# Patient Record
Sex: MALE | Race: WHITE | NOT HISPANIC OR LATINO | Employment: OTHER | URBAN - METROPOLITAN AREA
[De-identification: names, ages, dates, MRNs, and addresses within clinical notes are randomized per-mention and may not be internally consistent; named-entity substitution may affect disease eponyms.]

---

## 2018-02-20 NOTE — H&P
[facility]  TOTAL EYE CARE  [address]  [facility] [address]  [phone]  Dept: [phone]    OPHTHALMOLOGY PRE-OPERATIVE  HISTORY AND PHYSICAL    DATE OF H/P:  2018    DATE OF SURGERY:  2018  PROCEDURE:  Procedure(s):  PHACOEMULSIFICATION WITH STANDARD INTRAOCULAR LENS IMPLANT, Left Eye  LENS IMPLANT:  ZCB00 +18.0  REFRACTIVE GOAL:  PL Sph  SURGEON:  Mohit Victor MD    ANESTHESIA:  TOPICAL / MAC    OR CASE REQUIREMENTS:    DEMOGRAPHICS:  Demographic Information on Dk Randhawa:    Dk Randhawa  Gender: male  : 1955  [address] [facility] [address]  [phone] (home)     Medical Record: [id number]  Social Security Number: [id number]  Pharmacy: Data Unavailable  Primary Care Provider: Valent[facility]    Parent's names are: Data Unavailable (mother) and Data Unavailable (father).    Insurance: Payor: BCBS / [facility] Product Type: Indemnity /     OCULAR HISTORY:  Cataracts.    HISTORIES:  HTN    No past medical history on file.    No past surgical history on file.    No family history on file.    Social History   Substance Use Topics     Smoking status: Never Smoker     Smokeless tobacco: Not on file     Alcohol use Yes       MEDICATIONS:  No current facility-administered medications for this encounter.      Current Outpatient Prescriptions   Medication Sig     LISINOPRIL PO Take 20 mg by mouth 2 times daily.       metaproterenol sulfate 10 MG/5ML SYRP Take  by mouth.       METOPROLOL SUCCINATE PO Take  by mouth daily.         ALLERGIES:   No Known Allergies    PERTINENT SYSTEMS REVIEW:    1. No - Do you have a history of heart attack, stroke, stent, bypass or surgery on an artery in the head, neck, heart or legs?  2. No - Do you ever have any pain or discomfort in your chest?  3. No - Do you have a history of  Heart Failure?  4. No - Are you troubled by shortness of breath when walking: On the level, up a slight hill or  at night?  5. No - Do you currently have a cold, bronchitis or other respiratory infection?  6. No - Do you have a cough, shortness of breath or wheezing?  7. No - Do you sometimes get pains in the calves of your legs when you walk?  8. No - Do you or anyone in your family have previous history of blood clots?  9. No - Do you or does anyone in your family have a serious bleeding problem such as prolonged bleeding following surgeries or cuts?  10. No - Have you ever had problems with anemia or been told to take iron pills?  11. No - Have you had any abnormal blood loss such as black, tarry or bloody stools, or abnormal vaginal bleeding?  12. No - Have you ever had a blood transfusion?  13. No - Have you or any of your relatives ever had problems with anesthesia?  14. No - Do you have sleep apnea, excessive snoring or daytime drowsiness?  15. No - Do you have any prosthetic heart valves?  16. No - Do you have prosthetic joints?    EXAMINATION:  Vitals were reviewed                     Vison:  Va, right - 20/25, left - 20/30;   BAT, right - 20/70, left - 20/80;  HEENT:  Cataract, otherwise unremarkable.  LUNGS:  Clear  CV:  Regular rate and rhythm without murmur  ABD:  Soft and nontender  NEURO:  Alert and nonfocal    IMPRESSION:  Patient cleared for ophthalmic surgery.  Low risk with monitored, light sedation.  I have assessed the patient's DVT risk, and no additional orders necessary.    PLAN:  Procedure(s):  PHACOEMULSIFICATION WITH STANDARD INTRAOCULAR LENS IMPLANT, Left Eye      Mohit Victor MD

## 2018-02-22 ENCOUNTER — ANESTHESIA EVENT (OUTPATIENT)
Dept: SURGERY | Facility: CLINIC | Age: 63
End: 2018-02-22
Payer: COMMERCIAL

## 2018-02-22 NOTE — ANESTHESIA PREPROCEDURE EVALUATION
Anesthesia Evaluation     . Pt has had prior anesthetic. Type: General and MAC    No history of anesthetic complications          ROS/MED HX    ENT/Pulmonary:     (+)BERNARDINO risk factors hypertension, obese, tobacco use, Past use , . .    Neurologic:  - neg neurologic ROS     Cardiovascular:     (+) hypertension----. : . . . :. .       METS/Exercise Tolerance:  >4 METS   Hematologic:  - neg hematologic  ROS       Musculoskeletal:  - neg musculoskeletal ROS       GI/Hepatic:  - neg GI/hepatic ROS       Renal/Genitourinary:  - ROS Renal section negative       Endo:  - neg endo ROS       Psychiatric:  - neg psychiatric ROS       Infectious Disease:  - neg infectious disease ROS       Malignancy:      - no malignancy   Other:                     Physical Exam  Normal systems: cardiovascular, pulmonary and dental    Airway   Mallampati: I  TM distance: >3 FB  Neck ROM: full    Dental     Cardiovascular       Pulmonary                     Anesthesia Plan      History & Physical Review  History and physical reviewed and following examination; no interval change.    ASA Status:  2 .    NPO Status:  > 8 hours    Plan for MAC Reason for MAC:  Deep or markedly invasive procedure (G8)         Postoperative Care      Consents  Anesthetic plan, risks, benefits and alternatives discussed with:  Patient..                          .

## 2018-02-26 ENCOUNTER — SURGERY (OUTPATIENT)
Age: 63
End: 2018-02-26

## 2018-02-26 ENCOUNTER — ANESTHESIA (OUTPATIENT)
Dept: SURGERY | Facility: CLINIC | Age: 63
End: 2018-02-26
Payer: COMMERCIAL

## 2018-02-26 ENCOUNTER — HOSPITAL ENCOUNTER (OUTPATIENT)
Facility: CLINIC | Age: 63
Discharge: HOME | End: 2018-02-26
Attending: OPHTHALMOLOGY | Admitting: OPHTHALMOLOGY
Payer: COMMERCIAL

## 2018-02-26 VITALS
HEIGHT: 74 IN | SYSTOLIC BLOOD PRESSURE: 115 MMHG | RESPIRATION RATE: 16 BRPM | TEMPERATURE: 98 F | DIASTOLIC BLOOD PRESSURE: 90 MMHG | WEIGHT: 290 LBS | BODY MASS INDEX: 37.22 KG/M2 | OXYGEN SATURATION: 92 %

## 2018-02-26 PROCEDURE — 37000012 ZZH ANESTHESIA CATARACT PACKAGE: Performed by: OPHTHALMOLOGY

## 2018-02-26 PROCEDURE — 71000022 ZZH RECOVERY CATRACT PACKAGE: Performed by: OPHTHALMOLOGY

## 2018-02-26 PROCEDURE — V2632 POST CHMBR INTRAOCULAR LENS: HCPCS | Performed by: OPHTHALMOLOGY

## 2018-02-26 PROCEDURE — 25000125 ZZHC RX 250: Performed by: OPHTHALMOLOGY

## 2018-02-26 PROCEDURE — 25000125 ZZHC RX 250: Performed by: NURSE ANESTHETIST, CERTIFIED REGISTERED

## 2018-02-26 PROCEDURE — 25000128 H RX IP 250 OP 636: Performed by: OPHTHALMOLOGY

## 2018-02-26 PROCEDURE — 25000128 H RX IP 250 OP 636: Performed by: NURSE ANESTHETIST, CERTIFIED REGISTERED

## 2018-02-26 PROCEDURE — 36000025 ZZH CATARACT SURGICAL PACKAGE: Performed by: OPHTHALMOLOGY

## 2018-02-26 DEVICE — EYE IMP IOL AMO PCL TECNIS ZCB00 18.0: Type: IMPLANTABLE DEVICE | Site: EYE | Status: FUNCTIONAL

## 2018-02-26 RX ORDER — CYCLOPENTOLATE HYDROCHLORIDE 10 MG/ML
1 SOLUTION/ DROPS OPHTHALMIC
Status: COMPLETED | OUTPATIENT
Start: 2018-02-26 | End: 2018-02-26

## 2018-02-26 RX ORDER — LIDOCAINE 40 MG/G
CREAM TOPICAL
Status: DISCONTINUED | OUTPATIENT
Start: 2018-02-26 | End: 2018-02-26 | Stop reason: HOSPADM

## 2018-02-26 RX ORDER — BALANCED SALT SOLUTION 6.4; .75; .48; .3; 3.9; 1.7 MG/ML; MG/ML; MG/ML; MG/ML; MG/ML; MG/ML
SOLUTION OPHTHALMIC PRN
Status: DISCONTINUED | OUTPATIENT
Start: 2018-02-26 | End: 2018-02-26 | Stop reason: HOSPADM

## 2018-02-26 RX ORDER — PHENYLEPHRINE HYDROCHLORIDE 25 MG/ML
1 SOLUTION/ DROPS OPHTHALMIC
Status: COMPLETED | OUTPATIENT
Start: 2018-02-26 | End: 2018-02-26

## 2018-02-26 RX ORDER — SODIUM CHLORIDE, SODIUM LACTATE, POTASSIUM CHLORIDE, CALCIUM CHLORIDE 600; 310; 30; 20 MG/100ML; MG/100ML; MG/100ML; MG/100ML
INJECTION, SOLUTION INTRAVENOUS CONTINUOUS
Status: DISCONTINUED | OUTPATIENT
Start: 2018-02-26 | End: 2018-02-26 | Stop reason: HOSPADM

## 2018-02-26 RX ORDER — PROPARACAINE HYDROCHLORIDE 5 MG/ML
SOLUTION/ DROPS OPHTHALMIC PRN
Status: DISCONTINUED | OUTPATIENT
Start: 2018-02-26 | End: 2018-02-26 | Stop reason: HOSPADM

## 2018-02-26 RX ORDER — TROPICAMIDE 10 MG/ML
1 SOLUTION/ DROPS OPHTHALMIC
Status: COMPLETED | OUTPATIENT
Start: 2018-02-26 | End: 2018-02-26

## 2018-02-26 RX ADMIN — PHENYLEPHRINE HYDROCHLORIDE 1 DROP: 25 SOLUTION/ DROPS OPHTHALMIC at 08:46

## 2018-02-26 RX ADMIN — LIDOCAINE HYDROCHLORIDE 1 ML: 10 INJECTION, SOLUTION EPIDURAL; INFILTRATION; INTRACAUDAL; PERINEURAL at 08:57

## 2018-02-26 RX ADMIN — TROPICAMIDE 1 DROP: 10 SOLUTION/ DROPS OPHTHALMIC at 08:46

## 2018-02-26 RX ADMIN — PROPARACAINE HYDROCHLORIDE 2 DROP: 5 SOLUTION/ DROPS OPHTHALMIC at 10:18

## 2018-02-26 RX ADMIN — PHENYLEPHRINE HYDROCHLORIDE 1 DROP: 25 SOLUTION/ DROPS OPHTHALMIC at 09:11

## 2018-02-26 RX ADMIN — EPINEPHRINE 0.7 ML: 1 INJECTION, SOLUTION INTRAMUSCULAR; SUBCUTANEOUS at 10:20

## 2018-02-26 RX ADMIN — BALANCED SALT SOLUTION 175 ML: 6.4; .75; .48; .3; 3.9; 1.7 SOLUTION OPHTHALMIC at 10:19

## 2018-02-26 RX ADMIN — CYCLOPENTOLATE HYDROCHLORIDE 1 DROP: 10 SOLUTION/ DROPS OPHTHALMIC at 08:56

## 2018-02-26 RX ADMIN — TROPICAMIDE 1 DROP: 10 SOLUTION/ DROPS OPHTHALMIC at 08:56

## 2018-02-26 RX ADMIN — CYCLOPENTOLATE HYDROCHLORIDE 1 DROP: 10 SOLUTION/ DROPS OPHTHALMIC at 09:11

## 2018-02-26 RX ADMIN — MOXIFLOXACIN HYDROCHLORIDE 0.7 ML: 5 SOLUTION/ DROPS OPHTHALMIC at 10:31

## 2018-02-26 RX ADMIN — TROPICAMIDE 1 DROP: 10 SOLUTION/ DROPS OPHTHALMIC at 09:11

## 2018-02-26 RX ADMIN — MIDAZOLAM 2 MG: 1 INJECTION INTRAMUSCULAR; INTRAVENOUS at 10:12

## 2018-02-26 RX ADMIN — PHENYLEPHRINE HYDROCHLORIDE 1 DROP: 25 SOLUTION/ DROPS OPHTHALMIC at 08:56

## 2018-02-26 RX ADMIN — CYCLOPENTOLATE HYDROCHLORIDE 1 DROP: 10 SOLUTION/ DROPS OPHTHALMIC at 08:46

## 2018-02-26 ASSESSMENT — LIFESTYLE VARIABLES: TOBACCO_USE: 1

## 2018-02-26 NOTE — OP NOTE
CATARACT OPERATIVE NOTE    PATIENT: Dk Randhawa  DATE OF SURGERY: 2/26/2018  PREOPERATIVE DIAGNOSIS:  Senile Nuclear Cataract, Left eye  POSTOPERATIVE DIAGNOSIS:  Senile Nuclear Cataract, Left eye  OPERATIVE PROCEDURE:  Phacoemulsification and placement of intraocular lens  SURGEON:  Mohit Victor MD  ANESTHESIA:  Topical / MAC  EBL:  None  SPECIMENS:  None  COMPLICATIONS:  None    PROCEDURE:  The patient was brought to the operating room at [facility].  The left eye was prepped and draped in the usual fashion for cataract surgery.  A wire lid speculum was inserted.  A super sharp blade was used to make a paracentesis at the 5 O'clock position.  The super sharp blade was used to make a partial thickness temporal groove, which was 3 mm in length.  0.8 mL of non-preserved epi-Shugarcaine was injected into the anterior chamber.  Viscoelastic was used to inflate the anterior chamber through a cannula.  A 2.5 mm microkeratome was used to make a temporal clear corneal incision in a two-plane fashion.  A cystotome needle and forceps were used to make a capsulorrhexis.  Hydrodissection and hydrodelineation were performed with Balance Salt Solution.  The lens was then phacoemulsified and removed without complications.  The cortical material was removed with bimanual irrigation and aspiration.  The capsular bag was filled with viscoelastic.  A posterior chamber intraocular lens, preselected and recorded, was folded and inserted into the capsular bag.  The viscoelastic was removed with the irrigation and aspiration tip.  Balanced Salt Solution with Vigamox, 150mg/0.1mL, was used to refill the anterior chamber.  The wounds were checked for water tightness and required no suture.  The wire lid speculum was removed.  The patient's left eye was cleaned and a drop of each post-operative drop was placed, followed by a serna shield.  The patient tolerated the procedure well, and there were no  complications.      Mohit Victor MD

## 2018-02-26 NOTE — ANESTHESIA CARE TRANSFER NOTE
Patient: Dk Randhawa    Procedure(s):  Left Cataract Removal with Implant - Wound Class: I-Clean    Diagnosis: cataract  Diagnosis Additional Information: No value filed.    Anesthesia Type:   MAC     Note:  Airway :Room Air  Patient transferred to:Phase II  Comments: Patient's VSS. Spontaneous respirations. Patient awake and oriented. IV patent. Report to RN.Handoff Report: Identifed the Patient, Identified the Reponsible Provider, Reviewed the pertinent medical history, Discussed the surgical course, Reviewed Intra-OP anesthesia mangement and issues during anesthesia, Set expectations for post-procedure period and Allowed opportunity for questions and acknowledgement of understanding      Vitals: (Last set prior to Anesthesia Care Transfer)    CRNA VITALS  2/26/2018 1002 - 2/26/2018 1033      2/26/2018             NIBP: 114/73    Pulse: 86    NIBP Mean: 87    SpO2: 92 %                Electronically Signed By: DIEGO Whitehead CRNA  February 26, 2018  10:33 AM

## 2019-01-25 NOTE — H&P
[facility]  TOTAL EYE CARE  [address]  [facility] [address]  [phone]  Dept: [phone]    OPHTHALMOLOGY PRE-OPERATIVE  HISTORY AND PHYSICAL    DATE OF H/P:  2019    DATE OF SURGERY:  2019  PROCEDURE:  Procedure(s):  Cataract Removal with Implant, Right Eye  LENS IMPLANT:  ZCB00 +17.5  REFRACTIVE GOAL:  PL Sph  SURGEON:  Mohit Victor MD    ANESTHESIA:  TOPICAL / MAC    OR CASE REQUIREMENTS:    DEMOGRAPHICS:  Demographic Information on Dk Randhawa:    Dk Randhawa  Gender: male  : 1955  [address]  [facility] [address]  [phone] (home)     Medical Record: [id number]  Social Security Number: [id number]  Pharmacy: Data Unavailable  Primary Care Provider: Valentín [facility]    Parent's names are: Data Unavailable (mother) and Data Unavailable (father).    Insurance: Payor: BCBS / Plan: [facility] OUT OF STATE / Product Type: Indemnity /     OCULAR HISTORY:  Cataracts, s/p IOL left eye.    HISTORIES:  Past Medical History:   Diagnosis Date     Hypertension        Past Surgical History:   Procedure Laterality Date     ORTHOPEDIC SURGERY       PHACOEMULSIFICATION WITH STANDARD INTRAOCULAR LENS IMPLANT Left 2018    Procedure: PHACOEMULSIFICATION WITH STANDARD INTRAOCULAR LENS IMPLANT;  Left Cataract Removal with Implant;  Surgeon: Mohit Victor MD;  Location: [facility] OR       No family history on file.    Social History     Tobacco Use     Smoking status: Never Smoker     Smokeless tobacco: Never Used   Substance Use Topics     Alcohol use: Yes       MEDICATIONS:  No current facility-administered medications for this encounter.      Current Outpatient Medications   Medication Sig     AMLODIPINE BESYLATE PO Take 10 mg by mouth     LOSARTAN POTASSIUM PO Take 100 mg by mouth       ALLERGIES:   No Known Allergies    PERTINENT SYSTEMS REVIEW:    1. No - Do you have a history of heart attack, stroke, stent, bypass or surgery on an  artery in the head, neck, heart or legs?  2. No - Do you ever have any pain or discomfort in your chest?  3. No - Do you have a history of  Heart Failure?  4. No - Are you troubled by shortness of breath when walking: On the level, up a slight hill or at night?  5. No - Do you currently have a cold, bronchitis or other respiratory infection?  6. No - Do you have a cough, shortness of breath or wheezing?  7. No - Do you sometimes get pains in the calves of your legs when you walk?  8. No - Do you or anyone in your family have previous history of blood clots?  9. No - Do you or does anyone in your family have a serious bleeding problem such as prolonged bleeding following surgeries or cuts?  10. No - Have you ever had problems with anemia or been told to take iron pills?  11. No - Have you had any abnormal blood loss such as black, tarry or bloody stools, or abnormal vaginal bleeding?  12. No - Have you ever had a blood transfusion?  13. No - Have you or any of your relatives ever had problems with anesthesia?  14. No - Do you have sleep apnea, excessive snoring or daytime drowsiness?  15. No - Do you have any prosthetic heart valves?  16. No - Do you have prosthetic joints?    EXAMINATION:  Vitals were reviewed                     Vison:  Va, right - 20/30;   BAT, right - 20/80;  HEENT:  Cataract, otherwise unremarkable.  LUNGS:  Clear  CV:  Regular rate and rhythm without murmur  ABD:  Soft and nontender  NEURO:  Alert and nonfocal    IMPRESSION:  Patient cleared for ophthalmic surgery.  Low risk with monitored, light sedation.  I have assessed the patient's DVT risk, and no additional orders necessary.    PLAN:  Procedure(s):  Cataract Removal with Implant, Right Eye      Mohit Victor MD

## 2019-01-29 ENCOUNTER — ANESTHESIA EVENT (OUTPATIENT)
Dept: SURGERY | Facility: CLINIC | Age: 64
End: 2019-01-29
Payer: COMMERCIAL

## 2019-01-29 ASSESSMENT — LIFESTYLE VARIABLES: TOBACCO_USE: 1

## 2019-01-29 NOTE — ANESTHESIA PREPROCEDURE EVALUATION
Anesthesia Pre-Procedure Evaluation    Patient: Dk Randhawa   MRN: [id number] : 1955          Preoperative Diagnosis: cataract    Procedure(s):  Cataract Removal with Implant    Past Medical History:   Diagnosis Date     Hypertension      Past Surgical History:   Procedure Laterality Date     ORTHOPEDIC SURGERY       PHACOEMULSIFICATION WITH STANDARD INTRAOCULAR LENS IMPLANT Left 2018    Procedure: PHACOEMULSIFICATION WITH STANDARD INTRAOCULAR LENS IMPLANT;  Left Cataract Removal with Implant;  Surgeon: Mohit Victor MD;  Location: [facility] OR       Anesthesia Evaluation     . Pt has had prior anesthetic. Type: MAC and Regional           ROS/MED HX    ENT/Pulmonary:     (+)BERNARDINO risk factors hypertension, tobacco use, Past use , . .    Neurologic:       Cardiovascular:     (+) Dyslipidemia, hypertension----. : . . . :. .       METS/Exercise Tolerance:     Hematologic:         Musculoskeletal:         GI/Hepatic:         Renal/Genitourinary:         Endo:         Psychiatric:         Infectious Disease:         Malignancy:         Other:                          Physical Exam  Normal systems: cardiovascular and dental    Airway   Mallampati: I  TM distance: >3 FB  Neck ROM: full    Dental     Cardiovascular   Rhythm and rate: regular and normal      Pulmonary    breath sounds clear to auscultation            No results found for: WBC, HGB, HCT, PLT, CRP, SED, NA, POTASSIUM, CHLORIDE, CO2, BUN, CR, GLC, COLLINS, PHOS, MAG, ALBUMIN, PROTTOTAL, ALT, AST, GGT, ALKPHOS, BILITOTAL, BILIDIRECT, LIPASE, AMYLASE, JOSE F, PTT, INR, FIBR, TSH, T4, T3, HCG, HCGS, CKTOTAL, CKMB, TROPN    Preop Vitals  BP Readings from Last 3 Encounters:   18 115/90   12 167/110    Pulse Readings from Last 3 Encounters:   12 83      Resp Readings from Last 3 Encounters:   18 16    SpO2 Readings from Last 3 Encounters:   18 92%   12 99%      Temp Readings from Last 1 Encounters:   18 36.7  C  "(98  F) (Oral)    Ht Readings from Last 1 Encounters:   02/26/18 1.88 m (6' 2\")      Wt Readings from Last 1 Encounters:   02/26/18 131.5 kg (290 lb)    Estimated body mass index is 37.23 kg/m  as calculated from the following:    Height as of 2/26/18: 1.88 m (6' 2\").    Weight as of 2/26/18: 131.5 kg (290 lb).       Anesthesia Plan      History & Physical Review  History and physical reviewed and following examination; no interval change.    ASA Status:  3 .    NPO Status:  > 6 hours    Plan for MAC Reason for MAC:  Deep or markedly invasive procedure (G8)         Postoperative Care      Consents  Anesthetic plan, risks, benefits and alternatives discussed with:  Patient..                 DIEGO Carty CRNA  "

## 2019-01-30 ENCOUNTER — HOSPITAL ENCOUNTER (OUTPATIENT)
Facility: CLINIC | Age: 64
Discharge: HOME | End: 2019-01-30
Attending: OPHTHALMOLOGY | Admitting: OPHTHALMOLOGY
Payer: COMMERCIAL

## 2019-01-30 ENCOUNTER — ANESTHESIA (OUTPATIENT)
Dept: SURGERY | Facility: CLINIC | Age: 64
End: 2019-01-30
Payer: COMMERCIAL

## 2019-01-30 VITALS
OXYGEN SATURATION: 95 % | WEIGHT: 280 LBS | HEART RATE: 76 BPM | HEIGHT: 73 IN | TEMPERATURE: 98.3 F | BODY MASS INDEX: 37.11 KG/M2 | DIASTOLIC BLOOD PRESSURE: 85 MMHG | SYSTOLIC BLOOD PRESSURE: 129 MMHG | RESPIRATION RATE: 16 BRPM

## 2019-01-30 PROCEDURE — 71000022 ZZH RECOVERY CATRACT PACKAGE: Performed by: OPHTHALMOLOGY

## 2019-01-30 PROCEDURE — 25000128 H RX IP 250 OP 636: Performed by: OPHTHALMOLOGY

## 2019-01-30 PROCEDURE — 25000125 ZZHC RX 250: Performed by: NURSE ANESTHETIST, CERTIFIED REGISTERED

## 2019-01-30 PROCEDURE — 25000128 H RX IP 250 OP 636: Performed by: NURSE ANESTHETIST, CERTIFIED REGISTERED

## 2019-01-30 PROCEDURE — 37000012 ZZH ANESTHESIA CATARACT PACKAGE: Performed by: OPHTHALMOLOGY

## 2019-01-30 PROCEDURE — 25000125 ZZHC RX 250: Performed by: OPHTHALMOLOGY

## 2019-01-30 PROCEDURE — V2632 POST CHMBR INTRAOCULAR LENS: HCPCS | Performed by: OPHTHALMOLOGY

## 2019-01-30 PROCEDURE — 36000025 ZZH CATARACT SURGICAL PACKAGE: Performed by: OPHTHALMOLOGY

## 2019-01-30 DEVICE — EYE IMP IOL AMO PCL TECNIS ZCB00 17.5: Type: IMPLANTABLE DEVICE | Site: EYE | Status: FUNCTIONAL

## 2019-01-30 RX ORDER — PHENYLEPHRINE HYDROCHLORIDE 25 MG/ML
1 SOLUTION/ DROPS OPHTHALMIC
Status: COMPLETED | OUTPATIENT
Start: 2019-01-30 | End: 2019-01-30

## 2019-01-30 RX ORDER — SODIUM CHLORIDE, SODIUM LACTATE, POTASSIUM CHLORIDE, CALCIUM CHLORIDE 600; 310; 30; 20 MG/100ML; MG/100ML; MG/100ML; MG/100ML
INJECTION, SOLUTION INTRAVENOUS CONTINUOUS
Status: CANCELLED | OUTPATIENT
Start: 2019-01-30

## 2019-01-30 RX ORDER — BALANCED SALT SOLUTION 6.4; .75; .48; .3; 3.9; 1.7 MG/ML; MG/ML; MG/ML; MG/ML; MG/ML; MG/ML
SOLUTION OPHTHALMIC PRN
Status: DISCONTINUED | OUTPATIENT
Start: 2019-01-30 | End: 2019-01-30 | Stop reason: HOSPADM

## 2019-01-30 RX ORDER — SODIUM CHLORIDE, SODIUM LACTATE, POTASSIUM CHLORIDE, CALCIUM CHLORIDE 600; 310; 30; 20 MG/100ML; MG/100ML; MG/100ML; MG/100ML
INJECTION, SOLUTION INTRAVENOUS CONTINUOUS
Status: DISCONTINUED | OUTPATIENT
Start: 2019-01-30 | End: 2019-01-30 | Stop reason: HOSPADM

## 2019-01-30 RX ORDER — CYCLOPENTOLATE HYDROCHLORIDE 10 MG/ML
1 SOLUTION/ DROPS OPHTHALMIC
Status: COMPLETED | OUTPATIENT
Start: 2019-01-30 | End: 2019-01-30

## 2019-01-30 RX ORDER — TETRACAINE HYDROCHLORIDE 5 MG/ML
SOLUTION OPHTHALMIC PRN
Status: DISCONTINUED | OUTPATIENT
Start: 2019-01-30 | End: 2019-01-30 | Stop reason: HOSPADM

## 2019-01-30 RX ORDER — LIDOCAINE 40 MG/G
CREAM TOPICAL
Status: DISCONTINUED | OUTPATIENT
Start: 2019-01-30 | End: 2019-01-30 | Stop reason: HOSPADM

## 2019-01-30 RX ORDER — PROPOFOL 10 MG/ML
INJECTION, EMULSION INTRAVENOUS PRN
Status: DISCONTINUED | OUTPATIENT
Start: 2019-01-30 | End: 2019-01-30

## 2019-01-30 RX ORDER — TROPICAMIDE 10 MG/ML
1 SOLUTION/ DROPS OPHTHALMIC
Status: COMPLETED | OUTPATIENT
Start: 2019-01-30 | End: 2019-01-30

## 2019-01-30 RX ADMIN — CYCLOPENTOLATE HYDROCHLORIDE 1 DROP: 10 SOLUTION/ DROPS OPHTHALMIC at 06:52

## 2019-01-30 RX ADMIN — PHENYLEPHRINE HYDROCHLORIDE 1 DROP: 25 SOLUTION/ DROPS OPHTHALMIC at 07:02

## 2019-01-30 RX ADMIN — SODIUM CHLORIDE, POTASSIUM CHLORIDE, SODIUM LACTATE AND CALCIUM CHLORIDE 1000 ML: 600; 310; 30; 20 INJECTION, SOLUTION INTRAVENOUS at 06:57

## 2019-01-30 RX ADMIN — PHENYLEPHRINE HYDROCHLORIDE 1 DROP: 25 SOLUTION/ DROPS OPHTHALMIC at 06:58

## 2019-01-30 RX ADMIN — CYCLOPENTOLATE HYDROCHLORIDE 1 DROP: 10 SOLUTION/ DROPS OPHTHALMIC at 06:57

## 2019-01-30 RX ADMIN — CYCLOPENTOLATE HYDROCHLORIDE 1 DROP: 10 SOLUTION/ DROPS OPHTHALMIC at 07:02

## 2019-01-30 RX ADMIN — PROPOFOL 25 MG: 10 INJECTION, EMULSION INTRAVENOUS at 08:23

## 2019-01-30 RX ADMIN — PHENYLEPHRINE HYDROCHLORIDE 1 DROP: 25 SOLUTION/ DROPS OPHTHALMIC at 06:53

## 2019-01-30 RX ADMIN — TROPICAMIDE 1 DROP: 10 SOLUTION/ DROPS OPHTHALMIC at 07:03

## 2019-01-30 RX ADMIN — TROPICAMIDE 1 DROP: 10 SOLUTION/ DROPS OPHTHALMIC at 06:58

## 2019-01-30 RX ADMIN — TROPICAMIDE 1 DROP: 10 SOLUTION/ DROPS OPHTHALMIC at 06:53

## 2019-01-30 RX ADMIN — LIDOCAINE HYDROCHLORIDE 1 ML: 10 INJECTION, SOLUTION EPIDURAL; INFILTRATION; INTRACAUDAL; PERINEURAL at 06:57

## 2019-01-30 RX ADMIN — PROPOFOL 25 MG: 10 INJECTION, EMULSION INTRAVENOUS at 08:22

## 2019-01-30 ASSESSMENT — MIFFLIN-ST. JEOR: SCORE: 2113.95

## 2019-01-30 NOTE — OP NOTE
OPHTHALMOLOGY OPERATIVE NOTE    PATIENT: Dk Randhawa  DATE OF SURGERY: 1/30/2019  PREOPERATIVE DIAGNOSIS:  Senile Nuclear Cataract, Right eye  POSTOPERATIVE DIAGNOSIS:  Senile Nuclear Cataract, Right eye  OPERATIVE PROCEDURE:  Phacoemulsification with placement of intraocular lens  SURGEON:  Mohit Victor MD  ANESTHESIA:  Topical / MAC  EBL:  None  SPECIMENS:  None  COMPLICATIONS:  None    PROCEDURE:  The patient was brought to the operating room at [facility].  The right eye was prepped and draped in the usual fashion for cataract surgery.  A wire lid speculum was inserted.  A super sharp blade was used to make a paracentesis at the 11 O'clock position.  The super sharp blade was used to make a partial thickness temporal groove, which was 3 mm in length.  0.8 mL of non-preserved epi-Shugarcaine was injected into the anterior chamber.  Viscoelastic was used to inflate the anterior chamber through a cannula.  A 2.5 mm microkeratome was used to make a temporal clear corneal incision in a two-plane fashion.  A cystotome needle and forceps were used to make a capsulorrhexis.  Hydrodissection and hydrodelineation were performed with Balance Salt Solution.  The lens was then phacoemulsified and removed without complications.  The cortical material was removed with bimanual irrigation and aspiration.  The capsular bag was filled with viscoelastic.  A posterior chamber intraocular lens, preselected and recorded, was folded and inserted into the capsular bag.  The viscoelastic was removed with the irrigation and aspiration tip.  Balanced Salt Solution with Vigamox, 150mg/0.1mL, was used to refill the anterior chamber.  The wounds were checked for water tightness and required no suture.  The wire lid speculum was removed.  The patient's right eye was cleaned and a drop of each post-operative drop was placed, followed by a serna shield.  The patient tolerated the procedure well, and there  were no complications.      Mohit Victor MD

## 2019-01-30 NOTE — ANESTHESIA POSTPROCEDURE EVALUATION
Patient: Dk Randhawa    Procedure(s):  Cataract Removal with Implant    Diagnosis:cataract  Diagnosis Additional Information: No value filed.    Anesthesia Type:  No value filed.    Note:  Anesthesia Post Evaluation    Patient location during evaluation: Bedside  Patient participation: Able to fully participate in evaluation  Level of consciousness: awake and alert  Pain management: adequate  Airway patency: patent  Cardiovascular status: acceptable  Respiratory status: acceptable  Hydration status: acceptable  PONV: none     Anesthetic complications: None          Last vitals:  Vitals:    01/30/19 0625   BP: 119/89   Pulse: 89   Resp: 18   Temp: 36.8  C (98.3  F)   SpO2: 95%         Electronically Signed By: DIEGO Rome CRNA  January 30, 2019  8:53 AM

## 2022-04-06 ENCOUNTER — HOSPITAL ENCOUNTER (EMERGENCY)
Facility: CLINIC | Age: 67
Discharge: HOME | End: 2022-04-06
Attending: EMERGENCY MEDICINE | Admitting: EMERGENCY MEDICINE
Payer: COMMERCIAL

## 2022-04-06 ENCOUNTER — OFFICE VISIT (OUTPATIENT)
Dept: URGENT CARE | Facility: URGENT CARE | Age: 67
End: 2022-04-06
Payer: COMMERCIAL

## 2022-04-06 ENCOUNTER — NURSE TRIAGE (OUTPATIENT)
Dept: NURSING | Facility: CLINIC | Age: 67
End: 2022-04-06
Payer: MEDICARE

## 2022-04-06 ENCOUNTER — APPOINTMENT (OUTPATIENT)
Dept: CT IMAGING | Facility: CLINIC | Age: 67
End: 2022-04-06
Attending: EMERGENCY MEDICINE
Payer: COMMERCIAL

## 2022-04-06 VITALS
OXYGEN SATURATION: 98 % | BODY MASS INDEX: 35.21 KG/M2 | WEIGHT: 260 LBS | SYSTOLIC BLOOD PRESSURE: 159 MMHG | HEIGHT: 72 IN | TEMPERATURE: 97.7 F | RESPIRATION RATE: 18 BRPM | HEART RATE: 92 BPM | DIASTOLIC BLOOD PRESSURE: 108 MMHG

## 2022-04-06 VITALS
HEART RATE: 93 BPM | OXYGEN SATURATION: 97 % | BODY MASS INDEX: 34.3 KG/M2 | WEIGHT: 260 LBS | DIASTOLIC BLOOD PRESSURE: 99 MMHG | SYSTOLIC BLOOD PRESSURE: 168 MMHG | TEMPERATURE: 97.5 F | RESPIRATION RATE: 18 BRPM

## 2022-04-06 DIAGNOSIS — R10.31 RLQ ABDOMINAL PAIN: Primary | ICD-10-CM

## 2022-04-06 DIAGNOSIS — R10.31 ABDOMINAL PAIN, RIGHT LOWER QUADRANT: ICD-10-CM

## 2022-04-06 LAB
ALBUMIN UR-MCNC: NEGATIVE MG/DL
APPEARANCE UR: CLEAR
BILIRUB UR QL STRIP: NEGATIVE
COLOR UR AUTO: YELLOW
ERYTHROCYTE [DISTWIDTH] IN BLOOD BY AUTOMATED COUNT: 12.7 % (ref 10–15)
GLUCOSE UR STRIP-MCNC: NEGATIVE MG/DL
HCT VFR BLD AUTO: 43.9 % (ref 40–53)
HGB BLD-MCNC: 14.8 G/DL (ref 13.3–17.7)
HGB UR QL STRIP: ABNORMAL
KETONES UR STRIP-MCNC: 80 MG/DL
LEUKOCYTE ESTERASE UR QL STRIP: NEGATIVE
MCH RBC QN AUTO: 30 PG (ref 26.5–33)
MCHC RBC AUTO-ENTMCNC: 33.7 G/DL (ref 31.5–36.5)
MCV RBC AUTO: 89 FL (ref 78–100)
NITRATE UR QL: NEGATIVE
PH UR STRIP: 6 [PH] (ref 5–7)
PLATELET # BLD AUTO: 259 10E3/UL (ref 150–450)
RBC # BLD AUTO: 4.93 10E6/UL (ref 4.4–5.9)
RBC #/AREA URNS AUTO: NORMAL /HPF
SP GR UR STRIP: 1.01 (ref 1–1.03)
UROBILINOGEN UR STRIP-ACNC: 0.2 E.U./DL
WBC # BLD AUTO: 9.8 10E3/UL (ref 4–11)
WBC #/AREA URNS AUTO: NORMAL /HPF

## 2022-04-06 PROCEDURE — 81001 URINALYSIS AUTO W/SCOPE: CPT | Performed by: NURSE PRACTITIONER

## 2022-04-06 PROCEDURE — 250N000011 HC RX IP 250 OP 636: Performed by: EMERGENCY MEDICINE

## 2022-04-06 PROCEDURE — 36415 COLL VENOUS BLD VENIPUNCTURE: CPT | Performed by: NURSE PRACTITIONER

## 2022-04-06 PROCEDURE — 99285 EMERGENCY DEPT VISIT HI MDM: CPT | Mod: 25 | Performed by: EMERGENCY MEDICINE

## 2022-04-06 PROCEDURE — 250N000009 HC RX 250: Performed by: EMERGENCY MEDICINE

## 2022-04-06 PROCEDURE — 99284 EMERGENCY DEPT VISIT MOD MDM: CPT | Performed by: EMERGENCY MEDICINE

## 2022-04-06 PROCEDURE — 74177 CT ABD & PELVIS W/CONTRAST: CPT

## 2022-04-06 PROCEDURE — 85027 COMPLETE CBC AUTOMATED: CPT | Performed by: NURSE PRACTITIONER

## 2022-04-06 PROCEDURE — 99203 OFFICE O/P NEW LOW 30 MIN: CPT | Performed by: NURSE PRACTITIONER

## 2022-04-06 RX ORDER — IOPAMIDOL 755 MG/ML
100 INJECTION, SOLUTION INTRAVASCULAR ONCE
Status: COMPLETED | OUTPATIENT
Start: 2022-04-06 | End: 2022-04-06

## 2022-04-06 RX ADMIN — IOPAMIDOL 100 ML: 755 INJECTION, SOLUTION INTRAVENOUS at 21:35

## 2022-04-06 RX ADMIN — SODIUM CHLORIDE 72 ML: 9 INJECTION, SOLUTION INTRAVENOUS at 21:35

## 2022-04-06 NOTE — ED TRIAGE NOTES
Pt sent from [facility] with RLQ abdominal pain that started a little bit yesterday. Worse today. No nausea, vomiting or diarrhea.

## 2022-04-06 NOTE — TELEPHONE ENCOUNTER
Pt calling    Has been having RLQ pain that radiates to his lower back that started this morning    Denies N/V, diarrhea, CP, or difficulty urinating. Has had 2 BMs today     Rates pain 5/10 that becomes sharp when he pushes on it       Reason for Disposition    Constant abdominal pain lasting > 2 hours    Additional Information    Negative: Passed out (i.e., fainted, collapsed and was not responding)    Negative: Chest pain    Negative: Pain is mainly in upper abdomen (if needed ask: 'is it mainly above the belly button?')    Negative: Shock suspected (e.g., cold/pale/clammy skin, too weak to stand, low BP, rapid pulse)    Negative: Sounds like a life-threatening emergency to the triager    Negative: SEVERE abdominal pain (e.g., excruciating)    Negative: Vomiting red blood or black (coffee ground) material    Negative: Bloody, black, or tarry bowel movements (Exception: chronic-unchanged black-grey bowel movements and is taking iron pills or Pepto-bismol)    Negative: Unable to urinate (or only a few drops) and bladder feels very full    Negative: Pain in scrotum persists > 1 hour    Protocols used: ABDOMINAL PAIN - MALE-A-OH    COVID 19 Nurse Triage Plan/Patient Instructions    Please be aware that novel coronavirus (COVID-19) may be circulating in the community. If you develop symptoms such as fever, cough, or SOB or if you have concerns about the presence of another infection including coronavirus (COVID-19), please contact your health care provider or visit [email or web address].     Disposition/Instructions    In-Person Visit with provider recommended. Reference Visit Selection Guide.    Thank you for taking steps to prevent the spread of this virus.  o Limit your contact with others.  o Wear a simple mask to cover your cough.  o Wash your hands well and often.    Resources    [facility]: About COVID-19: [email or web address]    CDC: What to Do If You're Sick:  www.cdc.gov/coronavirus/2019-ncov/about/steps-when-sick.html    CDC: Ending Home Isolation: www.cdc.gov/coronavirus/2019-ncov/hcp/disposition-in-home-patients.html     CDC: Caring for Someone: www.cdc.gov/coronavirus/2019-ncov/if-you-are-sick/care-for-someone.html     [facility]: Interim Guidance for Hospital Discharge to Home: [email or web address]./diseases/coronavirus/hcp/hospdischarge.pdf    [facility] clinical trials (COVID-19 research studies): clinicalaffairs.[facility]-clinical-trials     Below are the COVID-19 hotlines at the [place] [facility] ([facility]). Interpreters are available.   o For health questions: Call 312-618-7206 or 1-506.239.8673 (7 a.m. to 7 p.m.)  o For questions about schools and childcare: Call 554-520-3241 or 1-714.908.5239 (7 a.m. to 7 p.m.)         Sanna Little RN [facility] April 6, 2022 3:18 PM

## 2022-04-06 NOTE — PROGRESS NOTES
SUBJECTIVE:  Dk Randhawa is a 66 year old male seen in clinic today for evaluation of abdomen pain.   Symptoms have beening going on for 1 day.    Pain is located in the LLQ region, with radiation to radiates to the back, and are at worst a  on a scale of 1-10.    Past Medical History:   Diagnosis Date     Hypertension        Social History     Tobacco Use     Smoking status: Never Smoker     Smokeless tobacco: Never Used   Substance Use Topics     Alcohol use: Yes     Drug use: No       ROS:  Review of systems negative except as stated above.      OBJECTIVE:  Blood pressure (!) 168/99, pulse 93, temperature 97.5  F (36.4  C), temperature source Tympanic, resp. rate 18, weight 117.9 kg (260 lb), SpO2 97 %.  Exam:  Constitutional: healthy, alert and no distress  Head: Normocephalic. No masses, lesions, tenderness or abnormalities  Cardiovascular: negative, PMI normal. No lifts, heaves, or thrills. RRR. No murmurs, clicks gallops or rub  Respiratory: negative, Percussion normal. Good diaphragmatic excursion. Lungs clear  Gastrointestinal: Abdomen soft, non-tender. BS normal. No masses, organomegaly, positive findings: tenderness lower RLQ.  Musculoskeletal: extremities normal- no gross deformities noted, gait normal and normal muscle tone  Skin: no suspicious lesions or rashes  Neurologic: Gait normal. Reflexes normal and symmetric. Sensation grossly WNL.  Psychiatric: mentation appears normal and affect normal/bright  Hematologic/Lymphatic/Immunologic:     Results for orders placed or performed in visit on 04/06/22   UA Macro with Reflex to Micro and Culture - lab collect     Status: Abnormal    Specimen: Urine, Midstream   Result Value Ref Range    Color Urine Yellow Colorless, Straw, Light Yellow, Yellow    Appearance Urine Clear Clear    Glucose Urine Negative Negative mg/dL    Bilirubin Urine Negative Negative    Ketones Urine 80  (A) Negative mg/dL    Specific Gravity Urine 1.010 1.003 - 1.035    Blood  Urine Trace (A) Negative    pH Urine 6.0 5.0 - 7.0    Protein Albumin Urine Negative Negative mg/dL    Urobilinogen Urine 0.2 0.2, 1.0 E.U./dL    Nitrite Urine Negative Negative    Leukocyte Esterase Urine Negative Negative   CBC with platelets     Status: Normal   Result Value Ref Range    WBC Count 9.8 4.0 - 11.0 10e3/uL    RBC Count 4.93 4.40 - 5.90 10e6/uL    Hemoglobin 14.8 13.3 - 17.7 g/dL    Hematocrit 43.9 40.0 - 53.0 %    MCV 89 78 - 100 fL    MCH 30.0 26.5 - 33.0 pg    MCHC 33.7 31.5 - 36.5 g/dL    RDW 12.7 10.0 - 15.0 %    Platelet Count 259 150 - 450 10e3/uL   Urine Microscopic     Status: Normal   Result Value Ref Range    RBC Urine 0-2 0-2 /HPF /HPF    WBC Urine 0-5 0-5 /HPF /HPF    Narrative    Urine Culture not indicated             ICD-10-CM    1. RLQ abdominal pain  R10.31 UA Macro with Reflex to Micro and Culture - lab collect     CBC with platelets     UA Macro with Reflex to Micro and Culture - lab collect     CBC with platelets     Urine Microscopic     Patient is urine negative CBC is negative ketones noted in the urine.  Reviewed with patient muscular pain versus lower right quadrant pain.  Patient does feel that the pain is steady in the right lower quadrant did start last night and is progressively getting worse based on these symptoms and her limitations of urgent care recommend patient be seen in the emergency room.  Patient will go to [place].  I will call report any    DIEGO Mendieta CNP

## 2022-04-07 NOTE — ED NOTES
Patient states he was at the [facility] earlier today and they sent him here for further evaluation of right lower abdominal, pain that started early this morning.

## 2022-04-07 NOTE — DISCHARGE INSTRUCTIONS
We have not found a good cause for your abdominal pain, as all tests so far have not shown us the reason you're in pain.      Therefore, it is very important for you to follow up here or at your regular doctor's office tomorrow, in 1-2 days if still having symptoms, so that we can re-check your pain and see how you are doing.      Please come back sooner if you have worsening abdominal pain, intractable vomiting, fevers/chills, increasing malaise, or for any other worsening of your condition as you see fit.

## 2022-04-07 NOTE — ED PROVIDER NOTES
History     Chief Complaint   Patient presents with     Abdominal Pain     HPI  Dk Randhawa is a 66 year old male who presents for right lower quadrant abdominal pain.  Symptoms have been getting worse for the past 2 days.  Pain is sharp, nonradiating, hurts with certain movements, mild.  He has never had pain like this before.  He denies fever, chills, nausea, vomiting, diarrhea, dysuria, hematuria, or rash.  No prior abdominal surgeries.  No chest pain or difficulty breathing.  He was in urgent care prior to his arrival here and had a urinalysis that was unremarkable and a CBC that showed a normal white blood cell count.  He was sent here for further evaluation.    Allergies:  No Known Allergies    Problem List:    Patient Active Problem List    Diagnosis Date Noted     CARDIOVASCULAR SCREENING; LDL GOAL LESS THAN 160 10/31/2010     Priority: Medium        Past Medical History:    Past Medical History:   Diagnosis Date     Hypertension        Past Surgical History:    Past Surgical History:   Procedure Laterality Date     ORTHOPEDIC SURGERY       PHACOEMULSIFICATION WITH STANDARD INTRAOCULAR LENS IMPLANT Left 2/26/2018    Procedure: PHACOEMULSIFICATION WITH STANDARD INTRAOCULAR LENS IMPLANT;  Left Cataract Removal with Implant;  Surgeon: Mohit Victor MD;  Location: [facility] OR     PHACOEMULSIFICATION WITH STANDARD INTRAOCULAR LENS IMPLANT Right 1/30/2019    Procedure: Cataract Removal with Implant;  Surgeon: Mohit Victor MD;  Location: [facility] OR       Family History:    No family history on file.    Social History:  Marital Status:   [4]  Social History     Tobacco Use     Smoking status: Never Smoker     Smokeless tobacco: Never Used   Substance Use Topics     Alcohol use: Yes     Drug use: No        Medications:    AMLODIPINE BESYLATE PO  Aspirin-Acetaminophen-Caffeine (EXCEDRIN PO)  LOSARTAN POTASSIUM PO          Review of Systems  Pertinent positives and negatives listed in the  HPI, all other systems reviewed and are negative.    Physical Exam   BP: (!) 171/109  Pulse: 95  Temp: 97.7  F (36.5  C)  Resp: 18  Height: 182.9 cm (6')  Weight: 117.9 kg (260 lb)  SpO2: 97 %      Physical Exam  Vitals and nursing note reviewed.   Constitutional:       General: He is in acute distress.      Appearance: He is well-developed. He is not diaphoretic.   HENT:      Head: Normocephalic and atraumatic.      Right Ear: External ear normal.      Left Ear: External ear normal.      Nose: Nose normal.   Eyes:      General: No scleral icterus.     Conjunctiva/sclera: Conjunctivae normal.   Cardiovascular:      Rate and Rhythm: Normal rate and regular rhythm.   Pulmonary:      Effort: Pulmonary effort is normal. No respiratory distress.      Breath sounds: No stridor.   Abdominal:      General: There is no distension.      Palpations: Abdomen is soft.      Tenderness: There is abdominal tenderness in the right lower quadrant. There is no guarding.   Genitourinary:     Penis: Normal.       Testes: Normal.   Musculoskeletal:      Cervical back: Normal range of motion.   Skin:     General: Skin is warm and dry.   Neurological:      Mental Status: He is alert and oriented to person, place, and time.   Psychiatric:         Behavior: Behavior normal.         ED Course                 Procedures              Critical Care time:  none               Results for orders placed or performed during the hospital encounter of 04/06/22 (from the past 24 hour(s))   CT Abdomen Pelvis w Contrast    Narrative    EXAM: CT ABDOMEN PELVIS W CONTRAST  LOCATION: [facility]  DATE/TIME: 4/6/2022 9:35 PM    INDICATION: RLQ abdominal pain  COMPARISON: None.  TECHNIQUE: CT scan of the abdomen and pelvis was performed following injection of IV contrast. Multiplanar reformats were obtained. Dose reduction techniques were used.  CONTRAST: 100mL Isovue 370    FINDINGS:   LOWER CHEST: Lung bases  clear.    HEPATOBILIARY: Normal.    PANCREAS: Normal.    SPLEEN: Normal.    ADRENAL GLANDS: Thickening of the left adrenal gland.    KIDNEYS/BLADDER: Normal.    BOWEL: Normal caliber. Normal appendix.    LYMPH NODES: Normal.    VASCULATURE: Atherosclerotic vascular calcification.    PELVIC ORGANS: Normal.    MUSCULOSKELETAL: Degenerative change osseous structures.      Impression    IMPRESSION:   1.  No inflammatory change, bowel obstruction or abscess. The appendix is normal.       Medications   iopamidol (ISOVUE-370) solution 100 mL (100 mLs Intravenous Given 4/6/22 2135)   sodium chloride 0.9 % bag 500mL for CT scan flush use (72 mLs Intravenous Given 4/6/22 2135)       Assessments & Plan (with Medical Decision Making)   66-year-old male presents with right lower quadrant abdominal pain.  Temperature is 36.5  C, heart rate 95, SPO2 is 97% on room air.  Genitourinary exam is reassuring without signs of hernia or testicular torsion or epididymitis. CT of the abdomen and pelvis obtained, images reviewed independently as well as radiology read reviewed, no signs of appendicitis or obstruction or other cause of acute pain or inflammation within the abdomen or pelvis.  On recheck the patient is comfortable and is safe to discharge with reassurance and instructions to return if he has worsening of his symptoms or other concerns, otherwise get rechecked if not better in the next several days.  The patient is in agreement with this plan.    I have reviewed the nursing notes.    I have reviewed the findings, diagnosis, plan and need for follow up with the patient.       New Prescriptions    No medications on file       Final diagnoses:   Abdominal pain, right lower quadrant       4/6/2022   [facility] EMERGENCY DEPT     Kwaku Lin MD  04/06/22 [id number]

## 2024-01-01 ENCOUNTER — ANCILLARY PROCEDURE (OUTPATIENT)
Dept: GENERAL RADIOLOGY | Facility: CLINIC | Age: 69
End: 2024-01-01
Attending: THORACIC SURGERY (CARDIOTHORACIC VASCULAR SURGERY)
Payer: COMMERCIAL

## 2024-01-01 ENCOUNTER — DOCUMENTATION ONLY (OUTPATIENT)
Dept: OTHER | Facility: CLINIC | Age: 69
End: 2024-01-01
Payer: COMMERCIAL

## 2024-01-01 ENCOUNTER — PATIENT OUTREACH (OUTPATIENT)
Dept: ONCOLOGY | Facility: CLINIC | Age: 69
End: 2024-01-01
Payer: COMMERCIAL

## 2024-01-01 ENCOUNTER — APPOINTMENT (OUTPATIENT)
Dept: CT IMAGING | Facility: CLINIC | Age: 69
End: 2024-01-01
Attending: FAMILY MEDICINE
Payer: COMMERCIAL

## 2024-01-01 ENCOUNTER — INFUSION THERAPY VISIT (OUTPATIENT)
Dept: INFUSION THERAPY | Facility: CLINIC | Age: 69
End: 2024-01-01
Attending: INTERNAL MEDICINE
Payer: COMMERCIAL

## 2024-01-01 ENCOUNTER — HOSPITAL ENCOUNTER (OUTPATIENT)
Dept: MRI IMAGING | Facility: CLINIC | Age: 69
Discharge: HOME | End: 2024-08-08
Attending: RADIOLOGY | Admitting: RADIOLOGY
Payer: COMMERCIAL

## 2024-01-01 ENCOUNTER — HOSPITAL ENCOUNTER (OUTPATIENT)
Dept: MRI IMAGING | Facility: CLINIC | Age: 69
Discharge: HOME | End: 2024-04-30
Attending: THORACIC SURGERY (CARDIOTHORACIC VASCULAR SURGERY) | Admitting: THORACIC SURGERY (CARDIOTHORACIC VASCULAR SURGERY)
Payer: COMMERCIAL

## 2024-01-01 ENCOUNTER — APPOINTMENT (OUTPATIENT)
Dept: PHYSICAL THERAPY | Facility: CLINIC | Age: 69
DRG: 871 | End: 2024-01-01
Payer: COMMERCIAL

## 2024-01-01 ENCOUNTER — LAB REQUISITION (OUTPATIENT)
Dept: LAB | Facility: CLINIC | Age: 69
End: 2024-01-01

## 2024-01-01 ENCOUNTER — ONCOLOGY VISIT (OUTPATIENT)
Dept: ONCOLOGY | Facility: CLINIC | Age: 69
End: 2024-01-01
Attending: INTERNAL MEDICINE
Payer: COMMERCIAL

## 2024-01-01 ENCOUNTER — TRANSITIONAL CARE UNIT VISIT (OUTPATIENT)
Dept: GERIATRICS | Facility: CLINIC | Age: 69
End: 2024-01-01
Payer: COMMERCIAL

## 2024-01-01 ENCOUNTER — APPOINTMENT (OUTPATIENT)
Dept: GENERAL RADIOLOGY | Facility: CLINIC | Age: 69
DRG: 871 | End: 2024-01-01
Attending: STUDENT IN AN ORGANIZED HEALTH CARE EDUCATION/TRAINING PROGRAM
Payer: COMMERCIAL

## 2024-01-01 ENCOUNTER — PATIENT OUTREACH (OUTPATIENT)
Dept: ONCOLOGY | Facility: CLINIC | Age: 69
End: 2024-01-01

## 2024-01-01 ENCOUNTER — PRE VISIT (OUTPATIENT)
Dept: SURGERY | Facility: CLINIC | Age: 69
End: 2024-01-01

## 2024-01-01 ENCOUNTER — ONCOLOGY VISIT (OUTPATIENT)
Dept: PULMONOLOGY | Facility: CLINIC | Age: 69
End: 2024-01-01
Attending: STUDENT IN AN ORGANIZED HEALTH CARE EDUCATION/TRAINING PROGRAM
Payer: COMMERCIAL

## 2024-01-01 ENCOUNTER — APPOINTMENT (OUTPATIENT)
Dept: GENERAL RADIOLOGY | Facility: CLINIC | Age: 69
End: 2024-01-01
Attending: STUDENT IN AN ORGANIZED HEALTH CARE EDUCATION/TRAINING PROGRAM
Payer: COMMERCIAL

## 2024-01-01 ENCOUNTER — PATIENT OUTREACH (OUTPATIENT)
Dept: CARE COORDINATION | Facility: CLINIC | Age: 69
End: 2024-01-01
Payer: COMMERCIAL

## 2024-01-01 ENCOUNTER — ONCOLOGY VISIT (OUTPATIENT)
Dept: ONCOLOGY | Facility: CLINIC | Age: 69
End: 2024-01-01
Attending: THORACIC SURGERY (CARDIOTHORACIC VASCULAR SURGERY)
Payer: COMMERCIAL

## 2024-01-01 ENCOUNTER — NURSE TRIAGE (OUTPATIENT)
Dept: NURSING | Facility: CLINIC | Age: 69
End: 2024-01-01
Payer: COMMERCIAL

## 2024-01-01 ENCOUNTER — APPOINTMENT (OUTPATIENT)
Dept: GENERAL RADIOLOGY | Facility: CLINIC | Age: 69
DRG: 180 | End: 2024-01-01
Attending: NURSE PRACTITIONER
Payer: COMMERCIAL

## 2024-01-01 ENCOUNTER — PRE VISIT (OUTPATIENT)
Dept: RADIATION THERAPY | Facility: OUTPATIENT CENTER | Age: 69
End: 2024-01-01

## 2024-01-01 ENCOUNTER — APPOINTMENT (OUTPATIENT)
Dept: GENERAL RADIOLOGY | Facility: CLINIC | Age: 69
DRG: 164 | End: 2024-01-01
Attending: PHYSICIAN ASSISTANT
Payer: COMMERCIAL

## 2024-01-01 ENCOUNTER — TUMOR CONFERENCE (OUTPATIENT)
Dept: ONCOLOGY | Facility: CLINIC | Age: 69
End: 2024-01-01
Payer: COMMERCIAL

## 2024-01-01 ENCOUNTER — APPOINTMENT (OUTPATIENT)
Dept: PHYSICAL THERAPY | Facility: CLINIC | Age: 69
DRG: 164 | End: 2024-01-01
Attending: PHYSICIAN ASSISTANT
Payer: COMMERCIAL

## 2024-01-01 ENCOUNTER — HOSPITAL ENCOUNTER (OUTPATIENT)
Dept: PET IMAGING | Facility: CLINIC | Age: 69
Discharge: HOME | End: 2024-05-02
Attending: INTERNAL MEDICINE | Admitting: INTERNAL MEDICINE
Payer: COMMERCIAL

## 2024-01-01 ENCOUNTER — OFFICE VISIT (OUTPATIENT)
Dept: RADIATION THERAPY | Facility: OUTPATIENT CENTER | Age: 69
End: 2024-01-01
Payer: COMMERCIAL

## 2024-01-01 ENCOUNTER — HOSPITAL ENCOUNTER (OUTPATIENT)
Dept: CT IMAGING | Facility: CLINIC | Age: 69
Discharge: HOME | End: 2024-04-16
Attending: INTERNAL MEDICINE | Admitting: INTERNAL MEDICINE
Payer: COMMERCIAL

## 2024-01-01 ENCOUNTER — OFFICE VISIT (OUTPATIENT)
Dept: RADIATION ONCOLOGY | Facility: CLINIC | Age: 69
End: 2024-01-01
Attending: RADIOLOGY
Payer: COMMERCIAL

## 2024-01-01 ENCOUNTER — PRE VISIT (OUTPATIENT)
Dept: PULMONOLOGY | Facility: CLINIC | Age: 69
End: 2024-01-01
Payer: COMMERCIAL

## 2024-01-01 ENCOUNTER — HOSPITAL ENCOUNTER (OUTPATIENT)
Dept: GENERAL RADIOLOGY | Facility: CLINIC | Age: 69
Discharge: HOME | End: 2024-08-01
Attending: INTERNAL MEDICINE | Admitting: INTERNAL MEDICINE
Payer: COMMERCIAL

## 2024-01-01 ENCOUNTER — APPOINTMENT (OUTPATIENT)
Dept: RADIATION ONCOLOGY | Facility: CLINIC | Age: 69
End: 2024-01-01
Attending: NEUROLOGICAL SURGERY
Payer: COMMERCIAL

## 2024-01-01 ENCOUNTER — APPOINTMENT (OUTPATIENT)
Dept: RADIATION THERAPY | Facility: OUTPATIENT CENTER | Age: 69
End: 2024-01-01
Payer: COMMERCIAL

## 2024-01-01 ENCOUNTER — PATIENT OUTREACH (OUTPATIENT)
Dept: SURGERY | Facility: CLINIC | Age: 69
End: 2024-01-01
Payer: COMMERCIAL

## 2024-01-01 ENCOUNTER — PREP FOR PROCEDURE (OUTPATIENT)
Dept: SURGERY | Facility: CLINIC | Age: 69
End: 2024-01-01

## 2024-01-01 ENCOUNTER — APPOINTMENT (OUTPATIENT)
Dept: CT IMAGING | Facility: CLINIC | Age: 69
End: 2024-01-01
Attending: STUDENT IN AN ORGANIZED HEALTH CARE EDUCATION/TRAINING PROGRAM
Payer: COMMERCIAL

## 2024-01-01 ENCOUNTER — DOCUMENTATION ONLY (OUTPATIENT)
Dept: MEDSURG UNIT | Facility: CLINIC | Age: 69
End: 2024-01-01
Payer: COMMERCIAL

## 2024-01-01 ENCOUNTER — ANESTHESIA EVENT (OUTPATIENT)
Dept: SURGERY | Facility: CLINIC | Age: 69
End: 2024-01-01
Payer: COMMERCIAL

## 2024-01-01 ENCOUNTER — LAB (OUTPATIENT)
Dept: LAB | Facility: CLINIC | Age: 69
End: 2024-01-01
Payer: COMMERCIAL

## 2024-01-01 ENCOUNTER — OFFICE VISIT (OUTPATIENT)
Dept: RADIATION THERAPY | Facility: OUTPATIENT CENTER | Age: 69
End: 2024-01-01
Attending: INTERNAL MEDICINE
Payer: COMMERCIAL

## 2024-01-01 ENCOUNTER — APPOINTMENT (OUTPATIENT)
Dept: OCCUPATIONAL THERAPY | Facility: CLINIC | Age: 69
DRG: 871 | End: 2024-01-01
Payer: COMMERCIAL

## 2024-01-01 ENCOUNTER — HOSPITAL ENCOUNTER (OUTPATIENT)
Dept: CT IMAGING | Facility: HOSPITAL | Age: 69
Discharge: HOME | End: 2024-07-25
Attending: NURSE PRACTITIONER | Admitting: NURSE PRACTITIONER
Payer: COMMERCIAL

## 2024-01-01 ENCOUNTER — APPOINTMENT (OUTPATIENT)
Dept: GENERAL RADIOLOGY | Facility: CLINIC | Age: 69
End: 2024-01-01
Attending: PHYSICIAN ASSISTANT
Payer: COMMERCIAL

## 2024-01-01 ENCOUNTER — ANESTHESIA EVENT (OUTPATIENT)
Dept: SURGERY | Facility: CLINIC | Age: 69
DRG: 164 | End: 2024-01-01
Payer: COMMERCIAL

## 2024-01-01 ENCOUNTER — TELEPHONE (OUTPATIENT)
Dept: ONCOLOGY | Facility: CLINIC | Age: 69
End: 2024-01-01

## 2024-01-01 ENCOUNTER — OFFICE VISIT (OUTPATIENT)
Dept: SURGERY | Facility: CLINIC | Age: 69
End: 2024-01-01
Attending: INTERNAL MEDICINE
Payer: COMMERCIAL

## 2024-01-01 ENCOUNTER — ANESTHESIA (OUTPATIENT)
Dept: SURGERY | Facility: CLINIC | Age: 69
DRG: 164 | End: 2024-01-01
Payer: COMMERCIAL

## 2024-01-01 ENCOUNTER — HOSPITAL ENCOUNTER (EMERGENCY)
Facility: CLINIC | Age: 69
Discharge: HOME | End: 2024-04-07
Attending: PHYSICIAN ASSISTANT | Admitting: STUDENT IN AN ORGANIZED HEALTH CARE EDUCATION/TRAINING PROGRAM
Payer: COMMERCIAL

## 2024-01-01 ENCOUNTER — APPOINTMENT (OUTPATIENT)
Dept: PHYSICAL THERAPY | Facility: CLINIC | Age: 69
End: 2024-01-01
Payer: COMMERCIAL

## 2024-01-01 ENCOUNTER — ONCOLOGY VISIT (OUTPATIENT)
Dept: ONCOLOGY | Facility: CLINIC | Age: 69
End: 2024-01-01
Attending: NURSE PRACTITIONER
Payer: COMMERCIAL

## 2024-01-01 ENCOUNTER — HOSPITAL ENCOUNTER (INPATIENT)
Facility: CLINIC | Age: 69
LOS: 10 days | Discharge: HOSPICE | DRG: 180 | End: 2024-10-28
Attending: INTERNAL MEDICINE | Admitting: SURGERY
Payer: COMMERCIAL

## 2024-01-01 ENCOUNTER — LAB (OUTPATIENT)
Dept: INFUSION THERAPY | Facility: CLINIC | Age: 69
End: 2024-01-01
Attending: NURSE PRACTITIONER
Payer: COMMERCIAL

## 2024-01-01 ENCOUNTER — DOCUMENTATION ONLY (OUTPATIENT)
Dept: GERIATRICS | Facility: CLINIC | Age: 69
End: 2024-01-01
Payer: COMMERCIAL

## 2024-01-01 ENCOUNTER — TELEPHONE (OUTPATIENT)
Dept: GERIATRICS | Facility: CLINIC | Age: 69
End: 2024-01-01
Payer: COMMERCIAL

## 2024-01-01 ENCOUNTER — OFFICE VISIT (OUTPATIENT)
Dept: SURGERY | Facility: CLINIC | Age: 69
End: 2024-01-01
Payer: COMMERCIAL

## 2024-01-01 ENCOUNTER — HOSPITAL ENCOUNTER (OUTPATIENT)
Dept: GENERAL RADIOLOGY | Facility: HOSPITAL | Age: 69
Discharge: HOME | End: 2024-06-13
Attending: THORACIC SURGERY (CARDIOTHORACIC VASCULAR SURGERY) | Admitting: THORACIC SURGERY (CARDIOTHORACIC VASCULAR SURGERY)
Payer: COMMERCIAL

## 2024-01-01 ENCOUNTER — OFFICE VISIT (OUTPATIENT)
Dept: PULMONOLOGY | Facility: CLINIC | Age: 69
End: 2024-01-01
Payer: COMMERCIAL

## 2024-01-01 ENCOUNTER — ONCOLOGY VISIT (OUTPATIENT)
Dept: SURGERY | Facility: CLINIC | Age: 69
End: 2024-01-01
Attending: THORACIC SURGERY (CARDIOTHORACIC VASCULAR SURGERY)
Payer: COMMERCIAL

## 2024-01-01 ENCOUNTER — HOSPITAL ENCOUNTER (EMERGENCY)
Facility: CLINIC | Age: 69
Discharge: STILL ADMITTED OR ADMINISTRATIVE | End: 2024-10-18
Attending: FAMILY MEDICINE | Admitting: FAMILY MEDICINE
Payer: COMMERCIAL

## 2024-01-01 ENCOUNTER — HOSPITAL ENCOUNTER (OUTPATIENT)
Facility: CLINIC | Age: 69
Discharge: HOME | End: 2024-07-05
Attending: STUDENT IN AN ORGANIZED HEALTH CARE EDUCATION/TRAINING PROGRAM | Admitting: STUDENT IN AN ORGANIZED HEALTH CARE EDUCATION/TRAINING PROGRAM
Payer: COMMERCIAL

## 2024-01-01 ENCOUNTER — DOCUMENTATION ONLY (OUTPATIENT)
Dept: RADIATION ONCOLOGY | Facility: CLINIC | Age: 69
End: 2024-01-01

## 2024-01-01 ENCOUNTER — NURSE TRIAGE (OUTPATIENT)
Dept: ONCOLOGY | Facility: CLINIC | Age: 69
End: 2024-01-01
Payer: COMMERCIAL

## 2024-01-01 ENCOUNTER — ONCOLOGY VISIT (OUTPATIENT)
Dept: RADIATION THERAPY | Facility: OUTPATIENT CENTER | Age: 69
End: 2024-01-01

## 2024-01-01 ENCOUNTER — TELEPHONE (OUTPATIENT)
Dept: SURGERY | Facility: CLINIC | Age: 69
End: 2024-01-01
Payer: COMMERCIAL

## 2024-01-01 ENCOUNTER — PATIENT OUTREACH (OUTPATIENT)
Dept: RADIATION THERAPY | Facility: OUTPATIENT CENTER | Age: 69
End: 2024-01-01

## 2024-01-01 ENCOUNTER — HOSPITAL ENCOUNTER (INPATIENT)
Facility: CLINIC | Age: 69
LOS: 7 days | Discharge: NURSING, REHAB OR LONG-TERM CARE | DRG: 871 | End: 2024-09-23
Attending: EMERGENCY MEDICINE | Admitting: STUDENT IN AN ORGANIZED HEALTH CARE EDUCATION/TRAINING PROGRAM
Payer: COMMERCIAL

## 2024-01-01 ENCOUNTER — HOSPITAL ENCOUNTER (OUTPATIENT)
Dept: MRI IMAGING | Facility: CLINIC | Age: 69
Discharge: HOME | End: 2024-07-30
Attending: NURSE PRACTITIONER | Admitting: NURSE PRACTITIONER
Payer: COMMERCIAL

## 2024-01-01 ENCOUNTER — PRE VISIT (OUTPATIENT)
Dept: SURGERY | Facility: CLINIC | Age: 69
End: 2024-01-01
Payer: COMMERCIAL

## 2024-01-01 ENCOUNTER — HOSPITAL ENCOUNTER (OUTPATIENT)
Facility: CLINIC | Age: 69
Setting detail: OBSERVATION
Discharge: HOME | End: 2024-08-24
Attending: FAMILY MEDICINE | Admitting: FAMILY MEDICINE
Payer: COMMERCIAL

## 2024-01-01 ENCOUNTER — APPOINTMENT (OUTPATIENT)
Dept: GENERAL RADIOLOGY | Facility: CLINIC | Age: 69
DRG: 871 | End: 2024-01-01
Attending: EMERGENCY MEDICINE
Payer: COMMERCIAL

## 2024-01-01 ENCOUNTER — HOSPITAL ENCOUNTER (EMERGENCY)
Facility: CLINIC | Age: 69
Discharge: HOME | End: 2024-07-20
Attending: PHYSICIAN ASSISTANT | Admitting: PHYSICIAN ASSISTANT
Payer: COMMERCIAL

## 2024-01-01 ENCOUNTER — ANCILLARY ORDERS (OUTPATIENT)
Dept: SURGERY | Facility: CLINIC | Age: 69
End: 2024-01-01

## 2024-01-01 ENCOUNTER — TELEPHONE (OUTPATIENT)
Dept: RADIATION ONCOLOGY | Facility: CLINIC | Age: 69
End: 2024-01-01
Payer: COMMERCIAL

## 2024-01-01 ENCOUNTER — LAB (OUTPATIENT)
Dept: INFUSION THERAPY | Facility: CLINIC | Age: 69
End: 2024-01-01
Attending: RADIOLOGY
Payer: COMMERCIAL

## 2024-01-01 ENCOUNTER — HOSPITAL ENCOUNTER (INPATIENT)
Facility: CLINIC | Age: 69
LOS: 1 days | Discharge: HOME | DRG: 164 | End: 2024-05-16
Attending: THORACIC SURGERY (CARDIOTHORACIC VASCULAR SURGERY) | Admitting: THORACIC SURGERY (CARDIOTHORACIC VASCULAR SURGERY)
Payer: COMMERCIAL

## 2024-01-01 ENCOUNTER — HOSPITAL ENCOUNTER (OUTPATIENT)
Facility: CLINIC | Age: 69
Discharge: HOME | End: 2024-04-16
Attending: INTERNAL MEDICINE | Admitting: INTERNAL MEDICINE
Payer: COMMERCIAL

## 2024-01-01 ENCOUNTER — TRANSFERRED RECORDS (OUTPATIENT)
Dept: SURGERY | Facility: CLINIC | Age: 69
End: 2024-01-01

## 2024-01-01 ENCOUNTER — APPOINTMENT (OUTPATIENT)
Dept: GENERAL RADIOLOGY | Facility: CLINIC | Age: 69
DRG: 871 | End: 2024-01-01
Attending: INTERNAL MEDICINE
Payer: COMMERCIAL

## 2024-01-01 ENCOUNTER — HOSPITAL ENCOUNTER (OUTPATIENT)
Dept: RESPIRATORY THERAPY | Facility: CLINIC | Age: 69
Discharge: HOME | End: 2024-04-12
Attending: STUDENT IN AN ORGANIZED HEALTH CARE EDUCATION/TRAINING PROGRAM | Admitting: STUDENT IN AN ORGANIZED HEALTH CARE EDUCATION/TRAINING PROGRAM
Payer: COMMERCIAL

## 2024-01-01 ENCOUNTER — HOSPITAL ENCOUNTER (EMERGENCY)
Facility: CLINIC | Age: 69
Discharge: HOME | End: 2024-05-04
Attending: EMERGENCY MEDICINE | Admitting: EMERGENCY MEDICINE
Payer: COMMERCIAL

## 2024-01-01 ENCOUNTER — ANESTHESIA (OUTPATIENT)
Dept: SURGERY | Facility: CLINIC | Age: 69
End: 2024-01-01
Payer: COMMERCIAL

## 2024-01-01 ENCOUNTER — PRE VISIT (OUTPATIENT)
Dept: ONCOLOGY | Facility: CLINIC | Age: 69
End: 2024-01-01
Payer: COMMERCIAL

## 2024-01-01 VITALS
SYSTOLIC BLOOD PRESSURE: 108 MMHG | RESPIRATION RATE: 30 BRPM | HEART RATE: 101 BPM | TEMPERATURE: 97.4 F | OXYGEN SATURATION: 100 % | DIASTOLIC BLOOD PRESSURE: 73 MMHG

## 2024-01-01 VITALS
SYSTOLIC BLOOD PRESSURE: 161 MMHG | RESPIRATION RATE: 16 BRPM | TEMPERATURE: 97.3 F | DIASTOLIC BLOOD PRESSURE: 114 MMHG | HEART RATE: 98 BPM | OXYGEN SATURATION: 93 %

## 2024-01-01 VITALS
SYSTOLIC BLOOD PRESSURE: 132 MMHG | BODY MASS INDEX: 32.89 KG/M2 | WEIGHT: 242.8 LBS | RESPIRATION RATE: 20 BRPM | HEART RATE: 103 BPM | TEMPERATURE: 98 F | DIASTOLIC BLOOD PRESSURE: 91 MMHG | HEIGHT: 72 IN | OXYGEN SATURATION: 96 %

## 2024-01-01 VITALS
DIASTOLIC BLOOD PRESSURE: 88 MMHG | HEART RATE: 94 BPM | OXYGEN SATURATION: 96 % | WEIGHT: 248.8 LBS | BODY MASS INDEX: 33.74 KG/M2 | SYSTOLIC BLOOD PRESSURE: 137 MMHG

## 2024-01-01 VITALS
WEIGHT: 231.8 LBS | OXYGEN SATURATION: 97 % | TEMPERATURE: 97.8 F | DIASTOLIC BLOOD PRESSURE: 69 MMHG | SYSTOLIC BLOOD PRESSURE: 101 MMHG | HEART RATE: 117 BPM | BODY MASS INDEX: 31.44 KG/M2

## 2024-01-01 VITALS
BODY MASS INDEX: 32.7 KG/M2 | RESPIRATION RATE: 20 BRPM | WEIGHT: 241.4 LBS | SYSTOLIC BLOOD PRESSURE: 126 MMHG | DIASTOLIC BLOOD PRESSURE: 85 MMHG | TEMPERATURE: 98.6 F | HEART RATE: 96 BPM | HEIGHT: 72 IN | OXYGEN SATURATION: 95 %

## 2024-01-01 VITALS
BODY MASS INDEX: 28.04 KG/M2 | HEART RATE: 92 BPM | RESPIRATION RATE: 20 BRPM | HEIGHT: 72 IN | SYSTOLIC BLOOD PRESSURE: 123 MMHG | DIASTOLIC BLOOD PRESSURE: 78 MMHG | TEMPERATURE: 97.9 F | WEIGHT: 207 LBS | OXYGEN SATURATION: 97 %

## 2024-01-01 VITALS
OXYGEN SATURATION: 96 % | SYSTOLIC BLOOD PRESSURE: 118 MMHG | BODY MASS INDEX: 32.25 KG/M2 | TEMPERATURE: 97.5 F | HEART RATE: 102 BPM | HEIGHT: 72 IN | WEIGHT: 238.1 LBS | DIASTOLIC BLOOD PRESSURE: 76 MMHG | RESPIRATION RATE: 20 BRPM

## 2024-01-01 VITALS
HEIGHT: 72 IN | HEART RATE: 98 BPM | WEIGHT: 237.88 LBS | TEMPERATURE: 98.1 F | RESPIRATION RATE: 18 BRPM | OXYGEN SATURATION: 95 % | DIASTOLIC BLOOD PRESSURE: 82 MMHG | SYSTOLIC BLOOD PRESSURE: 122 MMHG | BODY MASS INDEX: 32.22 KG/M2

## 2024-01-01 VITALS
OXYGEN SATURATION: 98 % | SYSTOLIC BLOOD PRESSURE: 146 MMHG | HEART RATE: 87 BPM | RESPIRATION RATE: 18 BRPM | TEMPERATURE: 97.3 F | DIASTOLIC BLOOD PRESSURE: 91 MMHG

## 2024-01-01 VITALS
HEIGHT: 72 IN | DIASTOLIC BLOOD PRESSURE: 84 MMHG | HEART RATE: 97 BPM | WEIGHT: 208.4 LBS | BODY MASS INDEX: 28.23 KG/M2 | RESPIRATION RATE: 18 BRPM | OXYGEN SATURATION: 95 % | TEMPERATURE: 98 F | SYSTOLIC BLOOD PRESSURE: 138 MMHG

## 2024-01-01 VITALS
HEIGHT: 72 IN | RESPIRATION RATE: 12 BRPM | OXYGEN SATURATION: 96 % | DIASTOLIC BLOOD PRESSURE: 108 MMHG | BODY MASS INDEX: 33.46 KG/M2 | SYSTOLIC BLOOD PRESSURE: 158 MMHG | WEIGHT: 247 LBS | TEMPERATURE: 97.7 F | HEART RATE: 98 BPM

## 2024-01-01 VITALS
BODY MASS INDEX: 33.59 KG/M2 | OXYGEN SATURATION: 98 % | DIASTOLIC BLOOD PRESSURE: 109 MMHG | RESPIRATION RATE: 16 BRPM | SYSTOLIC BLOOD PRESSURE: 159 MMHG | HEART RATE: 75 BPM | HEIGHT: 72 IN | TEMPERATURE: 98 F | WEIGHT: 248 LBS

## 2024-01-01 VITALS
TEMPERATURE: 98.8 F | DIASTOLIC BLOOD PRESSURE: 84 MMHG | RESPIRATION RATE: 20 BRPM | HEART RATE: 120 BPM | OXYGEN SATURATION: 96 % | BODY MASS INDEX: 33.05 KG/M2 | SYSTOLIC BLOOD PRESSURE: 122 MMHG | WEIGHT: 244 LBS | HEIGHT: 72 IN

## 2024-01-01 VITALS
HEIGHT: 72 IN | OXYGEN SATURATION: 96 % | SYSTOLIC BLOOD PRESSURE: 141 MMHG | BODY MASS INDEX: 33.58 KG/M2 | TEMPERATURE: 99.5 F | DIASTOLIC BLOOD PRESSURE: 95 MMHG | HEART RATE: 90 BPM | RESPIRATION RATE: 11 BRPM | WEIGHT: 247.9 LBS

## 2024-01-01 VITALS
SYSTOLIC BLOOD PRESSURE: 110 MMHG | OXYGEN SATURATION: 99 % | WEIGHT: 231 LBS | DIASTOLIC BLOOD PRESSURE: 79 MMHG | TEMPERATURE: 98.2 F | BODY MASS INDEX: 31.29 KG/M2 | HEART RATE: 111 BPM | HEIGHT: 72 IN | RESPIRATION RATE: 12 BRPM

## 2024-01-01 VITALS
TEMPERATURE: 97.2 F | DIASTOLIC BLOOD PRESSURE: 76 MMHG | RESPIRATION RATE: 18 BRPM | SYSTOLIC BLOOD PRESSURE: 139 MMHG | OXYGEN SATURATION: 99 % | HEART RATE: 102 BPM

## 2024-01-01 VITALS
SYSTOLIC BLOOD PRESSURE: 150 MMHG | TEMPERATURE: 97.8 F | HEART RATE: 96 BPM | OXYGEN SATURATION: 98 % | DIASTOLIC BLOOD PRESSURE: 84 MMHG | RESPIRATION RATE: 18 BRPM

## 2024-01-01 VITALS
HEIGHT: 72 IN | OXYGEN SATURATION: 96 % | WEIGHT: 246.8 LBS | BODY MASS INDEX: 33.43 KG/M2 | SYSTOLIC BLOOD PRESSURE: 151 MMHG | BODY MASS INDEX: 33.72 KG/M2 | TEMPERATURE: 98.6 F | OXYGEN SATURATION: 98 % | RESPIRATION RATE: 16 BRPM | DIASTOLIC BLOOD PRESSURE: 95 MMHG | WEIGHT: 249 LBS | HEART RATE: 103 BPM | DIASTOLIC BLOOD PRESSURE: 92 MMHG | RESPIRATION RATE: 16 BRPM | TEMPERATURE: 98.1 F | HEART RATE: 104 BPM | HEIGHT: 72 IN | SYSTOLIC BLOOD PRESSURE: 142 MMHG

## 2024-01-01 VITALS
HEART RATE: 87 BPM | BODY MASS INDEX: 32.82 KG/M2 | SYSTOLIC BLOOD PRESSURE: 89 MMHG | WEIGHT: 242 LBS | TEMPERATURE: 97.8 F | RESPIRATION RATE: 18 BRPM | DIASTOLIC BLOOD PRESSURE: 60 MMHG | OXYGEN SATURATION: 98 %

## 2024-01-01 VITALS
TEMPERATURE: 97 F | RESPIRATION RATE: 16 BRPM | OXYGEN SATURATION: 94 % | DIASTOLIC BLOOD PRESSURE: 105 MMHG | HEIGHT: 72 IN | BODY MASS INDEX: 33.86 KG/M2 | SYSTOLIC BLOOD PRESSURE: 145 MMHG | HEART RATE: 105 BPM | WEIGHT: 250 LBS

## 2024-01-01 VITALS — SYSTOLIC BLOOD PRESSURE: 131 MMHG | DIASTOLIC BLOOD PRESSURE: 85 MMHG | HEART RATE: 102 BPM

## 2024-01-01 VITALS
HEART RATE: 102 BPM | BODY MASS INDEX: 33.5 KG/M2 | OXYGEN SATURATION: 98 % | RESPIRATION RATE: 18 BRPM | SYSTOLIC BLOOD PRESSURE: 152 MMHG | WEIGHT: 247 LBS | DIASTOLIC BLOOD PRESSURE: 92 MMHG | TEMPERATURE: 97.5 F

## 2024-01-01 VITALS
OXYGEN SATURATION: 98 % | HEIGHT: 72 IN | TEMPERATURE: 98.5 F | HEART RATE: 104 BPM | DIASTOLIC BLOOD PRESSURE: 104 MMHG | BODY MASS INDEX: 33.54 KG/M2 | SYSTOLIC BLOOD PRESSURE: 146 MMHG | WEIGHT: 247.6 LBS

## 2024-01-01 VITALS
OXYGEN SATURATION: 96 % | SYSTOLIC BLOOD PRESSURE: 127 MMHG | TEMPERATURE: 97.8 F | BODY MASS INDEX: 25.91 KG/M2 | WEIGHT: 191.3 LBS | DIASTOLIC BLOOD PRESSURE: 82 MMHG | RESPIRATION RATE: 20 BRPM | HEIGHT: 72 IN | HEART RATE: 94 BPM

## 2024-01-01 VITALS
DIASTOLIC BLOOD PRESSURE: 93 MMHG | TEMPERATURE: 98.3 F | SYSTOLIC BLOOD PRESSURE: 139 MMHG | HEART RATE: 95 BPM | WEIGHT: 247 LBS | BODY MASS INDEX: 33.5 KG/M2

## 2024-01-01 VITALS — SYSTOLIC BLOOD PRESSURE: 129 MMHG | DIASTOLIC BLOOD PRESSURE: 86 MMHG | HEART RATE: 118 BPM

## 2024-01-01 VITALS — SYSTOLIC BLOOD PRESSURE: 163 MMHG | HEART RATE: 114 BPM | TEMPERATURE: 97.7 F | DIASTOLIC BLOOD PRESSURE: 98 MMHG

## 2024-01-01 VITALS
TEMPERATURE: 97.7 F | DIASTOLIC BLOOD PRESSURE: 95 MMHG | SYSTOLIC BLOOD PRESSURE: 154 MMHG | HEART RATE: 118 BPM | RESPIRATION RATE: 18 BRPM

## 2024-01-01 VITALS
SYSTOLIC BLOOD PRESSURE: 133 MMHG | RESPIRATION RATE: 18 BRPM | TEMPERATURE: 98.6 F | DIASTOLIC BLOOD PRESSURE: 90 MMHG | BODY MASS INDEX: 31.86 KG/M2 | OXYGEN SATURATION: 98 % | HEART RATE: 114 BPM | HEIGHT: 72 IN | WEIGHT: 235.23 LBS

## 2024-01-01 VITALS
DIASTOLIC BLOOD PRESSURE: 75 MMHG | WEIGHT: 211.2 LBS | BODY MASS INDEX: 28.61 KG/M2 | HEART RATE: 64 BPM | SYSTOLIC BLOOD PRESSURE: 93 MMHG | TEMPERATURE: 97.8 F | HEIGHT: 72 IN | RESPIRATION RATE: 17 BRPM | OXYGEN SATURATION: 99 %

## 2024-01-01 VITALS
BODY MASS INDEX: 32.23 KG/M2 | HEART RATE: 101 BPM | WEIGHT: 238 LBS | DIASTOLIC BLOOD PRESSURE: 79 MMHG | HEIGHT: 72 IN | TEMPERATURE: 98 F | SYSTOLIC BLOOD PRESSURE: 106 MMHG | RESPIRATION RATE: 12 BRPM | OXYGEN SATURATION: 98 %

## 2024-01-01 VITALS
HEART RATE: 85 BPM | DIASTOLIC BLOOD PRESSURE: 94 MMHG | TEMPERATURE: 97.6 F | SYSTOLIC BLOOD PRESSURE: 155 MMHG | RESPIRATION RATE: 18 BRPM

## 2024-01-01 VITALS
DIASTOLIC BLOOD PRESSURE: 84 MMHG | TEMPERATURE: 98.3 F | BODY MASS INDEX: 28.36 KG/M2 | HEART RATE: 94 BPM | OXYGEN SATURATION: 97 % | WEIGHT: 209.4 LBS | SYSTOLIC BLOOD PRESSURE: 143 MMHG | RESPIRATION RATE: 20 BRPM | HEIGHT: 72 IN

## 2024-01-01 VITALS
RESPIRATION RATE: 20 BRPM | HEART RATE: 65 BPM | TEMPERATURE: 98.3 F | SYSTOLIC BLOOD PRESSURE: 117 MMHG | DIASTOLIC BLOOD PRESSURE: 69 MMHG

## 2024-01-01 VITALS
DIASTOLIC BLOOD PRESSURE: 77 MMHG | WEIGHT: 206.2 LBS | SYSTOLIC BLOOD PRESSURE: 131 MMHG | TEMPERATURE: 97.9 F | OXYGEN SATURATION: 96 % | HEART RATE: 102 BPM | HEIGHT: 72 IN | BODY MASS INDEX: 27.93 KG/M2 | RESPIRATION RATE: 18 BRPM

## 2024-01-01 VITALS
SYSTOLIC BLOOD PRESSURE: 113 MMHG | BODY MASS INDEX: 33.48 KG/M2 | WEIGHT: 247.2 LBS | RESPIRATION RATE: 16 BRPM | HEART RATE: 65 BPM | HEIGHT: 72 IN | DIASTOLIC BLOOD PRESSURE: 77 MMHG | TEMPERATURE: 97.8 F | OXYGEN SATURATION: 96 %

## 2024-01-01 VITALS — HEART RATE: 101 BPM | DIASTOLIC BLOOD PRESSURE: 71 MMHG | SYSTOLIC BLOOD PRESSURE: 135 MMHG

## 2024-01-01 VITALS
OXYGEN SATURATION: 100 % | DIASTOLIC BLOOD PRESSURE: 93 MMHG | RESPIRATION RATE: 16 BRPM | HEART RATE: 90 BPM | TEMPERATURE: 97.6 F | SYSTOLIC BLOOD PRESSURE: 163 MMHG

## 2024-01-01 DIAGNOSIS — L03.011 CELLULITIS OF FINGER OF RIGHT HAND: ICD-10-CM

## 2024-01-01 DIAGNOSIS — C34.11 MALIGNANT NEOPLASM OF UPPER LOBE OF RIGHT LUNG (H): ICD-10-CM

## 2024-01-01 DIAGNOSIS — K59.01 SLOW TRANSIT CONSTIPATION: ICD-10-CM

## 2024-01-01 DIAGNOSIS — C78.2 METASTASIS TO PLEURA (H): ICD-10-CM

## 2024-01-01 DIAGNOSIS — L03.115 CELLULITIS OF RIGHT LOWER EXTREMITY: ICD-10-CM

## 2024-01-01 DIAGNOSIS — Z51.5 HOSPICE CARE PATIENT: ICD-10-CM

## 2024-01-01 DIAGNOSIS — D72.819 LEUKOPENIA, UNSPECIFIED TYPE: ICD-10-CM

## 2024-01-01 DIAGNOSIS — C78.89 MALIGNANT NEOPLASM METASTATIC TO PANCREAS (H): ICD-10-CM

## 2024-01-01 DIAGNOSIS — C34.91 LARGE CELL CARCINOMA OF LUNG, RIGHT (H): Primary | ICD-10-CM

## 2024-01-01 DIAGNOSIS — C79.51 MALIGNANT NEOPLASM METASTATIC TO BONE (H): ICD-10-CM

## 2024-01-01 DIAGNOSIS — R91.8 MASS OF UPPER LOBE OF RIGHT LUNG: ICD-10-CM

## 2024-01-01 DIAGNOSIS — L89.300 PRESSURE INJURY OF BUTTOCK, UNSTAGEABLE, UNSPECIFIED LATERALITY (H): ICD-10-CM

## 2024-01-01 DIAGNOSIS — L03.115 CELLULITIS OF RIGHT LOWER EXTREMITY: Primary | ICD-10-CM

## 2024-01-01 DIAGNOSIS — Z01.818 PREOP EXAMINATION: Primary | ICD-10-CM

## 2024-01-01 DIAGNOSIS — I10 ESSENTIAL HYPERTENSION, BENIGN: ICD-10-CM

## 2024-01-01 DIAGNOSIS — I48.91 ATRIAL FIBRILLATION WITH RVR (H): ICD-10-CM

## 2024-01-01 DIAGNOSIS — C78.89 MALIGNANT NEOPLASM METASTATIC TO PANCREAS (H): Primary | ICD-10-CM

## 2024-01-01 DIAGNOSIS — C34.11 MALIGNANT NEOPLASM OF UPPER LOBE OF RIGHT LUNG (H): Primary | ICD-10-CM

## 2024-01-01 DIAGNOSIS — D72.828 OTHER ELEVATED WHITE BLOOD CELL (WBC) COUNT: ICD-10-CM

## 2024-01-01 DIAGNOSIS — C34.91 LARGE CELL CARCINOMA OF LUNG, RIGHT (H): ICD-10-CM

## 2024-01-01 DIAGNOSIS — M25.551 HIP PAIN, RIGHT: ICD-10-CM

## 2024-01-01 DIAGNOSIS — D72.829 LEUKOCYTOSIS, UNSPECIFIED TYPE: ICD-10-CM

## 2024-01-01 DIAGNOSIS — L08.9 NECK INFECTION: Primary | ICD-10-CM

## 2024-01-01 DIAGNOSIS — Z01.818 PREOP EXAMINATION: ICD-10-CM

## 2024-01-01 DIAGNOSIS — C79.31 MALIGNANT NEOPLASM METASTATIC TO BRAIN (H): ICD-10-CM

## 2024-01-01 DIAGNOSIS — E87.1 HYPONATREMIA: ICD-10-CM

## 2024-01-01 DIAGNOSIS — R91.8 LUNG MASS: ICD-10-CM

## 2024-01-01 DIAGNOSIS — Z01.818 PREOPERATIVE EXAMINATION: Primary | ICD-10-CM

## 2024-01-01 DIAGNOSIS — C79.31 MALIGNANT NEOPLASM METASTATIC TO BRAIN (H): Primary | ICD-10-CM

## 2024-01-01 DIAGNOSIS — K59.03 DRUG-INDUCED CONSTIPATION: ICD-10-CM

## 2024-01-01 DIAGNOSIS — C79.31 METASTASIS TO BRAIN (H): ICD-10-CM

## 2024-01-01 DIAGNOSIS — M84.550D: ICD-10-CM

## 2024-01-01 DIAGNOSIS — C79.31 METASTASIS TO BRAIN (H): Primary | ICD-10-CM

## 2024-01-01 DIAGNOSIS — R60.0 PEDAL EDEMA: ICD-10-CM

## 2024-01-01 DIAGNOSIS — E87.1 HYPO-OSMOLALITY AND HYPONATREMIA: ICD-10-CM

## 2024-01-01 DIAGNOSIS — S91.301A WOUND OF RIGHT FOOT: ICD-10-CM

## 2024-01-01 DIAGNOSIS — C79.31 SECONDARY MALIGNANT NEOPLASM OF BRAIN (H): Primary | ICD-10-CM

## 2024-01-01 DIAGNOSIS — L89.150 PRESSURE INJURY OF COCCYGEAL REGION, UNSTAGEABLE (H): ICD-10-CM

## 2024-01-01 DIAGNOSIS — D64.9 ANEMIA, UNSPECIFIED: ICD-10-CM

## 2024-01-01 DIAGNOSIS — Z86.711 PERSONAL HISTORY OF PE (PULMONARY EMBOLISM): Primary | ICD-10-CM

## 2024-01-01 DIAGNOSIS — R04.2 HEMOPTYSIS: ICD-10-CM

## 2024-01-01 DIAGNOSIS — I48.91 ATRIAL FIBRILLATION WITH RAPID VENTRICULAR RESPONSE (H): ICD-10-CM

## 2024-01-01 DIAGNOSIS — L89.626 PRESSURE INJURY OF DEEP TISSUE OF LEFT HEEL: Primary | ICD-10-CM

## 2024-01-01 DIAGNOSIS — M84.550A PATHOLOGICAL FRACTURE IN NEOPLASTIC DISEASE, PELVIS, INIT: ICD-10-CM

## 2024-01-01 DIAGNOSIS — M86.9 OSTEOMYELITIS, UNSPECIFIED SITE, UNSPECIFIED TYPE (H): ICD-10-CM

## 2024-01-01 DIAGNOSIS — M25.551 RIGHT HIP PAIN: ICD-10-CM

## 2024-01-01 DIAGNOSIS — K59.03 DRUG INDUCED CONSTIPATION: ICD-10-CM

## 2024-01-01 DIAGNOSIS — I48.20 CHRONIC ATRIAL FIBRILLATION (H): ICD-10-CM

## 2024-01-01 DIAGNOSIS — S91.302A WOUND OF LEFT FOOT: ICD-10-CM

## 2024-01-01 DIAGNOSIS — R91.8 MASS OF UPPER LOBE OF RIGHT LUNG: Primary | ICD-10-CM

## 2024-01-01 DIAGNOSIS — R91.8 LUNG MASS: Primary | ICD-10-CM

## 2024-01-01 DIAGNOSIS — D64.9 CHRONIC ANEMIA: ICD-10-CM

## 2024-01-01 DIAGNOSIS — R76.12 NONSPECIFIC REACTION TO CELL MEDIATED IMMUNITY MEASUREMENT OF GAMMA INTERFERON ANTIGEN RESPONSE WITHOUT ACTIVE TUBERCULOSIS: ICD-10-CM

## 2024-01-01 DIAGNOSIS — J96.01 ACUTE RESPIRATORY FAILURE WITH HYPOXIA (H): ICD-10-CM

## 2024-01-01 DIAGNOSIS — L89.300 PRESSURE INJURY OF BUTTOCK, UNSTAGEABLE, UNSPECIFIED LATERALITY (H): Primary | ICD-10-CM

## 2024-01-01 DIAGNOSIS — C79.51 SECONDARY MALIGNANT NEOPLASM OF BONE (H): Primary | ICD-10-CM

## 2024-01-01 DIAGNOSIS — R05.1 ACUTE COUGH: Primary | ICD-10-CM

## 2024-01-01 DIAGNOSIS — Z79.01 CHRONIC ANTICOAGULATION: ICD-10-CM

## 2024-01-01 DIAGNOSIS — N31.9 NEUROGENIC BLADDER: ICD-10-CM

## 2024-01-01 DIAGNOSIS — G89.3 CANCER ASSOCIATED PAIN: Primary | ICD-10-CM

## 2024-01-01 DIAGNOSIS — A41.9 SEPSIS, DUE TO UNSPECIFIED ORGANISM, UNSPECIFIED WHETHER ACUTE ORGAN DYSFUNCTION PRESENT (H): ICD-10-CM

## 2024-01-01 DIAGNOSIS — T45.1X5A ANTINEOPLASTIC CHEMOTHERAPY INDUCED PANCYTOPENIA (H): ICD-10-CM

## 2024-01-01 DIAGNOSIS — D61.810 ANTINEOPLASTIC CHEMOTHERAPY INDUCED PANCYTOPENIA (H): ICD-10-CM

## 2024-01-01 DIAGNOSIS — L89.616 PRESSURE INJURY OF DEEP TISSUE OF RIGHT HEEL: ICD-10-CM

## 2024-01-01 DIAGNOSIS — G89.29 OTHER CHRONIC PAIN: ICD-10-CM

## 2024-01-01 DIAGNOSIS — D72.829 ELEVATED WHITE BLOOD CELL COUNT, UNSPECIFIED: ICD-10-CM

## 2024-01-01 DIAGNOSIS — Z97.8 FOLEY CATHETER IN PLACE: ICD-10-CM

## 2024-01-01 DIAGNOSIS — J90 PLEURAL EFFUSION ON RIGHT: ICD-10-CM

## 2024-01-01 DIAGNOSIS — L03.116 CELLULITIS OF LEFT LOWER LIMB: ICD-10-CM

## 2024-01-01 DIAGNOSIS — C79.51 MALIGNANT NEOPLASM METASTATIC TO BONE (H): Primary | ICD-10-CM

## 2024-01-01 LAB
ABO/RH(D): NORMAL
ALBUMIN SERPL BCG-MCNC: 2.1 G/DL (ref 3.5–5.2)
ALBUMIN SERPL BCG-MCNC: 2.2 G/DL (ref 3.5–5.2)
ALBUMIN SERPL BCG-MCNC: 2.2 G/DL (ref 3.5–5.2)
ALBUMIN SERPL BCG-MCNC: 2.3 G/DL (ref 3.5–5.2)
ALBUMIN SERPL BCG-MCNC: 2.4 G/DL (ref 3.5–5.2)
ALBUMIN SERPL BCG-MCNC: 2.5 G/DL (ref 3.5–5.2)
ALBUMIN SERPL BCG-MCNC: 2.6 G/DL (ref 3.5–5.2)
ALBUMIN SERPL BCG-MCNC: 2.6 G/DL (ref 3.5–5.2)
ALBUMIN SERPL BCG-MCNC: 2.7 G/DL (ref 3.5–5.2)
ALBUMIN SERPL BCG-MCNC: 2.7 G/DL (ref 3.5–5.2)
ALBUMIN SERPL BCG-MCNC: 3 G/DL (ref 3.5–5.2)
ALBUMIN SERPL BCG-MCNC: 3.7 G/DL (ref 3.5–5.2)
ALBUMIN SERPL BCG-MCNC: 3.7 G/DL (ref 3.5–5.2)
ALBUMIN SERPL BCG-MCNC: 3.8 G/DL (ref 3.5–5.2)
ALBUMIN SERPL BCG-MCNC: 3.8 G/DL (ref 3.5–5.2)
ALBUMIN SERPL BCG-MCNC: 3.9 G/DL (ref 3.5–5.2)
ALBUMIN SERPL BCG-MCNC: 4.2 G/DL (ref 3.5–5.2)
ALBUMIN SERPL BCG-MCNC: 4.3 G/DL (ref 3.5–5.2)
ALBUMIN UR-MCNC: 30 MG/DL
ALBUMIN UR-MCNC: NEGATIVE MG/DL
ALP SERPL-CCNC: 110 U/L (ref 40–150)
ALP SERPL-CCNC: 121 U/L (ref 40–150)
ALP SERPL-CCNC: 121 U/L (ref 40–150)
ALP SERPL-CCNC: 124 U/L (ref 40–150)
ALP SERPL-CCNC: 136 U/L (ref 40–150)
ALP SERPL-CCNC: 136 U/L (ref 40–150)
ALP SERPL-CCNC: 145 U/L (ref 40–150)
ALP SERPL-CCNC: 152 U/L (ref 40–150)
ALP SERPL-CCNC: 163 U/L (ref 40–150)
ALP SERPL-CCNC: 176 U/L (ref 40–150)
ALP SERPL-CCNC: 198 U/L (ref 40–150)
ALP SERPL-CCNC: 238 U/L (ref 40–150)
ALP SERPL-CCNC: 265 U/L (ref 40–150)
ALP SERPL-CCNC: 301 U/L (ref 40–150)
ALP SERPL-CCNC: 346 U/L (ref 40–150)
ALP SERPL-CCNC: 355 U/L (ref 40–150)
ALP SERPL-CCNC: 94 U/L (ref 40–150)
ALP SERPL-CCNC: 96 U/L (ref 40–150)
ALT SERPL W P-5'-P-CCNC: 13 U/L (ref 0–70)
ALT SERPL W P-5'-P-CCNC: 16 U/L (ref 0–70)
ALT SERPL W P-5'-P-CCNC: 17 U/L (ref 0–70)
ALT SERPL W P-5'-P-CCNC: 17 U/L (ref 0–70)
ALT SERPL W P-5'-P-CCNC: 18 U/L (ref 0–70)
ALT SERPL W P-5'-P-CCNC: 18 U/L (ref 0–70)
ALT SERPL W P-5'-P-CCNC: 20 U/L (ref 0–70)
ALT SERPL W P-5'-P-CCNC: 22 U/L (ref 0–70)
ALT SERPL W P-5'-P-CCNC: 22 U/L (ref 0–70)
ALT SERPL W P-5'-P-CCNC: 23 U/L (ref 0–70)
ALT SERPL W P-5'-P-CCNC: 24 U/L (ref 0–70)
ALT SERPL W P-5'-P-CCNC: 26 U/L (ref 0–70)
ALT SERPL W P-5'-P-CCNC: 28 U/L (ref 0–70)
ALT SERPL W P-5'-P-CCNC: 31 U/L (ref 0–70)
ALT SERPL W P-5'-P-CCNC: 31 U/L (ref 0–70)
ALT SERPL W P-5'-P-CCNC: 9 U/L (ref 0–70)
ANION GAP SERPL CALCULATED.3IONS-SCNC: 10 MMOL/L (ref 7–15)
ANION GAP SERPL CALCULATED.3IONS-SCNC: 11 MMOL/L (ref 7–15)
ANION GAP SERPL CALCULATED.3IONS-SCNC: 12 MMOL/L (ref 7–15)
ANION GAP SERPL CALCULATED.3IONS-SCNC: 13 MMOL/L (ref 7–15)
ANION GAP SERPL CALCULATED.3IONS-SCNC: 14 MMOL/L (ref 7–15)
ANION GAP SERPL CALCULATED.3IONS-SCNC: 14 MMOL/L (ref 7–15)
ANION GAP SERPL CALCULATED.3IONS-SCNC: 15 MMOL/L (ref 7–15)
ANION GAP SERPL CALCULATED.3IONS-SCNC: 17 MMOL/L (ref 7–15)
ANION GAP SERPL CALCULATED.3IONS-SCNC: 6 MMOL/L (ref 7–15)
ANION GAP SERPL CALCULATED.3IONS-SCNC: 8 MMOL/L (ref 7–15)
ANION GAP SERPL CALCULATED.3IONS-SCNC: 9 MMOL/L (ref 7–15)
ANTIBODY SCREEN: NEGATIVE
APPEARANCE UR: ABNORMAL
APPEARANCE UR: CLEAR
APTT PPP: 27 SECONDS (ref 22–38)
AST SERPL W P-5'-P-CCNC: 11 U/L (ref 0–45)
AST SERPL W P-5'-P-CCNC: 13 U/L (ref 0–45)
AST SERPL W P-5'-P-CCNC: 16 U/L (ref 0–45)
AST SERPL W P-5'-P-CCNC: 17 U/L (ref 0–45)
AST SERPL W P-5'-P-CCNC: 18 U/L (ref 0–45)
AST SERPL W P-5'-P-CCNC: 18 U/L (ref 0–45)
AST SERPL W P-5'-P-CCNC: 19 U/L (ref 0–45)
AST SERPL W P-5'-P-CCNC: 20 U/L (ref 0–45)
AST SERPL W P-5'-P-CCNC: 20 U/L (ref 0–45)
AST SERPL W P-5'-P-CCNC: 21 U/L (ref 0–45)
AST SERPL W P-5'-P-CCNC: 22 U/L (ref 0–45)
AST SERPL W P-5'-P-CCNC: 22 U/L (ref 0–45)
AST SERPL W P-5'-P-CCNC: 24 U/L (ref 0–45)
AST SERPL W P-5'-P-CCNC: 25 U/L (ref 0–45)
AST SERPL W P-5'-P-CCNC: 27 U/L (ref 0–45)
ATRIAL RATE - MUSE: 102 BPM
ATRIAL RATE - MUSE: 84 BPM
BACTERIA BLD CULT: ABNORMAL
BACTERIA BLD CULT: NO GROWTH
BACTERIA SPT CULT: NORMAL
BACTERIA UR CULT: ABNORMAL
BACTERIA UR CULT: ABNORMAL
BACTERIA WND CULT: ABNORMAL
BACTERIA WND CULT: ABNORMAL
BASE EXCESS BLDV CALC-SCNC: -0.9 MMOL/L (ref -3–3)
BASE EXCESS BLDV CALC-SCNC: -1.9 MMOL/L (ref -3–3)
BASE EXCESS BLDV CALC-SCNC: 0 MMOL/L (ref -3–3)
BASE EXCESS BLDV CALC-SCNC: 1.9 MMOL/L (ref -3–3)
BASE EXCESS BLDV CALC-SCNC: 3.4 MMOL/L (ref -3–3)
BASOPHILS # BLD AUTO: 0 10E3/UL (ref 0–0.2)
BASOPHILS # BLD AUTO: 0.1 10E3/UL (ref 0–0.2)
BASOPHILS # BLD AUTO: 0.1 10E3/UL (ref 0–0.2)
BASOPHILS # BLD AUTO: 0.2 10E3/UL (ref 0–0.2)
BASOPHILS # BLD MANUAL: 0 10E3/UL (ref 0–0.2)
BASOPHILS # BLD MANUAL: 0.3 10E3/UL (ref 0–0.2)
BASOPHILS NFR BLD AUTO: 0 %
BASOPHILS NFR BLD AUTO: 1 %
BASOPHILS NFR BLD MANUAL: 0 %
BASOPHILS NFR BLD MANUAL: 1 %
BASOPHILS NFR BLD MANUAL: 2 %
BILIRUB DIRECT SERPL-MCNC: 0.23 MG/DL (ref 0–0.3)
BILIRUB SERPL-MCNC: 0.2 MG/DL
BILIRUB SERPL-MCNC: 0.3 MG/DL
BILIRUB SERPL-MCNC: 0.4 MG/DL
BILIRUB SERPL-MCNC: 0.5 MG/DL
BILIRUB SERPL-MCNC: 0.6 MG/DL
BILIRUB SERPL-MCNC: <0.2 MG/DL
BILIRUB UR QL STRIP: NEGATIVE
BILIRUB UR QL STRIP: NEGATIVE
BLD PROD TYP BPU: NORMAL
BLOOD COMPONENT TYPE: NORMAL
BUN SERPL-MCNC: 12.2 MG/DL (ref 8–23)
BUN SERPL-MCNC: 12.8 MG/DL (ref 8–23)
BUN SERPL-MCNC: 13 MG/DL (ref 8–23)
BUN SERPL-MCNC: 14 MG/DL (ref 8–23)
BUN SERPL-MCNC: 14.9 MG/DL (ref 8–23)
BUN SERPL-MCNC: 16.2 MG/DL (ref 8–23)
BUN SERPL-MCNC: 17.6 MG/DL (ref 8–23)
BUN SERPL-MCNC: 17.8 MG/DL (ref 8–23)
BUN SERPL-MCNC: 18.1 MG/DL (ref 8–23)
BUN SERPL-MCNC: 19.4 MG/DL (ref 8–23)
BUN SERPL-MCNC: 19.6 MG/DL (ref 8–23)
BUN SERPL-MCNC: 19.8 MG/DL (ref 8–23)
BUN SERPL-MCNC: 20.4 MG/DL (ref 8–23)
BUN SERPL-MCNC: 21.1 MG/DL (ref 8–23)
BUN SERPL-MCNC: 21.4 MG/DL (ref 8–23)
BUN SERPL-MCNC: 23.5 MG/DL (ref 8–23)
BUN SERPL-MCNC: 23.5 MG/DL (ref 8–23)
BUN SERPL-MCNC: 23.6 MG/DL (ref 8–23)
BUN SERPL-MCNC: 24 MG/DL (ref 8–23)
BUN SERPL-MCNC: 25.7 MG/DL (ref 8–23)
BUN SERPL-MCNC: 26.4 MG/DL (ref 8–23)
BUN SERPL-MCNC: 27.7 MG/DL (ref 8–23)
BUN SERPL-MCNC: 28 MG/DL (ref 8–23)
BUN SERPL-MCNC: 37.2 MG/DL (ref 8–23)
BUN SERPL-MCNC: 43.9 MG/DL (ref 8–23)
BUN SERPL-MCNC: 45.5 MG/DL (ref 8–23)
BURR CELLS BLD QL SMEAR: SLIGHT
CA-I BLD-MCNC: 4.6 MG/DL (ref 4.4–5.2)
CALCIUM SERPL-MCNC: 7.8 MG/DL (ref 8.8–10.4)
CALCIUM SERPL-MCNC: 7.9 MG/DL (ref 8.8–10.4)
CALCIUM SERPL-MCNC: 8.1 MG/DL (ref 8.8–10.4)
CALCIUM SERPL-MCNC: 8.1 MG/DL (ref 8.8–10.4)
CALCIUM SERPL-MCNC: 8.2 MG/DL (ref 8.8–10.4)
CALCIUM SERPL-MCNC: 8.3 MG/DL (ref 8.8–10.4)
CALCIUM SERPL-MCNC: 8.4 MG/DL (ref 8.8–10.2)
CALCIUM SERPL-MCNC: 8.5 MG/DL (ref 8.8–10.4)
CALCIUM SERPL-MCNC: 8.5 MG/DL (ref 8.8–10.4)
CALCIUM SERPL-MCNC: 8.6 MG/DL (ref 8.8–10.4)
CALCIUM SERPL-MCNC: 8.7 MG/DL (ref 8.8–10.4)
CALCIUM SERPL-MCNC: 8.7 MG/DL (ref 8.8–10.4)
CALCIUM SERPL-MCNC: 8.8 MG/DL (ref 8.8–10.2)
CALCIUM SERPL-MCNC: 8.8 MG/DL (ref 8.8–10.4)
CALCIUM SERPL-MCNC: 8.8 MG/DL (ref 8.8–10.4)
CALCIUM SERPL-MCNC: 8.9 MG/DL (ref 8.8–10.4)
CALCIUM SERPL-MCNC: 9 MG/DL (ref 8.8–10.2)
CALCIUM SERPL-MCNC: 9 MG/DL (ref 8.8–10.4)
CALCIUM SERPL-MCNC: 9 MG/DL (ref 8.8–10.4)
CALCIUM SERPL-MCNC: 9.2 MG/DL (ref 8.8–10.2)
CALCIUM SERPL-MCNC: 9.3 MG/DL (ref 8.8–10.4)
CALCIUM SERPL-MCNC: 9.4 MG/DL (ref 8.8–10.4)
CHLORIDE SERPL-SCNC: 100 MMOL/L (ref 98–107)
CHLORIDE SERPL-SCNC: 101 MMOL/L (ref 98–107)
CHLORIDE SERPL-SCNC: 102 MMOL/L (ref 98–107)
CHLORIDE SERPL-SCNC: 104 MMOL/L (ref 98–107)
CHLORIDE SERPL-SCNC: 112 MMOL/L (ref 98–107)
CHLORIDE SERPL-SCNC: 91 MMOL/L (ref 98–107)
CHLORIDE SERPL-SCNC: 92 MMOL/L (ref 98–107)
CHLORIDE SERPL-SCNC: 93 MMOL/L (ref 98–107)
CHLORIDE SERPL-SCNC: 94 MMOL/L (ref 98–107)
CHLORIDE SERPL-SCNC: 95 MMOL/L (ref 98–107)
CHLORIDE SERPL-SCNC: 96 MMOL/L (ref 98–107)
CHLORIDE SERPL-SCNC: 97 MMOL/L (ref 98–107)
CHLORIDE SERPL-SCNC: 98 MMOL/L (ref 98–107)
CHLORIDE SERPL-SCNC: 99 MMOL/L (ref 98–107)
CODING SYSTEM: NORMAL
COLOR UR AUTO: YELLOW
COLOR UR AUTO: YELLOW
CREAT SERPL-MCNC: 0.45 MG/DL (ref 0.67–1.17)
CREAT SERPL-MCNC: 0.48 MG/DL (ref 0.67–1.17)
CREAT SERPL-MCNC: 0.48 MG/DL (ref 0.67–1.17)
CREAT SERPL-MCNC: 0.52 MG/DL (ref 0.67–1.17)
CREAT SERPL-MCNC: 0.53 MG/DL (ref 0.67–1.17)
CREAT SERPL-MCNC: 0.58 MG/DL (ref 0.67–1.17)
CREAT SERPL-MCNC: 0.58 MG/DL (ref 0.67–1.17)
CREAT SERPL-MCNC: 0.6 MG/DL (ref 0.67–1.17)
CREAT SERPL-MCNC: 0.64 MG/DL (ref 0.67–1.17)
CREAT SERPL-MCNC: 0.7 MG/DL (ref 0.67–1.17)
CREAT SERPL-MCNC: 0.72 MG/DL (ref 0.67–1.17)
CREAT SERPL-MCNC: 0.74 MG/DL (ref 0.67–1.17)
CREAT SERPL-MCNC: 0.75 MG/DL (ref 0.67–1.17)
CREAT SERPL-MCNC: 0.77 MG/DL (ref 0.67–1.17)
CREAT SERPL-MCNC: 0.8 MG/DL (ref 0.67–1.17)
CREAT SERPL-MCNC: 0.83 MG/DL (ref 0.67–1.17)
CREAT SERPL-MCNC: 0.83 MG/DL (ref 0.67–1.17)
CREAT SERPL-MCNC: 0.85 MG/DL (ref 0.67–1.17)
CREAT SERPL-MCNC: 0.86 MG/DL (ref 0.67–1.17)
CREAT SERPL-MCNC: 0.86 MG/DL (ref 0.67–1.17)
CREAT SERPL-MCNC: 0.92 MG/DL (ref 0.67–1.17)
CREAT SERPL-MCNC: 0.94 MG/DL (ref 0.67–1.17)
CREAT SERPL-MCNC: 0.94 MG/DL (ref 0.67–1.17)
CROSSMATCH: NORMAL
CRP SERPL-MCNC: 256.81 MG/L
CRP SERPL-MCNC: 299.84 MG/L
DEPRECATED HCO3 PLAS-SCNC: 23 MMOL/L (ref 22–29)
DEPRECATED HCO3 PLAS-SCNC: 23 MMOL/L (ref 22–29)
DEPRECATED HCO3 PLAS-SCNC: 25 MMOL/L (ref 22–29)
DEPRECATED HCO3 PLAS-SCNC: 25 MMOL/L (ref 22–29)
DIASTOLIC BLOOD PRESSURE - MUSE: NORMAL MMHG
DIASTOLIC BLOOD PRESSURE - MUSE: NORMAL MMHG
DIGOXIN SERPL-MCNC: 0.6 NG/ML (ref 0.6–2)
DLCOCOR-%PRED-PRE: 84 %
DLCOCOR-PRE: 22.96 ML/MIN/MMHG
DLCOUNC-%PRED-PRE: 84 %
DLCOUNC-PRE: 22.96 ML/MIN/MMHG
DLCOUNC-PRED: 27.06 ML/MIN/MMHG
EGFRCR SERPLBLD CKD-EPI 2021: 87 ML/MIN/1.73M2
EGFRCR SERPLBLD CKD-EPI 2021: 88 ML/MIN/1.73M2
EGFRCR SERPLBLD CKD-EPI 2021: 89 ML/MIN/1.73M2
EGFRCR SERPLBLD CKD-EPI 2021: >90 ML/MIN/1.73M2
ELLIPTOCYTES BLD QL SMEAR: SLIGHT
ENTEROCOCCUS FAECALIS: NOT DETECTED
ENTEROCOCCUS FAECIUM: NOT DETECTED
EOSINOPHIL # BLD AUTO: 0 10E3/UL (ref 0–0.7)
EOSINOPHIL # BLD AUTO: 0.2 10E3/UL (ref 0–0.7)
EOSINOPHIL # BLD AUTO: 0.2 10E3/UL (ref 0–0.7)
EOSINOPHIL # BLD AUTO: 0.4 10E3/UL (ref 0–0.7)
EOSINOPHIL # BLD MANUAL: 0 10E3/UL (ref 0–0.7)
EOSINOPHIL NFR BLD AUTO: 0 %
EOSINOPHIL NFR BLD AUTO: 1 %
EOSINOPHIL NFR BLD AUTO: 1 %
EOSINOPHIL NFR BLD AUTO: 2 %
EOSINOPHIL NFR BLD MANUAL: 0 %
ERV-%PRED-PRE: 93 %
ERV-PRE: 1.45 L
ERV-PRED: 1.55 L
ERYTHROCYTE [DISTWIDTH] IN BLOOD BY AUTOMATED COUNT: 13.2 % (ref 10–15)
ERYTHROCYTE [DISTWIDTH] IN BLOOD BY AUTOMATED COUNT: 13.5 % (ref 10–15)
ERYTHROCYTE [DISTWIDTH] IN BLOOD BY AUTOMATED COUNT: 13.5 % (ref 10–15)
ERYTHROCYTE [DISTWIDTH] IN BLOOD BY AUTOMATED COUNT: 13.6 % (ref 10–15)
ERYTHROCYTE [DISTWIDTH] IN BLOOD BY AUTOMATED COUNT: 13.8 % (ref 10–15)
ERYTHROCYTE [DISTWIDTH] IN BLOOD BY AUTOMATED COUNT: 13.8 % (ref 10–15)
ERYTHROCYTE [DISTWIDTH] IN BLOOD BY AUTOMATED COUNT: 13.9 % (ref 10–15)
ERYTHROCYTE [DISTWIDTH] IN BLOOD BY AUTOMATED COUNT: 15.9 % (ref 10–15)
ERYTHROCYTE [DISTWIDTH] IN BLOOD BY AUTOMATED COUNT: 17.2 % (ref 10–15)
ERYTHROCYTE [DISTWIDTH] IN BLOOD BY AUTOMATED COUNT: 17.4 % (ref 10–15)
ERYTHROCYTE [DISTWIDTH] IN BLOOD BY AUTOMATED COUNT: 18.2 % (ref 10–15)
ERYTHROCYTE [DISTWIDTH] IN BLOOD BY AUTOMATED COUNT: 18.7 % (ref 10–15)
ERYTHROCYTE [DISTWIDTH] IN BLOOD BY AUTOMATED COUNT: 20 % (ref 10–15)
ERYTHROCYTE [DISTWIDTH] IN BLOOD BY AUTOMATED COUNT: 20.3 % (ref 10–15)
ERYTHROCYTE [DISTWIDTH] IN BLOOD BY AUTOMATED COUNT: 21.2 % (ref 10–15)
ERYTHROCYTE [DISTWIDTH] IN BLOOD BY AUTOMATED COUNT: 21.2 % (ref 10–15)
ERYTHROCYTE [DISTWIDTH] IN BLOOD BY AUTOMATED COUNT: 21.3 % (ref 10–15)
ERYTHROCYTE [DISTWIDTH] IN BLOOD BY AUTOMATED COUNT: 21.4 % (ref 10–15)
ERYTHROCYTE [DISTWIDTH] IN BLOOD BY AUTOMATED COUNT: 22.5 % (ref 10–15)
ERYTHROCYTE [DISTWIDTH] IN BLOOD BY AUTOMATED COUNT: 22.7 % (ref 10–15)
ERYTHROCYTE [DISTWIDTH] IN BLOOD BY AUTOMATED COUNT: 23.8 % (ref 10–15)
ERYTHROCYTE [DISTWIDTH] IN BLOOD BY AUTOMATED COUNT: 25.1 % (ref 10–15)
ERYTHROCYTE [DISTWIDTH] IN BLOOD BY AUTOMATED COUNT: 25.1 % (ref 10–15)
ERYTHROCYTE [DISTWIDTH] IN BLOOD BY AUTOMATED COUNT: 25.2 % (ref 10–15)
ERYTHROCYTE [DISTWIDTH] IN BLOOD BY AUTOMATED COUNT: 25.3 % (ref 10–15)
ERYTHROCYTE [DISTWIDTH] IN BLOOD BY AUTOMATED COUNT: 25.5 % (ref 10–15)
EST. AVERAGE GLUCOSE BLD GHB EST-MCNC: 134 MG/DL
EXPTIME-PRE: 7.35 SEC
FEF2575-%PRED-PRE: 143 %
FEF2575-PRE: 3.48 L/SEC
FEF2575-PRED: 2.42 L/SEC
FEFMAX-%PRED-PRE: 117 %
FEFMAX-PRE: 10.22 L/SEC
FEFMAX-PRED: 8.72 L/SEC
FEV1-%PRED-PRE: 122 %
FEV1-PRE: 3.86 L
FEV1FEV6-PRE: 80 %
FEV1FEV6-PRED: 78 %
FEV1FVC-PRE: 78 %
FEV1FVC-PRED: 77 %
FEV1SVC-PRE: 75 %
FEV1SVC-PRED: 72 %
FIBRINOGEN PPP-MCNC: 865 MG/DL (ref 170–510)
FIFMAX-PRE: 2.39 L/SEC
FRAGMENTS BLD QL SMEAR: ABNORMAL
FRAGMENTS BLD QL SMEAR: SLIGHT
FRAGMENTS BLD QL SMEAR: SLIGHT
FRCPLETH-%PRED-PRE: 116 %
FRCPLETH-PRE: 4.41 L
FRCPLETH-PRED: 3.79 L
FVC-%PRED-PRE: 119 %
FVC-PRE: 4.95 L
FVC-PRED: 4.13 L
GAMMA INTERFERON BACKGROUND BLD IA-ACNC: 0.02 IU/ML
GGT SERPL-CCNC: 74 U/L (ref 8–61)
GLUCOSE BLDC GLUCOMTR-MCNC: 103 MG/DL (ref 70–99)
GLUCOSE BLDC GLUCOMTR-MCNC: 104 MG/DL (ref 70–99)
GLUCOSE BLDC GLUCOMTR-MCNC: 107 MG/DL (ref 70–99)
GLUCOSE BLDC GLUCOMTR-MCNC: 124 MG/DL (ref 70–99)
GLUCOSE BLDC GLUCOMTR-MCNC: 136 MG/DL (ref 70–99)
GLUCOSE BLDC GLUCOMTR-MCNC: 139 MG/DL (ref 70–99)
GLUCOSE BLDC GLUCOMTR-MCNC: 144 MG/DL (ref 70–99)
GLUCOSE BLDC GLUCOMTR-MCNC: 148 MG/DL (ref 70–99)
GLUCOSE BLDC GLUCOMTR-MCNC: 197 MG/DL (ref 70–99)
GLUCOSE BLDC GLUCOMTR-MCNC: 217 MG/DL (ref 70–99)
GLUCOSE BLDC GLUCOMTR-MCNC: 232 MG/DL (ref 70–99)
GLUCOSE BLDC GLUCOMTR-MCNC: 249 MG/DL (ref 70–99)
GLUCOSE BLDC GLUCOMTR-MCNC: 51 MG/DL (ref 70–99)
GLUCOSE SERPL-MCNC: 100 MG/DL (ref 70–99)
GLUCOSE SERPL-MCNC: 102 MG/DL (ref 70–99)
GLUCOSE SERPL-MCNC: 102 MG/DL (ref 70–99)
GLUCOSE SERPL-MCNC: 104 MG/DL (ref 70–99)
GLUCOSE SERPL-MCNC: 105 MG/DL (ref 70–99)
GLUCOSE SERPL-MCNC: 105 MG/DL (ref 70–99)
GLUCOSE SERPL-MCNC: 107 MG/DL (ref 70–99)
GLUCOSE SERPL-MCNC: 111 MG/DL (ref 70–99)
GLUCOSE SERPL-MCNC: 115 MG/DL (ref 70–99)
GLUCOSE SERPL-MCNC: 117 MG/DL (ref 70–99)
GLUCOSE SERPL-MCNC: 117 MG/DL (ref 70–99)
GLUCOSE SERPL-MCNC: 120 MG/DL (ref 70–99)
GLUCOSE SERPL-MCNC: 121 MG/DL (ref 70–99)
GLUCOSE SERPL-MCNC: 121 MG/DL (ref 70–99)
GLUCOSE SERPL-MCNC: 131 MG/DL (ref 70–99)
GLUCOSE SERPL-MCNC: 142 MG/DL (ref 70–99)
GLUCOSE SERPL-MCNC: 154 MG/DL (ref 70–99)
GLUCOSE SERPL-MCNC: 156 MG/DL (ref 70–99)
GLUCOSE SERPL-MCNC: 158 MG/DL (ref 70–99)
GLUCOSE SERPL-MCNC: 158 MG/DL (ref 70–99)
GLUCOSE SERPL-MCNC: 199 MG/DL (ref 70–99)
GLUCOSE SERPL-MCNC: 250 MG/DL (ref 70–99)
GLUCOSE SERPL-MCNC: 257 MG/DL (ref 70–99)
GLUCOSE SERPL-MCNC: 77 MG/DL (ref 70–99)
GLUCOSE SERPL-MCNC: 86 MG/DL (ref 70–99)
GLUCOSE SERPL-MCNC: 97 MG/DL (ref 70–99)
GLUCOSE SERPL-MCNC: 98 MG/DL (ref 70–99)
GLUCOSE SERPL-MCNC: 99 MG/DL (ref 70–99)
GLUCOSE UR STRIP-MCNC: 300 MG/DL
GLUCOSE UR STRIP-MCNC: NEGATIVE MG/DL
GRAM STAIN RESULT: ABNORMAL
GRAM STAIN RESULT: ABNORMAL
GRAM STAIN RESULT: NORMAL
HBA1C MFR BLD: 6.3 %
HCO3 BLDV-SCNC: 22 MMOL/L (ref 21–28)
HCO3 BLDV-SCNC: 24 MMOL/L (ref 21–28)
HCO3 BLDV-SCNC: 24 MMOL/L (ref 21–28)
HCO3 BLDV-SCNC: 26 MMOL/L (ref 21–28)
HCO3 BLDV-SCNC: 27 MMOL/L (ref 21–28)
HCO3 SERPL-SCNC: 19 MMOL/L (ref 22–29)
HCO3 SERPL-SCNC: 21 MMOL/L (ref 22–29)
HCO3 SERPL-SCNC: 22 MMOL/L (ref 22–29)
HCO3 SERPL-SCNC: 23 MMOL/L (ref 22–29)
HCO3 SERPL-SCNC: 24 MMOL/L (ref 22–29)
HCO3 SERPL-SCNC: 25 MMOL/L (ref 22–29)
HCO3 SERPL-SCNC: 26 MMOL/L (ref 22–29)
HCO3 SERPL-SCNC: 26 MMOL/L (ref 22–29)
HCO3 SERPL-SCNC: 29 MMOL/L (ref 22–29)
HCT VFR BLD AUTO: 16.7 % (ref 40–53)
HCT VFR BLD AUTO: 18.7 % (ref 40–53)
HCT VFR BLD AUTO: 21.8 % (ref 40–53)
HCT VFR BLD AUTO: 22.2 % (ref 40–53)
HCT VFR BLD AUTO: 22.4 % (ref 40–53)
HCT VFR BLD AUTO: 23.9 % (ref 40–53)
HCT VFR BLD AUTO: 24.2 % (ref 40–53)
HCT VFR BLD AUTO: 24.6 % (ref 40–53)
HCT VFR BLD AUTO: 25.1 % (ref 40–53)
HCT VFR BLD AUTO: 25.8 % (ref 40–53)
HCT VFR BLD AUTO: 26 % (ref 40–53)
HCT VFR BLD AUTO: 26.5 % (ref 40–53)
HCT VFR BLD AUTO: 27.1 % (ref 40–53)
HCT VFR BLD AUTO: 27.3 % (ref 40–53)
HCT VFR BLD AUTO: 28.1 % (ref 40–53)
HCT VFR BLD AUTO: 28.6 % (ref 40–53)
HCT VFR BLD AUTO: 29.9 % (ref 40–53)
HCT VFR BLD AUTO: 29.9 % (ref 40–53)
HCT VFR BLD AUTO: 30.4 % (ref 40–53)
HCT VFR BLD AUTO: 31.2 % (ref 40–53)
HCT VFR BLD AUTO: 32.9 % (ref 40–53)
HCT VFR BLD AUTO: 33.7 % (ref 40–53)
HCT VFR BLD AUTO: 33.9 % (ref 40–53)
HCT VFR BLD AUTO: 34.4 % (ref 40–53)
HCT VFR BLD AUTO: 34.6 % (ref 40–53)
HCT VFR BLD AUTO: 35.8 % (ref 40–53)
HCT VFR BLD AUTO: 37.2 % (ref 40–53)
HCT VFR BLD AUTO: 38.6 % (ref 40–53)
HCT VFR BLD AUTO: 42.7 % (ref 40–53)
HCT VFR BLD AUTO: 42.8 % (ref 40–53)
HCT VFR BLD AUTO: 43.2 % (ref 40–53)
HGB BLD-MCNC: 10 G/DL (ref 13.3–17.7)
HGB BLD-MCNC: 10.1 G/DL (ref 13.3–17.7)
HGB BLD-MCNC: 10.2 G/DL (ref 13.3–17.7)
HGB BLD-MCNC: 10.2 G/DL (ref 13.3–17.7)
HGB BLD-MCNC: 11.2 G/DL (ref 13.3–17.7)
HGB BLD-MCNC: 11.5 G/DL (ref 13.3–17.7)
HGB BLD-MCNC: 11.6 G/DL (ref 13.3–17.7)
HGB BLD-MCNC: 11.6 G/DL (ref 13.3–17.7)
HGB BLD-MCNC: 11.7 G/DL (ref 13.3–17.7)
HGB BLD-MCNC: 11.8 G/DL (ref 13.3–17.7)
HGB BLD-MCNC: 12.9 G/DL (ref 13.3–17.7)
HGB BLD-MCNC: 13.1 G/DL (ref 13.3–17.7)
HGB BLD-MCNC: 14.2 G/DL (ref 13.3–17.7)
HGB BLD-MCNC: 14.4 G/DL (ref 13.3–17.7)
HGB BLD-MCNC: 14.6 G/DL (ref 13.3–17.7)
HGB BLD-MCNC: 5.3 G/DL (ref 13.3–17.7)
HGB BLD-MCNC: 6.3 G/DL (ref 13.3–17.7)
HGB BLD-MCNC: 7.3 G/DL (ref 13.3–17.7)
HGB BLD-MCNC: 7.4 G/DL (ref 13.3–17.7)
HGB BLD-MCNC: 7.5 G/DL (ref 13.3–17.7)
HGB BLD-MCNC: 7.6 G/DL (ref 13.3–17.7)
HGB BLD-MCNC: 8 G/DL (ref 13.3–17.7)
HGB BLD-MCNC: 8.2 G/DL (ref 13.3–17.7)
HGB BLD-MCNC: 8.5 G/DL (ref 13.3–17.7)
HGB BLD-MCNC: 8.5 G/DL (ref 13.3–17.7)
HGB BLD-MCNC: 8.7 G/DL (ref 13.3–17.7)
HGB BLD-MCNC: 8.7 G/DL (ref 13.3–17.7)
HGB BLD-MCNC: 8.8 G/DL (ref 13.3–17.7)
HGB BLD-MCNC: 8.8 G/DL (ref 13.3–17.7)
HGB BLD-MCNC: 9.2 G/DL (ref 13.3–17.7)
HGB BLD-MCNC: 9.3 G/DL (ref 13.3–17.7)
HGB UR QL STRIP: ABNORMAL
HGB UR QL STRIP: NEGATIVE
HOLD SPECIMEN: NORMAL
IC-%PRED-PRE: 118 %
IC-PRE: 3.66 L
IC-PRED: 3.09 L
IMM GRANULOCYTES # BLD: 0 10E3/UL
IMM GRANULOCYTES # BLD: 0.1 10E3/UL
IMM GRANULOCYTES # BLD: 0.1 10E3/UL
IMM GRANULOCYTES # BLD: 1.2 10E3/UL
IMM GRANULOCYTES # BLD: 1.7 10E3/UL
IMM GRANULOCYTES # BLD: 2.2 10E3/UL
IMM GRANULOCYTES # BLD: 2.6 10E3/UL
IMM GRANULOCYTES NFR BLD: 1 %
IMM GRANULOCYTES NFR BLD: 3 %
IMM GRANULOCYTES NFR BLD: 4 %
IMM GRANULOCYTES NFR BLD: 5 %
IMM GRANULOCYTES NFR BLD: 5 %
INR PPP: 1.16 (ref 0.85–1.15)
INR PPP: 1.18 (ref 0.85–1.15)
INR PPP: 1.2 (ref 0.85–1.15)
INTERPRETATION ECG - MUSE: NORMAL
INTERPRETATION ECG - MUSE: NORMAL
ISSUE DATE AND TIME: NORMAL
ISSUE DATE AND TIME: NORMAL
KETONES UR STRIP-MCNC: 5 MG/DL
KETONES UR STRIP-MCNC: NEGATIVE MG/DL
L PNEUMO1 AG UR QL IA: NEGATIVE
LACTATE SERPL-SCNC: 1.3 MMOL/L (ref 0.7–2)
LACTATE SERPL-SCNC: 1.4 MMOL/L (ref 0.7–2)
LACTATE SERPL-SCNC: 1.5 MMOL/L (ref 0.7–2)
LACTATE SERPL-SCNC: 3.1 MMOL/L (ref 0.7–2)
LACTATE SERPL-SCNC: 3.3 MMOL/L (ref 0.7–2)
LACTATE SERPL-SCNC: 3.5 MMOL/L (ref 0.7–2)
LACTATE SERPL-SCNC: 4.4 MMOL/L (ref 0.7–2)
LEUKOCYTE ESTERASE UR QL STRIP: ABNORMAL
LEUKOCYTE ESTERASE UR QL STRIP: NEGATIVE
LIPASE SERPL-CCNC: 42 U/L (ref 13–60)
LIPASE SERPL-CCNC: 7 U/L (ref 13–60)
LISTERIA SPECIES (DETECTED/NOT DETECTED): NOT DETECTED
LYMPHOCYTES # BLD AUTO: 0.2 10E3/UL (ref 0.8–5.3)
LYMPHOCYTES # BLD AUTO: 0.5 10E3/UL (ref 0.8–5.3)
LYMPHOCYTES # BLD AUTO: 1.2 10E3/UL (ref 0.8–5.3)
LYMPHOCYTES # BLD AUTO: 1.3 10E3/UL (ref 0.8–5.3)
LYMPHOCYTES # BLD AUTO: 1.4 10E3/UL (ref 0.8–5.3)
LYMPHOCYTES # BLD MANUAL: 0.1 10E3/UL (ref 0.8–5.3)
LYMPHOCYTES # BLD MANUAL: 0.2 10E3/UL (ref 0.8–5.3)
LYMPHOCYTES # BLD MANUAL: 0.6 10E3/UL (ref 0.8–5.3)
LYMPHOCYTES # BLD MANUAL: 0.9 10E3/UL (ref 0.8–5.3)
LYMPHOCYTES # BLD MANUAL: 1.1 10E3/UL (ref 0.8–5.3)
LYMPHOCYTES # BLD MANUAL: 1.3 10E3/UL (ref 0.8–5.3)
LYMPHOCYTES # BLD MANUAL: 1.3 10E3/UL (ref 0.8–5.3)
LYMPHOCYTES # BLD MANUAL: 1.4 10E3/UL (ref 0.8–5.3)
LYMPHOCYTES # BLD MANUAL: 2.2 10E3/UL (ref 0.8–5.3)
LYMPHOCYTES # BLD MANUAL: 4.3 10E3/UL (ref 0.8–5.3)
LYMPHOCYTES NFR BLD AUTO: 10 %
LYMPHOCYTES NFR BLD AUTO: 2 %
LYMPHOCYTES NFR BLD AUTO: 2 %
LYMPHOCYTES NFR BLD AUTO: 28 %
LYMPHOCYTES NFR BLD AUTO: 3 %
LYMPHOCYTES NFR BLD AUTO: 3 %
LYMPHOCYTES NFR BLD AUTO: 9 %
LYMPHOCYTES NFR BLD MANUAL: 1 %
LYMPHOCYTES NFR BLD MANUAL: 10 %
LYMPHOCYTES NFR BLD MANUAL: 10 %
LYMPHOCYTES NFR BLD MANUAL: 3 %
LYMPHOCYTES NFR BLD MANUAL: 4 %
LYMPHOCYTES NFR BLD MANUAL: 40 %
LYMPHOCYTES NFR BLD MANUAL: 5 %
LYMPHOCYTES NFR BLD MANUAL: 5 %
M TB IFN-G BLD-IMP: ABNORMAL
M TB IFN-G CD4+ BCKGRND COR BLD-ACNC: -0.01 IU/ML
MAGNESIUM SERPL-MCNC: 1.5 MG/DL (ref 1.7–2.3)
MAGNESIUM SERPL-MCNC: 1.5 MG/DL (ref 1.7–2.3)
MAGNESIUM SERPL-MCNC: 1.6 MG/DL (ref 1.7–2.3)
MAGNESIUM SERPL-MCNC: 1.6 MG/DL (ref 1.7–2.3)
MAGNESIUM SERPL-MCNC: 1.8 MG/DL (ref 1.7–2.3)
MAGNESIUM SERPL-MCNC: 1.8 MG/DL (ref 1.7–2.3)
MAGNESIUM SERPL-MCNC: 1.9 MG/DL (ref 1.7–2.3)
MAGNESIUM SERPL-MCNC: 1.9 MG/DL (ref 1.7–2.3)
MAGNESIUM SERPL-MCNC: 2 MG/DL (ref 1.7–2.3)
MAGNESIUM SERPL-MCNC: 2.1 MG/DL (ref 1.7–2.3)
MAGNESIUM SERPL-MCNC: 2.1 MG/DL (ref 1.7–2.3)
MAGNESIUM SERPL-MCNC: 2.2 MG/DL (ref 1.7–2.3)
MAGNESIUM SERPL-MCNC: 3.6 MG/DL (ref 1.7–2.3)
MCH RBC QN AUTO: 28.7 PG (ref 26.5–33)
MCH RBC QN AUTO: 28.8 PG (ref 26.5–33)
MCH RBC QN AUTO: 28.9 PG (ref 26.5–33)
MCH RBC QN AUTO: 28.9 PG (ref 26.5–33)
MCH RBC QN AUTO: 29.1 PG (ref 26.5–33)
MCH RBC QN AUTO: 29.2 PG (ref 26.5–33)
MCH RBC QN AUTO: 29.2 PG (ref 26.5–33)
MCH RBC QN AUTO: 29.3 PG (ref 26.5–33)
MCH RBC QN AUTO: 29.4 PG (ref 26.5–33)
MCH RBC QN AUTO: 29.5 PG (ref 26.5–33)
MCH RBC QN AUTO: 29.6 PG (ref 26.5–33)
MCH RBC QN AUTO: 29.7 PG (ref 26.5–33)
MCH RBC QN AUTO: 29.7 PG (ref 26.5–33)
MCH RBC QN AUTO: 30 PG (ref 26.5–33)
MCH RBC QN AUTO: 30.2 PG (ref 26.5–33)
MCH RBC QN AUTO: 30.3 PG (ref 26.5–33)
MCH RBC QN AUTO: 30.4 PG (ref 26.5–33)
MCH RBC QN AUTO: 30.5 PG (ref 26.5–33)
MCH RBC QN AUTO: 30.6 PG (ref 26.5–33)
MCH RBC QN AUTO: 30.8 PG (ref 26.5–33)
MCH RBC QN AUTO: 30.9 PG (ref 26.5–33)
MCH RBC QN AUTO: 31 PG (ref 26.5–33)
MCH RBC QN AUTO: 31.1 PG (ref 26.5–33)
MCHC RBC AUTO-ENTMCNC: 31.1 G/DL (ref 31.5–36.5)
MCHC RBC AUTO-ENTMCNC: 31.4 G/DL (ref 31.5–36.5)
MCHC RBC AUTO-ENTMCNC: 31.7 G/DL (ref 31.5–36.5)
MCHC RBC AUTO-ENTMCNC: 32.1 G/DL (ref 31.5–36.5)
MCHC RBC AUTO-ENTMCNC: 32.2 G/DL (ref 31.5–36.5)
MCHC RBC AUTO-ENTMCNC: 32.7 G/DL (ref 31.5–36.5)
MCHC RBC AUTO-ENTMCNC: 32.7 G/DL (ref 31.5–36.5)
MCHC RBC AUTO-ENTMCNC: 32.9 G/DL (ref 31.5–36.5)
MCHC RBC AUTO-ENTMCNC: 33.1 G/DL (ref 31.5–36.5)
MCHC RBC AUTO-ENTMCNC: 33.2 G/DL (ref 31.5–36.5)
MCHC RBC AUTO-ENTMCNC: 33.2 G/DL (ref 31.5–36.5)
MCHC RBC AUTO-ENTMCNC: 33.3 G/DL (ref 31.5–36.5)
MCHC RBC AUTO-ENTMCNC: 33.5 G/DL (ref 31.5–36.5)
MCHC RBC AUTO-ENTMCNC: 33.6 G/DL (ref 31.5–36.5)
MCHC RBC AUTO-ENTMCNC: 33.7 G/DL (ref 31.5–36.5)
MCHC RBC AUTO-ENTMCNC: 33.8 G/DL (ref 31.5–36.5)
MCHC RBC AUTO-ENTMCNC: 33.9 G/DL (ref 31.5–36.5)
MCHC RBC AUTO-ENTMCNC: 33.9 G/DL (ref 31.5–36.5)
MCHC RBC AUTO-ENTMCNC: 34 G/DL (ref 31.5–36.5)
MCHC RBC AUTO-ENTMCNC: 34.1 G/DL (ref 31.5–36.5)
MCHC RBC AUTO-ENTMCNC: 34.4 G/DL (ref 31.5–36.5)
MCHC RBC AUTO-ENTMCNC: 34.7 G/DL (ref 31.5–36.5)
MCHC RBC AUTO-ENTMCNC: 34.8 G/DL (ref 31.5–36.5)
MCV RBC AUTO: 85 FL (ref 78–100)
MCV RBC AUTO: 86 FL (ref 78–100)
MCV RBC AUTO: 87 FL (ref 78–100)
MCV RBC AUTO: 88 FL (ref 78–100)
MCV RBC AUTO: 89 FL (ref 78–100)
MCV RBC AUTO: 90 FL (ref 78–100)
MCV RBC AUTO: 92 FL (ref 78–100)
MCV RBC AUTO: 92 FL (ref 78–100)
MCV RBC AUTO: 93 FL (ref 78–100)
MCV RBC AUTO: 96 FL (ref 78–100)
MCV RBC AUTO: 97 FL (ref 78–100)
MCV RBC AUTO: 97 FL (ref 78–100)
MCV RBC AUTO: 98 FL (ref 78–100)
MCV RBC AUTO: 98 FL (ref 78–100)
METAMYELOCYTES # BLD MANUAL: 0 10E3/UL
METAMYELOCYTES # BLD MANUAL: 0.9 10E3/UL
METAMYELOCYTES # BLD MANUAL: 1.1 10E3/UL
METAMYELOCYTES # BLD MANUAL: 1.4 10E3/UL
METAMYELOCYTES # BLD MANUAL: 1.6 10E3/UL
METAMYELOCYTES # BLD MANUAL: 1.9 10E3/UL
METAMYELOCYTES NFR BLD MANUAL: 1 %
METAMYELOCYTES NFR BLD MANUAL: 17 %
METAMYELOCYTES NFR BLD MANUAL: 5 %
METAMYELOCYTES NFR BLD MANUAL: 7 %
MITOGEN IGNF BCKGRD COR BLD-ACNC: 0 IU/ML
MITOGEN IGNF BCKGRD COR BLD-ACNC: 0.01 IU/ML
MONOCYTES # BLD AUTO: 0 10E3/UL (ref 0–1.3)
MONOCYTES # BLD AUTO: 0.2 10E3/UL (ref 0–1.3)
MONOCYTES # BLD AUTO: 0.5 10E3/UL (ref 0–1.3)
MONOCYTES # BLD AUTO: 0.6 10E3/UL (ref 0–1.3)
MONOCYTES # BLD AUTO: 1 10E3/UL (ref 0–1.3)
MONOCYTES # BLD AUTO: 1 10E3/UL (ref 0–1.3)
MONOCYTES # BLD AUTO: 2.2 10E3/UL (ref 0–1.3)
MONOCYTES # BLD MANUAL: 0 10E3/UL (ref 0–1.3)
MONOCYTES # BLD MANUAL: 0 10E3/UL (ref 0–1.3)
MONOCYTES # BLD MANUAL: 0.1 10E3/UL (ref 0–1.3)
MONOCYTES # BLD MANUAL: 0.1 10E3/UL (ref 0–1.3)
MONOCYTES # BLD MANUAL: 1.3 10E3/UL (ref 0–1.3)
MONOCYTES # BLD MANUAL: 1.6 10E3/UL (ref 0–1.3)
MONOCYTES # BLD MANUAL: 1.7 10E3/UL (ref 0–1.3)
MONOCYTES # BLD MANUAL: 2.3 10E3/UL (ref 0–1.3)
MONOCYTES # BLD MANUAL: 2.8 10E3/UL (ref 0–1.3)
MONOCYTES # BLD MANUAL: 2.9 10E3/UL (ref 0–1.3)
MONOCYTES NFR BLD AUTO: 1 %
MONOCYTES NFR BLD AUTO: 5 %
MONOCYTES NFR BLD AUTO: 5 %
MONOCYTES NFR BLD AUTO: 6 %
MONOCYTES NFR BLD AUTO: 7 %
MONOCYTES NFR BLD MANUAL: 0 %
MONOCYTES NFR BLD MANUAL: 1 %
MONOCYTES NFR BLD MANUAL: 17 %
MONOCYTES NFR BLD MANUAL: 2 %
MONOCYTES NFR BLD MANUAL: 4 %
MONOCYTES NFR BLD MANUAL: 4 %
MONOCYTES NFR BLD MANUAL: 6 %
MRSA DNA SPEC QL NAA+PROBE: NEGATIVE
MUCOUS THREADS #/AREA URNS LPF: PRESENT /LPF
MYELOCYTES # BLD MANUAL: 0.1 10E3/UL
MYELOCYTES # BLD MANUAL: 0.8 10E3/UL
MYELOCYTES # BLD MANUAL: 1.7 10E3/UL
MYELOCYTES # BLD MANUAL: 2.2 10E3/UL
MYELOCYTES # BLD MANUAL: 4.2 10E3/UL
MYELOCYTES # BLD MANUAL: 4.3 10E3/UL
MYELOCYTES NFR BLD MANUAL: 15 %
MYELOCYTES NFR BLD MANUAL: 16 %
MYELOCYTES NFR BLD MANUAL: 2 %
MYELOCYTES NFR BLD MANUAL: 3 %
MYELOCYTES NFR BLD MANUAL: 3 %
MYELOCYTES NFR BLD MANUAL: 9 %
NEUTROPHILS # BLD AUTO: 0.5 10E3/UL (ref 1.6–8.3)
NEUTROPHILS # BLD AUTO: 10.9 10E3/UL (ref 1.6–8.3)
NEUTROPHILS # BLD AUTO: 12.4 10E3/UL (ref 1.6–8.3)
NEUTROPHILS # BLD AUTO: 28.9 10E3/UL (ref 1.6–8.3)
NEUTROPHILS # BLD AUTO: 37.7 10E3/UL (ref 1.6–8.3)
NEUTROPHILS # BLD AUTO: 45.5 10E3/UL (ref 1.6–8.3)
NEUTROPHILS # BLD AUTO: 54.6 10E3/UL (ref 1.6–8.3)
NEUTROPHILS # BLD MANUAL: 0.1 10E3/UL (ref 1.6–8.3)
NEUTROPHILS # BLD MANUAL: 0.6 10E3/UL (ref 1.6–8.3)
NEUTROPHILS # BLD MANUAL: 109.7 10E3/UL (ref 1.6–8.3)
NEUTROPHILS # BLD MANUAL: 132 10E3/UL (ref 1.6–8.3)
NEUTROPHILS # BLD MANUAL: 17.5 10E3/UL (ref 1.6–8.3)
NEUTROPHILS # BLD MANUAL: 30 10E3/UL (ref 1.6–8.3)
NEUTROPHILS # BLD MANUAL: 5.7 10E3/UL (ref 1.6–8.3)
NEUTROPHILS # BLD MANUAL: 56.4 10E3/UL (ref 1.6–8.3)
NEUTROPHILS # BLD MANUAL: 64.8 10E3/UL (ref 1.6–8.3)
NEUTROPHILS # BLD MANUAL: 78 10E3/UL (ref 1.6–8.3)
NEUTROPHILS NFR BLD AUTO: 65 %
NEUTROPHILS NFR BLD AUTO: 81 %
NEUTROPHILS NFR BLD AUTO: 82 %
NEUTROPHILS NFR BLD AUTO: 88 %
NEUTROPHILS NFR BLD AUTO: 92 %
NEUTROPHILS NFR BLD AUTO: 92 %
NEUTROPHILS NFR BLD AUTO: 93 %
NEUTROPHILS NFR BLD MANUAL: 22 %
NEUTROPHILS NFR BLD MANUAL: 61 %
NEUTROPHILS NFR BLD MANUAL: 66 %
NEUTROPHILS NFR BLD MANUAL: 86 %
NEUTROPHILS NFR BLD MANUAL: 90 %
NEUTROPHILS NFR BLD MANUAL: 90 %
NEUTROPHILS NFR BLD MANUAL: 91 %
NEUTROPHILS NFR BLD MANUAL: 93 %
NEUTROPHILS NFR BLD MANUAL: 96 %
NEUTROPHILS NFR BLD MANUAL: 99 %
NITRATE UR QL: NEGATIVE
NITRATE UR QL: NEGATIVE
NRBC # BLD AUTO: 0 10E3/UL
NRBC # BLD AUTO: 0.1 10E3/UL
NRBC # BLD AUTO: 0.1 10E3/UL
NRBC # BLD AUTO: 0.2 10E3/UL
NRBC # BLD AUTO: 0.3 10E3/UL
NRBC # BLD AUTO: 0.3 10E3/UL
NRBC # BLD AUTO: 0.6 10E3/UL
NRBC # BLD AUTO: 0.6 10E3/UL
NRBC # BLD AUTO: 0.9 10E3/UL
NRBC BLD AUTO-RTO: 0 /100
NRBC BLD AUTO-RTO: 1 /100
NRBC BLD AUTO-RTO: 2 /100
NRBC BLD AUTO-RTO: 2 /100
NRBC BLD AUTO-RTO: 5 /100
NRBC BLD MANUAL-RTO: 1 %
NRBC BLD MANUAL-RTO: 5 %
NRBC BLD MANUAL-RTO: 6 %
NT-PROBNP SERPL-MCNC: 1214 PG/ML (ref 0–900)
O2/TOTAL GAS SETTING VFR VENT: 0 %
O2/TOTAL GAS SETTING VFR VENT: 3 %
O2/TOTAL GAS SETTING VFR VENT: 40 %
O2/TOTAL GAS SETTING VFR VENT: 48 %
O2/TOTAL GAS SETTING VFR VENT: 50 %
OSMOLALITY UR: 572 MMOL/KG (ref 100–1200)
OXYHGB MFR BLDV: 72 % (ref 70–75)
OXYHGB MFR BLDV: 72 % (ref 70–75)
OXYHGB MFR BLDV: 90 % (ref 70–75)
OXYHGB MFR BLDV: 90 % (ref 70–75)
OXYHGB MFR BLDV: 94 % (ref 70–75)
P AXIS - MUSE: NORMAL DEGREES
P AXIS - MUSE: NORMAL DEGREES
PATH REPORT.ADDENDUM SPEC: ABNORMAL
PATH REPORT.COMMENTS IMP SPEC: ABNORMAL
PATH REPORT.COMMENTS IMP SPEC: NORMAL
PATH REPORT.COMMENTS IMP SPEC: NORMAL
PATH REPORT.COMMENTS IMP SPEC: YES
PATH REPORT.COMMENTS IMP SPEC: YES
PATH REPORT.FINAL DX SPEC: ABNORMAL
PATH REPORT.FINAL DX SPEC: ABNORMAL
PATH REPORT.FINAL DX SPEC: NORMAL
PATH REPORT.GROSS SPEC: ABNORMAL
PATH REPORT.GROSS SPEC: ABNORMAL
PATH REPORT.GROSS SPEC: NORMAL
PATH REPORT.INTRAOP OBS SPEC DOC: ABNORMAL
PATH REPORT.MICROSCOPIC SPEC OTHER STN: ABNORMAL
PATH REPORT.MICROSCOPIC SPEC OTHER STN: ABNORMAL
PATH REPORT.MICROSCOPIC SPEC OTHER STN: NORMAL
PATH REPORT.RELEVANT HX SPEC: ABNORMAL
PATH REPORT.RELEVANT HX SPEC: ABNORMAL
PATH REPORT.RELEVANT HX SPEC: NORMAL
PATHOLOGY SYNOPTIC REPORT: ABNORMAL
PCO2 BLDV: 34 MM HG (ref 40–50)
PCO2 BLDV: 35 MM HG (ref 40–50)
PCO2 BLDV: 37 MM HG (ref 40–50)
PCO2 BLDV: 38 MM HG (ref 40–50)
PCO2 BLDV: 38 MM HG (ref 40–50)
PH BLDV: 7.41 [PH] (ref 7.32–7.43)
PH BLDV: 7.41 [PH] (ref 7.32–7.43)
PH BLDV: 7.45 [PH] (ref 7.32–7.43)
PH BLDV: 7.45 [PH] (ref 7.32–7.43)
PH BLDV: 7.47 [PH] (ref 7.32–7.43)
PH UR STRIP: 5.5 [PH] (ref 5–7)
PH UR STRIP: 7 [PH] (ref 5–7)
PHOSPHATE SERPL-MCNC: 2 MG/DL (ref 2.5–4.5)
PHOSPHATE SERPL-MCNC: 4.4 MG/DL (ref 2.5–4.5)
PHOSPHATE SERPL-MCNC: 6.4 MG/DL (ref 2.5–4.5)
PHOTO IMAGE: ABNORMAL
PHOTO IMAGE: ABNORMAL
PLAT MORPH BLD: ABNORMAL
PLAT MORPH BLD: NORMAL
PLATELET # BLD AUTO: 10 10E3/UL (ref 150–450)
PLATELET # BLD AUTO: 118 10E3/UL (ref 150–450)
PLATELET # BLD AUTO: 147 10E3/UL (ref 150–450)
PLATELET # BLD AUTO: 157 10E3/UL (ref 150–450)
PLATELET # BLD AUTO: 167 10E3/UL (ref 150–450)
PLATELET # BLD AUTO: 18 10E3/UL (ref 150–450)
PLATELET # BLD AUTO: 202 10E3/UL (ref 150–450)
PLATELET # BLD AUTO: 213 10E3/UL (ref 150–450)
PLATELET # BLD AUTO: 232 10E3/UL (ref 150–450)
PLATELET # BLD AUTO: 243 10E3/UL (ref 150–450)
PLATELET # BLD AUTO: 248 10E3/UL (ref 150–450)
PLATELET # BLD AUTO: 251 10E3/UL (ref 150–450)
PLATELET # BLD AUTO: 262 10E3/UL (ref 150–450)
PLATELET # BLD AUTO: 263 10E3/UL (ref 150–450)
PLATELET # BLD AUTO: 266 10E3/UL (ref 150–450)
PLATELET # BLD AUTO: 278 10E3/UL (ref 150–450)
PLATELET # BLD AUTO: 281 10E3/UL (ref 150–450)
PLATELET # BLD AUTO: 289 10E3/UL (ref 150–450)
PLATELET # BLD AUTO: 292 10E3/UL (ref 150–450)
PLATELET # BLD AUTO: 296 10E3/UL (ref 150–450)
PLATELET # BLD AUTO: 314 10E3/UL (ref 150–450)
PLATELET # BLD AUTO: 317 10E3/UL (ref 150–450)
PLATELET # BLD AUTO: 350 10E3/UL (ref 150–450)
PLATELET # BLD AUTO: 357 10E3/UL (ref 150–450)
PLATELET # BLD AUTO: 358 10E3/UL (ref 150–450)
PLATELET # BLD AUTO: 46 10E3/UL (ref 150–450)
PLATELET # BLD AUTO: 46 10E3/UL (ref 150–450)
PLATELET # BLD AUTO: 48 10E3/UL (ref 150–450)
PLATELET # BLD AUTO: 55 10E3/UL (ref 150–450)
PLATELET # BLD AUTO: 59 10E3/UL (ref 150–450)
PLATELET # BLD AUTO: 77 10E3/UL (ref 150–450)
PO2 BLDV: 40 MM HG (ref 25–47)
PO2 BLDV: 40 MM HG (ref 25–47)
PO2 BLDV: 62 MM HG (ref 25–47)
PO2 BLDV: 65 MM HG (ref 25–47)
PO2 BLDV: 73 MM HG (ref 25–47)
POLYCHROMASIA BLD QL SMEAR: SLIGHT
POTASSIUM SERPL-SCNC: 4 MMOL/L (ref 3.4–5.3)
POTASSIUM SERPL-SCNC: 4.1 MMOL/L (ref 3.4–5.3)
POTASSIUM SERPL-SCNC: 4.1 MMOL/L (ref 3.4–5.3)
POTASSIUM SERPL-SCNC: 4.2 MMOL/L (ref 3.4–5.3)
POTASSIUM SERPL-SCNC: 4.3 MMOL/L (ref 3.4–5.3)
POTASSIUM SERPL-SCNC: 4.5 MMOL/L (ref 3.4–5.3)
POTASSIUM SERPL-SCNC: 4.6 MMOL/L (ref 3.4–5.3)
POTASSIUM SERPL-SCNC: 4.7 MMOL/L (ref 3.4–5.3)
POTASSIUM SERPL-SCNC: 4.8 MMOL/L (ref 3.4–5.3)
POTASSIUM SERPL-SCNC: 4.9 MMOL/L (ref 3.4–5.3)
POTASSIUM SERPL-SCNC: 5.1 MMOL/L (ref 3.4–5.3)
POTASSIUM SERPL-SCNC: 5.1 MMOL/L (ref 3.4–5.3)
POTASSIUM SERPL-SCNC: 5.3 MMOL/L (ref 3.4–5.3)
PR INTERVAL - MUSE: NORMAL MS
PR INTERVAL - MUSE: NORMAL MS
PROCALCITONIN SERPL IA-MCNC: 2.25 NG/ML
PROCALCITONIN SERPL IA-MCNC: 2.42 NG/ML
PROMYELOCYTES # BLD MANUAL: 0 10E3/UL
PROMYELOCYTES # BLD MANUAL: 0.2 10E3/UL
PROMYELOCYTES NFR BLD MANUAL: 1 %
PROMYELOCYTES NFR BLD MANUAL: 2 %
PROT SERPL-MCNC: 4.7 G/DL (ref 6.4–8.3)
PROT SERPL-MCNC: 5 G/DL (ref 6.4–8.3)
PROT SERPL-MCNC: 5.1 G/DL (ref 6.4–8.3)
PROT SERPL-MCNC: 5.2 G/DL (ref 6.4–8.3)
PROT SERPL-MCNC: 5.2 G/DL (ref 6.4–8.3)
PROT SERPL-MCNC: 5.3 G/DL (ref 6.4–8.3)
PROT SERPL-MCNC: 5.4 G/DL (ref 6.4–8.3)
PROT SERPL-MCNC: 5.5 G/DL (ref 6.4–8.3)
PROT SERPL-MCNC: 6 G/DL (ref 6.4–8.3)
PROT SERPL-MCNC: 6.2 G/DL (ref 6.4–8.3)
PROT SERPL-MCNC: 6.3 G/DL (ref 6.4–8.3)
PROT SERPL-MCNC: 6.5 G/DL (ref 6.4–8.3)
PROT SERPL-MCNC: 6.6 G/DL (ref 6.4–8.3)
PROT SERPL-MCNC: 6.7 G/DL (ref 6.4–8.3)
PROT SERPL-MCNC: 7 G/DL (ref 6.4–8.3)
PROT SERPL-MCNC: 7.3 G/DL (ref 6.4–8.3)
QRS DURATION - MUSE: 84 MS
QRS DURATION - MUSE: 88 MS
QT - MUSE: 336 MS
QT - MUSE: 444 MS
QTC - MUSE: 402 MS
QTC - MUSE: 546 MS
QUANTIFERON MITOGEN: 0.01 IU/ML
QUANTIFERON NIL TUBE: 0.02 IU/ML
QUANTIFERON TB1 TUBE: 0.02 IU/ML
QUANTIFERON TB2 TUBE: 0.03
R AXIS - MUSE: 0 DEGREES
R AXIS - MUSE: 73 DEGREES
RADIOLOGIST FLAGS: NORMAL
RBC # BLD AUTO: 1.72 10E6/UL (ref 4.4–5.9)
RBC # BLD AUTO: 2.16 10E6/UL (ref 4.4–5.9)
RBC # BLD AUTO: 2.46 10E6/UL (ref 4.4–5.9)
RBC # BLD AUTO: 2.48 10E6/UL (ref 4.4–5.9)
RBC # BLD AUTO: 2.52 10E6/UL (ref 4.4–5.9)
RBC # BLD AUTO: 2.57 10E6/UL (ref 4.4–5.9)
RBC # BLD AUTO: 2.77 10E6/UL (ref 4.4–5.9)
RBC # BLD AUTO: 2.8 10E6/UL (ref 4.4–5.9)
RBC # BLD AUTO: 2.81 10E6/UL (ref 4.4–5.9)
RBC # BLD AUTO: 2.85 10E6/UL (ref 4.4–5.9)
RBC # BLD AUTO: 2.85 10E6/UL (ref 4.4–5.9)
RBC # BLD AUTO: 2.88 10E6/UL (ref 4.4–5.9)
RBC # BLD AUTO: 2.94 10E6/UL (ref 4.4–5.9)
RBC # BLD AUTO: 2.96 10E6/UL (ref 4.4–5.9)
RBC # BLD AUTO: 3.04 10E6/UL (ref 4.4–5.9)
RBC # BLD AUTO: 3.13 10E6/UL (ref 4.4–5.9)
RBC # BLD AUTO: 3.3 10E6/UL (ref 4.4–5.9)
RBC # BLD AUTO: 3.34 10E6/UL (ref 4.4–5.9)
RBC # BLD AUTO: 3.38 10E6/UL (ref 4.4–5.9)
RBC # BLD AUTO: 3.47 10E6/UL (ref 4.4–5.9)
RBC # BLD AUTO: 3.79 10E6/UL (ref 4.4–5.9)
RBC # BLD AUTO: 3.91 10E6/UL (ref 4.4–5.9)
RBC # BLD AUTO: 3.94 10E6/UL (ref 4.4–5.9)
RBC # BLD AUTO: 3.99 10E6/UL (ref 4.4–5.9)
RBC # BLD AUTO: 4 10E6/UL (ref 4.4–5.9)
RBC # BLD AUTO: 4.04 10E6/UL (ref 4.4–5.9)
RBC # BLD AUTO: 4.39 10E6/UL (ref 4.4–5.9)
RBC # BLD AUTO: 4.46 10E6/UL (ref 4.4–5.9)
RBC # BLD AUTO: 4.8 10E6/UL (ref 4.4–5.9)
RBC # BLD AUTO: 4.86 10E6/UL (ref 4.4–5.9)
RBC # BLD AUTO: 4.86 10E6/UL (ref 4.4–5.9)
RBC MORPH BLD: ABNORMAL
RBC MORPH BLD: NORMAL
RBC URINE: 20 /HPF
RVPLETH-%PRED-PRE: 110 %
RVPLETH-PRE: 2.96 L
RVPLETH-PRED: 2.68 L
S PNEUM AG SPEC QL: NEGATIVE
SA TARGET DNA: POSITIVE
SAO2 % BLDV: 73.7 % (ref 70–75)
SAO2 % BLDV: 74.4 % (ref 70–75)
SAO2 % BLDV: 91.7 % (ref 70–75)
SAO2 % BLDV: 92.5 % (ref 70–75)
SAO2 % BLDV: 95.3 % (ref 70–75)
SCANNED LAB RESULT: NORMAL
SCANNED LAB RESULT: NORMAL
SODIUM SERPL-SCNC: 125 MMOL/L (ref 135–145)
SODIUM SERPL-SCNC: 126 MMOL/L (ref 135–145)
SODIUM SERPL-SCNC: 128 MMOL/L (ref 135–145)
SODIUM SERPL-SCNC: 129 MMOL/L (ref 135–145)
SODIUM SERPL-SCNC: 129 MMOL/L (ref 135–145)
SODIUM SERPL-SCNC: 130 MMOL/L (ref 135–145)
SODIUM SERPL-SCNC: 131 MMOL/L (ref 135–145)
SODIUM SERPL-SCNC: 132 MMOL/L (ref 135–145)
SODIUM SERPL-SCNC: 133 MMOL/L (ref 135–145)
SODIUM SERPL-SCNC: 133 MMOL/L (ref 135–145)
SODIUM SERPL-SCNC: 134 MMOL/L (ref 135–145)
SODIUM SERPL-SCNC: 140 MMOL/L (ref 135–145)
SODIUM SERPL-SCNC: 141 MMOL/L (ref 135–145)
SODIUM SERPL-SCNC: 149 MMOL/L (ref 135–145)
SODIUM UR-SCNC: 85 MMOL/L
SP GR UR STRIP: 1.02 (ref 1–1.03)
SP GR UR STRIP: 1.03 (ref 1–1.03)
SPECIMEN EXPIRATION DATE: NORMAL
SPECIMEN TYPE: NORMAL
SQUAMOUS EPITHELIAL: <1 /HPF
STAPHYLOCOCCUS AUREUS: DETECTED
STAPHYLOCOCCUS EPIDERMIDIS: NOT DETECTED
STAPHYLOCOCCUS LUGDUNENSIS: NOT DETECTED
STREPTOCOCCUS AGALACTIAE: NOT DETECTED
STREPTOCOCCUS ANGINOSUS GROUP: NOT DETECTED
STREPTOCOCCUS PNEUMONIAE: NOT DETECTED
STREPTOCOCCUS PYOGENES: NOT DETECTED
STREPTOCOCCUS SPECIES: NOT DETECTED
SYSTOLIC BLOOD PRESSURE - MUSE: NORMAL MMHG
SYSTOLIC BLOOD PRESSURE - MUSE: NORMAL MMHG
T AXIS - MUSE: 28 DEGREES
T AXIS - MUSE: 90 DEGREES
TLCPLETH-%PRED-PRE: 108 %
TLCPLETH-PRE: 8.07 L
TLCPLETH-PRED: 7.43 L
TOXIC GRANULES BLD QL SMEAR: PRESENT
TRANSITIONAL EPI: 1 /HPF
TROPONIN T SERPL HS-MCNC: 21 NG/L
TSH SERPL DL<=0.005 MIU/L-ACNC: 1.21 UIU/ML (ref 0.3–4.2)
TSH SERPL DL<=0.005 MIU/L-ACNC: 1.36 UIU/ML (ref 0.3–4.2)
UNIT ABO/RH: NORMAL
UNIT ABO/RH: NORMAL
UNIT NUMBER: NORMAL
UNIT STATUS: NORMAL
UNIT TYPE ISBT: 5100
UNIT TYPE ISBT: 5100
UROBILINOGEN UR STRIP-MCNC: NORMAL MG/DL
UROBILINOGEN UR STRIP-MCNC: NORMAL MG/DL
VA-%PRED-PRE: 100 %
VA-PRE: 6.81 L
VANCOMYCIN SERPL-MCNC: 9.1 UG/ML
VARIANT LYMPHS BLD QL SMEAR: PRESENT
VC-%PRED-PRE: 117 %
VC-PRE: 5.11 L
VC-PRED: 4.37 L
VENTRICULAR RATE- MUSE: 86 BPM
VENTRICULAR RATE- MUSE: 91 BPM
WBC # BLD AUTO: 0.1 10E3/UL (ref 4–11)
WBC # BLD AUTO: 0.1 10E3/UL (ref 4–11)
WBC # BLD AUTO: 0.2 10E3/UL (ref 4–11)
WBC # BLD AUTO: 0.2 10E3/UL (ref 4–11)
WBC # BLD AUTO: 0.6 10E3/UL (ref 4–11)
WBC # BLD AUTO: 0.7 10E3/UL (ref 4–11)
WBC # BLD AUTO: 0.8 10E3/UL (ref 4–11)
WBC # BLD AUTO: 114.3 10E3/UL (ref 4–11)
WBC # BLD AUTO: 13.5 10E3/UL (ref 4–11)
WBC # BLD AUTO: 141.9 10E3/UL (ref 4–11)
WBC # BLD AUTO: 15.1 10E3/UL (ref 4–11)
WBC # BLD AUTO: 17 10E3/UL (ref 4–11)
WBC # BLD AUTO: 19.9 10E3/UL (ref 4–11)
WBC # BLD AUTO: 26.5 10E3/UL (ref 4–11)
WBC # BLD AUTO: 31.3 10E3/UL (ref 4–11)
WBC # BLD AUTO: 33 10E3/UL (ref 4–11)
WBC # BLD AUTO: 42.2 10E3/UL (ref 4–11)
WBC # BLD AUTO: 43 10E3/UL (ref 4–11)
WBC # BLD AUTO: 48.2 10E3/UL (ref 4–11)
WBC # BLD AUTO: 49.7 10E3/UL (ref 4–11)
WBC # BLD AUTO: 51 10E3/UL (ref 4–11)
WBC # BLD AUTO: 54.4 10E3/UL (ref 4–11)
WBC # BLD AUTO: 57 10E3/UL (ref 4–11)
WBC # BLD AUTO: 58.9 10E3/UL (ref 4–11)
WBC # BLD AUTO: 6.1 10E3/UL (ref 4–11)
WBC # BLD AUTO: 72 10E3/UL (ref 4–11)
WBC # BLD AUTO: 86.7 10E3/UL (ref 4–11)
WBC # BLD AUTO: 89.2 10E3/UL (ref 4–11)
WBC # BLD AUTO: 9.4 10E3/UL (ref 4–11)
WBC # BLD AUTO: 90.9 10E3/UL (ref 4–11)
WBC # BLD AUTO: 96.2 10E3/UL (ref 4–11)
WBC CLUMPS #/AREA URNS HPF: PRESENT /HPF
WBC URINE: >182 /HPF

## 2024-01-01 PROCEDURE — 250N000013 HC RX MED GY IP 250 OP 250 PS 637: Performed by: INTERNAL MEDICINE

## 2024-01-01 PROCEDURE — 36415 COLL VENOUS BLD VENIPUNCTURE: CPT | Performed by: NURSE PRACTITIONER

## 2024-01-01 PROCEDURE — 99232 SBSQ HOSP IP/OBS MODERATE 35: CPT | Performed by: INTERNAL MEDICINE

## 2024-01-01 PROCEDURE — 85007 BL SMEAR W/DIFF WBC COUNT: CPT | Performed by: INTERNAL MEDICINE

## 2024-01-01 PROCEDURE — 250N000011 HC RX IP 250 OP 636: Performed by: INTERNAL MEDICINE

## 2024-01-01 PROCEDURE — 200N000002 HC R&B ICU UMMC

## 2024-01-01 PROCEDURE — 82805 BLOOD GASES W/O2 SATURATION: CPT | Performed by: NURSE PRACTITIONER

## 2024-01-01 PROCEDURE — 84155 ASSAY OF PROTEIN SERUM: CPT | Performed by: NURSE PRACTITIONER

## 2024-01-01 PROCEDURE — 258N000003 HC RX IP 258 OP 636: Performed by: INTERNAL MEDICINE

## 2024-01-01 PROCEDURE — 85027 COMPLETE CBC AUTOMATED: CPT | Performed by: EMERGENCY MEDICINE

## 2024-01-01 PROCEDURE — 120N000004 HC R&B MS OVERFLOW

## 2024-01-01 PROCEDURE — 31653 BRONCH EBUS SAMPLNG 3/> NODE: CPT | Performed by: NURSE ANESTHETIST, CERTIFIED REGISTERED

## 2024-01-01 PROCEDURE — 93010 ELECTROCARDIOGRAM REPORT: CPT | Mod: 59 | Performed by: FAMILY MEDICINE

## 2024-01-01 PROCEDURE — 999N000141 HC STATISTIC PRE-PROCEDURE NURSING ASSESSMENT: Performed by: THORACIC SURGERY (CARDIOTHORACIC VASCULAR SURGERY)

## 2024-01-01 PROCEDURE — 250N000011 HC RX IP 250 OP 636: Performed by: STUDENT IN AN ORGANIZED HEALTH CARE EDUCATION/TRAINING PROGRAM

## 2024-01-01 PROCEDURE — 258N000003 HC RX IP 258 OP 636: Performed by: ANESTHESIOLOGY

## 2024-01-01 PROCEDURE — 88172 CYTP DX EVAL FNA 1ST EA SITE: CPT | Mod: 26 | Performed by: PATHOLOGY

## 2024-01-01 PROCEDURE — G0463 HOSPITAL OUTPT CLINIC VISIT: HCPCS | Performed by: NURSE PRACTITIONER

## 2024-01-01 PROCEDURE — 250N000013 HC RX MED GY IP 250 OP 250 PS 637: Performed by: EMERGENCY MEDICINE

## 2024-01-01 PROCEDURE — 250N000011 HC RX IP 250 OP 636: Performed by: FAMILY MEDICINE

## 2024-01-01 PROCEDURE — 258N000003 HC RX IP 258 OP 636: Performed by: EMERGENCY MEDICINE

## 2024-01-01 PROCEDURE — 85027 COMPLETE CBC AUTOMATED: CPT | Performed by: INTERNAL MEDICINE

## 2024-01-01 PROCEDURE — 99222 1ST HOSP IP/OBS MODERATE 55: CPT | Performed by: INTERNAL MEDICINE

## 2024-01-01 PROCEDURE — 36415 COLL VENOUS BLD VENIPUNCTURE: CPT | Performed by: PATHOLOGY

## 2024-01-01 PROCEDURE — 83036 HEMOGLOBIN GLYCOSYLATED A1C: CPT | Performed by: NURSE PRACTITIONER

## 2024-01-01 PROCEDURE — 86923 COMPATIBILITY TEST ELECTRIC: CPT | Performed by: INTERNAL MEDICINE

## 2024-01-01 PROCEDURE — 96375 TX/PRO/DX INJ NEW DRUG ADDON: CPT

## 2024-01-01 PROCEDURE — 258N000003 HC RX IP 258 OP 636: Performed by: NURSE PRACTITIONER

## 2024-01-01 PROCEDURE — 80048 BASIC METABOLIC PNL TOTAL CA: CPT | Performed by: FAMILY MEDICINE

## 2024-01-01 PROCEDURE — 250N000011 HC RX IP 250 OP 636: Mod: JZ | Performed by: STUDENT IN AN ORGANIZED HEALTH CARE EDUCATION/TRAINING PROGRAM

## 2024-01-01 PROCEDURE — 85025 COMPLETE CBC W/AUTO DIFF WBC: CPT | Performed by: NURSE PRACTITIONER

## 2024-01-01 PROCEDURE — 999N000141 HC STATISTIC PRE-PROCEDURE NURSING ASSESSMENT: Performed by: STUDENT IN AN ORGANIZED HEALTH CARE EDUCATION/TRAINING PROGRAM

## 2024-01-01 PROCEDURE — 94660 CPAP INITIATION&MGMT: CPT

## 2024-01-01 PROCEDURE — 258N000003 HC RX IP 258 OP 636: Performed by: STUDENT IN AN ORGANIZED HEALTH CARE EDUCATION/TRAINING PROGRAM

## 2024-01-01 PROCEDURE — 96413 CHEMO IV INFUSION 1 HR: CPT

## 2024-01-01 PROCEDURE — 258N000001 HC RX 258

## 2024-01-01 PROCEDURE — 94640 AIRWAY INHALATION TREATMENT: CPT | Mod: 76

## 2024-01-01 PROCEDURE — 88305 TISSUE EXAM BY PATHOLOGIST: CPT | Mod: 26 | Performed by: PATHOLOGY

## 2024-01-01 PROCEDURE — 85041 AUTOMATED RBC COUNT: CPT | Performed by: NURSE PRACTITIONER

## 2024-01-01 PROCEDURE — P9604 ONE-WAY ALLOW PRORATED TRIP: HCPCS | Performed by: NURSE PRACTITIONER

## 2024-01-01 PROCEDURE — 80053 COMPREHEN METABOLIC PANEL: CPT | Performed by: NURSE PRACTITIONER

## 2024-01-01 PROCEDURE — 77290 THER RAD SIMULAJ FIELD CPLX: CPT | Mod: 26 | Performed by: RADIOLOGY

## 2024-01-01 PROCEDURE — 80053 COMPREHEN METABOLIC PANEL: CPT | Performed by: INTERNAL MEDICINE

## 2024-01-01 PROCEDURE — P9035 PLATELET PHERES LEUKOREDUCED: HCPCS | Performed by: INTERNAL MEDICINE

## 2024-01-01 PROCEDURE — 370N000017 HC ANESTHESIA TECHNICAL FEE, PER MIN: Performed by: STUDENT IN AN ORGANIZED HEALTH CARE EDUCATION/TRAINING PROGRAM

## 2024-01-01 PROCEDURE — 250N000012 HC RX MED GY IP 250 OP 636 PS 637

## 2024-01-01 PROCEDURE — 83605 ASSAY OF LACTIC ACID: CPT

## 2024-01-01 PROCEDURE — 77334 RADIATION TREATMENT AID(S): CPT | Performed by: RADIOLOGY

## 2024-01-01 PROCEDURE — 32674 THORACOSCOPY LYMPH NODE EXC: CPT | Performed by: THORACIC SURGERY (CARDIOTHORACIC VASCULAR SURGERY)

## 2024-01-01 PROCEDURE — 250N000011 HC RX IP 250 OP 636: Mod: JZ

## 2024-01-01 PROCEDURE — 84300 ASSAY OF URINE SODIUM: CPT | Performed by: NURSE PRACTITIONER

## 2024-01-01 PROCEDURE — 36593 DECLOT VASCULAR DEVICE: CPT

## 2024-01-01 PROCEDURE — 36415 COLL VENOUS BLD VENIPUNCTURE: CPT | Performed by: EMERGENCY MEDICINE

## 2024-01-01 PROCEDURE — 87077 CULTURE AEROBIC IDENTIFY: CPT | Performed by: FAMILY MEDICINE

## 2024-01-01 PROCEDURE — 99285 EMERGENCY DEPT VISIT HI MDM: CPT | Mod: 25 | Performed by: STUDENT IN AN ORGANIZED HEALTH CARE EDUCATION/TRAINING PROGRAM

## 2024-01-01 PROCEDURE — G0378 HOSPITAL OBSERVATION PER HR: HCPCS

## 2024-01-01 PROCEDURE — 83605 ASSAY OF LACTIC ACID: CPT | Performed by: STUDENT IN AN ORGANIZED HEALTH CARE EDUCATION/TRAINING PROGRAM

## 2024-01-01 PROCEDURE — 999N000065 XR CHEST PORT 1 VIEW

## 2024-01-01 PROCEDURE — 82310 ASSAY OF CALCIUM: CPT | Performed by: NURSE PRACTITIONER

## 2024-01-01 PROCEDURE — 82040 ASSAY OF SERUM ALBUMIN: CPT

## 2024-01-01 PROCEDURE — 97165 OT EVAL LOW COMPLEX 30 MIN: CPT | Mod: GO

## 2024-01-01 PROCEDURE — 85025 COMPLETE CBC W/AUTO DIFF WBC: CPT

## 2024-01-01 PROCEDURE — 99309 SBSQ NF CARE MODERATE MDM 30: CPT | Performed by: NURSE PRACTITIONER

## 2024-01-01 PROCEDURE — 250N000013 HC RX MED GY IP 250 OP 250 PS 637

## 2024-01-01 PROCEDURE — 71250 CT THORAX DX C-: CPT

## 2024-01-01 PROCEDURE — 88309 TISSUE EXAM BY PATHOLOGIST: CPT | Mod: 26 | Performed by: PATHOLOGY

## 2024-01-01 PROCEDURE — 36415 COLL VENOUS BLD VENIPUNCTURE: CPT

## 2024-01-01 PROCEDURE — 250N000011 HC RX IP 250 OP 636: Performed by: EMERGENCY MEDICINE

## 2024-01-01 PROCEDURE — 97116 GAIT TRAINING THERAPY: CPT | Mod: GP

## 2024-01-01 PROCEDURE — 97530 THERAPEUTIC ACTIVITIES: CPT | Mod: GP | Performed by: PHYSICAL THERAPIST

## 2024-01-01 PROCEDURE — 77263 THER RADIOLOGY TX PLNG CPLX: CPT | Performed by: RADIOLOGY

## 2024-01-01 PROCEDURE — 250N000013 HC RX MED GY IP 250 OP 250 PS 637: Performed by: FAMILY MEDICINE

## 2024-01-01 PROCEDURE — P9016 RBC LEUKOCYTES REDUCED: HCPCS | Performed by: INTERNAL MEDICINE

## 2024-01-01 PROCEDURE — 96417 CHEMO IV INFUS EACH ADDL SEQ: CPT

## 2024-01-01 PROCEDURE — 82330 ASSAY OF CALCIUM: CPT | Performed by: NURSE PRACTITIONER

## 2024-01-01 PROCEDURE — 99024 POSTOP FOLLOW-UP VISIT: CPT | Performed by: THORACIC SURGERY (CARDIOTHORACIC VASCULAR SURGERY)

## 2024-01-01 PROCEDURE — C1788 PORT, INDWELLING, IMP: HCPCS | Performed by: STUDENT IN AN ORGANIZED HEALTH CARE EDUCATION/TRAINING PROGRAM

## 2024-01-01 PROCEDURE — 99233 SBSQ HOSP IP/OBS HIGH 50: CPT | Performed by: INTERNAL MEDICINE

## 2024-01-01 PROCEDURE — 250N000011 HC RX IP 250 OP 636: Performed by: RADIOLOGY

## 2024-01-01 PROCEDURE — 8E0W4CZ ROBOTIC ASSISTED PROCEDURE OF TRUNK REGION, PERCUTANEOUS ENDOSCOPIC APPROACH: ICD-10-PCS | Performed by: THORACIC SURGERY (CARDIOTHORACIC VASCULAR SURGERY)

## 2024-01-01 PROCEDURE — 85027 COMPLETE CBC AUTOMATED: CPT | Performed by: STUDENT IN AN ORGANIZED HEALTH CARE EDUCATION/TRAINING PROGRAM

## 2024-01-01 PROCEDURE — 85018 HEMOGLOBIN: CPT | Performed by: NURSE PRACTITIONER

## 2024-01-01 PROCEDURE — 83735 ASSAY OF MAGNESIUM: CPT | Performed by: NURSE PRACTITIONER

## 2024-01-01 PROCEDURE — 77001 FLUOROGUIDE FOR VEIN DEVICE: CPT | Mod: 26 | Performed by: STUDENT IN AN ORGANIZED HEALTH CARE EDUCATION/TRAINING PROGRAM

## 2024-01-01 PROCEDURE — 70552 MRI BRAIN STEM W/DYE: CPT

## 2024-01-01 PROCEDURE — 96367 TX/PROPH/DG ADDL SEQ IV INF: CPT

## 2024-01-01 PROCEDURE — 31575 DIAGNOSTIC LARYNGOSCOPY: CPT | Performed by: FAMILY MEDICINE

## 2024-01-01 PROCEDURE — 99214 OFFICE O/P EST MOD 30 MIN: CPT | Performed by: INTERNAL MEDICINE

## 2024-01-01 PROCEDURE — 86901 BLOOD TYPING SEROLOGIC RH(D): CPT

## 2024-01-01 PROCEDURE — 999N000111 HC STATISTIC OT IP EVAL DEFER: Performed by: OCCUPATIONAL THERAPIST

## 2024-01-01 PROCEDURE — 32663 THORACOSCOPY W/LOBECTOMY: CPT | Performed by: ANESTHESIOLOGY

## 2024-01-01 PROCEDURE — P9045 ALBUMIN (HUMAN), 5%, 250 ML: HCPCS | Mod: JZ

## 2024-01-01 PROCEDURE — 83690 ASSAY OF LIPASE: CPT | Performed by: NURSE PRACTITIONER

## 2024-01-01 PROCEDURE — 200N000001 HC R&B ICU

## 2024-01-01 PROCEDURE — 120N000002 HC R&B MED SURG/OB UMMC

## 2024-01-01 PROCEDURE — G2211 COMPLEX E/M VISIT ADD ON: HCPCS | Performed by: INTERNAL MEDICINE

## 2024-01-01 PROCEDURE — 370N000017 HC ANESTHESIA TECHNICAL FEE, PER MIN: Performed by: INTERNAL MEDICINE

## 2024-01-01 PROCEDURE — 96415 CHEMO IV INFUSION ADDL HR: CPT

## 2024-01-01 PROCEDURE — 85025 COMPLETE CBC W/AUTO DIFF WBC: CPT | Performed by: RADIOLOGY

## 2024-01-01 PROCEDURE — 93000 ELECTROCARDIOGRAM COMPLETE: CPT | Performed by: INTERNAL MEDICINE

## 2024-01-01 PROCEDURE — 99291 CRITICAL CARE FIRST HOUR: CPT | Mod: FS | Performed by: INTERNAL MEDICINE

## 2024-01-01 PROCEDURE — 99310 SBSQ NF CARE HIGH MDM 45: CPT | Performed by: NURSE PRACTITIONER

## 2024-01-01 PROCEDURE — G0463 HOSPITAL OUTPT CLINIC VISIT: HCPCS | Performed by: INTERNAL MEDICINE

## 2024-01-01 PROCEDURE — 710N000010 HC RECOVERY PHASE 1, LEVEL 2, PER MIN: Performed by: INTERNAL MEDICINE

## 2024-01-01 PROCEDURE — 71045 X-RAY EXAM CHEST 1 VIEW: CPT

## 2024-01-01 PROCEDURE — 36415 COLL VENOUS BLD VENIPUNCTURE: CPT | Performed by: ANESTHESIOLOGY

## 2024-01-01 PROCEDURE — 99215 OFFICE O/P EST HI 40 MIN: CPT | Performed by: INTERNAL MEDICINE

## 2024-01-01 PROCEDURE — 77373 STRTCTC BDY RAD THER TX DLVR: CPT | Performed by: RADIOLOGY

## 2024-01-01 PROCEDURE — 84100 ASSAY OF PHOSPHORUS: CPT | Performed by: NURSE PRACTITIONER

## 2024-01-01 PROCEDURE — 250N000011 HC RX IP 250 OP 636

## 2024-01-01 PROCEDURE — 99223 1ST HOSP IP/OBS HIGH 75: CPT

## 2024-01-01 PROCEDURE — A9585 GADOBUTROL INJECTION: HCPCS | Performed by: RADIOLOGY

## 2024-01-01 PROCEDURE — 96361 HYDRATE IV INFUSION ADD-ON: CPT | Performed by: EMERGENCY MEDICINE

## 2024-01-01 PROCEDURE — 77470 SPECIAL RADIATION TREATMENT: CPT | Mod: XU | Performed by: RADIOLOGY

## 2024-01-01 PROCEDURE — 250N000011 HC RX IP 250 OP 636: Performed by: NURSE ANESTHETIST, CERTIFIED REGISTERED

## 2024-01-01 PROCEDURE — 97110 THERAPEUTIC EXERCISES: CPT | Mod: GP | Performed by: PHYSICAL THERAPIST

## 2024-01-01 PROCEDURE — 36591 DRAW BLOOD OFF VENOUS DEVICE: CPT

## 2024-01-01 PROCEDURE — 70553 MRI BRAIN STEM W/O & W/DYE: CPT

## 2024-01-01 PROCEDURE — 250N000013 HC RX MED GY IP 250 OP 250 PS 637: Performed by: NURSE PRACTITIONER

## 2024-01-01 PROCEDURE — 80162 ASSAY OF DIGOXIN TOTAL: CPT | Performed by: NURSE PRACTITIONER

## 2024-01-01 PROCEDURE — 77334 RADIATION TREATMENT AID(S): CPT | Mod: 26 | Performed by: RADIOLOGY

## 2024-01-01 PROCEDURE — 82977 ASSAY OF GGT: CPT

## 2024-01-01 PROCEDURE — 31641 BRONCHOSCOPY TREAT BLOCKAGE: CPT | Performed by: INTERNAL MEDICINE

## 2024-01-01 PROCEDURE — 250N000009 HC RX 250: Performed by: STUDENT IN AN ORGANIZED HEALTH CARE EDUCATION/TRAINING PROGRAM

## 2024-01-01 PROCEDURE — 85014 HEMATOCRIT: CPT | Performed by: NURSE PRACTITIONER

## 2024-01-01 PROCEDURE — 99215 OFFICE O/P EST HI 40 MIN: CPT | Performed by: NURSE PRACTITIONER

## 2024-01-01 PROCEDURE — 99207 PR APP CREDIT; MD BILLING SHARED VISIT: CPT | Mod: FS | Performed by: INTERNAL MEDICINE

## 2024-01-01 PROCEDURE — 250N000012 HC RX MED GY IP 250 OP 636 PS 637: Performed by: NURSE PRACTITIONER

## 2024-01-01 PROCEDURE — 73630 X-RAY EXAM OF FOOT: CPT | Mod: LT

## 2024-01-01 PROCEDURE — 250N000009 HC RX 250

## 2024-01-01 PROCEDURE — 85027 COMPLETE CBC AUTOMATED: CPT

## 2024-01-01 PROCEDURE — 83605 ASSAY OF LACTIC ACID: CPT | Performed by: FAMILY MEDICINE

## 2024-01-01 PROCEDURE — 87641 MR-STAPH DNA AMP PROBE: CPT | Performed by: NURSE PRACTITIONER

## 2024-01-01 PROCEDURE — 94726 PLETHYSMOGRAPHY LUNG VOLUMES: CPT

## 2024-01-01 PROCEDURE — 99233 SBSQ HOSP IP/OBS HIGH 50: CPT | Performed by: STUDENT IN AN ORGANIZED HEALTH CARE EDUCATION/TRAINING PROGRAM

## 2024-01-01 PROCEDURE — 255N000002 HC RX 255 OP 636: Performed by: THORACIC SURGERY (CARDIOTHORACIC VASCULAR SURGERY)

## 2024-01-01 PROCEDURE — 77295 3-D RADIOTHERAPY PLAN: CPT | Mod: 26 | Performed by: RADIOLOGY

## 2024-01-01 PROCEDURE — 93308 TTE F-UP OR LMTD: CPT | Mod: 26 | Performed by: FAMILY MEDICINE

## 2024-01-01 PROCEDURE — 96375 TX/PRO/DX INJ NEW DRUG ADDON: CPT | Mod: 59 | Performed by: FAMILY MEDICINE

## 2024-01-01 PROCEDURE — 99305 1ST NF CARE MODERATE MDM 35: CPT | Performed by: FAMILY MEDICINE

## 2024-01-01 PROCEDURE — 86140 C-REACTIVE PROTEIN: CPT | Performed by: EMERGENCY MEDICINE

## 2024-01-01 PROCEDURE — 999N000157 HC STATISTIC RCP TIME EA 10 MIN

## 2024-01-01 PROCEDURE — 84100 ASSAY OF PHOSPHORUS: CPT | Performed by: STUDENT IN AN ORGANIZED HEALTH CARE EDUCATION/TRAINING PROGRAM

## 2024-01-01 PROCEDURE — 97161 PT EVAL LOW COMPLEX 20 MIN: CPT | Mod: GP

## 2024-01-01 PROCEDURE — 710N000009 HC RECOVERY PHASE 1, LEVEL 1, PER MIN: Performed by: THORACIC SURGERY (CARDIOTHORACIC VASCULAR SURGERY)

## 2024-01-01 PROCEDURE — 120N000013 HC R&B IMCU

## 2024-01-01 PROCEDURE — 82565 ASSAY OF CREATININE: CPT | Performed by: PHYSICIAN ASSISTANT

## 2024-01-01 PROCEDURE — 87899 AGENT NOS ASSAY W/OPTIC: CPT | Performed by: NURSE PRACTITIONER

## 2024-01-01 PROCEDURE — 80048 BASIC METABOLIC PNL TOTAL CA: CPT | Performed by: NURSE PRACTITIONER

## 2024-01-01 PROCEDURE — 85041 AUTOMATED RBC COUNT: CPT | Performed by: INTERNAL MEDICINE

## 2024-01-01 PROCEDURE — 999N000215 HC STATISTIC HFNC ADULT NON-CPAP

## 2024-01-01 PROCEDURE — 36415 COLL VENOUS BLD VENIPUNCTURE: CPT | Performed by: PHYSICIAN ASSISTANT

## 2024-01-01 PROCEDURE — 84145 PROCALCITONIN (PCT): CPT

## 2024-01-01 PROCEDURE — 99239 HOSP IP/OBS DSCHRG MGMT >30: CPT | Performed by: INTERNAL MEDICINE

## 2024-01-01 PROCEDURE — 78816 PET IMAGE W/CT FULL BODY: CPT | Mod: 26 | Performed by: RADIOLOGY

## 2024-01-01 PROCEDURE — 85004 AUTOMATED DIFF WBC COUNT: CPT | Performed by: FAMILY MEDICINE

## 2024-01-01 PROCEDURE — 73140 X-RAY EXAM OF FINGER(S): CPT | Mod: RT

## 2024-01-01 PROCEDURE — 82040 ASSAY OF SERUM ALBUMIN: CPT | Performed by: INTERNAL MEDICINE

## 2024-01-01 PROCEDURE — 360N000082 HC SURGERY LEVEL 2 W/ FLUORO, PER MIN: Performed by: STUDENT IN AN ORGANIZED HEALTH CARE EDUCATION/TRAINING PROGRAM

## 2024-01-01 PROCEDURE — 87186 SC STD MICRODIL/AGAR DIL: CPT | Performed by: NURSE PRACTITIONER

## 2024-01-01 PROCEDURE — 87205 SMEAR GRAM STAIN: CPT | Performed by: EMERGENCY MEDICINE

## 2024-01-01 PROCEDURE — 84145 PROCALCITONIN (PCT): CPT | Performed by: NURSE PRACTITIONER

## 2024-01-01 PROCEDURE — 81003 URINALYSIS AUTO W/O SCOPE: CPT | Performed by: NURSE PRACTITIONER

## 2024-01-01 PROCEDURE — 77300 RADIATION THERAPY DOSE PLAN: CPT | Mod: 26 | Performed by: RADIOLOGY

## 2024-01-01 PROCEDURE — 85610 PROTHROMBIN TIME: CPT | Performed by: NURSE PRACTITIONER

## 2024-01-01 PROCEDURE — 81001 URINALYSIS AUTO W/SCOPE: CPT | Performed by: NURSE PRACTITIONER

## 2024-01-01 PROCEDURE — 250N000012 HC RX MED GY IP 250 OP 636 PS 637: Performed by: INTERNAL MEDICINE

## 2024-01-01 PROCEDURE — 272N000001 HC OR GENERAL SUPPLY STERILE: Performed by: INTERNAL MEDICINE

## 2024-01-01 PROCEDURE — 70491 CT SOFT TISSUE NECK W/DYE: CPT

## 2024-01-01 PROCEDURE — 87040 BLOOD CULTURE FOR BACTERIA: CPT | Performed by: NURSE PRACTITIONER

## 2024-01-01 PROCEDURE — 85730 THROMBOPLASTIN TIME PARTIAL: CPT | Performed by: NURSE PRACTITIONER

## 2024-01-01 PROCEDURE — 83735 ASSAY OF MAGNESIUM: CPT | Performed by: INTERNAL MEDICINE

## 2024-01-01 PROCEDURE — 96367 TX/PROPH/DG ADDL SEQ IV INF: CPT | Performed by: FAMILY MEDICINE

## 2024-01-01 PROCEDURE — 86900 BLOOD TYPING SEROLOGIC ABO: CPT | Performed by: NURSE PRACTITIONER

## 2024-01-01 PROCEDURE — 93005 ELECTROCARDIOGRAM TRACING: CPT

## 2024-01-01 PROCEDURE — 93005 ELECTROCARDIOGRAM TRACING: CPT | Performed by: EMERGENCY MEDICINE

## 2024-01-01 PROCEDURE — 80053 COMPREHEN METABOLIC PANEL: CPT | Performed by: STUDENT IN AN ORGANIZED HEALTH CARE EDUCATION/TRAINING PROGRAM

## 2024-01-01 PROCEDURE — 250N000011 HC RX IP 250 OP 636: Performed by: NURSE PRACTITIONER

## 2024-01-01 PROCEDURE — 88173 CYTOPATH EVAL FNA REPORT: CPT | Mod: 26 | Performed by: PATHOLOGY

## 2024-01-01 PROCEDURE — G0463 HOSPITAL OUTPT CLINIC VISIT: HCPCS | Performed by: THORACIC SURGERY (CARDIOTHORACIC VASCULAR SURGERY)

## 2024-01-01 PROCEDURE — 96366 THER/PROPH/DIAG IV INF ADDON: CPT | Performed by: FAMILY MEDICINE

## 2024-01-01 PROCEDURE — 272N000001 HC OR GENERAL SUPPLY STERILE: Performed by: STUDENT IN AN ORGANIZED HEALTH CARE EDUCATION/TRAINING PROGRAM

## 2024-01-01 PROCEDURE — 85027 COMPLETE CBC AUTOMATED: CPT | Performed by: NURSE PRACTITIONER

## 2024-01-01 PROCEDURE — 258N000003 HC RX IP 258 OP 636: Performed by: NURSE ANESTHETIST, CERTIFIED REGISTERED

## 2024-01-01 PROCEDURE — 84450 TRANSFERASE (AST) (SGOT): CPT | Performed by: INTERNAL MEDICINE

## 2024-01-01 PROCEDURE — 94375 RESPIRATORY FLOW VOLUME LOOP: CPT

## 2024-01-01 PROCEDURE — 83935 ASSAY OF URINE OSMOLALITY: CPT | Performed by: NURSE PRACTITIONER

## 2024-01-01 PROCEDURE — 999N000141 HC STATISTIC PRE-PROCEDURE NURSING ASSESSMENT: Performed by: INTERNAL MEDICINE

## 2024-01-01 PROCEDURE — 85007 BL SMEAR W/DIFF WBC COUNT: CPT | Performed by: EMERGENCY MEDICINE

## 2024-01-01 PROCEDURE — 86850 RBC ANTIBODY SCREEN: CPT

## 2024-01-01 PROCEDURE — 77300 RADIATION THERAPY DOSE PLAN: CPT | Performed by: RADIOLOGY

## 2024-01-01 PROCEDURE — 71260 CT THORAX DX C+: CPT

## 2024-01-01 PROCEDURE — 255N000002 HC RX 255 OP 636: Performed by: RADIOLOGY

## 2024-01-01 PROCEDURE — 96365 THER/PROPH/DIAG IV INF INIT: CPT | Performed by: EMERGENCY MEDICINE

## 2024-01-01 PROCEDURE — 76604 US EXAM CHEST: CPT | Performed by: FAMILY MEDICINE

## 2024-01-01 PROCEDURE — 88305 TISSUE EXAM BY PATHOLOGIST: CPT | Mod: TC | Performed by: INTERNAL MEDICINE

## 2024-01-01 PROCEDURE — 120N000001 HC R&B MED SURG/OB

## 2024-01-01 PROCEDURE — 36591 DRAW BLOOD OFF VENOUS DEVICE: CPT | Performed by: NURSE PRACTITIONER

## 2024-01-01 PROCEDURE — 86140 C-REACTIVE PROTEIN: CPT | Performed by: NURSE PRACTITIONER

## 2024-01-01 PROCEDURE — 99285 EMERGENCY DEPT VISIT HI MDM: CPT | Mod: 25 | Performed by: FAMILY MEDICINE

## 2024-01-01 PROCEDURE — 71275 CT ANGIOGRAPHY CHEST: CPT

## 2024-01-01 PROCEDURE — 31653 BRONCH EBUS SAMPLNG 3/> NODE: CPT | Performed by: ANESTHESIOLOGY

## 2024-01-01 PROCEDURE — 99291 CRITICAL CARE FIRST HOUR: CPT | Performed by: EMERGENCY MEDICINE

## 2024-01-01 PROCEDURE — 88341 IMHCHEM/IMCYTCHM EA ADD ANTB: CPT | Mod: 26 | Performed by: PATHOLOGY

## 2024-01-01 PROCEDURE — 77435 SBRT MANAGEMENT: CPT | Performed by: RADIOLOGY

## 2024-01-01 PROCEDURE — 250N000025 HC SEVOFLURANE, PER MIN: Performed by: THORACIC SURGERY (CARDIOTHORACIC VASCULAR SURGERY)

## 2024-01-01 PROCEDURE — 99285 EMERGENCY DEPT VISIT HI MDM: CPT | Performed by: FAMILY MEDICINE

## 2024-01-01 PROCEDURE — 77336 RADIATION PHYSICS CONSULT: CPT | Performed by: RADIOLOGY

## 2024-01-01 PROCEDURE — 0BTC4ZZ RESECTION OF RIGHT UPPER LUNG LOBE, PERCUTANEOUS ENDOSCOPIC APPROACH: ICD-10-PCS | Performed by: THORACIC SURGERY (CARDIOTHORACIC VASCULAR SURGERY)

## 2024-01-01 PROCEDURE — G0463 HOSPITAL OUTPT CLINIC VISIT: HCPCS

## 2024-01-01 PROCEDURE — G0463 HOSPITAL OUTPT CLINIC VISIT: HCPCS | Mod: 25 | Performed by: INTERNAL MEDICINE

## 2024-01-01 PROCEDURE — 272N000448 HC KIT POWER PICC SOLO 5F TRIPLE LUMEN

## 2024-01-01 PROCEDURE — 250N000009 HC RX 250: Performed by: EMERGENCY MEDICINE

## 2024-01-01 PROCEDURE — 96365 THER/PROPH/DIAG IV INF INIT: CPT | Performed by: FAMILY MEDICINE

## 2024-01-01 PROCEDURE — 94729 DIFFUSING CAPACITY: CPT

## 2024-01-01 PROCEDURE — 85014 HEMATOCRIT: CPT | Performed by: INTERNAL MEDICINE

## 2024-01-01 PROCEDURE — 84443 ASSAY THYROID STIM HORMONE: CPT | Performed by: INTERNAL MEDICINE

## 2024-01-01 PROCEDURE — 255N000002 HC RX 255 OP 636: Performed by: NURSE PRACTITIONER

## 2024-01-01 PROCEDURE — 370N000017 HC ANESTHESIA TECHNICAL FEE, PER MIN: Performed by: THORACIC SURGERY (CARDIOTHORACIC VASCULAR SURGERY)

## 2024-01-01 PROCEDURE — 71045 X-RAY EXAM CHEST 1 VIEW: CPT | Mod: 26 | Performed by: RADIOLOGY

## 2024-01-01 PROCEDURE — 78816 PET IMAGE W/CT FULL BODY: CPT | Mod: PI

## 2024-01-01 PROCEDURE — 82374 ASSAY BLOOD CARBON DIOXIDE: CPT | Performed by: INTERNAL MEDICINE

## 2024-01-01 PROCEDURE — 36415 COLL VENOUS BLD VENIPUNCTURE: CPT | Performed by: FAMILY MEDICINE

## 2024-01-01 PROCEDURE — 36561 INSERT TUNNELED CV CATH: CPT | Performed by: STUDENT IN AN ORGANIZED HEALTH CARE EDUCATION/TRAINING PROGRAM

## 2024-01-01 PROCEDURE — 71045 X-RAY EXAM CHEST 1 VIEW: CPT | Mod: 76

## 2024-01-01 PROCEDURE — 32663 THORACOSCOPY W/LOBECTOMY: CPT | Performed by: NURSE ANESTHETIST, CERTIFIED REGISTERED

## 2024-01-01 PROCEDURE — 87070 CULTURE OTHR SPECIMN AEROBIC: CPT | Performed by: NURSE PRACTITIONER

## 2024-01-01 PROCEDURE — 99284 EMERGENCY DEPT VISIT MOD MDM: CPT | Performed by: STUDENT IN AN ORGANIZED HEALTH CARE EDUCATION/TRAINING PROGRAM

## 2024-01-01 PROCEDURE — 88341 IMHCHEM/IMCYTCHM EA ADD ANTB: CPT | Mod: TC | Performed by: INTERNAL MEDICINE

## 2024-01-01 PROCEDURE — 88342 IMHCHEM/IMCYTCHM 1ST ANTB: CPT | Mod: 26 | Performed by: PATHOLOGY

## 2024-01-01 PROCEDURE — 96360 HYDRATION IV INFUSION INIT: CPT

## 2024-01-01 PROCEDURE — 72192 CT PELVIS W/O DYE: CPT

## 2024-01-01 PROCEDURE — 258N000003 HC RX IP 258 OP 636

## 2024-01-01 PROCEDURE — 96367 TX/PROPH/DG ADDL SEQ IV INF: CPT | Performed by: EMERGENCY MEDICINE

## 2024-01-01 PROCEDURE — 99291 CRITICAL CARE FIRST HOUR: CPT | Mod: 25 | Performed by: EMERGENCY MEDICINE

## 2024-01-01 PROCEDURE — 31653 BRONCH EBUS SAMPLNG 3/> NODE: CPT | Performed by: INTERNAL MEDICINE

## 2024-01-01 PROCEDURE — 71250 CT THORAX DX C-: CPT | Mod: 26 | Performed by: RADIOLOGY

## 2024-01-01 PROCEDURE — 80048 BASIC METABOLIC PNL TOTAL CA: CPT

## 2024-01-01 PROCEDURE — 87040 BLOOD CULTURE FOR BACTERIA: CPT | Performed by: THORACIC SURGERY (CARDIOTHORACIC VASCULAR SURGERY)

## 2024-01-01 PROCEDURE — 99205 OFFICE O/P NEW HI 60 MIN: CPT | Performed by: INTERNAL MEDICINE

## 2024-01-01 PROCEDURE — 99204 OFFICE O/P NEW MOD 45 MIN: CPT | Performed by: NURSE PRACTITIONER

## 2024-01-01 PROCEDURE — 80202 ASSAY OF VANCOMYCIN: CPT | Performed by: STUDENT IN AN ORGANIZED HEALTH CARE EDUCATION/TRAINING PROGRAM

## 2024-01-01 PROCEDURE — 93005 ELECTROCARDIOGRAM TRACING: CPT | Performed by: FAMILY MEDICINE

## 2024-01-01 PROCEDURE — 85610 PROTHROMBIN TIME: CPT | Performed by: STUDENT IN AN ORGANIZED HEALTH CARE EDUCATION/TRAINING PROGRAM

## 2024-01-01 PROCEDURE — 343N000001 HC RX 343: Performed by: INTERNAL MEDICINE

## 2024-01-01 PROCEDURE — 82947 ASSAY GLUCOSE BLOOD QUANT: CPT | Performed by: INTERNAL MEDICINE

## 2024-01-01 PROCEDURE — 87149 DNA/RNA DIRECT PROBE: CPT | Performed by: FAMILY MEDICINE

## 2024-01-01 PROCEDURE — A9585 GADOBUTROL INJECTION: HCPCS | Performed by: THORACIC SURGERY (CARDIOTHORACIC VASCULAR SURGERY)

## 2024-01-01 PROCEDURE — 250N000009 HC RX 250: Performed by: THORACIC SURGERY (CARDIOTHORACIC VASCULAR SURGERY)

## 2024-01-01 PROCEDURE — 710N000012 HC RECOVERY PHASE 2, PER MINUTE: Performed by: STUDENT IN AN ORGANIZED HEALTH CARE EDUCATION/TRAINING PROGRAM

## 2024-01-01 PROCEDURE — 250N000009 HC RX 250: Performed by: NURSE PRACTITIONER

## 2024-01-01 PROCEDURE — 84484 ASSAY OF TROPONIN QUANT: CPT | Performed by: STUDENT IN AN ORGANIZED HEALTH CARE EDUCATION/TRAINING PROGRAM

## 2024-01-01 PROCEDURE — 84155 ASSAY OF PROTEIN SERUM: CPT

## 2024-01-01 PROCEDURE — 86900 BLOOD TYPING SEROLOGIC ABO: CPT | Performed by: STUDENT IN AN ORGANIZED HEALTH CARE EDUCATION/TRAINING PROGRAM

## 2024-01-01 PROCEDURE — 70552 MRI BRAIN STEM W/DYE: CPT | Mod: 26 | Performed by: RADIOLOGY

## 2024-01-01 PROCEDURE — 87040 BLOOD CULTURE FOR BACTERIA: CPT | Performed by: FAMILY MEDICINE

## 2024-01-01 PROCEDURE — 272N000001 HC OR GENERAL SUPPLY STERILE: Performed by: THORACIC SURGERY (CARDIOTHORACIC VASCULAR SURGERY)

## 2024-01-01 PROCEDURE — G2211 COMPLEX E/M VISIT ADD ON: HCPCS | Performed by: NURSE PRACTITIONER

## 2024-01-01 PROCEDURE — A9552 F18 FDG: HCPCS | Performed by: INTERNAL MEDICINE

## 2024-01-01 PROCEDURE — A9585 GADOBUTROL INJECTION: HCPCS | Performed by: NURSE PRACTITIONER

## 2024-01-01 PROCEDURE — 99214 OFFICE O/P EST MOD 30 MIN: CPT | Performed by: PHYSICIAN ASSISTANT

## 2024-01-01 PROCEDURE — 999N000179 XR SURGERY CARM FLUORO LESS THAN 5 MIN W STILLS: Mod: TC

## 2024-01-01 PROCEDURE — 86901 BLOOD TYPING SEROLOGIC RH(D): CPT | Performed by: NURSE PRACTITIONER

## 2024-01-01 PROCEDURE — 85018 HEMOGLOBIN: CPT | Performed by: INTERNAL MEDICINE

## 2024-01-01 PROCEDURE — 88342 IMHCHEM/IMCYTCHM 1ST ANTB: CPT | Mod: TC | Performed by: THORACIC SURGERY (CARDIOTHORACIC VASCULAR SURGERY)

## 2024-01-01 PROCEDURE — 93010 ELECTROCARDIOGRAM REPORT: CPT | Performed by: EMERGENCY MEDICINE

## 2024-01-01 PROCEDURE — 88307 TISSUE EXAM BY PATHOLOGIST: CPT | Mod: 26 | Performed by: PATHOLOGY

## 2024-01-01 PROCEDURE — 85027 COMPLETE CBC AUTOMATED: CPT | Performed by: FAMILY MEDICINE

## 2024-01-01 PROCEDURE — 250N000009 HC RX 250: Performed by: FAMILY MEDICINE

## 2024-01-01 PROCEDURE — 97530 THERAPEUTIC ACTIVITIES: CPT | Mod: GO

## 2024-01-01 PROCEDURE — 250N000009 HC RX 250: Performed by: NURSE ANESTHETIST, CERTIFIED REGISTERED

## 2024-01-01 PROCEDURE — 82565 ASSAY OF CREATININE: CPT | Performed by: ANESTHESIOLOGY

## 2024-01-01 PROCEDURE — 72131 CT LUMBAR SPINE W/O DYE: CPT

## 2024-01-01 PROCEDURE — 82805 BLOOD GASES W/O2 SATURATION: CPT | Performed by: FAMILY MEDICINE

## 2024-01-01 PROCEDURE — 250N000013 HC RX MED GY IP 250 OP 250 PS 637: Performed by: PHYSICIAN ASSISTANT

## 2024-01-01 PROCEDURE — 83605 ASSAY OF LACTIC ACID: CPT | Performed by: NURSE PRACTITIONER

## 2024-01-01 PROCEDURE — 99282 EMERGENCY DEPT VISIT SF MDM: CPT

## 2024-01-01 PROCEDURE — 258N000003 HC RX IP 258 OP 636: Performed by: FAMILY MEDICINE

## 2024-01-01 PROCEDURE — 85007 BL SMEAR W/DIFF WBC COUNT: CPT | Performed by: NURSE PRACTITIONER

## 2024-01-01 PROCEDURE — 99024 POSTOP FOLLOW-UP VISIT: CPT | Performed by: CLINICAL NURSE SPECIALIST

## 2024-01-01 PROCEDURE — 36415 COLL VENOUS BLD VENIPUNCTURE: CPT | Performed by: INTERNAL MEDICINE

## 2024-01-01 PROCEDURE — 85014 HEMATOCRIT: CPT | Performed by: PHYSICIAN ASSISTANT

## 2024-01-01 PROCEDURE — 85025 COMPLETE CBC W/AUTO DIFF WBC: CPT | Performed by: STUDENT IN AN ORGANIZED HEALTH CARE EDUCATION/TRAINING PROGRAM

## 2024-01-01 PROCEDURE — 83605 ASSAY OF LACTIC ACID: CPT | Performed by: THORACIC SURGERY (CARDIOTHORACIC VASCULAR SURGERY)

## 2024-01-01 PROCEDURE — 36415 COLL VENOUS BLD VENIPUNCTURE: CPT | Performed by: THORACIC SURGERY (CARDIOTHORACIC VASCULAR SURGERY)

## 2024-01-01 PROCEDURE — 88305 TISSUE EXAM BY PATHOLOGIST: CPT | Mod: 26 | Performed by: STUDENT IN AN ORGANIZED HEALTH CARE EDUCATION/TRAINING PROGRAM

## 2024-01-01 PROCEDURE — 93308 TTE F-UP OR LMTD: CPT | Performed by: FAMILY MEDICINE

## 2024-01-01 PROCEDURE — G0463 HOSPITAL OUTPT CLINIC VISIT: HCPCS | Performed by: PHYSICIAN ASSISTANT

## 2024-01-01 PROCEDURE — 250N000013 HC RX MED GY IP 250 OP 250 PS 637: Performed by: STUDENT IN AN ORGANIZED HEALTH CARE EDUCATION/TRAINING PROGRAM

## 2024-01-01 PROCEDURE — 250N000009 HC RX 250: Performed by: ANESTHESIOLOGY

## 2024-01-01 PROCEDURE — P9603 ONE-WAY ALLOW PRORATED MILES: HCPCS | Performed by: NURSE PRACTITIONER

## 2024-01-01 PROCEDURE — 999N000111 HC STATISTIC OT IP EVAL DEFER

## 2024-01-01 PROCEDURE — 99291 CRITICAL CARE FIRST HOUR: CPT | Performed by: NURSE PRACTITIONER

## 2024-01-01 PROCEDURE — 360N000087 HC SURGERY LEVEL 7 W/ FLUORO, PER MIN: Performed by: INTERNAL MEDICINE

## 2024-01-01 PROCEDURE — 99214 OFFICE O/P EST MOD 30 MIN: CPT | Performed by: NURSE PRACTITIONER

## 2024-01-01 PROCEDURE — 76604 US EXAM CHEST: CPT | Mod: 26 | Performed by: FAMILY MEDICINE

## 2024-01-01 PROCEDURE — 71046 X-RAY EXAM CHEST 2 VIEWS: CPT

## 2024-01-01 PROCEDURE — 32663 THORACOSCOPY W/LOBECTOMY: CPT | Mod: RT | Performed by: THORACIC SURGERY (CARDIOTHORACIC VASCULAR SURGERY)

## 2024-01-01 PROCEDURE — 360N000080 HC SURGERY LEVEL 7, PER MIN: Performed by: THORACIC SURGERY (CARDIOTHORACIC VASCULAR SURGERY)

## 2024-01-01 PROCEDURE — 85384 FIBRINOGEN ACTIVITY: CPT | Performed by: NURSE PRACTITIONER

## 2024-01-01 PROCEDURE — 77470 SPECIAL RADIATION TREATMENT: CPT | Mod: 26 | Performed by: RADIOLOGY

## 2024-01-01 PROCEDURE — 88332 PATH CONSLTJ SURG EA ADD BLK: CPT | Mod: 26 | Performed by: STUDENT IN AN ORGANIZED HEALTH CARE EDUCATION/TRAINING PROGRAM

## 2024-01-01 PROCEDURE — 271N000001 HC OR GENERAL SUPPLY NON-STERILE: Performed by: STUDENT IN AN ORGANIZED HEALTH CARE EDUCATION/TRAINING PROGRAM

## 2024-01-01 PROCEDURE — 99204 OFFICE O/P NEW MOD 45 MIN: CPT | Performed by: THORACIC SURGERY (CARDIOTHORACIC VASCULAR SURGERY)

## 2024-01-01 PROCEDURE — 250N000011 HC RX IP 250 OP 636: Performed by: ANESTHESIOLOGY

## 2024-01-01 PROCEDURE — 85041 AUTOMATED RBC COUNT: CPT | Performed by: STUDENT IN AN ORGANIZED HEALTH CARE EDUCATION/TRAINING PROGRAM

## 2024-01-01 PROCEDURE — 99207 PR NO BILLABLE SERVICE THIS VISIT: CPT | Performed by: INTERNAL MEDICINE

## 2024-01-01 PROCEDURE — 80053 COMPREHEN METABOLIC PANEL: CPT | Performed by: EMERGENCY MEDICINE

## 2024-01-01 PROCEDURE — 250N000011 HC RX IP 250 OP 636: Performed by: THORACIC SURGERY (CARDIOTHORACIC VASCULAR SURGERY)

## 2024-01-01 PROCEDURE — 99207 PR NO BILLABLE SERVICE THIS VISIT: CPT | Performed by: RADIOLOGY

## 2024-01-01 PROCEDURE — 83880 ASSAY OF NATRIURETIC PEPTIDE: CPT | Performed by: FAMILY MEDICINE

## 2024-01-01 PROCEDURE — 80048 BASIC METABOLIC PNL TOTAL CA: CPT | Performed by: PHYSICIAN ASSISTANT

## 2024-01-01 PROCEDURE — 88331 PATH CONSLTJ SURG 1 BLK 1SPC: CPT | Mod: 26 | Performed by: STUDENT IN AN ORGANIZED HEALTH CARE EDUCATION/TRAINING PROGRAM

## 2024-01-01 PROCEDURE — 71046 X-RAY EXAM CHEST 2 VIEWS: CPT | Performed by: RADIOLOGY

## 2024-01-01 PROCEDURE — 82247 BILIRUBIN TOTAL: CPT | Performed by: FAMILY MEDICINE

## 2024-01-01 PROCEDURE — 86481 TB AG RESPONSE T-CELL SUSP: CPT | Performed by: NURSE PRACTITIONER

## 2024-01-01 PROCEDURE — 97162 PT EVAL MOD COMPLEX 30 MIN: CPT | Mod: GP

## 2024-01-01 PROCEDURE — 96361 HYDRATE IV INFUSION ADD-ON: CPT | Performed by: FAMILY MEDICINE

## 2024-01-01 PROCEDURE — 88341 IMHCHEM/IMCYTCHM EA ADD ANTB: CPT | Mod: 26 | Performed by: STUDENT IN AN ORGANIZED HEALTH CARE EDUCATION/TRAINING PROGRAM

## 2024-01-01 PROCEDURE — 77290 THER RAD SIMULAJ FIELD CPLX: CPT | Performed by: RADIOLOGY

## 2024-01-01 PROCEDURE — 250N000011 HC RX IP 250 OP 636: Performed by: PHYSICIAN ASSISTANT

## 2024-01-01 PROCEDURE — 36415 COLL VENOUS BLD VENIPUNCTURE: CPT | Performed by: STUDENT IN AN ORGANIZED HEALTH CARE EDUCATION/TRAINING PROGRAM

## 2024-01-01 PROCEDURE — 83690 ASSAY OF LIPASE: CPT | Performed by: STUDENT IN AN ORGANIZED HEALTH CARE EDUCATION/TRAINING PROGRAM

## 2024-01-01 PROCEDURE — 96360 HYDRATION IV INFUSION INIT: CPT | Mod: 59 | Performed by: STUDENT IN AN ORGANIZED HEALTH CARE EDUCATION/TRAINING PROGRAM

## 2024-01-01 PROCEDURE — 82947 ASSAY GLUCOSE BLOOD QUANT: CPT | Performed by: ANESTHESIOLOGY

## 2024-01-01 PROCEDURE — 87040 BLOOD CULTURE FOR BACTERIA: CPT

## 2024-01-01 PROCEDURE — 99283 EMERGENCY DEPT VISIT LOW MDM: CPT | Performed by: EMERGENCY MEDICINE

## 2024-01-01 PROCEDURE — 77370 RADIATION PHYSICS CONSULT: CPT | Performed by: RADIOLOGY

## 2024-01-01 PROCEDURE — 76000 FLUOROSCOPY <1 HR PHYS/QHP: CPT

## 2024-01-01 PROCEDURE — 84132 ASSAY OF SERUM POTASSIUM: CPT | Performed by: ANESTHESIOLOGY

## 2024-01-01 PROCEDURE — 07T74ZZ RESECTION OF THORAX LYMPHATIC, PERCUTANEOUS ENDOSCOPIC APPROACH: ICD-10-PCS | Performed by: THORACIC SURGERY (CARDIOTHORACIC VASCULAR SURGERY)

## 2024-01-01 PROCEDURE — 36569 INSJ PICC 5 YR+ W/O IMAGING: CPT

## 2024-01-01 PROCEDURE — 70553 MRI BRAIN STEM W/O & W/DYE: CPT | Mod: 26 | Performed by: STUDENT IN AN ORGANIZED HEALTH CARE EDUCATION/TRAINING PROGRAM

## 2024-01-01 PROCEDURE — 83735 ASSAY OF MAGNESIUM: CPT | Performed by: STUDENT IN AN ORGANIZED HEALTH CARE EDUCATION/TRAINING PROGRAM

## 2024-01-01 PROCEDURE — 88331 PATH CONSLTJ SURG 1 BLK 1SPC: CPT | Mod: TC | Performed by: THORACIC SURGERY (CARDIOTHORACIC VASCULAR SURGERY)

## 2024-01-01 PROCEDURE — 80048 BASIC METABOLIC PNL TOTAL CA: CPT | Performed by: PATHOLOGY

## 2024-01-01 PROCEDURE — 86900 BLOOD TYPING SEROLOGIC ABO: CPT

## 2024-01-01 PROCEDURE — 99203 OFFICE O/P NEW LOW 30 MIN: CPT | Performed by: STUDENT IN AN ORGANIZED HEALTH CARE EDUCATION/TRAINING PROGRAM

## 2024-01-01 PROCEDURE — 97530 THERAPEUTIC ACTIVITIES: CPT | Mod: GP

## 2024-01-01 PROCEDURE — 82565 ASSAY OF CREATININE: CPT

## 2024-01-01 PROCEDURE — 5A09357 ASSISTANCE WITH RESPIRATORY VENTILATION, LESS THAN 24 CONSECUTIVE HOURS, CONTINUOUS POSITIVE AIRWAY PRESSURE: ICD-10-PCS | Performed by: INTERNAL MEDICINE

## 2024-01-01 PROCEDURE — 85007 BL SMEAR W/DIFF WBC COUNT: CPT | Performed by: FAMILY MEDICINE

## 2024-01-01 PROCEDURE — 88342 IMHCHEM/IMCYTCHM 1ST ANTB: CPT | Mod: 26 | Performed by: STUDENT IN AN ORGANIZED HEALTH CARE EDUCATION/TRAINING PROGRAM

## 2024-01-01 PROCEDURE — 85027 COMPLETE CBC AUTOMATED: CPT | Performed by: PATHOLOGY

## 2024-01-01 PROCEDURE — 73502 X-RAY EXAM HIP UNI 2-3 VIEWS: CPT

## 2024-01-01 PROCEDURE — 97110 THERAPEUTIC EXERCISES: CPT | Mod: GP

## 2024-01-01 PROCEDURE — 77295 3-D RADIOTHERAPY PLAN: CPT | Performed by: RADIOLOGY

## 2024-01-01 DEVICE — CATH PORT SMART VORTEX LOW PROFILE 8FR CT80LPPDVI: Type: IMPLANTABLE DEVICE | Site: NECK | Status: FUNCTIONAL

## 2024-01-01 RX ORDER — HEPARIN SODIUM (PORCINE) LOCK FLUSH IV SOLN 100 UNIT/ML 100 UNIT/ML
5 SOLUTION INTRAVENOUS
Status: CANCELLED | OUTPATIENT
Start: 2024-01-01

## 2024-01-01 RX ORDER — HYDRALAZINE HYDROCHLORIDE 20 MG/ML
2.5-5 INJECTION INTRAMUSCULAR; INTRAVENOUS
Status: DISCONTINUED | OUTPATIENT
Start: 2024-01-01 | End: 2024-01-01 | Stop reason: HOSPADM

## 2024-01-01 RX ORDER — DEXTROSE MONOHYDRATE 25 G/50ML
25-50 INJECTION, SOLUTION INTRAVENOUS
Status: DISCONTINUED | OUTPATIENT
Start: 2024-01-01 | End: 2024-01-01

## 2024-01-01 RX ORDER — SENNOSIDES 8.6 MG
1 TABLET ORAL 2 TIMES DAILY PRN
Status: DISCONTINUED | OUTPATIENT
Start: 2024-01-01 | End: 2024-01-01 | Stop reason: HOSPADM

## 2024-01-01 RX ORDER — DIGOXIN 0.25 MG/ML
250 INJECTION INTRAMUSCULAR; INTRAVENOUS EVERY 6 HOURS
Status: COMPLETED | OUTPATIENT
Start: 2024-01-01 | End: 2024-01-01

## 2024-01-01 RX ORDER — DEXAMETHASONE 4 MG/1
4 TABLET ORAL 2 TIMES DAILY WITH MEALS
Qty: 60 TABLET | Refills: 1 | Status: SHIPPED | OUTPATIENT
Start: 2024-01-01 | End: 2024-01-01

## 2024-01-01 RX ORDER — EPINEPHRINE 1 MG/ML
0.3 INJECTION, SOLUTION, CONCENTRATE INTRAVENOUS EVERY 5 MIN PRN
Status: CANCELLED | OUTPATIENT
Start: 2024-01-01

## 2024-01-01 RX ORDER — DEXTROSE MONOHYDRATE 25 G/50ML
25-50 INJECTION, SOLUTION INTRAVENOUS
Status: CANCELLED | OUTPATIENT
Start: 2024-01-01

## 2024-01-01 RX ORDER — AMIODARONE HYDROCHLORIDE 200 MG/1
200 TABLET ORAL DAILY
Status: DISCONTINUED | OUTPATIENT
Start: 2024-01-01 | End: 2024-01-01 | Stop reason: HOSPADM

## 2024-01-01 RX ORDER — OXYCODONE HYDROCHLORIDE 5 MG/1
5 TABLET ORAL
Status: DISCONTINUED | OUTPATIENT
Start: 2024-01-01 | End: 2024-01-01 | Stop reason: HOSPADM

## 2024-01-01 RX ORDER — LORAZEPAM 2 MG/ML
0.5 INJECTION INTRAMUSCULAR EVERY 4 HOURS PRN
Status: CANCELLED | OUTPATIENT
Start: 2024-01-01

## 2024-01-01 RX ORDER — HYDROMORPHONE HYDROCHLORIDE 1 MG/ML
0.3 INJECTION, SOLUTION INTRAMUSCULAR; INTRAVENOUS; SUBCUTANEOUS
Status: DISCONTINUED | OUTPATIENT
Start: 2024-01-01 | End: 2024-01-01 | Stop reason: HOSPADM

## 2024-01-01 RX ORDER — SODIUM CHLORIDE, SODIUM LACTATE, POTASSIUM CHLORIDE, CALCIUM CHLORIDE 600; 310; 30; 20 MG/100ML; MG/100ML; MG/100ML; MG/100ML
INJECTION, SOLUTION INTRAVENOUS CONTINUOUS PRN
Status: DISCONTINUED | OUTPATIENT
Start: 2024-01-01 | End: 2024-01-01

## 2024-01-01 RX ORDER — NALOXONE HYDROCHLORIDE 0.4 MG/ML
0.2 INJECTION, SOLUTION INTRAMUSCULAR; INTRAVENOUS; SUBCUTANEOUS
Status: DISCONTINUED | OUTPATIENT
Start: 2024-01-01 | End: 2024-01-01 | Stop reason: HOSPADM

## 2024-01-01 RX ORDER — CEFAZOLIN SODIUM/WATER 2 G/20 ML
2 SYRINGE (ML) INTRAVENOUS
Status: COMPLETED | OUTPATIENT
Start: 2024-01-01 | End: 2024-01-01

## 2024-01-01 RX ORDER — DEXAMETHASONE SODIUM PHOSPHATE 4 MG/ML
4 INJECTION, SOLUTION INTRA-ARTICULAR; INTRALESIONAL; INTRAMUSCULAR; INTRAVENOUS; SOFT TISSUE
Status: DISCONTINUED | OUTPATIENT
Start: 2024-01-01 | End: 2024-01-01 | Stop reason: HOSPADM

## 2024-01-01 RX ORDER — PROCHLORPERAZINE MALEATE 5 MG/1
5 TABLET ORAL EVERY 6 HOURS PRN
Status: CANCELLED | OUTPATIENT
Start: 2024-01-01

## 2024-01-01 RX ORDER — MEPERIDINE HYDROCHLORIDE 25 MG/ML
25 INJECTION INTRAMUSCULAR; INTRAVENOUS; SUBCUTANEOUS EVERY 30 MIN PRN
Status: CANCELLED | OUTPATIENT
Start: 2024-01-01

## 2024-01-01 RX ORDER — PALONOSETRON 0.05 MG/ML
0.25 INJECTION, SOLUTION INTRAVENOUS ONCE
Status: CANCELLED | OUTPATIENT
Start: 2024-01-01

## 2024-01-01 RX ORDER — PIPERACILLIN SODIUM, TAZOBACTAM SODIUM 3; .375 G/15ML; G/15ML
3.38 INJECTION, POWDER, LYOPHILIZED, FOR SOLUTION INTRAVENOUS EVERY 6 HOURS
Status: CANCELLED | OUTPATIENT
Start: 2024-01-01

## 2024-01-01 RX ORDER — HEPARIN SODIUM,PORCINE 10 UNIT/ML
5-20 VIAL (ML) INTRAVENOUS DAILY PRN
Status: CANCELLED | OUTPATIENT
Start: 2024-01-01

## 2024-01-01 RX ORDER — CARBOXYMETHYLCELLULOSE SODIUM 5 MG/ML
1 SOLUTION/ DROPS OPHTHALMIC
DISCHARGE
Start: 2024-01-01

## 2024-01-01 RX ORDER — PROPOFOL 10 MG/ML
INJECTION, EMULSION INTRAVENOUS CONTINUOUS PRN
Status: DISCONTINUED | OUTPATIENT
Start: 2024-01-01 | End: 2024-01-01

## 2024-01-01 RX ORDER — DIPHENHYDRAMINE HYDROCHLORIDE 50 MG/ML
50 INJECTION INTRAMUSCULAR; INTRAVENOUS
Status: CANCELLED
Start: 2024-01-01

## 2024-01-01 RX ORDER — ALBUTEROL SULFATE 0.83 MG/ML
2.5 SOLUTION RESPIRATORY (INHALATION)
Status: CANCELLED | OUTPATIENT
Start: 2024-01-01

## 2024-01-01 RX ORDER — FUROSEMIDE 40 MG
40 TABLET ORAL DAILY
Status: DISCONTINUED | OUTPATIENT
Start: 2024-01-01 | End: 2024-01-01 | Stop reason: HOSPADM

## 2024-01-01 RX ORDER — LIDOCAINE HYDROCHLORIDE 20 MG/ML
JELLY TOPICAL ONCE
Status: COMPLETED | OUTPATIENT
Start: 2024-01-01 | End: 2024-01-01

## 2024-01-01 RX ORDER — MAGNESIUM SULFATE HEPTAHYDRATE 40 MG/ML
2 INJECTION, SOLUTION INTRAVENOUS ONCE
Status: COMPLETED | OUTPATIENT
Start: 2024-01-01 | End: 2024-01-01

## 2024-01-01 RX ORDER — CELECOXIB 200 MG/1
200 CAPSULE ORAL ONCE
Status: COMPLETED | OUTPATIENT
Start: 2024-01-01 | End: 2024-01-01

## 2024-01-01 RX ORDER — VANCOMYCIN HYDROCHLORIDE 1 G/200ML
1000 INJECTION, SOLUTION INTRAVENOUS EVERY 8 HOURS
Status: DISCONTINUED | OUTPATIENT
Start: 2024-01-01 | End: 2024-01-01 | Stop reason: HOSPADM

## 2024-01-01 RX ORDER — NALOXONE HYDROCHLORIDE 0.4 MG/ML
0.1 INJECTION, SOLUTION INTRAMUSCULAR; INTRAVENOUS; SUBCUTANEOUS
Status: DISCONTINUED | OUTPATIENT
Start: 2024-01-01 | End: 2024-01-01 | Stop reason: HOSPADM

## 2024-01-01 RX ORDER — CEFAZOLIN SODIUM/WATER 2 G/20 ML
2 SYRINGE (ML) INTRAVENOUS
Status: DISCONTINUED | OUTPATIENT
Start: 2024-01-01 | End: 2024-01-01 | Stop reason: HOSPADM

## 2024-01-01 RX ORDER — DEXAMETHASONE SODIUM PHOSPHATE 4 MG/ML
INJECTION, SOLUTION INTRA-ARTICULAR; INTRALESIONAL; INTRAMUSCULAR; INTRAVENOUS; SOFT TISSUE PRN
Status: DISCONTINUED | OUTPATIENT
Start: 2024-01-01 | End: 2024-01-01

## 2024-01-01 RX ORDER — ENOXAPARIN SODIUM 100 MG/ML
40 INJECTION SUBCUTANEOUS EVERY 24 HOURS
Qty: 0.4 ML | Refills: 0 | Status: SHIPPED | OUTPATIENT
Start: 2024-01-01 | End: 2024-01-01

## 2024-01-01 RX ORDER — AMOXICILLIN 250 MG
1 CAPSULE ORAL 2 TIMES DAILY PRN
Status: DISCONTINUED | OUTPATIENT
Start: 2024-01-01 | End: 2024-01-01 | Stop reason: HOSPADM

## 2024-01-01 RX ORDER — HYDROMORPHONE HCL IN WATER/PF 6 MG/30 ML
0.4 PATIENT CONTROLLED ANALGESIA SYRINGE INTRAVENOUS EVERY 5 MIN PRN
Status: DISCONTINUED | OUTPATIENT
Start: 2024-01-01 | End: 2024-01-01 | Stop reason: HOSPADM

## 2024-01-01 RX ORDER — LIDOCAINE 40 MG/G
CREAM TOPICAL
Status: ACTIVE | OUTPATIENT
Start: 2024-01-01 | End: 2024-01-01

## 2024-01-01 RX ORDER — MAGNESIUM OXIDE 400 MG/1
400 TABLET ORAL EVERY 4 HOURS
Status: COMPLETED | OUTPATIENT
Start: 2024-01-01 | End: 2024-01-01

## 2024-01-01 RX ORDER — PROCHLORPERAZINE 25 MG
12.5 SUPPOSITORY, RECTAL RECTAL EVERY 12 HOURS PRN
Status: DISCONTINUED | OUTPATIENT
Start: 2024-01-01 | End: 2024-01-01

## 2024-01-01 RX ORDER — HEPARIN SODIUM (PORCINE) LOCK FLUSH IV SOLN 100 UNIT/ML 100 UNIT/ML
5 SOLUTION INTRAVENOUS
Status: DISCONTINUED | OUTPATIENT
Start: 2024-01-01 | End: 2024-01-01 | Stop reason: HOSPADM

## 2024-01-01 RX ORDER — PALONOSETRON 0.05 MG/ML
0.25 INJECTION, SOLUTION INTRAVENOUS ONCE
Status: DISCONTINUED | OUTPATIENT
Start: 2024-01-01 | End: 2024-01-01

## 2024-01-01 RX ORDER — GADOBUTROL 604.72 MG/ML
11 INJECTION INTRAVENOUS ONCE
Status: COMPLETED | OUTPATIENT
Start: 2024-01-01 | End: 2024-01-01

## 2024-01-01 RX ORDER — DEXAMETHASONE 4 MG/1
4 TABLET ORAL
COMMUNITY

## 2024-01-01 RX ORDER — BISACODYL 10 MG
10 SUPPOSITORY, RECTAL RECTAL DAILY PRN
Status: ON HOLD | COMMUNITY
End: 2024-01-01

## 2024-01-01 RX ORDER — ACETAMINOPHEN 500 MG
1000 TABLET ORAL 3 TIMES DAILY
COMMUNITY

## 2024-01-01 RX ORDER — CARBOXYMETHYLCELLULOSE SODIUM 5 MG/ML
1 SOLUTION/ DROPS OPHTHALMIC
Status: CANCELLED | OUTPATIENT
Start: 2024-01-01

## 2024-01-01 RX ORDER — ONDANSETRON 2 MG/ML
4 INJECTION INTRAMUSCULAR; INTRAVENOUS EVERY 6 HOURS PRN
Status: DISCONTINUED | OUTPATIENT
Start: 2024-01-01 | End: 2024-01-01 | Stop reason: HOSPADM

## 2024-01-01 RX ORDER — DIGOXIN 125 MCG
125 TABLET ORAL DAILY
DISCHARGE
Start: 2024-01-01

## 2024-01-01 RX ORDER — NALOXONE HYDROCHLORIDE 0.4 MG/ML
0.4 INJECTION, SOLUTION INTRAMUSCULAR; INTRAVENOUS; SUBCUTANEOUS
Status: DISCONTINUED | OUTPATIENT
Start: 2024-01-01 | End: 2024-01-01 | Stop reason: HOSPADM

## 2024-01-01 RX ORDER — ACETAMINOPHEN 325 MG/1
975 TABLET ORAL ONCE
Status: COMPLETED | OUTPATIENT
Start: 2024-01-01 | End: 2024-01-01

## 2024-01-01 RX ORDER — AMIODARONE HYDROCHLORIDE 200 MG/1
TABLET ORAL
DISCHARGE
Start: 2024-01-01 | End: 2024-01-01

## 2024-01-01 RX ORDER — LOSARTAN POTASSIUM 50 MG/1
100 TABLET ORAL EVERY MORNING
Status: DISCONTINUED | OUTPATIENT
Start: 2024-01-01 | End: 2024-01-01 | Stop reason: HOSPADM

## 2024-01-01 RX ORDER — POLYETHYLENE GLYCOL 3350 17 G/17G
17 POWDER, FOR SOLUTION ORAL 2 TIMES DAILY
Status: DISCONTINUED | OUTPATIENT
Start: 2024-01-01 | End: 2024-01-01 | Stop reason: HOSPADM

## 2024-01-01 RX ORDER — AMOXICILLIN 250 MG
1 CAPSULE ORAL 2 TIMES DAILY
Status: DISCONTINUED | OUTPATIENT
Start: 2024-01-01 | End: 2024-01-01

## 2024-01-01 RX ORDER — DEXAMETHASONE 4 MG/1
4 TABLET ORAL 2 TIMES DAILY WITH MEALS
Status: DISCONTINUED | OUTPATIENT
Start: 2024-01-01 | End: 2024-01-01 | Stop reason: HOSPADM

## 2024-01-01 RX ORDER — OXYCODONE HCL 20 MG/ML
10 CONCENTRATE, ORAL ORAL 4 TIMES DAILY
Status: DISCONTINUED | OUTPATIENT
Start: 2024-01-01 | End: 2024-01-01 | Stop reason: HOSPADM

## 2024-01-01 RX ORDER — SENNOSIDES 8.6 MG
1 TABLET ORAL 2 TIMES DAILY
Status: DISCONTINUED | OUTPATIENT
Start: 2024-01-01 | End: 2024-01-01

## 2024-01-01 RX ORDER — CARBOXYMETHYLCELLULOSE SODIUM 5 MG/ML
1 SOLUTION/ DROPS OPHTHALMIC
Status: DISCONTINUED | OUTPATIENT
Start: 2024-01-01 | End: 2024-01-01 | Stop reason: HOSPADM

## 2024-01-01 RX ORDER — CEFAZOLIN SODIUM 2 G/50ML
2 SOLUTION INTRAVENOUS SEE ADMIN INSTRUCTIONS
Status: CANCELLED | OUTPATIENT
Start: 2024-01-01

## 2024-01-01 RX ORDER — MINERAL OIL/HYDROPHIL PETROLAT
OINTMENT (GRAM) TOPICAL
DISCHARGE
Start: 2024-01-01

## 2024-01-01 RX ORDER — PROCHLORPERAZINE 25 MG
12.5 SUPPOSITORY, RECTAL RECTAL EVERY 12 HOURS PRN
Status: CANCELLED | OUTPATIENT
Start: 2024-01-01

## 2024-01-01 RX ORDER — AMOXICILLIN 250 MG
1 CAPSULE ORAL 2 TIMES DAILY
Status: DISCONTINUED | OUTPATIENT
Start: 2024-01-01 | End: 2024-01-01 | Stop reason: HOSPADM

## 2024-01-01 RX ORDER — SENNA AND DOCUSATE SODIUM 50; 8.6 MG/1; MG/1
1 TABLET, FILM COATED ORAL 2 TIMES DAILY
Status: SHIPPED
Start: 2024-01-01

## 2024-01-01 RX ORDER — DILTIAZEM HCL/D5W 125 MG/125
5-15 PLASTIC BAG, INJECTION (ML) INTRAVENOUS CONTINUOUS
Status: DISCONTINUED | OUTPATIENT
Start: 2024-01-01 | End: 2024-01-01

## 2024-01-01 RX ORDER — DEXAMETHASONE 4 MG/1
4 TABLET ORAL
Status: DISCONTINUED | OUTPATIENT
Start: 2024-01-01 | End: 2024-01-01 | Stop reason: HOSPADM

## 2024-01-01 RX ORDER — METHYLPREDNISOLONE SODIUM SUCCINATE 125 MG/2ML
125 INJECTION, POWDER, LYOPHILIZED, FOR SOLUTION INTRAMUSCULAR; INTRAVENOUS
Status: CANCELLED
Start: 2024-01-01

## 2024-01-01 RX ORDER — LIDOCAINE 40 MG/G
CREAM TOPICAL
Status: DISCONTINUED | OUTPATIENT
Start: 2024-01-01 | End: 2024-01-01 | Stop reason: HOSPADM

## 2024-01-01 RX ORDER — IOPAMIDOL 755 MG/ML
100 INJECTION, SOLUTION INTRAVASCULAR ONCE
Status: COMPLETED | OUTPATIENT
Start: 2024-01-01 | End: 2024-01-01

## 2024-01-01 RX ORDER — VANCOMYCIN HYDROCHLORIDE 1 G/200ML
1000 INJECTION, SOLUTION INTRAVENOUS EVERY 8 HOURS
Status: DISCONTINUED | OUTPATIENT
Start: 2024-01-01 | End: 2024-01-01

## 2024-01-01 RX ORDER — MORPHINE SULFATE 15 MG/1
TABLET, FILM COATED, EXTENDED RELEASE ORAL
Qty: 30 TABLET | Refills: 0 | Status: SHIPPED | OUTPATIENT
Start: 2024-01-01 | End: 2024-01-01

## 2024-01-01 RX ORDER — DEXMEDETOMIDINE HYDROCHLORIDE 4 UG/ML
.1-1.2 INJECTION, SOLUTION INTRAVENOUS CONTINUOUS
Status: DISCONTINUED | OUTPATIENT
Start: 2024-01-01 | End: 2024-01-01

## 2024-01-01 RX ORDER — MORPHINE SULFATE 15 MG/1
15 TABLET, FILM COATED, EXTENDED RELEASE ORAL EVERY EVENING
Status: DISCONTINUED | OUTPATIENT
Start: 2024-01-01 | End: 2024-01-01 | Stop reason: HOSPADM

## 2024-01-01 RX ORDER — ALBUTEROL SULFATE 90 UG/1
1-2 AEROSOL, METERED RESPIRATORY (INHALATION)
Status: CANCELLED
Start: 2024-01-01

## 2024-01-01 RX ORDER — LIDOCAINE HYDROCHLORIDE 20 MG/ML
INJECTION, SOLUTION INFILTRATION; PERINEURAL PRN
Status: DISCONTINUED | OUTPATIENT
Start: 2024-01-01 | End: 2024-01-01

## 2024-01-01 RX ORDER — MAGNESIUM SULFATE HEPTAHYDRATE 40 MG/ML
4 INJECTION, SOLUTION INTRAVENOUS ONCE
Status: COMPLETED | OUTPATIENT
Start: 2024-01-01 | End: 2024-01-01

## 2024-01-01 RX ORDER — CELECOXIB 200 MG/1
200 CAPSULE ORAL ONCE
Status: CANCELLED | OUTPATIENT
Start: 2024-01-01 | End: 2024-01-01

## 2024-01-01 RX ORDER — OXYCODONE HYDROCHLORIDE 5 MG/1
5 TABLET ORAL ONCE
Status: COMPLETED | OUTPATIENT
Start: 2024-01-01 | End: 2024-01-01

## 2024-01-01 RX ORDER — RIVAROXABAN 20 MG/1
20 TABLET, FILM COATED ORAL EVERY EVENING
Status: ON HOLD | COMMUNITY
Start: 2024-01-01 | End: 2024-01-01

## 2024-01-01 RX ORDER — ACETAMINOPHEN 325 MG/1
975 TABLET ORAL EVERY 6 HOURS PRN
Status: CANCELLED | OUTPATIENT
Start: 2024-01-01

## 2024-01-01 RX ORDER — LORAZEPAM 1 MG/1
1 TABLET ORAL
Qty: 20 TABLET | Refills: 0 | Status: SHIPPED | OUTPATIENT
Start: 2024-01-01

## 2024-01-01 RX ORDER — RIVAROXABAN 20 MG/1
20 TABLET, FILM COATED ORAL
Status: ON HOLD | COMMUNITY
End: 2024-01-01

## 2024-01-01 RX ORDER — AMOXICILLIN 250 MG
1 CAPSULE ORAL 2 TIMES DAILY
Status: CANCELLED | OUTPATIENT
Start: 2024-01-01

## 2024-01-01 RX ORDER — KETAMINE HYDROCHLORIDE 10 MG/ML
INJECTION INTRAMUSCULAR; INTRAVENOUS PRN
Status: DISCONTINUED | OUTPATIENT
Start: 2024-01-01 | End: 2024-01-01

## 2024-01-01 RX ORDER — DIGOXIN 0.25 MG/ML
500 INJECTION INTRAMUSCULAR; INTRAVENOUS ONCE
Status: COMPLETED | OUTPATIENT
Start: 2024-01-01 | End: 2024-01-01

## 2024-01-01 RX ORDER — HYDROMORPHONE HYDROCHLORIDE 2 MG/1
2 TABLET ORAL
Status: DISCONTINUED | OUTPATIENT
Start: 2024-01-01 | End: 2024-01-01 | Stop reason: HOSPADM

## 2024-01-01 RX ORDER — NICOTINE POLACRILEX 4 MG
15-30 LOZENGE BUCCAL
Status: DISCONTINUED | OUTPATIENT
Start: 2024-01-01 | End: 2024-01-01

## 2024-01-01 RX ORDER — FENTANYL CITRATE 50 UG/ML
25 INJECTION, SOLUTION INTRAMUSCULAR; INTRAVENOUS EVERY 5 MIN PRN
Status: DISCONTINUED | OUTPATIENT
Start: 2024-01-01 | End: 2024-01-01 | Stop reason: HOSPADM

## 2024-01-01 RX ORDER — OXYCODONE HYDROCHLORIDE 5 MG/1
5 TABLET ORAL EVERY 4 HOURS PRN
Qty: 30 TABLET | Refills: 0 | Status: SHIPPED | OUTPATIENT
Start: 2024-01-01

## 2024-01-01 RX ORDER — ONDANSETRON 8 MG/1
8 TABLET, FILM COATED ORAL EVERY 8 HOURS PRN
Qty: 30 TABLET | Refills: 2 | Status: SHIPPED | OUTPATIENT
Start: 2024-01-01 | End: 2024-01-01

## 2024-01-01 RX ORDER — ENOXAPARIN SODIUM 100 MG/ML
40 INJECTION SUBCUTANEOUS EVERY 24 HOURS
Status: DISCONTINUED | OUTPATIENT
Start: 2024-01-01 | End: 2024-01-01 | Stop reason: HOSPADM

## 2024-01-01 RX ORDER — FUROSEMIDE 40 MG
40 TABLET ORAL DAILY
Qty: 30 TABLET | Refills: 1 | Status: SHIPPED | OUTPATIENT
Start: 2024-01-01

## 2024-01-01 RX ORDER — PALONOSETRON 0.05 MG/ML
0.25 INJECTION, SOLUTION INTRAVENOUS ONCE
Status: COMPLETED | OUTPATIENT
Start: 2024-01-01 | End: 2024-01-01

## 2024-01-01 RX ORDER — METOPROLOL SUCCINATE 100 MG/1
100 TABLET, EXTENDED RELEASE ORAL EVERY MORNING
Status: DISCONTINUED | OUTPATIENT
Start: 2024-01-01 | End: 2024-01-01 | Stop reason: HOSPADM

## 2024-01-01 RX ORDER — LIDOCAINE 40 MG/G
CREAM TOPICAL
Status: DISCONTINUED | OUTPATIENT
Start: 2024-01-01 | End: 2024-01-01

## 2024-01-01 RX ORDER — CEFTRIAXONE 2 G/1
2 INJECTION, POWDER, FOR SOLUTION INTRAMUSCULAR; INTRAVENOUS ONCE
Status: DISCONTINUED | OUTPATIENT
Start: 2024-01-01 | End: 2024-01-01

## 2024-01-01 RX ORDER — CEFEPIME HYDROCHLORIDE 2 G/1
2 INJECTION, POWDER, FOR SOLUTION INTRAVENOUS EVERY 8 HOURS
Status: DISCONTINUED | OUTPATIENT
Start: 2024-01-01 | End: 2024-01-01

## 2024-01-01 RX ORDER — ACETAMINOPHEN 500 MG
1000 TABLET ORAL 3 TIMES DAILY
Status: DISCONTINUED | OUTPATIENT
Start: 2024-01-01 | End: 2024-01-01 | Stop reason: HOSPADM

## 2024-01-01 RX ORDER — BISACODYL 10 MG
10 SUPPOSITORY, RECTAL RECTAL DAILY PRN
Status: CANCELLED | OUTPATIENT
Start: 2024-01-01

## 2024-01-01 RX ORDER — CEFAZOLIN SODIUM/WATER 2 G/20 ML
2 SYRINGE (ML) INTRAVENOUS SEE ADMIN INSTRUCTIONS
Status: DISCONTINUED | OUTPATIENT
Start: 2024-01-01 | End: 2024-01-01 | Stop reason: HOSPADM

## 2024-01-01 RX ORDER — NALOXONE HYDROCHLORIDE 0.4 MG/ML
0.4 INJECTION, SOLUTION INTRAMUSCULAR; INTRAVENOUS; SUBCUTANEOUS
Status: DISCONTINUED | OUTPATIENT
Start: 2024-01-01 | End: 2024-01-01

## 2024-01-01 RX ORDER — ONDANSETRON 4 MG/1
4 TABLET, ORALLY DISINTEGRATING ORAL EVERY 6 HOURS PRN
Status: DISCONTINUED | OUTPATIENT
Start: 2024-01-01 | End: 2024-01-01

## 2024-01-01 RX ORDER — ONDANSETRON 2 MG/ML
4 INJECTION INTRAMUSCULAR; INTRAVENOUS EVERY 6 HOURS PRN
Status: CANCELLED | OUTPATIENT
Start: 2024-01-01

## 2024-01-01 RX ORDER — CALCIUM CARBONATE 500 MG/1
1000 TABLET, CHEWABLE ORAL 4 TIMES DAILY PRN
Status: DISCONTINUED | OUTPATIENT
Start: 2024-01-01 | End: 2024-01-01 | Stop reason: HOSPADM

## 2024-01-01 RX ORDER — CARBOXYMETHYLCELLULOSE SODIUM 5 MG/ML
1-2 SOLUTION/ DROPS OPHTHALMIC
Status: DISCONTINUED | OUTPATIENT
Start: 2024-01-01 | End: 2024-01-01

## 2024-01-01 RX ORDER — MINERAL OIL/HYDROPHIL PETROLAT
OINTMENT (GRAM) TOPICAL
Status: DISCONTINUED | OUTPATIENT
Start: 2024-01-01 | End: 2024-01-01 | Stop reason: HOSPADM

## 2024-01-01 RX ORDER — IOPAMIDOL 755 MG/ML
90 INJECTION, SOLUTION INTRAVASCULAR ONCE
Status: COMPLETED | OUTPATIENT
Start: 2024-01-01 | End: 2024-01-01

## 2024-01-01 RX ORDER — PROPOFOL 10 MG/ML
INJECTION, EMULSION INTRAVENOUS PRN
Status: DISCONTINUED | OUTPATIENT
Start: 2024-01-01 | End: 2024-01-01

## 2024-01-01 RX ORDER — FENTANYL CITRATE 50 UG/ML
50 INJECTION, SOLUTION INTRAMUSCULAR; INTRAVENOUS EVERY 5 MIN PRN
Status: DISCONTINUED | OUTPATIENT
Start: 2024-01-01 | End: 2024-01-01 | Stop reason: HOSPADM

## 2024-01-01 RX ORDER — MORPHINE SULFATE 15 MG/1
TABLET, FILM COATED, EXTENDED RELEASE ORAL
Qty: 45 TABLET | Refills: 0 | Status: ON HOLD | OUTPATIENT
Start: 2024-01-01 | End: 2024-01-01

## 2024-01-01 RX ORDER — SODIUM CHLORIDE, SODIUM LACTATE, POTASSIUM CHLORIDE, CALCIUM CHLORIDE 600; 310; 30; 20 MG/100ML; MG/100ML; MG/100ML; MG/100ML
INJECTION, SOLUTION INTRAVENOUS CONTINUOUS
Status: DISCONTINUED | OUTPATIENT
Start: 2024-01-01 | End: 2024-01-01 | Stop reason: HOSPADM

## 2024-01-01 RX ORDER — ONDANSETRON 2 MG/ML
INJECTION INTRAMUSCULAR; INTRAVENOUS PRN
Status: DISCONTINUED | OUTPATIENT
Start: 2024-01-01 | End: 2024-01-01

## 2024-01-01 RX ORDER — ONDANSETRON 4 MG/1
4 TABLET, ORALLY DISINTEGRATING ORAL EVERY 30 MIN PRN
Status: DISCONTINUED | OUTPATIENT
Start: 2024-01-01 | End: 2024-01-01 | Stop reason: HOSPADM

## 2024-01-01 RX ORDER — IBUPROFEN 600 MG/1
600 TABLET, FILM COATED ORAL EVERY 6 HOURS PRN
Status: DISCONTINUED | OUTPATIENT
Start: 2024-01-01 | End: 2024-01-01 | Stop reason: HOSPADM

## 2024-01-01 RX ORDER — ONDANSETRON 2 MG/ML
4 INJECTION INTRAMUSCULAR; INTRAVENOUS EVERY 30 MIN PRN
Status: DISCONTINUED | OUTPATIENT
Start: 2024-01-01 | End: 2024-01-01 | Stop reason: HOSPADM

## 2024-01-01 RX ORDER — CEFAZOLIN SODIUM 2 G/50ML
2 SOLUTION INTRAVENOUS
Status: CANCELLED | OUTPATIENT
Start: 2024-01-01

## 2024-01-01 RX ORDER — CLINDAMYCIN PHOSPHATE 900 MG/50ML
900 INJECTION, SOLUTION INTRAVENOUS EVERY 8 HOURS
Status: DISCONTINUED | OUTPATIENT
Start: 2024-01-01 | End: 2024-01-01

## 2024-01-01 RX ORDER — DEXTROSE MONOHYDRATE 50 MG/ML
10-20 INJECTION, SOLUTION INTRAVENOUS
Status: DISCONTINUED | OUTPATIENT
Start: 2024-01-01 | End: 2024-01-01 | Stop reason: DRUGHIGH

## 2024-01-01 RX ORDER — IOPAMIDOL 612 MG/ML
15 INJECTION, SOLUTION INTRATHECAL ONCE
Status: DISCONTINUED | OUTPATIENT
Start: 2024-01-01 | End: 2024-01-01 | Stop reason: HOSPADM

## 2024-01-01 RX ORDER — ACETAMINOPHEN 500 MG
1000 TABLET ORAL EVERY 6 HOURS
COMMUNITY
Start: 2024-01-01 | End: 2024-01-01

## 2024-01-01 RX ORDER — GABAPENTIN 100 MG/1
100 CAPSULE ORAL
Status: COMPLETED | OUTPATIENT
Start: 2024-01-01 | End: 2024-01-01

## 2024-01-01 RX ORDER — IPRATROPIUM BROMIDE AND ALBUTEROL SULFATE 2.5; .5 MG/3ML; MG/3ML
3 SOLUTION RESPIRATORY (INHALATION) EVERY 4 HOURS PRN
Status: DISCONTINUED | OUTPATIENT
Start: 2024-01-01 | End: 2024-01-01

## 2024-01-01 RX ORDER — METOPROLOL TARTRATE 50 MG
50 TABLET ORAL 2 TIMES DAILY
Status: DISCONTINUED | OUTPATIENT
Start: 2024-01-01 | End: 2024-01-01 | Stop reason: HOSPADM

## 2024-01-01 RX ORDER — HEPARIN SODIUM,PORCINE 10 UNIT/ML
5-10 VIAL (ML) INTRAVENOUS EVERY 24 HOURS
Status: DISCONTINUED | OUTPATIENT
Start: 2024-01-01 | End: 2024-01-01

## 2024-01-01 RX ORDER — OXYCODONE HYDROCHLORIDE 5 MG/1
5 CAPSULE ORAL EVERY 4 HOURS PRN
Qty: 15 CAPSULE | Refills: 0 | Status: SHIPPED | OUTPATIENT
Start: 2024-01-01 | End: 2024-01-01

## 2024-01-01 RX ORDER — NALOXONE HYDROCHLORIDE 0.4 MG/ML
0.2 INJECTION, SOLUTION INTRAMUSCULAR; INTRAVENOUS; SUBCUTANEOUS
Status: DISCONTINUED | OUTPATIENT
Start: 2024-01-01 | End: 2024-01-01

## 2024-01-01 RX ORDER — ACETAMINOPHEN 325 MG/1
650 TABLET ORAL EVERY 4 HOURS PRN
Qty: 50 TABLET | Refills: 0 | Status: SHIPPED | OUTPATIENT
Start: 2024-01-01 | End: 2024-01-01

## 2024-01-01 RX ORDER — PIPERACILLIN SODIUM, TAZOBACTAM SODIUM 3; .375 G/15ML; G/15ML
3.38 INJECTION, POWDER, LYOPHILIZED, FOR SOLUTION INTRAVENOUS EVERY 6 HOURS
Status: DISCONTINUED | OUTPATIENT
Start: 2024-01-01 | End: 2024-01-01 | Stop reason: DRUGHIGH

## 2024-01-01 RX ORDER — IBUPROFEN 600 MG/1
600 TABLET, FILM COATED ORAL EVERY 6 HOURS PRN
Qty: 30 TABLET | Refills: 0 | Status: SHIPPED | OUTPATIENT
Start: 2024-01-01 | End: 2024-01-01

## 2024-01-01 RX ORDER — ENOXAPARIN SODIUM 100 MG/ML
40 INJECTION SUBCUTANEOUS
Status: COMPLETED | OUTPATIENT
Start: 2024-01-01 | End: 2024-01-01

## 2024-01-01 RX ORDER — GABAPENTIN 100 MG/1
100 CAPSULE ORAL
Status: CANCELLED | OUTPATIENT
Start: 2024-01-01

## 2024-01-01 RX ORDER — DEXAMETHASONE SODIUM PHOSPHATE 10 MG/ML
10 INJECTION, SOLUTION INTRAMUSCULAR; INTRAVENOUS ONCE
Status: COMPLETED | OUTPATIENT
Start: 2024-01-01 | End: 2024-01-01

## 2024-01-01 RX ORDER — HYDROMORPHONE HCL IN WATER/PF 6 MG/30 ML
0.2 PATIENT CONTROLLED ANALGESIA SYRINGE INTRAVENOUS EVERY 5 MIN PRN
Status: DISCONTINUED | OUTPATIENT
Start: 2024-01-01 | End: 2024-01-01 | Stop reason: HOSPADM

## 2024-01-01 RX ORDER — MORPHINE SULFATE 15 MG/1
TABLET, FILM COATED, EXTENDED RELEASE ORAL
Qty: 45 TABLET | Refills: 0 | Status: SHIPPED | OUTPATIENT
Start: 2024-01-01 | End: 2024-01-01

## 2024-01-01 RX ORDER — AMIODARONE HYDROCHLORIDE 200 MG/1
200 TABLET ORAL 2 TIMES DAILY
Status: DISCONTINUED | OUTPATIENT
Start: 2024-01-01 | End: 2024-01-01 | Stop reason: HOSPADM

## 2024-01-01 RX ORDER — LIDOCAINE HYDROCHLORIDE AND EPINEPHRINE 10; 10 MG/ML; UG/ML
INJECTION, SOLUTION INFILTRATION; PERINEURAL PRN
Status: DISCONTINUED | OUTPATIENT
Start: 2024-01-01 | End: 2024-01-01 | Stop reason: HOSPADM

## 2024-01-01 RX ORDER — VASOPRESSIN IN 0.9 % NACL 2 UNIT/2ML
SYRINGE (ML) INTRAVENOUS PRN
Status: DISCONTINUED | OUTPATIENT
Start: 2024-01-01 | End: 2024-01-01

## 2024-01-01 RX ORDER — FEXOFENADINE HCL 180 MG/1
180 TABLET ORAL DAILY
COMMUNITY

## 2024-01-01 RX ORDER — IOPAMIDOL 612 MG/ML
15 INJECTION, SOLUTION INTRATHECAL ONCE
Status: DISCONTINUED | OUTPATIENT
Start: 2024-01-01 | End: 2024-01-01

## 2024-01-01 RX ORDER — IPRATROPIUM BROMIDE AND ALBUTEROL SULFATE 2.5; .5 MG/3ML; MG/3ML
3 SOLUTION RESPIRATORY (INHALATION) EVERY 4 HOURS PRN
Status: CANCELLED | OUTPATIENT
Start: 2024-01-01

## 2024-01-01 RX ORDER — CLINDAMYCIN PHOSPHATE 900 MG/50ML
900 INJECTION, SOLUTION INTRAVENOUS EVERY 8 HOURS
Status: CANCELLED | OUTPATIENT
Start: 2024-01-01

## 2024-01-01 RX ORDER — HEPARIN SODIUM (PORCINE) LOCK FLUSH IV SOLN 100 UNIT/ML 100 UNIT/ML
5-10 SOLUTION INTRAVENOUS
Status: DISCONTINUED | OUTPATIENT
Start: 2024-01-01 | End: 2024-01-01

## 2024-01-01 RX ORDER — BUPIVACAINE HYDROCHLORIDE 2.5 MG/ML
INJECTION, SOLUTION INFILTRATION; PERINEURAL PRN
Status: DISCONTINUED | OUTPATIENT
Start: 2024-01-01 | End: 2024-01-01 | Stop reason: HOSPADM

## 2024-01-01 RX ORDER — AMLODIPINE BESYLATE 10 MG/1
10 TABLET ORAL DAILY
COMMUNITY
Start: 2023-09-03 | End: 2024-01-01

## 2024-01-01 RX ORDER — BISACODYL 10 MG
10 SUPPOSITORY, RECTAL RECTAL
Status: DISCONTINUED | OUTPATIENT
Start: 2024-01-01 | End: 2024-01-01

## 2024-01-01 RX ORDER — DEXAMETHASONE SODIUM PHOSPHATE 10 MG/ML
10 INJECTION, SOLUTION INTRAMUSCULAR; INTRAVENOUS EVERY 6 HOURS
Status: CANCELLED | OUTPATIENT
Start: 2024-01-01

## 2024-01-01 RX ORDER — PROCHLORPERAZINE MALEATE 5 MG
5 TABLET ORAL EVERY 6 HOURS PRN
Status: DISCONTINUED | OUTPATIENT
Start: 2024-01-01 | End: 2024-01-01 | Stop reason: HOSPADM

## 2024-01-01 RX ORDER — HEPARIN SODIUM,PORCINE 10 UNIT/ML
5-10 VIAL (ML) INTRAVENOUS
Status: DISCONTINUED | OUTPATIENT
Start: 2024-01-01 | End: 2024-01-01 | Stop reason: HOSPADM

## 2024-01-01 RX ORDER — MEPERIDINE HYDROCHLORIDE 25 MG/ML
12.5 INJECTION INTRAMUSCULAR; INTRAVENOUS; SUBCUTANEOUS EVERY 5 MIN PRN
Status: DISCONTINUED | OUTPATIENT
Start: 2024-01-01 | End: 2024-01-01 | Stop reason: HOSPADM

## 2024-01-01 RX ORDER — POLYETHYLENE GLYCOL 3350 17 G/17G
17 POWDER, FOR SOLUTION ORAL DAILY PRN
Status: DISCONTINUED | OUTPATIENT
Start: 2024-01-01 | End: 2024-01-01 | Stop reason: HOSPADM

## 2024-01-01 RX ORDER — HEPARIN SODIUM (PORCINE) LOCK FLUSH IV SOLN 100 UNIT/ML 100 UNIT/ML
SOLUTION INTRAVENOUS
Status: COMPLETED
Start: 2024-01-01 | End: 2024-01-01

## 2024-01-01 RX ORDER — OXYCODONE HYDROCHLORIDE 5 MG/1
5 CAPSULE ORAL EVERY 4 HOURS PRN
Status: DISCONTINUED | OUTPATIENT
Start: 2024-01-01 | End: 2024-01-01

## 2024-01-01 RX ORDER — NICOTINE POLACRILEX 4 MG
15-30 LOZENGE BUCCAL
Status: CANCELLED | OUTPATIENT
Start: 2024-01-01

## 2024-01-01 RX ORDER — OXYCODONE HYDROCHLORIDE 5 MG/1
5 CAPSULE ORAL EVERY 4 HOURS PRN
COMMUNITY
End: 2024-01-01

## 2024-01-01 RX ORDER — HEPARIN SODIUM (PORCINE) LOCK FLUSH IV SOLN 100 UNIT/ML 100 UNIT/ML
5 SOLUTION INTRAVENOUS
OUTPATIENT
Start: 2024-01-01

## 2024-01-01 RX ORDER — POLYETHYLENE GLYCOL 3350 17 G/17G
17 POWDER, FOR SOLUTION ORAL DAILY
DISCHARGE
Start: 2024-01-01

## 2024-01-01 RX ORDER — POLYETHYLENE GLYCOL 3350 17 G/17G
17 POWDER, FOR SOLUTION ORAL DAILY
Qty: 7 PACKET | Refills: 0 | Status: SHIPPED | OUTPATIENT
Start: 2024-01-01 | End: 2024-01-01

## 2024-01-01 RX ORDER — OXYCODONE HCL 20 MG/ML
5 CONCENTRATE, ORAL ORAL
Status: DISCONTINUED | OUTPATIENT
Start: 2024-01-01 | End: 2024-01-01 | Stop reason: HOSPADM

## 2024-01-01 RX ORDER — OXYCODONE HYDROCHLORIDE 5 MG/1
10 TABLET ORAL ONCE
Status: COMPLETED | OUTPATIENT
Start: 2024-01-01 | End: 2024-01-01

## 2024-01-01 RX ORDER — MORPHINE SULFATE 15 MG/1
15 TABLET, FILM COATED, EXTENDED RELEASE ORAL EVERY 12 HOURS
Qty: 60 TABLET | Refills: 0 | Status: SHIPPED | OUTPATIENT
Start: 2024-01-01 | End: 2024-01-01

## 2024-01-01 RX ORDER — SODIUM CHLORIDE 9 MG/ML
INJECTION, SOLUTION INTRAVENOUS CONTINUOUS
Status: DISCONTINUED | OUTPATIENT
Start: 2024-01-01 | End: 2024-01-01 | Stop reason: HOSPADM

## 2024-01-01 RX ORDER — AMOXICILLIN 250 MG
2 CAPSULE ORAL 2 TIMES DAILY PRN
Status: DISCONTINUED | OUTPATIENT
Start: 2024-01-01 | End: 2024-01-01 | Stop reason: HOSPADM

## 2024-01-01 RX ORDER — HYDROMORPHONE HCL IN WATER/PF 6 MG/30 ML
0.4 PATIENT CONTROLLED ANALGESIA SYRINGE INTRAVENOUS
Status: DISCONTINUED | OUTPATIENT
Start: 2024-01-01 | End: 2024-01-01 | Stop reason: HOSPADM

## 2024-01-01 RX ORDER — DILTIAZEM HYDROCHLORIDE 5 MG/ML
5-20 INJECTION INTRAVENOUS ONCE
Status: COMPLETED | OUTPATIENT
Start: 2024-01-01 | End: 2024-01-01

## 2024-01-01 RX ORDER — PIPERACILLIN SODIUM, TAZOBACTAM SODIUM 4; .5 G/20ML; G/20ML
4.5 INJECTION, POWDER, LYOPHILIZED, FOR SOLUTION INTRAVENOUS EVERY 6 HOURS
Status: DISCONTINUED | OUTPATIENT
Start: 2024-01-01 | End: 2024-01-01 | Stop reason: HOSPADM

## 2024-01-01 RX ORDER — OXYCODONE HCL 20 MG/ML
5 CONCENTRATE, ORAL ORAL
Qty: 8 ML | Refills: 0 | Status: SHIPPED | OUTPATIENT
Start: 2024-01-01 | End: 2024-01-01

## 2024-01-01 RX ORDER — LORAZEPAM 2 MG/ML
0.5 INJECTION INTRAMUSCULAR EVERY 4 HOURS PRN
Status: DISCONTINUED | OUTPATIENT
Start: 2024-01-01 | End: 2024-01-01 | Stop reason: HOSPADM

## 2024-01-01 RX ORDER — ACETAMINOPHEN 325 MG/1
975 TABLET ORAL EVERY 6 HOURS PRN
DISCHARGE
Start: 2024-01-01

## 2024-01-01 RX ORDER — ACETAMINOPHEN 325 MG/1
975 TABLET ORAL EVERY 4 HOURS PRN
Status: DISCONTINUED | OUTPATIENT
Start: 2024-01-01 | End: 2024-01-01 | Stop reason: HOSPADM

## 2024-01-01 RX ORDER — FENTANYL CITRATE 50 UG/ML
INJECTION, SOLUTION INTRAMUSCULAR; INTRAVENOUS PRN
Status: DISCONTINUED | OUTPATIENT
Start: 2024-01-01 | End: 2024-01-01

## 2024-01-01 RX ORDER — ACETAMINOPHEN 325 MG/1
650 TABLET ORAL EVERY 4 HOURS PRN
Status: DISCONTINUED | OUTPATIENT
Start: 2024-01-01 | End: 2024-01-01 | Stop reason: HOSPADM

## 2024-01-01 RX ORDER — OXYCODONE HYDROCHLORIDE 5 MG/1
5 TABLET ORAL EVERY 4 HOURS PRN
Qty: 40 TABLET | Refills: 0 | Status: SHIPPED | OUTPATIENT
Start: 2024-01-01 | End: 2024-01-01

## 2024-01-01 RX ORDER — LORAZEPAM 1 MG/1
1 TABLET ORAL
Status: DISCONTINUED | OUTPATIENT
Start: 2024-01-01 | End: 2024-01-01 | Stop reason: HOSPADM

## 2024-01-01 RX ORDER — HEPARIN SODIUM,PORCINE 10 UNIT/ML
5-20 VIAL (ML) INTRAVENOUS DAILY PRN
Status: DISCONTINUED | OUTPATIENT
Start: 2024-01-01 | End: 2024-01-01 | Stop reason: HOSPADM

## 2024-01-01 RX ORDER — POLYETHYLENE GLYCOL 3350 17 G/17G
17 POWDER, FOR SOLUTION ORAL DAILY PRN
Status: CANCELLED | OUTPATIENT
Start: 2024-01-01

## 2024-01-01 RX ORDER — AMOXICILLIN 250 MG
2 CAPSULE ORAL 2 TIMES DAILY
Status: DISCONTINUED | OUTPATIENT
Start: 2024-01-01 | End: 2024-01-01

## 2024-01-01 RX ORDER — SENNOSIDES 8.6 MG
2 TABLET ORAL 2 TIMES DAILY
Status: DISCONTINUED | OUTPATIENT
Start: 2024-01-01 | End: 2024-01-01

## 2024-01-01 RX ORDER — METHOCARBAMOL 500 MG/1
500 TABLET, FILM COATED ORAL EVERY 6 HOURS PRN
Qty: 20 TABLET | Refills: 0 | Status: SHIPPED | OUTPATIENT
Start: 2024-01-01 | End: 2024-01-01

## 2024-01-01 RX ORDER — DILTIAZEM HCL 60 MG
360 TABLET ORAL ONCE
Status: COMPLETED | OUTPATIENT
Start: 2024-01-01 | End: 2024-01-01

## 2024-01-01 RX ORDER — PROCHLORPERAZINE MALEATE 10 MG
10 TABLET ORAL EVERY 6 HOURS PRN
Qty: 30 TABLET | Refills: 2 | Status: SHIPPED | OUTPATIENT
Start: 2024-01-01 | End: 2024-01-01

## 2024-01-01 RX ORDER — AMOXICILLIN 250 MG
2 CAPSULE ORAL 2 TIMES DAILY
Status: CANCELLED | OUTPATIENT
Start: 2024-01-01

## 2024-01-01 RX ORDER — FENTANYL CITRATE 0.05 MG/ML
25 INJECTION, SOLUTION INTRAMUSCULAR; INTRAVENOUS EVERY 5 MIN PRN
Status: DISCONTINUED | OUTPATIENT
Start: 2024-01-01 | End: 2024-01-01 | Stop reason: HOSPADM

## 2024-01-01 RX ORDER — OXYCODONE HCL 20 MG/ML
10 CONCENTRATE, ORAL ORAL 4 TIMES DAILY
Qty: 8 ML | Refills: 0 | Status: SHIPPED | OUTPATIENT
Start: 2024-01-01 | End: 2024-01-01

## 2024-01-01 RX ORDER — IPRATROPIUM BROMIDE AND ALBUTEROL SULFATE 2.5; .5 MG/3ML; MG/3ML
3 SOLUTION RESPIRATORY (INHALATION) ONCE
Status: COMPLETED | OUTPATIENT
Start: 2024-01-01 | End: 2024-01-01

## 2024-01-01 RX ORDER — ENOXAPARIN SODIUM 100 MG/ML
40 INJECTION SUBCUTANEOUS
Status: CANCELLED | OUTPATIENT
Start: 2024-01-01

## 2024-01-01 RX ORDER — ONDANSETRON 4 MG/1
4 TABLET, ORALLY DISINTEGRATING ORAL EVERY 6 HOURS PRN
Status: CANCELLED | OUTPATIENT
Start: 2024-01-01

## 2024-01-01 RX ORDER — GADOBUTROL 604.72 MG/ML
10 INJECTION INTRAVENOUS ONCE
Status: COMPLETED | OUTPATIENT
Start: 2024-01-01 | End: 2024-01-01

## 2024-01-01 RX ORDER — OXYCODONE HCL 5 MG/5 ML
SOLUTION, ORAL ORAL
Qty: 360 ML | Refills: 0 | Status: SHIPPED | OUTPATIENT
Start: 2024-01-01 | End: 2024-11-01

## 2024-01-01 RX ORDER — ONDANSETRON 4 MG/1
4 TABLET, ORALLY DISINTEGRATING ORAL EVERY 6 HOURS PRN
Status: DISCONTINUED | OUTPATIENT
Start: 2024-01-01 | End: 2024-01-01 | Stop reason: HOSPADM

## 2024-01-01 RX ORDER — CHLORHEXIDINE GLUCONATE ORAL RINSE 1.2 MG/ML
15 SOLUTION DENTAL ONCE
Status: COMPLETED | OUTPATIENT
Start: 2024-01-01 | End: 2024-01-01

## 2024-01-01 RX ORDER — BISACODYL 10 MG
10 SUPPOSITORY, RECTAL RECTAL DAILY PRN
DISCHARGE
Start: 2024-01-01

## 2024-01-01 RX ORDER — PIPERACILLIN SODIUM, TAZOBACTAM SODIUM 3; .375 G/15ML; G/15ML
3.38 INJECTION, POWDER, LYOPHILIZED, FOR SOLUTION INTRAVENOUS EVERY 6 HOURS
Status: DISCONTINUED | OUTPATIENT
Start: 2024-01-01 | End: 2024-01-01

## 2024-01-01 RX ORDER — FENTANYL CITRATE 50 UG/ML
25 INJECTION, SOLUTION INTRAMUSCULAR; INTRAVENOUS
Status: DISCONTINUED | OUTPATIENT
Start: 2024-01-01 | End: 2024-01-01 | Stop reason: HOSPADM

## 2024-01-01 RX ORDER — BISACODYL 10 MG
10 SUPPOSITORY, RECTAL RECTAL DAILY PRN
Status: DISCONTINUED | OUTPATIENT
Start: 2024-01-01 | End: 2024-01-01 | Stop reason: HOSPADM

## 2024-01-01 RX ORDER — POLYETHYLENE GLYCOL 3350 17 G/17G
17 POWDER, FOR SOLUTION ORAL DAILY
Status: DISCONTINUED | OUTPATIENT
Start: 2024-01-01 | End: 2024-01-01 | Stop reason: HOSPADM

## 2024-01-01 RX ORDER — FLUDEOXYGLUCOSE F 18 200 MCI/ML
10-18 INJECTION, SOLUTION INTRAVENOUS ONCE
Status: COMPLETED | OUTPATIENT
Start: 2024-01-01 | End: 2024-01-01

## 2024-01-01 RX ORDER — FENTANYL CITRATE 0.05 MG/ML
50 INJECTION, SOLUTION INTRAMUSCULAR; INTRAVENOUS EVERY 5 MIN PRN
Status: DISCONTINUED | OUTPATIENT
Start: 2024-01-01 | End: 2024-01-01 | Stop reason: HOSPADM

## 2024-01-01 RX ORDER — VECURONIUM BROMIDE 1 MG/ML
INJECTION, POWDER, LYOPHILIZED, FOR SOLUTION INTRAVENOUS PRN
Status: DISCONTINUED | OUTPATIENT
Start: 2024-01-01 | End: 2024-01-01

## 2024-01-01 RX ORDER — AMIODARONE HYDROCHLORIDE 200 MG/1
400 TABLET ORAL 2 TIMES DAILY
Status: COMPLETED | OUTPATIENT
Start: 2024-01-01 | End: 2024-01-01

## 2024-01-01 RX ORDER — CEFTRIAXONE 1 G/1
1 INJECTION, POWDER, FOR SOLUTION INTRAMUSCULAR; INTRAVENOUS ONCE
Status: COMPLETED | OUTPATIENT
Start: 2024-01-01 | End: 2024-01-01

## 2024-01-01 RX ORDER — MULTIVIT WITH MINERALS/LUTEIN
1 TABLET ORAL DAILY
Status: ON HOLD | COMMUNITY
End: 2024-01-01

## 2024-01-01 RX ORDER — MORPHINE SULFATE 30 MG/1
30 TABLET, FILM COATED, EXTENDED RELEASE ORAL EVERY MORNING
Status: DISCONTINUED | OUTPATIENT
Start: 2024-01-01 | End: 2024-01-01

## 2024-01-01 RX ORDER — MORPHINE SULFATE 15 MG/1
30 TABLET, FILM COATED, EXTENDED RELEASE ORAL EVERY MORNING
Status: DISCONTINUED | OUTPATIENT
Start: 2024-01-01 | End: 2024-01-01 | Stop reason: HOSPADM

## 2024-01-01 RX ORDER — BUPIVACAINE HYDROCHLORIDE 5 MG/ML
INJECTION, SOLUTION PERINEURAL PRN
Status: DISCONTINUED | OUTPATIENT
Start: 2024-01-01 | End: 2024-01-01 | Stop reason: HOSPADM

## 2024-01-01 RX ORDER — ACETAMINOPHEN 325 MG/1
975 TABLET ORAL EVERY 6 HOURS
Status: DISCONTINUED | OUTPATIENT
Start: 2024-01-01 | End: 2024-01-01 | Stop reason: HOSPADM

## 2024-01-01 RX ORDER — DOCUSATE SODIUM 100 MG/1
100 CAPSULE, LIQUID FILLED ORAL 2 TIMES DAILY PRN
Status: DISCONTINUED | OUTPATIENT
Start: 2024-01-01 | End: 2024-01-01 | Stop reason: HOSPADM

## 2024-01-01 RX ORDER — OXYCODONE HYDROCHLORIDE 10 MG/1
10 TABLET ORAL
Status: DISCONTINUED | OUTPATIENT
Start: 2024-01-01 | End: 2024-01-01 | Stop reason: HOSPADM

## 2024-01-01 RX ORDER — DILTIAZEM HYDROCHLORIDE 120 MG/1
360 CAPSULE, EXTENDED RELEASE ORAL DAILY
DISCHARGE
Start: 2024-01-01

## 2024-01-01 RX ORDER — OXYCODONE HCL 20 MG/ML
10 CONCENTRATE, ORAL ORAL
Status: DISCONTINUED | OUTPATIENT
Start: 2024-01-01 | End: 2024-01-01 | Stop reason: HOSPADM

## 2024-01-01 RX ORDER — OXYCODONE HYDROCHLORIDE 5 MG/1
5-10 TABLET ORAL EVERY 4 HOURS PRN
Status: CANCELLED | OUTPATIENT
Start: 2024-01-01

## 2024-01-01 RX ORDER — ONDANSETRON 2 MG/ML
4 INJECTION INTRAMUSCULAR; INTRAVENOUS EVERY 6 HOURS PRN
Status: DISCONTINUED | OUTPATIENT
Start: 2024-01-01 | End: 2024-01-01

## 2024-01-01 RX ORDER — THIAMINE HYDROCHLORIDE 100 MG/ML
100 INJECTION, SOLUTION INTRAMUSCULAR; INTRAVENOUS ONCE
Status: COMPLETED | OUTPATIENT
Start: 2024-01-01 | End: 2024-01-01

## 2024-01-01 RX ORDER — OXYCODONE HCL 20 MG/ML
10 CONCENTRATE, ORAL ORAL
Qty: 8 ML | Refills: 0 | Status: SHIPPED | OUTPATIENT
Start: 2024-01-01 | End: 2024-01-01

## 2024-01-01 RX ORDER — DIGOXIN 125 MCG
125 TABLET ORAL DAILY
Status: DISCONTINUED | OUTPATIENT
Start: 2024-01-01 | End: 2024-01-01 | Stop reason: HOSPADM

## 2024-01-01 RX ORDER — ASCORBIC ACID/MULTIVIT-MIN 1000 MG
1 EFFERVESCENT POWDER IN PACKET ORAL DAILY
COMMUNITY
End: 2024-01-01

## 2024-01-01 RX ORDER — CHLORHEXIDINE GLUCONATE ORAL RINSE 1.2 MG/ML
15 SOLUTION DENTAL ONCE
Status: CANCELLED | OUTPATIENT
Start: 2024-01-01 | End: 2024-01-01

## 2024-01-01 RX ORDER — DEXMEDETOMIDINE HYDROCHLORIDE 4 UG/ML
INJECTION, SOLUTION INTRAVENOUS PRN
Status: DISCONTINUED | OUTPATIENT
Start: 2024-01-01 | End: 2024-01-01

## 2024-01-01 RX ORDER — LIDOCAINE 4 G/G
1 PATCH TOPICAL
Status: DISCONTINUED | OUTPATIENT
Start: 2024-01-01 | End: 2024-01-01 | Stop reason: HOSPADM

## 2024-01-01 RX ORDER — GUAIFENESIN 600 MG/1
600 TABLET, EXTENDED RELEASE ORAL 2 TIMES DAILY
Status: ON HOLD
Start: 2024-01-01 | End: 2024-01-01

## 2024-01-01 RX ORDER — IPRATROPIUM BROMIDE AND ALBUTEROL SULFATE 2.5; .5 MG/3ML; MG/3ML
1 SOLUTION RESPIRATORY (INHALATION) 4 TIMES DAILY PRN
Status: SHIPPED
Start: 2024-01-01

## 2024-01-01 RX ORDER — DEXAMETHASONE 4 MG/1
4 TABLET ORAL
Status: DISCONTINUED | OUTPATIENT
Start: 2024-01-01 | End: 2024-01-01

## 2024-01-01 RX ORDER — PANTOPRAZOLE SODIUM 40 MG/1
40 TABLET, DELAYED RELEASE ORAL DAILY
Status: DISCONTINUED | OUTPATIENT
Start: 2024-01-01 | End: 2024-01-01 | Stop reason: HOSPADM

## 2024-01-01 RX ORDER — NOREPINEPHRINE BITARTRATE 0.02 MG/ML
.01-.6 INJECTION, SOLUTION INTRAVENOUS CONTINUOUS
Status: DISCONTINUED | OUTPATIENT
Start: 2024-01-01 | End: 2024-01-01

## 2024-01-01 RX ORDER — SENNA AND DOCUSATE SODIUM 50; 8.6 MG/1; MG/1
1 TABLET, FILM COATED ORAL AT BEDTIME
Status: SHIPPED
Start: 2024-01-01 | End: 2024-01-01

## 2024-01-01 RX ORDER — OXYMETAZOLINE HYDROCHLORIDE 0.05 G/100ML
2 SPRAY NASAL 2 TIMES DAILY
Status: DISCONTINUED | OUTPATIENT
Start: 2024-01-01 | End: 2024-01-01 | Stop reason: HOSPADM

## 2024-01-01 RX ORDER — ACETAMINOPHEN 325 MG/1
975 TABLET ORAL ONCE
Status: CANCELLED | OUTPATIENT
Start: 2024-01-01 | End: 2024-01-01

## 2024-01-01 RX ORDER — OXYCODONE HYDROCHLORIDE 5 MG/1
5 CAPSULE ORAL EVERY 4 HOURS PRN
Qty: 40 CAPSULE | Refills: 0 | Status: ON HOLD | OUTPATIENT
Start: 2024-01-01 | End: 2024-01-01

## 2024-01-01 RX ORDER — HYDROMORPHONE HYDROCHLORIDE 1 MG/ML
.3-.5 INJECTION, SOLUTION INTRAMUSCULAR; INTRAVENOUS; SUBCUTANEOUS
Status: DISCONTINUED | OUTPATIENT
Start: 2024-01-01 | End: 2024-01-01

## 2024-01-01 RX ORDER — MAGNESIUM HYDROXIDE 1200 MG/15ML
LIQUID ORAL PRN
Status: DISCONTINUED | OUTPATIENT
Start: 2024-01-01 | End: 2024-01-01 | Stop reason: HOSPADM

## 2024-01-01 RX ORDER — MORPHINE SULFATE 15 MG/1
TABLET, FILM COATED, EXTENDED RELEASE ORAL
Qty: 60 TABLET | Refills: 0 | Status: ON HOLD | OUTPATIENT
Start: 2024-01-01 | End: 2024-01-01

## 2024-01-01 RX ORDER — PROCHLORPERAZINE MALEATE 5 MG/1
5 TABLET ORAL EVERY 6 HOURS PRN
Status: DISCONTINUED | OUTPATIENT
Start: 2024-01-01 | End: 2024-01-01

## 2024-01-01 RX ORDER — MORPHINE SULFATE 15 MG/1
15 TABLET, FILM COATED, EXTENDED RELEASE ORAL AT BEDTIME
Status: DISCONTINUED | OUTPATIENT
Start: 2024-01-01 | End: 2024-01-01

## 2024-01-01 RX ORDER — OXYCODONE HYDROCHLORIDE 5 MG/1
5 CAPSULE ORAL EVERY 4 HOURS PRN
Qty: 120 CAPSULE | Refills: 0 | Status: ON HOLD | OUTPATIENT
Start: 2024-01-01 | End: 2024-01-01

## 2024-01-01 RX ORDER — OXYCODONE HYDROCHLORIDE 5 MG/1
5 TABLET ORAL EVERY 6 HOURS PRN
Qty: 6 TABLET | Refills: 0 | Status: SHIPPED | OUTPATIENT
Start: 2024-01-01 | End: 2024-01-01

## 2024-01-01 RX ORDER — OXYCODONE HYDROCHLORIDE 5 MG/1
5-10 TABLET ORAL EVERY 4 HOURS PRN
Status: DISCONTINUED | OUTPATIENT
Start: 2024-01-01 | End: 2024-01-01

## 2024-01-01 RX ORDER — LABETALOL HYDROCHLORIDE 5 MG/ML
5 INJECTION, SOLUTION INTRAVENOUS
Status: DISCONTINUED | OUTPATIENT
Start: 2024-01-01 | End: 2024-01-01 | Stop reason: HOSPADM

## 2024-01-01 RX ORDER — LORAZEPAM 2 MG/ML
1 INJECTION INTRAMUSCULAR
Status: DISCONTINUED | OUTPATIENT
Start: 2024-01-01 | End: 2024-01-01 | Stop reason: HOSPADM

## 2024-01-01 RX ORDER — ACETAMINOPHEN 650 MG/20.3ML
650 LIQUID ORAL EVERY 4 HOURS PRN
Status: CANCELLED | OUTPATIENT
Start: 2024-01-01

## 2024-01-01 RX ORDER — DEXAMETHASONE SODIUM PHOSPHATE 4 MG/ML
10 INJECTION, SOLUTION INTRA-ARTICULAR; INTRALESIONAL; INTRAMUSCULAR; INTRAVENOUS; SOFT TISSUE EVERY 6 HOURS
Status: DISCONTINUED | OUTPATIENT
Start: 2024-01-01 | End: 2024-01-01

## 2024-01-01 RX ORDER — CLINDAMYCIN PHOSPHATE 900 MG/50ML
900 INJECTION, SOLUTION INTRAVENOUS ONCE
Status: COMPLETED | OUTPATIENT
Start: 2024-01-01 | End: 2024-01-01

## 2024-01-01 RX ORDER — FEXOFENADINE HCL 180 MG/1
180 TABLET ORAL DAILY
Status: DISCONTINUED | OUTPATIENT
Start: 2024-01-01 | End: 2024-01-01

## 2024-01-01 RX ORDER — METOPROLOL SUCCINATE 100 MG/1
1 TABLET, EXTENDED RELEASE ORAL EVERY MORNING
Status: ON HOLD | COMMUNITY
Start: 2024-01-01 | End: 2024-01-01

## 2024-01-01 RX ORDER — OXYCODONE HCL 5 MG/5 ML
SOLUTION, ORAL ORAL
Status: SHIPPED | DISCHARGE
Start: 2024-01-01 | End: 2024-01-01

## 2024-01-01 RX ORDER — HEPARIN SODIUM 1000 [USP'U]/ML
INJECTION, SOLUTION INTRAVENOUS; SUBCUTANEOUS PRN
Status: DISCONTINUED | OUTPATIENT
Start: 2024-01-01 | End: 2024-01-01 | Stop reason: HOSPADM

## 2024-01-01 RX ORDER — ACETAMINOPHEN 325 MG/10.15ML
650 LIQUID ORAL EVERY 4 HOURS PRN
Status: DISCONTINUED | OUTPATIENT
Start: 2024-01-01 | End: 2024-01-01 | Stop reason: HOSPADM

## 2024-01-01 RX ORDER — LOSARTAN POTASSIUM 100 MG/1
100 TABLET ORAL EVERY MORNING
Status: ON HOLD | COMMUNITY
Start: 2024-01-01 | End: 2024-01-01

## 2024-01-01 RX ORDER — VANCOMYCIN HYDROCHLORIDE 1 G/200ML
1000 INJECTION, SOLUTION INTRAVENOUS EVERY 12 HOURS
Status: DISCONTINUED | OUTPATIENT
Start: 2024-01-01 | End: 2024-01-01 | Stop reason: DRUGHIGH

## 2024-01-01 RX ORDER — OXYCODONE HYDROCHLORIDE 5 MG/1
10 TABLET ORAL
Status: DISCONTINUED | OUTPATIENT
Start: 2024-01-01 | End: 2024-01-01 | Stop reason: HOSPADM

## 2024-01-01 RX ORDER — ACETAMINOPHEN 325 MG/1
650 TABLET ORAL
Status: DISCONTINUED | OUTPATIENT
Start: 2024-01-01 | End: 2024-01-01 | Stop reason: HOSPADM

## 2024-01-01 RX ORDER — HYDROMORPHONE HYDROCHLORIDE 1 MG/ML
.3-.5 INJECTION, SOLUTION INTRAMUSCULAR; INTRAVENOUS; SUBCUTANEOUS
Status: CANCELLED | OUTPATIENT
Start: 2024-01-01

## 2024-01-01 RX ORDER — HYDROMORPHONE HYDROCHLORIDE 1 MG/ML
0.5 INJECTION, SOLUTION INTRAMUSCULAR; INTRAVENOUS; SUBCUTANEOUS
Status: DISCONTINUED | OUTPATIENT
Start: 2024-01-01 | End: 2024-01-01 | Stop reason: HOSPADM

## 2024-01-01 RX ORDER — METHOCARBAMOL 500 MG/1
500 TABLET, FILM COATED ORAL EVERY 6 HOURS PRN
Status: DISCONTINUED | OUTPATIENT
Start: 2024-01-01 | End: 2024-01-01 | Stop reason: HOSPADM

## 2024-01-01 RX ORDER — AMOXICILLIN 250 MG
1 CAPSULE ORAL 2 TIMES DAILY
Qty: 14 TABLET | Refills: 0 | Status: SHIPPED | OUTPATIENT
Start: 2024-01-01 | End: 2024-01-01

## 2024-01-01 RX ORDER — ACETAMINOPHEN 325 MG/1
975 TABLET ORAL EVERY 6 HOURS PRN
Status: DISCONTINUED | OUTPATIENT
Start: 2024-01-01 | End: 2024-01-01 | Stop reason: HOSPADM

## 2024-01-01 RX ORDER — PHENOL 1.4 %
10 AEROSOL, SPRAY (ML) MUCOUS MEMBRANE AT BEDTIME
COMMUNITY

## 2024-01-01 RX ORDER — DILTIAZEM HYDROCHLORIDE 120 MG/1
240 CAPSULE, COATED, EXTENDED RELEASE ORAL DAILY
Status: DISCONTINUED | OUTPATIENT
Start: 2024-01-01 | End: 2024-01-01 | Stop reason: HOSPADM

## 2024-01-01 RX ORDER — HYDROMORPHONE HCL IN WATER/PF 6 MG/30 ML
0.2 PATIENT CONTROLLED ANALGESIA SYRINGE INTRAVENOUS
Status: DISCONTINUED | OUTPATIENT
Start: 2024-01-01 | End: 2024-01-01 | Stop reason: HOSPADM

## 2024-01-01 RX ADMIN — DEXAMETHASONE 4 MG: 4 TABLET ORAL at 08:19

## 2024-01-01 RX ADMIN — CARBOPLATIN 750 MG: 10 INJECTION, SOLUTION INTRAVENOUS at 10:50

## 2024-01-01 RX ADMIN — LIDOCAINE 1 PATCH: 4 PATCH TOPICAL at 21:53

## 2024-01-01 RX ADMIN — Medication 5 ML: at 14:46

## 2024-01-01 RX ADMIN — POLYETHYLENE GLYCOL 3350 17 G: 17 POWDER, FOR SOLUTION ORAL at 08:12

## 2024-01-01 RX ADMIN — FILGRASTIM-AAFI 480 MCG: 480 INJECTION, SOLUTION SUBCUTANEOUS at 20:39

## 2024-01-01 RX ADMIN — MIDAZOLAM 2 MG: 1 INJECTION INTRAMUSCULAR; INTRAVENOUS at 11:52

## 2024-01-01 RX ADMIN — ETOPOSIDE 240 MG: 20 INJECTION INTRAVENOUS at 13:52

## 2024-01-01 RX ADMIN — OXYCODONE HYDROCHLORIDE 5 MG: 5 TABLET ORAL at 23:17

## 2024-01-01 RX ADMIN — Medication 2 G: at 12:07

## 2024-01-01 RX ADMIN — THIAMINE HYDROCHLORIDE 100 MG: 100 INJECTION, SOLUTION INTRAMUSCULAR; INTRAVENOUS at 00:46

## 2024-01-01 RX ADMIN — CLINDAMYCIN PHOSPHATE 900 MG: 900 INJECTION, SOLUTION INTRAVENOUS at 23:19

## 2024-01-01 RX ADMIN — PIPERACILLIN AND TAZOBACTAM 3.38 G: 3; .375 INJECTION, POWDER, LYOPHILIZED, FOR SOLUTION INTRAVENOUS at 10:20

## 2024-01-01 RX ADMIN — AMOXICILLIN AND CLAVULANATE POTASSIUM 1 TABLET: 875; 125 TABLET, COATED ORAL at 08:28

## 2024-01-01 RX ADMIN — ACETAMINOPHEN 975 MG: 325 TABLET, FILM COATED ORAL at 11:38

## 2024-01-01 RX ADMIN — OXYCODONE HYDROCHLORIDE 5 MG: 5 TABLET ORAL at 12:55

## 2024-01-01 RX ADMIN — DEXAMETHASONE SODIUM PHOSPHATE 12 MG: 10 INJECTION, SOLUTION INTRAMUSCULAR; INTRAVENOUS at 13:20

## 2024-01-01 RX ADMIN — SODIUM CHLORIDE 250 ML: 9 INJECTION, SOLUTION INTRAVENOUS at 09:38

## 2024-01-01 RX ADMIN — OXYCODONE HYDROCHLORIDE 10 MG: 100 SOLUTION ORAL at 17:20

## 2024-01-01 RX ADMIN — VECURONIUM BROMIDE 6 MG: 1 INJECTION, POWDER, LYOPHILIZED, FOR SOLUTION INTRAVENOUS at 12:01

## 2024-01-01 RX ADMIN — FAMOTIDINE 20 MG: 10 INJECTION, SOLUTION INTRAVENOUS at 10:52

## 2024-01-01 RX ADMIN — AMIODARONE HYDROCHLORIDE 400 MG: 200 TABLET ORAL at 08:01

## 2024-01-01 RX ADMIN — DEXAMETHASONE SODIUM PHOSPHATE: 10 INJECTION, SOLUTION INTRAMUSCULAR; INTRAVENOUS at 08:45

## 2024-01-01 RX ADMIN — Medication 5 ML: at 09:46

## 2024-01-01 RX ADMIN — DEXAMETHASONE 4 MG: 4 TABLET ORAL at 08:01

## 2024-01-01 RX ADMIN — CEFEPIME 2 G: 2 INJECTION, POWDER, FOR SOLUTION INTRAVENOUS at 01:52

## 2024-01-01 RX ADMIN — OXYCODONE HYDROCHLORIDE 10 MG: 100 SOLUTION ORAL at 07:05

## 2024-01-01 RX ADMIN — METOPROLOL TARTRATE 50 MG: 50 TABLET, FILM COATED ORAL at 09:46

## 2024-01-01 RX ADMIN — PHENYLEPHRINE HYDROCHLORIDE 100 MCG: 10 INJECTION INTRAVENOUS at 11:33

## 2024-01-01 RX ADMIN — CEFEPIME 2 G: 2 INJECTION, POWDER, FOR SOLUTION INTRAVENOUS at 02:21

## 2024-01-01 RX ADMIN — PALONOSETRON 0.25 MG: 0.05 INJECTION, SOLUTION INTRAVENOUS at 08:42

## 2024-01-01 RX ADMIN — PHENYLEPHRINE HYDROCHLORIDE 0.4 MCG/KG/MIN: 10 INJECTION INTRAVENOUS at 12:11

## 2024-01-01 RX ADMIN — MAGNESIUM OXIDE TAB 400 MG (241.3 MG ELEMENTAL MG) 400 MG: 400 (241.3 MG) TAB at 11:23

## 2024-01-01 RX ADMIN — OXYCODONE HYDROCHLORIDE 10 MG: 100 SOLUTION ORAL at 23:11

## 2024-01-01 RX ADMIN — SODIUM CHLORIDE 250 ML: 9 INJECTION, SOLUTION INTRAVENOUS at 13:34

## 2024-01-01 RX ADMIN — SODIUM CHLORIDE: 9 INJECTION, SOLUTION INTRAVENOUS at 12:55

## 2024-01-01 RX ADMIN — OXYCODONE HYDROCHLORIDE 10 MG: 100 SOLUTION ORAL at 14:24

## 2024-01-01 RX ADMIN — DEXAMETHASONE SODIUM PHOSPHATE 10 MG: 4 INJECTION, SOLUTION INTRA-ARTICULAR; INTRALESIONAL; INTRAMUSCULAR; INTRAVENOUS; SOFT TISSUE at 12:32

## 2024-01-01 RX ADMIN — CEFEPIME 2 G: 2 INJECTION, POWDER, FOR SOLUTION INTRAVENOUS at 19:26

## 2024-01-01 RX ADMIN — HYDROMORPHONE HYDROCHLORIDE 0.5 MG: 1 INJECTION, SOLUTION INTRAMUSCULAR; INTRAVENOUS; SUBCUTANEOUS at 05:36

## 2024-01-01 RX ADMIN — PANTOPRAZOLE SODIUM 40 MG: 40 TABLET, DELAYED RELEASE ORAL at 09:45

## 2024-01-01 RX ADMIN — HYDROMORPHONE HYDROCHLORIDE 0.5 MG: 1 INJECTION, SOLUTION INTRAMUSCULAR; INTRAVENOUS; SUBCUTANEOUS at 18:25

## 2024-01-01 RX ADMIN — POLYETHYLENE GLYCOL 3350 17 G: 17 POWDER, FOR SOLUTION ORAL at 19:31

## 2024-01-01 RX ADMIN — ACETAMINOPHEN 1000 MG: 500 TABLET, FILM COATED ORAL at 17:44

## 2024-01-01 RX ADMIN — SODIUM CHLORIDE: 9 INJECTION, SOLUTION INTRAVENOUS at 23:31

## 2024-01-01 RX ADMIN — POLYETHYLENE GLYCOL 3350 17 G: 17 POWDER, FOR SOLUTION ORAL at 19:44

## 2024-01-01 RX ADMIN — Medication 10 MG: at 12:18

## 2024-01-01 RX ADMIN — MORPHINE SULFATE 15 MG: 15 TABLET, EXTENDED RELEASE ORAL at 17:35

## 2024-01-01 RX ADMIN — SENNOSIDES AND DOCUSATE SODIUM 1 TABLET: 50; 8.6 TABLET ORAL at 21:04

## 2024-01-01 RX ADMIN — OXYCODONE HYDROCHLORIDE 5 MG: 5 TABLET ORAL at 04:45

## 2024-01-01 RX ADMIN — ATEZOLIZUMAB 1200 MG: 1200 INJECTION, SOLUTION INTRAVENOUS at 10:13

## 2024-01-01 RX ADMIN — FOSAPREPITANT: 150 INJECTION, POWDER, LYOPHILIZED, FOR SOLUTION INTRAVENOUS at 09:50

## 2024-01-01 RX ADMIN — MORPHINE SULFATE 15 MG: 15 TABLET, EXTENDED RELEASE ORAL at 17:21

## 2024-01-01 RX ADMIN — ALTEPLASE 2 MG: 2.2 INJECTION, POWDER, LYOPHILIZED, FOR SOLUTION INTRAVENOUS at 08:21

## 2024-01-01 RX ADMIN — HYDROMORPHONE HYDROCHLORIDE 0.5 MG: 1 INJECTION, SOLUTION INTRAMUSCULAR; INTRAVENOUS; SUBCUTANEOUS at 19:52

## 2024-01-01 RX ADMIN — Medication 2 G: at 12:21

## 2024-01-01 RX ADMIN — FAMOTIDINE 20 MG: 10 INJECTION, SOLUTION INTRAVENOUS at 13:07

## 2024-01-01 RX ADMIN — LIDOCAINE 1 PATCH: 4 PATCH TOPICAL at 19:59

## 2024-01-01 RX ADMIN — ACETAMINOPHEN 1000 MG: 500 TABLET ORAL at 08:20

## 2024-01-01 RX ADMIN — LIDOCAINE 1 PATCH: 4 PATCH TOPICAL at 20:58

## 2024-01-01 RX ADMIN — Medication 5 ML: at 14:05

## 2024-01-01 RX ADMIN — Medication 5 MG/HR: at 13:21

## 2024-01-01 RX ADMIN — HYDROMORPHONE HYDROCHLORIDE 0.5 MG: 1 INJECTION, SOLUTION INTRAMUSCULAR; INTRAVENOUS; SUBCUTANEOUS at 00:48

## 2024-01-01 RX ADMIN — OXYMETAZOLINE HYDROCHLORIDE 2 SPRAY: 0.05 SPRAY NASAL at 16:22

## 2024-01-01 RX ADMIN — ACETAMINOPHEN 975 MG: 325 TABLET, FILM COATED ORAL at 09:31

## 2024-01-01 RX ADMIN — HYDROMORPHONE HYDROCHLORIDE 0.5 MG: 1 INJECTION, SOLUTION INTRAMUSCULAR; INTRAVENOUS; SUBCUTANEOUS at 05:57

## 2024-01-01 RX ADMIN — PIPERACILLIN AND TAZOBACTAM 3.38 G: 3; .375 INJECTION, POWDER, FOR SOLUTION INTRAVENOUS at 14:39

## 2024-01-01 RX ADMIN — OXYCODONE HYDROCHLORIDE 10 MG: 5 TABLET ORAL at 11:26

## 2024-01-01 RX ADMIN — OXYMETAZOLINE HYDROCHLORIDE 2 SPRAY: 0.05 SPRAY NASAL at 17:14

## 2024-01-01 RX ADMIN — TRILACICLIB 570 MG: 300 INJECTION, POWDER, LYOPHILIZED, FOR SOLUTION INTRAVENOUS at 13:40

## 2024-01-01 RX ADMIN — ONDANSETRON 4 MG: 2 INJECTION INTRAMUSCULAR; INTRAVENOUS at 12:52

## 2024-01-01 RX ADMIN — ONDANSETRON 4 MG: 2 INJECTION INTRAMUSCULAR; INTRAVENOUS at 12:32

## 2024-01-01 RX ADMIN — Medication 5 ML: at 12:28

## 2024-01-01 RX ADMIN — OXYCODONE HYDROCHLORIDE 10 MG: 100 SOLUTION ORAL at 11:48

## 2024-01-01 RX ADMIN — SODIUM CHLORIDE 250 ML: 9 INJECTION, SOLUTION INTRAVENOUS at 11:53

## 2024-01-01 RX ADMIN — VANCOMYCIN HYDROCHLORIDE 1500 MG: 5 INJECTION, POWDER, LYOPHILIZED, FOR SOLUTION INTRAVENOUS at 14:30

## 2024-01-01 RX ADMIN — VANCOMYCIN HYDROCHLORIDE 1000 MG: 1 INJECTION, SOLUTION INTRAVENOUS at 09:05

## 2024-01-01 RX ADMIN — INSULIN ASPART 2 UNITS: 100 INJECTION, SOLUTION INTRAVENOUS; SUBCUTANEOUS at 00:51

## 2024-01-01 RX ADMIN — DEXAMETHASONE SODIUM PHOSPHATE: 10 INJECTION, SOLUTION INTRAMUSCULAR; INTRAVENOUS at 10:00

## 2024-01-01 RX ADMIN — MAGNESIUM OXIDE TAB 400 MG (241.3 MG ELEMENTAL MG) 400 MG: 400 (241.3 MG) TAB at 12:04

## 2024-01-01 RX ADMIN — VANCOMYCIN HYDROCHLORIDE 1000 MG: 1 INJECTION, SOLUTION INTRAVENOUS at 06:36

## 2024-01-01 RX ADMIN — FUROSEMIDE 40 MG: 40 TABLET ORAL at 08:01

## 2024-01-01 RX ADMIN — ACETAMINOPHEN 1000 MG: 500 TABLET, FILM COATED ORAL at 12:13

## 2024-01-01 RX ADMIN — CEFTRIAXONE SODIUM 1 G: 1 INJECTION, POWDER, FOR SOLUTION INTRAMUSCULAR; INTRAVENOUS at 17:05

## 2024-01-01 RX ADMIN — HYDROMORPHONE HYDROCHLORIDE 0.5 MG: 1 INJECTION, SOLUTION INTRAMUSCULAR; INTRAVENOUS; SUBCUTANEOUS at 14:53

## 2024-01-01 RX ADMIN — HYDROMORPHONE HYDROCHLORIDE 0.5 MG: 1 INJECTION, SOLUTION INTRAMUSCULAR; INTRAVENOUS; SUBCUTANEOUS at 20:49

## 2024-01-01 RX ADMIN — DOCUSATE SODIUM 100 MG: 100 CAPSULE, LIQUID FILLED ORAL at 21:57

## 2024-01-01 RX ADMIN — DOCUSATE SODIUM 100 MG: 100 CAPSULE, LIQUID FILLED ORAL at 09:40

## 2024-01-01 RX ADMIN — Medication 5 ML: at 12:23

## 2024-01-01 RX ADMIN — SODIUM CHLORIDE 500 ML: 9 INJECTION, SOLUTION INTRAVENOUS at 18:30

## 2024-01-01 RX ADMIN — SODIUM CHLORIDE 500 ML: 9 INJECTION, SOLUTION INTRAVENOUS at 15:28

## 2024-01-01 RX ADMIN — AMOXICILLIN AND CLAVULANATE POTASSIUM 1 TABLET: 875; 125 TABLET, COATED ORAL at 20:41

## 2024-01-01 RX ADMIN — FENTANYL CITRATE 50 MCG: 50 INJECTION INTRAMUSCULAR; INTRAVENOUS at 12:01

## 2024-01-01 RX ADMIN — MORPHINE SULFATE 30 MG: 15 TABLET, EXTENDED RELEASE ORAL at 08:16

## 2024-01-01 RX ADMIN — OXYCODONE HYDROCHLORIDE 5 MG: 5 TABLET ORAL at 21:04

## 2024-01-01 RX ADMIN — OXYCODONE HYDROCHLORIDE 10 MG: 100 SOLUTION ORAL at 16:20

## 2024-01-01 RX ADMIN — PHENYLEPHRINE HYDROCHLORIDE 100 MCG: 10 INJECTION INTRAVENOUS at 12:00

## 2024-01-01 RX ADMIN — PROPOFOL 150 MCG/KG/MIN: 10 INJECTION, EMULSION INTRAVENOUS at 11:24

## 2024-01-01 RX ADMIN — ONDANSETRON 4 MG: 4 TABLET, ORALLY DISINTEGRATING ORAL at 09:26

## 2024-01-01 RX ADMIN — ATEZOLIZUMAB 1200 MG: 1200 INJECTION, SOLUTION INTRAVENOUS at 10:22

## 2024-01-01 RX ADMIN — FAMOTIDINE 20 MG: 10 INJECTION, SOLUTION INTRAVENOUS at 09:43

## 2024-01-01 RX ADMIN — ALBUMIN (HUMAN): 12.5 SOLUTION INTRAVENOUS at 16:45

## 2024-01-01 RX ADMIN — SENNOSIDES AND DOCUSATE SODIUM 1 TABLET: 50; 8.6 TABLET ORAL at 09:46

## 2024-01-01 RX ADMIN — DIGOXIN 250 MCG: 0.25 INJECTION INTRAMUSCULAR; INTRAVENOUS at 20:39

## 2024-01-01 RX ADMIN — CEFEPIME 2 G: 2 INJECTION, POWDER, FOR SOLUTION INTRAVENOUS at 02:32

## 2024-01-01 RX ADMIN — SODIUM CHLORIDE: 9 INJECTION, SOLUTION INTRAVENOUS at 00:25

## 2024-01-01 RX ADMIN — OXYCODONE HYDROCHLORIDE 10 MG: 100 SOLUTION ORAL at 16:53

## 2024-01-01 RX ADMIN — HYDROMORPHONE HYDROCHLORIDE 0.5 MG: 1 INJECTION, SOLUTION INTRAMUSCULAR; INTRAVENOUS; SUBCUTANEOUS at 04:34

## 2024-01-01 RX ADMIN — FUROSEMIDE 40 MG: 40 TABLET ORAL at 08:11

## 2024-01-01 RX ADMIN — PROPOFOL 75 MCG/KG/MIN: 10 INJECTION, EMULSION INTRAVENOUS at 12:32

## 2024-01-01 RX ADMIN — SODIUM CHLORIDE 250 ML: 9 INJECTION, SOLUTION INTRAVENOUS at 10:33

## 2024-01-01 RX ADMIN — DEXTROSE MONOHYDRATE 50 ML: 25 INJECTION, SOLUTION INTRAVENOUS at 08:05

## 2024-01-01 RX ADMIN — AMOXICILLIN AND CLAVULANATE POTASSIUM 1 TABLET: 875; 125 TABLET, COATED ORAL at 08:08

## 2024-01-01 RX ADMIN — ACETAMINOPHEN 1000 MG: 500 TABLET, FILM COATED ORAL at 17:35

## 2024-01-01 RX ADMIN — FAMOTIDINE 20 MG: 10 INJECTION, SOLUTION INTRAVENOUS at 09:26

## 2024-01-01 RX ADMIN — ETOPOSIDE 240 MG: 20 INJECTION, SOLUTION INTRAVENOUS at 11:47

## 2024-01-01 RX ADMIN — ATEZOLIZUMAB 1200 MG: 1200 INJECTION, SOLUTION INTRAVENOUS at 09:11

## 2024-01-01 RX ADMIN — ACETAMINOPHEN 1000 MG: 500 TABLET, FILM COATED ORAL at 18:11

## 2024-01-01 RX ADMIN — DEXMEDETOMIDINE HYDROCHLORIDE 0.2 MCG/KG/HR: 400 INJECTION INTRAVENOUS at 07:28

## 2024-01-01 RX ADMIN — LIDOCAINE HYDROCHLORIDE 0.1 ML: 10 INJECTION, SOLUTION EPIDURAL; INFILTRATION; INTRACAUDAL; PERINEURAL at 11:49

## 2024-01-01 RX ADMIN — VECURONIUM BROMIDE 1 MG: 1 INJECTION, POWDER, LYOPHILIZED, FOR SOLUTION INTRAVENOUS at 15:00

## 2024-01-01 RX ADMIN — MAGNESIUM OXIDE TAB 400 MG (241.3 MG ELEMENTAL MG) 400 MG: 400 (241.3 MG) TAB at 08:18

## 2024-01-01 RX ADMIN — Medication 5 ML: at 14:22

## 2024-01-01 RX ADMIN — AMIODARONE HYDROCHLORIDE 400 MG: 200 TABLET ORAL at 20:28

## 2024-01-01 RX ADMIN — OXYCODONE HYDROCHLORIDE 5 MG: 5 TABLET ORAL at 12:48

## 2024-01-01 RX ADMIN — PHENYLEPHRINE HYDROCHLORIDE 200 MCG: 10 INJECTION INTRAVENOUS at 11:49

## 2024-01-01 RX ADMIN — PHENYLEPHRINE HYDROCHLORIDE 100 MCG: 10 INJECTION INTRAVENOUS at 15:48

## 2024-01-01 RX ADMIN — SODIUM CHLORIDE 1000 ML: 9 INJECTION, SOLUTION INTRAVENOUS at 09:07

## 2024-01-01 RX ADMIN — DEXAMETHASONE SODIUM PHOSPHATE 4 MG: 4 INJECTION, SOLUTION INTRA-ARTICULAR; INTRALESIONAL; INTRAMUSCULAR; INTRAVENOUS; SOFT TISSUE at 12:01

## 2024-01-01 RX ADMIN — SODIUM CHLORIDE 240 MG: 9 INJECTION, SOLUTION INTRAVENOUS at 14:10

## 2024-01-01 RX ADMIN — GADOBUTROL 11 ML: 604.72 INJECTION INTRAVENOUS at 14:08

## 2024-01-01 RX ADMIN — VANCOMYCIN HYDROCHLORIDE 1000 MG: 1 INJECTION, SOLUTION INTRAVENOUS at 01:31

## 2024-01-01 RX ADMIN — OXYCODONE HYDROCHLORIDE 5 MG: 5 TABLET ORAL at 05:49

## 2024-01-01 RX ADMIN — VANCOMYCIN HYDROCHLORIDE 1500 MG: 5 INJECTION, POWDER, LYOPHILIZED, FOR SOLUTION INTRAVENOUS at 02:22

## 2024-01-01 RX ADMIN — AMIODARONE HYDROCHLORIDE 0.5 MG/MIN: 50 INJECTION, SOLUTION INTRAVENOUS at 23:58

## 2024-01-01 RX ADMIN — SODIUM CHLORIDE 250 ML: 9 INJECTION, SOLUTION INTRAVENOUS at 13:06

## 2024-01-01 RX ADMIN — OXYCODONE HYDROCHLORIDE 10 MG: 100 SOLUTION ORAL at 03:14

## 2024-01-01 RX ADMIN — LIDOCAINE HYDROCHLORIDE 100 MG: 20 INJECTION, SOLUTION INFILTRATION; PERINEURAL at 12:32

## 2024-01-01 RX ADMIN — NOREPINEPHRINE BITARTRATE 0.03 MCG/KG/MIN: 0.02 INJECTION, SOLUTION INTRAVENOUS at 05:58

## 2024-01-01 RX ADMIN — AMIODARONE HYDROCHLORIDE 0.5 MG/MIN: 50 INJECTION, SOLUTION INTRAVENOUS at 23:52

## 2024-01-01 RX ADMIN — SODIUM CHLORIDE 1000 ML: 9 INJECTION, SOLUTION INTRAVENOUS at 16:34

## 2024-01-01 RX ADMIN — MORPHINE SULFATE 15 MG: 15 TABLET, EXTENDED RELEASE ORAL at 18:11

## 2024-01-01 RX ADMIN — LIDOCAINE 1 PATCH: 4 PATCH TOPICAL at 20:43

## 2024-01-01 RX ADMIN — POLYETHYLENE GLYCOL 3350 17 G: 17 POWDER, FOR SOLUTION ORAL at 09:46

## 2024-01-01 RX ADMIN — ENOXAPARIN SODIUM 40 MG: 40 INJECTION SUBCUTANEOUS at 11:27

## 2024-01-01 RX ADMIN — DEXAMETHASONE SODIUM PHOSPHATE 12 MG: 10 INJECTION, SOLUTION INTRAMUSCULAR; INTRAVENOUS at 11:58

## 2024-01-01 RX ADMIN — MORPHINE SULFATE 15 MG: 15 TABLET, EXTENDED RELEASE ORAL at 18:25

## 2024-01-01 RX ADMIN — ACETAMINOPHEN 1000 MG: 500 TABLET, FILM COATED ORAL at 08:01

## 2024-01-01 RX ADMIN — POLYETHYLENE GLYCOL 3350 17 G: 17 POWDER, FOR SOLUTION ORAL at 20:02

## 2024-01-01 RX ADMIN — IBUPROFEN 600 MG: 600 TABLET ORAL at 05:49

## 2024-01-01 RX ADMIN — HYDROMORPHONE HYDROCHLORIDE 0.5 MG: 1 INJECTION, SOLUTION INTRAMUSCULAR; INTRAVENOUS; SUBCUTANEOUS at 10:45

## 2024-01-01 RX ADMIN — ACETAMINOPHEN 975 MG: 325 TABLET, FILM COATED ORAL at 19:52

## 2024-01-01 RX ADMIN — ETOPOSIDE 240 MG: 20 INJECTION, SOLUTION INTRAVENOUS at 14:00

## 2024-01-01 RX ADMIN — CEFEPIME 2 G: 2 INJECTION, POWDER, FOR SOLUTION INTRAVENOUS at 17:36

## 2024-01-01 RX ADMIN — SENNOSIDES AND DOCUSATE SODIUM 1 TABLET: 50; 8.6 TABLET ORAL at 20:50

## 2024-01-01 RX ADMIN — PHENYLEPHRINE HYDROCHLORIDE 100 MCG: 10 INJECTION INTRAVENOUS at 11:36

## 2024-01-01 RX ADMIN — NOREPINEPHRINE BITARTRATE 0.07 MCG/KG/MIN: 0.02 INJECTION, SOLUTION INTRAVENOUS at 18:36

## 2024-01-01 RX ADMIN — CLINDAMYCIN PHOSPHATE 900 MG: 900 INJECTION, SOLUTION INTRAVENOUS at 15:23

## 2024-01-01 RX ADMIN — ACETAMINOPHEN 1000 MG: 500 TABLET, FILM COATED ORAL at 11:58

## 2024-01-01 RX ADMIN — SODIUM CHLORIDE 100 ML: 9 INJECTION, SOLUTION INTRAVENOUS at 12:52

## 2024-01-01 RX ADMIN — MORPHINE SULFATE 30 MG: 15 TABLET, EXTENDED RELEASE ORAL at 08:09

## 2024-01-01 RX ADMIN — OXYCODONE HYDROCHLORIDE 10 MG: 100 SOLUTION ORAL at 03:41

## 2024-01-01 RX ADMIN — OXYCODONE HYDROCHLORIDE 10 MG: 100 SOLUTION ORAL at 12:19

## 2024-01-01 RX ADMIN — FLUDEOXYGLUCOSE F 18 14.53 MILLICURIE: 200 INJECTION, SOLUTION INTRAVENOUS at 14:03

## 2024-01-01 RX ADMIN — ACETAMINOPHEN 975 MG: 325 TABLET, FILM COATED ORAL at 01:09

## 2024-01-01 RX ADMIN — ACETAMINOPHEN 1000 MG: 500 TABLET, FILM COATED ORAL at 17:24

## 2024-01-01 RX ADMIN — OXYCODONE HYDROCHLORIDE 5 MG: 5 TABLET ORAL at 23:53

## 2024-01-01 RX ADMIN — NOREPINEPHRINE BITARTRATE 0.03 MCG/KG/MIN: 0.02 INJECTION, SOLUTION INTRAVENOUS at 07:08

## 2024-01-01 RX ADMIN — AMOXICILLIN AND CLAVULANATE POTASSIUM 1 TABLET: 875; 125 TABLET, COATED ORAL at 19:58

## 2024-01-01 RX ADMIN — OXYCODONE HYDROCHLORIDE 5 MG: 5 TABLET ORAL at 21:13

## 2024-01-01 RX ADMIN — SODIUM CHLORIDE 500 ML: 9 INJECTION, SOLUTION INTRAVENOUS at 09:28

## 2024-01-01 RX ADMIN — LIDOCAINE HYDROCHLORIDE 80 MG: 20 INJECTION, SOLUTION INFILTRATION; PERINEURAL at 12:01

## 2024-01-01 RX ADMIN — IOPAMIDOL 100 ML: 755 INJECTION, SOLUTION INTRAVENOUS at 13:18

## 2024-01-01 RX ADMIN — ETOPOSIDE 240 MG: 20 INJECTION, SOLUTION INTRAVENOUS at 12:57

## 2024-01-01 RX ADMIN — DEXAMETHASONE SODIUM PHOSPHATE 10 MG: 4 INJECTION, SOLUTION INTRA-ARTICULAR; INTRALESIONAL; INTRAMUSCULAR; INTRAVENOUS; SOFT TISSUE at 11:36

## 2024-01-01 RX ADMIN — MORPHINE SULFATE 30 MG: 15 TABLET, EXTENDED RELEASE ORAL at 08:28

## 2024-01-01 RX ADMIN — Medication 0.5 UNITS: at 12:16

## 2024-01-01 RX ADMIN — RIVAROXABAN 20 MG: 10 TABLET, FILM COATED ORAL at 17:12

## 2024-01-01 RX ADMIN — LIDOCAINE 1 PATCH: 4 PATCH TOPICAL at 20:41

## 2024-01-01 RX ADMIN — ENOXAPARIN SODIUM 40 MG: 40 INJECTION SUBCUTANEOUS at 09:33

## 2024-01-01 RX ADMIN — OXYCODONE HYDROCHLORIDE 10 MG: 100 SOLUTION ORAL at 08:06

## 2024-01-01 RX ADMIN — AMIODARONE HYDROCHLORIDE 400 MG: 200 TABLET ORAL at 20:41

## 2024-01-01 RX ADMIN — SODIUM CHLORIDE, POTASSIUM CHLORIDE, SODIUM LACTATE AND CALCIUM CHLORIDE: 600; 310; 30; 20 INJECTION, SOLUTION INTRAVENOUS at 11:21

## 2024-01-01 RX ADMIN — Medication 5 ML: at 15:53

## 2024-01-01 RX ADMIN — OXYCODONE HYDROCHLORIDE 10 MG: 100 SOLUTION ORAL at 06:00

## 2024-01-01 RX ADMIN — Medication 5 ML: at 13:26

## 2024-01-01 RX ADMIN — SODIUM CHLORIDE 500 ML: 9 INJECTION, SOLUTION INTRAVENOUS at 05:27

## 2024-01-01 RX ADMIN — ACETAMINOPHEN 650 MG: 325 TABLET ORAL at 21:12

## 2024-01-01 RX ADMIN — ALBUMIN (HUMAN): 12.5 SOLUTION INTRAVENOUS at 15:50

## 2024-01-01 RX ADMIN — DEXAMETHASONE SODIUM PHOSPHATE 12 MG: 10 INJECTION, SOLUTION INTRAMUSCULAR; INTRAVENOUS at 13:34

## 2024-01-01 RX ADMIN — OXYCODONE HYDROCHLORIDE 10 MG: 100 SOLUTION ORAL at 23:51

## 2024-01-01 RX ADMIN — SODIUM CHLORIDE 250 ML: 9 INJECTION, SOLUTION INTRAVENOUS at 08:40

## 2024-01-01 RX ADMIN — LIDOCAINE HYDROCHLORIDE 2 ML: 10 INJECTION, SOLUTION EPIDURAL; INFILTRATION; INTRACAUDAL; PERINEURAL at 20:34

## 2024-01-01 RX ADMIN — FENTANYL CITRATE 50 MCG: 50 INJECTION INTRAMUSCULAR; INTRAVENOUS at 12:42

## 2024-01-01 RX ADMIN — VECURONIUM BROMIDE 1 MG: 1 INJECTION, POWDER, LYOPHILIZED, FOR SOLUTION INTRAVENOUS at 15:17

## 2024-01-01 RX ADMIN — SODIUM CHLORIDE, POTASSIUM CHLORIDE, SODIUM LACTATE AND CALCIUM CHLORIDE: 600; 310; 30; 20 INJECTION, SOLUTION INTRAVENOUS at 15:03

## 2024-01-01 RX ADMIN — MAGNESIUM SULFATE IN WATER 4 G: 40 INJECTION, SOLUTION INTRAVENOUS at 07:08

## 2024-01-01 RX ADMIN — OXYCODONE HYDROCHLORIDE 10 MG: 100 SOLUTION ORAL at 22:40

## 2024-01-01 RX ADMIN — AMOXICILLIN AND CLAVULANATE POTASSIUM 1 TABLET: 875; 125 TABLET, COATED ORAL at 08:03

## 2024-01-01 RX ADMIN — ACETAMINOPHEN 975 MG: 325 TABLET, FILM COATED ORAL at 06:49

## 2024-01-01 RX ADMIN — OXYCODONE HYDROCHLORIDE 10 MG: 100 SOLUTION ORAL at 22:33

## 2024-01-01 RX ADMIN — OXYCODONE HYDROCHLORIDE 10 MG: 100 SOLUTION ORAL at 23:44

## 2024-01-01 RX ADMIN — PHENYLEPHRINE HYDROCHLORIDE 100 MCG: 10 INJECTION INTRAVENOUS at 16:24

## 2024-01-01 RX ADMIN — TRILACICLIB 570 MG: 300 INJECTION, POWDER, LYOPHILIZED, FOR SOLUTION INTRAVENOUS at 10:18

## 2024-01-01 RX ADMIN — DEXAMETHASONE SODIUM PHOSPHATE 12 MG: 10 INJECTION, SOLUTION INTRAMUSCULAR; INTRAVENOUS at 13:04

## 2024-01-01 RX ADMIN — PROPOFOL 200 MG: 10 INJECTION, EMULSION INTRAVENOUS at 11:22

## 2024-01-01 RX ADMIN — LORAZEPAM 0.5 MG: 2 INJECTION INTRAMUSCULAR; INTRAVENOUS at 16:34

## 2024-01-01 RX ADMIN — DEXAMETHASONE 4 MG: 4 TABLET ORAL at 08:16

## 2024-01-01 RX ADMIN — HYDROMORPHONE HYDROCHLORIDE 0.5 MG: 1 INJECTION, SOLUTION INTRAMUSCULAR; INTRAVENOUS; SUBCUTANEOUS at 20:02

## 2024-01-01 RX ADMIN — OXYCODONE HYDROCHLORIDE 10 MG: 100 SOLUTION ORAL at 17:37

## 2024-01-01 RX ADMIN — SENNOSIDES AND DOCUSATE SODIUM 2 TABLET: 50; 8.6 TABLET ORAL at 08:21

## 2024-01-01 RX ADMIN — SODIUM CHLORIDE, POTASSIUM CHLORIDE, SODIUM LACTATE AND CALCIUM CHLORIDE: 600; 310; 30; 20 INJECTION, SOLUTION INTRAVENOUS at 09:37

## 2024-01-01 RX ADMIN — LIDOCAINE 1 PATCH: 4 PATCH TOPICAL at 20:29

## 2024-01-01 RX ADMIN — ONDANSETRON 4 MG: 2 INJECTION INTRAMUSCULAR; INTRAVENOUS at 16:53

## 2024-01-01 RX ADMIN — DEXAMETHASONE 4 MG: 4 TABLET ORAL at 19:04

## 2024-01-01 RX ADMIN — HYDROMORPHONE HYDROCHLORIDE 0.3 MG: 1 INJECTION, SOLUTION INTRAMUSCULAR; INTRAVENOUS; SUBCUTANEOUS at 12:44

## 2024-01-01 RX ADMIN — ACETAMINOPHEN 975 MG: 325 TABLET, FILM COATED ORAL at 09:42

## 2024-01-01 RX ADMIN — OXYCODONE HYDROCHLORIDE 5 MG: 100 SOLUTION ORAL at 03:58

## 2024-01-01 RX ADMIN — DEXAMETHASONE 4 MG: 4 TABLET ORAL at 09:27

## 2024-01-01 RX ADMIN — MINERAL OIL AND PETROLATUM 1 INCH: 150; 830 OINTMENT OPHTHALMIC at 12:03

## 2024-01-01 RX ADMIN — SODIUM CHLORIDE, POTASSIUM CHLORIDE, SODIUM LACTATE AND CALCIUM CHLORIDE: 600; 310; 30; 20 INJECTION, SOLUTION INTRAVENOUS at 12:53

## 2024-01-01 RX ADMIN — Medication 5 ML: at 10:51

## 2024-01-01 RX ADMIN — OXYCODONE HYDROCHLORIDE 10 MG: 100 SOLUTION ORAL at 06:59

## 2024-01-01 RX ADMIN — AMOXICILLIN AND CLAVULANATE POTASSIUM 1 TABLET: 875; 125 TABLET, COATED ORAL at 20:28

## 2024-01-01 RX ADMIN — DILTIAZEM HYDROCHLORIDE 360 MG: 60 TABLET ORAL at 13:52

## 2024-01-01 RX ADMIN — DILTIAZEM HYDROCHLORIDE 20 MG: 5 INJECTION, SOLUTION INTRAVENOUS at 12:58

## 2024-01-01 RX ADMIN — OXYCODONE HYDROCHLORIDE 10 MG: 100 SOLUTION ORAL at 22:03

## 2024-01-01 RX ADMIN — VANCOMYCIN HYDROCHLORIDE 1750 MG: 500 INJECTION, POWDER, LYOPHILIZED, FOR SOLUTION INTRAVENOUS at 15:58

## 2024-01-01 RX ADMIN — PHENYLEPHRINE HYDROCHLORIDE 200 MCG: 10 INJECTION INTRAVENOUS at 11:55

## 2024-01-01 RX ADMIN — OXYCODONE HYDROCHLORIDE 10 MG: 100 SOLUTION ORAL at 12:13

## 2024-01-01 RX ADMIN — OXYCODONE HYDROCHLORIDE 5 MG: 5 TABLET ORAL at 13:57

## 2024-01-01 RX ADMIN — FAMOTIDINE 20 MG: 10 INJECTION, SOLUTION INTRAVENOUS at 13:34

## 2024-01-01 RX ADMIN — HYDROMORPHONE HYDROCHLORIDE 0.5 MG: 1 INJECTION, SOLUTION INTRAMUSCULAR; INTRAVENOUS; SUBCUTANEOUS at 08:12

## 2024-01-01 RX ADMIN — OXYCODONE HYDROCHLORIDE 10 MG: 100 SOLUTION ORAL at 18:19

## 2024-01-01 RX ADMIN — Medication 5 ML: at 12:17

## 2024-01-01 RX ADMIN — ETOPOSIDE 240 MG: 20 INJECTION INTRAVENOUS at 11:44

## 2024-01-01 RX ADMIN — ETOPOSIDE 240 MG: 20 INJECTION, SOLUTION INTRAVENOUS at 13:22

## 2024-01-01 RX ADMIN — POLYETHYLENE GLYCOL 3350 17 G: 17 POWDER, FOR SOLUTION ORAL at 08:07

## 2024-01-01 RX ADMIN — VECURONIUM BROMIDE 1 MG: 1 INJECTION, POWDER, LYOPHILIZED, FOR SOLUTION INTRAVENOUS at 16:21

## 2024-01-01 RX ADMIN — DEXAMETHASONE SODIUM PHOSPHATE 10 MG: 10 INJECTION, SOLUTION INTRAMUSCULAR; INTRAVENOUS at 14:37

## 2024-01-01 RX ADMIN — DILTIAZEM HYDROCHLORIDE 240 MG: 120 CAPSULE, EXTENDED RELEASE ORAL at 14:04

## 2024-01-01 RX ADMIN — PIPERACILLIN AND TAZOBACTAM 3.38 G: 3; .375 INJECTION, POWDER, LYOPHILIZED, FOR SOLUTION INTRAVENOUS at 22:09

## 2024-01-01 RX ADMIN — ACETAMINOPHEN 1000 MG: 500 TABLET, FILM COATED ORAL at 08:15

## 2024-01-01 RX ADMIN — AMIODARONE HYDROCHLORIDE 400 MG: 200 TABLET ORAL at 08:10

## 2024-01-01 RX ADMIN — OXYCODONE HYDROCHLORIDE 10 MG: 100 SOLUTION ORAL at 23:16

## 2024-01-01 RX ADMIN — ACETAMINOPHEN 975 MG: 325 TABLET, FILM COATED ORAL at 19:41

## 2024-01-01 RX ADMIN — DEXAMETHASONE 4 MG: 4 TABLET ORAL at 08:08

## 2024-01-01 RX ADMIN — ACETAMINOPHEN 1000 MG: 500 TABLET, FILM COATED ORAL at 08:27

## 2024-01-01 RX ADMIN — VANCOMYCIN HYDROCHLORIDE 1000 MG: 1 INJECTION, SOLUTION INTRAVENOUS at 05:54

## 2024-01-01 RX ADMIN — Medication 5 ML: at 15:48

## 2024-01-01 RX ADMIN — DIGOXIN 125 MCG: 125 TABLET ORAL at 08:31

## 2024-01-01 RX ADMIN — HYDROMORPHONE HYDROCHLORIDE 0.5 MG: 1 INJECTION, SOLUTION INTRAMUSCULAR; INTRAVENOUS; SUBCUTANEOUS at 16:56

## 2024-01-01 RX ADMIN — DOCUSATE SODIUM 100 MG: 100 CAPSULE, LIQUID FILLED ORAL at 08:22

## 2024-01-01 RX ADMIN — MORPHINE SULFATE 15 MG: 15 TABLET, EXTENDED RELEASE ORAL at 17:44

## 2024-01-01 RX ADMIN — VECURONIUM BROMIDE 4 MG: 1 INJECTION, POWDER, LYOPHILIZED, FOR SOLUTION INTRAVENOUS at 12:51

## 2024-01-01 RX ADMIN — ETOPOSIDE 240 MG: 20 INJECTION INTRAVENOUS at 10:53

## 2024-01-01 RX ADMIN — DEXAMETHASONE 4 MG: 4 TABLET ORAL at 08:50

## 2024-01-01 RX ADMIN — HEPARIN, PORCINE (PF) 10 UNIT/ML INTRAVENOUS SYRINGE 5 ML: at 20:46

## 2024-01-01 RX ADMIN — DEXMEDETOMIDINE HYDROCHLORIDE 8 MCG: 200 INJECTION INTRAVENOUS at 13:20

## 2024-01-01 RX ADMIN — OXYCODONE HYDROCHLORIDE 5 MG: 5 TABLET ORAL at 16:03

## 2024-01-01 RX ADMIN — HYDROMORPHONE HYDROCHLORIDE 0.5 MG: 1 INJECTION, SOLUTION INTRAMUSCULAR; INTRAVENOUS; SUBCUTANEOUS at 01:34

## 2024-01-01 RX ADMIN — HYDROMORPHONE HYDROCHLORIDE 0.5 MG: 1 INJECTION, SOLUTION INTRAMUSCULAR; INTRAVENOUS; SUBCUTANEOUS at 13:15

## 2024-01-01 RX ADMIN — ACETAMINOPHEN 1000 MG: 500 TABLET, FILM COATED ORAL at 11:18

## 2024-01-01 RX ADMIN — DEXAMETHASONE 4 MG: 4 TABLET ORAL at 08:18

## 2024-01-01 RX ADMIN — CEFEPIME 2 G: 2 INJECTION, POWDER, FOR SOLUTION INTRAVENOUS at 10:27

## 2024-01-01 RX ADMIN — LOSARTAN POTASSIUM 100 MG: 50 TABLET, FILM COATED ORAL at 08:19

## 2024-01-01 RX ADMIN — ACETAMINOPHEN 1000 MG: 500 TABLET, FILM COATED ORAL at 08:17

## 2024-01-01 RX ADMIN — GADOBUTROL 10 ML: 604.72 INJECTION INTRAVENOUS at 10:40

## 2024-01-01 RX ADMIN — OXYCODONE HYDROCHLORIDE 10 MG: 100 SOLUTION ORAL at 09:30

## 2024-01-01 RX ADMIN — MAGNESIUM OXIDE TAB 400 MG (241.3 MG ELEMENTAL MG) 400 MG: 400 (241.3 MG) TAB at 08:31

## 2024-01-01 RX ADMIN — OXYCODONE HYDROCHLORIDE 10 MG: 100 SOLUTION ORAL at 11:08

## 2024-01-01 RX ADMIN — OXYCODONE HYDROCHLORIDE 10 MG: 100 SOLUTION ORAL at 23:45

## 2024-01-01 RX ADMIN — METHOCARBAMOL 500 MG: 500 TABLET ORAL at 01:09

## 2024-01-01 RX ADMIN — INSULIN ASPART 3 UNITS: 100 INJECTION, SOLUTION INTRAVENOUS; SUBCUTANEOUS at 03:52

## 2024-01-01 RX ADMIN — OXYCODONE HYDROCHLORIDE 10 MG: 100 SOLUTION ORAL at 17:27

## 2024-01-01 RX ADMIN — OXYCODONE HYDROCHLORIDE 10 MG: 100 SOLUTION ORAL at 07:09

## 2024-01-01 RX ADMIN — PHENYLEPHRINE HYDROCHLORIDE 200 MCG: 10 INJECTION INTRAVENOUS at 11:45

## 2024-01-01 RX ADMIN — AMOXICILLIN AND CLAVULANATE POTASSIUM 1 TABLET: 875; 125 TABLET, COATED ORAL at 19:41

## 2024-01-01 RX ADMIN — SODIUM CHLORIDE 250 ML: 9 INJECTION, SOLUTION INTRAVENOUS at 13:02

## 2024-01-01 RX ADMIN — KETAMINE HYDROCHLORIDE 50 MG: 10 INJECTION INTRAMUSCULAR; INTRAVENOUS at 12:32

## 2024-01-01 RX ADMIN — OXYCODONE HYDROCHLORIDE 10 MG: 100 SOLUTION ORAL at 22:01

## 2024-01-01 RX ADMIN — SODIUM CHLORIDE 1000 ML: 9 INJECTION, SOLUTION INTRAVENOUS at 15:22

## 2024-01-01 RX ADMIN — IOPAMIDOL 90 ML: 755 INJECTION, SOLUTION INTRAVENOUS at 20:15

## 2024-01-01 RX ADMIN — HYDROMORPHONE HYDROCHLORIDE 0.5 MG: 1 INJECTION, SOLUTION INTRAMUSCULAR; INTRAVENOUS; SUBCUTANEOUS at 00:22

## 2024-01-01 RX ADMIN — DIGOXIN 250 MCG: 0.25 INJECTION INTRAMUSCULAR; INTRAVENOUS at 15:18

## 2024-01-01 RX ADMIN — ACETAMINOPHEN 1000 MG: 500 TABLET, FILM COATED ORAL at 09:27

## 2024-01-01 RX ADMIN — POLYETHYLENE GLYCOL 3350 17 G: 17 POWDER, FOR SOLUTION ORAL at 19:53

## 2024-01-01 RX ADMIN — DIGOXIN 125 MCG: 125 TABLET ORAL at 08:02

## 2024-01-01 RX ADMIN — METOPROLOL SUCCINATE 100 MG: 100 TABLET, EXTENDED RELEASE ORAL at 08:19

## 2024-01-01 RX ADMIN — OXYCODONE HYDROCHLORIDE 5 MG: 5 TABLET ORAL at 09:40

## 2024-01-01 RX ADMIN — FAMOTIDINE 20 MG: 10 INJECTION, SOLUTION INTRAVENOUS at 11:53

## 2024-01-01 RX ADMIN — HYDROMORPHONE HYDROCHLORIDE 0.3 MG: 1 INJECTION, SOLUTION INTRAMUSCULAR; INTRAVENOUS; SUBCUTANEOUS at 21:51

## 2024-01-01 RX ADMIN — DIGOXIN 500 MCG: 0.25 INJECTION INTRAMUSCULAR; INTRAVENOUS at 09:27

## 2024-01-01 RX ADMIN — CARBOPLATIN 730 MG: 10 INJECTION, SOLUTION INTRAVENOUS at 10:55

## 2024-01-01 RX ADMIN — DIGOXIN 125 MCG: 125 TABLET ORAL at 08:01

## 2024-01-01 RX ADMIN — SODIUM CHLORIDE 250 ML: 9 INJECTION, SOLUTION INTRAVENOUS at 09:26

## 2024-01-01 RX ADMIN — OXYCODONE HYDROCHLORIDE 5 MG: 5 TABLET ORAL at 11:18

## 2024-01-01 RX ADMIN — OXYCODONE HYDROCHLORIDE 10 MG: 100 SOLUTION ORAL at 09:39

## 2024-01-01 RX ADMIN — ACETAMINOPHEN 650 MG: 325 TABLET ORAL at 20:37

## 2024-01-01 RX ADMIN — DEXAMETHASONE SODIUM PHOSPHATE 10 MG: 4 INJECTION, SOLUTION INTRAMUSCULAR; INTRAVENOUS at 23:14

## 2024-01-01 RX ADMIN — SODIUM CHLORIDE 500 ML: 9 INJECTION, SOLUTION INTRAVENOUS at 02:58

## 2024-01-01 RX ADMIN — PHENYLEPHRINE HYDROCHLORIDE 100 MCG: 10 INJECTION INTRAVENOUS at 12:07

## 2024-01-01 RX ADMIN — HYDROMORPHONE HYDROCHLORIDE 0.5 MG: 1 INJECTION, SOLUTION INTRAMUSCULAR; INTRAVENOUS; SUBCUTANEOUS at 09:22

## 2024-01-01 RX ADMIN — OXYCODONE HYDROCHLORIDE 10 MG: 100 SOLUTION ORAL at 12:36

## 2024-01-01 RX ADMIN — DIGOXIN 125 MCG: 125 TABLET ORAL at 08:18

## 2024-01-01 RX ADMIN — AMIODARONE HYDROCHLORIDE 1 MG/MIN: 50 INJECTION, SOLUTION INTRAVENOUS at 20:32

## 2024-01-01 RX ADMIN — OXYCODONE HYDROCHLORIDE 10 MG: 100 SOLUTION ORAL at 13:29

## 2024-01-01 RX ADMIN — DEXAMETHASONE 4 MG: 4 TABLET ORAL at 08:31

## 2024-01-01 RX ADMIN — IOPAMIDOL 90 ML: 755 INJECTION, SOLUTION INTRAVENOUS at 12:52

## 2024-01-01 RX ADMIN — DEXMEDETOMIDINE HYDROCHLORIDE 4 MCG: 200 INJECTION INTRAVENOUS at 16:53

## 2024-01-01 RX ADMIN — AMIODARONE HYDROCHLORIDE 0.5 MG/MIN: 50 INJECTION, SOLUTION INTRAVENOUS at 21:45

## 2024-01-01 RX ADMIN — OXYCODONE HYDROCHLORIDE 10 MG: 100 SOLUTION ORAL at 12:08

## 2024-01-01 RX ADMIN — OXYCODONE HYDROCHLORIDE 5 MG: 5 TABLET ORAL at 20:37

## 2024-01-01 RX ADMIN — SODIUM CHLORIDE 500 ML: 9 INJECTION, SOLUTION INTRAVENOUS at 02:35

## 2024-01-01 RX ADMIN — CELECOXIB 200 MG: 200 CAPSULE ORAL at 09:32

## 2024-01-01 RX ADMIN — DEXMEDETOMIDINE HYDROCHLORIDE 0.2 MCG/KG/HR: 400 INJECTION INTRAVENOUS at 23:01

## 2024-01-01 RX ADMIN — HEPARIN SODIUM (PORCINE) LOCK FLUSH IV SOLN 100 UNIT/ML 5 ML: 100 SOLUTION at 14:01

## 2024-01-01 RX ADMIN — Medication 10 MG/HR: at 13:56

## 2024-01-01 RX ADMIN — SODIUM CHLORIDE 72 ML: 9 INJECTION, SOLUTION INTRAVENOUS at 13:19

## 2024-01-01 RX ADMIN — FENTANYL CITRATE 100 MCG: 50 INJECTION INTRAMUSCULAR; INTRAVENOUS at 12:29

## 2024-01-01 RX ADMIN — PHENYLEPHRINE HYDROCHLORIDE 100 MCG: 10 INJECTION INTRAVENOUS at 12:38

## 2024-01-01 RX ADMIN — MORPHINE SULFATE 30 MG: 15 TABLET, EXTENDED RELEASE ORAL at 08:00

## 2024-01-01 RX ADMIN — OXYCODONE HYDROCHLORIDE 10 MG: 100 SOLUTION ORAL at 06:28

## 2024-01-01 RX ADMIN — ACETAMINOPHEN 975 MG: 325 TABLET, FILM COATED ORAL at 19:57

## 2024-01-01 RX ADMIN — HYDROMORPHONE HYDROCHLORIDE 0.5 MG: 1 INJECTION, SOLUTION INTRAMUSCULAR; INTRAVENOUS; SUBCUTANEOUS at 07:51

## 2024-01-01 RX ADMIN — OXYCODONE HYDROCHLORIDE 10 MG: 100 SOLUTION ORAL at 08:47

## 2024-01-01 RX ADMIN — MAGNESIUM SULFATE HEPTAHYDRATE 2 G: 40 INJECTION, SOLUTION INTRAVENOUS at 04:45

## 2024-01-01 RX ADMIN — MIDAZOLAM 2 MG: 1 INJECTION INTRAMUSCULAR; INTRAVENOUS at 12:29

## 2024-01-01 RX ADMIN — Medication 5 ML: at 15:31

## 2024-01-01 RX ADMIN — SENNOSIDES AND DOCUSATE SODIUM 1 TABLET: 50; 8.6 TABLET ORAL at 08:50

## 2024-01-01 RX ADMIN — LORAZEPAM 1 MG: 1 TABLET ORAL at 08:17

## 2024-01-01 RX ADMIN — PANTOPRAZOLE SODIUM 40 MG: 40 TABLET, DELAYED RELEASE ORAL at 19:57

## 2024-01-01 RX ADMIN — PHENYLEPHRINE HYDROCHLORIDE 200 MCG: 10 INJECTION INTRAVENOUS at 11:43

## 2024-01-01 RX ADMIN — ACETAMINOPHEN 1000 MG: 500 TABLET, FILM COATED ORAL at 14:47

## 2024-01-01 RX ADMIN — OXYCODONE HYDROCHLORIDE 10 MG: 100 SOLUTION ORAL at 16:24

## 2024-01-01 RX ADMIN — AMIODARONE HYDROCHLORIDE 400 MG: 200 TABLET ORAL at 19:58

## 2024-01-01 RX ADMIN — SODIUM CHLORIDE 1000 ML: 9 INJECTION, SOLUTION INTRAVENOUS at 13:29

## 2024-01-01 RX ADMIN — ACETAMINOPHEN 1000 MG: 500 TABLET, FILM COATED ORAL at 14:03

## 2024-01-01 RX ADMIN — ACETAMINOPHEN 1000 MG: 500 TABLET, FILM COATED ORAL at 12:04

## 2024-01-01 RX ADMIN — VECURONIUM BROMIDE 3 MG: 1 INJECTION, POWDER, LYOPHILIZED, FOR SOLUTION INTRAVENOUS at 14:03

## 2024-01-01 RX ADMIN — Medication 10 MG/HR: at 01:34

## 2024-01-01 RX ADMIN — Medication 2 G: at 16:07

## 2024-01-01 RX ADMIN — HYDROMORPHONE HYDROCHLORIDE 0.5 MG: 1 INJECTION, SOLUTION INTRAMUSCULAR; INTRAVENOUS; SUBCUTANEOUS at 13:26

## 2024-01-01 RX ADMIN — PALONOSETRON HYDROCHLORIDE 0.25 MG: 0.25 INJECTION INTRAVENOUS at 09:44

## 2024-01-01 RX ADMIN — ALTEPLASE 2 MG: 2.2 INJECTION, POWDER, LYOPHILIZED, FOR SOLUTION INTRAVENOUS at 12:53

## 2024-01-01 RX ADMIN — Medication 50 MG: at 11:24

## 2024-01-01 RX ADMIN — OXYCODONE HYDROCHLORIDE 10 MG: 100 SOLUTION ORAL at 06:14

## 2024-01-01 RX ADMIN — MAGNESIUM SULFATE HEPTAHYDRATE 4 G: 4 INJECTION, SOLUTION INTRAVENOUS at 11:52

## 2024-01-01 RX ADMIN — AMIODARONE HYDROCHLORIDE 150 MG: 1.5 INJECTION, SOLUTION INTRAVENOUS at 18:29

## 2024-01-01 RX ADMIN — HYDROMORPHONE HYDROCHLORIDE 0.5 MG: 1 INJECTION, SOLUTION INTRAMUSCULAR; INTRAVENOUS; SUBCUTANEOUS at 11:01

## 2024-01-01 RX ADMIN — VANCOMYCIN HYDROCHLORIDE 1500 MG: 5 INJECTION, POWDER, LYOPHILIZED, FOR SOLUTION INTRAVENOUS at 20:25

## 2024-01-01 RX ADMIN — MORPHINE SULFATE 30 MG: 15 TABLET, EXTENDED RELEASE ORAL at 08:17

## 2024-01-01 RX ADMIN — GADOBUTROL 10 ML: 604.72 INJECTION INTRAVENOUS at 07:31

## 2024-01-01 RX ADMIN — OXYCODONE HYDROCHLORIDE 10 MG: 5 TABLET ORAL at 16:08

## 2024-01-01 RX ADMIN — PALONOSETRON HYDROCHLORIDE 0.25 MG: 0.25 INJECTION INTRAVENOUS at 09:28

## 2024-01-01 RX ADMIN — GABAPENTIN 100 MG: 100 CAPSULE ORAL at 09:32

## 2024-01-01 RX ADMIN — MORPHINE SULFATE 30 MG: 15 TABLET, EXTENDED RELEASE ORAL at 08:03

## 2024-01-01 RX ADMIN — PHENYLEPHRINE HYDROCHLORIDE 200 MCG: 10 INJECTION INTRAVENOUS at 16:26

## 2024-01-01 RX ADMIN — IPRATROPIUM BROMIDE AND ALBUTEROL SULFATE 3 ML: 2.5; .5 SOLUTION RESPIRATORY (INHALATION) at 14:47

## 2024-01-01 RX ADMIN — LIDOCAINE 1 PATCH: 4 PATCH TOPICAL at 19:41

## 2024-01-01 RX ADMIN — OXYCODONE HYDROCHLORIDE 10 MG: 100 SOLUTION ORAL at 08:12

## 2024-01-01 RX ADMIN — PHENYLEPHRINE HYDROCHLORIDE 200 MCG: 10 INJECTION INTRAVENOUS at 11:28

## 2024-01-01 RX ADMIN — DILTIAZEM HYDROCHLORIDE 240 MG: 120 CAPSULE, EXTENDED RELEASE ORAL at 08:09

## 2024-01-01 RX ADMIN — HYDROMORPHONE HYDROCHLORIDE 0.5 MG: 1 INJECTION, SOLUTION INTRAMUSCULAR; INTRAVENOUS; SUBCUTANEOUS at 14:27

## 2024-01-01 RX ADMIN — PROPOFOL 300 MG: 10 INJECTION, EMULSION INTRAVENOUS at 12:01

## 2024-01-01 RX ADMIN — VANCOMYCIN HYDROCHLORIDE 1000 MG: 1 INJECTION, SOLUTION INTRAVENOUS at 19:00

## 2024-01-01 RX ADMIN — FAMOTIDINE 20 MG: 10 INJECTION, SOLUTION INTRAVENOUS at 13:02

## 2024-01-01 RX ADMIN — OXYCODONE HYDROCHLORIDE 10 MG: 100 SOLUTION ORAL at 17:31

## 2024-01-01 RX ADMIN — FENTANYL CITRATE 100 MCG: 50 INJECTION INTRAMUSCULAR; INTRAVENOUS at 11:21

## 2024-01-01 RX ADMIN — ETOPOSIDE 240 MG: 20 INJECTION, SOLUTION INTRAVENOUS at 11:29

## 2024-01-01 RX ADMIN — OXYCODONE HYDROCHLORIDE 5 MG: 5 TABLET ORAL at 01:09

## 2024-01-01 RX ADMIN — CEFEPIME 2 G: 2 INJECTION, POWDER, FOR SOLUTION INTRAVENOUS at 11:22

## 2024-01-01 RX ADMIN — AMIODARONE HYDROCHLORIDE 400 MG: 200 TABLET ORAL at 08:30

## 2024-01-01 RX ADMIN — MORPHINE SULFATE 30 MG: 15 TABLET, EXTENDED RELEASE ORAL at 09:26

## 2024-01-01 RX ADMIN — ACETAMINOPHEN 1000 MG: 500 TABLET, FILM COATED ORAL at 08:10

## 2024-01-01 RX ADMIN — CEFEPIME 2 G: 2 INJECTION, POWDER, FOR SOLUTION INTRAVENOUS at 09:44

## 2024-01-01 RX ADMIN — DEXAMETHASONE SODIUM PHOSPHATE 12 MG: 10 INJECTION, SOLUTION INTRAMUSCULAR; INTRAVENOUS at 10:34

## 2024-01-01 RX ADMIN — CHLORHEXIDINE GLUCONATE 15 ML: 1.2 SOLUTION ORAL at 09:34

## 2024-01-01 RX ADMIN — AMIODARONE HYDROCHLORIDE 400 MG: 200 TABLET ORAL at 19:41

## 2024-01-01 RX ADMIN — OXYCODONE HYDROCHLORIDE 10 MG: 100 SOLUTION ORAL at 19:39

## 2024-01-01 RX ADMIN — ACETAMINOPHEN 1000 MG: 500 TABLET, FILM COATED ORAL at 20:29

## 2024-01-01 RX ADMIN — CARBOPLATIN 650 MG: 10 INJECTION, SOLUTION INTRAVENOUS at 10:55

## 2024-01-01 RX ADMIN — PHENYLEPHRINE HYDROCHLORIDE 0.15 MCG/KG/MIN: 10 INJECTION INTRAVENOUS at 11:59

## 2024-01-01 RX ADMIN — SUGAMMADEX 200 MG: 100 INJECTION, SOLUTION INTRAVENOUS at 17:20

## 2024-01-01 RX ADMIN — DEXAMETHASONE 4 MG: 4 TABLET ORAL at 08:03

## 2024-01-01 RX ADMIN — DILTIAZEM HYDROCHLORIDE 20 MG: 5 INJECTION, SOLUTION INTRAVENOUS at 13:16

## 2024-01-01 RX ADMIN — PHENYLEPHRINE HYDROCHLORIDE 100 MCG: 10 INJECTION INTRAVENOUS at 12:11

## 2024-01-01 RX ADMIN — FAMOTIDINE 20 MG: 10 INJECTION, SOLUTION INTRAVENOUS at 08:40

## 2024-01-01 RX ADMIN — PHENYLEPHRINE HYDROCHLORIDE 100 MCG: 10 INJECTION INTRAVENOUS at 12:25

## 2024-01-01 RX ADMIN — POLYETHYLENE GLYCOL 3350 17 G: 17 POWDER, FOR SOLUTION ORAL at 20:12

## 2024-01-01 RX ADMIN — OXYCODONE HYDROCHLORIDE 5 MG: 5 TABLET ORAL at 06:39

## 2024-01-01 RX ADMIN — AMOXICILLIN AND CLAVULANATE POTASSIUM 1 TABLET: 875; 125 TABLET, COATED ORAL at 08:01

## 2024-01-01 RX ADMIN — PIPERACILLIN AND TAZOBACTAM 3.38 G: 3; .375 INJECTION, POWDER, LYOPHILIZED, FOR SOLUTION INTRAVENOUS at 04:06

## 2024-01-01 RX ADMIN — HYDROMORPHONE HYDROCHLORIDE 0.5 MG: 1 INJECTION, SOLUTION INTRAMUSCULAR; INTRAVENOUS; SUBCUTANEOUS at 04:38

## 2024-01-01 RX ADMIN — ACETAMINOPHEN 1000 MG: 500 TABLET ORAL at 20:49

## 2024-01-01 RX ADMIN — Medication 5 ML: at 13:18

## 2024-01-01 RX ADMIN — Medication 20 MG: at 12:05

## 2024-01-01 RX ADMIN — LIDOCAINE HYDROCHLORIDE 100 MG: 20 INJECTION, SOLUTION INFILTRATION; PERINEURAL at 11:21

## 2024-01-01 RX ADMIN — TRILACICLIB 570 MG: 300 INJECTION, POWDER, LYOPHILIZED, FOR SOLUTION INTRAVENOUS at 12:22

## 2024-01-01 RX ADMIN — IPRATROPIUM BROMIDE AND ALBUTEROL SULFATE 3 ML: 2.5; .5 SOLUTION RESPIRATORY (INHALATION) at 18:56

## 2024-01-01 RX ADMIN — FILGRASTIM-AAFI 480 MCG: 480 INJECTION, SOLUTION SUBCUTANEOUS at 21:10

## 2024-01-01 RX ADMIN — OXYCODONE HYDROCHLORIDE 5 MG: 5 TABLET ORAL at 14:47

## 2024-01-01 RX ADMIN — DIGOXIN 125 MCG: 125 TABLET ORAL at 08:11

## 2024-01-01 RX ADMIN — Medication 5 ML: at 14:01

## 2024-01-01 RX ADMIN — ACETAMINOPHEN 1000 MG: 500 TABLET, FILM COATED ORAL at 08:03

## 2024-01-01 RX ADMIN — CLINDAMYCIN PHOSPHATE 900 MG: 900 INJECTION, SOLUTION INTRAVENOUS at 08:00

## 2024-01-01 RX ADMIN — POLYETHYLENE GLYCOL 3350 17 G: 17 POWDER, FOR SOLUTION ORAL at 09:21

## 2024-01-01 RX ADMIN — SUGAMMADEX 200 MG: 100 INJECTION, SOLUTION INTRAVENOUS at 12:56

## 2024-01-01 RX ADMIN — HYDROMORPHONE HYDROCHLORIDE 0.5 MG: 1 INJECTION, SOLUTION INTRAMUSCULAR; INTRAVENOUS; SUBCUTANEOUS at 02:59

## 2024-01-01 RX ADMIN — SODIUM CHLORIDE 500 ML: 9 INJECTION, SOLUTION INTRAVENOUS at 00:27

## 2024-01-01 RX ADMIN — OXYCODONE HYDROCHLORIDE 5 MG: 5 TABLET ORAL at 03:55

## 2024-01-01 RX ADMIN — ACETAMINOPHEN 1000 MG: 500 TABLET, FILM COATED ORAL at 18:25

## 2024-01-01 RX ADMIN — SODIUM CHLORIDE, POTASSIUM CHLORIDE, SODIUM LACTATE AND CALCIUM CHLORIDE: 600; 310; 30; 20 INJECTION, SOLUTION INTRAVENOUS at 11:48

## 2024-01-01 RX ADMIN — FILGRASTIM-AAFI 480 MCG: 480 INJECTION, SOLUTION SUBCUTANEOUS at 21:40

## 2024-01-01 RX ADMIN — MORPHINE SULFATE 15 MG: 15 TABLET, EXTENDED RELEASE ORAL at 17:20

## 2024-01-01 RX ADMIN — CEFEPIME 2 G: 2 INJECTION, POWDER, FOR SOLUTION INTRAVENOUS at 18:07

## 2024-01-01 RX ADMIN — DEXAMETHASONE SODIUM PHOSPHATE 12 MG: 10 INJECTION, SOLUTION INTRAMUSCULAR; INTRAVENOUS at 13:25

## 2024-01-01 RX ADMIN — RIVAROXABAN 20 MG: 10 TABLET, FILM COATED ORAL at 19:04

## 2024-01-01 RX ADMIN — ACETAMINOPHEN 975 MG: 325 TABLET, FILM COATED ORAL at 13:09

## 2024-01-01 RX ADMIN — HYDROMORPHONE HYDROCHLORIDE 0.5 MG: 1 INJECTION, SOLUTION INTRAMUSCULAR; INTRAVENOUS; SUBCUTANEOUS at 03:25

## 2024-01-01 RX ADMIN — OXYCODONE HYDROCHLORIDE 5 MG: 5 TABLET ORAL at 09:06

## 2024-01-01 RX ADMIN — FILGRASTIM-AAFI 480 MCG: 480 INJECTION, SOLUTION SUBCUTANEOUS at 19:42

## 2024-01-01 RX ADMIN — PHENYLEPHRINE HYDROCHLORIDE 100 MCG: 10 INJECTION INTRAVENOUS at 12:01

## 2024-01-01 RX ADMIN — DILTIAZEM HYDROCHLORIDE 240 MG: 120 CAPSULE, EXTENDED RELEASE ORAL at 08:01

## 2024-01-01 ASSESSMENT — ENCOUNTER SYMPTOMS
FEVER: 0
SORE THROAT: 0
ABDOMINAL PAIN: 0
DIARRHEA: 0
DYSURIA: 0
BACK PAIN: 1
NAUSEA: 0
SHORTNESS OF BREATH: 0
COUGH: 0
NAUSEA: 0
VOMITING: 0
WHEEZING: 0
FREQUENCY: 0
DYSURIA: 0
WEAKNESS: 1
CHILLS: 0
DIAPHORESIS: 0
FALLS: 0
DYSRHYTHMIAS: 1
COUGH: 0
DIARRHEA: 0
DYSRHYTHMIAS: 1
FEVER: 0
SINUS PRESSURE: 0
CONSTIPATION: 0
SHORTNESS OF BREATH: 0
PALPITATIONS: 0
FREQUENCY: 0
FREQUENCY: 0
DYSURIA: 0
VOMITING: 0
PALPITATIONS: 0
BLOOD IN STOOL: 0
PALPITATIONS: 0
WEAKNESS: 0
ABDOMINAL PAIN: 0
FEVER: 0
NAUSEA: 0
CHILLS: 0
SHORTNESS OF BREATH: 1
DIARRHEA: 0
CONSTIPATION: 1
COUGH: 0
VOMITING: 0
SEIZURES: 0
BLOOD IN STOOL: 0
WHEEZING: 0
DYSRHYTHMIAS: 1
SEIZURES: 0
HEADACHES: 0
CHILLS: 0
ABDOMINAL PAIN: 0
FALLS: 0
HEADACHES: 0

## 2024-01-01 ASSESSMENT — ACTIVITIES OF DAILY LIVING (ADL)
DIFFICULTY_COMMUNICATING: NO
ADLS_ACUITY_SCORE: 50
ADLS_ACUITY_SCORE: 0
ADLS_ACUITY_SCORE: 0
TOILETING_ASSISTANCE: TOILETING DIFFICULTY, ASSISTANCE 1 PERSON;TOILETING DIFFICULTY, REQUIRES EQUIPMENT
ADLS_ACUITY_SCORE: 56
ADLS_ACUITY_SCORE: 36
ADLS_ACUITY_SCORE: 50
ADLS_ACUITY_SCORE: 52
TOILETING_ISSUES: YES
ADLS_ACUITY_SCORE: 0
FALL_HISTORY_WITHIN_LAST_SIX_MONTHS: NO
ADLS_ACUITY_SCORE: 43
ADLS_ACUITY_SCORE: 52
ADLS_ACUITY_SCORE: 52
ADLS_ACUITY_SCORE: 50
ADLS_ACUITY_SCORE: 44
ADLS_ACUITY_SCORE: 42
ADLS_ACUITY_SCORE: 0
ADLS_ACUITY_SCORE: 0
ADLS_ACUITY_SCORE: 39
ADLS_ACUITY_SCORE: 18
ADLS_ACUITY_SCORE: 43
ADLS_ACUITY_SCORE: 0
ADLS_ACUITY_SCORE: 0
ADLS_ACUITY_SCORE: 52
ADLS_ACUITY_SCORE: 52
ADLS_ACUITY_SCORE: 0
ADLS_ACUITY_SCORE: 0
TOILETING: 1-->ASSISTANCE (EQUIPMENT/PERSON) NEEDED (NOT DEVELOPMENTALLY APPROPRIATE)
ADLS_ACUITY_SCORE: 0
ADLS_ACUITY_SCORE: 0
ADLS_ACUITY_SCORE: 52
ADLS_ACUITY_SCORE: 0
ADLS_ACUITY_SCORE: 43
ADLS_ACUITY_SCORE: 0
ADLS_ACUITY_SCORE: 52
ADLS_ACUITY_SCORE: 0
ADLS_ACUITY_SCORE: 43
ADLS_ACUITY_SCORE: 52
ADLS_ACUITY_SCORE: 24
ADLS_ACUITY_SCORE: 37
ADLS_ACUITY_SCORE: 52
ADLS_ACUITY_SCORE: 56
ADLS_ACUITY_SCORE: 39
ADLS_ACUITY_SCORE: 39
ADLS_ACUITY_SCORE: 42
ADLS_ACUITY_SCORE: 31
ADLS_ACUITY_SCORE: 42
ADLS_ACUITY_SCORE: 37
ADLS_ACUITY_SCORE: 0
ADLS_ACUITY_SCORE: 42
ADLS_ACUITY_SCORE: 36
ADLS_ACUITY_SCORE: 54
ADLS_ACUITY_SCORE: 56
ADLS_ACUITY_SCORE: 52
ADLS_ACUITY_SCORE: 50
ADLS_ACUITY_SCORE: 56
ADLS_ACUITY_SCORE: 43
ADLS_ACUITY_SCORE: 43
ADLS_ACUITY_SCORE: 31
ADLS_ACUITY_SCORE: 52
CHANGE_IN_FUNCTIONAL_STATUS_SINCE_ONSET_OF_CURRENT_ILLNESS/INJURY: NO
ADLS_ACUITY_SCORE: 52
ADLS_ACUITY_SCORE: 0
ADLS_ACUITY_SCORE: 50
ADLS_ACUITY_SCORE: 0
ADLS_ACUITY_SCORE: 42
ADLS_ACUITY_SCORE: 56
ADLS_ACUITY_SCORE: 0
ADLS_ACUITY_SCORE: 56
ADLS_ACUITY_SCORE: 52
ADLS_ACUITY_SCORE: 42
ADLS_ACUITY_SCORE: 0
ADLS_ACUITY_SCORE: 42
ADLS_ACUITY_SCORE: 0
ADLS_ACUITY_SCORE: 42
ADLS_ACUITY_SCORE: 0
ADLS_ACUITY_SCORE: 52
ADLS_ACUITY_SCORE: 23
ADLS_ACUITY_SCORE: 52
ADLS_ACUITY_SCORE: 42
ADLS_ACUITY_SCORE: 18
ADLS_ACUITY_SCORE: 0
ADLS_ACUITY_SCORE: 38
ADLS_ACUITY_SCORE: 56
ADLS_ACUITY_SCORE: 0
ADLS_ACUITY_SCORE: 0
ADLS_ACUITY_SCORE: 45
ADLS_ACUITY_SCORE: 42
ADLS_ACUITY_SCORE: 0
ADLS_ACUITY_SCORE: 0
ADLS_ACUITY_SCORE: 52
ADLS_ACUITY_SCORE: 42
ADLS_ACUITY_SCORE: 42
ADLS_ACUITY_SCORE: 23
TOILETING: 1-->ASSISTANCE (EQUIPMENT/PERSON) NEEDED
ADLS_ACUITY_SCORE: 52
WALKING_OR_CLIMBING_STAIRS: TRANSFERRING DIFFICULTY, REQUIRES EQUIPMENT;TRANSFERRING DIFFICULTY, ASSISTANCE 1 PERSON
ADLS_ACUITY_SCORE: 42
ADLS_ACUITY_SCORE: 29
ADLS_ACUITY_SCORE: 0
ADLS_ACUITY_SCORE: 42
ADLS_ACUITY_SCORE: 39
ADLS_ACUITY_SCORE: 24
ADLS_ACUITY_SCORE: 54
ADLS_ACUITY_SCORE: 56
ADLS_ACUITY_SCORE: 52
ADLS_ACUITY_SCORE: 0
ADLS_ACUITY_SCORE: 29
ADLS_ACUITY_SCORE: 0
ADLS_ACUITY_SCORE: 52
ADLS_ACUITY_SCORE: 0
ADLS_ACUITY_SCORE: 42
ADLS_ACUITY_SCORE: 52
ADLS_ACUITY_SCORE: 42
ADLS_ACUITY_SCORE: 56
ADLS_ACUITY_SCORE: 0
ADLS_ACUITY_SCORE: 56
ADLS_ACUITY_SCORE: 0
ADLS_ACUITY_SCORE: 0
ADLS_ACUITY_SCORE: 42
ADLS_ACUITY_SCORE: 0
ADLS_ACUITY_SCORE: 24
ADLS_ACUITY_SCORE: 52
ADLS_ACUITY_SCORE: 50
ADLS_ACUITY_SCORE: 43
ADLS_ACUITY_SCORE: 56
ADLS_ACUITY_SCORE: 24
ADLS_ACUITY_SCORE: 0
ADLS_ACUITY_SCORE: 0
ADLS_ACUITY_SCORE: 50
ADLS_ACUITY_SCORE: 0
ADLS_ACUITY_SCORE: 52
ADLS_ACUITY_SCORE: 23
ADLS_ACUITY_SCORE: 52
ADLS_ACUITY_SCORE: 0
ADLS_ACUITY_SCORE: 50
ADLS_ACUITY_SCORE: 43
ADLS_ACUITY_SCORE: 42
ADLS_ACUITY_SCORE: 56
ADLS_ACUITY_SCORE: 52
ADLS_ACUITY_SCORE: 56
DOING_ERRANDS_INDEPENDENTLY_DIFFICULTY: NO
ADLS_ACUITY_SCORE: 42
VISION_MANAGEMENT: GLASSES
ADLS_ACUITY_SCORE: 0
ADLS_ACUITY_SCORE: 23
WALKING_OR_CLIMBING_STAIRS_DIFFICULTY: YES
ADLS_ACUITY_SCORE: 50
HEARING_DIFFICULTY_OR_DEAF: NO
ADLS_ACUITY_SCORE: 52
ADLS_ACUITY_SCORE: 42
ADLS_ACUITY_SCORE: 0
ADLS_ACUITY_SCORE: 50
ADLS_ACUITY_SCORE: 45
ADLS_ACUITY_SCORE: 18
ADLS_ACUITY_SCORE: 52
ADLS_ACUITY_SCORE: 36
ADLS_ACUITY_SCORE: 52
ADLS_ACUITY_SCORE: 30
ADLS_ACUITY_SCORE: 38
ADLS_ACUITY_SCORE: 42
ADLS_ACUITY_SCORE: 56
ADLS_ACUITY_SCORE: 39
ADLS_ACUITY_SCORE: 52
ADLS_ACUITY_SCORE: 52
ADLS_ACUITY_SCORE: 37
ADLS_ACUITY_SCORE: 52
ADLS_ACUITY_SCORE: 24
ADLS_ACUITY_SCORE: 23
ADLS_ACUITY_SCORE: 39
ADLS_ACUITY_SCORE: 0
ADLS_ACUITY_SCORE: 39
ADLS_ACUITY_SCORE: 52
ADLS_ACUITY_SCORE: 50
ADLS_ACUITY_SCORE: 43
ADLS_ACUITY_SCORE: 56
ADLS_ACUITY_SCORE: 42
ADLS_ACUITY_SCORE: 43
ADLS_ACUITY_SCORE: 54
ADLS_ACUITY_SCORE: 42
ADLS_ACUITY_SCORE: 52
ADLS_ACUITY_SCORE: 23
ADLS_ACUITY_SCORE: 0
ADLS_ACUITY_SCORE: 39
ADLS_ACUITY_SCORE: 42
ADLS_ACUITY_SCORE: 56
ADLS_ACUITY_SCORE: 23
ADLS_ACUITY_SCORE: 0
ADLS_ACUITY_SCORE: 52
ADLS_ACUITY_SCORE: 38
ADLS_ACUITY_SCORE: 37
ADLS_ACUITY_SCORE: 0
ADLS_ACUITY_SCORE: 0
ADLS_ACUITY_SCORE: 42
ADLS_ACUITY_SCORE: 54
ADLS_ACUITY_SCORE: 23
ADLS_ACUITY_SCORE: 0
ADLS_ACUITY_SCORE: 39
ADLS_ACUITY_SCORE: 42
ADLS_ACUITY_SCORE: 0
ADLS_ACUITY_SCORE: 23
ADLS_ACUITY_SCORE: 0
ADLS_ACUITY_SCORE: 0
ADLS_ACUITY_SCORE: 42
ADLS_ACUITY_SCORE: 24
ADLS_ACUITY_SCORE: 42
ADLS_ACUITY_SCORE: 54
ADLS_ACUITY_SCORE: 43
ADLS_ACUITY_SCORE: 54
ADLS_ACUITY_SCORE: 42
ADLS_ACUITY_SCORE: 50
ADLS_ACUITY_SCORE: 24
ADLS_ACUITY_SCORE: 0
ADLS_ACUITY_SCORE: 37
ADLS_ACUITY_SCORE: 0
ADLS_ACUITY_SCORE: 0
ADLS_ACUITY_SCORE: 54
ADLS_ACUITY_SCORE: 0
ADLS_ACUITY_SCORE: 0
ADLS_ACUITY_SCORE: 24
ADLS_ACUITY_SCORE: 43
ADLS_ACUITY_SCORE: 0
ADLS_ACUITY_SCORE: 42
ADLS_ACUITY_SCORE: 42
ADLS_ACUITY_SCORE: 50
ADLS_ACUITY_SCORE: 56
ADLS_ACUITY_SCORE: 54
ADLS_ACUITY_SCORE: 43
ADLS_ACUITY_SCORE: 52
ADLS_ACUITY_SCORE: 42
ADLS_ACUITY_SCORE: 37
ADLS_ACUITY_SCORE: 52
ADLS_ACUITY_SCORE: 0
ADLS_ACUITY_SCORE: 50
ADLS_ACUITY_SCORE: 44
ADLS_ACUITY_SCORE: 18
ADLS_ACUITY_SCORE: 39
ADLS_ACUITY_SCORE: 54
ADLS_ACUITY_SCORE: 46
ADLS_ACUITY_SCORE: 23
ADLS_ACUITY_SCORE: 42
ADLS_ACUITY_SCORE: 23
ADLS_ACUITY_SCORE: 50
ADLS_ACUITY_SCORE: 37
ADLS_ACUITY_SCORE: 42
ADLS_ACUITY_SCORE: 52
ADLS_ACUITY_SCORE: 0
ADLS_ACUITY_SCORE: 46
ADLS_ACUITY_SCORE: 56
ADLS_ACUITY_SCORE: 18
ADLS_ACUITY_SCORE: 18
ADLS_ACUITY_SCORE: 0
ADLS_ACUITY_SCORE: 0
ADLS_ACUITY_SCORE: 52
ADLS_ACUITY_SCORE: 50
ADLS_ACUITY_SCORE: 39
ADLS_ACUITY_SCORE: 0
DIFFICULTY_EATING/SWALLOWING: NO
ADLS_ACUITY_SCORE: 42
ADLS_ACUITY_SCORE: 0
ADLS_ACUITY_SCORE: 52
ADLS_ACUITY_SCORE: 18
ADLS_ACUITY_SCORE: 42
ADLS_ACUITY_SCORE: 24
ADLS_ACUITY_SCORE: 56
ADLS_ACUITY_SCORE: 42
ADLS_ACUITY_SCORE: 56
ADLS_ACUITY_SCORE: 56
ADLS_ACUITY_SCORE: 45
ADLS_ACUITY_SCORE: 0
ADLS_ACUITY_SCORE: 38
ADLS_ACUITY_SCORE: 56
ADLS_ACUITY_SCORE: 0
ADLS_ACUITY_SCORE: 41
ADLS_ACUITY_SCORE: 36
ADLS_ACUITY_SCORE: 56
ADLS_ACUITY_SCORE: 0
ADLS_ACUITY_SCORE: 50
ADLS_ACUITY_SCORE: 0
ADLS_ACUITY_SCORE: 23
ADLS_ACUITY_SCORE: 18
ADLS_ACUITY_SCORE: 46
ADLS_ACUITY_SCORE: 52
ADLS_ACUITY_SCORE: 42
ADLS_ACUITY_SCORE: 52
ADLS_ACUITY_SCORE: 56
ADLS_ACUITY_SCORE: 0
ADLS_ACUITY_SCORE: 42
ADLS_ACUITY_SCORE: 43
ADLS_ACUITY_SCORE: 52
DEPENDENT_IADLS:: INDEPENDENT
ADLS_ACUITY_SCORE: 42
ADLS_ACUITY_SCORE: 50
ADLS_ACUITY_SCORE: 29
ADLS_ACUITY_SCORE: 56
ADLS_ACUITY_SCORE: 52
ADLS_ACUITY_SCORE: 54
ADLS_ACUITY_SCORE: 50
ADLS_ACUITY_SCORE: 42
ADLS_ACUITY_SCORE: 56
ADLS_ACUITY_SCORE: 0
ADLS_ACUITY_SCORE: 52
ADLS_ACUITY_SCORE: 0
ADLS_ACUITY_SCORE: 0
ADLS_ACUITY_SCORE: 35
ADLS_ACUITY_SCORE: 0
ADLS_ACUITY_SCORE: 18
ADLS_ACUITY_SCORE: 18
ADLS_ACUITY_SCORE: 0
ADLS_ACUITY_SCORE: 50
ADLS_ACUITY_SCORE: 0
ADLS_ACUITY_SCORE: 50
ADLS_ACUITY_SCORE: 18
ADLS_ACUITY_SCORE: 35
ADLS_ACUITY_SCORE: 39
ADLS_ACUITY_SCORE: 42
ADLS_ACUITY_SCORE: 24
CONCENTRATING,_REMEMBERING_OR_MAKING_DECISIONS_DIFFICULTY: NO
ADLS_ACUITY_SCORE: 0
ADLS_ACUITY_SCORE: 42
ADLS_ACUITY_SCORE: 0
ADLS_ACUITY_SCORE: 24
ADLS_ACUITY_SCORE: 0
ADLS_ACUITY_SCORE: 39
WEAR_GLASSES_OR_BLIND: YES
ADLS_ACUITY_SCORE: 52
ADLS_ACUITY_SCORE: 42
ADLS_ACUITY_SCORE: 42
ADLS_ACUITY_SCORE: 43
ADLS_ACUITY_SCORE: 18
ADLS_ACUITY_SCORE: 54
ADLS_ACUITY_SCORE: 0
ADLS_ACUITY_SCORE: 0
ADLS_ACUITY_SCORE: 39
ADLS_ACUITY_SCORE: 52
ADLS_ACUITY_SCORE: 24
ADLS_ACUITY_SCORE: 0
ADLS_ACUITY_SCORE: 24
ADLS_ACUITY_SCORE: 35
ADLS_ACUITY_SCORE: 37
ADLS_ACUITY_SCORE: 0
ADLS_ACUITY_SCORE: 43
ADLS_ACUITY_SCORE: 39
ADLS_ACUITY_SCORE: 39
ADLS_ACUITY_SCORE: 29
ADLS_ACUITY_SCORE: 42
ADLS_ACUITY_SCORE: 42
ADLS_ACUITY_SCORE: 56
ADLS_ACUITY_SCORE: 18
ADLS_ACUITY_SCORE: 50
ADLS_ACUITY_SCORE: 38
ADLS_ACUITY_SCORE: 56
ADLS_ACUITY_SCORE: 0
ADLS_ACUITY_SCORE: 23
ADLS_ACUITY_SCORE: 0
ADLS_ACUITY_SCORE: 52
ADLS_ACUITY_SCORE: 42
ADLS_ACUITY_SCORE: 39
ADLS_ACUITY_SCORE: 0
ADLS_ACUITY_SCORE: 38
ADLS_ACUITY_SCORE: 42
ADLS_ACUITY_SCORE: 50
ADLS_ACUITY_SCORE: 35
ADLS_ACUITY_SCORE: 0
DRESSING/BATHING: BATHING DIFFICULTY, REQUIRES EQUIPMENT;BATHING DIFFICULTY, ASSISTANCE 1 PERSON
ADLS_ACUITY_SCORE: 0
ADLS_ACUITY_SCORE: 23
ADLS_ACUITY_SCORE: 52
ADLS_ACUITY_SCORE: 0
ADLS_ACUITY_SCORE: 52
ADLS_ACUITY_SCORE: 43
ADLS_ACUITY_SCORE: 52
ADLS_ACUITY_SCORE: 0
ADLS_ACUITY_SCORE: 52
ADLS_ACUITY_SCORE: 29
ADLS_ACUITY_SCORE: 0
ADLS_ACUITY_SCORE: 46
ADLS_ACUITY_SCORE: 52
ADLS_ACUITY_SCORE: 56
ADLS_ACUITY_SCORE: 0
ADLS_ACUITY_SCORE: 50
ADLS_ACUITY_SCORE: 0
ADLS_ACUITY_SCORE: 42
ADLS_ACUITY_SCORE: 42
ADLS_ACUITY_SCORE: 35
ADLS_ACUITY_SCORE: 56
ADLS_ACUITY_SCORE: 0
ADLS_ACUITY_SCORE: 24
ADLS_ACUITY_SCORE: 45
ADLS_ACUITY_SCORE: 0
ADLS_ACUITY_SCORE: 45
ADLS_ACUITY_SCORE: 43
ADLS_ACUITY_SCORE: 43
ADLS_ACUITY_SCORE: 42
ADLS_ACUITY_SCORE: 31
ADLS_ACUITY_SCORE: 23
ADLS_ACUITY_SCORE: 0
ADLS_ACUITY_SCORE: 52
ADLS_ACUITY_SCORE: 0
ADLS_ACUITY_SCORE: 42
ADLS_ACUITY_SCORE: 36
ADLS_ACUITY_SCORE: 52
ADLS_ACUITY_SCORE: 0
ADLS_ACUITY_SCORE: 42
ADLS_ACUITY_SCORE: 50
ADLS_ACUITY_SCORE: 0
ADLS_ACUITY_SCORE: 56
ADLS_ACUITY_SCORE: 0
ADLS_ACUITY_SCORE: 0
ADLS_ACUITY_SCORE: 52
ADLS_ACUITY_SCORE: 52
ADLS_ACUITY_SCORE: 42
ADLS_ACUITY_SCORE: 0
ADLS_ACUITY_SCORE: 42
ADLS_ACUITY_SCORE: 0
ADLS_ACUITY_SCORE: 0
ADLS_ACUITY_SCORE: 42
ADLS_ACUITY_SCORE: 42
ADLS_ACUITY_SCORE: 0
ADLS_ACUITY_SCORE: 56
ADLS_ACUITY_SCORE: 42
ADLS_ACUITY_SCORE: 56
ADLS_ACUITY_SCORE: 0
ADLS_ACUITY_SCORE: 50
ADLS_ACUITY_SCORE: 24
ADLS_ACUITY_SCORE: 0
ADLS_ACUITY_SCORE: 39
ADLS_ACUITY_SCORE: 52
ADLS_ACUITY_SCORE: 0
ADLS_ACUITY_SCORE: 39
ADLS_ACUITY_SCORE: 38
ADLS_ACUITY_SCORE: 0
ADLS_ACUITY_SCORE: 0
ADLS_ACUITY_SCORE: 52
ADLS_ACUITY_SCORE: 38
ADLS_ACUITY_SCORE: 42
ADLS_ACUITY_SCORE: 52
DRESSING/BATHING_DIFFICULTY: YES
ADLS_ACUITY_SCORE: 45
ADLS_ACUITY_SCORE: 39
ADLS_ACUITY_SCORE: 50
ADLS_ACUITY_SCORE: 39
ADLS_ACUITY_SCORE: 0
ADLS_ACUITY_SCORE: 24
ADLS_ACUITY_SCORE: 42
ADLS_ACUITY_SCORE: 54

## 2024-01-01 ASSESSMENT — COLUMBIA-SUICIDE SEVERITY RATING SCALE - C-SSRS
2. HAVE YOU ACTUALLY HAD ANY THOUGHTS OF KILLING YOURSELF IN THE PAST MONTH?: NO
1. IN THE PAST MONTH, HAVE YOU WISHED YOU WERE DEAD OR WISHED YOU COULD GO TO SLEEP AND NOT WAKE UP?: NO
2. HAVE YOU ACTUALLY HAD ANY THOUGHTS OF KILLING YOURSELF IN THE PAST MONTH?: NO
2. HAVE YOU ACTUALLY HAD ANY THOUGHTS OF KILLING YOURSELF IN THE PAST MONTH?: NO
1. IN THE PAST MONTH, HAVE YOU WISHED YOU WERE DEAD OR WISHED YOU COULD GO TO SLEEP AND NOT WAKE UP?: NO
6. HAVE YOU EVER DONE ANYTHING, STARTED TO DO ANYTHING, OR PREPARED TO DO ANYTHING TO END YOUR LIFE?: NO
2. HAVE YOU ACTUALLY HAD ANY THOUGHTS OF KILLING YOURSELF IN THE PAST MONTH?: NO
1. IN THE PAST MONTH, HAVE YOU WISHED YOU WERE DEAD OR WISHED YOU COULD GO TO SLEEP AND NOT WAKE UP?: NO
6. HAVE YOU EVER DONE ANYTHING, STARTED TO DO ANYTHING, OR PREPARED TO DO ANYTHING TO END YOUR LIFE?: NO
6. HAVE YOU EVER DONE ANYTHING, STARTED TO DO ANYTHING, OR PREPARED TO DO ANYTHING TO END YOUR LIFE?: NO
1. IN THE PAST MONTH, HAVE YOU WISHED YOU WERE DEAD OR WISHED YOU COULD GO TO SLEEP AND NOT WAKE UP?: NO
1. IN THE PAST MONTH, HAVE YOU WISHED YOU WERE DEAD OR WISHED YOU COULD GO TO SLEEP AND NOT WAKE UP?: NO
2. HAVE YOU ACTUALLY HAD ANY THOUGHTS OF KILLING YOURSELF IN THE PAST MONTH?: NO
6. HAVE YOU EVER DONE ANYTHING, STARTED TO DO ANYTHING, OR PREPARED TO DO ANYTHING TO END YOUR LIFE?: NO
6. HAVE YOU EVER DONE ANYTHING, STARTED TO DO ANYTHING, OR PREPARED TO DO ANYTHING TO END YOUR LIFE?: NO
2. HAVE YOU ACTUALLY HAD ANY THOUGHTS OF KILLING YOURSELF IN THE PAST MONTH?: NO
6. HAVE YOU EVER DONE ANYTHING, STARTED TO DO ANYTHING, OR PREPARED TO DO ANYTHING TO END YOUR LIFE?: NO
1. IN THE PAST MONTH, HAVE YOU WISHED YOU WERE DEAD OR WISHED YOU COULD GO TO SLEEP AND NOT WAKE UP?: NO

## 2024-01-01 ASSESSMENT — COPD QUESTIONNAIRES: COPD: 0

## 2024-01-01 ASSESSMENT — PAIN SCALES - GENERAL
PAINLEVEL: MILD PAIN (2)
PAINLEVEL: NO PAIN (0)
PAINLEVEL: NO PAIN (0)
PAINLEVEL: WORST PAIN (10)
PAINLEVEL: MILD PAIN (2)
PAINLEVEL: EXTREME PAIN (8)
PAINLEVEL: NO PAIN (0)
PAINLEVEL: WORST PAIN (10)
PAINLEVEL: NO PAIN (0)
PAINLEVEL: SEVERE PAIN (6)
PAINLEVEL: MODERATE PAIN (4)
PAINLEVEL: EXTREME PAIN (8)
PAINLEVEL: NO PAIN (0)
PAINLEVEL: MILD PAIN (2)

## 2024-01-01 ASSESSMENT — LIFESTYLE VARIABLES
TOBACCO_USE: 1
TOBACCO_USE: 1
TOBACCO_USE: 0

## 2024-04-07 PROBLEM — E66.811 OBESITY (BMI 30.0-34.9): Status: ACTIVE | Noted: 2022-04-01

## 2024-04-07 PROBLEM — I48.91 ATRIAL FIBRILLATION WITH RVR (H): Status: ACTIVE | Noted: 2023-10-23

## 2024-04-07 NOTE — ED NOTES
Pt sent over from urgent care, pt reports he thought maybe he had a sinus infection, pt has had some sinus drainage and congestion the last few days. Pt reports in the morning he is able to cough up some mucus, the last couple mornings his mucus has been bloody.

## 2024-04-07 NOTE — TELEPHONE ENCOUNTER
"Nurse Triage SBAR    Is this a 2nd Level Triage? No    Situation/Background: Patient is calling with concern of \"sinus issue going on for quite some time.\" Patient wakes with a runny nose. Did have some green sinus drainage and blood when blowing nose, which has resolved. Did have a sore throat with green mucous from nose, has resolved.     Patient recently had dental scraping and bone graft recently.     Assessment:   Is coughing up bloody mucous in the morning, initially in the morning. Patient states he is able to clear blood tinged mucous after coughing a few times.   No cough other than clearing mucous from lungs in morning  Is taking Allegra for nasal drip  Patient is on blood thinners - xarelto, losaartan, metoprolol    Recommendation: Per disposition, See PCP within 24 hours . Reviewed Care Advice with patient and verbalizes understanding. Declines additional questions. Advised patient to call back with any new or worsening symptoms. Patient verbalized understanding and agrees with plan. Patient will go to /[facility].     Protocol Recommended Disposition: Urgent care center    Luanne Joseph RN on 4/7/2024 at 10:05 AM  [facility]  Reason for Disposition   [1] Nasal discharge AND [2] present > 10 days    Additional Information   Negative: SEVERE difficulty breathing (e.g., struggling for each breath, speaks in single words)   Negative: [1] Chest pain AND [2] difficulty breathing   Negative: Bluish (or gray) lips or face now   Negative: Passed out (i.e., lost consciousness, collapsed and was not responding)   Negative: Shock suspected (e.g., cold/pale/clammy skin, too weak to stand, low BP, rapid pulse)   Negative: Difficult to awaken or acting confused (e.g., disoriented, slurred speech)   Negative: Recent chest injury (i.e., past 24 hours)   Negative: [1] Coughed up blood AND [2] large amount (Such as: \"a half cup of blood\")   Negative: Sounds like a life-threatening emergency to the " "triager   Negative: [1] MODERATE difficulty breathing (e.g., speaks in phrases, SOB even at rest, pulse 100-120) AND [2] still present when not coughing   Negative: Chest pain   Negative: Unclear to triager if the patient is coughing up blood or vomiting blood   Negative: History of prior \"blood clot\" in leg or lungs (i.e., deep vein thrombosis, pulmonary embolism)   Negative: History of inherited increased risk of blood clots (e.g., Factor 5 Leiden, Anti-thrombin 3, Protein C or Protein S deficiency, Prothrombin mutation)   Negative: Pregnant or pregnant in past month   Negative: Hip or leg fracture (broken bone) in past month (or had cast on leg or ankle in past month)   Negative: Long-distance travel in past month (e.g., car, bus, train, plane; with trip lasting 6 or more hours)   Negative: Bedridden (e.g., CVA, chronic illness, recovering from surgery)   Negative: Patient sounds very sick or weak to the triager   Negative: [1] MILD difficulty breathing (e.g., minimal/no SOB at rest, SOB with walking, pulse <100) AND [2] still present when not coughing  (Exception: No change from usual, chronic shortness of breath.)   Negative: [1] Coughed up blood AND [2] > 1 tablespoon (15 ml)   Negative: Fever > 103 F (39.4 C)   Negative: [1] Fever > 101 F (38.3 C) AND [2] age > 60 years   Negative: [1] Fever > 100.0 F (37.8 C) AND [2] diabetes mellitus or weak immune system (e.g., HIV positive, cancer chemo, splenectomy, organ transplant, chronic steroids)   Negative: [1] Has underlying lung disease (e.g., COPD, chronic bronchitis or emphysema) AND [2] sputum has turned yellow or green in color   Negative: Coughing up ann-colored sputum    Protocols used: Coughing Up Blood-A-AH    "

## 2024-04-07 NOTE — ED PROVIDER NOTES
History   No chief complaint on file.    HPI  Dk Randhawa Sr. is a 68 year old male with documented PMH significant for atrial fibrillation and chronic anticoagulation therapy who presents to the department from adjacent urgent care for further evaluation of RUL opacification identified on chest radiograph.  Patient had originally complained of several days of URI symptoms including sore throat, rhinorrhea, but also mild cough with blood-tinged sputum.  Please see urgent care provider note for further details.        Allergies:  No Known Allergies    Problem List:    Patient Active Problem List    Diagnosis Date Noted    Atrial fibrillation with RVR (H) 10/23/2023     Priority: Medium    Obesity (BMI 30.0-34.9) 04/01/2022     Priority: Medium    Cough due to angiotensin-converting enzyme inhibitor 12/02/2015     Priority: Medium    CARDIOVASCULAR SCREENING; LDL GOAL LESS THAN 160 10/31/2010     Priority: Medium    Essential hypertension, benign 05/18/2010     Priority: Medium    Hyperlipidemia 05/18/2010     Priority: Medium        Past Medical History:    Past Medical History:   Diagnosis Date    Hypertension        Past Surgical History:    Past Surgical History:   Procedure Laterality Date    ORTHOPEDIC SURGERY      PHACOEMULSIFICATION WITH STANDARD INTRAOCULAR LENS IMPLANT Left 2/26/2018    Procedure: PHACOEMULSIFICATION WITH STANDARD INTRAOCULAR LENS IMPLANT;  Left Cataract Removal with Implant;  Surgeon: Mohit Victor MD;  Location: [facility] OR    PHACOEMULSIFICATION WITH STANDARD INTRAOCULAR LENS IMPLANT Right 1/30/2019    Procedure: Cataract Removal with Implant;  Surgeon: Mohit Victor MD;  Location: [facility] OR       Family History:    No family history on file.    Social History:  Marital Status:   [4]  Social History     Tobacco Use    Smoking status: Never    Smokeless tobacco: Never   Substance Use Topics    Alcohol use: Yes    Drug use: No        Medications:     amoxicillin-clavulanate (AUGMENTIN) 875-125 MG tablet  AMLODIPINE BESYLATE PO  Aspirin-Acetaminophen-Caffeine (EXCEDRIN PO)  LOSARTAN POTASSIUM PO          Review of Systems   ROS: 10 point ROS neg other than the symptoms noted above in the HPI.    Physical Exam   BP: (!) 163/93  Pulse: 90  Temp: 97.6  F (36.4  C)  Resp: 16  SpO2: 100 %      Physical Exam  Constitutional:  Well developed, well nourished.  Appears nontoxic and in no acute distress.  Resting comfortably on the gurney.  HENT:  Normocephalic and atraumatic.  Symmetric in appearance.  Eyes:  Conjunctivae are normal.  Cardiovascular:  No cyanosis.  Irregular.  Respiratory:  Effort normal without sign of respiratory distress.  No audible wheezing or stridor.  CTAB.   Gastrointestinal:  Soft nondistended abdomen.  Nontender and without guarding.  No rigidity or rebound tenderness.  Negative Trejo's sign.  Negative McBurney's point.    Musculoskeletal:  Moves extremities spontaneously.  Neurological:  Patient is alert.  Skin:  Skin is warm and dry.  Psychiatric:  Normal mood and affect.      ED Course        Procedures                Results for orders placed or performed during the hospital encounter of 04/07/24 (from the past 24 hour(s))   Chest XR,  PA & LAT   Result Value Ref Range    Radiologist flags Lung mass     Narrative    EXAM: XR CHEST 2 VIEWS  LOCATION: [facility]  DATE: 4/7/2024    INDICATION: Cough, blood-tinged sputum.  COMPARISON: None.      Impression    IMPRESSION:     Rounded 4.8 cm right upper lobe opacity is suspicious for a pulmonary mass. Infection is in the differential but considered less likely. Recommend further evaluation with a contrast-enhanced chest CT.    Remainder of the lungs are clear. No pleural effusions or pneumothorax. Normal pulmonary vascularity.    Nonenlarged cardiac silhouette. Mildly tortuous, calcified thoracic aorta.    Multilevel degenerative changes of the spine.    [Recommend  Follow Up: Lung mass]    This report will be copied to the [facility] to ensure a provider acknowledges the finding.   CBC with platelets differential    Narrative    The following orders were created for panel order CBC with platelets differential.  Procedure                               Abnormality         Status                     ---------                               -----------         ------                     CBC with platelets and d...[[id number]]  Abnormal            Final result                 Please view results for these tests on the individual orders.   INR   Result Value Ref Range    INR 1.16 (H) 0.85 - 1.15   Comprehensive metabolic panel   Result Value Ref Range    Sodium 134 (L) 135 - 145 mmol/L    Potassium 4.5 3.4 - 5.3 mmol/L    Carbon Dioxide (CO2) 25 22 - 29 mmol/L    Anion Gap 11 7 - 15 mmol/L    Urea Nitrogen 17.6 8.0 - 23.0 mg/dL    Creatinine 0.94 0.67 - 1.17 mg/dL    GFR Estimate 88 >60 mL/min/1.73m2    Calcium 8.8 8.8 - 10.2 mg/dL    Chloride 98 98 - 107 mmol/L    Glucose 105 (H) 70 - 99 mg/dL    Alkaline Phosphatase 94 40 - 150 U/L    AST 22 0 - 45 U/L    ALT 16 0 - 70 U/L    Protein Total 7.3 6.4 - 8.3 g/dL    Albumin 4.3 3.5 - 5.2 g/dL    Bilirubin Total 0.3 <=1.2 mg/dL   Lipase   Result Value Ref Range    Lipase 42 13 - 60 U/L   CBC with platelets and differential   Result Value Ref Range    WBC Count 15.1 (H) 4.0 - 11.0 10e3/uL    RBC Count 4.86 4.40 - 5.90 10e6/uL    Hemoglobin 14.6 13.3 - 17.7 g/dL    Hematocrit 43.2 40.0 - 53.0 %    MCV 89 78 - 100 fL    MCH 30.0 26.5 - 33.0 pg    MCHC 33.8 31.5 - 36.5 g/dL    RDW 13.2 10.0 - 15.0 %    Platelet Count 266 150 - 450 10e3/uL    % Neutrophils 82 %    % Lymphocytes 9 %    % Monocytes 6 %    % Eosinophils 2 %    % Basophils 1 %    % Immature Granulocytes 1 %    NRBCs per 100 WBC 0 <1 /100    Absolute Neutrophils 12.4 (H) 1.6 - 8.3 10e3/uL    Absolute Lymphocytes 1.3 0.8 - 5.3 10e3/uL    Absolute Monocytes 1.0 0.0 - 1.3  10e3/uL    Absolute Eosinophils 0.2 0.0 - 0.7 10e3/uL    Absolute Basophils 0.1 0.0 - 0.2 10e3/uL    Absolute Immature Granulocytes 0.1 <=0.4 10e3/uL    Absolute NRBCs 0.0 10e3/uL   CT Chest/Abdomen/Pelvis w Contrast    Narrative    EXAM: CT CHEST/ABDOMEN/PELVIS WITH CONTRAST  LOCATION: [facility]  DATE: 04/07/2024    INDICATION: Symptom of hemoptysis with recent chest x-ray identifying right upper lobe mass concerning for malignancy.  COMPARISON: CT abdomen and pelvis 04/06/2022, chest x-ray 04/07/2024.  TECHNIQUE: CT scan of the chest, abdomen, and pelvis was performed following injection of IV contrast. Multiplanar reformats were obtained. Dose reduction techniques were used.   CONTRAST: 100 mL Isovue 370.    FINDINGS:   LUNGS AND PLEURA: 4.5 cm mass in the right upper lobe. Right hilar lymphadenopathy. Moderate emphysema.    MEDIASTINUM/AXILLAE: Small mediastinal lymph nodes are not enlarged by size criteria. Some of the lymph nodes are partially calcified consistent with granulomatous disease.    CORONARY ARTERY CALCIFICATION: Moderate.    HEPATOBILIARY: Normal.    PANCREAS: Normal.    SPLEEN: Normal.    ADRENAL GLANDS: Normal.    KIDNEYS/BLADDER: Normal.    BOWEL: Normal.    LYMPH NODES: Normal.    VASCULATURE: Mild atherosclerosis.    PELVIC ORGANS: Normal.    MUSCULOSKELETAL: Normal.      Impression    IMPRESSION:  1.  4.5 cm right upper lobe lung mass suspicious for primary lung cancer.  2.  Right hilar lymphadenopathy.  3.  No evidence of distant metastatic disease. Recommend PET/CT for further evaluation and staging.           Medications   sodium chloride 0.9% BOLUS 1,000 mL (1,000 mLs Intravenous $New Bag 4/7/24 1329)   iopamidol (ISOVUE-370) solution 100 mL (100 mLs Intravenous $Given 4/7/24 1318)   sodium chloride 0.9 % bag 500 mL for CT scan flush use (72 mLs Intravenous $Given 4/7/24 1319)       Assessments & Plan (with Medical Decision Making)   Dk Randhawa Sr. is a  68 year old male who presents to the department for evaluation of symptoms including sore throat, rhinorrhea with nasal congestion, and cough notable for blood-tinged sputum.  He arrived to department afebrile and hemodynamically stable from urgent care after suspicious lesions on the chest radiograph.  CBC reveals mild leukocytosis, nonspecific finding.  CT imaging reviewed and radiologist confirms a large 4.5 cm RUL Lung Mass with hilar lymphadenopathy suspicious for malignancy.  Patient is not anemic nor appears to be actively bleeding/hemorrhaging, anticoagulation will remain unchanged for now.  He will require close outpatient follow-up with hematology/oncology for reevaluation and continued management planning.  Also encourage patient to follow with primary care provider tomorrow morning so they may help arrange.      Disclaimer:  This note consists of symbols derived from keyboarding, dictation, and/or voice recognition software.  As a result, there may be errors in the script that have gone undetected.  Please consider this when interpreting information found in the chart.      I have reviewed the nursing notes.    I have reviewed the findings, diagnosis, plan and need for follow up with the patient.          New Prescriptions    AMOXICILLIN-CLAVULANATE (AUGMENTIN) 875-125 MG TABLET    Take 1 tablet by mouth 2 times daily for 7 days       Final diagnoses:   Mass of upper lobe of right lung   Hemoptysis   Chronic anticoagulation       4/7/2024   [facility] EMERGENCY DEPT       Vick Rodriguez DO  04/07/24 1418

## 2024-04-07 NOTE — ED PROVIDER NOTES
History   No chief complaint on file.    HPI  Dk Randhawa Bay is a 68 year old male who presents to urgent care with concern over approximately 4-day history of blood-tinged sputum.  Patient states that he he initially developed sore throat, rhinorrhea with blood from his right nares.  He more concerned over the last several days he has noted blood tinged sputum that is present primarily upon waking in the morning, clears throughout the day.  He has not had any fever, chills, allergies, chest pain, palpitations, shortness of breath, wheezing, nausea, vomiting, diarrhea or abdominal pain.  He is on Xarelto for history of atrial fibrillation.  He has began using allegra in the morning and Nyquil at bedtime for control of nasal secretions with improvement.      Allergies:  No Known Allergies    Problem List:    Patient Active Problem List    Diagnosis Date Noted    CARDIOVASCULAR SCREENING; LDL GOAL LESS THAN 160 10/31/2010     Priority: Medium        Past Medical History:    Past Medical History:   Diagnosis Date    Hypertension        Past Surgical History:    Past Surgical History:   Procedure Laterality Date    ORTHOPEDIC SURGERY      PHACOEMULSIFICATION WITH STANDARD INTRAOCULAR LENS IMPLANT Left 2/26/2018    Procedure: PHACOEMULSIFICATION WITH STANDARD INTRAOCULAR LENS IMPLANT;  Left Cataract Removal with Implant;  Surgeon: Mohit Victor MD;  Location: [facility]    PHACOEMULSIFICATION WITH STANDARD INTRAOCULAR LENS IMPLANT Right 1/30/2019    Procedure: Cataract Removal with Implant;  Surgeon: Mohit Victor MD;  Location: [facility] OR       Family History:    No family history on file.    Social History:  Marital Status:   [4]  Social History     Tobacco Use    Smoking status: Never    Smokeless tobacco: Never   Substance Use Topics    Alcohol use: Yes    Drug use: No        Medications:    AMLODIPINE BESYLATE PO  Aspirin-Acetaminophen-Caffeine (EXCEDRIN PO)  LOSARTAN POTASSIUM  PO      Review of Systems  CONSTITUTIONAL:NEGATIVE for fever, chills, change in weight  INTEGUMENTARY/SKIN: NEGATIVE for worrisome rashes, moles or lesions  EYES: NEGATIVE for vision changes or irritation  ENT/MOUTH: POSITIVE for rhinorrhea with some blood from right nares, improved sore throat NEGATIVE for marisol epistaxis, ear pain, sinus pain   RESP:POSITIVE for cough upon waking and NEGATIVE for dyspnea, wheezing   GI: NEGATIVE for abdominal pain, diarrhea, nausea, and vomiting  Physical Exam   BP: (!) 163/93  Pulse: 90  Temp: 97.6  F (36.4  C)  Resp: 16  SpO2: 100 %  Physical Exam  GENERAL APPEARANCE:alert, cooperative and no distress  EYES: EOMI,  PERRL, conjunctiva clear  HENT: ear canals and TM's normal.  Nose and mouth without ulcers, erythema or lesions  NECK: supple, nontender, no lymphadenopathy  RESP: lungs clear to auscultation - no rales, rhonchi or wheezes  CV: irregularly irregular, no murmur noted  SKIN: no suspicious lesions or rashes  ED Course        Procedures       Critical Care time:  none          Results for orders placed or performed during the hospital encounter of 04/07/24   Chest XR,  PA & LAT     Status: None   Result Value Ref Range    Radiologist flags Lung mass     Narrative    EXAM: XR CHEST 2 VIEWS  LOCATION: [facility]  DATE: 4/7/2024    INDICATION: Cough, blood-tinged sputum.  COMPARISON: None.      Impression    IMPRESSION:     Rounded 4.8 cm right upper lobe opacity is suspicious for a pulmonary mass. Infection is in the differential but considered less likely. Recommend further evaluation with a contrast-enhanced chest CT.    Remainder of the lungs are clear. No pleural effusions or pneumothorax. Normal pulmonary vascularity.    Nonenlarged cardiac silhouette. Mildly tortuous, calcified thoracic aorta.    Multilevel degenerative changes of the spine.    [Recommend Follow Up: Lung mass]    This report will be copied to the [facility] to  ensure a provider acknowledges the finding.   CT Chest/Abdomen/Pelvis w Contrast     Status: None    Narrative    EXAM: CT CHEST/ABDOMEN/PELVIS WITH CONTRAST  LOCATION: [facility]  DATE: 04/07/2024    INDICATION: Symptom of hemoptysis with recent chest x-ray identifying right upper lobe mass concerning for malignancy.  COMPARISON: CT abdomen and pelvis 04/06/2022, chest x-ray 04/07/2024.  TECHNIQUE: CT scan of the chest, abdomen, and pelvis was performed following injection of IV contrast. Multiplanar reformats were obtained. Dose reduction techniques were used.   CONTRAST: 100 mL Isovue 370.    FINDINGS:   LUNGS AND PLEURA: 4.5 cm mass in the right upper lobe. Right hilar lymphadenopathy. Moderate emphysema.    MEDIASTINUM/AXILLAE: Small mediastinal lymph nodes are not enlarged by size criteria. Some of the lymph nodes are partially calcified consistent with granulomatous disease.    CORONARY ARTERY CALCIFICATION: Moderate.    HEPATOBILIARY: Normal.    PANCREAS: Normal.    SPLEEN: Normal.    ADRENAL GLANDS: Normal.    KIDNEYS/BLADDER: Normal.    BOWEL: Normal.    LYMPH NODES: Normal.    VASCULATURE: Mild atherosclerosis.    PELVIC ORGANS: Normal.    MUSCULOSKELETAL: Normal.      Impression    IMPRESSION:  1.  4.5 cm right upper lobe lung mass suspicious for primary lung cancer.  2.  Right hilar lymphadenopathy.  3.  No evidence of distant metastatic disease. Recommend PET/CT for further evaluation and staging.       INR     Status: Abnormal   Result Value Ref Range    INR 1.16 (H) 0.85 - 1.15   Comprehensive metabolic panel     Status: Abnormal   Result Value Ref Range    Sodium 134 (L) 135 - 145 mmol/L    Potassium 4.5 3.4 - 5.3 mmol/L    Carbon Dioxide (CO2) 25 22 - 29 mmol/L    Anion Gap 11 7 - 15 mmol/L    Urea Nitrogen 17.6 8.0 - 23.0 mg/dL    Creatinine 0.94 0.67 - 1.17 mg/dL    GFR Estimate 88 >60 mL/min/1.73m2    Calcium 8.8 8.8 - 10.2 mg/dL    Chloride 98 98 - 107 mmol/L     Glucose 105 (H) 70 - 99 mg/dL    Alkaline Phosphatase 94 40 - 150 U/L    AST 22 0 - 45 U/L    ALT 16 0 - 70 U/L    Protein Total 7.3 6.4 - 8.3 g/dL    Albumin 4.3 3.5 - 5.2 g/dL    Bilirubin Total 0.3 <=1.2 mg/dL   Lipase     Status: Normal   Result Value Ref Range    Lipase 42 13 - 60 U/L   CBC with platelets and differential     Status: Abnormal   Result Value Ref Range    WBC Count 15.1 (H) 4.0 - 11.0 10e3/uL    RBC Count 4.86 4.40 - 5.90 10e6/uL    Hemoglobin 14.6 13.3 - 17.7 g/dL    Hematocrit 43.2 40.0 - 53.0 %    MCV 89 78 - 100 fL    MCH 30.0 26.5 - 33.0 pg    MCHC 33.8 31.5 - 36.5 g/dL    RDW 13.2 10.0 - 15.0 %    Platelet Count 266 150 - 450 10e3/uL    % Neutrophils 82 %    % Lymphocytes 9 %    % Monocytes 6 %    % Eosinophils 2 %    % Basophils 1 %    % Immature Granulocytes 1 %    NRBCs per 100 WBC 0 <1 /100    Absolute Neutrophils 12.4 (H) 1.6 - 8.3 10e3/uL    Absolute Lymphocytes 1.3 0.8 - 5.3 10e3/uL    Absolute Monocytes 1.0 0.0 - 1.3 10e3/uL    Absolute Eosinophils 0.2 0.0 - 0.7 10e3/uL    Absolute Basophils 0.1 0.0 - 0.2 10e3/uL    Absolute Immature Granulocytes 0.1 <=0.4 10e3/uL    Absolute NRBCs 0.0 10e3/uL   CBC with platelets differential     Status: Abnormal    Narrative    The following orders were created for panel order CBC with platelets differential.  Procedure                               Abnormality         Status                     ---------                               -----------         ------                     CBC with platelets and d...[[id number]]  Abnormal            Final result                 Please view results for these tests on the individual orders.         Medications - No data to display    Assessments & Plan (with Medical Decision Making)     I have reviewed the nursing notes.    I have reviewed the findings, diagnosis, plan and need for follow up with the patient.       New Prescriptions    No medications on file     Final diagnoses:   Mass of upper lobe of right  lung   Hemoptysis   Chronic anticoagulation     68-year-old male initially presented to the urgent care with concern over 4-day history of blood-tinged sputum upon waking.  Patient initially had chest x-ray which demonstrated a 2.5 cm opacity in the right upper lobe suspicious for pulmonary mass infection was on differential but considered less likely recommend further evaluation with a contrast-enhanced chest CT per radiology report.  Given x-ray findings, patient was transferred to the emergency department for additional imaging, blood work.  Please see ER providers note for further evaluation with all medical decision making at his discretion.    Disclaimer: This note consists of symbols derived from keyboarding, dictation, and/or voice recognition software. As a result, there may be errors in the script that have gone undetected.  Please consider this when interpreting information found in the chart.      4/7/2024   [facility] EMERGENCY DEPT       Brooke Smith PA-C  04/08/24 1955

## 2024-04-08 NOTE — TELEPHONE ENCOUNTER
RECORDS STATUS - ALL OTHER DIAGNOSIS      RECORDS RECEIVED FROM: Epic   DATE RECEIVED:    NOTES STATUS DETAILS   OFFICE NOTE from referring provider Epic 4/7/24: Dr. Vick Rodriguez   DISCHARGE REPORT from the ER [facility] 4/7/24: [facility] ED   MEDICATION LIST [facility]    LABS     ANYTHING RELATED TO DIAGNOSIS Epic Most recent 4/7/24   IMAGING (NEED IMAGES & REPORT)     CT SCANS PACS 4/7/24: CT Chest Abd Pel   ULTRASOUND PACS 4/7/24: XR Chest

## 2024-04-08 NOTE — PROGRESS NOTES
New IP (Interventional Pulmonology) referral rec'd.  Chart reviewed.       New Patient: Interventional Pulmonary (Lung nodule) Nurse Navigator Note    [facility]    Referred to (specialty): Interventional Pulmonary (Lung nodule)    Requested provider (if applicable): n/a    Date Referral Received: 4/7/2024    Evaluation for :  New RUL Mass with lymphadenopathy    Clinical History (per Nurse review of records provided):    **BOOK MARKED**    EXAM: CT CHEST/ABDOMEN/PELVIS WITH CONTRAST  LOCATION: [facility]  DATE: 04/07/2024     INDICATION: Symptom of hemoptysis with recent chest x-ray identifying right upper lobe mass concerning for malignancy.  COMPARISON: CT abdomen and pelvis 04/06/2022, chest x-ray 04/07/2024.  TECHNIQUE: CT scan of the chest, abdomen, and pelvis was performed following injection of IV contrast. Multiplanar reformats were obtained. Dose reduction techniques were used.   CONTRAST: 100 mL Isovue 370.     FINDINGS:   LUNGS AND PLEURA: 4.5 cm mass in the right upper lobe. Right hilar lymphadenopathy. Moderate emphysema.     MEDIASTINUM/AXILLAE: Small mediastinal lymph nodes are not enlarged by size criteria. Some of the lymph nodes are partially calcified consistent with granulomatous disease.     CORONARY ARTERY CALCIFICATION: Moderate.     HEPATOBILIARY: Normal.     PANCREAS: Normal.     SPLEEN: Normal.     ADRENAL GLANDS: Normal.     KIDNEYS/BLADDER: Normal.     BOWEL: Normal.     LYMPH NODES: Normal.     VASCULATURE: Mild atherosclerosis.     PELVIC ORGANS: Normal.     MUSCULOSKELETAL: Normal.                                                                      IMPRESSION:  1.  4.5 cm right upper lobe lung mass suspicious for primary lung cancer.  2.  Right hilar lymphadenopathy.  3.  No evidence of distant metastatic disease. Recommend PET/CT for further evaluation and  staging.    Records Location: [facility]     Records Needed: none    Additional testing needed prior to consult: PFT's

## 2024-04-08 NOTE — PROGRESS NOTES
I called and spoke to Dk.  I explained my role and purpose of my marcia.  I let him know that I need him to consult with one of our IP (Interventional Pulmonology) providers to discuss how we might get a biopsy of the lung mass.  We went over appt details and need for PFT's as well.  He is agreeable.  I am also going to try and hold a spot with one of the [facility] onc providers as he will need one in follow up of the results.

## 2024-04-09 NOTE — NURSING NOTE
"Oncology Rooming Note    April 9, 2024 1:08 PM   Dk Randhawa Sr. is a 68 year old male who presents for:    Chief Complaint   Patient presents with    Oncology Clinic Visit     SY Mass of upper lobe of right lung      Initial Vitals: BP (!) 142/95 (BP Location: Right arm, Patient Position: Sitting, Cuff Size: Adult Large)   Pulse 103   Temp 98.1  F (36.7  C) (Oral)   Resp 16   Ht 1.817 m (5' 11.54\")   Wt 111.9 kg (246 lb 12.8 oz)   SpO2 98%   BMI 33.91 kg/m   Estimated body mass index is 33.91 kg/m  as calculated from the following:    Height as of this encounter: 1.817 m (5' 11.54\").    Weight as of this encounter: 111.9 kg (246 lb 12.8 oz). Body surface area is 2.38 meters squared.  No Pain (0) Comment: Data Unavailable   No LMP for male patient.  Allergies reviewed: Yes  Medications reviewed: Yes    Medications: Medication refills not needed today.  Pharmacy name entered into [facility]:    [id number] [employer] - [place], [place] - [address] [place] [address] [id number] - [place], [place] - [address] [facility]    Frailty Screening:   Is the patient here for a new oncology consult visit in cancer care? 1. Yes. Over the past month, have you experienced difficulty or required a caregiver to assist with:   1. Balance, walking or general mobility (including any falls)? NO  2. Completion of self-care tasks such as bathing, dressing, toileting, grooming/hygiene?  NO  3. Concentration or memory that affects your daily life?  NO       Clinical concerns: none    Hanna Jackson"

## 2024-04-09 NOTE — PROGRESS NOTES
[facility]  Interventional Pulmonary Clinic Visit Note    April 9, 2024    Chief complaint:  Dk Randhawa Sr. is a 68 year old male seen for lung mass  Blood tinged sputum     Reason for clinic visit / Chief complaint:       Assessment and Plan:  RUL mass, highly suspicious for primary lung cancer. We discussed about CT findings and approach to diagnosis.   Procedure- Navigation bronchoscopy (r-EBUs or Robotic) for RUL mass, and EBUS guided mediastinal LN sampling). Message sent to scheduling team on an urgent base.     A fib Oct 2023- on xarelto (will be held 2 days prior to procedure- he will confirm with his Cardiologist. No hx of stent)    CT findings suggestive of mild emphysematous changes. Pending PFT.    Former smoker    History of Present Illness:  67 y/o man with no pulm history recently visited urgent care after he blow out blood tinged secretions from his nostril and coughed up bloody secretions a couple of times afterwards. On empiric abx by his PCP and referred to my clinic after he was found to have lung mass.     Lung problems: bloody sputum, blood when blowing out right nostril. 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 Normal left ventricular chamber size.Mild concentrically increased left ventricular wall   thickness.    Normal left ventricular systolic function.Estimated left ventricular ejection fraction is   50-55%.    No regional wall motion abnormalities.    2.Normal right ventricular size and systolic function.    Right ventricular systolic pressure cannot be estimated due to  inability to detect peak   tricuspid regurgitation Doppler velocity.    3.Trileaflet aortic valve.Mildly thickened aortic valve.    Aortic valve sclerosis without stenosis.No aortic valve regurgitation.    4.No pericardial effusion.    5.Aortic sinus of Valsalva is normal in size (3.7 cm, ZScore = -0.61).Ascending aorta was not   well visualized.    There were no prior studies available for comparison.    Estimated EF: 50-55%     CT chest from 4/7/2024          No Known Allergies     Past Medical History:   Diagnosis Date    Hypertension         Past Surgical History:   Procedure Laterality Date    ORTHOPEDIC SURGERY      PHACOEMULSIFICATION WITH STANDARD INTRAOCULAR LENS IMPLANT Left 2/26/2018    Procedure: PHACOEMULSIFICATION WITH STANDARD INTRAOCULAR LENS IMPLANT;  Left Cataract Removal with Implant;  Surgeon: Mohit Victor MD;  Location: [facility] OR    PHACOEMULSIFICATION WITH STANDARD INTRAOCULAR LENS IMPLANT Right 1/30/2019    Procedure: Cataract Removal with Implant;  Surgeon: Mohit Victor MD;  Location: [facility] OR        Social History     Socioeconomic History    Marital status:      Spouse name: Not on file    Number of children: Not on file    Years of education: Not on file    Highest education level: Not on file   Occupational History    Not on file   Tobacco Use    Smoking status: Never    Smokeless tobacco: Never   Substance and Sexual Activity    Alcohol use: Yes    Drug use: No    Sexual activity: Not on file   Other Topics Concern    Parent/sibling w/ CABG, MI or angioplasty before 65F 55M? Not Asked   Social History Narrative    Not on file     Social Determinants of Health     Financial Resource Strain: Not on file   Food Insecurity: Not on file   Transportation Needs: Not on file   Physical Activity: Not on file   Stress: Not on file   Social Connections: Not on file   Interpersonal Safety: Not on file   Housing Stability: Not on file        No family history on file.      Immunization History   Administered Date(s) Administered    COVID-19 12+ (2023-24) (MODERNA) 09/20/2023    COVID-19 Bivalent 18+ (Moderna) 09/08/2022, 04/22/2023    COVID-19 Monovalent 18+ (Moderna) 02/18/2021, 03/18/2021, 10/22/2021, 04/09/2022    Flu, Unspecified 09/02/2018, 09/12/2019    Influenza (High Dose) 3 valent vaccine 09/12/2019    Influenza (IIV3) PF 11/07/2011, 11/05/2012, 11/25/2013    Influenza Vaccine 65+ (FLUAD) 08/23/2020, 09/01/2022, 09/06/2023    Influenza Vaccine 65+ (Fluzone HD) 09/04/2021, 08/31/2022    Influenza Vaccine >6 months,quad, PF 12/15/2014, 11/21/2016, 10/03/2017, 09/02/2018    Influenza, seasonal, injectable, PF 11/07/2011    Pneumo Conj 13-V (2010&after) 10/29/2019    Pneumococcal 23 valent 10/15/2021    RSV Vaccine (Arexvy) 09/06/2023    TDAP (Adacel,Boostrix) 10/18/2022    TDAP Vaccine (Adacel) 06/28/2012    Zoster recombinant adjuvanted (SHINGRIX) 04/17/2018, 09/29/2018    Zoster vaccine, live 12/15/2014       Current Outpatient Medications   Medication Sig Dispense Refill    amLODIPine (NORVASC) 10 MG tablet Take 10 mg by mouth daily      amoxicillin-clavulanate (AUGMENTIN) 875-125 MG tablet Take 1 tablet by mouth 2 times daily for 7 days 14 tablet 0    losartan (COZAAR) 100 MG tablet Take 1 Tablet (100 mg) by mouth once daily*      metoprolol succinate ER (TOPROL XL) 100 MG 24 hr tablet Take 1 tablet by mouth daily      XARELTO ANTICOAGULANT 20 MG TABS tablet Take 1 Tablet (20 mg) by mouth once daily with evening meal*       No current facility-administered medications for this visit.        Review of Systems:  I have done 10 points of review systems and all negative except for those mentioned in HPI    Physical examination  Constitutional: Oriented, not in distress  @vitals  Eyes: No icterus, nystagmus, pupils isocoric  Head and neck: normal posture and movements  Respiratory: Normal tidal breathing, no shortness of breath, no audible wheezing or stridor    Musculoskeletal: Normal muscle mass, no deformity on hands/fingers  Integumentary:  No rash on visible skin areas   Neurological: Alert, orientedx3, no motor deficits  Psychiatric:  Mood and affect are appropriate with insight into his/her medical condition    Data:  Lab Results   Component Value Date    WBC 15.1 04/07/2024     Lab Results   Component Value Date    RBC 4.86 04/07/2024     Lab Results   Component Value Date    HGB 14.6 04/07/2024     Lab Results   Component Value Date    HCT 43.2 04/07/2024     Lab Results   Component Value Date    MCV 89 04/07/2024     Lab Results   Component Value Date    MCH 30.0 04/07/2024     Lab Results   Component Value Date    MCHC 33.8 04/07/2024     Lab Results   Component Value Date    RDW 13.2 04/07/2024     Lab Results   Component Value Date     04/07/2024       Lab Results   Component Value Date     04/07/2024      Lab Results   Component Value Date    POTASSIUM 4.5 04/07/2024     Lab Results   Component Value Date    CHLORIDE 98 04/07/2024     Lab Results   Component Value Date    COLLINS 8.8 04/07/2024     Lab Results   Component Value Date    CO2 25 04/07/2024     Lab Results   Component Value Date    BUN 17.6 04/07/2024     Lab Results   Component Value Date    CR 0.94 04/07/2024     Lab Results   Component Value Date     04/07/2024         ANGELA Traylor MD

## 2024-04-16 NOTE — ANESTHESIA CARE TRANSFER NOTE
Patient: Dk Randhawa .    Procedure: Procedure(s):  BRONCHOSCOPY, endobronchial ultrasound, transbronchial needle aspiration, endobronchial biopsies and tumor debulking       Diagnosis: Mass of upper lobe of right lung [R91.8]  Diagnosis Additional Information: No value filed.    Anesthesia Type:   General     Note:    Oropharynx: oropharynx clear of all foreign objects and spontaneously breathing  Level of Consciousness: awake  Oxygen Supplementation: nasal cannula  Level of Supplemental Oxygen (L/min / FiO2): 2  Independent Airway: airway patency satisfactory and stable  Dentition: dentition unchanged  Vital Signs Stable: post-procedure vital signs reviewed and stable  Report to RN Given: handoff report given  Patient transferred to: PACU  Comments: VSS, report given to RN.    Handoff Report: Identifed the Patient, Identified the Reponsible Provider, Reviewed the pertinent medical history, Discussed the surgical course, Reviewed Intra-OP anesthesia mangement and issues during anesthesia, Set expectations for post-procedure period and Allowed opportunity for questions and acknowledgement of understanding      Vitals:  Vitals Value Taken Time   /71    Temp     Pulse 85 04/16/24 1308   Resp 12 04/16/24 1308   SpO2 97 % 04/16/24 1308   Vitals shown include unfiled device data.    Electronically Signed By: DIEGO Damian CRNA  April 16, 2024  1:08 PM

## 2024-04-16 NOTE — ANESTHESIA PREPROCEDURE EVALUATION
Anesthesia Pre-Procedure Evaluation    Patient: Dk Randhawa .   MRN: [id number] : 1955        Procedure : Procedure(s):  BRONCHOSCOPY, USING OPTICAL TRACKING SYSTEM ION, endobronchial ultrasound, transbronchial biopsy          Past Medical History:   Diagnosis Date     Antiplatelet or antithrombotic long-term use      Arrhythmia      Hypertension       Past Surgical History:   Procedure Laterality Date     ORTHOPEDIC SURGERY       PHACOEMULSIFICATION WITH STANDARD INTRAOCULAR LENS IMPLANT Left 2018    Procedure: PHACOEMULSIFICATION WITH STANDARD INTRAOCULAR LENS IMPLANT;  Left Cataract Removal with Implant;  Surgeon: Mohit Victor MD;  Location: [facility]     PHACOEMULSIFICATION WITH STANDARD INTRAOCULAR LENS IMPLANT Right 2019    Procedure: Cataract Removal with Implant;  Surgeon: Mohit Victor MD;  Location: [facility]      No Known Allergies   Social History     Tobacco Use     Smoking status: Former     Current packs/day: 0.00     Types: Cigarettes     Quit date:      Years since quitting: 10.2     Smokeless tobacco: Never   Substance Use Topics     Alcohol use: Not Currently      Wt Readings from Last 1 Encounters:   24 107.9 kg (237 lb 14 oz)        Anesthesia Evaluation   Pt has had prior anesthetic. Type: General.        ROS/MED HX  ENT/Pulmonary:     (+)                              recent URI, resolved, Sinus infection, finished abx 3 days ago:        Neurologic:       Cardiovascular:     (+)  hypertension- -   -  - -   Taking blood thinners Pt has received instructions: Instructions Given to patient: stopped xarelto 2 days ago.                                 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      Other:          Physical Exam    Airway        Mallampati: II   TM distance: > 3 FB   Neck ROM: full   Mouth opening: > 3 cm    Respiratory Devices and Support         Dental       (+) Modest Abnormalities - crowns, retainers, 1 or 2 missing teeth      Cardiovascular          Rhythm and rate: irregular and normal     Pulmonary           breath sounds clear to auscultation       OUTSIDE LABS:  CBC:   Lab Results   Component Value Date    WBC 15.1 (H) 04/07/2024    WBC 9.8 04/06/2022    HGB 14.6 04/07/2024    HGB 14.8 04/06/2022    HCT 43.2 04/07/2024    HCT 43.9 04/06/2022     04/07/2024     04/06/2022     BMP:   Lab Results   Component Value Date     (L) 04/07/2024    POTASSIUM 4.5 04/07/2024    CHLORIDE 98 04/07/2024    CO2 25 04/07/2024    BUN 17.6 04/07/2024    CR 0.94 04/07/2024     (H) 04/07/2024     COAGS:   Lab Results   Component Value Date    INR 1.16 (H) 04/07/2024     POC: No results found for: \"BGM\", \"HCG\", \"HCGS\"  HEPATIC:   Lab Results   Component Value Date    ALBUMIN 4.3 04/07/2024    PROTTOTAL 7.3 04/07/2024    ALT 16 04/07/2024    AST 22 04/07/2024    ALKPHOS 94 04/07/2024    BILITOTAL 0.3 04/07/2024     OTHER:   Lab Results   Component Value Date    COLLINS 8.8 04/07/2024    LIPASE 42 04/07/2024       Anesthesia Plan    ASA Status:  2    NPO Status:  NPO Appropriate    Anesthesia Type: General.     - Airway: ETT   Induction: Intravenous.           Consents    Anesthesia Plan(s) and associated risks, benefits, and realistic alternatives discussed. Questions answered and patient/representative(s) expressed understanding.     - Discussed:     - Discussed with:  Patient            Postoperative Care    Pain management: IV analgesics, Multi-modal analgesia.   PONV prophylaxis: Ondansetron (or other 5HT-3), Background Propofol Infusion     Comments:             Cathy Martinez MD    I have reviewed the pertinent notes and labs in " the chart from the past 30 days and (re)examined the patient.  Any updates or changes from those notes are reflected in this note.     # Hyponatremia: Lowest Na = 134 mmol/L in last 30 days, will monitor as appropriate        # Drug Induced Coagulation Defect: home medication list includes an anticoagulant medication   # Obesity: Estimated body mass index is 32.26 kg/m  as calculated from the following:    Height as of this encounter: 1.829 m (6').    Weight as of this encounter: 107.9 kg (237 lb 14 oz).

## 2024-04-16 NOTE — ANESTHESIA POSTPROCEDURE EVALUATION
Patient: Dk Randhawa .    Procedure: Procedure(s):  BRONCHOSCOPY, endobronchial ultrasound, transbronchial needle aspiration, endobronchial biopsies and tumor debulking       Anesthesia Type:  General    Note:  Disposition: Outpatient   Postop Pain Control: Uneventful            Sign Out: Well controlled pain   PONV: No   Neuro/Psych: Uneventful            Sign Out: Acceptable/Baseline neuro status   Airway/Respiratory: Uneventful            Sign Out: Acceptable/Baseline resp. status   CV/Hemodynamics: Uneventful            Sign Out: Acceptable CV status; No obvious hypovolemia; No obvious fluid overload   Other NRE: NONE   DID A NON-ROUTINE EVENT OCCUR? No           Last vitals:  Vitals Value Taken Time   BP 94/59 04/16/24 1315   Temp 36.9  C (98.4  F) 04/16/24 1305   Pulse 86 04/16/24 1319   Resp 23 04/16/24 1319   SpO2 95 % 04/16/24 1319   Vitals shown include unfiled device data.    Electronically Signed By: Mikael Saini MD  April 16, 2024  1:20 PM

## 2024-04-16 NOTE — ANESTHESIA POSTPROCEDURE EVALUATION
Patient: Dk Randhawa .    Procedure: Procedure(s):  BRONCHOSCOPY, endobronchial ultrasound, transbronchial needle aspiration, endobronchial biopsies and tumor debulking       Anesthesia Type:  General    Note:  Disposition: Outpatient   Postop Pain Control: Uneventful            Sign Out: Well controlled pain   PONV: No   Neuro/Psych: Uneventful            Sign Out: Acceptable/Baseline neuro status   Airway/Respiratory: Uneventful            Sign Out: Acceptable/Baseline resp. status   CV/Hemodynamics: Uneventful            Sign Out: Acceptable CV status; No obvious hypovolemia; No obvious fluid overload   Other NRE: NONE   DID A NON-ROUTINE EVENT OCCUR? No       Last vitals:  Vitals Value Taken Time   /82 04/16/24 1400   Temp 36.7  C (98.1  F) 04/16/24 1400   Pulse 98 04/16/24 1400   Resp 18 04/16/24 1400   SpO2 97 % 04/16/24 1400   Vitals shown include unfiled device data.    Electronically Signed By: Cathy Martinez MD  April 16, 2024  2:37 PM

## 2024-04-16 NOTE — ANESTHESIA PROCEDURE NOTES
Airway       Patient location during procedure: OR       Procedure Start/Stop Times: 4/16/2024 11:27 AM  Staff -        CRNA: Shasha Bhandari APRN CRNA       Other Anesthesia Staff: Shayy Davis       Performed By: SRNA  Consent for Airway        Urgency: elective  Indications and Patient Condition       Indications for airway management: alexis-procedural         Mask difficulty assessment: 1 - vent by mask    Final Airway Details       Final airway type: endotracheal airway       Successful airway: ETT - single  Endotracheal Airway Details        ETT size (mm): 8.5       Cuffed: yes       Cuff volume (mL): 7       Successful intubation technique: direct laryngoscopy       DL Blade Type: [place] 2       Grade View of Cords: 1       Adjucts: stylet       Position: Right       Measured from: gums/teeth       Secured at (cm): 23       Bite block used: None    Post intubation assessment        Placement verified by: capnometry, equal breath sounds and chest rise        Number of attempts at approach: 1       Secured with: tape       Ease of procedure: easy       Dentition: Intact and Unchanged    Medication(s) Administered   Medication Administration Time: 4/16/2024 11:27 AM

## 2024-04-16 NOTE — OP NOTE
INTERVENTIONAL PULMONOLOGY       Procedure(s):    A flexible bronchoscopy  Airway exam  EBUS-TBNA (5 sites)  Endobronchial biopsies (1 sites)  Therapeutic suctioning (1 sites)  Tissue/tumor debulking     Indication:  lung mass    Attending of Record:  Lanette Arce MD     Interventional Pulmonary Fellow   None    Trainees Present:   None     Medications:    General Anesthesia - See anesthesia flowsheet for details    Sedation Time:   Per Anesthesia Care Provider    Time Out:  Performed    The patient's medical record has been reviewed.  The indication for the procedure was reviewed.  The necessary history and physical examination was performed and reviewed.  The risks, benefits and alternatives of the procedure were discussed with the the patient in detail and he had the opportunity to ask questions.  I discussed in particular the potential complications including risks of minor or life-threatening bleeding and/or infection, respiratory failure, vocal cord trauma / paralysis, pneumothorax, and discomfort. Sedation risks were also discussed including abnormal heart rhythms, low blood pressure, and respiratory failure. All questions were answered to the best of my ability.  Verbal and written informed consent was obtained.  The proposed procedure and the patient's identification were verified prior to the procedure by the physician and the nurse.    A Tuberculosis risk assessment was performed:  The patient has no known RISK of Tuberculosis    The procedure was performed in a negative airflow room: The patient could not be moved to a negative airflow room because of needed OR for the procedure    Maneuvers / Procedure:      A Flexible  bronchoscope was used for the procedure. The patient was orally intubated with a # 8.5 ETT and the flexible scope was passed through.    Airway Examination: A complete airway examination was performed from the distal trachea to the subsegmental level in each lobe of both lungs.   Pertinent findings include RUL posterior segment endobronchial lesion noted (see photo).     Planned robotic assisted navigation aborted because of this.   EBUS-TBNA (Lymph Node) The EBUS scope was inserted and evaluation included:                                                                                                                                                                                                                                                                         Station 2R: not evaluated                                                                                                                                                                                                                                     Station 2L: not evaluated                                                                                                                                                                                                                                       Station 4R: visualized and biopsied. A total of 3 biopsies were performed using 22G FNA Needle.                                                                                                                                                                                                                                        Station 4L: visualized and biopsied. A total of 4 biopsies were performed using 22G FNA Needle.                                                                                                                                                                                                                                             Station 7: visualized and biopsied. A total of 4 biopsies were performed using 22G FNA Needle.                                                                                                                                                                                                                                          Station 10R: not visualized                                                                                                                                                                                                                                  Station 10L: not visualized                                                                                                                                                                                                                                   Station 11R: visualized and biopsied. A total of 4 biopsies were performed using 22G FNA Needle.                                                                                                                                                                                                                                  Station 11L: visualized and biopsied. A total of 4 biopsies were performed using 22G FNA Needle.                                                                                                                                                                                                                                       Station 12R: not visualized                                                                                                                                                                                                                                  Station 12L: not visualized                                                                                                                                                                                                                                          Madiha was Present   Endobronchial biopsies: One Site - The bronchoscope was inserted.  Topical epinephrine was not administered.  The Forceps were introduced and endobronchial biopsies were obtained from RUL posterior segment.  A total of  10 biopsies were obtained.  Tumor/Tissue Debulking: The RUL airway was accessed and tumor/tissue debulking was performed using the 1.7mm cryoprobe. Hemostasis and patency of the airway was acheived post-debulking.    Relevant Pictures    Right upper lobe takeoff, with posterior segment visualized and endobronchial lesion        After debulking, all visible endoluminal component was removed      Recommendations:     -->  uncomplicated bronchoscopy with endobronchial ultrasound transbronchial needle aspiration for mediastinal staging  -->  RUL endobronchial lesion visualized and biopsy/debulked. There is no apparent lumenal involvement at the RUL takeoff. Most of the debulked tissue was soft, mucoid and easy to remove. But using cryoprobe some of the deeper and more fungating appearance lesion was removed. In addition, a large mucoid blood clot was suctioned. FLORES did not evaluate the endobronchial tissue, hopefully there is lesional tissue there and not just mucus impaction  -->  PFTs are normal, likely will be a surgical candidate      Lanette Arce MD  Associate Professor of Medicine  Section of Interventional Pulmonology   Division of Pulmonary, Allergy, Critical Care and Sleep Medicine   [facility]

## 2024-04-16 NOTE — DISCHARGE INSTRUCTIONS
Contacting your Doctor -   To contact Dr Arce call [phone] at the Pulmonary Medicine Clinic at 8 am till 5 pm or:  [phone] and ask for the resident on call for pulmonology (answered 24 hours a day)   Emergency Department:  [facility]: [phone] 911 if you are in need of immediate or emergent help   After Anesthesia (Sleep Medicine)  What should I do after anesthesia?  You should rest and relax for the next 24 hours. Avoid risky or difficult (strenuous) activity. A responsible adult should stay with you overnight.  Don't drive or use any heavy equipment for 24 hours. Even if you feel normal, your reactions may be affected by the sleep medicine given to you.  Don't drink alcohol or make any important decisions for 24 hours.  Slowly get back to your regular diet, as you feel able.  How should I expect to feel?  It's normal to feel dizzy, light-headed, or faint for up to a full day after anesthesia or while taking pain medicine. If this happens:   Sit down for a few minutes before standing.  Have someone help you when you get up to walk or use the bathroom.  If you have nausea (feel sick to your stomach) or vomit (throw up):   Drink clear liquids (such as apple juice, ginger ale, broth, or 7UP) until you feel better.  If you feel sick to your stomach, or you keep vomiting for 24 hours, please call the doctor.  What else should I know?  You might have a dry mouth, sore throat, muscle aches, or trouble sleeping. These should go away after 24 hours.  Please contact your doctor if you have any other symptoms that concern you, such as fever, pain, bleeding, fluid drainage, swelling, or headache, or if it's been over 8 to 10 hours and you still aren't able to pee (urinate).  If you have a history of sleep apnea, it's very important to use your CPAP machine for the next 24 hours when you nap or sleep.   For informational purposes only. Not to replace the advice of your health care provider. Copyright    2023 [facility]. All rights reserved. Clinically reviewed by Renan Chinchilla MD. [employer] [id number] - REV 09/23.

## 2024-04-17 NOTE — PROGRESS NOTES
New Patient Oncology Nurse Navigator Note     Referring provider: Dr. Lanette Arce    Referring Clinic/Organization: [facility]  Referred to: Thoracic Surgery  Requested provider (if applicable): First available - did not specify   Referral Received: 04/17/24       Evaluation for : [id number] (ICD-10-CM) - Mass of upper lobe of right lung     Clinical History (per Nurse review of records provided):      04/12/2024 PFTs done (bookmarked)     04/16/2024 CT Chest w/o contrast (bookmarked) showed:   IMPRESSION: Slight increase in size of 5.3 cm right upper lobe mass  compatible with malignancy until proven otherwise,  suggest tissue sampling    04/16/2024 Procedure - Bronchoscopy, EBUS with biopsies and tumor debulking  Pathology pending as of 4/17/24.     Clinical Assessment / Barriers to Care (Per Nurse):  Tobacco History    Smoking Status  Former Quit Date  2014 Smoking Tobacco Type  Cigarettes quit in 2014     Records Location: [facility]   Records Needed: None  Additional testing needed prior to consult: None    MALAIKA GarciaN, RN, OCN  [facility] Oncology Nurse Navigator  [phone] / [phone]

## 2024-04-18 NOTE — TELEPHONE ENCOUNTER
RECORDS STATUS - ALL OTHER DIAGNOSIS      RECORDS RECEIVED FROM: [facility]   DATE RECEIVED:    NOTES STATUS DETAILS   OFFICE NOTE from referring provider Epic Dr. Lanette Arce   OFFICE NOTE from medical oncologist Epic 4/6/24: Dr. Dwaine Traylor   DISCHARGE REPORT from the ER [facility] 4/7/24: [facility]    OPERATIVE REPORT [facility] 4/16/24: BRONCHOSCOPY    MEDICATION LIST [facility]    LABS     PATHOLOGY REPORTS Report in [facility] 4/7/24: [id number]   ANYTHING RELATED TO DIAGNOSIS [facility]- [facility] Most recent 4/11/24   IMAGING (NEED IMAGES & REPORT)     CT SCANS PACS 4/16/24: CT Chest  4/7/24: CT Chest Abd Pel    XRAYS PACS 4/7/24: XR Chest

## 2024-04-22 NOTE — PROGRESS NOTES
THORACIC SURGERY - NEW PATIENT OFFICE VISIT      Dear Dr. Copeland,    I saw Dk Randhawa at Dr. Arce's request in consultation for the evaluation and treatment of a mass of the RIGHT upper lobe.    HPI  Dk Randhawa is a 68 year old male patient who presents with an enlarging right upper lobe lung mass. This was found on workup of hemoptysis/bloody nose. His weight has been stable.    He bikes 5 miles daily. He walks outside every day. He is on Xarelto for atrial fibrillation.    ECOG performance status  0- Fully active, without restriction                 Previsit Tests   PFT (04/12/2024): FEV1 122% predicted, 3.86 L, DLCO 84%  Pathology (04/16/2024): Right upper lobe: necrotic tissue with scattered atypical cells highly suspicious for malignancy  4L, 11L, 7, 11R, 4R all negative for malignancy.   CT scan (04/16/2024): RIGHT upper lobe 5.3 cm pulmonary Mass (4.9 cm on 4/7/2024), without mediastinal adenopathy, with no pleural effusion          TTE (04/07/2024): Normal EF, normal RV size and function, no significant VHD.   Covid vaccination status: Vaccinated    PMH  Reviewed, as below    Past Medical History:   Diagnosis Date    Antiplatelet or antithrombotic long-term use     Arrhythmia     Hypertension         PSH  Reviewed, as below    Past Surgical History:   Procedure Laterality Date    BRONCHOSCOPY, WITH BIOPSY, ROBOT ASSISTED Bilateral 4/16/2024    Procedure: BRONCHOSCOPY, endobronchial ultrasound, transbronchial needle aspiration, endobronchial biopsies and tumor debulking;  Surgeon: Lanette Arce MD;  Location: [facility]    ORTHOPEDIC SURGERY      PHACOEMULSIFICATION WITH STANDARD INTRAOCULAR LENS IMPLANT Left 2/26/2018    Procedure: PHACOEMULSIFICATION WITH STANDARD INTRAOCULAR LENS IMPLANT;  Left Cataract Removal with Implant;  Surgeon: Mohit Victor MD;  Location: [facility]    PHACOEMULSIFICATION WITH STANDARD INTRAOCULAR LENS IMPLANT Right 1/30/2019    Procedure: Cataract Removal with  Implant;  Surgeon: Mohit Victor MD;  Location: [facility]        No Known Allergies    Current Outpatient Medications   Medication Sig Dispense Refill    acetaminophen (TYLENOL) 325 MG tablet Take 325-650 mg by mouth every 6 hours as needed for mild pain      diphenhydrAMINE-acetaminophen (TYLENOL PM)  MG tablet Take 1 tablet by mouth nightly as needed for sleep      HEMP OIL OR EXTRACT OR OTHER CBD CANNABINOID, NOT MEDICAL CANNABIS, CBD gummies      losartan (COZAAR) 100 MG tablet Take 1 Tablet (100 mg) by mouth once daily*      Melatonin 10 MG TABS tablet Take 10 mg by mouth nightly as needed for sleep      metoprolol succinate ER (TOPROL XL) 100 MG 24 hr tablet Take 1 tablet by mouth daily      multivitamin (CENTRUM SILVER) tablet Take 1 tablet by mouth daily      Turmeric (QC TUMERIC COMPLEX PO) Tumeric and ginger with applie cider 1410mg      UNABLE TO FIND MEDICATION NAME: osteo bi flex      UNABLE TO FIND MEDICATION NAME: Emergen-e 1000mg      XARELTO ANTICOAGULANT 20 MG TABS tablet Take 1 Tablet (20 mg) by mouth once daily with evening meal*       No current facility-administered medications for this visit.       ETOH: None  TOBACCO: ages 15 - 56. 1/2 pack per day. Quit 10 years ago.   OTHER DRUGS: None    Physical examination  BP (!) 132/91 (BP Location: Right arm, Patient Position: Sitting, Cuff Size: Adult Large)   Pulse 103   Temp 98  F (36.7  C) (Oral)   Resp 20   Ht 1.829 m (6')   Wt 110.1 kg (242 lb 12.8 oz)   SpO2 96%   BMI 32.93 kg/m      Physical Exam  Constitutional:       Appearance: Normal appearance.   Cardiovascular:      Rate and Rhythm: Rhythm irregular.   Pulmonary:      Effort: Pulmonary effort is normal.   Skin:     General: Skin is warm and dry.   Neurological:      Mental Status: He is alert and oriented to person, place, and time.   Psychiatric:         Mood and Affect: Mood normal.         Behavior: Behavior normal.         Thought Content: Thought content normal.          Judgment: Judgment normal.          From a personal perspective, he is a retired /.  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 He lives alone. He has a daughter and a son. He and his brother help each other out.    Code Status: Full Code    IMPRESSION   68 year old male patient with a RIGHT upper lobe mass. This is suspicious for a primary lung cancer. He is a good surgical candidate.    Stage: Indeterminate pulmonary lesion, IF this were a cancer, clinical stage IIB    PLAN  I spent 30 min on the date of the encounter in chart review, patient visit, review of tests, documentation and/or discussion with other providers about the issues documented above. I reviewed the plan as follows:  Procedure planned: Robotic-assisted right upper lobectomy, mediastinal lymph node dissection.  I discussed the risks and benefits of the operation, including obtaining a diagnosis, resecting and staging the cancer. The risks include bleeding, infection, arrhythmia requiring medication or anticoagulation, prolonged air leak, chylothorax, deep venous thrombosis and pulmonary embolism, and death.  There is also a risk of prolonged pain, which could require further treatment.  Prolonged air leak could be treated with bronchoscopy and endobronchial valve insertion  or going home with a chest tube.  Postoperative bleeding (rare) could require a return to the OR.   Necessary Preop Tests & Appointments: Preoperative assessment clinic, PET scan, and MRI Brain  Regional Anesthesia Plan: Intraoperative intercostal nerve block  Anticoagulation Plan: Prophylactic Lovenox  I appreciate the opportunity to participate in the care of your patient and will keep you updated.  Sincerely,    Mikael Peoples MD

## 2024-04-23 NOTE — LETTER
4/23/2024         RE: Dk Randhawa Sr.  [address] [facility] [address]        Dear Colleague,    Thank you for referring your patient, Dk Randhawa Sr., to the [facility]. Please see a copy of my visit note below.    THORACIC SURGERY - NEW PATIENT OFFICE VISIT      Dear Dr. Copeland,    I saw Dk Randhawa at Dr. Arce's request in consultation for the evaluation and treatment of a mass of the RIGHT upper lobe.    HPI  Dk Randhawa is a 68 year old male patient who presents with an enlarging right upper lobe lung mass. This was found on workup of hemoptysis/bloody nose. His weight has been stable.    He bikes 5 miles daily. He walks outside every day. He is on Xarelto for atrial fibrillation.    ECOG performance status  0- Fully active, without restriction                 Previsit Tests   PFT (04/12/2024): FEV1 122% predicted, 3.86 L, DLCO 84%  Pathology (04/16/2024): Right upper lobe: necrotic tissue with scattered atypical cells highly suspicious for malignancy  4L, 11L, 7, 11R, 4R all negative for malignancy.   CT scan (04/16/2024): RIGHT upper lobe 5.3 cm pulmonary Mass (4.9 cm on 4/7/2024), without mediastinal adenopathy, with no pleural effusion          TTE (04/07/2024): Normal EF, normal RV size and function, no significant VHD.   Covid vaccination status: Vaccinated    PMH  Reviewed, as below    Past Medical History:   Diagnosis Date    Antiplatelet or antithrombotic long-term use     Arrhythmia     Hypertension         PSH  Reviewed, as below    Past Surgical History:   Procedure Laterality Date    BRONCHOSCOPY, WITH BIOPSY, ROBOT ASSISTED Bilateral 4/16/2024    Procedure: BRONCHOSCOPY, endobronchial ultrasound, transbronchial needle aspiration, endobronchial biopsies and tumor debulking;  Surgeon: Lanette Arce MD;  Location: [facility]    ORTHOPEDIC SURGERY      PHACOEMULSIFICATION WITH STANDARD INTRAOCULAR LENS IMPLANT Left 2/26/2018    Procedure:  PHACOEMULSIFICATION WITH STANDARD INTRAOCULAR LENS IMPLANT;  Left Cataract Removal with Implant;  Surgeon: Mohit Victor MD;  Location: [facility]    PHACOEMULSIFICATION WITH STANDARD INTRAOCULAR LENS IMPLANT Right 1/30/2019    Procedure: Cataract Removal with Implant;  Surgeon: Mohit Victor MD;  Location: [facility]        No Known Allergies    Current Outpatient Medications   Medication Sig Dispense Refill    acetaminophen (TYLENOL) 325 MG tablet Take 325-650 mg by mouth every 6 hours as needed for mild pain      diphenhydrAMINE-acetaminophen (TYLENOL PM)  MG tablet Take 1 tablet by mouth nightly as needed for sleep      HEMP OIL OR EXTRACT OR OTHER CBD CANNABINOID, NOT MEDICAL CANNABIS, CBD gummies      losartan (COZAAR) 100 MG tablet Take 1 Tablet (100 mg) by mouth once daily*      Melatonin 10 MG TABS tablet Take 10 mg by mouth nightly as needed for sleep      metoprolol succinate ER (TOPROL XL) 100 MG 24 hr tablet Take 1 tablet by mouth daily      multivitamin (CENTRUM SILVER) tablet Take 1 tablet by mouth daily      Turmeric (QC TUMERIC COMPLEX PO) Tumeric and ginger with applie cider 1410mg      UNABLE TO FIND MEDICATION NAME: osteo bi flex      UNABLE TO FIND MEDICATION NAME: Emergen-e 1000mg      XARELTO ANTICOAGULANT 20 MG TABS tablet Take 1 Tablet (20 mg) by mouth once daily with evening meal*       No current facility-administered medications for this visit.       ETOH: None  TOBACCO: ages 15 - 56. 1/2 pack per day. Quit 10 years ago.   OTHER DRUGS: None    Physical examination  BP (!) 132/91 (BP Location: Right arm, Patient Position: Sitting, Cuff Size: Adult Large)   Pulse 103   Temp 98  F (36.7  C) (Oral)   Resp 20   Ht 1.829 m (6')   Wt 110.1 kg (242 lb 12.8 oz)   SpO2 96%   BMI 32.93 kg/m      Physical Exam  Constitutional:       Appearance: Normal appearance.   Cardiovascular:      Rate and Rhythm: Rhythm irregular.   Pulmonary:      Effort: Pulmonary effort is normal.    Skin:     General: Skin is warm and dry.   Neurological:      Mental Status: He is alert and oriented to person, place, and time.   Psychiatric:         Mood and Affect: Mood normal.         Behavior: Behavior normal.         Thought Content: Thought content normal.         Judgment: Judgment normal.          From a personal perspective, he is a retired /.  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 He lives alone. He has a daughter and a son. He and his brother help each other out.    Code Status: Full Code    IMPRESSION   68 year old male patient with a RIGHT upper lobe mass. This is suspicious for a primary lung cancer. He is a good surgical candidate.    Stage: Indeterminate pulmonary lesion, IF this were a cancer, clinical stage IIB    PLAN  I spent 30 min on the date of the encounter in chart review, patient visit, review of tests, documentation and/or discussion with other providers about the issues documented above. I reviewed the plan as follows:  Procedure planned: Robotic-assisted right upper lobectomy, mediastinal lymph node dissection.  I discussed the risks and benefits of the operation, including obtaining a diagnosis, resecting and staging the cancer. The risks include bleeding, infection, arrhythmia requiring medication or anticoagulation, prolonged air leak, chylothorax, deep venous thrombosis and pulmonary embolism, and death.  There is also a risk of prolonged pain, which could require further treatment.  Prolonged air leak could be treated with bronchoscopy and endobronchial valve insertion  or going home with a chest tube.  Postoperative bleeding (rare) could require a return to the OR.   Necessary Preop Tests & Appointments: Preoperative assessment clinic, PET scan, and MRI Brain  Regional Anesthesia Plan: Intraoperative intercostal nerve block  Anticoagulation Plan: Prophylactic Lovenox  I appreciate the  opportunity to participate in the care of your patient and will keep you updated.  Sincerely,    Mikael Peoples MD    08-Apr-2021 18:36

## 2024-04-23 NOTE — NURSING NOTE
Oncology Rooming Note    April 23, 2024 1:20 PM   Dk Waldropyoshi McgarryBay is a 68 year old male who presents for:    Chief Complaint   Patient presents with    Oncology Clinic Visit     Mass of upper lobe of right lung     Initial Vitals: BP (!) 132/91 (BP Location: Right arm, Patient Position: Sitting, Cuff Size: Adult Large)   Pulse 103   Temp 98  F (36.7  C) (Oral)   Resp 20   Wt 110.1 kg (242 lb 12.8 oz)   SpO2 96%   BMI 32.93 kg/m   Estimated body mass index is 32.93 kg/m  as calculated from the following:    Height as of 4/16/24: 1.829 m (6').    Weight as of this encounter: 110.1 kg (242 lb 12.8 oz). Body surface area is 2.36 meters squared.  No Pain (0) Comment: Data Unavailable   No LMP for male patient.  Allergies reviewed: Yes  Medications reviewed: Yes    Medications: Medication refills not needed today.  Pharmacy name entered into [facility]:    [id number] - [place], [place] - [address] [place] [address] [id number] - [place] - [address] [facility]    Frailty Screening:   Is the patient here for a new oncology consult visit in cancer care? 1. Yes. Over the past month, have you experienced difficulty or required a caregiver to assist with:   1. Balance, walking or general mobility (including any falls)? NO  2. Completion of self-care tasks such as bathing, dressing, toileting, grooming/hygiene?  NO  3. Concentration or memory that affects your daily life?  NO       Clinical concerns: none      Agata Agarwal, EMT  4/23/2024

## 2024-04-30 NOTE — TELEPHONE ENCOUNTER
FUTURE VISIT INFORMATION      SURGERY INFORMATION:  Date: 5/15/24  Location:  or  Surgeon:  Mikael Peoples MD   Anesthesia Type:  general  Procedure: Robot-assisted right thoracoscopic  lobectomy, mediastinal lymph node dissection   Consult: ov 4/23/24    RECORDS REQUESTED FROM:       Primary Care Provider: Esteban Copeland MD  - [facility]    Pertinent Medical History: ATrial fibrillation    Most recent EKG+ Tracing: 10/23/23- [facility]    Most recent ECHO: 10/31/23- [facility]    Most recent PFT's: 4/12/24

## 2024-04-30 NOTE — TELEPHONE ENCOUNTER
Spoke with patient to schedule procedure with Dr. Peoples   Procedure was scheduled on 5/15 at  [facility]  Patient will have H&P with PAC    Informed pt of surgery details:  Date:    Wednesday, May 15, 2024  Arrival Time:  10:00 AM    Pt is aware surgery start time may change, and someone will reach out with the new arrival time, if so.    Patient is aware a COVID-19 test is needed before their procedure ONLY IF symptomatic.   (Patient is aware Thoracic is no longer requiring COVID-19 test)       Patient is aware a / is needed day of surgery.   Surgery Letter was sent via [employer],     Patient has my direct contact information for any further questions.

## 2024-05-02 NOTE — PROGRESS NOTES
"Called patient to see if he would like to be involved with Dr. Castro's research study. Patient stated \"I just have too much on my plate right now\".  Writer thanked patient for his time, he did not have any other questions or concerns.  "

## 2024-05-03 NOTE — H&P
Pre-Operative H & P     CC:  Preoperative exam to assess for increased cardiopulmonary risk while undergoing surgery and anesthesia.    Date of Encounter: 5/3/2024  Primary Care Physician:  Esteban Copeland     Reason for visit:   Encounter Diagnoses   Name Primary?    Preop examination Yes    Mass of upper lobe of right lung     Atrial fibrillation with RVR (H)        HPI  Dk Randhawa Sr. is a 68 year old male who presents for pre-operative H & P in preparation for  Procedure Information       Case: [id number] Date/Time: 05/15/24 1200    Procedures:       Robot-assisted right thoracoscopic  lobectomy, (Right: Chest)      mediastinal lymph node dissection (Chest)    Anesthesia type: General    Diagnosis: Mass of upper lobe of right lung [R91.8]    Pre-op diagnosis: Mass of upper lobe of right lung [R91.8]    Location:  OR [address] OR    Providers: Mikael Peoples MD            The patient presents to the PAC in person today in preparation for the above scheduled procedure with comorbid conditions including atrial fibrillation with h/o rapid ventricular response, HTN and prediabetic.     The patient was seen by Dr. Peoples on 4/23/24 in thoracic surgery consultation for the evaluation and treatment of a mass of the RIGHT upper lobe. Through Dr. Peoples's evaluation, the patient was found to have an RIGHT upper lobe mass that is suspicious for a primary lung cancer.  Dr. Peoples counseled the patient of the findings and treatment options.  The patient has now been scheduled for the procedure as listed above.      History is obtained from the patient and chart review    Hx of abnormal bleeding or anti-platelet use: Xarelto      Past Medical History  Past Medical History:   Diagnosis Date    Antiplatelet or antithrombotic long-term use     Arrhythmia     Hypertension        Past Surgical History  Past Surgical History:   Procedure Laterality Date    BRONCHOSCOPY, WITH BIOPSY, ROBOT ASSISTED Bilateral 4/16/2024     Procedure: BRONCHOSCOPY, endobronchial ultrasound, transbronchial needle aspiration, endobronchial biopsies and tumor debulking;  Surgeon: Lanette Arce MD;  Location: [facility] OR    ORTHOPEDIC SURGERY      PHACOEMULSIFICATION WITH STANDARD INTRAOCULAR LENS IMPLANT Left 2/26/2018    Procedure: PHACOEMULSIFICATION WITH STANDARD INTRAOCULAR LENS IMPLANT;  Left Cataract Removal with Implant;  Surgeon: Mohit Victor MD;  Location: [facility] OR    PHACOEMULSIFICATION WITH STANDARD INTRAOCULAR LENS IMPLANT Right 1/30/2019    Procedure: Cataract Removal with Implant;  Surgeon: Mohit Victor MD;  Location: [facility] OR       Prior to Admission Medications  Current Outpatient Medications   Medication Sig Dispense Refill    acetaminophen (TYLENOL) 325 MG tablet Take 325-650 mg by mouth every 6 hours as needed for mild pain      diphenhydrAMINE-acetaminophen (TYLENOL PM)  MG tablet Take 1 tablet by mouth nightly as needed for sleep      fexofenadine (ALLEGRA) 180 MG tablet Take 180 mg by mouth daily      HEMP OIL OR EXTRACT OR OTHER CBD CANNABINOID, NOT MEDICAL CANNABIS, CBD gummies      losartan (COZAAR) 100 MG tablet Take 100 mg by mouth every morning      Melatonin 10 MG TABS tablet Take 10 mg by mouth nightly as needed for sleep      metoprolol succinate ER (TOPROL XL) 100 MG 24 hr tablet Take 1 tablet by mouth every morning      multivitamin (CENTRUM SILVER) tablet Take 1 tablet by mouth daily      Turmeric (QC TUMERIC COMPLEX PO) Tumeric and ginger with applie cider 1410mg      UNABLE TO FIND MEDICATION NAME: osteo bi flex      UNABLE TO FIND MEDICATION NAME: Emergen-e 1000mg      XARELTO ANTICOAGULANT 20 MG TABS tablet Take 20 mg by mouth daily (with lunch)         Allergies  No Known Allergies    Social History  Social History     Socioeconomic History    Marital status:      Spouse name: Not on file    Number of children: Not on file    Years of education: Not on file    Highest education  level: Not on file   Occupational History    Not on file   Tobacco Use    Smoking status: Former     Current packs/day: 0.00     Types: Cigarettes     Quit date: 2014     Years since quitting: 10.3    Smokeless tobacco: Never   Substance and Sexual Activity    Alcohol use: Not Currently    Drug use: No    Sexual activity: Not on file   Other Topics Concern    Parent/sibling w/ CABG, MI or angioplasty before 65F 55M? Not Asked   Social History Narrative    Not on file     Social Determinants of Health     Financial Resource Strain: Low Risk  (10/23/2023)    [facility]    Financial Resource Strain     Difficulty of Paying Living Expenses: 3     Difficulty of Paying Living Expenses: Not on file   Food Insecurity: No Food Insecurity (10/23/2023)    [facility] [employer] [facility]    Food Insecurity     Worried About Running Out of Food in the Last Year: 1   Transportation Needs: No Transportation Needs (10/23/2023)    [facility]    Transportation Needs     Lack of Transportation (Medical): 1   Physical Activity: Not on file   Stress: Not on file   Social Connections: Socially Integrated (10/23/2023)    [facility] [employer] [facility]    Social Connections     Frequency of Communication with Friends and Family: 0   Interpersonal Safety: Not on file   Housing Stability: Low Risk  (10/23/2023)    [facility]    Housing Stability     Unable to Pay for Housing in the Last Year: 1       Family History  No family history on file.    Review of Systems  The complete review of systems is negative other than  noted in the HPI or here.   Anesthesia Evaluation   Pt has had prior anesthetic. Type: General and MAC.    No history of anesthetic complications       ROS/MED HX  ENT/Pulmonary: Comment: Reports that he had had a sinus infection and subsequently some blood that evolved into hemoptysis.  He sought medical attention.  CXR was done that revealed the RUL lung mass which ultimately led to further work-up as outlined in chart.  Patient also reports that he was treated with antibiotics for a sinus infection for 7 days. Since completing antibiotics, his symptoms have improved but continues to have clear sinus drainage in the morning.  He has started to take Allegra for possible allergies.  Is not sure if it is lingering sinus infection or allergies.    (+)     BERNARDINO risk factors, snores loudly, hypertension,         tobacco use (Quit 2012), Past use,                    (-) asthma   Neurologic:    (-) no seizures, no CVA, no TIA and migraines   Cardiovascular: Comment: Denies cardiac symptoms including chest pain, SOB, palpitations, syncope, SOFIA, orthopnea, or PND.        (+)  hypertension- -   -  - -   Taking blood thinners                     dysrhythmias, a-fib,        Previous cardiac testing     METS/Exercise Tolerance: >4 METS Comment: Rides exercise bike 5 miles most days of the week.    Hematologic:    (-) history of blood clots, anemia and history of blood transfusion   Musculoskeletal:   (+)  arthritis (s/p left HEYDI 4/12/2022.),             GI/Hepatic:    (-) GERD   Renal/Genitourinary:    (-) renal disease and nephrolithiasis   Endo: Comment: Record indicates prediabetes.     (+)               Obesity,       Psychiatric/Substance Use:     (+)    H/O chronic opiod use  (CBD edibles for insomnia.). Recreational drug usage: Cannabis. (-) psychiatric history and alcohol abuse history   Infectious Disease:       Malignancy:   (+) Malignancy (Mass RUL),     Other:  - neg other ROS          /77 (BP Location: Right  arm, Patient Position: Sitting, Cuff Size: Adult Large)   Pulse 65   Temp 97.8  F (36.6  C) (Oral)   Resp 16   Ht 1.829 m (6')   Wt 112.1 kg (247 lb 3.2 oz)   SpO2 96%   BMI 33.53 kg/m      Physical Exam   Constitutional: Awake, alert, cooperative, no apparent distress, and appears stated age.  Eyes: Pupils equal, round and reactive to light, extra ocular muscles intact, sclera clear, conjunctiva normal.  HENT: Normocephalic, oral pharynx with moist mucus membranes, dentition intact with left upper central incisor chipped.  No goiter appreciated.   Respiratory: Clear to auscultation bilaterally, no crackles or wheezing.  Cardiovascular: Regular rate and rhythm, normal S1 and S2, and no murmur noted.  Carotids +2, no bruits. No edema. Palpable pulses to radial  DP and PT arteries.   GI: Normal bowel sounds, soft, non-distended, non-tender, no masses palpated, no hepatosplenomegaly.   Lymph/Hematologic: No cervical lymphadenopathy and no supraclavicular lymph  adenopathy.  Skin: Warm and dry.    Musculoskeletal: Full ROM of neck. There is no redness, warmth, or swelling of the joints. Gross motor strength is normal.    Neurologic: Awake, alert, oriented to name, place and time. Cranial nerves II-XII are grossly intact. Gait is normal.   Neuropsychiatric: Calm, cooperative. Normal affect.     Prior Labs/Diagnostic Studies   All labs and imaging personally reviewed    Latest Reference Range & Units 04/07/24 12:51   Sodium 135 - 145 mmol/L 134 (L)   Potassium 3.4 - 5.3 mmol/L 4.5   Chloride 98 - 107 mmol/L 98   Carbon Dioxide (CO2) 22 - 29 mmol/L 25   Urea Nitrogen 8.0 - 23.0 mg/dL 17.6   Creatinine 0.67 - 1.17 mg/dL 0.94   GFR Estimate >60 mL/min/1.73m2 88   Calcium 8.8 - 10.2 mg/dL 8.8   Anion Gap 7 - 15 mmol/L 11   Albumin 3.5 - 5.2 g/dL 4.3   Protein Total 6.4 - 8.3 g/dL 7.3   Alkaline Phosphatase 40 - 150 U/L 94   ALT 0 - 70 U/L 16   AST 0 - 45 U/L 22   Bilirubin Total <=1.2 mg/dL 0.3   Glucose 70 - 99 mg/dL  105 (H)   Lipase 13 - 60 U/L 42   WBC 4.0 - 11.0 10e3/uL 15.1 (H)   Hemoglobin 13.3 - 17.7 g/dL 14.6   Hematocrit 40.0 - 53.0 % 43.2   Platelet Count 150 - 450 10e3/uL 266   RBC Count 4.40 - 5.90 10e6/uL 4.86   MCV 78 - 100 fL 89   MCH 26.5 - 33.0 pg 30.0   MCHC 31.5 - 36.5 g/dL 33.8   RDW 10.0 - 15.0 % 13.2   % Neutrophils % 82   % Lymphocytes % 9   % Monocytes % 6   % Eosinophils % 2   % Basophils % 1   Absolute Basophils 0.0 - 0.2 10e3/uL 0.1   Absolute Eosinophils 0.0 - 0.7 10e3/uL 0.2   Absolute Immature Granulocytes <=0.4 10e3/uL 0.1   Absolute Lymphocytes 0.8 - 5.3 10e3/uL 1.3   Absolute Monocytes 0.0 - 1.3 10e3/uL 1.0   % Immature Granulocytes % 1   Absolute Neutrophils 1.6 - 8.3 10e3/uL 12.4 (H)   Absolute NRBCs 10e3/uL 0.0   NRBCs per 100 WBC <1 /100 0   INR 0.85 - 1.15  1.16 (H)   (L): Data is abnormally low  (H): Data is abnormally high        PROCEDURES  CT chest without contrast     History:  Mass RUL     Comparison: 4/7/2024     Findings: Slight increase in size of 5.3 cm mass in right upper lobe  compared to 4.9 cm on 4/7/2024. Moderate upper lung paraseptal  emphysema. No enlarged mediastinal, hilar or axillary adenopathy.  Moderate coronary calcification. Heart is not enlarged. Main pulmonary  artery and aorta are not enlarged. No pleural pericardial effusion.     Evaluation of the upper abdomen is limited.     Bones: Degenerative changes of the spine.                                                                      IMPRESSION: Slight increase in size of 5.3 cm right upper lobe mass  compatible with malignancy until proven otherwise,  suggest tissue sampling     PHYLLIS YEPEZ MD      Echocardiogram 10/2023   Final Conclusion    1. Normal left ventricular chamber size.Mild concentrically increased left ventricular wall thickness.  Normal left ventricular systolic function.Estimated left ventricular ejection fraction is 50-55%.  No regional wall motion abnormalities.    2.Normal right  ventricular size and systolic function.    Right ventricular systolic pressure cannot be estimated due to inability to detect peak   tricuspid regurgitation Doppler velocity.    3.Trileaflet aortic valve.Mildly thickened aortic valve.    Aortic valve sclerosis without stenosis.No aortic valve regurgitation.    4.No pericardial effusion.    5.Aortic sinus of Valsalva is normal in size (3.7 cm, ZScore = -0.61).Ascending aorta was not   well visualized.    There were no prior studies available for comparison.    Estimated EF: 50-55%     Holter 11/2023          EKG 10/2023  Atrial fibrillation with RVR at 102 BPM      Latest Ref Rng & Units 4/12/2024    10:31 AM   PFT   FVC L 4.95    FEV1 L 3.86    FVC% % 119    FEV1% % 122          The patient's records and results personally reviewed by this provider.     Outside records reviewed from: Care Everywhere    LAB/DIAGNOSTIC STUDIES TODAY:     Latest Reference Range & Units 05/03/24 08:26   WBC 4.0 - 11.0 10e3/uL 19.9 (H)   Hemoglobin 13.3 - 17.7 g/dL 14.4   Hematocrit 40.0 - 53.0 % 42.8   Platelet Count 150 - 450 10e3/uL 281   RBC Count 4.40 - 5.90 10e6/uL 4.86   MCV 78 - 100 fL 88   MCH 26.5 - 33.0 pg 29.6   MCHC 31.5 - 36.5 g/dL 33.6   RDW 10.0 - 15.0 % 13.5   (H): Data is abnormally high     Latest Reference Range & Units 05/03/24 08:27   Sodium 135 - 145 mmol/L 133 (L)   Potassium 3.4 - 5.3 mmol/L 5.1   Chloride 98 - 107 mmol/L 98   Carbon Dioxide (CO2) 22 - 29 mmol/L 23   Urea Nitrogen 8.0 - 23.0 mg/dL 19.4   Creatinine 0.67 - 1.17 mg/dL 0.86   GFR Estimate >60 mL/min/1.73m2 >90   Calcium 8.8 - 10.2 mg/dL 9.2   Anion Gap 7 - 15 mmol/L 12   Glucose 70 - 99 mg/dL 107 (H)   (L): Data is abnormally low  (H): Data is abnormally high    Assessment    Dk HUNTER Randhawa Sr. is a 68 year old male seen as a PAC referral for risk assessment and optimization for anesthesia.    Plan/Recommendations  Pt will be optimized for the proposed procedure.  See below for details on the  assessment, risk, and preoperative recommendations    ACCESS  - Difficult stick>>has needed US in the past.     NEUROLOGY  - No history of TIA, CVA or seizure    -Post Op delirium risk factors:  No risk identified    ENT  - Treated for sinus infection with lingering sinus drainage in the morning.  Unsure if allergies or sinus infection.  Taking allegra for symptoms and seem to be helping.  Referred to PCP to be seen within the next few days for further evaluation.     - No current airway concerns.  Will need to be reassessed day of surgery.  Left   Mallampati: I  TM: > 3    CARDIAC  - Denies h/o CAD and denies any cardiac symptoms    - Afib   ~ continue metoprolol DOS.   ~ hold Xarelto for 3 days prior   ~ [id number] score:  2    - Hypertension   ~ Well controlled   ~ metoprolol and losartan   ~ hold losartan DOS     - METS (Metabolic Equivalents):  rides exercise bike for 5 miles most days of the week.    Patient performs 4 or more METS exercise without symptoms             Total Score: 0      - RCRI-Low risk: Class 2 0.9% complication rate             Total Score: 1    RCRI: High Risk Surgery        PULMONARY  - Mass of RUL   ~ above procedure scheduled with ERAS protocol    ~ preoperative labs today with T&S scheduled for DOS 2/2 to it to being at [facility].     - BERNARDINO Medium Risk             Total Score: 4    BERNARDNIO: Snores loudly    BERNARDINO: Hypertension    BERNARDINO: Over 50 ys old    BERNARDINO: Male      - Denies asthma or inhaler use    - Tobacco History    History   Smoking Status    Former    Types: Cigarettes   Smokeless Tobacco    Never       GI  - Denies h/o GERD    PONV Medium Risk  Total Score: 2           1 AN PONV: Patient is not a current smoker    1 AN PONV: Intended Post Op Opioids        /RENAL  - Baseline Creatinine  see above     ENDOCRINE    - BMI: Estimated body mass index is 33.53 kg/m  as calculated from the following:    Height as of this encounter: 1.829 m (6').    Weight as of this encounter: 112.1 kg  (247 lb 3.2 oz).  Obesity (BMI >30)    - Record indicates prediabetes    HEME  - VTE High Risk 3%             Total Score: 8    VTE: Greater than 59 yrs old    VTE: Male    VTE: Current cancer      - Coagulopathy second to Rivaroxaban (Xarelto)   ~ hold for 3 days prior for possible of block    - Denies a h/o anemia or previous blood transfusion      MSK  - Osteoarthritis s/p left TKA    - Patient is NOT Frail             Total Score: 0        Different anesthesia methods/types have been discussed with the patient, but they are aware that the final plan will be decided by the assigned anesthesia provider on the date of service.    The patient is optimized for their procedure. AVS with information on surgery time/arrival time, meds and NPO status given by nursing staff. No further diagnostic testing indicated.      On the day of service:     Prep time: 7 minutes  Visit time: 23 minutes  Documentation time: 26 minutes  ------------------------------------------  Total time: 56 minutes      DIEGO Marcelo CNP  Preoperative Assessment Center  [facility]  Clinic and Surgery Center  Phone: [phone]  Fax: [phone]

## 2024-05-03 NOTE — PATIENT INSTRUCTIONS
Name:  Dk Randhawa Sr.   MRN:  [id number]   :  1955   Today's Date:  5/3/2024         You were seen today for a pre-operative assessment in the:    Pre-operative Anesthesia Assessment Center(PAC)  [facility]  [address] [place] [id number]  phone [phone]      You will be receiving a call with location, date, arrival time and diet instructions from Preadmission Nursing at your surgical site:    -[facility]: [phone]   -[facility]: [phone]  -[facility]: [phone]      -[facility]: [phone]      -[facility]: [phone]  -[facility]: [phone]  -[facility]: [phone]        Anesthesia recommendations for medications:    Hold Aspirin for 7 days before procedure.  Hold Multivitamins for 7 days before procedure.   Hold Herbal medications and Supplements for 7 days before procedure. (Turmeric, Osteo bi Flex, Emergence-E)  Hold Ibuprofen for 1 day before procedure.   Hold Naproxen for 4 days before procedure.     No alcohol or cannabis products for 24 hours before your procedure       Special instructions for anticoagulation medications:    Plan for Xarelto-----Hold for  3 days before surgery ---take last dose on 24        Please DO NOT take the following medications the day of procedure:  Losartan        Please take these medications the day of procedure:  Metoprolol           How do I prepare myself?  - Please take 2 showers (one the night prior to surgery and one the morning of surgery) using Scrubcare or Hibiclens soap.    Use this soap only from the neck to your toes.     Leave the soap on your skin for one minute--then rinse thoroughly.      You may use your own shampoo and conditioner. No other hair products.   - Please remove all jewelry and body piercings.  - No lotions, deodorants or fragrance.  - No makeup or fingernail polish.   - Bring your ID and insurance  card.    -If you have a Deep Brain Stimulator, a Spinal Cord Stimulator, or any implanted Neuro Device, you must bring the remote to your appointment       Enhanced Recovery After Surgery     This is a team effort, including you, to get you back on your feet, eating and drinking      normally and out of the hospital as quickly as possible.  The goals are: 1) NO INFECTIONS and   2) RETURN TO NORMAL DIET    How can we achieve these goals?  1) STAY ACTIVE: Walk every day before your surgery; try to increase the amount every day.  Walk after surgery as much as you can-the nurses will help you.  Walking speeds healing and gets you home quicker, you heal better at home and have less risk of infection.     2) INCENTIVE SPIROMETER: Practice your incentive spirometer 4 times per day with 5 repetitions each time.  Using the incentive spirometer can strengthen your muscles between your ribs and help you have a strong cough after surgery.  A more effective cough can help prevent problems with your lungs.    3) STAY HYDRATED: Drink clear liquids up until 2 hours prior to arrival.     We would like you to purchase a drink such as Gatorade or Ensure Clear (not the milkshake type).  Drink this before bedtime and the morning of surgery, drink between 8-10 ounces or until you feel hydrated.      Keeping well hydrated leads to your veins being plump, you wake up faster, and you are less likely to be nauseated. Start drinking water as soon as you can after surgery and advance to clear liquids and food as tolerated.  IV fluids contain salt, drinking fluids will minimize the amount of IV fluids you need and decrease the amount of salt you get.    The most common reason for the patient to be readmitted is dehydration. Staying hydrated after you go home from the hospital is very important.  Ensure or Ensure Clear are good options to keep you hydrated.     4) PAIN MANAGEMENT: If we minimize the amount of opioids and narcotics, and use  regional blocks (which numb the area where your surgery is) along with oral pain medications; you will have less side effects of nausea and constipation. Narcotics can slow down your bowels and cause you to stay in the hospital longer.     Our goal is to keep you comfortable; eating and drinking normally and back home safely.               For further questions regarding your surgery please call your surgeon's office.

## 2024-05-04 NOTE — ED TRIAGE NOTES
"Diagnosed with lung mass, started coughing up blood today. Started doxy for a \" sinus issue\" on Friday. On Xeralto for afib.      Triage Assessment (Adult)       Row Name 05/04/24 4125          Triage Assessment    Airway WDL WDL        Respiratory WDL    Respiratory WDL WDL        Skin Circulation/Temperature WDL    Skin Circulation/Temperature WDL WDL        Cardiac WDL    Cardiac WDL WDL        Peripheral/Neurovascular WDL    Peripheral Neurovascular WDL WDL        Cognitive/Neuro/Behavioral WDL    Cognitive/Neuro/Behavioral WDL WDL                     "

## 2024-05-04 NOTE — ED PROVIDER NOTES
[facility] EMERGENCY DEPT  PHYSICIAN NOTE    MRN: [id number]    FINAL IMPRESSION     1. Hemoptysis          ED COURSE & MDM     Patient presented for hemoptysis.  Initial vital signs notable for hypertension.  Patient's hemoptysis is not unexpected in the setting of a rapidly growing lung mass and anticoagulation with Xarelto.  He is not having any respiratory distress and the hemoptysis is still streaks in his sputum rather than marisol hemoptysis, so I do not believe any urgent intervention is indicated.  PE is highly unlikely given his anticoagulation status and no signs of DVT on exam.  The sputum is white with red streaks, not generally pink, which makes pulmonary edema unlikely, especially without any abnormal lung sounds on exam.  He had chest imaging 2 days ago that showed no signs of pneumonia, and it is unlikely that he would have developed such a significant infiltrate to cause additional hemoptysis in such a short amount of time.  Additionally he is already on doxycycline for a sinus infection, so if this were to be an atypical pneumonia he would be already getting adequate antibiosis.  Patient expressed that his main concern was communicating with the pulmonologist who performed his bronchoscopy.  I spoke with Dr. Cotto of interventional pulmonology at the  who agrees that there is not much else to be done since this is a common symptom with lung cancer.  He did suggest that if the hemoptysis is particularly distressing to the patient, he could stop his Xarelto now since he will be stopping it for surgery in a few days anyway.  I discussed this with the patient, including the stop his Xarelto does slightly increase his risk of stroke.  He understands and is more distressed by the hemoptysis and would prefer to stop the Xarelto now.  Patient discharged in stable condition.  Return precautions provided.  All questions  answered.      ===================================================================    HPI     Dk Randhawa Sr. is a 68 year old male with relevant PMH significant for hypertension, hyperlipidemia, and atrial fibrillation on Xarelto presenting with hemoptysis.  Patient states he has been having trace amounts of hemoptysis since his bronchoscopy on 4/17/2024, but today it has been significantly more blood in his sputum.  He is concerned that this may have something to do with the doxycycline he started yesterday for sinus infection.  He is scheduled for surgery on 5/15/2024.  He denies dyspnea, fever, chest pain, headache, neck pain, nausea, vomiting, or abdominal pain.  He tried calling the doctor who performed his bronchoscopy but was unable to reach her, so he came to the ED.    I reviewed applicable documentation in the patient's chart including chest imaging from 4/7/2024, 4/16/2024, and 5/2/2024, showing progression of the right upper lung mass from 4.5 cm to 5.3 cm to 5.9 cm.    ROS  All other ROS negative.    Problem list, medications, allergies, PMH, PSH, family history, and social history reviewed and updated as able in Epic.      PHYSICAL EXAM     Vitals:    05/04/24 1617 05/04/24 1730 05/04/24 1800 05/04/24 1830   BP: (!) 196/105 (!) 153/95 (!) 149/96 (!) 145/105   Pulse: 98 109 97 105   Resp: 16      Temp: 97  F (36.1  C)      TempSrc: Tympanic      SpO2: 98% 97% 99% 94%   Weight: 113.4 kg (250 lb)      Height: 1.829 m (6')           Constitutional: Alert, no acute distress.  HENT: Normocephalic, atraumatic.  Eyes: Sclera anicteric, EOMI.  Neck: Supple, full ROM. No JVD.  CV: Normal rate, regular rhythm. Peripheral pulses intact and symmetric.  Pulm: Non-labored respirations. Inspiratory breath sounds clear and full bilaterally without rales or rhonchi. No expiratory wheezing.  Abdomen: Soft, non-tender.  MSK: No edema or calf tenderness.  Neuro: Oriented to person, place, and time. Normal speech. No  focal deficits.  Skin: Warm and dry, no rash.  Psych: Cooperative, able to follow commands. Intact attention.      TESTING   All testing reviewed and independently interpreted.    EKG  None    LABS  Labs Ordered and Resulted from Time of ED Arrival to Time of ED Departure - No data to display    IMAGING  No orders to display              David Pedroza MD  05/04/24 2014

## 2024-05-05 NOTE — DISCHARGE INSTRUCTIONS
It is a common thing for people in your situation to cough up some blood.  We get more concerned about this if it starts making it hard for you to breathe.  If this happens, come back to the ER right away.    Otherwise you can stop taking your Xarelto now since you will be stopping it in a few days anyway.  This will slightly increase your risk of stroke from the atrial fibrillation, but it would make it more likely that the coughing up blood will decrease or stop.  Continue to follow-up with your doctors for ongoing management of your care, including your upcoming surgery.

## 2024-05-13 NOTE — PROGRESS NOTES
PTA medications updated by Medication Scribe prior to surgery via phone call with patient (last doses completed by Nurse)     Medication history sources: Patient, Surescripts, and H&P  In the past week, patient estimated taking medication this percent of the time: Greater than 90%      Significant changes made to the medication list:  None      Additional medication history information:   None    Medication reconciliation completed by provider prior to medication history? No    Time spent in this activity: 25 minutes    The information provided in this note is only as accurate as the sources available at the time of update(s)      Prior to Admission medications    Medication Sig Last Dose Taking? Auth Provider Long Term End Date   ACETAMINOPHEN PO Take 1,300 mg by mouth as needed for mild pain 5/11/2024 at prn Yes Reported, Patient     diphenhydrAMINE-acetaminophen (TYLENOL PM)  MG tablet Take 2 tablets by mouth nightly as needed for sleep  at pm Yes Reported, Patient     fexofenadine (ALLEGRA) 180 MG tablet Take 180 mg by mouth daily 5/11/2024 at am Yes Reported, Patient     HEMP OIL OR EXTRACT OR OTHER CBD CANNABINOID, NOT MEDICAL CANNABIS, CBD gummies  at pm Yes Reported, Patient     losartan (COZAAR) 100 MG tablet Take 100 mg by mouth every morning  at am Yes Reported, Patient Yes    Melatonin 10 MG TABS tablet Take 10 mg by mouth nightly as needed for sleep  at pm Yes Reported, Patient     metoprolol succinate ER (TOPROL XL) 100 MG 24 hr tablet Take 1 tablet by mouth every morning  at am Yes Reported, Patient     multivitamin (CENTRUM SILVER) tablet Take 1 tablet by mouth daily 5/1/2024 at pm Yes Reported, Patient     Turmeric (QC TUMERIC COMPLEX PO) Tumeric and ginger with applie cider 1410mg 5/1/2024 at pm Yes Reported, Patient     UNABLE TO FIND MEDICATION NAME: osteo bi flex 5/1/2024 at pm Yes Reported, Patient     UNABLE TO FIND MEDICATION NAME: Emergen-e 1000mg 5/1/2024 at pm Yes Reported, Patient      XARELTO ANTICOAGULANT 20 MG TABS tablet Take 20 mg by mouth daily (with lunch) 5/4/2024 at pm Yes Reported, Patient Yes        Medication history completed by: Dennis Chaidez

## 2024-05-13 NOTE — PROGRESS NOTES
Spoke with Dk about his Xarelto. He has not taken his Xarelto since his visit to the [place] 5/4/24. We went to the ED for hemoptysis, he stated he has not coughed up any blood since 5/4/24. He will continue to hold the Xarelto until after surgery. He has been staying hydrated, no further questions or concerns.

## 2024-05-15 PROBLEM — R91.8 LUNG MASS: Status: ACTIVE | Noted: 2024-01-01

## 2024-05-15 NOTE — ANESTHESIA PREPROCEDURE EVALUATION
Anesthesia Pre-Procedure Evaluation    Patient: Dk Randhawa .   MRN: [id number] : 1955        Procedure : Procedure(s):  Robot-assisted right thoracoscopic  lobectomy,  mediastinal lymph node dissection          Past Medical History:   Diagnosis Date    Acute non-recurrent maxillary sinusitis     Antiplatelet or antithrombotic long-term use     Arrhythmia     Atrial fibrillation with RVR (H)     Cough secondary to angiotensin converting enzyme inhibitor (ACE-I)     Hyperlipidemia     Hypertension     Mass of upper lobe of right lung     Obesity       Past Surgical History:   Procedure Laterality Date    BRONCHOSCOPY, WITH BIOPSY, ROBOT ASSISTED Bilateral 2024    Procedure: BRONCHOSCOPY, endobronchial ultrasound, transbronchial needle aspiration, endobronchial biopsies and tumor debulking;  Surgeon: Lanette Arce MD;  Location: [facility] OR    ELBOW ARTHROSCOPY Left 2017    PHACOEMULSIFICATION WITH STANDARD INTRAOCULAR LENS IMPLANT Left 2018    Procedure: PHACOEMULSIFICATION WITH STANDARD INTRAOCULAR LENS IMPLANT;  Left Cataract Removal with Implant;  Surgeon: Mohit Victor MD;  Location: [facility] OR    PHACOEMULSIFICATION WITH STANDARD INTRAOCULAR LENS IMPLANT Right 2019    Procedure: Cataract Removal with Implant;  Surgeon: Mohit Victor MD;  Location: [facility] OR    TONSILLECTOMY  1964    TOTAL HIP ARTHROPLASTY Left 2022      No Known Allergies   Social History     Tobacco Use    Smoking status: Former     Current packs/day: 0.00     Types: Cigarettes     Quit date:      Years since quitting: 10.3    Smokeless tobacco: Never   Substance Use Topics    Alcohol use: Not Currently      Wt Readings from Last 1 Encounters:   24 113.4 kg (250 lb)        Anesthesia Evaluation   Pt has had prior anesthetic. Type: General ([place] 2 = grade 1 view).        ROS/MED HX  ENT/Pulmonary: Comment: RUL mass   (-) tobacco use, asthma, COPD and sleep apnea   Neurologic:  "   (-) no seizures and no CVA   Cardiovascular:     (+)  hypertension- -   -  - -   Taking blood thinners                     dysrhythmias, a-fib,        Previous cardiac testing   Echo: Date: Results:    Stress Test:  Date: Results:    ECG Reviewed:  Date: 5/24 Results:  Atrial fibrillation at 86 bpm  Cath:  Date: Results:   (-) CAD and CHF   METS/Exercise Tolerance:     Hematologic:       Musculoskeletal:       GI/Hepatic:    (-) GERD and liver disease   Renal/Genitourinary:    (-) renal disease   Endo:     (+)               Obesity,    (-) Type I DM and Type II DM   Psychiatric/Substance Use:       Infectious Disease:       Malignancy:       Other:            Physical Exam    Airway        Mallampati: II   TM distance: > 3 FB   Neck ROM: full   Mouth opening: > 3 cm    Respiratory Devices and Support         Dental  no notable dental history     (+) Minor Abnormalities - some fillings, tiny chips    B=Bridge, C=Chipped, L=Loose, M=Missing    Cardiovascular          Rhythm and rate: regular     Pulmonary           breath sounds clear to auscultation           OUTSIDE LABS:  CBC:   Lab Results   Component Value Date    WBC 19.9 (H) 05/03/2024    WBC 15.1 (H) 04/07/2024    HGB 14.4 05/03/2024    HGB 14.6 04/07/2024    HCT 42.8 05/03/2024    HCT 43.2 04/07/2024     05/03/2024     04/07/2024     BMP:   Lab Results   Component Value Date     (L) 05/03/2024     (L) 04/07/2024    POTASSIUM 5.1 05/03/2024    POTASSIUM 4.5 04/07/2024    CHLORIDE 98 05/03/2024    CHLORIDE 98 04/07/2024    CO2 23 05/03/2024    CO2 25 04/07/2024    BUN 19.4 05/03/2024    BUN 17.6 04/07/2024    CR 0.86 05/03/2024    CR 0.94 04/07/2024     (H) 05/03/2024     (H) 04/07/2024     COAGS:   Lab Results   Component Value Date    INR 1.16 (H) 04/07/2024     POC: No results found for: \"BGM\", \"HCG\", \"HCGS\"  HEPATIC:   Lab Results   Component Value Date    ALBUMIN 4.3 04/07/2024    PROTTOTAL 7.3 04/07/2024    ALT " 16 04/07/2024    AST 22 04/07/2024    ALKPHOS 94 04/07/2024    BILITOTAL 0.3 04/07/2024     OTHER:   Lab Results   Component Value Date    COLLINS 9.2 05/03/2024    LIPASE 42 04/07/2024       Anesthesia Plan    ASA Status:  3       Anesthesia Type: General.     - Airway: ETT   Induction: Intravenous, Propofol.   Maintenance: Balanced.   Techniques and Equipment:     - Airway: Double lumen ETT     - Lines/Monitors: 2nd IV, Arterial Line     Consents    Anesthesia Plan(s) and associated risks, benefits, and realistic alternatives discussed. Questions answered and patient/representative(s) expressed understanding.     - Discussed:     - Discussed with:  Patient            Postoperative Care    Pain management: Multi-modal analgesia.   PONV prophylaxis: Ondansetron (or other 5HT-3), Dexamethasone or Solumedrol     Comments:               Ross Nunes MD    I have reviewed the pertinent notes and labs in the chart from the past 30 days and (re)examined the patient.  Any updates or changes from those notes are reflected in this note.     # Hyponatremia: Lowest Na = 133 mmol/L in last 30 days, will monitor as appropriate        # Drug Induced Coagulation Defect: home medication list includes an anticoagulant medication   # Obesity: Estimated body mass index is 33.91 kg/m  as calculated from the following:    Height as of 5/4/24: 1.829 m (6').    Weight as of 5/4/24: 113.4 kg (250 lb).

## 2024-05-15 NOTE — ANESTHESIA CARE TRANSFER NOTE
Patient: Dk Randhawa Sr.    Procedure: Procedure(s):  Robot-assisted thoracoscopic right upper lobectomy,  mediastinal lymph node dissection       Diagnosis: Mass of upper lobe of right lung [R91.8]  Diagnosis Additional Information: No value filed.    Anesthesia Type:   General     Note:    Oropharynx: oropharynx clear of all foreign objects and spontaneously breathing  Level of Consciousness: awake  Oxygen Supplementation: room air    Independent Airway: airway patency satisfactory and stable  Dentition: dentition unchanged  Vital Signs Stable: post-procedure vital signs reviewed and stable  Report to RN Given: handoff report given  Patient transferred to: PACU    Handoff Report: Identifed the Patient, Identified the Reponsible Provider, Reviewed the pertinent medical history, Discussed the surgical course, Reviewed Intra-OP anesthesia mangement and issues during anesthesia, Set expectations for post-procedure period and Allowed opportunity for questions and acknowledgement of understanding      Vitals:  Vitals Value Taken Time   /81 05/15/24 1750   Temp 35.8  C (96.5  F) 05/15/24 1731   Pulse 92 05/15/24 1756   Resp 19 05/15/24 1756   SpO2 97 % 05/15/24 1756   Vitals shown include unfiled device data.    Electronically Signed By: Blanca Diana  May 15, 2024  5:58 PM

## 2024-05-15 NOTE — OP NOTE
Preoperative diagnosis:                        Lung mass  Postoperative diagnosis:                      Lung mass     Procedure:   Right robot-assisted right upper lobectomy, mediastinal lymph node dissection     Anesthesia: General   Surgeon: Mikael Peoples  Assistant:  Gama Redmond PA-C  EBL: Less than 50 mL     Complications: none immediate     Description of procedure  The patient was brought to the room and placed supine upon the table.  After confirming the patient's identity and verifying the consent, appropriate monitoring devices were placed, as well as SCD boots. General anesthesia was administered.  The patient was intubated with a double-lumen endotracheal tube.  Proper position was confirmed by fiberoptic bronchoscopy.  Intravenous antibiotic was administered within 1 hour prior to the incision. The patient was turned to the left lateral decubitus position and all pressure points were padded. The bed was flexed, and the patient was secured to the table and the right arm was placed on an airplane board.  The right chest was prepped with chlorhexidine and  draped in the standard surgical fashion.       An 8 mm incision was made in the eighth intercostal space in line with the anterior superior iliac spine.  This was carried down to the pleural cavity.  An 8 mm port was placed and an 8 mm, 30-degree thoracoscope was inserted into the pleural cavity.  There were no adhesions.  A 12 mm port was placed anteriorly and another 12 mm port placed posteriorly, approximately a hand's breadth apart.  A final 8 mm port was a hand's breadth more posterior from the posterior 12 mm port.  Finally, a 12 mm assistant Airseal port was made in the low anterior chest.  The robot was docked without difficulty.The inferior pulmonary ligament and posterior mediastinal pleura were divided.  Level 9R lymph nodes were harvested.  The level 7 packet was dissected free. The right level 4 lymph node packet was also dissected  free and removed. The anterior mediastinal pleura was dissected free.  The superior pulmonary vein and the truncus anterior branches of the pulmonary artery were dissected free. During the course of dissection, right level 10, 11 and 12 nodes were harvested.  The superior vein branches were then divided using the stapler with a vascular load.  The truncus anterior PA branches were likewise divided with a vascular load of the stapler. The fissure was divided using serial fires of the robotic stapler with blue loads. The posterior ascending branch of the pulmonary artery was divided with a vascular load. The upper lobe bronchus was dissected free and divided with a green load of the stapler after inflation showed appropriate inflation. The right  upper lobe was brought out through the enlarged assistant port. Hemostasis was achieved with pressure. The middle lobe and lower lobe were well connected with a complete fissure, so a pexy was not required. A multiple level intercostal nerve block was administered. A 28 Yi chest tube was then placed going posteriorly to the apex  through the initial camera port site.  This was secured to the skin using 0 silk suture.  The lung was observed to inflate appropriately.  The utility incision was closed in layers, irrigating each layer prior to closure.  The smaller incisions were closed in layers. The areas were cleaned and dried and benzoin and steri-strips were applied.      At  the end of the case, sponge, needle, and instrument counts were correct.  There were no qualified residents available, so MANNY Redmond acted as my bedside assistant for the entire case.

## 2024-05-15 NOTE — ANESTHESIA POSTPROCEDURE EVALUATION
Patient: Dk Randhawa .    Procedure: Procedure(s):  Robot-assisted thoracoscopic right upper lobectomy,  mediastinal lymph node dissection       Anesthesia Type:  General    Note:  Disposition: Inpatient   Postop Pain Control: Uneventful            Sign Out: Well controlled pain   PONV: No   Neuro/Psych: Uneventful            Sign Out: Acceptable/Baseline neuro status   Airway/Respiratory: Uneventful            Sign Out: Acceptable/Baseline resp. status   CV/Hemodynamics: Uneventful            Sign Out: Acceptable CV status; No obvious hypovolemia; No obvious fluid overload   Other NRE: NONE   DID A NON-ROUTINE EVENT OCCUR? No       Last vitals:  Vitals Value Taken Time   /85 05/15/24 1800   Temp 35.8  C (96.5  F) 05/15/24 1731   Pulse 96 05/15/24 1809   Resp 39 05/15/24 1809   SpO2 96 % 05/15/24 1809   Vitals shown include unfiled device data.    Electronically Signed By: Hans Gaytan MD  May 15, 2024  6:10 PM

## 2024-05-15 NOTE — ANESTHESIA PROCEDURE NOTES
Airway       Patient location during procedure: OR       Procedure Start/Stop Times: 5/15/2024 12:05 PM  Staff -        Anesthesiologist:  Ross Nunes MD       CRNA: Petra Rosado APRN CRNA       Performed By: CRNA  Consent for Airway        Urgency: elective  Indications and Patient Condition       Indications for airway management: alexis-procedural       Induction type:intravenous       Mask difficulty assessment: 2 - vent by mask + OA or adjuvant +/- NMBA    Final Airway Details       Final airway type: endotracheal airway       Successful airway: ETT - double lumen left  Endotracheal Airway Details        Cuffed: yes       Successful intubation technique: direct laryngoscopy and flexible bronchoscopy       DL Blade Type: MAC 3       Grade View of Cords: 1       Placement verification comments: (Final placement verification via flex bronch per Dr. Nunes)       Adjucts: stylet       Position: Center       Measured from: gums/teeth       Bite block used: None       ETT Double lumen (fr): 39    Post intubation assessment        Placement verified by: capnometry, equal breath sounds and chest rise        Number of attempts at approach: 1       Secured with: tape       Ease of procedure: easy       Dentition: Intact and Unchanged    Medication(s) Administered   Medication Administration Time: 5/15/2024 12:05 PM

## 2024-05-16 NOTE — PLAN OF CARE
Occupational Therapy: Orders received. Chart reviewed and discussed with care team.? Per discussion w/ PT, pt moving w/ SBA, no concerns about ADL tasks after surgery. Has necessary equipment and assist at home to facilitate safe discharge. No acute OT needs identified. Defer discharge recommendations to PT.? Will complete orders.

## 2024-05-16 NOTE — PLAN OF CARE
Goal Outcome Evaluation:      Plan of Care Reviewed With: patient    Overall Patient Progress: improvingOverall Patient Progress: improving     Date & Time: 5/15/24 [id number]  Surgery/POD#: POD1 for Robot-assisted thoracoscopic R upper lobectomy, mediastinal lymph node dissection  Behavior & Aggression: green  Fall Risk: yes  Orientation:AOx4  ABNL VS/O2:VSS on RA  ABNL Labs: Lactic 1.4 NA: 134, , WBC 17, HgB: 11.7  Pain Management: Pain managed with oxy 5mg, robaxin, and tylenol  Bowel/Bladder: Cont of B/B, BS hypo, Voiding in urinal and bathroom  IV/Drains: PIV SL. R CT to waterseal, air leak noted.  Wounds/incisions: R chest tube site lap sites CDI.  Diet: Tolerating Reg diet  Activity Level: Up A1GBIV pole ambulated in [facility] x1  Tests/Procedures: N/A  Anticipated  DC Date: Pending  Significant Information: No acute concerns overnight.

## 2024-05-16 NOTE — PROGRESS NOTES
"   05/16/24 0900   Appointment Info   Signing Clinician's Name / Credentials (PT) Sherri Romano DPT   Living Environment   People in Home alone   Current Living Arrangements other (see comments)  ([facility])   Home Accessibility no concerns   Transportation Anticipated car, drives self   Living Environment Comments Pt lives alone, brother lives nearby, planning to drive him home after surgery, can check in as needed   Self-Care   Usual Activity Tolerance moderate   Current Activity Tolerance moderate   Equipment Currently Used at Home shower chair;grab bar, tub/shower;grab bar, toilet   Fall history within last six months no   Activity/Exercise/Self-Care Comment IND for mobility and ADLs at baseline, planning to borrow 4WW from brother at initial discharge   General Information   Onset of Illness/Injury or Date of Surgery 05/15/24   Referring Physician Gama Redmond PA-C   Patient/Family Therapy Goals Statement (PT) to return home   Pertinent History of Current Problem (include personal factors and/or comorbidities that impact the POC) \"Dk Romorling Sr. is a 68 year old male s/p robot-assisted thoracoscopic right upper lobectomy and mediastinal lymph node dissection on 5/15/2024 with Dr. Peoples\"   Existing Precautions/Restrictions fall   Cognition   Affect/Mental Status (Cognition) WFL   Orientation Status (Cognition) oriented x 3   Follows Commands (Cognition) WFL   Pain Assessment   Patient Currently in Pain Yes, see Vital Sign flowsheet  (R side/shoulder pain 4/10)   Posture    Posture Forward head position   Range of Motion (ROM)   Range of Motion ROM is WFL   Strength (Manual Muscle Testing)   Strength (Manual Muscle Testing) strength is WFL   Strength Comments mildly decreased activity tolerance   Bed Mobility   Comment, (Bed Mobility) SBA sup>sit   Transfers   Comment, (Transfers) CGA sit>stand to FWW   Gait/Stairs (Locomotion)   Comment, (Gait/Stairs) Pt amb 5 ft with FWW and CGA   Balance "   Balance Comments good sitting balance, fair+ standing balance   Sensory Examination   Sensory Perception patient reports no sensory changes   Clinical Impression   Criteria for Skilled Therapeutic Intervention Yes, treatment indicated   PT Diagnosis (PT) impaired functional mobility   Influenced by the following impairments pain, decreased activity tolerance   Functional limitations due to impairments impaired bed mobility, transfers, ambulation   Clinical Presentation (PT Evaluation Complexity) stable   Clinical Presentation Rationale Based on current presentation, PMH, social support   Clinical Decision Making (Complexity) low complexity   Planned Therapy Interventions (PT) bed mobility training;gait training;home exercise program;patient/family education;transfer training;home program guidelines   Risk & Benefits of therapy have been explained evaluation/treatment results reviewed;care plan/treatment goals reviewed;risks/benefits reviewed;current/potential barriers reviewed;participants voiced agreement with care plan;participants included;patient   PT Total Evaluation Time   PT Eval, Low Complexity Minutes (37804) 10   Physical Therapy Goals   PT Frequency One time eval and treatment only   PT Predicted Duration/Target Date for Goal Attainment 05/16/24   PT Goals Bed Mobility;Transfers;Gait   PT: Bed Mobility Modified independent;Supine to/from sit;Goal Met   PT: Transfers Modified independent;Sit to/from stand;Bed to/from chair;Assistive device;Goal Met   PT: Gait Modified independent;Assistive device;Rolling walker;Greater than 200 feet;Goal Met   Interventions   Interventions Quick Adds Gait Training;Therapeutic Activity   Therapeutic Activity   Therapeutic Activities: dynamic activities to improve functional performance Minutes (86910) 8   Treatment Detail/Skilled Intervention Pt edu on safe bed mobility technique at home, cued to use L UE to reach across body for ease of bed mobility. Sit<>stands to FWW  with SBA following eval.cued for safe hand placement as pt does not typically use walker. Pt asking about return to prior exercise when returning home, discussed benefits of walking program. Pt completed sit>sup Marky. Remained supine with all needs in reach.   Gait Training   Gait Training Minutes (36315) 12   Symptoms Noted During/After Treatment (Gait Training) fatigue   Treatment Detail/Skilled Intervention Pt cued for ambulation with FWW, quickly progresses to SBA. Pt planning to use his brother 4WW at home, edu on appropriate walker height, brake use, and how to progress to ambulating without device. Completed ascent and descent of 4 stairs, SBA, B rails and reciprocal pattern. On return to room, amb with with no AD, CGA-SBA. Pt more hesitant without device, cued for pacing.   Distance in Feet 400'   PT Discharge Planning   PT Plan dc, PT goals met   PT Discharge Recommendation (DC Rec) home with assist   PT Rationale for DC Rec Pt currently moving very near to reported baseline. Currently SBA/Marky with FWW, plans to borrow walker from brother on discharge. Pt appropriate to return home with assist of family for heavier household tasks as needed.   PT Brief overview of current status SBA/Marky with FWW   PT Equipment Needed at Discharge walker, rolling  (planning to borrow from brother)   Total Session Time   Timed Code Treatment Minutes 20   Total Session Time (sum of timed and untimed services) 30   Physical Therapy Discharge Summary    Reason for therapy discharge:    All goals and outcomes met, no further needs identified.    Progress towards therapy goal(s). See goals on Care Plan in [facility] electronic health record for goal details.  Goals met    Therapy recommendation(s):    No further therapy is recommended.

## 2024-05-16 NOTE — DISCHARGE SUMMARY
NAME: Dk Randhawa Sr.   MRN: [id number]   : 1955     DATE OF ADMISSION: 5/15/2024     PRE/POSTOPERATIVE DIAGNOSES: Lung mass    PROCEDURES PERFORMED: Robot-assisted thoracoscopic right upper lobectomy, mediastinal lymph node dissection    PATHOLOGY RESULTS: Final pending at time of discharge     CODING ADDENDUM:  Final pathology: Large cell carcinoma, stage T3N0    CULTURE RESULTS: None     INTRAOPERATIVE COMPLICATIONS: None     POSTOPERATIVE MEDICAL ISSUES: None     DRAINS/TUBES PRESENT AT DISCHARGE: dressing changes    DATE OF DISCHARGE:  2024    HOSPITAL COURSE: Dk Randhawa Sr. is a 68 year old male who on 5/15/2024 underwent the above-named procedures. He tolerated the operation well and postoperatively was transferred to the general post-surgical unit.  The remainder of his course was essentially uncomplicated.  Prior to discharge, his pain was controlled well, he was able to perform ADLs and ambulate independently without difficulty, and had full return of bladder function.  On , he was discharged home in stable condition. He was instructed to restart his Xarelto on POD 3 (2024).    DISCHARGE EXAM:   A&O, NAD  Resp non-labored  Distal extremities warm    Incisions CDI  Pleural tube site without concerns     DISCHARGE INSTRUCTIONS:  Discharge Procedure Orders   Reason for your hospital stay   Order Comments: Surgery     Follow-up and recommended labs and tests    Order Comments: 1.) Follow up with primary care physician, Esteban Copeland, in 1-2 weeks.  2.) Follow up with Dr. Peoples in Thoracic Surgery clinic on 2024, prior to which a CXR should be performed (details below).     2024  XR CHEST 2 VIEWS  1:00 PM  (20 min.)  [id number]  [facility] Xray [place]    RETURN CCSL  1:45 PM  (30 min.)  Mikael Peoples MD  [facility]  Cancer [facility]     Activity   Order Comments: Your activity upon discharge: activity as tolerated     Order  Specific Question Answer Comments   Is discharge order? Yes      Discharge Instructions   Order Comments: THORACIC SURGERY DISCHARGE INSTRUCTIONS    DIET: Regular diet - as prior to admission     If your plans upon discharge include prolonged periods of sitting (i.e a lengthy car or plane ride), it is highly beneficial to get up and walk at least once per hour to help prevent swelling and blood clots.     You may remove chest tube dressing 48 hours after tube removal and bandage the site at your own discretion thereafter.  Small amounts of leakage are normal for 2-3 days after removal.  Feel free to call with questions.    You may get incision wet 2 days after operation. Do not submerge, soak, or scrub incision or swim until seen in follow-up.    Take incentive spirometer home for continued frequent use    Activity as tolerated, no strenous activity until seen in follow-up, no lifting greater than 20 pounds for the next 2 weeks.    Stay hydrated. Take over the counter fiber (metamucil or benefiber) and stool softeners (Miralax, docusate or senna) if becoming constipated.     Call for fever greater than 101.5, chills, increased size of incision, red skin around incision, vision changes, muscle strength changes, sensation changes, shortness of breath, or other concerns.    No driving while taking narcotic pain medication.    Transition to ibuprofen or tylenol/acetaminophen for pain control. Do not take tylenol/acetaminophen and acetaminophen containing narcotic (e.g., percocet or vicodin) at the same time. If you have known ulcer problems, or kidney trouble (elevated creatinine) do not take the ibuprofen.    In emergencies, call 911    For other Questions or Concerns;   A.) During weekday working hours (Monday through Friday 8am to 4:30pm)   call [phone] ([phone] and ask to speak to a thoracic surgery nurse (RN or LPN).     B.) At nights (after 4:30pm), on weekends, or if urgent call [phone] and   tell the  " \"I would like to page job code [phone], the thoracic surgery   fellow on call, please.\"     Diet   Order Comments: Follow this diet upon discharge: Orders Placed This Encounter      Advance Diet as Tolerated: Regular Diet Adult; Regular Diet Adult     Order Specific Question Answer Comments   Is discharge order? Yes        DISCHARGE MEDICATIONS:   Current Discharge Medication List        START taking these medications    Details   enoxaparin ANTICOAGULANT (LOVENOX) 40 MG/0.4ML syringe Inject 0.4 mLs (40 mg) Subcutaneous every 24 hours for 1 day  Qty: 0.4 mL, Refills: 0    Associated Diagnoses: Lung mass      methocarbamol (ROBAXIN) 500 MG tablet Take 1 tablet (500 mg) by mouth every 6 hours as needed for muscle spasms  Qty: 20 tablet, Refills: 0    Associated Diagnoses: Lung mass      oxyCODONE (ROXICODONE) 5 MG tablet Take 1 tablet (5 mg) by mouth every 4 hours as needed for moderate pain  Qty: 40 tablet, Refills: 0    Associated Diagnoses: Lung mass      polyethylene glycol (MIRALAX) 17 g packet Take 17 g by mouth daily  Qty: 7 packet, Refills: 0    Associated Diagnoses: Lung mass      senna-docusate (SENOKOT-S/PERICOLACE) 8.6-50 MG tablet Take 1 tablet by mouth 2 times daily  Qty: 14 tablet, Refills: 0    Associated Diagnoses: Lung mass           CONTINUE these medications which have CHANGED    Details   acetaminophen (TYLENOL) 500 MG tablet Take 2 tablets (1,000 mg) by mouth every 6 hours    Associated Diagnoses: Lung mass      XARELTO ANTICOAGULANT 20 MG TABS tablet    (OK TO RESTART ON 5/18/2024) Take 1 tablet (20 mg) by mouth daily (with lunch)           CONTINUE these medications which have NOT CHANGED    Details   diphenhydrAMINE-acetaminophen (TYLENOL PM)  MG tablet Take 2 tablets by mouth nightly as needed for sleep      fexofenadine (ALLEGRA) 180 MG tablet Take 180 mg by mouth daily      HEMP OIL OR EXTRACT OR OTHER CBD CANNABINOID, NOT MEDICAL CANNABIS, CBD gummies      losartan (COZAAR) 100 " MG tablet Take 100 mg by mouth every morning      Melatonin 10 MG TABS tablet Take 10 mg by mouth nightly as needed for sleep      metoprolol succinate ER (TOPROL XL) 100 MG 24 hr tablet Take 1 tablet by mouth every morning      multivitamin (CENTRUM SILVER) tablet Take 1 tablet by mouth daily      Turmeric (QC TUMERIC COMPLEX PO) Tumeric and ginger with applie cider 1410mg      !! UNABLE TO FIND MEDICATION NAME: osteo bi flex      !! UNABLE TO FIND MEDICATION NAME: Emergen-e 1000mg       !! - Potential duplicate medications found. Please discuss with provider.

## 2024-05-16 NOTE — PROGRESS NOTES
THORACIC & FOREGUT SURGERY    S:  No acute overnight events.  Pt seen at bedside resting comfortably. Pain tolerable on current regimen. Ambulating well.     O:  /74 (BP Location: Right arm, Patient Position: Semi-Tomlinson's, Cuff Size: Adult Regular)   Pulse 98   Temp 97.7  F (36.5  C) (Oral)   Resp 20   Ht 1.829 m (6')   Wt 108 kg (238 lb 1.6 oz)   SpO2 97%   BMI 32.29 kg/m      A&Ox3, NAD  Breathing non-labored on RA  Regular rate  Soft, NDNT  Distal extremities appear well perfused  Pleural tube without concerns    Recent Labs   Lab 05/16/24  0644 05/16/24  0503 05/15/24  2002 05/15/24  0944   WBC  --  17.0*  --   --    HGB  --  11.7*  --   --    MCV  --  90  --   --    PLT  --  263  --   --    NA  --  134*  --   --    POTASSIUM  --  4.5  --  4.5   CHLORIDE  --  99  --   --    CO2  --  25  --   --    BUN  --  14.0  --   --    CR  --  0.74 0.83 0.85   ANIONGAP  --  10  --   --    COLLINS  --  8.4*  --   --    * 117*  --  102*       /24hrs: 200 ml, no producible air leak with valsalva    Imaging: reviewed     A/P: Dk Romorling Sr. is a 68 year old male s/p robot-assisted thoracoscopic right upper lobectomy and mediastinal lymph node dissection on 5/15/2024 with Dr. Peoples. Progressing appropriately.     -Multimodal pain control, tolerable  -Right pleural tube removed at bedside, post pull CXR to follow  -Encourage ambulation, OOB, IS  -Regular diet, bowel regimen  -Lovenox ppx  -Dispo: likely home this afternoon pending post pull CXR    Discussed with staff surgeon Dr. Richelle Redmond PAOG  Thoracic and Foregut Surgery

## 2024-05-16 NOTE — PLAN OF CARE
Patient discharging home with family. AVS gone over with patient in room and prescriptions given to patient. All Questions answered.     Alka Tom RN

## 2024-05-17 NOTE — PROGRESS NOTES
Post Op Discharge Call    Surgery: Robot-assisted thoracoscopic right upper lobectomy, mediastinal lymph node dissection     Surgery date: 05/15/24    Discharge Date:  5/16/24    Immediate Concerns: None at this time.     Pain:  Using pain medications as recommended with appropriate relief.     Incision:   No concerns, healing well, no redness, drainage or edema reported.     Drains:   No drain.     Diet:   Regular diet     Bowels:   Passing gas.   Patient reports he has NOT had bowel movement post op.   Taking stool softeners daily.   Denies feelings of constipation and cramping. Dk is also taking Miralax    Activity:   No difficulty with ADLs reported.   Patient is up independently at home.   Encourage patient to continue to stay active.     Post op/follow up plans:   Post op appointment scheduled,confirmed date and time with patient.       Future Appointments   Date Time Provider Department Center   5/28/2024  1:15 PM [id number]   5/28/2024  2:00 PM Mikael Peoples MD [facility]   6/10/2024  1:00 PM Mikael Peoples MD [facility]     He has been eating fruits and vegetables, walking, and using his incentive spirometer. He has no further questions at this time.    Patient has our direct number for any questions or concerns that may arise.      LAUREN Anton  Thoracic Surgery

## 2024-05-17 NOTE — TELEPHONE ENCOUNTER
Dk (pt) calling, no concerns with symptoms refills.   Calling about post surgical questions and showering tomrorow 5/18/2024. Recalls okay to take off bandage around drainage tube and reapply OTC dressings as needed.     Pt needed clarification on the other surgical dressings where removed nodules off lung and there are 4 additional incisions and wondering if should leave these other dressings on when showing?     Pt has follow visit with surgical team on 5/28/2024.     Routed high priority to thoracic RNCC [place].

## 2024-05-28 PROBLEM — C34.11 MALIGNANT NEOPLASM OF UPPER LOBE OF RIGHT LUNG (H): Status: ACTIVE | Noted: 2024-01-01

## 2024-05-28 NOTE — RESULT ENCOUNTER NOTE
I have personally reviewed the pathology results which support a diagnosis of large cell lung cancer.  Sites involved in this diagnosis include: right upper lobe of the lung.  Stage IIB and the patient will be referred for consideration for adjuvant therapy.    Mikael Peoples MD

## 2024-05-28 NOTE — LETTER
5/28/2024         RE: Dk Waldropling Sr.  [address]  [facility] [address]        Dear Colleague,    Thank you for referring your patient, Dk Randhawa , to the [facility] CANCER [facility]. Please see a copy of my visit note below.    THORACIC SURGERY FOLLOW UP VISIT    Dear Dr. Copeland,    I saw Mr. Randhawa in follow-up today. The clinical summary follows:     PREOP DIAGNOSIS   Lung cancer  PROCEDURE   Right robot-assisted right upper lobectomy, mediastinal lymph node dissection     DATE OF PROCEDURE  5/15/2024    HISTOPATHOLOGY   SPECIMEN   Procedure  Lobectomy   Specimen Laterality  Right   TUMOR   Tumor Focality  Single focus   Tumor Site  Upper lobe of lung   Tumor Size     Total Tumor Size (size of entire tumor)  Greatest Dimension (Centimeters): 5.1 cm   Histologic Type  Large cell carcinoma   Histologic Grade  G3, poorly differentiated   Visceral Pleura Invasion  Not identified   Direct Invasion of Adjacent Structures  Not identified   Treatment Effect  No known presurgical therapy   Lymphovascular Invasion  Present   MARGINS   Margin Status for Invasive Carcinoma  All margins negative for invasive carcinoma   Closest Margin(s) to Invasive Carcinoma  Parenchymal   Distance from Invasive Carcinoma to Closest Margin  0.2 cm   Margin Status for Non-Invasive Tumor  Not applicable   REGIONAL LYMPH NODES   Lymph Node(s) from Prior Procedures  No known prior lymph node sampling performed   Regional Lymph Node Status  All regional lymph nodes negative for tumor   Number of Lymph Nodes Examined  11   Carlos Enrique Site(s) Examined  2R: Upper paratracheal     4R: Lower paratracheal     9R: Pulmonary ligament     10R: Hilar     11R: Interlobar     7: Subcarinal   PATHOLOGIC STAGE CLASSIFICATION (pTNM, AJCC 8th Edition)   Reporting of pT, pN, and (when applicable) pM categories is based on information available to the pathologist at the time the report is issued. As per the AJCC (Chapter 1, 8th  Ed.) it is the managing physician s responsibility to establish the final pathologic stage based upon all pertinent information, including but potentially not limited to this pathology report.   pT Category  pT3   pN Category  pN0       COMPLICATIONS  None    INTERVAL STUDIES  Chest x-ray 5/28/2024: Elevated right hemidiaphragm      ETOH: None  TOBACCO: ages 16 - 57. 1/2 pack per day. Quit 10 years ago.   OTHER DRUGS: None    BP (!) 152/92 (BP Location: Right arm, Patient Position: Right side, Cuff Size: Adult Large)   Pulse 102   Temp 97.5  F (36.4  C) (Oral)   Resp 18   Wt 112 kg (247 lb)   SpO2 98%   BMI 33.50 kg/m     Physical Exam  Constitutional:       Appearance: Normal appearance.   Cardiovascular:      Rate and Rhythm: Normal rate.   Pulmonary:      Effort: Pulmonary effort is normal.   Skin:     General: Skin is warm and dry.      Comments: Incisions healing well   Neurological:      Mental Status: He is alert and oriented to person, place, and time.   Psychiatric:         Mood and Affect: Mood normal.         Behavior: Behavior normal.         Thought Content: Thought content normal.         Judgment: Judgment normal.          SUBJECTIVE   Mr. Randhawa is doing very well. He is taking tylenol twice daily and has minimal pain. He is walking well and is up to 2250 ml on the spirometer. He was at 2500 ml preop.     IMPRESSION (C34.11) Malignant neoplasm of upper lobe of right lung (H)  (primary encounter diagnosis)     This is a 68 year-old man who is recovering well after undergoing a right upper lobectomy for a stage IIB large cell carcinoma on 5/15/2024.      PLAN  I spent 15 min on the date of the encounter in chart review, patient visit, review of tests, documentation and/or discussion with other providers about the issues documented above. I reviewed the plan as follows:    1. Necessary Tests & Appointments: Dr. Salazar to discuss adjuvant chemotherapy for the large cell carcinoma  Follow up with  Jessenia Urbina in 2 weeks at [facility].    All questions were answered and the patient and present family were in agreement with the plan.  I appreciate the opportunity to participate in the care of your patient and will keep you updated.  Sincerely,     Mikael Peoples MD

## 2024-05-28 NOTE — NURSING NOTE
Oncology Rooming Note    May 28, 2024 1:52 PM   Dk Waldropyoshi McgarryBay is a 68 year old male who presents for:    Chief Complaint   Patient presents with    Oncology Clinic Visit     RTN for Lung Mass     Initial Vitals: BP (!) 152/92 (BP Location: Right arm, Patient Position: Right side, Cuff Size: Adult Large)   Pulse 102   Temp 97.5  F (36.4  C) (Oral)   Resp 18   Wt 112 kg (247 lb)   SpO2 98%   BMI 33.50 kg/m   Estimated body mass index is 33.5 kg/m  as calculated from the following:    Height as of 5/15/24: 1.829 m (6').    Weight as of this encounter: 112 kg (247 lb). Body surface area is 2.39 meters squared.  Mild Pain (2) Comment: Data Unavailable   No LMP for male patient.  Allergies reviewed: Yes  Medications reviewed: Yes    Medications: Medication refills not needed today.  Pharmacy name entered into [email or web address]:    [id number] [place] [facility] - [place], [place] - [address] [place] [address] [id number] - [place], [place] - [address] [facility]    Frailty Screening:   Is the patient here for a new oncology consult visit in cancer care? 2. No      Clinical concerns: none       Saniya Luz MA

## 2024-05-28 NOTE — PROGRESS NOTES
New Patient Oncology Nurse Navigator Note     Referring provider: Dr. Mikael Peoples    Referring Clinic/Organization: [facility]  Referred to: Medical Oncology  Requested provider (if applicable): First available - did not specify   Referral Received: 05/28/24       Evaluation for :   Diagnosis   C34.11 (ICD-10-CM) - Malignant neoplasm of upper lobe of right lung (H)     My Clinical Question Is: adjuvant therapy for resected stage IIB large cell lung cancer     Clinical History (per Nurse review of records provided):      04/16/2024 Surgical Pathology (bookmarked) showed:   Final Diagnosis   A. LUNG, RIGHT UPPER LOBE, ENDOBRONCHIAL BIOPSY:  - PREDOMINANTLY FRAGMENTS OF NECROTIC TISSUE WITH SCATTERED ATYPICAL CELLS  HIGHLY SUSPICIOUS FOR MALIGNANCY  - See comment   Electronically signed by Kristine Olvera MD on 4/22/2024 at  2:44 PM     Comment  [facility]   Molecular testing and PDL1 immunohistochemical stain are not performed due to the limited viable cells.     Immunohistochemical stain performed with adequate control in tissue block A2 for cytokeratin AE1/AE3 is positive in scattered viable atypical cells.  TTF-1 and p40 are negative. Cytokeratin 7 and cytokeratin 5 highlight scattered cells.  CD31 shows a nonspecific staining.  ERG is negative.  CD68 is positive in histiocytes.      Intradepartmental consultation was performed.   Clinical Information  [facility]   Procedure: BRONCHOSCOPY, endobronchial ultrasound, transbronchial needle aspiration, endobronchial biopsies and tumor debulking  Pre-op Diagnosis: Mass of upper lobe of right lung [R91.8]       04/30/2024 MR Brain w/o & w/ contrast (bookmarked) showed:   Impression: No evidence of abnormal enhancing lesions intracranially.     05/02/2024 PET (bookmarked) showed:   IMPRESSION: In this patient with right upper lobe mass highly  suspicious for primary lung malignancy:     1. Right upper lobe mass is hypermetabolic and is larger compared  to  4/16/2024 CT. Findings suggestive of malignancy.       2. Hypermetabolic right hilar node is suspicious for metastasis.  Mildly avid nonenlarged few other nodes in the mediastinum are  indeterminate.     3. No FDG lesions elsewhere in the body to suggest distant metastasis.    05/15/2024 Procedure:   Right robot-assisted right upper lobectomy, mediastinal lymph node dissection  Surgical Pathology (bookmarked) showed:   Addendum   An immunohistochemical stain for NUT is negative.   Addendum electronically signed by Tanmay Hoffman MD on 5/24/2024 at  2:15 PM   Final Diagnosis   A.  Lymph node, mediastinal, right, 9R, excision-  Benign lymph node with anthracosis and hyalinized granuloma (1)     B.  Lymph nodes, mediastinal, level 7, excision-  Benign lymph nodes (2)     C.  Lymph nodes, mediastinal, right, 10 R, excision-  Benign lymph nodes (2)     D.  Lymph node, mediastinal, right, 4R, excision-  Benign lymph node with hyalinized granulomas (1)     E.  Lymph node, mediastinal, right, 2R, excision-  Benign lymph node with hyalinized granuloma (1)     F.  Lymph nodes, mediastinal, right, 11 R, excision-  Benign lymph nodes (3)     G.  Lung, right, upper lobe, resection-  Large cell carcinoma (large cell undifferentiated carcinoma)  Benign perihilar lymph node (1)  Resection margins free of malignancy  See microscopic description and synoptic report     H.  Lymph node, mediastinal, right, 4R, excision-  Benign soft tissue stroma, lymph node not identified      Electronically signed by Tanmay Hoffman MD on 5/23/2024 at 11:59 AM       Clinical Assessment / Barriers to Care (Per Nurse):  Tobacco History    Smoking Status  Former Quit Date  2014 Smoking Tobacco Type  Cigarettes quit in 2014     Records Location: [facility]   Records Needed: None  Additional testing needed prior to consult: None    MALAIKA GarciaN, RN, OCN  [facility] Oncology Nurse Navigator  [phone] /  [phone]

## 2024-05-28 NOTE — PROGRESS NOTES
THORACIC SURGERY FOLLOW UP VISIT    Dear Dr. Copeland,    I saw Mr. Randhawa in follow-up today. The clinical summary follows:     PREOP DIAGNOSIS   Lung cancer  PROCEDURE   Right robot-assisted right upper lobectomy, mediastinal lymph node dissection     DATE OF PROCEDURE  5/15/2024    HISTOPATHOLOGY   SPECIMEN   Procedure  Lobectomy   Specimen Laterality  Right   TUMOR   Tumor Focality  Single focus   Tumor Site  Upper lobe of lung   Tumor Size     Total Tumor Size (size of entire tumor)  Greatest Dimension (Centimeters): 5.1 cm   Histologic Type  Large cell carcinoma   Histologic Grade  G3, poorly differentiated   Visceral Pleura Invasion  Not identified   Direct Invasion of Adjacent Structures  Not identified   Treatment Effect  No known presurgical therapy   Lymphovascular Invasion  Present   MARGINS   Margin Status for Invasive Carcinoma  All margins negative for invasive carcinoma   Closest Margin(s) to Invasive Carcinoma  Parenchymal   Distance from Invasive Carcinoma to Closest Margin  0.2 cm   Margin Status for Non-Invasive Tumor  Not applicable   REGIONAL LYMPH NODES   Lymph Node(s) from Prior Procedures  No known prior lymph node sampling performed   Regional Lymph Node Status  All regional lymph nodes negative for tumor   Number of Lymph Nodes Examined  11   Carlos Enrique Site(s) Examined  2R: Upper paratracheal     4R: Lower paratracheal     9R: Pulmonary ligament     10R: Hilar     11R: Interlobar     7: Subcarinal   PATHOLOGIC STAGE CLASSIFICATION (pTNM, AJCC 8th Edition)   Reporting of pT, pN, and (when applicable) pM categories is based on information available to the pathologist at the time the report is issued. As per the AJCC (Chapter 1, 8th Ed.) it is the managing physician s responsibility to establish the final pathologic stage based upon all pertinent information, including but potentially not limited to this pathology report.   pT Category  pT3   pN Category  pN0       COMPLICATIONS  None    INTERVAL  STUDIES  Chest x-ray 5/28/2024: Elevated right hemidiaphragm      ETOH: None  TOBACCO: ages 15 - 56. 1/2 pack per day. Quit 10 years ago.   OTHER DRUGS: None    BP (!) 152/92 (BP Location: Right arm, Patient Position: Right side, Cuff Size: Adult Large)   Pulse 102   Temp 97.5  F (36.4  C) (Oral)   Resp 18   Wt 112 kg (247 lb)   SpO2 98%   BMI 33.50 kg/m     Physical Exam  Constitutional:       Appearance: Normal appearance.   Cardiovascular:      Rate and Rhythm: Normal rate.   Pulmonary:      Effort: Pulmonary effort is normal.   Skin:     General: Skin is warm and dry.      Comments: Incisions healing well   Neurological:      Mental Status: He is alert and oriented to person, place, and time.   Psychiatric:         Mood and Affect: Mood normal.         Behavior: Behavior normal.         Thought Content: Thought content normal.         Judgment: Judgment normal.          SUBJECTIVE   Mr. Randhawa is doing very well. He is taking tylenol twice daily and has minimal pain. He is walking well and is up to 2250 ml on the spirometer. He was at 2500 ml preop.     IMPRESSION (C34.11) Malignant neoplasm of upper lobe of right lung (H)  (primary encounter diagnosis)     This is a 68 year-old man who is recovering well after undergoing a right upper lobectomy for a stage IIB large cell carcinoma on 5/15/2024.      PLAN  I spent 15 min on the date of the encounter in chart review, patient visit, review of tests, documentation and/or discussion with other providers about the issues documented above. I reviewed the plan as follows:    1. Necessary Tests & Appointments: Dr. Salazar to discuss adjuvant chemotherapy for the large cell carcinoma  Follow up with Jessenia Urbina in 2 weeks at [facility].    All questions were answered and the patient and present family were in agreement with the plan.  I appreciate the opportunity to participate in the care of your patient and will keep you updated.  Sincerely,     Mikael  MD Richelle

## 2024-05-29 NOTE — TELEPHONE ENCOUNTER
RECORDS STATUS - ALL OTHER DIAGNOSIS      RECORDS RECEIVED FROM: [facility]   NOTES STATUS DETAILS   OFFICE NOTE from referring provider Epic 5/28/24: Dr. Mikael Peoples   OFFICE NOTE from medical oncologist Epic 4/6/24: Dr. Dwaine Traylor   DISCHARGE REPORT from the ER [facility] 5/4/24, 4/7/24: [facility]   OPERATIVE REPORT Epic 5/15/24: lobectomy   4/16/24: BRONCHOSCOPY    MEDICATION LIST [facility]    LABS     PATHOLOGY REPORTS Report in Epic 5/15/24: [id number]     4/16/24: [id number]     FNA:  4/16/24: [id number]    ANYTHING RELATED TO DIAGNOSIS Epic Most recent 5/16/24   IMAGING (NEED IMAGES & REPORT)     CT SCANS PACS 4/16/24: CT Chest  4/7/24: CT Chest Abd Pel    MRI  4/30/24: MR Brain   XRAYS  5/28/24-4/7/24: XR Chest    PET  5/2/24: PET Whole Body

## 2024-05-31 NOTE — PROGRESS NOTES
Oncology Rooming Note    May 31, 2024 1:53 PM   Dk Randhawa Sr. is a 68 year old male who presents for:    Chief Complaint   Patient presents with    Oncology Clinic Visit     Malignant neoplasm of upper lobe of right lung - provider visit only, new consult.     Initial Vitals: BP (!) 146/104 (BP Location: Right arm, Patient Position: Sitting, Cuff Size: Adult Large)   Pulse 104   Temp 98.5  F (36.9  C) (Tympanic)   Ht 1.829 m (6')   Wt 112.3 kg (247 lb 9.6 oz)   SpO2 98%   BMI 33.58 kg/m   Estimated body mass index is 33.58 kg/m  as calculated from the following:    Height as of this encounter: 1.829 m (6').    Weight as of this encounter: 112.3 kg (247 lb 9.6 oz). Body surface area is 2.39 meters squared.  Mild Pain (2) Comment: Data Unavailable   No LMP for male patient.  Allergies reviewed: Yes  Medications reviewed: Yes    Medications: Medication refills not needed today.  Pharmacy name entered into [employer]:    [id number] [place] [facility] - [place], [place] - [address] [place] [address] [id number] - [place], [place] - [address] [facility]    Frailty Screening:   Is the patient here for a new oncology consult visit in cancer care? 1. Yes. Over the past month, have you experienced difficulty or required a caregiver to assist with:   1. Balance, walking or general mobility (including any falls)? NO  2. Completion of self-care tasks such as bathing, dressing, toileting, grooming/hygiene?  NO  3. Concentration or memory that affects your daily life?  NO       Clinical concerns: General questions - new consult.       Beatriz Steele MA

## 2024-05-31 NOTE — LETTER
5/31/2024         RE: Dk HARRISON Sydnee Kwon  [address]  [facility] [address]        Dear Colleague,    Thank you for referring your patient, Dk Romoace Kwon, to the [facility]. Please see a copy of my visit note below.    Oncology Rooming Note    May 31, 2024 1:53 PM   Dk Randhawa Sr. is a 68 year old male who presents for:    Chief Complaint   Patient presents with     Oncology Clinic Visit     Malignant neoplasm of upper lobe of right lung - provider visit only, new consult.     Initial Vitals: BP (!) 146/104 (BP Location: Right arm, Patient Position: Sitting, Cuff Size: Adult Large)   Pulse 104   Temp 98.5  F (36.9  C) (Tympanic)   Ht 1.829 m (6')   Wt 112.3 kg (247 lb 9.6 oz)   SpO2 98%   BMI 33.58 kg/m   Estimated body mass index is 33.58 kg/m  as calculated from the following:    Height as of this encounter: 1.829 m (6').    Weight as of this encounter: 112.3 kg (247 lb 9.6 oz). Body surface area is 2.39 meters squared.  Mild Pain (2) Comment: Data Unavailable   No LMP for male patient.  Allergies reviewed: Yes  Medications reviewed: Yes    Medications: Medication refills not needed today.  Pharmacy name entered into Quip:    [id number] - [place], [place] - [address] [place] [address] [id number] - [place], [place] - [address] [facility]    Frailty Screening:   Is the patient here for a new oncology consult visit in cancer care? 1. Yes. Over the past month, have you experienced difficulty or required a caregiver to assist with:   1. Balance, walking or general mobility (including any falls)? NO  2. Completion of self-care tasks such as bathing, dressing, toileting, grooming/hygiene?  NO  3. Concentration or memory that affects your daily life?  NO       Clinical concerns: General questions - new consult.       Beatriz Steele MA              [facility] Hematology and Oncology Consult Note    Patient: Dk HARRISON Sydnee Kwon  MRN:  [id number]  Date of Service: May 31, 2024       Reason for Visit    I was consulted by   Mikael Peoples MD   For evaluation and management of large cell lung cancer      Encounter Diagnoses Assessment and Plan:    Problem List Items Addressed This Visit       Malignant neoplasm of upper lobe of right lung (H)    Relevant Orders    Comprehensive metabolic panel    CBC with Platelets & Differential     Patient with large cell lung cancer presented with hemoptysis in April 2024.  Bronchosopy showed an obstructing lesion RUL.  Pathology suspicious for malignancy.  Definitve surgery by Dr. Peoples 5/15/2024.  R0 resection with no lymph node metastastes.      Patient is a candidate for adjuvant chemotherapy.  Genomic studies and PD-L1 to be obtained from primary cancer.  Follow up after genomic studies are resulted.    ____________________________________________________________________________    Staging History   Cancer Staging   Malignant neoplasm of upper lobe of right lung (H)  Staging form: Lung, AJCC 8th Edition  - Pathologic: Stage IIB (pT3, pN0, cM0) - Signed by Friedell, Peter E, MD on 6/1/2024    ECOG Performance = 0    History of Present Illness    Mr. Dk Randhawa . is a 68 year old man with a history of atrial fibrillation on Xarelto.  He has a history of exposure to industrial dusts and about a 20 pack year history of cigarette smoking, He quit about 10 years ago.  On 4/7/2024 he presented to the emergency room after several days of hemoptysis.  CT scan of the chest on 4/7/2024 showed a right upper lobe lung mass.  Brain MRI on 4/30/2024 was negative for metastatic disease.  PET CT scan on 5/2/2024 was positive for the RUL mass and there was uptake in some mediastinal lymph nodes.  On 5/16/2024 patient had a robotic assisted right upper lobe lobectomy by Dr. Peoples.  Pathology showed Large Cell Lung Cancer. Mediastinal nodes were negative.  Patient has made an uneventful recovery.    Presently he  feels well.  He is maintaining his weight.  His appetite and digestion are normal.  He has no change in bowel or bladder habit and he can go about his daily activities without difficulty.    Review of systems.  Pertinent Findings are included in the History of Present Illness    Physical Exam    BP (!) 146/104 (BP Location: Right arm, Patient Position: Sitting, Cuff Size: Adult Large)   Pulse 104   Temp 98.5  F (36.9  C) (Tympanic)   Ht 1.829 m (6')   Wt 112.3 kg (247 lb 9.6 oz)   SpO2 98%   BMI 33.58 kg/m       GENERAL APPEARANCE: Healthy appearing man in no apparent distress.  HEAD: Atraumatic; normocephalic; without lesions.  EYES: Conjunctiva, corneas and eyelids normal; pupils equal, round, reactive to light; No Icterus.  MOUTH/OROPHARYNX: oral mucosa intact  NECK: Supple with no nodes.  LUNGS:  Clear to auscultation and percussion with no extra sounds.  Decreased breath sounds right base.  HEART: Irregular rhythm; S1 and S2 normal; no murmurs noted.  ABDOMEN: Soft; no masses or tenderness, no hepatosplenomegaly.  NEUROLOGIC: Alert and oriented.  No obvious focal findings.  EXTREMITIES: No cyanosis, or edema.  SKIN: No abnormal bruising or bleeding. No suspicious lesions noted on exposed skin.  PSYCHIATRIC: Mental status normal; no apparent psychiatric issues    Medications:    Current Outpatient Medications   Medication Sig Dispense Refill     acetaminophen (TYLENOL) 500 MG tablet Take 2 tablets (1,000 mg) by mouth every 6 hours       diphenhydrAMINE-acetaminophen (TYLENOL PM)  MG tablet Take 2 tablets by mouth nightly as needed for sleep       fexofenadine (ALLEGRA) 180 MG tablet Take 180 mg by mouth daily       HEMP OIL OR EXTRACT OR OTHER CBD CANNABINOID, NOT MEDICAL CANNABIS, CBD gummies       losartan (COZAAR) 100 MG tablet Take 100 mg by mouth every morning       Melatonin 10 MG TABS tablet Take 10 mg by mouth nightly as needed for sleep       metoprolol succinate ER (TOPROL XL) 100 MG 24 hr  tablet Take 1 tablet by mouth every morning       multivitamin (CENTRUM SILVER) tablet Take 1 tablet by mouth daily       UNABLE TO FIND MEDICATION NAME: osteo bi flex       UNABLE TO FIND MEDICATION NAME: Emergen-e 1000mg       XARELTO ANTICOAGULANT 20 MG TABS tablet Take 1 tablet (20 mg) by mouth daily (with lunch)       methocarbamol (ROBAXIN) 500 MG tablet Take 1 tablet (500 mg) by mouth every 6 hours as needed for muscle spasms (Patient not taking: Reported on 5/28/2024) 20 tablet 0     oxyCODONE (ROXICODONE) 5 MG tablet Take 1 tablet (5 mg) by mouth every 4 hours as needed for moderate pain 40 tablet 0     polyethylene glycol (MIRALAX) 17 g packet Take 17 g by mouth daily 7 packet 0     senna-docusate (SENOKOT-S/PERICOLACE) 8.6-50 MG tablet Take 1 tablet by mouth 2 times daily 14 tablet 0     Turmeric (QC TUMERIC COMPLEX PO) Tumeric and ginger with applie cider 1410mg (Patient not taking: Reported on 5/31/2024)       No current facility-administered medications for this visit.           Past History    Past Medical History:   Diagnosis Date     Acute non-recurrent maxillary sinusitis      Antiplatelet or antithrombotic long-term use      Arrhythmia      Atrial fibrillation with RVR (H)      Cough secondary to angiotensin converting enzyme inhibitor (ACE-I)      Hyperlipidemia      Hypertension      Mass of upper lobe of right lung      Obesity      Past Surgical History:   Procedure Laterality Date     BRONCHOSCOPY, WITH BIOPSY, ROBOT ASSISTED Bilateral 04/16/2024    Procedure: BRONCHOSCOPY, endobronchial ultrasound, transbronchial needle aspiration, endobronchial biopsies and tumor debulking;  Surgeon: Lanette Arce MD;  Location: [facility] OR     DAVINCI EXCISE NODES THORACIC N/A 5/15/2024    Procedure: mediastinal lymph node dissection;  Surgeon: Mikael [facility]oples MD;  Location: [facility] OR     DAVINCI LOBECTOMY LUNG Right 5/15/2024    Procedure: Robot-assisted thoracoscopic right upper lobectomy,;  Surgeon:  Mikael Peoples MD;  Location: [facility] OR     ELBOW ARTHROSCOPY Left 03/09/2017     PHACOEMULSIFICATION WITH STANDARD INTRAOCULAR LENS IMPLANT Left 02/26/2018    Procedure: PHACOEMULSIFICATION WITH STANDARD INTRAOCULAR LENS IMPLANT;  Left Cataract Removal with Implant;  Surgeon: Mohit Victor MD;  Location: [facility] OR     PHACOEMULSIFICATION WITH STANDARD INTRAOCULAR LENS IMPLANT Right 01/30/2019    Procedure: Cataract Removal with Implant;  Surgeon: Mohit Victor MD;  Location: [facility] OR     TONSILLECTOMY  1964     TOTAL HIP ARTHROPLASTY Left 04/12/2022     No Known Allergies  No family history on file.  Social History     Socioeconomic History     Marital status:      Spouse name: None     Number of children: None     Years of education: None     Highest education level: None   Tobacco Use     Smoking status: Former     Current packs/day: 0.00     Types: Cigarettes     Quit date: 2014     Years since quitting: 10.4     Smokeless tobacco: Never   Vaping Use     Vaping status: Never Used   Substance and Sexual Activity     Alcohol use: Not Currently     Drug use: No     Social Determinants of Health     Financial Resource Strain: Low Risk  (10/23/2023)    Received from [facility], [facility]    Financial Resource Strain      Difficulty of Paying Living Expenses: 3   Food Insecurity: No Food Insecurity (10/23/2023)    [facility], [facility]    Food Insecurity      Worried About Running Out of Food in the Last Year: 1   Transportation Needs: No Transportation Needs (10/23/2023)    [facility] from [facility], [facility]    Transportation Needs      Lack of Transportation (Medical): 1   Social Connections: Socially Integrated (10/23/2023)    Received from [facility]  [facility], [facility] & [facility]    Social Connections      Frequency of Communication with Friends and Family: 0   Housing Stability: Low Risk  (10/23/2023)    [facility] from [facility], [facility]    Housing Stability      Unable to Pay for Housing in the Last Year: 1           Lab Results      Recent Results (from the past 720 hour(s))   EKG 12-lead complete w/read - Clinics    Collection Time: 05/03/24  7:45 AM   Result Value Ref Range    Systolic Blood Pressure  mmHg    Diastolic Blood Pressure  mmHg    Ventricular Rate 86 BPM    Atrial Rate 84 BPM    IA Interval  ms    QRS Duration 88 ms     ms    QTc 402 ms    P Axis  degrees    R AXIS 0 degrees    T Axis 28 degrees    Interpretation ECG       Atrial fibrillation  Abnormal ECG  No previous ECGs available  Confirmed by MD LYLE JANE ([id number]) on 5/6/2024 10:52:09 AM     CBC with platelets    Collection Time: 05/03/24  8:26 AM   Result Value Ref Range    WBC Count 19.9 (H) 4.0 - 11.0 10e3/uL    RBC Count 4.86 4.40 - 5.90 10e6/uL    Hemoglobin 14.4 13.3 - 17.7 g/dL    Hematocrit 42.8 40.0 - 53.0 %    MCV 88 78 - 100 fL    MCH 29.6 26.5 - 33.0 pg    MCHC 33.6 31.5 - 36.5 g/dL    RDW 13.5 10.0 - 15.0 %    Platelet Count 281 150 - 450 10e3/uL   Basic metabolic panel    Collection Time: 05/03/24  8:27 AM   Result Value Ref Range    Sodium 133 (L) 135 - 145 mmol/L    Potassium 5.1 3.4 - 5.3 mmol/L    Chloride 98 98 - 107 mmol/L    Carbon Dioxide (CO2) 23 22 - 29 mmol/L    Anion Gap 12 7 - 15 mmol/L    Urea Nitrogen 19.4 8.0 - 23.0 mg/dL    Creatinine 0.86 0.67 - 1.17 mg/dL    GFR Estimate >90 >60 mL/min/1.73m2    Calcium 9.2 8.8 - 10.2 mg/dL    Glucose 107 (H) 70 - 99 mg/dL   Extra Purple Top EDTA (LAB USE ONLY)    Collection Time: 05/14/24  1:28 PM   Result Value Ref Range    Hold Specimen JI    Adult Type and Screen    Collection Time: 05/14/24  1:28 PM   Result Value  Ref Range    ABO/RH(D) O POS     Antibody Screen Negative Negative    SPECIMEN EXPIRATION DATE [id number]    Potassium    Collection Time: 05/15/24  9:44 AM   Result Value Ref Range    Potassium 4.5 3.4 - 5.3 mmol/L   Creatinine    Collection Time: 05/15/24  9:44 AM   Result Value Ref Range    Creatinine 0.85 0.67 - 1.17 mg/dL    GFR Estimate >90 >60 mL/min/1.73m2   Glucose    Collection Time: 05/15/24  9:44 AM   Result Value Ref Range    Glucose 102 (H) 70 - 99 mg/dL   Surgical Pathology Exam    Collection Time: 05/15/24  1:04 PM   Result Value Ref Range    Case Report       Surgical Pathology Report                         Case: [id number]                                  Authorizing Provider:  Mikael Peoples MD         Collected:           05/15/2024 01:04 PM          Ordering Location:     [facility]          Received:            05/16/2024 07:07 AM                                 [facility] Main OR                                                            Pathologist:           Tanmay Hoffman MD                                                                           Intraop:               Jasmin Porras MD                                                          Specimens:   A) - Lymph Node(s), Mediastinal, Right, 9 R                                                         B) - Lymph Node(s), Mediastinal, Right, LEVEL 7                                                     C) - Lymph Node(s), Mediastinal, Right, 10 R                                                         D) - Lymph Node(s), Mediastinal, Right, 4R                                                          E) - Lymph Node(s), Mediastinal, Right, 2R                                                          F) - Lymph Node(s), Mediastinal, Right, 11 R                                                        G) - Lung, Upper Lobe, Right, RIGHT UPPER LOBE  DIAGNOSES BRONCHIAL AND VESICULAR                    MARGIN                                                                                              H) - Lymph Node(s), Mediastinal, Right, 4 R                                                Addendum       An immunohistochemical stain for NUT is negative.      Final Diagnosis       A.  Lymph node, mediastinal, right, 9R, excision-  Benign lymph node with anthracosis and hyalinized granuloma (1)    B.  Lymph nodes, mediastinal, level 7, excision-  Benign lymph nodes (2)    C.  Lymph nodes, mediastinal, right, 10 R, excision-  Benign lymph nodes (2)    D.  Lymph node, mediastinal, right, 4R, excision-  Benign lymph node with hyalinized granulomas (1)    E.  Lymph node, mediastinal, right, 2R, excision-  Benign lymph node with hyalinized granuloma (1)    F.  Lymph nodes, mediastinal, right, 11 R, excision-  Benign lymph nodes (3)    G.  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  Lymph node, mediastinal, right, 4R, excision-  Benign soft tissue stroma, lymph node not identified        Comment       An immunohistochemical stain for NUT is pending, the results of which will be reported in an addendum.      Synoptic Checklist       LUNG   LUNG - All Specimens   8th Edition - Protocol posted: 9/21/2022      SPECIMEN      Procedure:    Lobectomy       Specimen Laterality:    Right       TUMOR      Tumor Focality:    Single focus       Tumor Site:    Upper lobe of lung       Tumor Size:            Total Tumor Size (size of entire tumor):    Greatest Dimension (Centimeters): 5.1 cm      Histologic Type:    Large cell carcinoma       Histologic Grade:    G3, poorly differentiated       Visceral Pleura Invasion:    Not identified       Direct Invasion of Adjacent Structures:    Not identified        Treatment Effect:    No known presurgical therapy       Lymphovascular Invasion:    Present       MARGINS      Margin Status for Invasive Carcinoma:    All margins negative for invasive carcinoma         Closest Margin(s) to Invasive Carcinoma:    Parenchymal         Distance from Invasive Carcinoma to Closest Margin:    0.2 cm      Margin Status for Non-Invasive Tumor:    Not applicable       REGIONAL LYMPH NODES      Lymph Node(s) from Prior Procedures:    No known prior lymph node sampling performed       Regional Lymph Node Status:            :    All regional lymph nodes negative for tumor         Number of Lymph Nodes Examined:    11           Carlos Enrique Site(s) Examined:    2R: Upper paratracheal           Carlos Enrique Site(s) Examined:    4R: Lower paratracheal           Carlos Enrique Site(s) Examined:    9R: Pulmonary ligament           Carlos Enrique Site(s) Examined:    10R: Hilar           Carlos Enrique Site(s) Examined:    11R: Interlobar           Carlos Enrique Site(s) Examined:    7: Subcarinal       PATHOLOGIC STAGE CLASSIFICATION (pTNM, AJCC 8th Edition)      Reporting of pT, pN, and (when applicable) pM categories is based on information available to the pathologist at the time the report is issued. As per the AJCC (Chapter 1, 8th Ed.) it is the managing physician s responsibility to establish the final pathologic stage based upon all pertinent information, including but potentially not limited to this pathology report.      pT Category:    pT3       pN Category:    pN0       Clinical Information       Procedure:  Robot-assisted right thoracoscopic  lobectomy, - Right  mediastinal lymph node dissection  Pre-op Diagnosis: Mass of upper lobe of right lung [R91.8]  Post-op Diagnosis: R91.8 - Mass of upper lobe of right lung [ICD-10-CM]      Intraoperative Consultation       D(4). Lymph Node(s), Mediastinal, Right, 4R:  DFS1:  Negative for malignancy.    Jasmin Porras MD on 5/15/2024 at 2:41 PM  Intra op performed at:[facility]  "[facility], [address] 1st floor, [address], [place] [address]  Intra-op Dx verbally delivered to Dr. Richelle MI(5). Lymph Node(s), Mediastinal, Right, 2R:  EFS1:  Negative for malignancy    Jasmin Porras MD on 5/15/2024 at 2:41 PM  Intra op performed at: LABORATORY, [facility], [address] 1st floor, [address], [place] [address]  Intra-op Dx verbally delivered to Dr. Richelle MORALES(7). Lung, Upper Lobe, Right, RIGHT UPPER LOBE DIAGNOSES BRONCHIAL AND VESICULAR MARGIN:  -Necrotic tumor; defer to permanent.  -Bronchial vascular margins are negative.    Jasmin Porras MD on 5/15/2024 at 4:37 PM  Intra op performed at: LABORATORY, [facility], [address]st floor, [address], [place] [address]  Intra-op Dx verbally delivered to Dr. Peoples          Gross Description       A(1). Lymph Node(s), Mediastinal, Right, 9 R:  The specimen is received in formalin, labeled with the patient's name, medical record number and other identifying information designated \"mediastinal, 9R lymph node\". It consists of a 1.3 cm anthracotic candidate lymph node.  Sectioned and entirely submitted in 1 cassette.    B(2). Lymph Node(s), Mediastinal, Right, LEVEL 7:  The specimen is received in formalin, labeled with the patient's name, medical record number and other identifying information designated \"mediastinal, right level 7 lymph node\". It consists of 2 anthracotic candidate lymph nodes, 2.1 and 3.5 cm.  Entirely submitted as follows:    B1-1 candidate lymph node, sectioned  B2-B5-1 candidate lymph node, sectioned    C(3). Lymph Node(s), Mediastinal, Right, 10 R:  The specimen is received in formalin, labeled with the patient's name, medical record number and other identifying information designated \"mediastinal, right 10 R lymph node\". It consists of 2 anthracotic candidate lymph nodes, 1.1 and 2.1 cm.  The lymph nodes are entirely " "submitted as follows:    C1-1 candidate lymph node, sectioned  C2-C4-1 candidate lymph node, sectioned    D(4). Lymph Node(s), Mediastinal, Right, 4R:  Received fresh for intraoperative consultation labeled the patient's name, medical record number, and \"4R lymph node\" is an aggregate of black-tan to yellow-tan anthracotic material and lobulated adipose tissue measuring 3.6 x 2.1 x 1.7 cm in aggregate.  Representative sections of the fragments are submitted for frozen sectioning.  The remainder of the specimen is submitted entirely in formalin as follows:    Cassette Key:  D1-frozen section remnant  C6-U6-rkbvhuius of specimen.  Giselle BRYANT(ASCP)CM 5/15/2024 2:56 PM  E(5). Lymph Node(s), Mediastinal, Right, 2R:  Received fresh for intraoperative consultation labeled the patient's name, medical record number, and \"2R lymph node\" is a 2.6 cm in greatest dimension black-tan anthracotic lymph node.  The lymph node is bisected to reveal a black-tan smooth cut surface.  The lymph node is submitted entirely for frozen sectioning and the frozen section remnant is submitted entirely in formalin in 1 cassette.  Giselle BRYANT(ASCP)CM 5/15/2024 2:57 PM    F(6). Lymph Node(s), Mediastinal, Right, 11 R:  The specimen is received in formalin, labeled with the patient's name, medical record number and other identifying information designated \"mediastinal, right 11 R lymph node\". It consists of 3 anthracotic candidate lymph nodes, 1.0-1.2 cm.  Entirely submitted as follows:    F1-1 candidate lymph node, sectioned  F2-1 candidate lymph node, sectioned  F3-1 candidate lymph node, sectioned  G(7). Lung, Upper Lobe, Right, RIGHT UPPER LOBE DIAGNOSES BRONCHIAL AND VESICULAR MARGIN:  The specimen is received fresh for frozen section diagnosis, labeled with the patient's name, medical record number and other identifying information designated \"Right upper lobe\". It consists of a 367.8 g, 19.5 x 11.5 x 7.5 lung lobe.  The " "vascular and bronchial margin at the hilum are stapled closed.  The pleural surface is received focally disrupted and contains a 3.1 cm aggregate of tumor extending from the disruption (inked orange).  The bronchial and vascular margins are submitted en face for frozen section and subsequent permanent section diagnosis in G1FS.  A representative section of the tumor extending from the pleural surface is submitted for frozen section and subsequent permanent section diagnosis in G2FS.  A staple line traverses the specimen near the hilum.  The staple line is removed and the subjacent tissue is inked blue.  Sectioning reveals a 5.1 x 5.0 x 2.5 cm tan-white mass.  The mass disrupts the pleural surface (surrounding pleura inked orange).  The mass is located 0.2 cm from the nearest blue inked parenchymal margin.  Grossly the mass is located 1.1 cm from the nearest bronchial and vascular margins at the hilum.  Further sectioning through the mass reveals the mass to surround multiple vessels near the hilum with possible infiltration into the vasculature.  The remaining lung parenchyma is red and spongy.  Definitive lymph nodes at the hilum are not grossly appreciated.  Representative sections are submitted as follows:    G1FS-bronchial and vascular margins at hilum, en face (frozen section)  I7WH-axmbbiizccqgbl mass (frozen section)  G3-G5-mass at disrupted pleural surface  G6-G9-mass to nearest parenchymal margin (G8-contains parenchymal margin and mass surrounding vasculature with probable invasion)  U27-I59-zybruudtyk mass  O45-rfzyykdt grossly uninvolved normal lung parenchyma  H(8). Lymph Node(s), Mediastinal, Right, 4 R:  The specimen is received in formalin, labeled with the patient's name, medical record number and other identifying information designated \"mediastinal, right 4R lymph node\". It consists of a 1.1 cm anthracotic candidate lymph node.  Sectioned and entirely submitted in 1 cassette.   (Sigifredo Miner, " PA) 5/16/2024 9:15 AM       Microscopic Description       A-F.  Microscopic performed    G.  Sections of lung show a poorly differentiated neoplasm, comprised of solid sheets of malignant epithelioid cells exhibiting moderate to large amounts of eosinophilic cytoplasm and containing vesicular nuclei with prominent basophilic nucleoli.  Occasional cells demonstrate large hyperchromatic pleomorphic nuclei and scattered tumor giant cells are apparent.  Occasional cells demonstrate cytoplasmic eosinophilic globules compatible with rhabdoid differentiation.  These cells account for 1 to 5% of the overall tumor cell population.  Spindling is not noted.  Extensive necrosis is associated with the lesion.  Foci of vascular involvement are identified.  The pleural surface appears free of malignancy.  The parenchymal, bronchial and vascular margins are negative for tumor.  A single perihilar lymph benign lymph node is noted.  Immunohistochemical stains demonstrate  for cytokeratin AE1/AE3 and CK Bernardo.  Rare cells stain for CAM 5.2.  No reactivity is identified for S100 protein, TTF-1/p40, p63, Napsin A, CD34, SALLL4, synaptophysin or chromogranin.  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    H.  Sections show benign fibrous and adipose soft tissue stroma.  Lymph node is not identified.      MCRS Yes (A) N/A    Performing Labs       The technical component of this testing was completed at [facility].    Stain controls for all stains resulted within this report have been reviewed and show appropriate reactivity.   with additional technical staining components completed at [facility]      Case Images     Blood Culture Arm, Right    Collection Time: 05/15/24  7:34 PM    Specimen: Arm, Right; Blood   Result Value Ref Range    Culture No Growth    Blood Culture Arm,  Right    Collection Time: 05/15/24  7:47 PM    Specimen: Arm, Right; Blood   Result Value Ref Range    Culture No Growth    Creatinine    Collection Time: 05/15/24  8:02 PM   Result Value Ref Range    Creatinine 0.83 0.67 - 1.17 mg/dL    GFR Estimate >90 >60 mL/min/1.73m2   Lactic Acid Whole Blood w/ 1x repeat in 2 hrs when >2    Collection Time: 05/15/24  8:02 PM   Result Value Ref Range    Lactic Acid, Initial 1.4 0.7 - 2.0 mmol/L   CBC with platelets    Collection Time: 05/16/24  5:03 AM   Result Value Ref Range    WBC Count 17.0 (H) 4.0 - 11.0 10e3/uL    RBC Count 4.00 (L) 4.40 - 5.90 10e6/uL    Hemoglobin 11.7 (L) 13.3 - 17.7 g/dL    Hematocrit 35.8 (L) 40.0 - 53.0 %    MCV 90 78 - 100 fL    MCH 29.3 26.5 - 33.0 pg    MCHC 32.7 31.5 - 36.5 g/dL    RDW 13.5 10.0 - 15.0 %    Platelet Count 263 150 - 450 10e3/uL   Basic metabolic panel    Collection Time: 05/16/24  5:03 AM   Result Value Ref Range    Sodium 134 (L) 135 - 145 mmol/L    Potassium 4.5 3.4 - 5.3 mmol/L    Chloride 99 98 - 107 mmol/L    Carbon Dioxide (CO2) 25 22 - 29 mmol/L    Anion Gap 10 7 - 15 mmol/L    Urea Nitrogen 14.0 8.0 - 23.0 mg/dL    Creatinine 0.74 0.67 - 1.17 mg/dL    GFR Estimate >90 >60 mL/min/1.73m2    Calcium 8.4 (L) 8.8 - 10.2 mg/dL    Glucose 117 (H) 70 - 99 mg/dL   Glucose by meter    Collection Time: 05/16/24  6:44 AM   Result Value Ref Range    GLUCOSE BY METER POCT 107 (H) 70 - 99 mg/dL         Imaging Results    XR Chest 2 Views    Result Date: 5/28/2024  Chest 2 views INDICATION: Post lung resection. Mass right upper lobe. COMPARISON: 5/16/2024 FINDINGS: New elevation of right hemidiaphragm. Heart size normal. Atherosclerotic calcification of the aortic knob. Right lung base atelectasis best seen also as increased density overlying vertebral column posterior and lateral view with prominent tenting of the right hemidiaphragm and diffuse right lower hemithorax dense opacification. Prior right upper lobectomy.     IMPRESSION:  Secondary effects of right upper lobectomy with volume loss in the right lower hemithorax as well. Recommend follow-up to clearing to exclude superimposed lung base infection. SINGH DIAL MD   SYSTEM ID:  [id number]    XR Chest Port 1 View    Result Date: 5/16/2024  CHEST ONE VIEW  5/16/2024 9:26 AM HISTORY: right pleural tube removed COMPARISON: 5/16/2024.     IMPRESSION: Stable cardiac silhouette. Stable mediastinal contours. Aortic arch calcification. Interval removal of right chest tube. No radiographically discernible pneumothorax. No pleural effusion. Subcutaneous gas of the right neck and chest wall. MICHEAL HARRISON MD   SYSTEM ID:  [id number]    XR Chest Port 1 View    Result Date: 5/16/2024  EXAM: XR CHEST PORT 1 VIEW LOCATION: [facility] DATE: 5/16/2024 INDICATION: Post Op thoracic surgery. COMPARISON: 5/15/2024.     IMPRESSION: Stable right chest tube with tip at the right lung apex medially. No pneumothorax. Lungs are clear. Mild cardiac enlargement. Pulmonary vascularity within normal limits. Aortic calcification. No significant bony abnormalities.        XR Chest Port 1 View    Result Date: 5/15/2024  EXAM: XR CHEST PORT 1 VIEW LOCATION: [facility] DATE: 5/15/2024 INDICATION: Post Op thoracic surgery COMPARISON: PET/CT 5/2/2024     IMPRESSION: Right chest tube. No pneumothorax. No focal airspace disease. No pleural effusion. Normal, nonenlarged cardiomediastinal silhouette. Aortic calcifications. Soft tissue emphysema in the right neck and right lower chest wall.    PET Oncology Whole Body    Result Date: 5/3/2024  Combined Report of: PET and CT on  5/2/2024 3:48 PM: 1. PET of the neck, chest, abdomen, and pelvis. 2. PET CT Fusion for Attenuation Correction and Anatomical Localization:  3. 3D MIP and PET-CT fused images were processed on an independent workstation and archived to PACS and reviewed by a radiologist. Technique: 1. PET: The patient  received 14.53 mCi of F-18-FDG; the serum glucose was 100 mg/dL prior to administration, body weight was 110 kg. Images were evaluated in the axial, sagittal, and coronal planes as well as the rotational whole body MIP. Images were acquired from the Vertex to the Feet. UPTAKE WAS MEASURED AT 66 MINUTES. 2. CT: CT only obtained for attenuation correction and not diagnostic purposes. INDICATION: lung mass; Mass of upper lobe of right lung ADDITIONAL INFORMATION OBTAINED FROM EMR: 68-year-old male with right upper lobe mass highly suspicious for malignancy based on recent biopsy. Status post debulking of right upper lobe endobronchial lesion 4/16/2024. COMPARISON: CT chest 4/16/2024 FINDINGS: BACKGROUND: Liver SUV max = 3.4, Aorta Blood SUV max = 2.6. HEAD/NECK: No abnormal uptake. CHEST: Right upper lobe mass is FDG avid, now measuring 5.9 x 5.2 cm with SUV max 33.1, previously measuring 5.1 x 4.6 cm when measured similarly. FDG avid 1.9 x 1.8 cm right hilar lymph node with SUV max 5.2 (series 4 image 139). Additional smaller hypermetabolic mediastinal nodes including 1.2 x 1.5 cm left lower paratracheal node with SUV max 4.1 (series 4 image 132). Limited evaluation of lymph nodes due to low-dose noncontrast technique. Mild apical predominant paraseptal and centrilobular emphysema. Coronary artery calcifications. Aortic valve leaflets calcifications. ABDOMEN AND PELVIS: No abnormal uptake. LOWER EXTREMITIES: No abnormal uptake. Left total hip arthroplasty. BONES AND SOFT TISSUES: No abnormal uptake.     IMPRESSION: In this patient with right upper lobe mass highly suspicious for primary lung malignancy: 1. Right upper lobe mass is hypermetabolic and is larger compared to 4/16/2024 CT. Findings suggestive of malignancy.  2. Hypermetabolic right hilar node is suspicious for metastasis. Mildly avid nonenlarged few other nodes in the mediastinum are indeterminate. 3. No FDG lesions elsewhere in the body to suggest distant  metastasis. I have personally reviewed the examination and initial interpretation and I agree with the findings. SARAH BETH MARTINEZ MD   SYSTEM ID:  [id number]       I spent 60 minutes on the patient's visit today.  This included preparation for the visit, face-to-face time with the patient and documentation following the visit.  It did not include teaching or procedure time.    Signed by: Peter E. Friedell, MD          Again, thank you for allowing me to participate in the care of your patient.        Sincerely,        Peter E. Friedell, MD

## 2024-06-01 NOTE — PROGRESS NOTES
[facility] Hematology and Oncology Consult Note    Patient: Dk Randhawa Sr.  MRN: [id number]  Date of Service: May 31, 2024       Reason for Visit    I was consulted by   Mikael Peoples MD   For evaluation and management of large cell lung cancer      Encounter Diagnoses Assessment and Plan:    Problem List Items Addressed This Visit       Malignant neoplasm of upper lobe of right lung (H)    Relevant Orders    Comprehensive metabolic panel    CBC with Platelets & Differential     Patient with large cell lung cancer presented with hemoptysis in April 2024.  Bronchosopy showed an obstructing lesion RUL.  Pathology suspicious for malignancy.  Definitve surgery by Dr. Peoples 5/15/2024.  R0 resection with no lymph node metastastes.      Patient is a candidate for adjuvant chemotherapy.  Genomic studies and PD-L1 to be obtained from primary cancer.  Follow up after genomic studies are resulted.    ____________________________________________________________________________    Staging History   Cancer Staging   Malignant neoplasm of upper lobe of right lung (H)  Staging form: Lung, AJCC 8th Edition  - Pathologic: Stage IIB (pT3, pN0, cM0) - Signed by Friedell, Peter E, MD on 6/1/2024    ECOG Performance = 0    History of Present Illness    Mr. Dk Randhawa Sr. is a 68 year old man with a history of atrial fibrillation on Xarelto.  He has a history of exposure to industrial dusts and about a 20 pack year history of cigarette smoking, He quit about 10 years ago.  On 4/7/2024 he presented to the emergency room after several days of hemoptysis.  CT scan of the chest on 4/7/2024 showed a right upper lobe lung mass.  Brain MRI on 4/30/2024 was negative for metastatic disease.  PET CT scan on 5/2/2024 was positive for the RUL mass and there was uptake in some mediastinal lymph nodes.  On 5/16/2024 patient had a robotic assisted right upper lobe lobectomy by Dr. Peoples.  Pathology showed Large Cell Lung  Cancer. Mediastinal nodes were negative.  Patient has made an uneventful recovery.    Presently he feels well.  He is maintaining his weight.  His appetite and digestion are normal.  He has no change in bowel or bladder habit and he can go about his daily activities without difficulty.    Review of systems.  Pertinent Findings are included in the History of Present Illness    Physical Exam    BP (!) 146/104 (BP Location: Right arm, Patient Position: Sitting, Cuff Size: Adult Large)   Pulse 104   Temp 98.5  F (36.9  C) (Tympanic)   Ht 1.829 m (6')   Wt 112.3 kg (247 lb 9.6 oz)   SpO2 98%   BMI 33.58 kg/m       GENERAL APPEARANCE: Healthy appearing man in no apparent distress.  HEAD: Atraumatic; normocephalic; without lesions.  EYES: Conjunctiva, corneas and eyelids normal; pupils equal, round, reactive to light; No Icterus.  MOUTH/OROPHARYNX: oral mucosa intact  NECK: Supple with no nodes.  LUNGS:  Clear to auscultation and percussion with no extra sounds.  Decreased breath sounds right base.  HEART: Irregular rhythm; S1 and S2 normal; no murmurs noted.  ABDOMEN: Soft; no masses or tenderness, no hepatosplenomegaly.  NEUROLOGIC: Alert and oriented.  No obvious focal findings.  EXTREMITIES: No cyanosis, or edema.  SKIN: No abnormal bruising or bleeding. No suspicious lesions noted on exposed skin.  PSYCHIATRIC: Mental status normal; no apparent psychiatric issues    Medications:    Current Outpatient Medications   Medication Sig Dispense Refill    acetaminophen (TYLENOL) 500 MG tablet Take 2 tablets (1,000 mg) by mouth every 6 hours      diphenhydrAMINE-acetaminophen (TYLENOL PM)  MG tablet Take 2 tablets by mouth nightly as needed for sleep      fexofenadine (ALLEGRA) 180 MG tablet Take 180 mg by mouth daily      HEMP OIL OR EXTRACT OR OTHER CBD CANNABINOID, NOT MEDICAL CANNABIS, CBD gummies      losartan (COZAAR) 100 MG tablet Take 100 mg by mouth every morning      Melatonin 10 MG TABS tablet Take 10 mg  by mouth nightly as needed for sleep      metoprolol succinate ER (TOPROL XL) 100 MG 24 hr tablet Take 1 tablet by mouth every morning      multivitamin (CENTRUM SILVER) tablet Take 1 tablet by mouth daily      UNABLE TO FIND MEDICATION NAME: osteo bi flex      UNABLE TO FIND MEDICATION NAME: Emergen-e 1000mg      XARELTO ANTICOAGULANT 20 MG TABS tablet Take 1 tablet (20 mg) by mouth daily (with lunch)      methocarbamol (ROBAXIN) 500 MG tablet Take 1 tablet (500 mg) by mouth every 6 hours as needed for muscle spasms (Patient not taking: Reported on 5/28/2024) 20 tablet 0    oxyCODONE (ROXICODONE) 5 MG tablet Take 1 tablet (5 mg) by mouth every 4 hours as needed for moderate pain 40 tablet 0    polyethylene glycol (MIRALAX) 17 g packet Take 17 g by mouth daily 7 packet 0    senna-docusate (SENOKOT-S/PERICOLACE) 8.6-50 MG tablet Take 1 tablet by mouth 2 times daily 14 tablet 0    Turmeric (QC TUMERIC COMPLEX PO) Tumeric and ginger with applie cider 1410mg (Patient not taking: Reported on 5/31/2024)       No current facility-administered medications for this visit.           Past History    Past Medical History:   Diagnosis Date    Acute non-recurrent maxillary sinusitis     Antiplatelet or antithrombotic long-term use     Arrhythmia     Atrial fibrillation with RVR (H)     Cough secondary to angiotensin converting enzyme inhibitor (ACE-I)     Hyperlipidemia     Hypertension     Mass of upper lobe of right lung     Obesity      Past Surgical History:   Procedure Laterality Date    BRONCHOSCOPY, WITH BIOPSY, ROBOT ASSISTED Bilateral 04/16/2024    Procedure: BRONCHOSCOPY, endobronchial ultrasound, transbronchial needle aspiration, endobronchial biopsies and tumor debulking;  Surgeon: Lanette Arce MD;  Location: [facility] OR    DAVINCI EXCISE NODES THORACIC N/A 5/15/2024    Procedure: mediastinal lymph node dissection;  Surgeon: Mikael Peoples MD;  Location: [facility] OR    DAVINCI LOBECTOMY LUNG Right 5/15/2024    Procedure:  Robot-assisted thoracoscopic right upper lobectomy,;  Surgeon: Mikael Peoples MD;  Location: [facility] OR    ELBOW ARTHROSCOPY Left 03/09/2017    PHACOEMULSIFICATION WITH STANDARD INTRAOCULAR LENS IMPLANT Left 02/26/2018    Procedure: PHACOEMULSIFICATION WITH STANDARD INTRAOCULAR LENS IMPLANT;  Left Cataract Removal with Implant;  Surgeon: Mohit Victor MD;  Location: [facility] OR    PHACOEMULSIFICATION WITH STANDARD INTRAOCULAR LENS IMPLANT Right 01/30/2019    Procedure: Cataract Removal with Implant;  Surgeon: Mohit Victor MD;  Location: [facility] OR    TONSILLECTOMY  1964    TOTAL HIP ARTHROPLASTY Left 04/12/2022     No Known Allergies  No family history on file.  Social History     Socioeconomic History    Marital status:      Spouse name: None    Number of children: None    Years of education: None    Highest education level: None   Tobacco Use    Smoking status: Former     Current packs/day: 0.00     Types: Cigarettes     Quit date: 2014     Years since quitting: 10.4    Smokeless tobacco: Never   Vaping Use    Vaping status: Never Used   Substance and Sexual Activity    Alcohol use: Not Currently    Drug use: No     Social Determinants of Health     Financial Resource Strain: Low Risk  (10/23/2023)    Received from [facility], [facility]    Financial Resource Strain     Difficulty of Paying Living Expenses: 3   Food Insecurity: No Food Insecurity (10/23/2023)    Received from [facility], [facility]    Food Insecurity     Worried About Running Out of Food in the Last Year: 1   Transportation Needs: No Transportation Needs (10/23/2023)    Received from [facility], [facility]    Transportation Needs     Lack of Transportation (Medical): 1   Social Connections: Socially Integrated (10/23/2023)     Received from [facility] [facility] [employer] [facility]    Social Connections     Frequency of Communication with Friends and Family: 0   Housing Stability: Low Risk  (10/23/2023)    Received from [employer] [facility], [facility] [employer] [facility]    Housing Stability     Unable to Pay for Housing in the Last Year: 1           Lab Results      Recent Results (from the past 720 hour(s))   EKG 12-lead complete w/read - Clinics    Collection Time: 05/03/24  7:45 AM   Result Value Ref Range    Systolic Blood Pressure  mmHg    Diastolic Blood Pressure  mmHg    Ventricular Rate 86 BPM    Atrial Rate 84 BPM    FL Interval  ms    QRS Duration 88 ms     ms    QTc 402 ms    P Axis  degrees    R AXIS 0 degrees    T Axis 28 degrees    Interpretation ECG       Atrial fibrillation  Abnormal ECG  No previous ECGs available  Confirmed by MD LYLE JANE ([id number]) on 5/6/2024 10:52:09 AM     CBC with platelets    Collection Time: 05/03/24  8:26 AM   Result Value Ref Range    WBC Count 19.9 (H) 4.0 - 11.0 10e3/uL    RBC Count 4.86 4.40 - 5.90 10e6/uL    Hemoglobin 14.4 13.3 - 17.7 g/dL    Hematocrit 42.8 40.0 - 53.0 %    MCV 88 78 - 100 fL    MCH 29.6 26.5 - 33.0 pg    MCHC 33.6 31.5 - 36.5 g/dL    RDW 13.5 10.0 - 15.0 %    Platelet Count 281 150 - 450 10e3/uL   Basic metabolic panel    Collection Time: 05/03/24  8:27 AM   Result Value Ref Range    Sodium 133 (L) 135 - 145 mmol/L    Potassium 5.1 3.4 - 5.3 mmol/L    Chloride 98 98 - 107 mmol/L    Carbon Dioxide (CO2) 23 22 - 29 mmol/L    Anion Gap 12 7 - 15 mmol/L    Urea Nitrogen 19.4 8.0 - 23.0 mg/dL    Creatinine 0.86 0.67 - 1.17 mg/dL    GFR Estimate >90 >60 mL/min/1.73m2    Calcium 9.2 8.8 - 10.2 mg/dL    Glucose 107 (H) 70 - 99 mg/dL   Extra Purple Top EDTA (LAB USE ONLY)    Collection Time: 05/14/24  1:28 PM   Result Value Ref Range    Hold Specimen [facility]    Adult Type and Screen     Collection Time: 05/14/24  1:28 PM   Result Value Ref Range    ABO/RH(D) O POS     Antibody Screen Negative Negative    SPECIMEN EXPIRATION DATE [id number]    Potassium    Collection Time: 05/15/24  9:44 AM   Result Value Ref Range    Potassium 4.5 3.4 - 5.3 mmol/L   Creatinine    Collection Time: 05/15/24  9:44 AM   Result Value Ref Range    Creatinine 0.85 0.67 - 1.17 mg/dL    GFR Estimate >90 >60 mL/min/1.73m2   Glucose    Collection Time: 05/15/24  9:44 AM   Result Value Ref Range    Glucose 102 (H) 70 - 99 mg/dL   Surgical Pathology Exam    Collection Time: 05/15/24  1:04 PM   Result Value Ref Range    Case Report       Surgical Pathology Report                         Case: [id number]                                  Authorizing Provider:  Mikael Peoples MD         Collected:           05/15/2024 01:04 PM          Ordering Location:     [facility]          Received:            05/16/2024 07:07 AM                                 [facility] Main OR                                                            Pathologist:           Tanmay Hoffman MD                                                                           Intraop:               Jasmin Porras MD                                                          Specimens:   A) - Lymph Node(s), Mediastinal, Right, 9 R                                                         B) - Lymph Node(s), Mediastinal, Right, LEVEL 7                                                     C) - Lymph Node(s), Mediastinal, Right, 10 R                                                         D) - Lymph Node(s), Mediastinal, Right, 4R                                                          E) - Lymph Node(s), Mediastinal, Right, 2R                                                          F) - Lymph Node(s), Mediastinal, Right, 11 R                                                         G) - Lung, Upper Lobe, Right, RIGHT UPPER LOBE DIAGNOSES BRONCHIAL AND VESICULAR                    MARGIN                                                                                              H) - Lymph Node(s), Mediastinal, Right, 4 R                                                Addendum       An immunohistochemical stain for NUT is negative.      Final Diagnosis       A.  Lymph node, mediastinal, right, 9R, excision-  Benign lymph node with anthracosis and hyalinized granuloma (1)    B.  Lymph nodes, mediastinal, level 7, excision-  Benign lymph nodes (2)    C.  Lymph nodes, mediastinal, right, 10 R, excision-  Benign lymph nodes (2)    D.  Lymph node, mediastinal, right, 4R, excision-  Benign lymph node with hyalinized granulomas (1)    E.  Lymph node, mediastinal, right, 2R, excision-  Benign lymph node with hyalinized granuloma (1)    F.  Lymph nodes, mediastinal, right, 11 R, excision-  Benign lymph nodes (3)    G.  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  Lymph node, mediastinal, right, 4R, excision-  Benign soft tissue stroma, lymph node not identified        Comment       An immunohistochemical stain for NUT is pending, the results of which will be reported in an addendum.      Synoptic Checklist       LUNG   LUNG - All Specimens   8th Edition - Protocol posted: 9/21/2022      SPECIMEN      Procedure:    Lobectomy       Specimen Laterality:    Right       TUMOR      Tumor Focality:    Single focus       Tumor Site:    Upper lobe of lung       Tumor Size:            Total Tumor Size (size of entire tumor):    Greatest Dimension (Centimeters): 5.1 cm      Histologic Type:    Large cell carcinoma       Histologic Grade:    G3, poorly differentiated       Visceral Pleura Invasion:    Not identified       Direct Invasion  of Adjacent Structures:    Not identified       Treatment Effect:    No known presurgical therapy       Lymphovascular Invasion:    Present       MARGINS      Margin Status for Invasive Carcinoma:    All margins negative for invasive carcinoma         Closest Margin(s) to Invasive Carcinoma:    Parenchymal         Distance from Invasive Carcinoma to Closest Margin:    0.2 cm      Margin Status for Non-Invasive Tumor:    Not applicable       REGIONAL LYMPH NODES      Lymph Node(s) from Prior Procedures:    No known prior lymph node sampling performed       Regional Lymph Node Status:            :    All regional lymph nodes negative for tumor         Number of Lymph Nodes Examined:    11           Carlos Enrique Site(s) Examined:    2R: Upper paratracheal           Carlos Enrique Site(s) Examined:    4R: Lower paratracheal           Carlos Enrique Site(s) Examined:    9R: Pulmonary ligament           Carlos Enrique Site(s) Examined:    10R: Hilar           Carlos Enrique Site(s) Examined:    11R: Interlobar           Carlos Enrique Site(s) Examined:    7: Subcarinal       PATHOLOGIC STAGE CLASSIFICATION (pTNM, AJCC 8th Edition)      Reporting of pT, pN, and (when applicable) pM categories is based on information available to the pathologist at the time the report is issued. As per the AJCC (Chapter 1, 8th Ed.) it is the managing physician s responsibility to establish the final pathologic stage based upon all pertinent information, including but potentially not limited to this pathology report.      pT Category:    pT3       pN Category:    pN0       Clinical Information       Procedure:  Robot-assisted right thoracoscopic  lobectomy, - Right  mediastinal lymph node dissection  Pre-op Diagnosis: Mass of upper lobe of right lung [R91.8]  Post-op Diagnosis: R91.8 - Mass of upper lobe of right lung [ICD-10-CM]      Intraoperative Consultation       D(4). Lymph Node(s), Mediastinal, Right, 4R:  DFS1:  Negative for malignancy.    Jasmin Porras MD on 5/15/2024 at 2:41  "PM  Intra op performed at: LABORATORY, [facility], [address] 1st floor, [address], [place] [address]  Intra-op Dx verbally delivered to Dr. Richelle MI(5). Lymph Node(s), Mediastinal, Right, 2R:  EFS1:  Negative for malignancy    Jasmin Porras MD on 5/15/2024 at 2:41 PM  Intra op performed at: LABORATORY, [facility], [address] 1st floor, [address], [place] [address]  Intra-op Dx verbally delivered to Dr. Richelle MORALES(7). Lung, Upper Lobe, Right, RIGHT UPPER LOBE DIAGNOSES BRONCHIAL AND VESICULAR MARGIN:  -Necrotic tumor; defer to permanent.  -Bronchial vascular margins are negative.    Jasmin Porras MD on 5/15/2024 at 4:37 PM  Intra op performed at: LABORATORY, [facility], [address], [address], [place] [address]  Intra-op Dx verbally delivered to Dr. Peoples          Gross Description       A(1). Lymph Node(s), Mediastinal, Right, 9 R:  The specimen is received in formalin, labeled with the patient's name, medical record number and other identifying information designated \"mediastinal, 9R lymph node\". It consists of a 1.3 cm anthracotic candidate lymph node.  Sectioned and entirely submitted in 1 cassette.    B(2). Lymph Node(s), Mediastinal, Right, LEVEL 7:  The specimen is received in formalin, labeled with the patient's name, medical record number and other identifying information designated \"mediastinal, right level 7 lymph node\". It consists of 2 anthracotic candidate lymph nodes, 2.1 and 3.5 cm.  Entirely submitted as follows:    B1-1 candidate lymph node, sectioned  B2-B5-1 candidate lymph node, sectioned    C(3). Lymph Node(s), Mediastinal, Right, 10 R:  The specimen is received in formalin, labeled with the patient's name, medical record number and other identifying information designated \"mediastinal, right 10 R lymph node\". It consists of 2 anthracotic candidate lymph nodes, 1.1 " "and 2.1 cm.  The lymph nodes are entirely submitted as follows:    C1-1 candidate lymph node, sectioned  C2-C4-1 candidate lymph node, sectioned    D(4). Lymph Node(s), Mediastinal, Right, 4R:  Received fresh for intraoperative consultation labeled the patient's name, medical record number, and \"4R lymph node\" is an aggregate of black-tan to yellow-tan anthracotic material and lobulated adipose tissue measuring 3.6 x 2.1 x 1.7 cm in aggregate.  Representative sections of the fragments are submitted for frozen sectioning.  The remainder of the specimen is submitted entirely in formalin as follows:    Cassette Key:  D1-frozen section remnant  X0-C8-apydevvrm of specimen.  Giselle BRYANT([facility]) 5/15/2024 2:56 PM  E(5). Lymph Node(s), Mediastinal, Right, 2R:  Received fresh for intraoperative consultation labeled the patient's name, medical record number, and \"2R lymph node\" is a 2.6 cm in greatest dimension black-tan anthracotic lymph node.  The lymph node is bisected to reveal a black-tan smooth cut surface.  The lymph node is submitted entirely for frozen sectioning and the frozen section remnant is submitted entirely in formalin in 1 cassette.  Giselle BRYANT([facility]) 5/15/2024 2:57 PM    F(6). Lymph Node(s), Mediastinal, Right, 11 R:  The specimen is received in formalin, labeled with the patient's name, medical record number and other identifying information designated \"mediastinal, right 11 R lymph node\". It consists of 3 anthracotic candidate lymph nodes, 1.0-1.2 cm.  Entirely submitted as follows:    F1-1 candidate lymph node, sectioned  F2-1 candidate lymph node, sectioned  F3-1 candidate lymph node, sectioned  G(7). Lung, Upper Lobe, Right, RIGHT UPPER LOBE DIAGNOSES BRONCHIAL AND VESICULAR MARGIN:  The specimen is received fresh for frozen section diagnosis, labeled with the patient's name, medical record number and other identifying information designated \"Right upper lobe\". It consists of a 367.8 " "g, 19.5 x 11.5 x 7.5 lung lobe.  The vascular and bronchial margin at the hilum are stapled closed.  The pleural surface is received focally disrupted and contains a 3.1 cm aggregate of tumor extending from the disruption (inked orange).  The bronchial and vascular margins are submitted en face for frozen section and subsequent permanent section diagnosis in G1FS.  A representative section of the tumor extending from the pleural surface is submitted for frozen section and subsequent permanent section diagnosis in G2FS.  A staple line traverses the specimen near the hilum.  The staple line is removed and the subjacent tissue is inked blue.  Sectioning reveals a 5.1 x 5.0 x 2.5 cm tan-white mass.  The mass disrupts the pleural surface (surrounding pleura inked orange).  The mass is located 0.2 cm from the nearest blue inked parenchymal margin.  Grossly the mass is located 1.1 cm from the nearest bronchial and vascular margins at the hilum.  Further sectioning through the mass reveals the mass to surround multiple vessels near the hilum with possible infiltration into the vasculature.  The remaining lung parenchyma is red and spongy.  Definitive lymph nodes at the hilum are not grossly appreciated.  Representative sections are submitted as follows:    G1FS-bronchial and vascular margins at hilum, en face (frozen section)  T1IP-umrrtzdqbhofke mass (frozen section)  G3-G5-mass at disrupted pleural surface  G6-G9-mass to nearest parenchymal margin (G8-contains parenchymal margin and mass surrounding vasculature with probable invasion)  D69-K85-wwbmunlkbc mass  E01-peasgcqt grossly uninvolved normal lung parenchyma  H(8). Lymph Node(s), Mediastinal, Right, 4 R:  The specimen is received in formalin, labeled with the patient's name, medical record number and other identifying information designated \"mediastinal, right 4R lymph node\". It consists of a 1.1 cm anthracotic candidate lymph node.  Sectioned and entirely submitted " in 1 cassette.   (MANNY Zacarias) 5/16/2024 9:15 AM       Microscopic Description       A-F.  Microscopic performed    G.  Sections of lung show a poorly differentiated neoplasm, comprised of solid sheets of malignant epithelioid cells exhibiting moderate to large amounts of eosinophilic cytoplasm and containing vesicular nuclei with prominent basophilic nucleoli.  Occasional cells demonstrate large hyperchromatic pleomorphic nuclei and scattered tumor giant cells are apparent.  Occasional cells demonstrate cytoplasmic eosinophilic globules compatible with rhabdoid differentiation.  These cells account for 1 to 5% of the overall tumor cell population.  Spindling is not noted.  Extensive necrosis is associated with the lesion.  Foci of vascular involvement are identified.  The pleural surface appears free of malignancy.  The parenchymal, bronchial and vascular margins are negative for tumor.  A single perihilar lymph benign lymph node is noted.  Immunohistochemical stains demonstrate  for cytokeratin AE1/AE3 and CK Bernardo.  Rare cells stain for CAM 5.2.  No reactivity is identified for S100 protein, TTF-1/p40, p63, Napsin A, CD34, SALLL4, synaptophysin or chromogranin.  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    H.  Sections show benign fibrous and adipose soft tissue stroma.  Lymph node is not identified.      MCRS Yes (A) N/A    Performing Labs       The technical component of this testing was completed at [facility].    Stain controls for all stains resulted within this report have been reviewed and show appropriate reactivity.   with additional technical staining components completed at [facility]      Case Images     Blood Culture Arm, Right    Collection Time: 05/15/24  7:34 PM    Specimen: Arm, Right; Blood   Result Value Ref Range    Culture  No Growth    Blood Culture Arm, Right    Collection Time: 05/15/24  7:47 PM    Specimen: Arm, Right; Blood   Result Value Ref Range    Culture No Growth    Creatinine    Collection Time: 05/15/24  8:02 PM   Result Value Ref Range    Creatinine 0.83 0.67 - 1.17 mg/dL    GFR Estimate >90 >60 mL/min/1.73m2   Lactic Acid Whole Blood w/ 1x repeat in 2 hrs when >2    Collection Time: 05/15/24  8:02 PM   Result Value Ref Range    Lactic Acid, Initial 1.4 0.7 - 2.0 mmol/L   CBC with platelets    Collection Time: 05/16/24  5:03 AM   Result Value Ref Range    WBC Count 17.0 (H) 4.0 - 11.0 10e3/uL    RBC Count 4.00 (L) 4.40 - 5.90 10e6/uL    Hemoglobin 11.7 (L) 13.3 - 17.7 g/dL    Hematocrit 35.8 (L) 40.0 - 53.0 %    MCV 90 78 - 100 fL    MCH 29.3 26.5 - 33.0 pg    MCHC 32.7 31.5 - 36.5 g/dL    RDW 13.5 10.0 - 15.0 %    Platelet Count 263 150 - 450 10e3/uL   Basic metabolic panel    Collection Time: 05/16/24  5:03 AM   Result Value Ref Range    Sodium 134 (L) 135 - 145 mmol/L    Potassium 4.5 3.4 - 5.3 mmol/L    Chloride 99 98 - 107 mmol/L    Carbon Dioxide (CO2) 25 22 - 29 mmol/L    Anion Gap 10 7 - 15 mmol/L    Urea Nitrogen 14.0 8.0 - 23.0 mg/dL    Creatinine 0.74 0.67 - 1.17 mg/dL    GFR Estimate >90 >60 mL/min/1.73m2    Calcium 8.4 (L) 8.8 - 10.2 mg/dL    Glucose 117 (H) 70 - 99 mg/dL   Glucose by meter    Collection Time: 05/16/24  6:44 AM   Result Value Ref Range    GLUCOSE BY METER POCT 107 (H) 70 - 99 mg/dL         Imaging Results    XR Chest 2 Views    Result Date: 5/28/2024  Chest 2 views INDICATION: Post lung resection. Mass right upper lobe. COMPARISON: 5/16/2024 FINDINGS: New elevation of right hemidiaphragm. Heart size normal. Atherosclerotic calcification of the aortic knob. Right lung base atelectasis best seen also as increased density overlying vertebral column posterior and lateral view with prominent tenting of the right hemidiaphragm and diffuse right lower hemithorax dense opacification. Prior right  upper lobectomy.     IMPRESSION: Secondary effects of right upper lobectomy with volume loss in the right lower hemithorax as well. Recommend follow-up to clearing to exclude superimposed lung base infection. SINGH DIAL MD   SYSTEM ID:  [id number]    XR Chest Port 1 View    Result Date: 5/16/2024  CHEST ONE VIEW  5/16/2024 9:26 AM HISTORY: right pleural tube removed COMPARISON: 5/16/2024.     IMPRESSION: Stable cardiac silhouette. Stable mediastinal contours. Aortic arch calcification. Interval removal of right chest tube. No radiographically discernible pneumothorax. No pleural effusion. Subcutaneous gas of the right neck and chest wall. MICHEAL HARRISON MD   SYSTEM ID:  [id number]    XR Chest Port 1 View    Result Date: 5/16/2024  EXAM: XR CHEST PORT 1 VIEW LOCATION: [facility] DATE: 5/16/2024 INDICATION: Post Op thoracic surgery. COMPARISON: 5/15/2024.     IMPRESSION: Stable right chest tube with tip at the right lung apex medially. No pneumothorax. Lungs are clear. Mild cardiac enlargement. Pulmonary vascularity within normal limits. Aortic calcification. No significant bony abnormalities.        XR Chest Port 1 View    Result Date: 5/15/2024  EXAM: XR CHEST PORT 1 VIEW LOCATION: [facility] DATE: 5/15/2024 INDICATION: Post Op thoracic surgery COMPARISON: PET/CT 5/2/2024     IMPRESSION: Right chest tube. No pneumothorax. No focal airspace disease. No pleural effusion. Normal, nonenlarged cardiomediastinal silhouette. Aortic calcifications. Soft tissue emphysema in the right neck and right lower chest wall.    PET Oncology Whole Body    Result Date: 5/3/2024  Combined Report of: PET and CT on  5/2/2024 3:48 PM: 1. PET of the neck, chest, abdomen, and pelvis. 2. PET CT Fusion for Attenuation Correction and Anatomical Localization:  3. 3D MIP and PET-CT fused images were processed on an independent workstation and archived to PACS and reviewed by a  radiologist. Technique: 1. PET: The patient received 14.53 mCi of F-18-FDG; the serum glucose was 100 mg/dL prior to administration, body weight was 110 kg. Images were evaluated in the axial, sagittal, and coronal planes as well as the rotational whole body MIP. Images were acquired from the Vertex to the Feet. UPTAKE WAS MEASURED AT 66 MINUTES. 2. CT: CT only obtained for attenuation correction and not diagnostic purposes. INDICATION: lung mass; Mass of upper lobe of right lung ADDITIONAL INFORMATION OBTAINED FROM EMR: 68-year-old male with right upper lobe mass highly suspicious for malignancy based on recent biopsy. Status post debulking of right upper lobe endobronchial lesion 4/16/2024. COMPARISON: CT chest 4/16/2024 FINDINGS: BACKGROUND: Liver SUV max = 3.4, Aorta Blood SUV max = 2.6. HEAD/NECK: No abnormal uptake. CHEST: Right upper lobe mass is FDG avid, now measuring 5.9 x 5.2 cm with SUV max 33.1, previously measuring 5.1 x 4.6 cm when measured similarly. FDG avid 1.9 x 1.8 cm right hilar lymph node with SUV max 5.2 (series 4 image 139). Additional smaller hypermetabolic mediastinal nodes including 1.2 x 1.5 cm left lower paratracheal node with SUV max 4.1 (series 4 image 132). Limited evaluation of lymph nodes due to low-dose noncontrast technique. Mild apical predominant paraseptal and centrilobular emphysema. Coronary artery calcifications. Aortic valve leaflets calcifications. ABDOMEN AND PELVIS: No abnormal uptake. LOWER EXTREMITIES: No abnormal uptake. Left total hip arthroplasty. BONES AND SOFT TISSUES: No abnormal uptake.     IMPRESSION: In this patient with right upper lobe mass highly suspicious for primary lung malignancy: 1. Right upper lobe mass is hypermetabolic and is larger compared to 4/16/2024 CT. Findings suggestive of malignancy.  2. Hypermetabolic right hilar node is suspicious for metastasis. Mildly avid nonenlarged few other nodes in the mediastinum are indeterminate. 3. No FDG  lesions elsewhere in the body to suggest distant metastasis. I have personally reviewed the examination and initial interpretation and I agree with the findings. SARAH BETH MARTINEZ MD   SYSTEM ID:  [id number]       I spent 60 minutes on the patient's visit today.  This included preparation for the visit, face-to-face time with the patient and documentation following the visit.  It did not include teaching or procedure time.    Signed by: Peter E. Friedell, MD

## 2024-06-13 NOTE — PROGRESS NOTES
THORACIC SURGERY FOLLOW UP VISIT      I saw Mr. Randhawa in follow-up today. 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 Mild hyperinflation. Torsion aorta. Left lung clear.   CLERICAL ADDENDUM:  There is a word recognition error involving the second sentence.     It should read as follows:     Tortuous aorta.     Past Medical History:   Diagnosis Date    Acute non-recurrent maxillary sinusitis     Antiplatelet or antithrombotic long-term use     Arrhythmia     Atrial fibrillation with RVR (H)     Cough secondary to angiotensin converting enzyme inhibitor (ACE-I)     Hyperlipidemia     Hypertension     Mass of upper lobe of right lung     Obesity       Past Surgical History:   Procedure Laterality Date    BRONCHOSCOPY, WITH BIOPSY, ROBOT ASSISTED Bilateral 04/16/2024    Procedure: BRONCHOSCOPY, endobronchial ultrasound, transbronchial needle aspiration, endobronchial biopsies and tumor debulking;  Surgeon: Lanette Arce MD;  Location: [facility] OR    DAVINCI EXCISE NODES THORACIC N/A 5/15/2024    Procedure: mediastinal lymph node dissection;  Surgeon: Mikael Peoples MD;  Location: [facility] OR    DAVINCI LOBECTOMY LUNG Right 5/15/2024    Procedure: Robot-assisted thoracoscopic right upper lobectomy,;  Surgeon: Mikael Peoples MD;  Location:  OR    ELBOW ARTHROSCOPY Left 03/09/2017    PHACOEMULSIFICATION WITH STANDARD INTRAOCULAR LENS IMPLANT Left 02/26/2018    Procedure: PHACOEMULSIFICATION WITH STANDARD INTRAOCULAR LENS IMPLANT;  Left Cataract Removal with Implant;  Surgeon: Mohit Victor MD;  Location: [facility]    PHACOEMULSIFICATION WITH STANDARD INTRAOCULAR  LENS IMPLANT Right 01/30/2019    Procedure: Cataract Removal with Implant;  Surgeon: Mohit Victor MD;  Location: [facility] OR    TONSILLECTOMY  1964    TOTAL HIP ARTHROPLASTY Left 04/12/2022      Social History     Socioeconomic History    Marital status:      Spouse name: Not on file    Number of children: Not on file    Years of education: Not on file    Highest education level: Not on file   Occupational History    Not on file   Tobacco Use    Smoking status: Former     Current packs/day: 0.00     Types: Cigarettes     Quit date: 2014     Years since quitting: 10.4    Smokeless tobacco: Never   Vaping Use    Vaping status: Never Used   Substance and Sexual Activity    Alcohol use: Not Currently    Drug use: No    Sexual activity: Not on file   Other Topics Concern    Parent/sibling w/ CABG, MI or angioplasty before 65F 55M? Not Asked   Social History Narrative    Not on file     Social Determinants of Health     Financial Resource Strain: Low Risk  (10/23/2023)    [place] [facility] [place] [facility] [facility] [employer] [facility]    Financial Resource Strain     Difficulty of Paying Living Expenses: 3     Difficulty of Paying Living Expenses: Not on file   Food Insecurity: No Food Insecurity (10/23/2023)    [facility], [facility] [employer] [facility]    Food Insecurity     Worried About Running Out of Food in the Last Year: 1   Transportation Needs: No Transportation Needs (10/23/2023)    [facility], [facility]    Transportation Needs     Lack of Transportation (Medical): 1   Physical Activity: Not on file   Stress: Not on file   Social Connections: Socially Integrated (10/23/2023)    [place] [facility] [place] [facility] [facility], [facility] [employer] [facility] [employer] [facility]    Social Connections     Frequency  of Communication with Friends and Family: 0   Interpersonal Safety: Not on file   Housing Stability: Low Risk  (10/23/2023)    [facility], [employer] [facility]    Housing Stability     Unable to Pay for Housing in the Last Year: 1      SUBJECTIVE   Dk is doing well. He has notices his pain is getting better. His breathing is slowly getting back to normal. He is pretty active. He tries to get at least 5,000 steps a day in plus he rides 5 miles each day on his stationary bike. He uses the Incentive Spirometer and is nearly up to 2750. He was a side sleeper prior to surgery and now finds that getting to sleep is a challenge. He has been taking one oxycodone at night to help him get comfortable to she can sleep. He sleeps about 5 hours a night, which is normal for him. He meets with his oncologist next week to discuss chemotherapy.    OBJECTIVE  /88 (BP Location: Right arm, Patient Position: Sitting, Cuff Size: Adult Regular)   Pulse 94   Wt 112.9 kg (248 lb 12.8 oz)   SpO2 96%   BMI 33.74 kg/m       His incisions are healing nicely.    From a personal perspective, he is here alone today.    IMPRESSION  Dk is a 68 year-old male status post robot assisted thoracoscopic right upper lobectomy and mediastinal lymph node dissection for a pT3N0 (stage IIB) large cell carcinoma. He is here for a one month post operative follow up. He has already met with Medical Oncology and the plan is for Dk to get adjuvant chemotherapy.      PLAN  I spent 15 min on the date of the encounter in chart review, patient visit, review of tests, documentation and/or discussion with other providers about the issues documented above. I reviewed the plan as follows:  Follow up with Thoracic Surgery as needed. Oncology follow up per Medical Oncology.  All questions were answered and the patient and present family were in agreement with the plan.  I appreciate  the opportunity to participate in the care of your patient and will keep you updated.  Sincerely,

## 2024-06-19 NOTE — PROGRESS NOTES
[facility] Hematology and Oncology Consult Note    Patient: Dk Randhawa Sr.  MRN: [id number]  Date of Service: Jun 19, 2024       Reason for Visit    I was consulted by   Mikael Peoples MD   For evaluation and management of large cell lung cancer      Encounter Diagnoses Assessment and Plan:    Problem List Items Addressed This Visit       Malignant neoplasm of upper lobe of right lung (H) - Primary    Relevant Orders    Infusion Appointment Request - Adult    Infusion Appointment Request - Adult    Infusion Appointment Request - Adult    Infusion Appointment Request - Adult    Infusion Appointment Request - Adult    Infusion Appointment Request - Adult    Adult Gen Surg  Referral     Patient is a candidate for adjuvant chemotherapy.   Patient will have 4 cycles of cisplatin and etoposide every 3 weeks as adjuvant chemotherapy.  Following this he will have CT scans of the chest with IV contrast every 3 months for at least 2 years.  After 2 years surveillance interval may be increased.  ____________________________________________________________________________    Staging History   Cancer Staging   Malignant neoplasm of upper lobe of right lung (H)  Staging form: Lung, AJCC 8th Edition  - Pathologic: Stage IIB (pT3, pN0, cM0) - Signed by Friedell, Peter E, MD on 6/1/2024    Foundation 1 studies show a tumor proportion score of 40%.  Microsatellite status is MS stable tumor.  Mutational burden is 1 MUTS/MB   K-juanito-Q61H amplification is noted.  No therapies or clinical trials are available for this mutation    ECOG Performance = 0    History of Present Illness    Mr. Dk Randhawa Sr. is a 68 year old man with a history of atrial fibrillation on Xarelto.  He has a history of exposure to industrial dusts and about a 20 pack year history of cigarette smoking, He quit about 10 years ago.  On 4/7/2024 he presented to the emergency room after several days of hemoptysis.  CT scan of the chest on  4/7/2024 showed a right upper lobe lung mass.  Brain MRI on 4/30/2024 was negative for metastatic disease.  PET CT scan on 5/2/2024 was positive for the RUL mass and there was uptake in some mediastinal lymph nodes.  On 5/16/2024 patient had a robotic assisted right upper lobe lobectomy by Dr. Peoples.  Pathology showed Large Cell Lung Cancer. Mediastinal nodes were negative.  Patient has made an uneventful recovery.    Presently he feels well.  He is maintaining his weight.  His appetite and digestion are normal.  He has no change in bowel or bladder habit and he can go about his daily activities without difficulty.    Review of systems.  Pertinent Findings are included in the History of Present Illness    Physical Exam    BP (!) 158/108 (BP Location: Right arm, Patient Position: Sitting, Cuff Size: Adult Large)   Pulse 98   Temp 97.7  F (36.5  C) (Tympanic)   Resp 12   Ht 1.829 m (6')   Wt 112 kg (247 lb)   SpO2 96%   BMI 33.50 kg/m       Not examined this visit    Medications:    Current Outpatient Medications   Medication Sig Dispense Refill    acetaminophen (TYLENOL) 500 MG tablet Take 2 tablets (1,000 mg) by mouth every 6 hours      diphenhydrAMINE-acetaminophen (TYLENOL PM)  MG tablet Take 2 tablets by mouth nightly as needed for sleep      fexofenadine (ALLEGRA) 180 MG tablet Take 180 mg by mouth daily      HEMP OIL OR EXTRACT OR OTHER CBD CANNABINOID, NOT MEDICAL CANNABIS, CBD gummies      losartan (COZAAR) 100 MG tablet Take 100 mg by mouth every morning      Melatonin 10 MG TABS tablet Take 10 mg by mouth nightly as needed for sleep      metoprolol succinate ER (TOPROL XL) 100 MG 24 hr tablet Take 1 tablet by mouth every morning      multivitamin (CENTRUM SILVER) tablet Take 1 tablet by mouth daily      Turmeric (QC TUMERIC COMPLEX PO) Tumeric and ginger with applie cider 1410mg      UNABLE TO FIND MEDICATION NAME: osteo bi flex      UNABLE TO FIND MEDICATION NAME: Emergen-e 1000mg       XARELTO ANTICOAGULANT 20 MG TABS tablet Take 1 tablet (20 mg) by mouth daily (with lunch)      methocarbamol (ROBAXIN) 500 MG tablet Take 1 tablet (500 mg) by mouth every 6 hours as needed for muscle spasms (Patient not taking: Reported on 5/28/2024) 20 tablet 0     No current facility-administered medications for this visit.           Past History    Past Medical History:   Diagnosis Date    Acute non-recurrent maxillary sinusitis     Antiplatelet or antithrombotic long-term use     Arrhythmia     Atrial fibrillation with RVR (H)     Cough secondary to angiotensin converting enzyme inhibitor (ACE-I)     Hyperlipidemia     Hypertension     Mass of upper lobe of right lung     Obesity      Past Surgical History:   Procedure Laterality Date    BRONCHOSCOPY, WITH BIOPSY, ROBOT ASSISTED Bilateral 04/16/2024    Procedure: BRONCHOSCOPY, endobronchial ultrasound, transbronchial needle aspiration, endobronchial biopsies and tumor debulking;  Surgeon: Lanette Arce MD;  Location: [facility] OR    DAVINCI EXCISE NODES THORACIC N/A 5/15/2024    Procedure: mediastinal lymph node dissection;  Surgeon: Mikael Peoples MD;  Location: [facility] OR    DAVINCI LOBECTOMY LUNG Right 5/15/2024    Procedure: Robot-assisted thoracoscopic right upper lobectomy,;  Surgeon: Mikael Peoples MD;  Location: [facility] OR    ELBOW ARTHROSCOPY Left 03/09/2017    PHACOEMULSIFICATION WITH STANDARD INTRAOCULAR LENS IMPLANT Left 02/26/2018    Procedure: PHACOEMULSIFICATION WITH STANDARD INTRAOCULAR LENS IMPLANT;  Left Cataract Removal with Implant;  Surgeon: Mohit Victor MD;  Location: [facility]    PHACOEMULSIFICATION WITH STANDARD INTRAOCULAR LENS IMPLANT Right 01/30/2019    Procedure: Cataract Removal with Implant;  Surgeon: Mohit Victor MD;  Location: [facility] OR    TONSILLECTOMY  1964    TOTAL HIP ARTHROPLASTY Left 04/12/2022     No Known Allergies  No family history on file.  Social History     Socioeconomic History    Marital status:       Spouse name: None    Number of children: None    Years of education: None    Highest education level: None   Tobacco Use    Smoking status: Former     Current packs/day: 0.00     Types: Cigarettes     Quit date: 2014     Years since quitting: 10.4    Smokeless tobacco: Never   Vaping Use    Vaping status: Never Used   Substance and Sexual Activity    Alcohol use: Not Currently    Drug use: No     Social Determinants of Health     Financial Resource Strain: Low Risk  (10/23/2023)    Received [facility] [employer] [facility]    Financial Resource Strain     Difficulty of Paying Living Expenses: 3   Food Insecurity: No Food Insecurity (10/23/2023)    Received from [facility] [employer] [facility]    Food Insecurity     Worried About Running Out of Food in the Last Year: 1   Transportation Needs: No Transportation Needs (10/23/2023)    Received from [facility] [employer] [facility]    Transportation Needs     Lack of Transportation (Medical): 1   Social Connections: Socially Integrated (10/23/2023)    Received from [facility] [employer] [facility]    Social Connections     Frequency of Communication with Friends and Family: 0   Housing Stability: Low Risk  (10/23/2023)    [facility] [employer] [facility] [email or web address] [facility]    Housing Stability     Unable to Pay for Housing in the Last Year: 1           Lab Results      Recent Results (from the past 720 hour(s))   Comprehensive metabolic panel    Collection Time: 06/19/24  1:20 PM   Result Value Ref Range    Sodium 133 (L) 135 - 145 mmol/L    Potassium 4.3 3.4 - 5.3 mmol/L    Carbon Dioxide (CO2) 23 22 - 29 mmol/L    Anion Gap 12 7 - 15 mmol/L    Urea Nitrogen 12.2 8.0 - 23.0 mg/dL     Creatinine 0.86 0.67 - 1.17 mg/dL    GFR Estimate >90 >60 mL/min/1.73m2    Calcium 9.0 8.8 - 10.2 mg/dL    Chloride 98 98 - 107 mmol/L    Glucose 115 (H) 70 - 99 mg/dL    Alkaline Phosphatase 96 40 - 150 U/L    AST 20 0 - 45 U/L    ALT 13 0 - 70 U/L    Protein Total 7.0 6.4 - 8.3 g/dL    Albumin 4.2 3.5 - 5.2 g/dL    Bilirubin Total 0.3 <=1.2 mg/dL   CBC with platelets and differential    Collection Time: 06/19/24  1:20 PM   Result Value Ref Range    WBC Count 13.5 (H) 4.0 - 11.0 10e3/uL    RBC Count 4.80 4.40 - 5.90 10e6/uL    Hemoglobin 14.2 13.3 - 17.7 g/dL    Hematocrit 42.7 40.0 - 53.0 %    MCV 89 78 - 100 fL    MCH 29.6 26.5 - 33.0 pg    MCHC 33.3 31.5 - 36.5 g/dL    RDW 13.6 10.0 - 15.0 %    Platelet Count 278 150 - 450 10e3/uL    % Neutrophils 81 %    % Lymphocytes 10 %    % Monocytes 7 %    % Eosinophils 1 %    % Basophils 1 %    % Immature Granulocytes 1 %    NRBCs per 100 WBC 0 <1 /100    Absolute Neutrophils 10.9 (H) 1.6 - 8.3 10e3/uL    Absolute Lymphocytes 1.3 0.8 - 5.3 10e3/uL    Absolute Monocytes 1.0 0.0 - 1.3 10e3/uL    Absolute Eosinophils 0.2 0.0 - 0.7 10e3/uL    Absolute Basophils 0.1 0.0 - 0.2 10e3/uL    Absolute Immature Granulocytes 0.1 <=0.4 10e3/uL    Absolute NRBCs 0.0 10e3/uL         Imaging Results    XR Chest 2 Views    Addendum Date: 6/13/2024    CLERICAL ADDENDUM: There is a word recognition error involving the second sentence. It should read as follows: Tortuous aorta. Findings discussed with Dr. Peoples's nurse at 1530. END ADDENDUM    Result Date: 6/13/2024  EXAM: XR CHEST 2 VIEWS LOCATION: [facility] DATE: 6/13/2024 INDICATION: lung cancer status post lung resection COMPARISON: 5/28/2024     IMPRESSION: Right upper lobectomy with stable postsurgical change. Mild hyperinflation. Torsion aorta. Left lung clear.    XR Chest 2 Views    Result Date: 5/28/2024  Chest 2 views INDICATION: Post lung resection. Mass right upper lobe. COMPARISON: 5/16/2024 FINDINGS:  New elevation of right hemidiaphragm. Heart size normal. Atherosclerotic calcification of the aortic knob. Right lung base atelectasis best seen also as increased density overlying vertebral column posterior and lateral view with prominent tenting of the right hemidiaphragm and diffuse right lower hemithorax dense opacification. Prior right upper lobectomy.     IMPRESSION: Secondary effects of right upper lobectomy with volume loss in the right lower hemithorax as well. Recommend follow-up to clearing to exclude superimposed lung base infection. SINGH DIAL MD   SYSTEM ID:  [id number]     The goals and risks of adjuvant chemotherapy for large cell carcinoma of the lung with cisplatin and etoposide were discussed with the patient.  The patient has agreed to cisplatin and etoposide as adjuvant chemotherapy for large cell lung cancer.    I spent 45 minutes on the patient's visit today.  This included preparation for the visit, face-to-face time with the patient and documentation following the visit.  It did not include teaching or procedure time.    Signed by: Peter E. Friedell, MD

## 2024-06-19 NOTE — PROGRESS NOTES
Understanding Colon and Rectal Polyps     The colon has a smooth lining composed of millions of cells.     The colon (also called the large intestine) is a muscular tube that forms the last part of the digestive tract. It absorbs water and stores food waste. The colon is about 4 to 6 feet long. The rectum is the last 6 inches of the colon. The colon and rectum have a smooth lining composed of millions of cells. Changes in these cells can lead to growths in the colon that can become cancerous and should be removed.     When the Colon Lining Changes  Changes that occur in the cells that line the colon or rectum can lead to growths called polyps. Over a period of years, polyps can turn cancerous. Removing polyps early may prevent cancer from ever forming.      Polyps  Polyps are fleshy clumps of tissue that form on the lining of the colon or rectum. Small polyps are usually benign (not cancerous). However, over time, cells in a polyp can change and become cancerous. The larger a polyp grows, the more likely this is to happen. Also, certain types of polyps known as adenomatous polyps are considered premalignant. This means that they will almost always become cancerous if they re not removed.          Cancer  Almost all colorectal cancers start when polyp cells begin growing abnormally. As a cancerous tumor grows, it may involve more and more of the colon or rectum. In time, cancer can also grow beyond the colon or rectum and spread to nearby organs or to glands called lymph nodes. The cells can also travel to other parts of the body. This is known as metastasis. The earlier a cancerous tumor is removed, the better the chance of preventing its spread.        [id number] [facility], [address] [place] [address], [place] [address]. All rights reserved. This information is not intended as a substitute for professional medical care. Always follow your healthcare professional's instructions.    Understanding Diverticulosis and  Oncology Rooming Note    June 19, 2024 2:18 PM   Dk Randhawa Sr. is a 68 year old male who presents for:    Chief Complaint   Patient presents with    Oncology Clinic Visit     Malignant neoplasm of upper lobe of right lung - Labs and provider visit     Initial Vitals: BP (!) 158/108 (BP Location: Right arm, Patient Position: Sitting, Cuff Size: Adult Large)   Pulse 98   Temp 97.7  F (36.5  C) (Tympanic)   Resp 12   Ht 1.829 m (6')   Wt 112 kg (247 lb)   SpO2 96%   BMI 33.50 kg/m   Estimated body mass index is 33.5 kg/m  as calculated from the following:    Height as of this encounter: 1.829 m (6').    Weight as of this encounter: 112 kg (247 lb). Body surface area is 2.39 meters squared.  Mild Pain (2) Comment: Data Unavailable   No LMP for male patient.  Allergies reviewed: Yes  Medications reviewed: Yes    Medications: Medication refills not needed today.  Pharmacy name entered into [facility]:    [id number] [place], [place] - [address] [place] [address] [id number] - [place], [place] - [address] [facility]    Frailty Screening:   Is the patient here for a new oncology consult visit in cancer care? 2. No      Clinical concerns:  None      Stefania Dos Santos, CMA             Diverticulitis     Pouches or diverticula usually occur in the lower part of the colon called the sigmoid.      Diverticulitis occurs when the pouches become inflamed.     The colon (large intestine) is the last part of the digestive tract. It absorbs water from stool and changes it from a liquid to a solid. In certain cases, small pouches called diverticula can form in the colon wall. This condition is called diverticulosis. The pouches can become infected. If this happens, it becomes a more serious problem called diverticulitis. These problems can be painful. But they can be managed.   Managing Your Condition  Diet changes or taking medications are often tried first. These may be enough to bring relief. If the case is bad, surgery may be done. You and your doctor can discuss the plan that is best for you.  If You Have Diverticulosis  Diet changes are often enough to control symptoms. The main changes are adding fiber (roughage) and drinking more water. Fiber absorbs water as it travels through your colon. This helps your stool stay soft and move smoothly. Water helps this process. If needed, you may be told to take over-the-counter stool softeners. To help relieve pain, antispasmodic medications may be prescribed.  If You Have Diverticulitis  Treatment depends on how bad your symptoms are.  For mild symptoms: You may be put on a liquid diet for a short time. You may also be prescribed antibiotics. If these two steps relieve your symptoms, you may then be prescribed a high-fiber diet. If you still have symptoms, your doctor will discuss further treatment options with you.  For severe symptoms: You may need to be admitted to the hospital. There, you can be given IV antibiotics and fluids. Once symptoms are under control, the above treatments may be tried. If these don t control your condition, your doctor may discuss the option of having surgery with you.  [place] to Colon Health  Help keep your colon healthy with a  diet that includes plenty of high-fiber fruits, vegetables, and whole grains. Drink plenty of liquids like water and juice. Your doctor may also recommend avoiding seeds and nuts.          [id number] Stefani Sharma, [address] [place] [address], [place] [address]. All rights reserved. This information is not intended as a substitute for professional medical care. Always follow your healthcare professional's instructions.    Eating a High-Fiber Diet  Fiber is what gives strength and structure to plants. Most grains, beans, vegetables, and fruits contain fiber. Foods rich in fiber are often low in calories and fat, and they fill you up more. They may also reduce your risks for certain health problems. To find out the amount of fiber in canned, packaged, or frozen foods, read the  Nutrition Facts  label. It tells you how much fiber is in a serving.      Types of Fiber and Their Benefits  There are two types of fiber: insoluble and soluble. They both aid digestion and help you maintain a healthy weight.  Insoluble fiber: This is found in whole grains, cereals, certain fruits and vegetables (such as apple skin, corn, and carrots). Insoluble fiber may prevent constipation and reduce the risk of certain types of cancer.   Soluble fiber: This type of fiber is in oats, beans, and certain fruits and vegetables (such as strawberries and peas). Soluble fiber can reduce cholesterol (which may help lower the risk of heart disease), and helps control blood sugar levels.  Look for High-Fiber Foods  Whole-grain breads and cereals: Try to eat 6-8 ounces a day. Include wheat and oat bran cereals, whole-wheat muffins or toast, and corn tortillas in your meals.  Fruits: Try to eat 2 cups a day. Apples, oranges, strawberries, pears, and bananas are good sources. (Note: Fruit juice is low in fiber.)  Vegetables: Try to eat 3 cups a day. Add asparagus, carrots, broccoli, peas, and corn to your meals.  Legumes (beans): One cup of cooked lentils  gives you over 15 grams of fiber. Try navy beans, lentils, and chickpeas.  Seeds:  A small handful of seeds gives you about 3 grams of fiber. Try sunflower seeds.    Keep Track of Your Fiber  A healthy diet includes 31 grams of fiber a day if you have a 2,000-calorie diet. Keep track of how much fiber you eat. Start by reading food labels. Then eat a variety of foods high in fiber. Ask your doctor about supplemental fiber products.            [id number] Stefani Sharma, [address] [place] [address], [place] [id number]. All rights reserved. This information is not intended as a substitute for professional medical care. Always follow your healthcare professional's instructions.  The patient has received a copy of the Provation  report the doctor has written and discharge instructions have been discussed with the patient and responsible adult.  All questions were addressed and answered prior to patient discharge.

## 2024-06-19 NOTE — LETTER
6/19/2024      Dk Randhawa   [address]  [facility] [address]      Dear Colleague,    Thank you for referring your patient, Dk Randhawa Sr., to the [facility]. Please see a copy of my visit note below.    Oncology Rooming Note    June 19, 2024 2:18 PM   Dk Randhawa Sr. is a 68 year old male who presents for:    Chief Complaint   Patient presents with     Oncology Clinic Visit     Malignant neoplasm of upper lobe of right lung - Labs and provider visit     Initial Vitals: BP (!) 158/108 (BP Location: Right arm, Patient Position: Sitting, Cuff Size: Adult Large)   Pulse 98   Temp 97.7  F (36.5  C) (Tympanic)   Resp 12   Ht 1.829 m (6')   Wt 112 kg (247 lb)   SpO2 96%   BMI 33.50 kg/m   Estimated body mass index is 33.5 kg/m  as calculated from the following:    Height as of this encounter: 1.829 m (6').    Weight as of this encounter: 112 kg (247 lb). Body surface area is 2.39 meters squared.  Mild Pain (2) Comment: Data Unavailable   No LMP for male patient.  Allergies reviewed: Yes  Medications reviewed: Yes    Medications: Medication refills not needed today.  Pharmacy name entered into pic5:    [id number] - [place], [place] - [address] [place] [address] [id number] - [place], [place] - [address] [facility]    Frailty Screening:   Is the patient here for a new oncology consult visit in cancer care? 2. No      Clinical concerns:  None      Stefania Dos Santos, [facility] Hematology and Oncology Consult Note    Patient: Dk Randhawa Sr.  MRN: [id number]  Date of Service: Jun 19, 2024       Reason for Visit    I was consulted by   Mikael Peoples MD   For evaluation and management of large cell lung cancer      Encounter Diagnoses Assessment and Plan:    Problem List Items Addressed This Visit       Malignant neoplasm of upper lobe of right lung (H) - Primary    Relevant Orders    Infusion Appointment Request - Adult     Infusion Appointment Request - Adult    Infusion Appointment Request - Adult    Infusion Appointment Request - Adult    Infusion Appointment Request - Adult    Infusion Appointment Request - Adult    Adult Gen Surg  Referral     Patient is a candidate for adjuvant chemotherapy.   Patient will have 4 cycles of cisplatin and etoposide every 3 weeks as adjuvant chemotherapy.  Following this he will have CT scans of the chest with IV contrast every 3 months for at least 2 years.  After 2 years surveillance interval may be increased.  ____________________________________________________________________________    Staging History   Cancer Staging   Malignant neoplasm of upper lobe of right lung (H)  Staging form: Lung, AJCC 8th Edition  - Pathologic: Stage IIB (pT3, pN0, cM0) - Signed by Friedell, Peter E, MD on 6/1/2024    Foundation 1 studies show a tumor proportion score of 40%.  Microsatellite status is MS stable tumor.  Mutational burden is 1 MUTS/MB   K-juanito-Q61H amplification is noted.  No therapies or clinical trials are available for this mutation    ECOG Performance = 0    History of Present Illness    Mr. Dk Randhawa . is a 68 year old man with a history of atrial fibrillation on Xarelto.  He has a history of exposure to industrial dusts and about a 20 pack year history of cigarette smoking, He quit about 10 years ago.  On 4/7/2024 he presented to the emergency room after several days of hemoptysis.  CT scan of the chest on 4/7/2024 showed a right upper lobe lung mass.  Brain MRI on 4/30/2024 was negative for metastatic disease.  PET CT scan on 5/2/2024 was positive for the RUL mass and there was uptake in some mediastinal lymph nodes.  On 5/16/2024 patient had a robotic assisted right upper lobe lobectomy by Dr. Peoples.  Pathology showed Large Cell Lung Cancer. Mediastinal nodes were negative.  Patient has made an uneventful recovery.    Presently he feels well.  He is maintaining his weight.   His appetite and digestion are normal.  He has no change in bowel or bladder habit and he can go about his daily activities without difficulty.    Review of systems.  Pertinent Findings are included in the History of Present Illness    Physical Exam    BP (!) 158/108 (BP Location: Right arm, Patient Position: Sitting, Cuff Size: Adult Large)   Pulse 98   Temp 97.7  F (36.5  C) (Tympanic)   Resp 12   Ht 1.829 m (6')   Wt 112 kg (247 lb)   SpO2 96%   BMI 33.50 kg/m       Not examined this visit    Medications:    Current Outpatient Medications   Medication Sig Dispense Refill     acetaminophen (TYLENOL) 500 MG tablet Take 2 tablets (1,000 mg) by mouth every 6 hours       diphenhydrAMINE-acetaminophen (TYLENOL PM)  MG tablet Take 2 tablets by mouth nightly as needed for sleep       fexofenadine (ALLEGRA) 180 MG tablet Take 180 mg by mouth daily       HEMP OIL OR EXTRACT OR OTHER CBD CANNABINOID, NOT MEDICAL CANNABIS, CBD gummies       losartan (COZAAR) 100 MG tablet Take 100 mg by mouth every morning       Melatonin 10 MG TABS tablet Take 10 mg by mouth nightly as needed for sleep       metoprolol succinate ER (TOPROL XL) 100 MG 24 hr tablet Take 1 tablet by mouth every morning       multivitamin (CENTRUM SILVER) tablet Take 1 tablet by mouth daily       Turmeric (QC TUMERIC COMPLEX PO) Tumeric and ginger with applie cider 1410mg       UNABLE TO FIND MEDICATION NAME: osteo bi flex       UNABLE TO FIND MEDICATION NAME: Emergen-e 1000mg       XARELTO ANTICOAGULANT 20 MG TABS tablet Take 1 tablet (20 mg) by mouth daily (with lunch)       methocarbamol (ROBAXIN) 500 MG tablet Take 1 tablet (500 mg) by mouth every 6 hours as needed for muscle spasms (Patient not taking: Reported on 5/28/2024) 20 tablet 0     No current facility-administered medications for this visit.           Past History    Past Medical History:   Diagnosis Date     Acute non-recurrent maxillary sinusitis      Antiplatelet or antithrombotic  long-term use      Arrhythmia      Atrial fibrillation with RVR (H)      Cough secondary to angiotensin converting enzyme inhibitor (ACE-I)      Hyperlipidemia      Hypertension      Mass of upper lobe of right lung      Obesity      Past Surgical History:   Procedure Laterality Date     BRONCHOSCOPY, WITH BIOPSY, ROBOT ASSISTED Bilateral 04/16/2024    Procedure: BRONCHOSCOPY, endobronchial ultrasound, transbronchial needle aspiration, endobronchial biopsies and tumor debulking;  Surgeon: Lanette Arce MD;  Location: [facility] OR     DAVINCI EXCISE NODES THORACIC N/A 5/15/2024    Procedure: mediastinal lymph node dissection;  Surgeon: Mikael Peoples MD;  Location: [facility] OR     DAVINCI LOBECTOMY LUNG Right 5/15/2024    Procedure: Robot-assisted thoracoscopic right upper lobectomy,;  Surgeon: Mikael Peoples MD;  Location: [facility] OR     ELBOW ARTHROSCOPY Left 03/09/2017     PHACOEMULSIFICATION WITH STANDARD INTRAOCULAR LENS IMPLANT Left 02/26/2018    Procedure: PHACOEMULSIFICATION WITH STANDARD INTRAOCULAR LENS IMPLANT;  Left Cataract Removal with Implant;  Surgeon: Mohit Victor MD;  Location: [facility] OR     PHACOEMULSIFICATION WITH STANDARD INTRAOCULAR LENS IMPLANT Right 01/30/2019    Procedure: Cataract Removal with Implant;  Surgeon: Mohit Victor MD;  Location: [facility] OR     TONSILLECTOMY  1964     TOTAL HIP ARTHROPLASTY Left 04/12/2022     No Known Allergies  No family history on file.  Social History     Socioeconomic History     Marital status:      Spouse name: None     Number of children: None     Years of education: None     Highest education level: None   Tobacco Use     Smoking status: Former     Current packs/day: 0.00     Types: Cigarettes     Quit date: 2014     Years since quitting: 10.4     Smokeless tobacco: Never   Vaping Use     Vaping status: Never Used   Substance and Sexual Activity     Alcohol use: Not Currently     Drug use: No     Social Determinants of Health     Financial  Resource Strain: Low Risk  (10/23/2023)    [facility]    Financial Resource Strain      Difficulty of Paying Living Expenses: 3   Food Insecurity: No Food Insecurity (10/23/2023)    [facility]    Food Insecurity      Worried About Running Out of Food in the Last Year: 1   Transportation Needs: No Transportation Needs (10/23/2023)    [facility]    Transportation Needs      Lack of Transportation (Medical): 1   Social Connections: Socially Integrated (10/23/2023)    [facility]    Social Connections      Frequency of Communication with Friends and Family: 0   Housing Stability: Low Risk  (10/23/2023)    [facility]    Housing Stability      Unable to Pay for Housing in the Last Year: 1           Lab Results      Recent Results (from the past 720 hour(s))   Comprehensive metabolic panel    Collection Time: 06/19/24  1:20 PM   Result Value Ref Range    Sodium 133 (L) 135 - 145 mmol/L    Potassium 4.3 3.4 - 5.3 mmol/L    Carbon Dioxide (CO2) 23 22 - 29 mmol/L    Anion Gap 12 7 - 15 mmol/L    Urea Nitrogen 12.2 8.0 - 23.0 mg/dL    Creatinine 0.86 0.67 - 1.17 mg/dL    GFR Estimate >90 >60 mL/min/1.73m2    Calcium 9.0 8.8 - 10.2 mg/dL    Chloride 98 98 - 107 mmol/L    Glucose 115 (H) 70 - 99 mg/dL    Alkaline Phosphatase 96 40 - 150 U/L    AST 20 0 - 45 U/L    ALT 13 0 - 70 U/L    Protein Total 7.0 6.4 - 8.3 g/dL    Albumin 4.2 3.5 - 5.2 g/dL    Bilirubin Total 0.3 <=1.2 mg/dL   CBC with platelets and differential    Collection Time: 06/19/24  1:20 PM   Result  Value Ref Range    WBC Count 13.5 (H) 4.0 - 11.0 10e3/uL    RBC Count 4.80 4.40 - 5.90 10e6/uL    Hemoglobin 14.2 13.3 - 17.7 g/dL    Hematocrit 42.7 40.0 - 53.0 %    MCV 89 78 - 100 fL    MCH 29.6 26.5 - 33.0 pg    MCHC 33.3 31.5 - 36.5 g/dL    RDW 13.6 10.0 - 15.0 %    Platelet Count 278 150 - 450 10e3/uL    % Neutrophils 81 %    % Lymphocytes 10 %    % Monocytes 7 %    % Eosinophils 1 %    % Basophils 1 %    % Immature Granulocytes 1 %    NRBCs per 100 WBC 0 <1 /100    Absolute Neutrophils 10.9 (H) 1.6 - 8.3 10e3/uL    Absolute Lymphocytes 1.3 0.8 - 5.3 10e3/uL    Absolute Monocytes 1.0 0.0 - 1.3 10e3/uL    Absolute Eosinophils 0.2 0.0 - 0.7 10e3/uL    Absolute Basophils 0.1 0.0 - 0.2 10e3/uL    Absolute Immature Granulocytes 0.1 <=0.4 10e3/uL    Absolute NRBCs 0.0 10e3/uL         Imaging Results    XR Chest 2 Views    Addendum Date: 6/13/2024    CLERICAL ADDENDUM: There is a word recognition error involving the second sentence. It should read as follows: Tortuous aorta. Findings discussed with Dr. Peoples's nurse at 1530. END ADDENDUM    Result Date: 6/13/2024  EXAM: XR CHEST 2 VIEWS LOCATION: [facility] DATE: 6/13/2024 INDICATION: lung cancer status post lung resection COMPARISON: 5/28/2024     IMPRESSION: Right upper lobectomy with stable postsurgical change. Mild hyperinflation. Torsion aorta. Left lung clear.    XR Chest 2 Views    Result Date: 5/28/2024  Chest 2 views INDICATION: Post lung resection. Mass right upper lobe. COMPARISON: 5/16/2024 FINDINGS: New elevation of right hemidiaphragm. Heart size normal. Atherosclerotic calcification of the aortic knob. Right lung base atelectasis best seen also as increased density overlying vertebral column posterior and lateral view with prominent tenting of the right hemidiaphragm and diffuse right lower hemithorax dense opacification. Prior right upper lobectomy.     IMPRESSION: Secondary effects of right upper lobectomy with volume loss  in the right lower hemithorax as well. Recommend follow-up to clearing to exclude superimposed lung base infection. SINGH DIAL MD   SYSTEM ID:  [id number]     The goals and risks of adjuvant chemotherapy for large cell carcinoma of the lung with cisplatin and etoposide were discussed with the patient.  The patient has agreed to cisplatin and etoposide as adjuvant chemotherapy for large cell lung cancer.    I spent 45 minutes on the patient's visit today.  This included preparation for the visit, face-to-face time with the patient and documentation following the visit.  It did not include teaching or procedure time.    Signed by: Peter E. Friedell, MD          Again, thank you for allowing me to participate in the care of your patient.        Sincerely,        Peter E. Friedell, MD

## 2024-06-24 NOTE — NURSING NOTE
Initial BP (!) 139/93 (BP Location: Right arm, Patient Position: Chair, Cuff Size: Adult Large)   Pulse 95   Temp 98.3  F (36.8  C) (Tympanic)   Wt 112 kg (247 lb)   BMI 33.50 kg/m   Estimated body mass index is 33.5 kg/m  as calculated from the following:    Height as of 6/19/24: 1.829 m (6').    Weight as of this encounter: 112 kg (247 lb). .  Renata Martin LPN

## 2024-06-24 NOTE — LETTER
6/24/2024      Dk HARRISON Sydnee Sr.  [address]  [facility] [address]      Dear Colleague,    Thank you for referring your patient, Dk Randhawa Sr., to the [facility]. Please see a copy of my visit note below.    Surgical Consultation/History and Physical  [facility] General Surgery    Dk is seen in consultation for port placement, at the request of Esteban Copeland MD.    Chief Complaint:  right upper lobe lung cancer    HPI:  Dk Randhawa Sr. presents in consultation today for evaluation of infusion port placement. He has a history of right  upper lobe lung cancer, now s/p lobectomy  on 5/28/24. The patient is right-hand dominant, has never had central venous access, pneumothorax, lung surgery, history of dialysis or renal failure, and denies repetitive movements with the upper extremity (such as pitching, chopping wood, bowling, or hunting).      Patient Active Problem List   Diagnosis     CARDIOVASCULAR SCREENING; LDL GOAL LESS THAN 160     Atrial fibrillation with RVR (H)     Essential hypertension, benign     Obesity (BMI 30.0-34.9)     Hyperlipidemia     Cough due to angiotensin-converting enzyme inhibitor     Pre-diabetes     Malignant neoplasm of upper lobe of right lung (H)       Past Medical History:   Diagnosis Date     Acute non-recurrent maxillary sinusitis      Antiplatelet or antithrombotic long-term use      Arrhythmia      Atrial fibrillation with RVR (H)      Cough secondary to angiotensin converting enzyme inhibitor (ACE-I)      Hyperlipidemia      Hypertension      Mass of upper lobe of right lung      Obesity        Past Surgical History:   Procedure Laterality Date     BRONCHOSCOPY, WITH BIOPSY, ROBOT ASSISTED Bilateral 04/16/2024    Procedure: BRONCHOSCOPY, endobronchial ultrasound, transbronchial needle aspiration, endobronchial biopsies and tumor debulking;  Surgeon: Lanette Arce MD;  Location: [facility] OR     DAVINCI EXCISE NODES THORACIC N/A  5/15/2024    Procedure: mediastinal lymph node dissection;  Surgeon: Mikael Peoples MD;  Location: [facility] OR     DAVINCI LOBECTOMY LUNG Right 5/15/2024    Procedure: Robot-assisted thoracoscopic right upper lobectomy,;  Surgeon: Mikael Peoples MD;  Location: [facility] OR     ELBOW ARTHROSCOPY Left 03/09/2017     PHACOEMULSIFICATION WITH STANDARD INTRAOCULAR LENS IMPLANT Left 02/26/2018    Procedure: PHACOEMULSIFICATION WITH STANDARD INTRAOCULAR LENS IMPLANT;  Left Cataract Removal with Implant;  Surgeon: Mohit Victor MD;  Location: [facility]     PHACOEMULSIFICATION WITH STANDARD INTRAOCULAR LENS IMPLANT Right 01/30/2019    Procedure: Cataract Removal with Implant;  Surgeon: Mohit Victor MD;  Location: [facility]     TONSILLECTOMY  1964     TOTAL HIP ARTHROPLASTY Left 04/12/2022       Family History   Problem Relation Age of Onset     Ovarian Cancer Mother      Alzheimer Disease Mother      Depression Father 45        suicide     Diabetes Sister        Social History     Tobacco Use     Smoking status: Former     Current packs/day: 0.00     Types: Cigarettes     Quit date: 2014     Years since quitting: 10.4     Smokeless tobacco: Never   Substance Use Topics     Alcohol use: Not Currently        History   Drug Use No       Current Outpatient Medications   Medication Sig Dispense Refill     acetaminophen (TYLENOL) 500 MG tablet Take 2 tablets (1,000 mg) by mouth every 6 hours       diphenhydrAMINE-acetaminophen (TYLENOL PM)  MG tablet Take 2 tablets by mouth nightly as needed for sleep       fexofenadine (ALLEGRA) 180 MG tablet Take 180 mg by mouth daily       HEMP OIL OR EXTRACT OR OTHER CBD CANNABINOID, NOT MEDICAL CANNABIS, CBD gummies       losartan (COZAAR) 100 MG tablet Take 100 mg by mouth every morning       Melatonin 10 MG TABS tablet Take 10 mg by mouth nightly as needed for sleep       methocarbamol (ROBAXIN) 500 MG tablet Take 1 tablet (500 mg) by mouth every 6 hours as needed for muscle  spasms 20 tablet 0     metoprolol succinate ER (TOPROL XL) 100 MG 24 hr tablet Take 1 tablet by mouth every morning       multivitamin (CENTRUM SILVER) tablet Take 1 tablet by mouth daily       Turmeric (QC TUMERIC COMPLEX PO) Tumeric and zach with applie cider 1410mg       UNABLE TO FIND MEDICATION NAME: osteo bi flex       UNABLE TO FIND MEDICATION NAME: Emergen-e 1000mg       XARELTO ANTICOAGULANT 20 MG TABS tablet Take 1 tablet (20 mg) by mouth daily (with lunch)         No Known Allergies    Review of Systems:   Constitutional - Denies fevers, weight loss, malaise, lethargy  Neuro - Denies tremors or seizures  Pulmon - Denies SOB, dyspnea, hemoptysis, chronic cough or use of an inhaler  CV - Denies CP, SOB, lower extremity edema, difficulty w/ stairs, has never used NTG  GI - Denies hematemesis, BRBPR, melena, chronic diarrhea or epigastric pain   - Denies hematuria, difficulty voiding, h/o STDs  Hematology - Denies blood clotting disorders, chronic anemias  Dermatology - No melanomas or skin cancers  Rheumatology - No h/o RA  Pysch - Denies depression, bipolar d/o or schizophrenia    Physical Exam:  BP (!) 139/93 (BP Location: Right arm, Patient Position: Chair, Cuff Size: Adult Large)   Pulse 95   Temp 98.3  F (36.8  C) (Tympanic)   Wt 112 kg (247 lb)   BMI 33.50 kg/m      Constitutional- No acute distress, well nourished, non-toxic  Eyes: Anicteric, no injection.  PERRL  ENT:  Normocephalic, atraumatic, Nose midline, moist mucus membranes  Neck - supple, no LAD  Respiratory- Nonlabored breathing on room air  Cardiovascular - Extremities warm and well perfused  Neuro - No focal neuro deficits, Alert and oriented x 3  Psych: Appropriate mood and affect  Musculoskeletal: Normal gait, symmetric strength.  FROM upper and lower extremities.  Skin: Warm, Dry    LABS:     Lab Results   Component Value Date    WBC 13.5 (H) 06/19/2024    HGB 14.2 06/19/2024    HCT 42.7 06/19/2024    MCV 89 06/19/2024    PLT  278 06/19/2024      Lab Results   Component Value Date    INR 1.16 (H) 04/07/2024          INFORMED CONSENT:     A discussion of the indications, risks, benefits and alternatives to surgery was held with the patient, and the risks discussed include beeding, infection, thrombosis, stenosis, port malfunction or malposition, air embolism, pneumothorax, hemothorax, or cardiac arrythmia . The patient understood the information given, questions addressed, and she wishes to proceed. He understands the need for maintenance of the infusion port at least once a month while in place.    ASSESSMENT AND PLAN:     Mr. Dk Randhawa Bay is in need of an infusion port for adjuvant chemotherapy for lung cancer, and is being expedited to surgery to facilitate care. Anticipate placement of  right internal jugular  port. He  understood the information given, and wishes to proceed accordingly. He will need to be off xarelto for the procedure and patient to meet with his PCP prior to procedure for H&P    Henry Veronica MD on 6/24/2024 at 2:23 PM            Again, thank you for allowing me to participate in the care of your patient.        Sincerely,        Henry Veronica MD

## 2024-06-24 NOTE — PROGRESS NOTES
Surgical Consultation/History and Physical  [facility] General Surgery    Dk is seen in consultation for port placement, at the request of Esteban Copeland MD.    Chief Complaint:  right upper lobe lung cancer    HPI:  Dk Randhawa  presents in consultation today for evaluation of infusion port placement. He has a history of right  upper lobe lung cancer, now s/p lobectomy  on 5/28/24. The patient is right-hand dominant, has never had central venous access, pneumothorax, lung surgery, history of dialysis or renal failure, and denies repetitive movements with the upper extremity (such as pitching, chopping wood, bowling, or hunting).      Patient Active Problem List   Diagnosis    CARDIOVASCULAR SCREENING; LDL GOAL LESS THAN 160    Atrial fibrillation with RVR (H)    Essential hypertension, benign    Obesity (BMI 30.0-34.9)    Hyperlipidemia    Cough due to angiotensin-converting enzyme inhibitor    Pre-diabetes    Malignant neoplasm of upper lobe of right lung (H)       Past Medical History:   Diagnosis Date    Acute non-recurrent maxillary sinusitis     Antiplatelet or antithrombotic long-term use     Arrhythmia     Atrial fibrillation with RVR (H)     Cough secondary to angiotensin converting enzyme inhibitor (ACE-I)     Hyperlipidemia     Hypertension     Mass of upper lobe of right lung     Obesity        Past Surgical History:   Procedure Laterality Date    BRONCHOSCOPY, WITH BIOPSY, ROBOT ASSISTED Bilateral 04/16/2024    Procedure: BRONCHOSCOPY, endobronchial ultrasound, transbronchial needle aspiration, endobronchial biopsies and tumor debulking;  Surgeon: Lanette Arce MD;  [facility]    [facility]    Procedure: mediastinal lymph node dissection;  Surgeon: Mikael Peoples MD;  Location:  OR    DAVINCI LOBECTOMY LUNG Right 5/15/2024    Procedure: Robot-assisted thoracoscopic right upper lobectomy,;  Surgeon: Mikael Peoples MD;  Location:  OR     ELBOW ARTHROSCOPY Left 03/09/2017    PHACOEMULSIFICATION WITH STANDARD INTRAOCULAR LENS IMPLANT Left 02/26/2018    Procedure: PHACOEMULSIFICATION WITH STANDARD INTRAOCULAR LENS IMPLANT;  Left Cataract Removal with Implant;  Surgeon: Mohit Victor MD;  Location: [facility] OR    PHACOEMULSIFICATION WITH STANDARD INTRAOCULAR LENS IMPLANT Right 01/30/2019    Procedure: Cataract Removal with Implant;  Surgeon: Mohit Victor MD;  Location: [facility] OR    TONSILLECTOMY  1964    TOTAL HIP ARTHROPLASTY Left 04/12/2022       Family History   Problem Relation Age of Onset    Ovarian Cancer Mother     Alzheimer Disease Mother     Depression Father 45        suicide    Diabetes Sister        Social History     Tobacco Use    Smoking status: Former     Current packs/day: 0.00     Types: Cigarettes     Quit date: 2014     Years since quitting: 10.4    Smokeless tobacco: Never   Substance Use Topics    Alcohol use: Not Currently        History   Drug Use No       Current Outpatient Medications   Medication Sig Dispense Refill    acetaminophen (TYLENOL) 500 MG tablet Take 2 tablets (1,000 mg) by mouth every 6 hours      diphenhydrAMINE-acetaminophen (TYLENOL PM)  MG tablet Take 2 tablets by mouth nightly as needed for sleep      fexofenadine (ALLEGRA) 180 MG tablet Take 180 mg by mouth daily      HEMP OIL OR EXTRACT OR OTHER CBD CANNABINOID, NOT MEDICAL CANNABIS, CBD gummies      losartan (COZAAR) 100 MG tablet Take 100 mg by mouth every morning      Melatonin 10 MG TABS tablet Take 10 mg by mouth nightly as needed for sleep      methocarbamol (ROBAXIN) 500 MG tablet Take 1 tablet (500 mg) by mouth every 6 hours as needed for muscle spasms 20 tablet 0    metoprolol succinate ER (TOPROL XL) 100 MG 24 hr tablet Take 1 tablet by mouth every morning      multivitamin (CENTRUM SILVER) tablet Take 1 tablet by mouth daily      Turmeric (QC TUMERIC COMPLEX PO) Tumeric and ginger with applie cider 1410mg      UNABLE TO FIND  MEDICATION NAME: osteo bi flex      UNABLE TO FIND MEDICATION NAME: Emergen-e 1000mg      XARELTO ANTICOAGULANT 20 MG TABS tablet Take 1 tablet (20 mg) by mouth daily (with lunch)         No Known Allergies    Review of Systems:   Constitutional - Denies fevers, weight loss, malaise, lethargy  Neuro - Denies tremors or seizures  Pulmon - Denies SOB, dyspnea, hemoptysis, chronic cough or use of an inhaler  CV - Denies CP, SOB, lower extremity edema, difficulty w/ stairs, has never used NTG  GI - Denies hematemesis, BRBPR, melena, chronic diarrhea or epigastric pain   - Denies hematuria, difficulty voiding, h/o STDs  Hematology - Denies blood clotting disorders, chronic anemias  Dermatology - No melanomas or skin cancers  Rheumatology - No h/o RA  Pysch - Denies depression, bipolar d/o or schizophrenia    Physical Exam:  BP (!) 139/93 (BP Location: Right arm, Patient Position: Chair, Cuff Size: Adult Large)   Pulse 95   Temp 98.3  F (36.8  C) (Tympanic)   Wt 112 kg (247 lb)   BMI 33.50 kg/m      Constitutional- No acute distress, well nourished, non-toxic  Eyes: Anicteric, no injection.  PERRL  ENT:  Normocephalic, atraumatic, Nose midline, moist mucus membranes  Neck - supple, no LAD  Respiratory- Nonlabored breathing on room air  Cardiovascular - Extremities warm and well perfused  Neuro - No focal neuro deficits, Alert and oriented x 3  Psych: Appropriate mood and affect  Musculoskeletal: Normal gait, symmetric strength.  FROM upper and lower extremities.  Skin: Warm, Dry    LABS:     Lab Results   Component Value Date    WBC 13.5 (H) 06/19/2024    HGB 14.2 06/19/2024    HCT 42.7 06/19/2024    MCV 89 06/19/2024     06/19/2024      Lab Results   Component Value Date    INR 1.16 (H) 04/07/2024          INFORMED CONSENT:     A discussion of the indications, risks, benefits and alternatives to surgery was held with the patient, and the risks discussed include beeding, infection, thrombosis, stenosis, port  malfunction or malposition, air embolism, pneumothorax, hemothorax, or cardiac arrythmia . The patient understood the information given, questions addressed, and she wishes to proceed. He understands the need for maintenance of the infusion port at least once a month while in place.    ASSESSMENT AND PLAN:     Mr. Dk Randhawa Bay is in need of an infusion port for adjuvant chemotherapy for lung cancer, and is being expedited to surgery to facilitate care. Anticipate placement of  right internal jugular  port. He  understood the information given, and wishes to proceed accordingly. He will need to be off xarelto for the procedure and patient to meet with his PCP prior to procedure for H&P    Henry Veronica MD on 6/24/2024 at 2:23 PM

## 2024-06-28 NOTE — H&P (VIEW-ONLY)
[facility]      Preoperative Consultation     Dk Randhawa   : 1955   Gender: Male     Date of Encounter: 2024     Date of Procedure: 2024      Nursing Notes:   Fadia Rodriguez  2024  3:27 PM  Signed  Chief Complaint   Patient presents with     Pre-Op Exam     24 [facility]       Additional visit information (chief complaint/health maintenance) shared by patient:     Health Maintenance Due   Topic Date Due     Low Dose CT (for lung CA) age 50-80  Never done     Fecal testing non-DNA (FIT,FOBT,iFOBT) for age 45-75  2023     COVID-19 vaccine series ( season) 11/15/2023   Patient declined HM due    Health maintenance reviewed with patient Yes    Patient presents for an in-person office visit: alone  Communication Method: Patient is active on avolution and has been instructed that results/communications will be made via avolution  If a phone call is needed, the preferred number is:  Mobile   Home Phone [phone]   Mobile [phone]     May we leave a detailed message at this number? Yes      Dk Randhawa is a 69 y.o. male (1955) who presents for preop evaluation undergoing INSERTION, VASCULAR ACCESS PORT   Date of Surgery: 24  Surgical Specialty: General  [facility] Facility: [facility]  Fax number: [phone]   Surgery type: outpatient  Primary Physician: Esteban Copeland         Anesthesia: MAC     History of Present Illness   Dk Randhawa is a 69 y.o. male here for preoperative medical clearance exam for port placement.  He has recently diagnosed right lung cancer and is having vascular access placed prior to starting chemotherapy on .    He's otherwise feeling pretty good.  Still having some R sided pain following primary tumor resection on 5/15.  He tolerated anesthesia well for that surgery.    No recent fevers, flu-like symptoms, or known ill contacts.    No further  hemoptysis, which was his initial presenting symptom prompting w/u that led to discovery of his cancer.    He's on Xarelto for anticoagulation for his A-fib.  He's already talked to his cardiologist and plans to hold this for 2 days prior.     Review of Systems   A comprehensive review of systems was negative except for items noted in HPI.     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 Date     Essential hypertension, benign      Heart murmur        Past Surgical History:   . Laterality Date     ELBOW ARTHROSCOPY Left 2017    loose joint body removal, Dr. Doherty/Connie     LUNG LOBECTOMY  05/15/2024    RUL     TONSILLECTOMY  1964     TOTAL HIP ARTHROPLASTY Left 2022       Social History     Tobacco Use   Smoking Status Former     Current packs/day: 0.00     Average packs/day: 0.5 packs/day for 43.0 years (21.5 ttl pk-yrs)     Types: Cigarettes     Start date:      Quit date: 2012     Years since quittin.4   Smokeless Tobacco Never       Social History    Social History Narrative      Not on file      Family History   Problem Relation Age of Onset     Diabetes Paternal Aunt      No Known Problems Other         no surgical, bleeding, or anesthesia complications        Allergies: No Known Allergies  Latex Allergy: no    Current Outpatient Rx   Medication Sig Dispense Refill     azelastine 137 mcg/actuation (ASTELIN) nasal spray Inhale 1 Spray into affected nostril(s) two times daily. 30 mL 1     losartan (COZAAR) 100 mg tablet Take 1 Tablet (100 mg) by mouth once  daily. 90 Tablet 3     medication order composer Tumeric 1850 mg cap x 3 day  0     metoprolol succinate (Toprol XL) 100 mg Sustained-Release tablet Take 1 Tablet (100 mg) by mouth once daily. 90 Tablet 3     rivaroxaban (XARELTO) 20 mg tablet Take 1 Tablet (20 mg) by mouth once daily with evening meal. 90 Tablet 3     Medications have been reviewed by me and are current to the best of my knowledge and ability.       Recent use of: Xarelto    Anesthesia Complications: None  History of abnormal bleeding: None  History of Blood Transfusions: No    Health Care Directive or Living Will: yes       Physical Exam   /88 (Cuff Site: Right Arm, Position: Sitting, Cuff Size: Adult Large)   Pulse 68   Ht 1.829 m (6')   Wt 110.2 kg (243 lb)   SpO2 98%   BMI 32.96 kg/m     General Appearance: No distress - comfortable  Head Exam: Normocephalic, without obvious abnormality.  Eye Exam: Normal external eye, conjunctiva, lids, cornea. HERMELINDO.  Ear Exam: Normal TM's bilaterally. Normal auditory canals and external ears. Non-tender.  Nose Exam: Normal external nose, mucus membranes, and septum.  OroPharynx Exam: Dental hygiene adequate. Normal buccal mucosa. Normal pharynx.  Neck Exam: Supple, no masses or nodes.  Thyroid Exam: No nodules or enlargement.  Chest/Respiratory Exam: Normal chest wall and respirations. Clear to auscultation.  Cardiovascular Exam: Regular rate and rhythm. S1, S2, no murmur, click, gallop, or rubs.  Gastrointestinal Exam: Soft, nontender, no abnormal masses or organomegaly.  Lymphatic Exam: Normal.  Skin: No rash or abnormalities.  Neurologic Exam: Non-focal, normal gross motor movement, tone, and coordination. No tremor.  Psychiatric Exam: Alert and oriented, appropriate affect.     Diagnostics     1. EKG: EKG FINDINGS - done 5/3/24.  Atrial fibrillation with controlled rate of 86 bpm.  2. CXR: not indicated  3. Labs: Per Care Everywhere from 6/19/24: Na 133, K 4.3, Cr 0.86.     Assessment / Plan      The Pre-Op Tool    Recommendations      Low Risk Procedure    Cardiac History  Active atrial fibrillation with controlled rate.  No history of coronary artery disease           Labs  Potassium within last 30 days  EKG  Not indicated  Stress Testing  Not indicated    * Testing recommendations are intended to assist, but not direct, clinical decisions.           Hold Losartan (Cozaar) the evening before and/or morning of the procedure.  Continue taking Metoprolol (Lopressor, Toprol); be sure to take the evening before and/or morning of the procedure.  Hold vitamins and/or supplements for 1 week prior to the procedure.    Anticoagulation / Bridging  Procedure date: 07-  Procedural bleeding risk: Intermediate  Thrombotic Risk Factor(s):  Atrial fibrillation with a CHADS-2 score of 1  Last dose of Rivaroxaban (Xarelto) to be taken 07-.  * Medication recommendations are not intended to be exhaustive; they are limited to common medications that are potentially dangerous if incorrectly managed  Above anticoagulation recommendations assume no neuraxial anesthesia            Labs  * Data supports elimination of  routine  laboratory testing in favor of focused,  indicated  testing based on medical co-morbidities. A 2009 study randomized 1061 patients undergoing ambulatory, non-cataract surgery to routine or to indicated testing. Perioperative adverse events were similar (Anesthesia & Analgesia 2009;108:467-75; Anesthesiol. Clin. 2016 Mar;34(1):43-58).  EKG  * The ACC/AHA recommends against obtaining routine EKGs in patients undergoing low risk surgeries, a class IIa recommendation (JACC. 2014;64(21);e1-76).  Anticoagulation Management  * Based on the BRIDGE trial and other studies, in most circumstances we recommend against bridging patients with CHADS-2 scores of 0-4 (NEJ 2015; 373(9):823-33; JACC 2015;66:1392-403; JACC 2017;69:87-98; CHEST 2018;154:1121-201).  RAAS Antagonist  * Hypotension during  anesthesia is associated with continuing renin-angiotensin system (RAAS) antagonist. While it is unclear if holding RAAS antagonists reduces postoperative complications, in most circumstances our experts recommend holding RAAS antagonist 24 hours prior to surgery. (Postgrad Med J 2011;87:472-81; Anest 2017;126:16-27; BMC Anesthesiol 2018;18:26.)     Session ID: [id number]  Endnotes and bibliography available upon request: [email or web address]         ICD-10-CM    1. Preop examination  Z01.818       2. Large cell carcinoma of lung, right (HC)  C34.91           Pre-operative exam for port placement.     For above listed surgery and anesthesia:   Patient is low risk for perioperative complications.    RECOMMENDATIONS:   #1: Proceed without further diagnostic evaluation.  #2:  Perioperative medication management recommendations as above.  These were reviewed with the patient and printed on their AVS.  #3: Postoperative management through treating surgeon.       Esteban Copeland MD

## 2024-06-30 NOTE — ANESTHESIA PREPROCEDURE EVALUATION
Anesthesia Pre-Procedure Evaluation    Patient: Dk Randhawa .   MRN: [id number] : 1955        Procedure : Procedure(s):  INSERTION, VASCULAR ACCESS PORT          Past Medical History:   Diagnosis Date     Acute non-recurrent maxillary sinusitis      Antiplatelet or antithrombotic long-term use      Arrhythmia      Atrial fibrillation with RVR (H)      Cough secondary to angiotensin converting enzyme inhibitor (ACE-I)      Hyperlipidemia      Hypertension      Mass of upper lobe of right lung      Obesity       Past Surgical History:   Procedure Laterality Date     BRONCHOSCOPY, WITH BIOPSY, ROBOT ASSISTED Bilateral 2024    Procedure: BRONCHOSCOPY, endobronchial ultrasound, transbronchial needle aspiration, endobronchial biopsies and tumor debulking;  Surgeon: Lanette Arce MD;  Location: [facility] OR     DAVINCI EXCISE NODES THORACIC N/A 5/15/2024    Procedure: mediastinal lymph node dissection;  Surgeon: Mikael Peoples MD;  Location: [facility] OR     DAVINCI LOBECTOMY LUNG Right 5/15/2024    Procedure: Robot-assisted thoracoscopic right upper lobectomy,;  Surgeon: Mikael Peoples MD;  Location: [facility] OR     ELBOW ARTHROSCOPY Left 2017     PHACOEMULSIFICATION WITH STANDARD INTRAOCULAR LENS IMPLANT Left 2018    Procedure: PHACOEMULSIFICATION WITH STANDARD INTRAOCULAR LENS IMPLANT;  Left Cataract Removal with Implant;  Surgeon: Mohit Victor MD;  Location: [facility]     PHACOEMULSIFICATION WITH STANDARD INTRAOCULAR LENS IMPLANT Right 2019    Procedure: Cataract Removal with Implant;  Surgeon: Mohit Victor MD;  Location: [facility]     TONSILLECTOMY  1964     TOTAL HIP ARTHROPLASTY Left 2022      No Known Allergies   Social History     Tobacco Use     Smoking status: Former     Current packs/day: 0.00     Types: Cigarettes     Quit date:      Years since quitting: 10.5     Smokeless tobacco: Never   Substance Use Topics     Alcohol use: Not Currently      Wt  Readings from Last 1 Encounters:   06/24/24 112 kg (247 lb)        Anesthesia Evaluation   Pt has had prior anesthetic. Type: General, MAC and Regional.        ROS/MED HX  ENT/Pulmonary: Comment: Mass of upper lobe of right lung    (+)                tobacco use, Past use,                       Neurologic:  - neg neurologic ROS     Cardiovascular:     (+) Dyslipidemia hypertension- -   -  - -   Taking blood thinners                     dysrhythmias, a-fib,        Previous cardiac testing   Echo: Date: Results:    Stress Test:  Date: Results:    ECG Reviewed:  Date: 5/3/24 Results:  Atrial fibrillation   Abnormal ECG     Cath:  Date: Results:      METS/Exercise Tolerance:     Hematologic:       Musculoskeletal:       GI/Hepatic:  - neg GI/hepatic ROS     Renal/Genitourinary:  - neg Renal ROS     Endo:     (+)               Obesity,       Psychiatric/Substance Use:  - neg psychiatric ROS     Infectious Disease:       Malignancy:   (+) Malignancy, History of Lung.    Other:  - neg other ROS          Physical Exam    Airway        Mallampati: II   TM distance: > 3 FB   Neck ROM: full   Mouth opening: > 3 cm    Respiratory Devices and Support         Dental    unable to assess        Cardiovascular   cardiovascular exam normal          Pulmonary   pulmonary exam normal            OUTSIDE LABS:  CBC:   Lab Results   Component Value Date    WBC 13.5 (H) 06/19/2024    WBC 17.0 (H) 05/16/2024    HGB 14.2 06/19/2024    HGB 11.7 (L) 05/16/2024    HCT 42.7 06/19/2024    HCT 35.8 (L) 05/16/2024     06/19/2024     05/16/2024     BMP:   Lab Results   Component Value Date     (L) 06/19/2024     (L) 05/16/2024    POTASSIUM 4.3 06/19/2024    POTASSIUM 4.5 05/16/2024    CHLORIDE 98 06/19/2024    CHLORIDE 99 05/16/2024    CO2 23 06/19/2024    CO2 25 05/16/2024    BUN 12.2 06/19/2024    BUN 14.0 05/16/2024    CR 0.86 06/19/2024    CR 0.74 05/16/2024     (H) 06/19/2024     (H) 05/16/2024     COAGS:  "  Lab Results   Component Value Date    INR 1.16 (H) 04/07/2024     POC: No results found for: \"BGM\", \"HCG\", \"HCGS\"  HEPATIC:   Lab Results   Component Value Date    ALBUMIN 4.2 06/19/2024    PROTTOTAL 7.0 06/19/2024    ALT 13 06/19/2024    AST 20 06/19/2024    ALKPHOS 96 06/19/2024    BILITOTAL 0.3 06/19/2024     OTHER:   Lab Results   Component Value Date    LACT 1.4 05/15/2024    COLLINS 9.0 06/19/2024    LIPASE 42 04/07/2024       Anesthesia Plan    ASA Status:  3       Anesthesia Type: General.     - Airway: Native airway   Induction: Intravenous.   Maintenance: TIVA.        Consents    Anesthesia Plan(s) and associated risks, benefits, and realistic alternatives discussed. Questions answered and patient/representative(s) expressed understanding.     - Discussed: Risks, Benefits and Alternatives for BOTH SEDATION and the PROCEDURE were discussed     - Discussed with:  Patient            Postoperative Care    Pain management: IV analgesics, Oral pain medications.   PONV prophylaxis: Ondansetron (or other 5HT-3), Dexamethasone or Solumedrol     Comments:             Pancho Chinchilla, APRN CRNA    I have reviewed the pertinent notes and labs in the chart from the past 30 days and (re)examined the patient.  Any updates or changes from those notes are reflected in this note.     # Hyponatremia: Lowest Na = 133 mmol/L in last 30 days, will monitor as appropriate          # Obesity: Estimated body mass index is 33.5 kg/m  as calculated from the following:    Height as of 6/19/24: 1.829 m (6').    Weight as of 6/24/24: 112 kg (247 lb).      "

## 2024-07-05 NOTE — DISCHARGE INSTRUCTIONS
Same Day Surgery Discharge Instructions  Special Precautions After Surgery - Adult    It is not unusual to feel lightheaded or faint, up to 24 hours after surgery or while taking pain medication.  If you have these symptoms; sit for a few minutes before standing and have someone assist you when getting up.  You should rest and relax for the next 24 hours and must have someone stay with you for at least 24 hours after your discharge.  DO NOT DRIVE any vehicle or operate mechanical equipment for 24 hours following the end of your surgery.  DO NOT DRIVE while taking narcotic pain medications that have been prescribed by your physician.  If you had a limb operated on, you must be able to use it fully to drive.  DO NOT drink alcoholic beverages for 24 hours following surgery or while taking prescription pain medication.  Drink clear liquids (apple juice, ginger ale, broth, 7-Up, etc.).  Progress to your regular diet as you feel able.  Any questions call your physician and do not make important decisions for 24 hours.    Nausea and Vomiting: Nausea and vomiting can occur any time after receiving anesthesia. If you experience nausea and vomiting we encourage you to move to a clear liquid diet and advance your diet as tolerated. If nausea and vomiting do not improve within 12 hours please call the surgeon or present to the Emergency department.     Break-through Bleeding: If your experience bleeding from your surgical site apply pressure and additional dressing per nurse instruction. For simple problems such as a saturated dressing, you may need to reinforce the dressing with more gauze and tape and put slight pressure on the site. If bleeding does not subside contact the surgeon or present to the Emergency Department.    Post-op Infection: If you develop a fever of 100.4 or greater, have pus like drainage, redness, swelling or severe pain at the surgical site not alleviated with pain medications; please  contact the surgeon or present to the Emergency Department.   ___________________________________________________________________________________________________________________  IMPORTANT NUMBERS:    [facility] [place] Main Number:  [phone], [phone]  Pharmacy:  [phone]  Same Day Surgery:  [phone], for general post-op questions call Monday - Thursday until 8:30 p.m., Fridays until 6:00 p.m.                                                                      General Surgery:  [phone]  [facility]: [phone]

## 2024-07-05 NOTE — ANESTHESIA POSTPROCEDURE EVALUATION
Patient: Dk Waldropyoshi Mcgarry.    Procedure: Procedure(s):  INSERTION, VASCULAR ACCESS PORT       Anesthesia Type:  General    Note:  Disposition: Outpatient   Postop Pain Control: Uneventful            Sign Out: Well controlled pain   PONV: No   Neuro/Psych: Uneventful            Sign Out: Acceptable/Baseline neuro status   Airway/Respiratory: Uneventful            Sign Out: Acceptable/Baseline resp. status   CV/Hemodynamics: Uneventful            Sign Out: Acceptable CV status; No obvious hypovolemia; No obvious fluid overload   Other NRE: NONE   DID A NON-ROUTINE EVENT OCCUR? No           Last vitals:  Vitals Value Taken Time   /114 07/05/24 1415   Temp 36.3  C (97.3  F) 07/05/24 1415   Pulse 98 07/05/24 1415   Resp 16 07/05/24 1415   SpO2 96 % 07/05/24 1422   Vitals shown include unfiled device data.    Electronically Signed By: DIEGO Zimmerman CRNA  July 5, 2024  2:23 PM

## 2024-07-05 NOTE — INTERVAL H&P NOTE
I have reviewed the surgical (or preoperative) H&P that is linked to this encounter, and examined the patient. There are no significant changes    Clinical Conditions Present on Arrival:  Clinically Significant Risk Factors Present on Admission         # Hyponatremia: Lowest Na = 133 mmol/L in last 30 days, will monitor as appropriate        # Drug Induced Coagulation Defect: home medication list includes an anticoagulant medication       # Obesity: Estimated body mass index is 33.5 kg/m  as calculated from the following:    Height as of 6/19/24: 1.829 m (6').    Weight as of 6/24/24: 112 kg (247 lb).

## 2024-07-05 NOTE — OP NOTE
Procedure Date: July 5, 2024    PREOPERATIVE DIAGNOSIS: lung cancer  POSTOPERATIVE DIAGNOSIS: Same  PROCEDURE:  1. Placement of right Internal Jugular Infusion port (8 [place] SmartPort)  2. Ultrasound and Fluoroscopic guidance and confirmation of port placement.    ATTENDING SURGEON and Assistants:   Surgeon(s):  Henry Veronica MD    ESTIMATED BLOOD LOSS: 5 mL    INDICATIONS:Mr. Dk Randhawa Sr. is a 69 year old male who presents with a history of right upper lobe lung cancer now s/p resection. The patient is in need of an infusion port for chemotherapy access, and a discussion was held with the patient regarding indications, risks, benefits, and alternatives (including, but not limited to, risks of bleeding, infection, pneumothorax, need for revision, thrombosis, stenosis, port malposition/malfunction, cardiac arrhythmia, and the rare risk of gas embolism). He understood the information given, questions were addressed, and he wishes to proceed.     DESCRIPTION OF PROCEDURE: The patient was brought to the operating room and placed supine on the operating room table. Sedative agents were administered by the anesthesia service. The bilateral chest and neck were then prepped and draped in the usual sterile fashion. The patient was placed in Trendelenburg position.  The ultrasound probe was outfitted with a sterile sheath, and the right internal jugular vein accessed after a single pass of the introducer needle. A guidewire was advanced under direct fluoroscopic guidance, and the trajectory was confirmed toward the superior vena cava/heart. Additional local anesthesia was used, and a cutdown was created two finger breadths below the clavicle at the ipsilateral chest wall. The incision was deepened to create an appropriate pocket to allow the port reservoir to comfortably reside. The catheter was tunneled from the pocket to the neck incision using the tunneling device. An introducer sheath was then passed over the  guidewire without difficulty, under direct fluoroscopic guidance, and the dilator and guidewire removed. The infusion port catheter was then passed until the tip the catheter was in the region of the cavo-atrial junction via fluoroscopy. The sheath was torn away in the standard fashion. The catheter was then manicured to an appropriate length, secured to the infusion port and the locking collar affixed. The port was easily flushed with a heparin solution, after assuring ease of aspiration. The infusion port itself was then transfixed to the tissue in the base of the pocket using interrupted 2-0 prolene suture. The final catheter position was again assessed under fluoroscopy and appeared satisfactory, with no abnormal trajectory or kinks noted, and with the tip of the catheter again noted at the cavo-atrial junction. The port was aspirated with easy return of venous blood and flushed with heparinzed saline. Hemostasis was assured. An instrument count was conducted, and included laparotomy pads, needles and sponges, and was found to be correct. The subcutaneous tissue on the chest wound was closed with interrupted 3-0 Vicryl. Skin edges were re-approximated using 4-0 Monocryl. Both wounds were dressed with dermabond.    The patient tolerated the procedure well, was allowed to recover and was then transferred to the recovery room in stable condition. He will require a post-op CXR to confirm port position and evaluate for any pneumothorax.     Henry Veronica MD on 7/5/2024 at 1:33 PM

## 2024-07-05 NOTE — ANESTHESIA CARE TRANSFER NOTE
Patient: Dk Randhawa .    Procedure: Procedure(s):  INSERTION, VASCULAR ACCESS PORT       Diagnosis: Malignant neoplasm of upper lobe of right lung (H) [C34.11]  Diagnosis Additional Information: No value filed.    Anesthesia Type:   General     Note:    Oropharynx: oropharynx clear of all foreign objects  Level of Consciousness: awake  Oxygen Supplementation: face mask        Vital Signs Stable: post-procedure vital signs reviewed and stable    Patient transferred to: Phase II    Handoff Report: Identifed the Patient, Identified the Reponsible Provider, Reviewed the pertinent medical history, Discussed the surgical course, Reviewed Intra-OP anesthesia mangement and issues during anesthesia, Set expectations for post-procedure period and Allowed opportunity for questions and acknowledgement of understanding  Vitals:  Vitals Value Taken Time   BP     Temp     Pulse     Resp     SpO2         Electronically Signed By: DIEGO An CRNA  July 5, 2024  1:34 PM

## 2024-07-19 NOTE — CONFIDENTIAL NOTE
[facility]: Cancer Care                                                                                        Called and spoke with patient over the phone today to do chemo teach. Unable to complete due to patient was in the middle of making dinner. Patient prefers to be called next week. Will have CHANEL Tucker reach out to him on Tuesday 7/23 prior to his chemo to do chemo teach. Patient in agreement with plan. Sent PRN nausea meds to patients pharmacy. Patient aware.      Signature:  Carmen Campbell RN

## 2024-07-20 NOTE — TELEPHONE ENCOUNTER
"Right hip issues x couple days.  Sharp pain.    L hip replaced a few years ago.  Awaiting a call from [facility] regarding new right hip pain.    Starting chemo next week.    I recommended that Dk be seen.Though he stated his pain has only been present for two days and rated it a 6/10, he also said he didn't sleep last night because of pain/      Reason for Disposition   [1] MODERATE pain (e.g., interferes with normal activities, limping) AND [2] present > 3 days    Additional Information   Negative: Looks like a broken bone or dislocated joint (e.g., crooked or deformed)   Negative: Sounds like a life-threatening emergency to the triager   Negative: Followed a hip injury   Negative: Leg pain is main symptom   Negative: Back pain radiating (shooting) into hip   Negative: [1] SEVERE pain (e.g., excruciating, unable to do any normal activities) AND [2] fever   Negative: Can't stand (bear weight) or walk   Negative: [1] Red area or streak AND [2] fever   Negative: Patient sounds very sick or weak to the triager   Negative: [1] SEVERE pain (e.g., excruciating, unable to do any normal activities) AND [2] not improved after 2 hours of pain medicine   Negative: [1] Looks infected (spreading redness, pus) AND [2] large red area (> 2 in. or 5 cm)   Negative: [1] Painful rash AND [2] multiple small blisters grouped together (i.e., dermatomal distribution or \"band\" or \"stripe\")   Negative: Looks like a boil, infected sore, or deep ulcer   Negative: [1] Localized rash is very painful AND [2] no fever   Negative: [1] Redness of the skin AND [2] no fever   Negative: Numbness in a leg or foot (i.e., loss of sensation)    Protocols used: Hip Pain-A-MAURI MORALES RN [facility]     "

## 2024-07-20 NOTE — ED PROVIDER NOTES
History     Chief Complaint   Patient presents with    Hip Pain     RT     HPI  Dk Randhawa . is a 69 year old male medical history significant for malignant neoplasm of the upper lobe of the right lower lung S/P lobectomy scheduled to initiate chemotherapy within the next week, history of A-fib with RVR on long-term anticoagulation with Xarelto, HTN, hyperlipidemia, prediabetes who presents to urgent care with concern over 2-day history of right hip pain.  Patient reports that he has been increasing physical activity attempting to maintain strength prior to initiating chemotherapy has been walking, riding bike more often than his previously baseline.  No recent falls or other injuries to his hip.  He complains of pain primarily in the lateral aspect which is exacerbated by flexion of the hip.  No overlying Erythema rashes or skin changes.  No distal numbness or paresthesias.  He does have a history of osteoarthritis of the left hip status post total hip replacement 2 years ago through [facility] and states concern that he may require surgery again.  He contacted orthopedic clinic earlier today who stated they will reach out to him on Monday for further evaluation.      Allergies:  No Known Allergies    Problem List:    Patient Active Problem List    Diagnosis Date Noted    Malignant neoplasm of upper lobe of right lung (H) 05/15/2024     Priority: Medium    Atrial fibrillation with RVR (H) 10/23/2023     Priority: Medium    Obesity (BMI 30.0-34.9) 04/01/2022     Priority: Medium    Cough due to angiotensin-converting enzyme inhibitor 12/02/2015     Priority: Medium    CARDIOVASCULAR SCREENING; LDL GOAL LESS THAN 160 10/31/2010     Priority: Medium    Essential hypertension, benign 05/18/2010     Priority: Medium    Hyperlipidemia 05/18/2010     Priority: Medium    Pre-diabetes 05/18/2010     Priority: Medium        Past Medical History:    Past Medical History:   Diagnosis Date    Acute  non-recurrent maxillary sinusitis     Antiplatelet or antithrombotic long-term use     Arrhythmia     Atrial fibrillation with RVR (H)     Cough secondary to angiotensin converting enzyme inhibitor (ACE-I)     Hyperlipidemia     Hypertension     Mass of upper lobe of right lung     Obesity        Past Surgical History:    Past Surgical History:   Procedure Laterality Date    BRONCHOSCOPY, WITH BIOPSY, ROBOT ASSISTED Bilateral 04/16/2024    Procedure: BRONCHOSCOPY, endobronchial ultrasound, transbronchial needle aspiration, endobronchial biopsies and tumor debulking;  Surgeon: Lanette Arce MD;  Location: [facility] OR    DAVINCI EXCISE NODES THORACIC N/A 5/15/2024    Procedure: mediastinal lymph node dissection;  Surgeon: Mikael Peoples MD;  Location: [facility] OR    DAVINCI LOBECTOMY LUNG Right 5/15/2024    Procedure: Robot-assisted thoracoscopic right upper lobectomy,;  Surgeon: Mikael Peoples MD;  Location: [facility] OR    ELBOW ARTHROSCOPY Left 03/09/2017    INSERT PORT VASCULAR ACCESS Right 7/5/2024    Procedure: INSERTION, VASCULAR ACCESS PORT;  Surgeon: Henry Veronica MD;  Location: [facility]    PHACOEMULSIFICATION WITH STANDARD INTRAOCULAR LENS IMPLANT Left 02/26/2018    Procedure: PHACOEMULSIFICATION WITH STANDARD INTRAOCULAR LENS IMPLANT;  Left Cataract Removal with Implant;  Surgeon: Mohit Victor MD;  Location: [facility]    PHACOEMULSIFICATION WITH STANDARD INTRAOCULAR LENS IMPLANT Right 01/30/2019    Procedure: Cataract Removal with Implant;  Surgeon: Mohit Victor MD;  Location: [facility]    TONSILLECTOMY  1965    TOTAL HIP ARTHROPLASTY Left 04/12/2022     Family History:    Family History   Problem Relation Age of Onset    Ovarian Cancer Mother     Alzheimer Disease Mother     Depression Father 45        suicide    Diabetes Sister      Social History:  Marital Status:   [4]  Social History     Tobacco Use    Smoking status: Former     Current packs/day: 0.00     Types: Cigarettes     Quit  date: 2014     Years since quitting: 10.5    Smokeless tobacco: Never   Vaping Use    Vaping status: Never Used   Substance Use Topics    Alcohol use: Not Currently    Drug use: No        Medications:    acetaminophen (TYLENOL) 325 MG tablet  acetaminophen (TYLENOL) 500 MG tablet  diphenhydrAMINE-acetaminophen (TYLENOL PM)  MG tablet  fexofenadine (ALLEGRA) 180 MG tablet  HEMP OIL OR EXTRACT OR OTHER CBD CANNABINOID, NOT MEDICAL CANNABIS,  ibuprofen (ADVIL/MOTRIN) 600 MG tablet  losartan (COZAAR) 100 MG tablet  Melatonin 10 MG TABS tablet  methocarbamol (ROBAXIN) 500 MG tablet  metoprolol succinate ER (TOPROL XL) 100 MG 24 hr tablet  multivitamin (CENTRUM SILVER) tablet  ondansetron (ZOFRAN) 8 MG tablet  oxyCODONE (OXY-IR) 5 MG capsule  prochlorperazine (COMPAZINE) 10 MG tablet  Turmeric (QC TUMERIC COMPLEX PO)  UNABLE TO FIND  UNABLE TO FIND  XARELTO ANTICOAGULANT 20 MG TABS tablet      Review of Systems  CONSTITUTIONAL:NEGATIVE for fever, chills, change in weight  INTEGUMENTARY/SKIN: NEGATIVE for worrisome rashes, moles or lesions  GI: NEGATIVE for nausea, abdominal pain, heartburn, or change in bowel habits  : negative for dysuria, hematuria   MUSCULOSKELETAL: POSITIVE  for right hip pain and NEGATIVE for other concerning arthralgias or myalgias   NEURO: NEGATIVE for numbness, paresthesias   Physical Exam   BP: (!) 146/91  Pulse: 87  Temp: 97.3  F (36.3  C)  Resp: 18  SpO2: 98 %  Physical Exam  Constitutional:       General: He is not in acute distress.     Appearance: He is not ill-appearing or toxic-appearing.   HENT:      Head: Normocephalic and atraumatic.   Musculoskeletal:      Right hip: Tenderness and bony tenderness (greater trochanter) present. No deformity, lacerations or crepitus. Decreased range of motion. Normal strength.      Right upper leg: Normal.   Skin:     General: Skin is warm and dry.      Findings: No abrasion, ecchymosis, erythema, laceration, rash or wound.   Neurological:       Mental Status: He is alert.      Sensory: No sensory deficit.       ED Course        Procedures       Critical Care time:  none        Results for orders placed or performed during the hospital encounter of 07/20/24 (from the past 24 hour(s))   Pelvis XR w/ unilateral hip right    Narrative    EXAM: XR PELVIS AND HIP RIGHT 1 VIEW  LOCATION: [facility]  DATE: 7/20/2024    INDICATION: pain known clear history of trauma  COMPARISON: None.      Impression    IMPRESSION: No fracture. Mild degenerative changes in the right hip. Left total hip arthroplasty.     Medications - No data to display    Assessments & Plan (with Medical Decision Making)     I have reviewed the nursing notes.  I have reviewed the findings, diagnosis, plan and need for follow up with the patient.     Discharge Medication List as of 7/20/2024 10:27 AM        START taking these medications    Details   oxyCODONE (ROXICODONE) 5 MG tablet Take 1 tablet (5 mg) by mouth every 6 hours as needed for severe pain, Disp-6 tablet, R-0, E-Prescribe           Final diagnoses:   Right hip pain     69-year-old male with known history of lung cancer, initiating chemotherapy within the next week presents to the urgent care with concern over 2-day history of atraumatic right hip pain.  He had elevated blood pressure upon arrival, remainder vital signs stable.  Physical exam findings were significant for tenderness palpation the lateral aspect of the hip, decreased range of motion due to discomfort.  As part of evaluation he did have x-ray which is negative for evidence of acute fracture, mild degenerative changes noted.  No lytic lesions seen.  Suspect symptoms secondary to bursitis given increased activity level over the last several weeks differential would include pain due to osteoarthritis. I do not suspect intraabdominal etiology of his pain.  He was discharged home with instructions for symptomatic treatment with ice, continued use of  Tylenol.  I did agree to provide small number of oxycodone to be used as needed for breakthrough pain.  Follow-up with Ortho as previously scheduled if pain persist.  Worrisome reasons to return to the ER/UC sooner discussed.    Disclaimer: This note consists of symbols derived from keyboarding, dictation, and/or voice recognition software. As a result, there may be errors in the script that have gone undetected.  Please consider this when interpreting information found in the chart.    7/20/2024   [facility] EMERGENCY DEPT       Brooke Smith PA-C  07/20/24 0947

## 2024-07-23 NOTE — PROGRESS NOTES
[facility]: Cancer Care Plan of Care Education Note                                    Discussion with Patient:                                                      Chemo teach provided to pt regarding his upcoming infusion of C1D1 cisplatin/etoposide     Antiemetics: Zofran and compazine PRN2  Medication understanding/side effect: chemotherapy and fluids reviewed   Port concerns: reviewed when to use EMLA cream (if needed) and things to watch for as far as irritation around port site.        Assessment:                                                           Plan of Care Education   Diagnosis:: Lung cancer  Does patient understand diagnosis?: Yes  Tx plan/regimen:: Cisplatin/Etoposide  Preparing for treatment:: Reviewed treatment preparation information with patient (vascular access, day of chemo, visitor policy, what to bring, etc.)  Vascular access education provided for:: Port    Evaluation of Learning       Plan of Care Education Review:        Plan of Care Education   Diagnosis:: Lung cancer  Does patient understand diagnosis?: Yes  Tx plan/regimen:: Cisplatin/Etoposide  Preparing for treatment:: Reviewed treatment preparation information with patient (vascular access, day of chemo, visitor policy, what to bring, etc.)  Vascular access education provided for:: Port  Side effect education:: Diarrhea/Constipation;Fatigue;Hair loss;Immune-mediated effects;Lab value monitoring (anemia, neutropenia, thrombocytopenia);Nausea/Vomiting  Safety/self care at home reviewed with patient:: Yes  Coping - concerns/fears reviewed with patient:: Yes  Plan of Care:: RAKAN follow-up appointment;Lab appointment;MD follow-up appointment;Treatment schedule  When to call provider:: Bleeding;Increased shortness of breath;New/worsening pain;Shaking chills;Temperature >100.4F;Uncontrolled diarrhea/constipation;Uncontrolled nausea/vomiting  Reasons for deferring treatment reviewed with patient:: Yes    Evaluation of  Learning  Patient Education Provided: Yes  Readiness:: Acceptance  Method:: Booklet/Handout;Explanation;Literature;Teach Back  Response:: Verbalizes understanding;Demonstrates understanding           Intervention/Education provided during outreach:                                                       Pt verbalized understanding of upcoming treatment. Phone number provided for RNSINAN Campbell for questions.    Follow up call in 1-2 weeks  Patient to call/MyChart message with updates  Confirmed patient has clinic and triage numbers    Signature:  CANDIDA MENDEZ RN

## 2024-07-23 NOTE — PROGRESS NOTES
[facility] Hematology and Oncology Outpatient Progress Note    Patient: Dk Randhawa Sr.  MRN: [id number]  Date of Service: Jul 24, 2024          Reason for Visit    Resected large cell lung cancer    Primary Oncologist: Dr. 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     Dr. Friedell has recommended 4 cycles of adjuvant cisplatin + etoposide. Schedule and side effects previously reviewed and pt elected to proceed. He had port placed. He has gotten his supportive meds filled.    He's feels recovered from his surgery and looks well today; however, he's had some new/progressive lab abnormalities that need further eval before proceeding. Ideally, we'd want to get started on adjuvant chemo in the next week (no later than 12 weeks), so will expedite this.     CBC: WBC 33K, ANC 30K (WBC 13-19 range since April, normal prior to that); hgb 13.1,  plat WNL. Na 126 (previously WNL, lowest 133-134), rest CMP WNL. Mag WNL.     Recent right hip pain and new hyponatremia ?SIADH, raises some suspicion for recurrent/met lung cancer that should be ruled out before starting adjuvant chemo.    Plan:  -Defer chemo this week  -Restaging/baseline CT chest/abd/pelvis this week to rule out recurrent cancer and infection  -If CT negative, get patient back next week (11 weeks post-op) to initiate chemo with cycle 1 cisplatin + Etoposide. Was planning Neulasta day 3, but may omit that with baseline neutrophilia and can add with subsequent cycles if indicated.   -He's had chemo education and has his supportive med Rx   -Return 1.5-2 weeks for lab + mid-cycle eval with Aidee   -Return 3 weeks after the start of cycle 1 with lab, provider visit with Aidee/Dr. Friedell, cycle 2  -Plan total of 4 cycles (12 weeks)  -CT chest with IV contrast every 3 months x 2 yrs, then every 6 mths up to 5 yrs as surveillance    Subacute leukocytosis with  neutrophilia, progressive  WBC have been mildly elevated with mild neutrophilia since 4/2024 (13K-19K WBC). Prior to that, normal counts. Today, WBC up to 33K with 30K ANC. Rest CBC WNL, aside from mild anemia (hgb 13.1).    No symptoms of infection, denies respiratory, skin, , GI symptoms. Low grade fever today 99.5, but denies fevers/chills at home. VSS/non-toxic appearing, so no concern for sepsis. Port site appears good. Did have acute right hip joint pain last week, but that is resolved now so septic joint less likely.      No weight loss, night sweats.   Has his spleen, no evident splenomegaly on exam.     No recent steroid use.     Etiology not clear, but given progression and risk for infection, will need to rule this out before proceeding with chemo today.     Plan:  -CT chest/abd/pelvis r/o infection  -UA +/- culture  -Peripheral smear to exclude other etiology that may require additional eval.  -Follow-up with pt by phone with results and next steps. If infection ruled out, will have him return to initiate chemo next week and follow     Hyponatremia  Na 126. Was 133-134 in May around time of his lung cancer surgery. Looking back (Care Everywhere) over last 3-4 yrs, Na usually normal and at lowest 134, so this appears new for him.   No hyperglycemia.    Asymptomatic.   He's not on any new medications. No diuretics.   Hydrating well. Follows low-sodium diet due to his CV risk factors.  Cannot exclude SIADH related to lung surgery or recurrent lung cancer.     Plan:  -Urine NA + osmolality  -Restaging CT cap will rule out recurrence  -Restrict free water and integrate some Sports Drinks with electrolytes. Can liberalize Na in diet for short term.    Acute right hip pain, possible bursitis  Hx OA  Acute onset last week after doing more exercise.   Xray last week negative for evident lytic lesions nor fracture.   PET scan in early May negative for mets.     Symptomatic measures this week have been helpful  and pain resolving. No redness/swelling of joint.    Plan:  -Will get pelvic CT with restaging scan to entirely rule out met/infection  -Follow up with Ortho if CT negative and pain recurs.  May need steroid injection or other interventions for pain mgmt.   ______________________________________________________________________________    History of Present Illness/ Interval History    Mr. Dk Randhawa .  is a 69 year old with resected IIB right lung large cell cancer, almost 10 weeks ago. Dr. Friedell recommended initiation of adjuvant cisplatin + etoposide. He had port placed, but has not yet started chemo. Returns to initiate chemo.     He feels he's recovered well after his lung surgery. Good appetite, stable weight. Active, he has been walking and riding stationary bike. Some mild right chest wall pain, managed with Tylenol as needed. No shortness of breath, cough. No abd pain, nausea, diarrhea. No urinary symptoms. Port site without pain, redness nor drainage. No fevers, chills, night sweats.    Was seen in Urgent Care over weekend for 2-day hx right hip pain. He has been working on exercising, but no traumatic injuries. Hx OA with left HEYDI. Xray right hip negative for lesions nor fracture. Suspected bursitis due to overuse with recent increase in biking/exercise. Recommended symptom measures of ice, Tylenol and small qty oxycodone provided with recs to see Ortho is progressive.   Right hip pain is now largely improved/resolved this week.     No other new sites of pain.   No headaches/neuro symptoms.    No significant sensorineural hearing loss, no tinnitus. Mild neuropathy in feet at baseline.     ECOG Performance    0      Oncology History/Treatment  Diagnosis/Stage:   5/2024: IIB (pT3-pN0-cM0) RUL lung cancer (large cell)  -hx 20 pk yr smoking hx. Quit 2014  -4/2024: ED eval for hemoptysis, on Xarelto (a fib). CT chest: RUL lung mass  -PET: RUL mass and possible med adenopathy. No distant mets  -Brain  MRI negative for mets  -5/16/2024 surgical path: 5.1 cm (neg margins, neg pleural invasion) pT3 large cell cancer. 0/11 LN involved.  -[employer] studies: TPS score 40%. MSI stable. Mutational burden 1 MUTS/MB. KRAS Q61H amplification noted (no targeted therapies available for this mutation)    Treatment:  5/16/2024 robotic-assisted RULobectomy (Dr. Peoples)    Planning adjuvant cisplatin + etoposide (4 cycles planned) - not yet started      Physical Exam    GENERAL: Alert and oriented to time place and person. Seated comfortably. In no distress. Alone.   HEAD: Atraumatic and normocephalic. No alopecia.  EYES: GALA, EOMI. No erythema. No icterus.  ORAL CAVITY: Moist. No mucosal lesion or tonsillar enlargement.  LYMPH NODES: No palpable supraclavicular, cervical, axillary lymphadenopathy.  CHEST: clear to auscultation bilaterally. Resonant to percussion throughout bilaterally. Symmetrical breath movements bilaterally. Right VATS incisions well-healed, no redness. Right port site healing, no redness/swelling.   CVS: RRR  ABDOMEN: Soft. Not tender. Not distended. No palpable hepatomegaly or splenomegaly. No other mass palpable. Bowel sounds present.  EXTREMITIES: Warm. No peripheral edema. No reproducible right hip pain.   SKIN: no rash, or bruising or purpura.   NEURO: No gross deficit noted. Non-antalgic gait.      Lab Results    Recent Results (from the past 168 hour(s))   Comprehensive metabolic panel   Result Value Ref Range    Sodium 126 (L) 135 - 145 mmol/L    Potassium 4.8 3.4 - 5.3 mmol/L    Carbon Dioxide (CO2) 23 22 - 29 mmol/L    Anion Gap 11 7 - 15 mmol/L    Urea Nitrogen 14.9 8.0 - 23.0 mg/dL    Creatinine 0.75 0.67 - 1.17 mg/dL    GFR Estimate >90 >60 mL/min/1.73m2    Calcium 8.8 8.8 - 10.4 mg/dL    Chloride 92 (L) 98 - 107 mmol/L    Glucose 97 70 - 99 mg/dL    Alkaline Phosphatase 121 40 - 150 U/L    AST 18 0 - 45 U/L    ALT 9 0 - 70 U/L    Protein Total 6.5 6.4 - 8.3 g/dL    Albumin 3.9 3.5 - 5.2  g/dL    Bilirubin Total 0.3 <=1.2 mg/dL   Magnesium   Result Value Ref Range    Magnesium 2.0 1.7 - 2.3 mg/dL   CBC with platelets and differential   Result Value Ref Range    WBC Count 33.0 (H) 4.0 - 11.0 10e3/uL    RBC Count 4.46 4.40 - 5.90 10e6/uL    Hemoglobin 13.1 (L) 13.3 - 17.7 g/dL    Hematocrit 38.6 (L) 40.0 - 53.0 %    MCV 87 78 - 100 fL    MCH 29.4 26.5 - 33.0 pg    MCHC 33.9 31.5 - 36.5 g/dL    RDW 13.8 10.0 - 15.0 %    Platelet Count 314 150 - 450 10e3/uL    NRBCs per 100 WBC 0 <1 /100    Absolute NRBCs 0.0 10e3/uL   Manual Differential   Result Value Ref Range    % Neutrophils 91 %    % Lymphocytes 4 %    % Monocytes 4 %    % Eosinophils 0 %    % Basophils 1 %    Absolute Neutrophils 30.0 (H) 1.6 - 8.3 10e3/uL    Absolute Lymphocytes 1.3 0.8 - 5.3 10e3/uL    Absolute Monocytes 1.3 0.0 - 1.3 10e3/uL    Absolute Eosinophils 0.0 0.0 - 0.7 10e3/uL    Absolute Basophils 0.3 (H) 0.0 - 0.2 10e3/uL    RBC Morphology Confirmed RBC Indices     Platelet Assessment  Automated Count Confirmed. Platelet morphology is normal.     Automated Count Confirmed. Platelet morphology is normal.   Sodium random urine   Result Value Ref Range    Sodium Urine mmol/L 85 mmol/L   UA Macroscopic with reflex to Microscopic and Culture - Lab Collect    Specimen: Urine, NOS   Result Value Ref Range    Color Urine Yellow Colorless, Straw, Light Yellow, Yellow    Appearance Urine Clear Clear    Glucose Urine Negative Negative mg/dL    Bilirubin Urine Negative Negative    Ketones Urine 5 (A) Negative mg/dL    Specific Gravity Urine 1.017 1.003 - 1.035    Blood Urine Negative Negative    pH Urine 7.0 5.0 - 7.0    Protein Albumin Urine Negative Negative mg/dL    Urobilinogen Urine Normal Normal, 2.0 mg/dL    Nitrite Urine Negative Negative    Leukocyte Esterase Urine Negative Negative       Imaging    Pelvis XR w/ unilateral hip right    Result Date: 7/20/2024  EXAM: XR PELVIS AND HIP RIGHT 1 VIEW LOCATION: [facility] DATE: 7/20/2024 INDICATION: pain known clear history of trauma COMPARISON: None.     IMPRESSION: No fracture. Mild degenerative changes in the right hip. Left total hip arthroplasty.    XR Chest Port 1 View    Result Date: 7/5/2024  CHEST ONE VIEW  7/5/2024 2:00 PM HISTORY: assess right internal jugular port position and evaluate for pneumothorax COMPARISON: 5/20/2024     IMPRESSION: Right-sided port with tip over the superior vena cava. Status post right upper lobectomy with elevated right hemidiaphragm. No pneumothorax. No focal consolidation. Cardiac silhouette within normal limits. Mildly tortuous arthroscopic aorta. JOHN F BRUNNER, MD   SYSTEM ID:  [id number]    XR Surgery PAWAN L/T 5 Min Fluoro w Stills    Result Date: 7/5/2024  This exam was marked as non-reportable because it will not be read by a radiologist or a [place] non-radiologist provider.      Billing  Total time 50 minutes, to include face to face visit, review of EMR, ordering, documentation and coordination of care on date of service   complexity modifier for longitudinal care.     Signed by: Aidee Paige NP     Detail Level: Detailed Plan: Call if no better to up dosage Initiate Treatment: Spironolactone 50 mg qd.  Finish Potassium pills (from PCP, ending course after this bottle) first before starting. Initiate Treatment: TAC 0.1% cream  AAA to brown spots for 2 weeks, rest 1 week and repeat cycles. Plan: Get authorization for Dupixent again.

## 2024-07-24 NOTE — LETTER
7/24/2024      Dk Waldropling Sr.  [address]  [facility] [address]      Dear Colleague,    Thank you for referring your patient, Dk Randhawa Sr., to the [facility] [place]. Please see a copy of my visit note below.    [facility] Hematology and Oncology Outpatient Progress Note    Patient: Dk Randhawa Sr.  MRN: [id number]  Date of Service: Jul 24, 2024          Reason for Visit    Resected large cell lung cancer    Primary Oncologist: Dr. 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     Dr. Friedell has recommended 4 cycles of adjuvant cisplatin + etoposide. Schedule and side effects previously reviewed and pt elected to proceed. He had port placed. He has gotten his supportive meds filled.    He's feels recovered from his surgery and looks well today; however, he's had some new/progressive lab abnormalities that need further eval before proceeding. Ideally, we'd want to get started on adjuvant chemo in the next week (no later than 12 weeks), so will expedite this.     CBC: WBC 33K, ANC 30K (WBC 13-19 range since April, normal prior to that); hgb 13.1,  plat WNL. Na 126 (previously WNL, lowest 133-134), rest CMP WNL. Mag WNL.     Recent right hip pain and new hyponatremia ?SIADH, raises some suspicion for recurrent/met lung cancer that should be ruled out before starting adjuvant chemo.    Plan:  -Defer chemo this week  -Restaging/baseline CT chest/abd/pelvis this week to rule out recurrent cancer and infection  -If CT negative, get patient back next week (11 weeks post-op) to initiate chemo with cycle 1 cisplatin + Etoposide. Was planning Neulasta day 3, but may omit that with baseline neutrophilia and can add with subsequent cycles if indicated.   -He's had chemo education and has his supportive med Rx   -Return 1.5-2 weeks for lab + mid-cycle eval with Aidee   -Return 3  weeks after the start of cycle 1 with lab, provider visit with Aidee/Dr. Friedell, cycle 2  -Plan total of 4 cycles (12 weeks)  -CT chest with IV contrast every 3 months x 2 yrs, then every 6 mths up to 5 yrs as surveillance    Subacute leukocytosis with neutrophilia, progressive  WBC have been mildly elevated with mild neutrophilia since 4/2024 (13K-19K WBC). Prior to that, normal counts. Today, WBC up to 33K with 30K ANC. Rest CBC WNL, aside from mild anemia (hgb 13.1).    No symptoms of infection, denies respiratory, skin, , GI symptoms. Low grade fever today 99.5, but denies fevers/chills at home. VSS/non-toxic appearing, so no concern for sepsis. Port site appears good. Did have acute right hip joint pain last week, but that is resolved now so septic joint less likely.      No weight loss, night sweats.   Has his spleen, no evident splenomegaly on exam.     No recent steroid use.     Etiology not clear, but given progression and risk for infection, will need to rule this out before proceeding with chemo today.     Plan:  -CT chest/abd/pelvis r/o infection  -UA +/- culture  -Peripheral smear to exclude other etiology that may require additional eval.  -Follow-up with pt by phone with results and next steps. If infection ruled out, will have him return to initiate chemo next week and follow     Hyponatremia  Na 126. Was 133-134 in May around time of his lung cancer surgery. Looking back (Care Everywhere) over last 3-4 yrs, Na usually normal and at lowest 134, so this appears new for him.   No hyperglycemia.    Asymptomatic.   He's not on any new medications. No diuretics.   Hydrating well. Follows low-sodium diet due to his CV risk factors.  Cannot exclude SIADH related to lung surgery or recurrent lung cancer.     Plan:  -Urine NA + osmolality  -Restaging CT cap will rule out recurrence  -Restrict free water and integrate some Sports Drinks with electrolytes. Can liberalize Na in diet for short term.    Acute  right hip pain, possible bursitis  Hx OA  Acute onset last week after doing more exercise.   Xray last week negative for evident lytic lesions nor fracture.   PET scan in early May negative for mets.     Symptomatic measures this week have been helpful and pain resolving. No redness/swelling of joint.    Plan:  -Will get pelvic CT with restaging scan to entirely rule out met/infection  -Follow up with Ortho if CT negative and pain recurs.  May need steroid injection or other interventions for pain mgmt.   ______________________________________________________________________________    History of Present Illness/ Interval History    Mr. Dk Randhawa .  is a 69 year old with resected IIB right lung large cell cancer, almost 10 weeks ago. Dr. Friedell recommended initiation of adjuvant cisplatin + etoposide. He had port placed, but has not yet started chemo. Returns to initiate chemo.     He feels he's recovered well after his lung surgery. Good appetite, stable weight. Active, he has been walking and riding stationary bike. Some mild right chest wall pain, managed with Tylenol as needed. No shortness of breath, cough. No abd pain, nausea, diarrhea. No urinary symptoms. Port site without pain, redness nor drainage. No fevers, chills, night sweats.    Was seen in Urgent Care over weekend for 2-day hx right hip pain. He has been working on exercising, but no traumatic injuries. Hx OA with left HEYDI. Xray right hip negative for lesions nor fracture. Suspected bursitis due to overuse with recent increase in biking/exercise. Recommended symptom measures of ice, Tylenol and small qty oxycodone provided with recs to see Ortho is progressive.   Right hip pain is now largely improved/resolved this week.     No other new sites of pain.   No headaches/neuro symptoms.    No significant sensorineural hearing loss, no tinnitus. Mild neuropathy in feet at baseline.     ECOG Performance    0      Oncology  History/Treatment  Diagnosis/Stage:   5/2024: IIB (pT3-pN0-cM0) RUL lung cancer (large cell)  -hx 20 pk yr smoking hx. Quit 2014  -4/2024: ED eval for hemoptysis, on Xarelto (a fib). CT chest: RUL lung mass  -PET: RUL mass and possible med adenopathy. No distant mets  -Brain MRI negative for mets  -5/16/2024 surgical path: 5.1 cm (neg margins, neg pleural invasion) pT3 large cell cancer. 0/11 LN involved.  -[employer] studies: TPS score 40%. MSI stable. Mutational burden 1 MUTS/MB. KRAS Q61H amplification noted (no targeted therapies available for this mutation)    Treatment:  5/16/2024 robotic-assisted RULobectomy (Dr. Peoples)    Planning adjuvant cisplatin + etoposide (4 cycles planned) - not yet started      Physical Exam    GENERAL: Alert and oriented to time place and person. Seated comfortably. In no distress. Alone.   HEAD: Atraumatic and normocephalic. No alopecia.  EYES: GALA, EOMI. No erythema. No icterus.  ORAL CAVITY: Moist. No mucosal lesion or tonsillar enlargement.  LYMPH NODES: No palpable supraclavicular, cervical, axillary lymphadenopathy.  CHEST: clear to auscultation bilaterally. Resonant to percussion throughout bilaterally. Symmetrical breath movements bilaterally. Right VATS incisions well-healed, no redness. Right port site healing, no redness/swelling.   CVS: RRR  ABDOMEN: Soft. Not tender. Not distended. No palpable hepatomegaly or splenomegaly. No other mass palpable. Bowel sounds present.  EXTREMITIES: Warm. No peripheral edema. No reproducible right hip pain.   SKIN: no rash, or bruising or purpura.   NEURO: No gross deficit noted. Non-antalgic gait.      Lab Results    Recent Results (from the past 168 hour(s))   Comprehensive metabolic panel   Result Value Ref Range    Sodium 126 (L) 135 - 145 mmol/L    Potassium 4.8 3.4 - 5.3 mmol/L    Carbon Dioxide (CO2) 23 22 - 29 mmol/L    Anion Gap 11 7 - 15 mmol/L    Urea Nitrogen 14.9 8.0 - 23.0 mg/dL    Creatinine 0.75 0.67 - 1.17 mg/dL     GFR Estimate >90 >60 mL/min/1.73m2    Calcium 8.8 8.8 - 10.4 mg/dL    Chloride 92 (L) 98 - 107 mmol/L    Glucose 97 70 - 99 mg/dL    Alkaline Phosphatase 121 40 - 150 U/L    AST 18 0 - 45 U/L    ALT 9 0 - 70 U/L    Protein Total 6.5 6.4 - 8.3 g/dL    Albumin 3.9 3.5 - 5.2 g/dL    Bilirubin Total 0.3 <=1.2 mg/dL   Magnesium   Result Value Ref Range    Magnesium 2.0 1.7 - 2.3 mg/dL   CBC with platelets and differential   Result Value Ref Range    WBC Count 33.0 (H) 4.0 - 11.0 10e3/uL    RBC Count 4.46 4.40 - 5.90 10e6/uL    Hemoglobin 13.1 (L) 13.3 - 17.7 g/dL    Hematocrit 38.6 (L) 40.0 - 53.0 %    MCV 87 78 - 100 fL    MCH 29.4 26.5 - 33.0 pg    MCHC 33.9 31.5 - 36.5 g/dL    RDW 13.8 10.0 - 15.0 %    Platelet Count 314 150 - 450 10e3/uL    NRBCs per 100 WBC 0 <1 /100    Absolute NRBCs 0.0 10e3/uL   Manual Differential   Result Value Ref Range    % Neutrophils 91 %    % Lymphocytes 4 %    % Monocytes 4 %    % Eosinophils 0 %    % Basophils 1 %    Absolute Neutrophils 30.0 (H) 1.6 - 8.3 10e3/uL    Absolute Lymphocytes 1.3 0.8 - 5.3 10e3/uL    Absolute Monocytes 1.3 0.0 - 1.3 10e3/uL    Absolute Eosinophils 0.0 0.0 - 0.7 10e3/uL    Absolute Basophils 0.3 (H) 0.0 - 0.2 10e3/uL    RBC Morphology Confirmed RBC Indices     Platelet Assessment  Automated Count Confirmed. Platelet morphology is normal.     Automated Count Confirmed. Platelet morphology is normal.   Sodium random urine   Result Value Ref Range    Sodium Urine mmol/L 85 mmol/L   UA Macroscopic with reflex to Microscopic and Culture - Lab Collect    Specimen: Urine, NOS   Result Value Ref Range    Color Urine Yellow Colorless, Straw, Light Yellow, Yellow    Appearance Urine Clear Clear    Glucose Urine Negative Negative mg/dL    Bilirubin Urine Negative Negative    Ketones Urine 5 (A) Negative mg/dL    Specific Gravity Urine 1.017 1.003 - 1.035    Blood Urine Negative Negative    pH Urine 7.0 5.0 - 7.0    Protein Albumin Urine Negative Negative mg/dL     Urobilinogen Urine Normal Normal, 2.0 mg/dL    Nitrite Urine Negative Negative    Leukocyte Esterase Urine Negative Negative       Imaging    Pelvis XR w/ unilateral hip right    Result Date: 7/20/2024  EXAM: XR PELVIS AND HIP RIGHT 1 VIEW LOCATION: [facility] DATE: 7/20/2024 INDICATION: pain known clear history of trauma COMPARISON: None.     IMPRESSION: No fracture. Mild degenerative changes in the right hip. Left total hip arthroplasty.    XR Chest Port 1 View    Result Date: 7/5/2024  CHEST ONE VIEW  7/5/2024 2:00 PM HISTORY: assess right internal jugular port position and evaluate for pneumothorax COMPARISON: 5/20/2024     IMPRESSION: Right-sided port with tip over the superior vena cava. Status post right upper lobectomy with elevated right hemidiaphragm. No pneumothorax. No focal consolidation. Cardiac silhouette within normal limits. Mildly tortuous arthroscopic aorta. JOHN F BRUNNER, MD   SYSTEM ID:  [id number]    XR Surgery PAWAN L/T 5 Min Fluoro w Stills    Result Date: 7/5/2024  This exam was marked as non-reportable because it will not be read by a radiologist or a [facility] non-radiologist provider.      Billing  Total time 50 minutes, to include face to face visit, review of EMR, ordering, documentation and coordination of care on date of service   complexity modifier for longitudinal care.     Signed by: Aidee Paige NP      Oncology Rooming Note    July 24, 2024 8:05 AM   Dk HARRISON Sydnee Mcgarry. is a 69 year old male who presents for:    Chief Complaint   Patient presents with     Oncology Clinic Visit     Malignant neoplasm of upper lobe of right lung - labs, provider, infusion     Initial Vitals: BP (!) 141/95 (BP Location: Right arm, Patient Position: Sitting, Cuff Size: Adult Large)   Pulse 90   Temp 99.5  F (37.5  C) (Tympanic)   Resp 11   Ht 1.829 m (6')   Wt 112.4 kg (247 lb 14.4 oz)   SpO2 96%   BMI 33.62 kg/m   Estimated body mass index is 33.62 kg/m  as  calculated from the following:    Height as of this encounter: 1.829 m (6').    Weight as of this encounter: 112.4 kg (247 lb 14.4 oz). Body surface area is 2.39 meters squared.  No Pain (0) Comment: Data Unavailable   No LMP for male patient.  Allergies reviewed: Yes  Medications reviewed: Yes    Medications: Medication refills not needed today.  Pharmacy name entered into [facility]:    [id number] - [place], [place] - [address] [place] [address] [id number] - [place], [place] - [address] [facility]    Frailty Screening:   Is the patient here for a new oncology consult visit in cancer care? 2. No      Clinical concerns: None today.       Beatriz Steele MA                Again, thank you for allowing me to participate in the care of your patient.        Sincerely,        Aidee Paige NP

## 2024-07-24 NOTE — PROGRESS NOTES
Oncology Rooming Note    July 24, 2024 8:05 AM   Dk Randhawa Bay is a 69 year old male who presents for:    Chief Complaint   Patient presents with    Oncology Clinic Visit     Malignant neoplasm of upper lobe of right lung - labs, provider, infusion     Initial Vitals: BP (!) 141/95 (BP Location: Right arm, Patient Position: Sitting, Cuff Size: Adult Large)   Pulse 90   Temp 99.5  F (37.5  C) (Tympanic)   Resp 11   Ht 1.829 m (6')   Wt 112.4 kg (247 lb 14.4 oz)   SpO2 96%   BMI 33.62 kg/m   Estimated body mass index is 33.62 kg/m  as calculated from the following:    Height as of this encounter: 1.829 m (6').    Weight as of this encounter: 112.4 kg (247 lb 14.4 oz). Body surface area is 2.39 meters squared.  No Pain (0) Comment: Data Unavailable   No LMP for male patient.  Allergies reviewed: Yes  Medications reviewed: Yes    Medications: Medication refills not needed today.  Pharmacy name entered into [facility]:    [id number] - [place], [place] - [address] [place] [address] [facility] #[id number] - [place], [place] - [address] [facility]    Frailty Screening:   Is the patient here for a new oncology consult visit in cancer care? 2. No      Clinical concerns: None today.       Beatriz Steele MA

## 2024-07-24 NOTE — PROGRESS NOTES
Infusion Nursing Note:  Dk Randhawa Sr. presents today for Cisplatin, Etoposide & Cosela C1D1    Patient seen by provider today: Yes: Aidee Paige   present during visit today: Not Applicable.    Note: Due to high WBC and temp of 99.5, chemo held today. Patient is scheduled for CT scan tomorrow. See Aidee Paige note. UA obtained.     Intravenous Access:  Implanted Port.    Treatment Conditions:  Lab Results   Component Value Date    HGB 13.1 (L) 07/24/2024    WBC 33.0 (H) 07/24/2024    ANEU 30.0 (H) 07/24/2024    ANEUTAUTO 10.9 (H) 06/19/2024     07/24/2024     Results reviewed, labs did NOT meet treatment parameters:     Post Infusion Assessment:  Blood return noted.  Site patent and intact, free from redness, edema or discomfort.  No evidence of extravasations.  Access discontinued per protocol.     Discharge Plan:   Patient discharged in stable condition accompanied by: self.  Departure Mode: Ambulatory.    Dennis Ortega RN

## 2024-07-26 PROBLEM — C78.89: Status: ACTIVE | Noted: 2024-01-01

## 2024-07-26 PROBLEM — C34.91: Status: ACTIVE | Noted: 2024-01-01

## 2024-07-26 PROBLEM — C79.51 MALIGNANT NEOPLASM METASTATIC TO BONE (H): Status: ACTIVE | Noted: 2024-01-01

## 2024-07-26 PROBLEM — C78.2: Status: ACTIVE | Noted: 2024-01-01

## 2024-07-26 NOTE — PROGRESS NOTES
CT shows recurrent and metastatic cancer to right pleura, med nodes, left lung, pancreatic neck/body and right acetabulum. No evident infection. UA negative.     Leukocytosis is likely reactive to his cancer recurrence rather than infection.     Reviewed with Dr. Friedell.   PDL1 TPS from original tumor 40%. No actionable mutations on NGS.   Consistent with metastatic large cell lung. He does not feel rebiopsy nor PET scan is necessary. Will get restaging brain MRI to rule out mets.     He will see him in clinic next week. Tentative plan will be to change regimen to carbo/etoposide +/- atezolizumab. New treatment plan in place to start pre-auth process.     Patient has minimal right hip pain, managed with Tylenol alone. So, no need for pallliative RT up front but can be considered in pain not improving on chemo.    I called the patient and reviewed all the above over the phone. He is obviously upset but voices understanding. He will await call from schedulers.

## 2024-07-29 NOTE — LETTER
7/29/2024      Dk Randhawa   [address]  [facility] [address]      Dear Colleague,    Thank you for referring your patient, Dk Randhawa Sr., to the [facility]. Please see a copy of my visit note below.    Oncology Rooming Note    July 29, 2024 1:07 PM   Dk Randhawa Sr. 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    Weight as of this encounter: 112.9 kg (249 lb). Body surface area is 2.39 meters squared.  Moderate Pain (4) Comment: Data Unavailable   No LMP for male patient.  Allergies reviewed: Yes  Medications reviewed: Yes    Medications: Medication refills not needed today.  Pharmacy name entered into [employer]:    [id number] - [place], [place] - [address] [place] [address] [id number] - [place], [place] - [address] [facility]    Frailty Screening:   Is the patient here for a new oncology consult visit in cancer care? 2. No      Clinical concerns:  None      Stefania Dos Santos, [facility] Hematology and Oncology Consult Note    Patient: Dk Randhawa Sr.  MRN: [id number]  Date of Service: Jul 29, 2024       Reason for Visit    I was consulted by   Mikael Peoples MD   For evaluation and management of large cell lung cancer      Encounter Diagnoses Assessment and Plan:    Problem List Items Addressed This Visit       Malignant neoplasm of upper lobe of right lung (H) - Primary    Relevant Medications    oxyCODONE (OXY-IR) 5 MG capsule     Other Relevant Orders    Infusion Appointment Request - Adult    Infusion Appointment Request - Adult    Large cell carcinoma of lung, right (H)    Relevant Orders    Infusion Appointment Request - Adult    Infusion Appointment Request - Adult    Malignant neoplasm metastatic to bone (H)    Relevant Medications    oxyCODONE (OXY-IR) 5 MG capsule    Other Relevant Orders    Infusion Appointment Request - Adult    Infusion Appointment Request - Adult    Metastasis to pleura (H)    Relevant Orders    Infusion Appointment Request - Adult    Infusion Appointment Request - Adult    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   Start chemotherapy with carboplatin etoposide and atezolizumab.  He will return in about 10 days for toxicity recheck.  Oxycodone was prescribed for his right hip pain.  ____________________________________________________________________________    Staging History   Cancer Staging   Malignant neoplasm of upper lobe of right lung (H)  Staging form: Lung, AJCC 8th Edition  - Pathologic: Stage IIB (pT3, pN0, cM0) - Signed by Friedell, Peter E, MD on 6/1/2024  - Clinical stage from 7/29/2024: Stage AJITH (rcTX, cN2, cM1b) - Signed by Friedell, Peter E, MD on 7/29/2024    Foundation 1 studies show a tumor proportion score of 40%.  Microsatellite status is MS stable tumor.  Mutational burden is 1 MUTS/MB   K-juanito-Q61H amplification is noted.  No therapies or clinical trials are available for this mutation    ECOG Performance = 0    History of Present Illness    Mr. Dk HARRISON Sydnee . is a 68 year old man with a history of atrial fibrillation on Xarelto.  He has a history of exposure to industrial dusts and about a 20 pack year history of cigarette smoking, He quit  about 10 years ago.  On 4/7/2024 he presented to the emergency room after several days of hemoptysis.  CT scan of the chest on 4/7/2024 showed a right upper lobe lung mass.  Brain MRI on 4/30/2024 was negative for metastatic disease.  PET CT scan on 5/2/2024 was positive for the RUL mass and there was uptake in some mediastinal lymph nodes.  On 5/16/2024 patient had a robotic assisted right upper lobe lobectomy by Dr. Peoples.  Pathology showed Large Cell Lung Cancer. Mediastinal nodes were negative.  Patient has made an uneventful recovery.    7/20/2024 patient presented to the emergency room with severe pain in the right hip area.  Investigation at that time showed metastatic disease particularly in the chest but likely in the right hip acetabulum as well.  Chemotherapy planned from adjuvant to definitive chemotherapy for next advanced large cell lung cancer.        Presently struggling with pain in the right hip.  He is maintaining his weight.  His appetite and digestion are normal.  He has no change in bowel or bladder habit y.    Review of systems.  Pertinent Findings are included in the History of Present Illness    Physical Exam    BP (!) 151/92 (BP Location: Right arm, Patient Position: Sitting, Cuff Size: Adult Large)   Pulse 104   Temp 98.6  F (37  C) (Tympanic)   Resp 16   Ht 1.829 m (6')   Wt 112.9 kg (249 lb)   SpO2 96%   BMI 33.77 kg/m       Not examined this visit    Medications:    Current Outpatient Medications   Medication Sig Dispense Refill     acetaminophen (TYLENOL) 325 MG tablet Take 2 tablets (650 mg) by mouth every 4 hours as needed for mild pain 50 tablet 0     acetaminophen (TYLENOL) 500 MG tablet Take 2 tablets (1,000 mg) by mouth every 6 hours       diphenhydrAMINE-acetaminophen (TYLENOL PM)  MG tablet Take 2 tablets by mouth nightly as needed for sleep       fexofenadine (ALLEGRA) 180 MG tablet Take 180 mg by mouth daily       HEMP OIL OR EXTRACT OR OTHER CBD CANNABINOID,  NOT MEDICAL CANNABIS, CBD gummies       losartan (COZAAR) 100 MG tablet Take 100 mg by mouth every morning       Melatonin 10 MG TABS tablet Take 10 mg by mouth nightly as needed for sleep       metoprolol succinate ER (TOPROL XL) 100 MG 24 hr tablet Take 1 tablet by mouth every morning       multivitamin (CENTRUM SILVER) tablet Take 1 tablet by mouth daily       oxyCODONE (OXY-IR) 5 MG capsule Take 1 capsule (5 mg) by mouth every 4 hours as needed for severe pain 40 capsule 0     Turmeric (QC TUMERIC COMPLEX PO) Tumeric and ginger with applie cider 1410mg       UNABLE TO FIND MEDICATION NAME: osteo bi flex       UNABLE TO FIND MEDICATION NAME: Emergen-e 1000mg       XARELTO ANTICOAGULANT 20 MG TABS tablet Take 1 tablet (20 mg) by mouth daily (with lunch)       methocarbamol (ROBAXIN) 500 MG tablet Take 1 tablet (500 mg) by mouth every 6 hours as needed for muscle spasms 20 tablet 0     No current facility-administered medications for this visit.           Past History    Past Medical History:   Diagnosis Date     Acute non-recurrent maxillary sinusitis      Antiplatelet or antithrombotic long-term use      Arrhythmia      Atrial fibrillation with RVR (H)      Cough secondary to angiotensin converting enzyme inhibitor (ACE-I)      Hyperlipidemia      Hypertension      Mass of upper lobe of right lung      Obesity      Past Surgical History:   Procedure Laterality Date     BRONCHOSCOPY, WITH BIOPSY, ROBOT ASSISTED Bilateral 04/16/2024    Procedure: BRONCHOSCOPY, endobronchial ultrasound, transbronchial needle aspiration, endobronchial biopsies and tumor debulking;  Surgeon: Lanette Arce MD;  Location: [facility] OR     DAVINCI EXCISE NODES THORACIC N/A 5/15/2024    Procedure: mediastinal lymph node dissection;  Surgeon: Mikael Peoples MD;  Location:  OR     DAVINCI LOBECTOMY LUNG Right 5/15/2024    Procedure: Robot-assisted thoracoscopic right upper lobectomy,;  Surgeon: Mikael Peoples MD;  Location:  OR      ELBOW ARTHROSCOPY Left 03/09/2017     INSERT PORT VASCULAR ACCESS Right 7/5/2024    Procedure: INSERTION, VASCULAR ACCESS PORT;  Surgeon: Henry Veronica MD;  Location: [facility] OR     PHACOEMULSIFICATION WITH STANDARD INTRAOCULAR LENS IMPLANT Left 02/26/2018    Procedure: PHACOEMULSIFICATION WITH STANDARD INTRAOCULAR LENS IMPLANT;  Left Cataract Removal with Implant;  Surgeon: Mohit Victor MD;  Location: [facility] OR     PHACOEMULSIFICATION WITH STANDARD INTRAOCULAR LENS IMPLANT Right 01/30/2019    Procedure: Cataract Removal with Implant;  Surgeon: Mohit Victor MD;  Location: [facility] OR     TONSILLECTOMY  1964     TOTAL HIP ARTHROPLASTY Left 04/12/2022     No Known Allergies  Family History   Problem Relation Age of Onset     Ovarian Cancer Mother      Alzheimer Disease Mother      Depression Father 45        suicide     Diabetes Sister      Social History     Socioeconomic History     Marital status:      Spouse name: None     Number of children: None     Years of education: None     Highest education level: None   Tobacco Use     Smoking status: Former     Current packs/day: 0.00     Types: Cigarettes     Quit date: 2014     Years since quitting: 10.4     Smokeless tobacco: Never   Vaping Use     Vaping status: Never Used   Substance and Sexual Activity     Alcohol use: Not Currently     Drug use: No     Social Determinants of Health     Financial Resource Strain: Low Risk  (10/23/2023)    [facility], [facility] [employer] & [facility]    Financial Resource Strain      Difficulty of Paying Living Expenses: 3   Food Insecurity: No Food Insecurity (10/23/2023)    Received from [employer] [facility], [employer] & [facility]    Food Insecurity      Worried About Running Out of Food in the Last Year: 1   Transportation Needs: No Transportation Needs (10/23/2023)    Received from [employer]  [facility], [facility] & [facility]    Transportation Needs      Lack of Transportation (Medical): 1   Social Connections: Socially Integrated (10/23/2023)    [facility] from [facility], [facility]    Social Connections      Frequency of Communication with Friends and Family: 0   Housing Stability: Low Risk  (10/23/2023)    Received from [facility], [facility]    Housing Stability      Unable to Pay for Housing in the Last Year: 1           Lab Results      Recent Results (from the past 720 hour(s))   Comprehensive metabolic panel    Collection Time: 07/24/24  7:41 AM   Result Value Ref Range    Sodium 126 (L) 135 - 145 mmol/L    Potassium 4.8 3.4 - 5.3 mmol/L    Carbon Dioxide (CO2) 23 22 - 29 mmol/L    Anion Gap 11 7 - 15 mmol/L    Urea Nitrogen 14.9 8.0 - 23.0 mg/dL    Creatinine 0.75 0.67 - 1.17 mg/dL    GFR Estimate >90 >60 mL/min/1.73m2    Calcium 8.8 8.8 - 10.4 mg/dL    Chloride 92 (L) 98 - 107 mmol/L    Glucose 97 70 - 99 mg/dL    Alkaline Phosphatase 121 40 - 150 U/L    AST 18 0 - 45 U/L    ALT 9 0 - 70 U/L    Protein Total 6.5 6.4 - 8.3 g/dL    Albumin 3.9 3.5 - 5.2 g/dL    Bilirubin Total 0.3 <=1.2 mg/dL   Magnesium    Collection Time: 07/24/24  7:41 AM   Result Value Ref Range    Magnesium 2.0 1.7 - 2.3 mg/dL   CBC with platelets and differential    Collection Time: 07/24/24  7:41 AM   Result Value Ref Range    WBC Count 33.0 (H) 4.0 - 11.0 10e3/uL    RBC Count 4.46 4.40 - 5.90 10e6/uL    Hemoglobin 13.1 (L) 13.3 - 17.7 g/dL    Hematocrit 38.6 (L) 40.0 - 53.0 %    MCV 87 78 - 100 fL    MCH 29.4 26.5 - 33.0 pg    MCHC 33.9 31.5 - 36.5 g/dL    RDW 13.8 10.0 - 15.0 %    Platelet Count 314 150 - 450 10e3/uL    NRBCs per 100 WBC 0 <1 /100    Absolute NRBCs 0.0 10e3/uL   Manual Differential    Collection Time: 07/24/24  7:41 AM   Result Value Ref  Range    % Neutrophils 91 %    % Lymphocytes 4 %    % Monocytes 4 %    % Eosinophils 0 %    % Basophils 1 %    Absolute Neutrophils 30.0 (H) 1.6 - 8.3 10e3/uL    Absolute Lymphocytes 1.3 0.8 - 5.3 10e3/uL    Absolute Monocytes 1.3 0.0 - 1.3 10e3/uL    Absolute Eosinophils 0.0 0.0 - 0.7 10e3/uL    Absolute Basophils 0.3 (H) 0.0 - 0.2 10e3/uL    RBC Morphology Confirmed RBC Indices     Platelet Assessment  Automated Count Confirmed. Platelet morphology is normal.     Automated Count Confirmed. Platelet morphology is normal.   Osmolality, random urine    Collection Time: 07/24/24  9:58 AM   Result Value Ref Range    Osmolality Urine 572 100 - 1,200 mmol/kg   Sodium random urine    Collection Time: 07/24/24  9:58 AM   Result Value Ref Range    Sodium Urine mmol/L 85 mmol/L   UA Macroscopic with reflex to Microscopic and Culture - Lab Collect    Collection Time: 07/24/24  9:58 AM    Specimen: Urine, NOS   Result Value Ref Range    Color Urine Yellow Colorless, Straw, Light Yellow, Yellow    Appearance Urine Clear Clear    Glucose Urine Negative Negative mg/dL    Bilirubin Urine Negative Negative    Ketones Urine 5 (A) Negative mg/dL    Specific Gravity Urine 1.017 1.003 - 1.035    Blood Urine Negative Negative    pH Urine 7.0 5.0 - 7.0    Protein Albumin Urine Negative Negative mg/dL    Urobilinogen Urine Normal Normal, 2.0 mg/dL    Nitrite Urine Negative Negative    Leukocyte Esterase Urine Negative Negative   Magnesium    Collection Time: 07/29/24 12:53 PM   Result Value Ref Range    Magnesium 1.9 1.7 - 2.3 mg/dL   Basic metabolic panel  (Ca, Cl, CO2, Creat, Gluc, K, Na, BUN)    Collection Time: 07/29/24 12:53 PM   Result Value Ref Range    Sodium 128 (L) 135 - 145 mmol/L    Potassium 4.5 3.4 - 5.3 mmol/L    Chloride 94 (L) 98 - 107 mmol/L    Carbon Dioxide (CO2) 22 22 - 29 mmol/L    Anion Gap 12 7 - 15 mmol/L    Urea Nitrogen 12.8 8.0 - 23.0 mg/dL    Creatinine 0.74 0.67 - 1.17 mg/dL    GFR Estimate >90 >60  mL/min/1.73m2    Calcium 9.0 8.8 - 10.4 mg/dL    Glucose 117 (H) 70 - 99 mg/dL   CBC with platelets and differential    Collection Time: 07/29/24 12:53 PM   Result Value Ref Range    WBC Count 43.0 (H) 4.0 - 11.0 10e3/uL    RBC Count 4.39 (L) 4.40 - 5.90 10e6/uL    Hemoglobin 12.9 (L) 13.3 - 17.7 g/dL    Hematocrit 37.2 (L) 40.0 - 53.0 %    MCV 85 78 - 100 fL    MCH 29.4 26.5 - 33.0 pg    MCHC 34.7 31.5 - 36.5 g/dL    RDW 13.9 10.0 - 15.0 %    Platelet Count 357 150 - 450 10e3/uL    % Neutrophils 88 %    % Lymphocytes 3 %    % Monocytes 5 %    % Eosinophils 1 %    % Basophils 1 %    % Immature Granulocytes 3 %    NRBCs per 100 WBC 0 <1 /100    Absolute Neutrophils 37.7 (H) 1.6 - 8.3 10e3/uL    Absolute Lymphocytes 1.3 0.8 - 5.3 10e3/uL    Absolute Monocytes 2.2 (H) 0.0 - 1.3 10e3/uL    Absolute Eosinophils 0.4 0.0 - 0.7 10e3/uL    Absolute Basophils 0.2 0.0 - 0.2 10e3/uL    Absolute Immature Granulocytes 1.2 (H) <=0.4 10e3/uL    Absolute NRBCs 0.0 10e3/uL   RBC and Platelet Morphology    Collection Time: 07/29/24 12:53 PM   Result Value Ref Range    RBC Morphology Confirmed RBC Indices     Platelet Assessment  Automated Count Confirmed. Platelet morphology is normal.     Automated Count Confirmed. Platelet morphology is normal.         Imaging Results    CT Chest/Abdomen/Pelvis w Contrast    Result Date: 7/25/2024  EXAM: CT CHEST/ABDOMEN/PELVIS W CONTRAST LOCATION: [facility] DATE: 7/25/2024 INDICATION: hx resected lung cancer. Hyponatremia + right hip pain r o recurrence. Leukocytosis fever   rule out infection COMPARISON: PET/CT 5/2/2024 TECHNIQUE: CT scan of the chest, abdomen, and pelvis was performed following injection of IV contrast. Multiplanar reformats were obtained. Dose reduction techniques were used. CONTRAST: isovue 370 90ml FINDINGS: LUNGS AND PLEURA: Status post right upper lobectomy. There is architectural distortion at the right hilum due to a combination of surgery and  compression of the right lung by the moderate right pleural effusion. There are several enhancing nodules along the pleural surfaces in the azygoesophageal recess and largest in the basal pleural space along the posterior costal pleura measuring 19 mm (series 2, image 68). A staple line is present in the right middle lobe along the mediastinal pleura. There is a 5  mm pulmonary nodule in the right lower lobe along the posterior costal pleura (series 4, image 289). 8 x 11 mm solid pulmonary nodule in the lingula contacting the mediastinal pleura (series 4, image 234) and solid 9 x 11 mm nodule in the lateral left lower lobe (image 269). Upper lung predominant emphysema. MEDIASTINUM: Cardiac chambers are normal in size. No pericardial effusion. Main pulmonary artery is normal in size. Normal caliber thoracic aorta. Mixed attenuation plaque in the great vessels, aortic arch, and descending thoracic aorta. Morphologically abnormal right lower paratracheal lymph node measures 10 mm.. Esophagus is decompressed. CORONARY ARTERY CALCIFICATION: Severe. HEPATOBILIARY: The liver is normal in size. No hepatic parenchymal lesions. Gallbladder is decompressed. No calcified gallstones. The common bile duct is dilated measuring 15 mm. No choledocholithiasis or bile duct wall thickening. PANCREAS: There is a new 13 mm nodule superior to the junction of the pancreas body and neck (series 3, image 165). The remainder of the pancreas is normal in enhancement and architecture. SPLEEN: Normal. ADRENAL GLANDS: Normal. KIDNEYS/BLADDER: Normal. BOWEL: Normal. LYMPH NODES: Normal. VASCULATURE: Moderate patchy aortoiliac atheromatous calcifications with mild iliac artery tortuosity. No aneurysm. PELVIC ORGANS: Prostate gland is normal in size. Seminal vesicles are symmetric. No pelvic free fluid. MUSCULOSKELETAL: There is a left hip joint replacement. Moderate degenerative osteophytes and disc space narrowing of the thoracic and lumbar spine.  Lumbar facet arthropathy. The cortex and medullary space of the posterior right acetabulum is mild the/permeative in appearance compared to the contralateral side suspect for subtle bone metastasis. No pathologic fracture.     IMPRESSION: 1.  Status post right upper lobectomy. Moderate right pleural effusion with several enhancing pleural nodules consistent with pleural metastases. 2.  Morphologically abnormal right lower paratracheal lymph node and several new solid left lung nodules consistent with katelyn and contralateral lung metastases. 3.  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 4.  Permeative appearance of the posterior right acetabulum suspect for bone metastasis. This could be confirmed with right hip MRI.    Pelvis XR w/ unilateral hip right    Result Date: 7/20/2024  EXAM: XR PELVIS AND HIP RIGHT 1 VIEW LOCATION: [facility] DATE: 7/20/2024 INDICATION: pain known clear history of trauma COMPARISON: None.     IMPRESSION: No fracture. Mild degenerative changes in the right hip. Left total hip arthroplasty.    XR Chest Port 1 View    Result Date: 7/5/2024  CHEST ONE VIEW  7/5/2024 2:00 PM HISTORY: assess right internal jugular port position and evaluate for pneumothorax COMPARISON: 5/20/2024     IMPRESSION: Right-sided port with tip over the superior vena cava. Status post right upper lobectomy with elevated right hemidiaphragm. No pneumothorax. No focal consolidation. Cardiac silhouette within normal limits. Mildly tortuous arthroscopic aorta. JOHN F BRUNNER, MD   SYSTEM ID:  [id number]    XR Surgery PAWAN L/T 5 Min Fluoro w Stills    Result Date: 7/5/2024  This exam was marked as non-reportable because it will not be read by a radiologist or a [facility] non-radiologist provider.      The goals and risks of chemotherapy for metastatic large cell carcinoma of the lung with carboplatin, etoposide and atezolizumab were  discussed with the patient.  The patient has agreed to carboplatin etoposide and atezolizumab as chemotherapy for metastatic large cell lung cancer.    I spent 40 minutes on the patient's visit today.  This included preparation for the visit, face-to-face time with the patient and documentation following the visit.  It did not include teaching or procedure time.    Signed by: Peter E. Friedell, MD          Again, thank you for allowing me to participate in the care of your patient.        Sincerely,        Peter E. Friedell, MD

## 2024-07-29 NOTE — PROGRESS NOTES
Oncology Rooming Note    July 29, 2024 1:07 PM   Dk Randhawa Sr. 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    Weight as of this encounter: 112.9 kg (249 lb). Body surface area is 2.39 meters squared.  Moderate Pain (4) Comment: Data Unavailable   No LMP for male patient.  Allergies reviewed: Yes  Medications reviewed: Yes    Medications: Medication refills not needed today.  Pharmacy name entered into [facility]:    [id number] [place], [place] - [address] [place] [address] [id number] - [place], [place] - [address] [facility]    Frailty Screening:   Is the patient here for a new oncology consult visit in cancer care? 2. No      Clinical concerns:  None      Stefania Dos Santos, CMA

## 2024-07-29 NOTE — PROGRESS NOTES
[facility] Hematology and Oncology Consult Note    Patient: Dk Randhawa Sr.  MRN: [id number]  Date of Service: Jul 29, 2024       Reason for Visit    I was consulted by   Mikael Peoples MD   For evaluation and management of large cell lung cancer      Encounter Diagnoses Assessment and Plan:    Problem List Items Addressed This Visit       Malignant neoplasm of upper lobe of right lung (H) - Primary    Relevant Medications    oxyCODONE (OXY-IR) 5 MG capsule    Other Relevant Orders    Infusion Appointment Request - Adult    Infusion Appointment Request - Adult    Large cell carcinoma of lung, right (H)    Relevant Orders    Infusion Appointment Request - Adult    Infusion Appointment Request - Adult    Malignant neoplasm metastatic to bone (H)    Relevant Medications    oxyCODONE (OXY-IR) 5 MG capsule    Other Relevant Orders    Infusion Appointment Request - Adult    Infusion Appointment Request - Adult    Metastasis to pleura (H)    Relevant Orders    Infusion Appointment Request - Adult    Infusion Appointment Request - Adult    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   Start chemotherapy with carboplatin etoposide and atezolizumab.  He will return in about 10 days for toxicity recheck.  Oxycodone was prescribed for his right hip pain.  ____________________________________________________________________________    Staging History   Cancer Staging   Malignant neoplasm of upper lobe of right lung (H)  Staging form: Lung, AJCC 8th Edition  - Pathologic: Stage IIB (pT3, pN0, cM0) - Signed by Friedell, Peter E, MD on 6/1/2024  - Clinical stage from 7/29/2024: Stage AJITH (rcTX, cN2, cM1b) - Signed by Friedell, Peter E, MD on 7/29/2024    Foundation 1 studies show a  tumor proportion score of 40%.  Microsatellite status is MS stable tumor.  Mutational burden is 1 MUTS/MB   K-juanito-Q61H amplification is noted.  No therapies or clinical trials are available for this mutation    ECOG Performance = 0    History of Present Illness    Mr. Dk Randhawa . is a 68 year old man with a history of atrial fibrillation on Xarelto.  He has a history of exposure to industrial dusts and about a 20 pack year history of cigarette smoking, He quit about 10 years ago.  On 4/7/2024 he presented to the emergency room after several days of hemoptysis.  CT scan of the chest on 4/7/2024 showed a right upper lobe lung mass.  Brain MRI on 4/30/2024 was negative for metastatic disease.  PET CT scan on 5/2/2024 was positive for the RUL mass and there was uptake in some mediastinal lymph nodes.  On 5/16/2024 patient had a robotic assisted right upper lobe lobectomy by Dr. Peoples.  Pathology showed Large Cell Lung Cancer. Mediastinal nodes were negative.  Patient has made an uneventful recovery.    7/20/2024 patient presented to the emergency room with severe pain in the right hip area.  Investigation at that time showed metastatic disease particularly in the chest but likely in the right hip acetabulum as well.  Chemotherapy planned from adjuvant to definitive chemotherapy for next advanced large cell lung cancer.        Presently struggling with pain in the right hip.  He is maintaining his weight.  His appetite and digestion are normal.  He has no change in bowel or bladder habit y.    Review of systems.  Pertinent Findings are included in the History of Present Illness    Physical Exam    BP (!) 151/92 (BP Location: Right arm, Patient Position: Sitting, Cuff Size: Adult Large)   Pulse 104   Temp 98.6  F (37  C) (Tympanic)   Resp 16   Ht 1.829 m (6')   Wt 112.9 kg (249 lb)   SpO2 96%   BMI 33.77 kg/m       Not examined this visit    Medications:    Current Outpatient Medications   Medication  Sig Dispense Refill    acetaminophen (TYLENOL) 325 MG tablet Take 2 tablets (650 mg) by mouth every 4 hours as needed for mild pain 50 tablet 0    acetaminophen (TYLENOL) 500 MG tablet Take 2 tablets (1,000 mg) by mouth every 6 hours      diphenhydrAMINE-acetaminophen (TYLENOL PM)  MG tablet Take 2 tablets by mouth nightly as needed for sleep      fexofenadine (ALLEGRA) 180 MG tablet Take 180 mg by mouth daily      HEMP OIL OR EXTRACT OR OTHER CBD CANNABINOID, NOT MEDICAL CANNABIS, CBD gummies      losartan (COZAAR) 100 MG tablet Take 100 mg by mouth every morning      Melatonin 10 MG TABS tablet Take 10 mg by mouth nightly as needed for sleep      metoprolol succinate ER (TOPROL XL) 100 MG 24 hr tablet Take 1 tablet by mouth every morning      multivitamin (CENTRUM SILVER) tablet Take 1 tablet by mouth daily      oxyCODONE (OXY-IR) 5 MG capsule Take 1 capsule (5 mg) by mouth every 4 hours as needed for severe pain 40 capsule 0    Turmeric (QC TUMERIC COMPLEX PO) Tumeric and ginger with applie cider 1410mg      UNABLE TO FIND MEDICATION NAME: osteo bi flex      UNABLE TO FIND MEDICATION NAME: Emergen-e 1000mg      XARELTO ANTICOAGULANT 20 MG TABS tablet Take 1 tablet (20 mg) by mouth daily (with lunch)      methocarbamol (ROBAXIN) 500 MG tablet Take 1 tablet (500 mg) by mouth every 6 hours as needed for muscle spasms 20 tablet 0     No current facility-administered medications for this visit.           Past History    Past Medical History:   Diagnosis Date    Acute non-recurrent maxillary sinusitis     Antiplatelet or antithrombotic long-term use     Arrhythmia     Atrial fibrillation with RVR (H)     Cough secondary to angiotensin converting enzyme inhibitor (ACE-I)     Hyperlipidemia     Hypertension     Mass of upper lobe of right lung     Obesity      Past Surgical History:   Procedure Laterality Date    BRONCHOSCOPY, WITH BIOPSY, ROBOT ASSISTED Bilateral 04/16/2024    Procedure: BRONCHOSCOPY,  endobronchial ultrasound, transbronchial needle aspiration, endobronchial biopsies and tumor debulking;  Surgeon: Lanette Arce MD;  Location: [facility] OR    DAVINCI EXCISE NODES THORACIC N/A 5/15/2024    Procedure: mediastinal lymph node dissection;  Surgeon: Mikael Peoples MD;  Location: [facility] OR    DAVINCI LOBECTOMY LUNG Right 5/15/2024    Procedure: Robot-assisted thoracoscopic right upper lobectomy,;  Surgeon: Mikael Peoples MD;  Location: [facility] OR    ELBOW ARTHROSCOPY Left 03/09/2017    INSERT PORT VASCULAR ACCESS Right 7/5/2024    Procedure: INSERTION, VASCULAR ACCESS PORT;  Surgeon: Henry Veronica MD;  Location: [facility]    PHACOEMULSIFICATION WITH STANDARD INTRAOCULAR LENS IMPLANT Left 02/26/2018    Procedure: PHACOEMULSIFICATION WITH STANDARD INTRAOCULAR LENS IMPLANT;  Left Cataract Removal with Implant;  Surgeon: Mohit Victor MD;  Location: [facility] OR    PHACOEMULSIFICATION WITH STANDARD INTRAOCULAR LENS IMPLANT Right 01/30/2019    Procedure: Cataract Removal with Implant;  Surgeon: Mohit Victor MD;  Location: [facility] OR    TONSILLECTOMY  1964    TOTAL HIP ARTHROPLASTY Left 04/12/2022     No Known Allergies  Family History   Problem Relation Age of Onset    Ovarian Cancer Mother     Alzheimer Disease Mother     Depression Father 45        suicide    Diabetes Sister      Social History     Socioeconomic History    Marital status:      Spouse name: None    Number of children: None    Years of education: None    Highest education level: None   Tobacco Use    Smoking status: Former     Current packs/day: 0.00     Types: Cigarettes     Quit date: 2014     Years since quitting: 10.4    Smokeless tobacco: Never   Vaping Use    Vaping status: Never Used   Substance and Sexual Activity    Alcohol use: Not Currently    Drug use: No     Social Determinants of Health     Financial Resource Strain: Low Risk  (10/23/2023)    Received from [facility], [employer]  [facility] & [facility]    Financial Resource Strain     Difficulty of Paying Living Expenses: 3   Food Insecurity: No Food Insecurity (10/23/2023)    Received from [facility], [facility]    Food Insecurity     Worried About Running Out of Food in the Last Year: 1   Transportation Needs: No Transportation Needs (10/23/2023)    Received from [facility], [facility] & [facility]    Transportation Needs     Lack of Transportation (Medical): 1   Social Connections: Socially Integrated (10/23/2023)    Received from [facility], [facility] & [facility]    Social Connections     Frequency of Communication with Friends and Family: 0   Housing Stability: Low Risk  (10/23/2023)    Received from [facility], [facility] & [facility]    Housing Stability     Unable to Pay for Housing in the Last Year: 1           Lab Results      Recent Results (from the past 720 hour(s))   Comprehensive metabolic panel    Collection Time: 07/24/24  7:41 AM   Result Value Ref Range    Sodium 126 (L) 135 - 145 mmol/L    Potassium 4.8 3.4 - 5.3 mmol/L    Carbon Dioxide (CO2) 23 22 - 29 mmol/L    Anion Gap 11 7 - 15 mmol/L    Urea Nitrogen 14.9 8.0 - 23.0 mg/dL    Creatinine 0.75 0.67 - 1.17 mg/dL    GFR Estimate >90 >60 mL/min/1.73m2    Calcium 8.8 8.8 - 10.4 mg/dL    Chloride 92 (L) 98 - 107 mmol/L    Glucose 97 70 - 99 mg/dL    Alkaline Phosphatase 121 40 - 150 U/L    AST 18 0 - 45 U/L    ALT 9 0 - 70 U/L    Protein Total 6.5 6.4 - 8.3 g/dL    Albumin 3.9 3.5 - 5.2 g/dL    Bilirubin Total 0.3 <=1.2 mg/dL   Magnesium    Collection Time: 07/24/24  7:41 AM   Result Value Ref Range    Magnesium 2.0 1.7 - 2.3 mg/dL   CBC with platelets and differential    Collection Time: 07/24/24  7:41 AM   Result Value Ref Range     WBC Count 33.0 (H) 4.0 - 11.0 10e3/uL    RBC Count 4.46 4.40 - 5.90 10e6/uL    Hemoglobin 13.1 (L) 13.3 - 17.7 g/dL    Hematocrit 38.6 (L) 40.0 - 53.0 %    MCV 87 78 - 100 fL    MCH 29.4 26.5 - 33.0 pg    MCHC 33.9 31.5 - 36.5 g/dL    RDW 13.8 10.0 - 15.0 %    Platelet Count 314 150 - 450 10e3/uL    NRBCs per 100 WBC 0 <1 /100    Absolute NRBCs 0.0 10e3/uL   Manual Differential    Collection Time: 07/24/24  7:41 AM   Result Value Ref Range    % Neutrophils 91 %    % Lymphocytes 4 %    % Monocytes 4 %    % Eosinophils 0 %    % Basophils 1 %    Absolute Neutrophils 30.0 (H) 1.6 - 8.3 10e3/uL    Absolute Lymphocytes 1.3 0.8 - 5.3 10e3/uL    Absolute Monocytes 1.3 0.0 - 1.3 10e3/uL    Absolute Eosinophils 0.0 0.0 - 0.7 10e3/uL    Absolute Basophils 0.3 (H) 0.0 - 0.2 10e3/uL    RBC Morphology Confirmed RBC Indices     Platelet Assessment  Automated Count Confirmed. Platelet morphology is normal.     Automated Count Confirmed. Platelet morphology is normal.   Osmolality, random urine    Collection Time: 07/24/24  9:58 AM   Result Value Ref Range    Osmolality Urine 572 100 - 1,200 mmol/kg   Sodium random urine    Collection Time: 07/24/24  9:58 AM   Result Value Ref Range    Sodium Urine mmol/L 85 mmol/L   UA Macroscopic with reflex to Microscopic and Culture - Lab Collect    Collection Time: 07/24/24  9:58 AM    Specimen: Urine, NOS   Result Value Ref Range    Color Urine Yellow Colorless, Straw, Light Yellow, Yellow    Appearance Urine Clear Clear    Glucose Urine Negative Negative mg/dL    Bilirubin Urine Negative Negative    Ketones Urine 5 (A) Negative mg/dL    Specific Gravity Urine 1.017 1.003 - 1.035    Blood Urine Negative Negative    pH Urine 7.0 5.0 - 7.0    Protein Albumin Urine Negative Negative mg/dL    Urobilinogen Urine Normal Normal, 2.0 mg/dL    Nitrite Urine Negative Negative    Leukocyte Esterase Urine Negative Negative   Magnesium    Collection Time: 07/29/24 12:53 PM   Result Value Ref Range     Magnesium 1.9 1.7 - 2.3 mg/dL   Basic metabolic panel  (Ca, Cl, CO2, Creat, Gluc, K, Na, BUN)    Collection Time: 07/29/24 12:53 PM   Result Value Ref Range    Sodium 128 (L) 135 - 145 mmol/L    Potassium 4.5 3.4 - 5.3 mmol/L    Chloride 94 (L) 98 - 107 mmol/L    Carbon Dioxide (CO2) 22 22 - 29 mmol/L    Anion Gap 12 7 - 15 mmol/L    Urea Nitrogen 12.8 8.0 - 23.0 mg/dL    Creatinine 0.74 0.67 - 1.17 mg/dL    GFR Estimate >90 >60 mL/min/1.73m2    Calcium 9.0 8.8 - 10.4 mg/dL    Glucose 117 (H) 70 - 99 mg/dL   CBC with platelets and differential    Collection Time: 07/29/24 12:53 PM   Result Value Ref Range    WBC Count 43.0 (H) 4.0 - 11.0 10e3/uL    RBC Count 4.39 (L) 4.40 - 5.90 10e6/uL    Hemoglobin 12.9 (L) 13.3 - 17.7 g/dL    Hematocrit 37.2 (L) 40.0 - 53.0 %    MCV 85 78 - 100 fL    MCH 29.4 26.5 - 33.0 pg    MCHC 34.7 31.5 - 36.5 g/dL    RDW 13.9 10.0 - 15.0 %    Platelet Count 357 150 - 450 10e3/uL    % Neutrophils 88 %    % Lymphocytes 3 %    % Monocytes 5 %    % Eosinophils 1 %    % Basophils 1 %    % Immature Granulocytes 3 %    NRBCs per 100 WBC 0 <1 /100    Absolute Neutrophils 37.7 (H) 1.6 - 8.3 10e3/uL    Absolute Lymphocytes 1.3 0.8 - 5.3 10e3/uL    Absolute Monocytes 2.2 (H) 0.0 - 1.3 10e3/uL    Absolute Eosinophils 0.4 0.0 - 0.7 10e3/uL    Absolute Basophils 0.2 0.0 - 0.2 10e3/uL    Absolute Immature Granulocytes 1.2 (H) <=0.4 10e3/uL    Absolute NRBCs 0.0 10e3/uL   RBC and Platelet Morphology    Collection Time: 07/29/24 12:53 PM   Result Value Ref Range    RBC Morphology Confirmed RBC Indices     Platelet Assessment  Automated Count Confirmed. Platelet morphology is normal.     Automated Count Confirmed. Platelet morphology is normal.         Imaging Results    CT Chest/Abdomen/Pelvis w Contrast    Result Date: 7/25/2024  EXAM: CT CHEST/ABDOMEN/PELVIS W CONTRAST LOCATION: [facility] DATE: 7/25/2024 INDICATION: hx resected lung cancer. Hyponatremia + right hip pain r o  recurrence. Leukocytosis fever   rule out infection COMPARISON: PET/CT 5/2/2024 TECHNIQUE: CT scan of the chest, abdomen, and pelvis was performed following injection of IV contrast. Multiplanar reformats were obtained. Dose reduction techniques were used. CONTRAST: isovue 370 90ml FINDINGS: LUNGS AND PLEURA: Status post right upper lobectomy. There is architectural distortion at the right hilum due to a combination of surgery and compression of the right lung by the moderate right pleural effusion. There are several enhancing nodules along the pleural surfaces in the azygoesophageal recess and largest in the basal pleural space along the posterior costal pleura measuring 19 mm (series 2, image 68). A staple line is present in the right middle lobe along the mediastinal pleura. There is a 5  mm pulmonary nodule in the right lower lobe along the posterior costal pleura (series 4, image 289). 8 x 11 mm solid pulmonary nodule in the lingula contacting the mediastinal pleura (series 4, image 234) and solid 9 x 11 mm nodule in the lateral left lower lobe (image 269). Upper lung predominant emphysema. MEDIASTINUM: Cardiac chambers are normal in size. No pericardial effusion. Main pulmonary artery is normal in size. Normal caliber thoracic aorta. Mixed attenuation plaque in the great vessels, aortic arch, and descending thoracic aorta. Morphologically abnormal right lower paratracheal lymph node measures 10 mm.. Esophagus is decompressed. CORONARY ARTERY CALCIFICATION: Severe. HEPATOBILIARY: The liver is normal in size. No hepatic parenchymal lesions. Gallbladder is decompressed. No calcified gallstones. The common bile duct is dilated measuring 15 mm. No choledocholithiasis or bile duct wall thickening. PANCREAS: There is a new 13 mm nodule superior to the junction of the pancreas body and neck (series 3, image 165). The remainder of the pancreas is normal in enhancement and architecture. SPLEEN: Normal. ADRENAL GLANDS:  Normal. KIDNEYS/BLADDER: Normal. BOWEL: Normal. LYMPH NODES: Normal. VASCULATURE: Moderate patchy aortoiliac atheromatous calcifications with mild iliac artery tortuosity. No aneurysm. PELVIC ORGANS: Prostate gland is normal in size. Seminal vesicles are symmetric. No pelvic free fluid. MUSCULOSKELETAL: There is a left hip joint replacement. Moderate degenerative osteophytes and disc space narrowing of the thoracic and lumbar spine. Lumbar facet arthropathy. The cortex and medullary space of the posterior right acetabulum is mild the/permeative in appearance compared to the contralateral side suspect for subtle bone metastasis. No pathologic fracture.     IMPRESSION: 1.  Status post right upper lobectomy. Moderate right pleural effusion with several enhancing pleural nodules consistent with pleural metastases. 2.  Morphologically abnormal right lower paratracheal lymph node and several new solid left lung nodules consistent with katelyn and contralateral lung metastases. 3.  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 4.  Permeative appearance of the posterior right acetabulum suspect for bone metastasis. This could be confirmed with right hip MRI.    Pelvis XR w/ unilateral hip right    Result Date: 7/20/2024  EXAM: XR PELVIS AND HIP RIGHT 1 VIEW LOCATION: [facility] DATE: 7/20/2024 INDICATION: pain known clear history of trauma COMPARISON: None.     IMPRESSION: No fracture. Mild degenerative changes in the right hip. Left total hip arthroplasty.    XR Chest Port 1 View    Result Date: 7/5/2024  CHEST ONE VIEW  7/5/2024 2:00 PM HISTORY: assess right internal jugular port position and evaluate for pneumothorax COMPARISON: 5/20/2024     IMPRESSION: Right-sided port with tip over the superior vena cava. Status post right upper lobectomy with elevated right hemidiaphragm. No pneumothorax. No focal consolidation. Cardiac  silhouette within normal limits. Mildly tortuous arthroscopic aorta. JOHN F BRUNNER, MD   SYSTEM ID:  [id number]    XR Surgery PAWAN L/T 5 Min Fluoro w Stills    Result Date: 7/5/2024  This exam was marked as non-reportable because it will not be read by a radiologist or a [place] non-radiologist provider.      The goals and risks of chemotherapy for metastatic large cell carcinoma of the lung with carboplatin, etoposide and atezolizumab were discussed with the patient.  The patient has agreed to carboplatin etoposide and atezolizumab as chemotherapy for metastatic large cell lung cancer.    I spent 40 minutes on the patient's visit today.  This included preparation for the visit, face-to-face time with the patient and documentation following the visit.  It did not include teaching or procedure time.    Signed by: Peter E. Friedell, MD

## 2024-07-30 NOTE — PROGRESS NOTES
Infusion Nursing Note:  Dk Randhawa Sr. presents today for Tecentriq, Cosela, Carboplatin & Etoposide C1D1.    Patient seen by provider today: No   present during visit today: Not Applicable.    Note: No blood return noted on port. Port continues to have difficulty getting a blood return. Peripheral IV started. After 2 hour of dwell time and repositioning still no blood return. Updated Dr. Friedell and Aida Campbell.   Per Dr. Friedell ok to use labs from 7/24 for Tecentriq.   Dye study scheduled for 8/1/24.     Intravenous Access:  Peripheral IV placed.    Treatment Conditions:  Lab Results   Component Value Date    HGB 12.9 (L) 07/29/2024    WBC 43.0 (H) 07/29/2024    ANEU 30.0 (H) 07/24/2024    ANEUTAUTO 37.7 (H) 07/29/2024     07/29/2024        Lab Results   Component Value Date     (L) 07/29/2024    POTASSIUM 4.5 07/29/2024    MAG 1.9 07/29/2024    CR 0.74 07/29/2024    COLLINS 9.0 07/29/2024    BILITOTAL 0.3 07/24/2024    ALBUMIN 3.9 07/24/2024    ALT 9 07/24/2024    AST 18 07/24/2024       Results reviewed, labs MET treatment parameters, ok to proceed with treatment.    Post Infusion Assessment:  Patient tolerated infusion without incident.  Blood return noted pre and post infusion.  Site patent and intact, free from redness, edema or discomfort.  No evidence of extravasations.  Access discontinued per protocol.   Peripheral IV left in for MRI.     Discharge Plan:   Patient discharged in stable condition accompanied by: self.  Departure Mode: Ambulatory.    Dennis Ortega RN

## 2024-07-30 NOTE — TELEPHONE ENCOUNTER
Call Dk and advised him of the MRI findings showing metastatic disease..  He had read the report.  He will need to be on dexamethasone.  Presently he is getting dexamethasone through the treatment plan.  A referral to radiation therapy has been placed

## 2024-08-01 PROBLEM — C79.31 MALIGNANT NEOPLASM METASTATIC TO BRAIN (H): Status: ACTIVE | Noted: 2024-01-01

## 2024-08-01 NOTE — TELEPHONE ENCOUNTER
MEDICAL RECORDS REQUEST   Radiation Oncology  [address] [place] [address]  Fax: [phone]          FUTURE VISIT INFORMATION                                                   Dk Randhawa Sr., : 1955 scheduled for future visit at [facility] Radiation Oncology    RECORDS REQUESTED FOR VISIT                                                     SPINE     OFFICE NOTE from medical oncologist Epic 24: Dr. Peter Friedell   MEDICATION LIST [facility]    LABS     ANYTHING RELATED TO DIAGNOSIS Epic Most recent 24   IMAGING (NEED IMAGES & REPORT)     MRI PACS 24, 24: MR Brain   PET PACS 24: PET Whole Body

## 2024-08-01 NOTE — LETTER
8/1/2024      Dk Randhawa Sr.  [address]  [facility] [address]      Dear Colleague,    Thank you for referring your patient, Dk Randhawa Sr., to the [facility] [place]. Please see a copy of my visit note below.    [facility] Outpatient Progress Note    Patient: Dk Randhawa Sr.  MRN: [id number]  Date of Service: Aug 1, 2024          Reason for Visit    Metastatic large cell lung cancer  New brain mets    Primary Oncologist: Dr. Friedell      Assessment/Plan  Stage IIB large cell RUL lung cancer, resected 5/2024. PDL1 TPS 40%  Metastatic/recurrent Stage IV lung cancer to lung, pleura, pancreas, bone, brain (7/2024)  Baseline foot neuropathy, grade 1  Unfortunately, Cortes has recently been noted to have widespread metastatic lung cancer within a few months of his initial staging and resection of primary cancer. Pathology consistent with large cell lung cancer. Given the proximity to this diagnosis and findings of metastatic disease, Dr. Friedell did not feel repeat biopsy confirmation was necessary.     NGS testing from original lung tumor: PDL1 TPS 40%. No actionable mutations.      He started palliative carbo, etop, atezo this week. Due for day 2 today.     His brain MRI yesterday is showing multiple hemorrhagic brain mets with vasogenic edema. He has no neuro symptoms and appears completely asymptomatic.  He's getting 12 mg IV dex daily this week on his chemo regimen.     Right hip pain is minimal.    Plan:  -Continue with days 2+3 etoposide today and tomorrow.   -Will get IV dex 12 mg today and tomorrow with chemo. Dr. Friedell plans to start oral dex 4 mg BID tomorrow when he follows up with him after his Rad Onc consult/planning.   -Rad Onc consult tomorrow to discuss brain RT. Suspect this will need to be WBRT in light of the multiple lesions. Since he is in the beginning of his first cycle of chemo, may need to defer radiation a few  weeks to minimize concurrent toxicity. If that's the case, we would delay cycle 2 until 1-2 weeks post RT to allow his recovery.   -Return in 2 weeks for cycle 1 mid-cycle tox check. Reassess timing of cycle 2 at that time.     Subacute leukocytosis with neutrophilia, reactive to lung cancer  WBC have been mildly elevated with mild neutrophilia since 4/2024 (13K-19K WBC). Prior to that, normal counts. This week,  WBC up to 43K with 30K ANC. Rest CBC WNL, aside from mild anemia (hgb 12.9).  No evidence of infection. Felt to be reactive, related to his cancer.     Plan:  -Follow. 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   Asymptomatic.     Plan:  -Restrict free water and integrate some Sports Drinks with electrolytes. Can liberalize Na in diet for short term.  -Monitor on treatment for his lung cancer.    ______________________________________________________________________________    History of Present Illness/ Interval History    Mr. Dk HARRISON Sydnee Mcgarry.  is a 69 year old with resected IIB right lung large cell cancer. He returned last week to initiate adjuvant chemo, but had some new findings of concern (leukocytosis, hyponatremia and right hip pain). Therefore, chemo was held and he had restaging CT that unfortunately showed widespread cancer metastasis (right pleural mets/moderate effusion, mediastinal nodes, bilateral lung mets, pancreatic met and right acetabular met), all new from his pre-op staging 3 mths prior. Therefore, his systemic chemo goals became palliative and regimen changed to carbo, etoposide + atezolizumab this week. He is here for day 2 of his first cycle.     Unfortunately, his restaging brain MRI done yesterday is also showing new multiple brain mets (8+) with some vasogenic edema and hemorrhagic lesions. No midline shift. He was called with the report last evening and I'm visiting with him today in chemo to  review/assess in more detail.     He denies headaches, vision changes, weakness, other neuro symptoms.  He's getting dex as part of his chemo regimen this week.     So far, he's tolerating his first day of chemo well with no side effects.   His port was not getting blood return yesterday, but is functioning okay today.     Right hip pain is minimal.    ECOG Performance    0      Oncology History/Treatment  Diagnosis/Stage:   5/2024: IIB (pT3-pN0-cM0) RUL lung cancer (large cell)  -hx 20 pk yr smoking hx. Quit 2014  -4/2024: ED eval for hemoptysis, on Xarelto (a fib). CT chest: RUL lung mass  -PET: RUL mass and possible med adenopathy. No distant mets  -Brain MRI negative for mets  -5/16/2024 surgical path: 5.1 cm (neg margins, neg pleural invasion) pT3 large cell cancer. 0/11 LN involved.  -[employer] studies: TPS score 40%. MSI stable. Mutational burden 1 MUTS/MB. KRAS Q61H amplification noted (no targeted therapies available for this mutation)    7/2024: Recurrent/metastatic IV lung cancer to lung, pancreas, bone, brain (presume large cell)  -When back to start adjuvant chemo had new leukocytosis (30-40K with predominant neutrophilia), hyponatremia (126) and right hip pain.   -CT cap: new right pleural nodularity, moderate R pleural effusion, med adenopathy, bilateral lung mets, pancreatic met, right acetabular bone met.   -Brain MRI: multiple (8+) intracranial mets, some hemorrhagic and with vasogenic edema    Treatment:  5/16/2024 robotic-assisted RULobectomy (Dr. Peoples)    7/30/2024: pallliative carbo, etoposide, atezolizumab      Physical Exam    GENERAL: Alert and oriented to time place and person. Seated comfortably. In no distress. Alone.   HEAD: Atraumatic and normocephalic. No alopecia.  EYES: GALA, EOMI. No erythema. No icterus.  CHEST: regular respiratory effort.  SKIN: no rash, or bruising or purpura.   NEURO: No gross deficit noted. Non-antalgic gait.      Lab Results    Recent Results (from  the past 168 hour(s))   Magnesium   Result Value Ref Range    Magnesium 1.9 1.7 - 2.3 mg/dL   Basic metabolic panel  (Ca, Cl, CO2, Creat, Gluc, K, Na, BUN)   Result Value Ref Range    Sodium 128 (L) 135 - 145 mmol/L    Potassium 4.5 3.4 - 5.3 mmol/L    Chloride 94 (L) 98 - 107 mmol/L    Carbon Dioxide (CO2) 22 22 - 29 mmol/L    Anion Gap 12 7 - 15 mmol/L    Urea Nitrogen 12.8 8.0 - 23.0 mg/dL    Creatinine 0.74 0.67 - 1.17 mg/dL    GFR Estimate >90 >60 mL/min/1.73m2    Calcium 9.0 8.8 - 10.4 mg/dL    Glucose 117 (H) 70 - 99 mg/dL   CBC with platelets and differential   Result Value Ref Range    WBC Count 43.0 (H) 4.0 - 11.0 10e3/uL    RBC Count 4.39 (L) 4.40 - 5.90 10e6/uL    Hemoglobin 12.9 (L) 13.3 - 17.7 g/dL    Hematocrit 37.2 (L) 40.0 - 53.0 %    MCV 85 78 - 100 fL    MCH 29.4 26.5 - 33.0 pg    MCHC 34.7 31.5 - 36.5 g/dL    RDW 13.9 10.0 - 15.0 %    Platelet Count 357 150 - 450 10e3/uL    % Neutrophils 88 %    % Lymphocytes 3 %    % Monocytes 5 %    % Eosinophils 1 %    % Basophils 1 %    % Immature Granulocytes 3 %    NRBCs per 100 WBC 0 <1 /100    Absolute Neutrophils 37.7 (H) 1.6 - 8.3 10e3/uL    Absolute Lymphocytes 1.3 0.8 - 5.3 10e3/uL    Absolute Monocytes 2.2 (H) 0.0 - 1.3 10e3/uL    Absolute Eosinophils 0.4 0.0 - 0.7 10e3/uL    Absolute Basophils 0.2 0.0 - 0.2 10e3/uL    Absolute Immature Granulocytes 1.2 (H) <=0.4 10e3/uL    Absolute NRBCs 0.0 10e3/uL   RBC and Platelet Morphology   Result Value Ref Range    RBC Morphology Confirmed RBC Indices     Platelet Assessment  Automated Count Confirmed. Platelet morphology is normal.     Automated Count Confirmed. Platelet morphology is normal.       Imaging    MR Brain w/o & w Contrast    Result Date: 7/30/2024  MRI BRAIN WITHOUT AND WITH CONTRAST July 30, 2024 2:35 PM HISTORY: Large cell carcinoma of lung, right. TECHNIQUE: Multiplanar, multisequence MRI of the brain without and with 11 mL Gadavist. COMPARISON: Brain MR 4/30/2024. FINDINGS: Multiple new  enhancing intracranial masses concerning for metastatic disease. There is evidence of intralesional hemorrhage of the lesions in the left occipital lobe and left cerebellum which demonstrates susceptibility hypointensity and peripheral rind of T1 hyperintensity. Subtle susceptibility hyperintensity in the other lesions could represent subtle intralesional hemorrhage. New enhancing intracranial masses are listed below: *  1.8 cm left parietal lobe and (series 14 image 141) *  0.5 cm left parietal white matter (series 14 image 115) *  Peripherally enhancing lesion with intralesional hemorrhage in the left occipital lobe measuring 2.6 cm (series 14 image 77) *  1.7 cm right occipital lobe (series 14 image 77) *  Subtle linear enhancement superior right cerebellum measuring 0.3 cm (series 14 image 69) *  Irregularly-shaped enhancing lesion with intralesional hemorrhage in the left cerebellum measuring 2.3 cm (series 14 image 55) *  Subtle enhancing 0.3 cm nodule left cerebellum (series 14 image 49) *  0.3 cm nodule left cerebellum (series 14 image 45) There is T2 hyperintense edema around the larger enhancing lesions with particular edema around the hemorrhagic lesions in the left occipital lobe and left cerebellum. No significant midline shift or herniation. Ventricular size is within normal limits without evidence of hydrocephalus. No restricted diffusion in the brain parenchyma to suggest acute infarct. Mild patchy periventricular white matter T2 hyperintensities which are similar to prior and likely due to chronic microvascular ischemic disease. The facial structures appear normal. The major arterial T2 flow voids at the base of the brain appear patent.     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 Surrounding edema around the larger lesions. No significant midline shift, herniation, or  hydrocephalus. ABIEL BARRERA MD   SYSTEM ID:  [id number]    CT Chest/Abdomen/Pelvis w Contrast    Result Date: 7/25/2024  EXAM: CT CHEST/ABDOMEN/PELVIS W CONTRAST LOCATION: [facility] DATE: 7/25/2024 INDICATION: hx resected lung cancer. Hyponatremia + right hip pain r o recurrence. Leukocytosis fever   rule out infection COMPARISON: PET/CT 5/2/2024 TECHNIQUE: CT scan of the chest, abdomen, and pelvis was performed following injection of IV contrast. Multiplanar reformats were obtained. Dose reduction techniques were used. CONTRAST: isovue 370 90ml FINDINGS: LUNGS AND PLEURA: Status post right upper lobectomy. There is architectural distortion at the right hilum due to a combination of surgery and compression of the right lung by the moderate right pleural effusion. There are several enhancing nodules along the pleural surfaces in the azygoesophageal recess and largest in the basal pleural space along the posterior costal pleura measuring 19 mm (series 2, image 68). A staple line is present in the right middle lobe along the mediastinal pleura. There is a 5  mm pulmonary nodule in the right lower lobe along the posterior costal pleura (series 4, image 289). 8 x 11 mm solid pulmonary nodule in the lingula contacting the mediastinal pleura (series 4, image 234) and solid 9 x 11 mm nodule in the lateral left lower lobe (image 269). Upper lung predominant emphysema. MEDIASTINUM: Cardiac chambers are normal in size. No pericardial effusion. Main pulmonary artery is normal in size. Normal caliber thoracic aorta. Mixed attenuation plaque in the great vessels, aortic arch, and descending thoracic aorta. Morphologically abnormal right lower paratracheal lymph node measures 10 mm.. Esophagus is decompressed. CORONARY ARTERY CALCIFICATION: Severe. HEPATOBILIARY: The liver is normal in size. No hepatic parenchymal lesions. Gallbladder is decompressed. No calcified gallstones. The common bile duct is  dilated measuring 15 mm. No choledocholithiasis or bile duct wall thickening. PANCREAS: There is a new 13 mm nodule superior to the junction of the pancreas body and neck (series 3, image 165). The remainder of the pancreas is normal in enhancement and architecture. SPLEEN: Normal. ADRENAL GLANDS: Normal. KIDNEYS/BLADDER: Normal. BOWEL: Normal. LYMPH NODES: Normal. VASCULATURE: Moderate patchy aortoiliac atheromatous calcifications with mild iliac artery tortuosity. No aneurysm. PELVIC ORGANS: Prostate gland is normal in size. Seminal vesicles are symmetric. No pelvic free fluid. MUSCULOSKELETAL: There is a left hip joint replacement. Moderate degenerative osteophytes and disc space narrowing of the thoracic and lumbar spine. Lumbar facet arthropathy. The cortex and medullary space of the posterior right acetabulum is mild the/permeative in appearance compared to the contralateral side suspect for subtle bone metastasis. No pathologic fracture.     IMPRESSION: 1.  Status post right upper lobectomy. Moderate right pleural effusion with several enhancing pleural nodules consistent with pleural metastases. 2.  Morphologically abnormal right lower paratracheal lymph node and several new solid left lung nodules consistent with katelyn and contralateral lung metastases. 3.  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 4.  Permeative appearance of the posterior right acetabulum suspect for bone metastasis. This could be confirmed with right hip MRI.    Pelvis XR w/ unilateral hip right    Result Date: 7/20/2024  EXAM: XR PELVIS AND HIP RIGHT 1 VIEW LOCATION: [facility] DATE: 7/20/2024 INDICATION: pain known clear history of trauma COMPARISON: None.     IMPRESSION: No fracture. Mild degenerative changes in the right hip. Left total hip arthroplasty.    XR Chest Port 1 View    Result Date: 7/5/2024  CHEST ONE VIEW  7/5/2024 2:00 PM  HISTORY: assess right internal jugular port position and evaluate for pneumothorax COMPARISON: 5/20/2024     IMPRESSION: Right-sided port with tip over the superior vena cava. Status post right upper lobectomy with elevated right hemidiaphragm. No pneumothorax. No focal consolidation. Cardiac silhouette within normal limits. Mildly tortuous arthroscopic aorta. JOHN F BRUNNER, MD   SYSTEM ID:  [id number]    XR Surgery PAWAN L/T 5 Min Fluoro w Stills    Result Date: 7/5/2024  This exam was marked as non-reportable because it will not be read by a radiologist or a [facility] non-radiologist provider.      Billing  Total time 25 minutes, to include face to face visit, review of EMR, ordering, documentation and coordination of care on date of service   complexity modifier for longitudinal care.     Signed by: Aidee Paige NP      Again, thank you for allowing me to participate in the care of your patient.        Sincerely,        Aidee Paige NP

## 2024-08-01 NOTE — PROGRESS NOTES
[facility] Hematology and Oncology Outpatient Progress Note    Patient: Dk Randhawa Sr.  MRN: [id number]  Date of Service: Aug 1, 2024          Reason for Visit    Metastatic large cell lung cancer  New brain mets    Primary Oncologist: Dr. Friedell      Assessment/Plan  Stage IIB large cell RUL lung cancer, resected 5/2024. PDL1 TPS 40%  Metastatic/recurrent Stage IV lung cancer to lung, pleura, pancreas, bone, brain (7/2024)  Baseline foot neuropathy, grade 1  Unfortunately, Cortes has recently been noted to have widespread metastatic lung cancer within a few months of his initial staging and resection of primary cancer. Pathology consistent with large cell lung cancer. Given the proximity to this diagnosis and findings of metastatic disease, Dr. Friedell did not feel repeat biopsy confirmation was necessary.     NGS testing from original lung tumor: PDL1 TPS 40%. No actionable mutations.      He started palliative carbo, etop, atezo this week. Due for day 2 today.     His brain MRI yesterday is showing multiple hemorrhagic brain mets with vasogenic edema. He has no neuro symptoms and appears completely asymptomatic.  He's getting 12 mg IV dex daily this week on his chemo regimen.     Right hip pain is minimal.    Plan:  -Continue with days 2+3 etoposide today and tomorrow.   -Will get IV dex 12 mg today and tomorrow with chemo. Dr. Friedell plans to start oral dex 4 mg BID tomorrow when he follows up with him after his Rad Onc consult/planning.   -Rad Onc consult tomorrow to discuss brain RT. Suspect this will need to be WBRT in light of the multiple lesions. Since he is in the beginning of his first cycle of chemo, may need to defer radiation a few weeks to minimize concurrent toxicity. If that's the case, we would delay cycle 2 until 1-2 weeks post RT to allow his recovery.   -Return in 2 weeks for cycle 1 mid-cycle tox check. Reassess timing of cycle 2 at that time.     Subacute leukocytosis with  neutrophilia, reactive to lung cancer  WBC have been mildly elevated with mild neutrophilia since 4/2024 (13K-19K WBC). Prior to that, normal counts. This week,  WBC up to 43K with 30K ANC. Rest CBC WNL, aside from mild anemia (hgb 12.9).  No evidence of infection. Felt to be reactive, related to his cancer.     Plan:  -Follow. 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   Asymptomatic.     Plan:  -Restrict free water and integrate some Sports Drinks with electrolytes. Can liberalize Na in diet for short term.  -Monitor on treatment for his lung cancer.    ______________________________________________________________________________    History of Present Illness/ Interval History    Mr. Dk HARRISON Sydnee Mcgarry.  is a 69 year old with resected IIB right lung large cell cancer. He returned last week to initiate adjuvant chemo, but had some new findings of concern (leukocytosis, hyponatremia and right hip pain). Therefore, chemo was held and he had restaging CT that unfortunately showed widespread cancer metastasis (right pleural mets/moderate effusion, mediastinal nodes, bilateral lung mets, pancreatic met and right acetabular met), all new from his pre-op staging 3 mths prior. Therefore, his systemic chemo goals became palliative and regimen changed to carbo, etoposide + atezolizumab this week. He is here for day 2 of his first cycle.     Unfortunately, his restaging brain MRI done yesterday is also showing new multiple brain mets (8+) with some vasogenic edema and hemorrhagic lesions. No midline shift. He was called with the report last evening and I'm visiting with him today in chemo to review/assess in more detail.     He denies headaches, vision changes, weakness, other neuro symptoms.  He's getting dex as part of his chemo regimen this week.     So far, he's tolerating his first day of chemo well with no side effects.   His port was not  getting blood return yesterday, but is functioning okay today.     Right hip pain is minimal.    ECOG Performance    0      Oncology History/Treatment  Diagnosis/Stage:   5/2024: IIB (pT3-pN0-cM0) RUL lung cancer (large cell)  -hx 20 pk yr smoking hx. Quit 2014  -4/2024: ED eval for hemoptysis, on Xarelto (a fib). CT chest: RUL lung mass  -PET: RUL mass and possible med adenopathy. No distant mets  -Brain MRI negative for mets  -5/16/2024 surgical path: 5.1 cm (neg margins, neg pleural invasion) pT3 large cell cancer. 0/11 LN involved.  -[employer] studies: TPS score 40%. MSI stable. Mutational burden 1 MUTS/MB. KRAS Q61H amplification noted (no targeted therapies available for this mutation)    7/2024: Recurrent/metastatic IV lung cancer to lung, pancreas, bone, brain (presume large cell)  -When back to start adjuvant chemo had new leukocytosis (30-40K with predominant neutrophilia), hyponatremia (126) and right hip pain.   -CT cap: new right pleural nodularity, moderate R pleural effusion, med adenopathy, bilateral lung mets, pancreatic met, right acetabular bone met.   -Brain MRI: multiple (8+) intracranial mets, some hemorrhagic and with vasogenic edema    Treatment:  5/16/2024 robotic-assisted RULobectomy (Dr. Peoples)    7/30/2024: pallliative carbo, etoposide, atezolizumab      Physical Exam    GENERAL: Alert and oriented to time place and person. Seated comfortably. In no distress. Alone.   HEAD: Atraumatic and normocephalic. No alopecia.  EYES: GALA, EOMI. No erythema. No icterus.  CHEST: regular respiratory effort.  SKIN: no rash, or bruising or purpura.   NEURO: No gross deficit noted. Non-antalgic gait.      Lab Results    Recent Results (from the past 168 hour(s))   Magnesium   Result Value Ref Range    Magnesium 1.9 1.7 - 2.3 mg/dL   Basic metabolic panel  (Ca, Cl, CO2, Creat, Gluc, K, Na, BUN)   Result Value Ref Range    Sodium 128 (L) 135 - 145 mmol/L    Potassium 4.5 3.4 - 5.3 mmol/L     Chloride 94 (L) 98 - 107 mmol/L    Carbon Dioxide (CO2) 22 22 - 29 mmol/L    Anion Gap 12 7 - 15 mmol/L    Urea Nitrogen 12.8 8.0 - 23.0 mg/dL    Creatinine 0.74 0.67 - 1.17 mg/dL    GFR Estimate >90 >60 mL/min/1.73m2    Calcium 9.0 8.8 - 10.4 mg/dL    Glucose 117 (H) 70 - 99 mg/dL   CBC with platelets and differential   Result Value Ref Range    WBC Count 43.0 (H) 4.0 - 11.0 10e3/uL    RBC Count 4.39 (L) 4.40 - 5.90 10e6/uL    Hemoglobin 12.9 (L) 13.3 - 17.7 g/dL    Hematocrit 37.2 (L) 40.0 - 53.0 %    MCV 85 78 - 100 fL    MCH 29.4 26.5 - 33.0 pg    MCHC 34.7 31.5 - 36.5 g/dL    RDW 13.9 10.0 - 15.0 %    Platelet Count 357 150 - 450 10e3/uL    % Neutrophils 88 %    % Lymphocytes 3 %    % Monocytes 5 %    % Eosinophils 1 %    % Basophils 1 %    % Immature Granulocytes 3 %    NRBCs per 100 WBC 0 <1 /100    Absolute Neutrophils 37.7 (H) 1.6 - 8.3 10e3/uL    Absolute Lymphocytes 1.3 0.8 - 5.3 10e3/uL    Absolute Monocytes 2.2 (H) 0.0 - 1.3 10e3/uL    Absolute Eosinophils 0.4 0.0 - 0.7 10e3/uL    Absolute Basophils 0.2 0.0 - 0.2 10e3/uL    Absolute Immature Granulocytes 1.2 (H) <=0.4 10e3/uL    Absolute NRBCs 0.0 10e3/uL   RBC and Platelet Morphology   Result Value Ref Range    RBC Morphology Confirmed RBC Indices     Platelet Assessment  Automated Count Confirmed. Platelet morphology is normal.     Automated Count Confirmed. Platelet morphology is normal.       Imaging    MR Brain w/o & w Contrast    Result Date: 7/30/2024  MRI BRAIN WITHOUT AND WITH CONTRAST July 30, 2024 2:35 PM HISTORY: Large cell carcinoma of lung, right. TECHNIQUE: Multiplanar, multisequence MRI of the brain without and with 11 mL Gadavist. COMPARISON: Brain MR 4/30/2024. FINDINGS: Multiple new enhancing intracranial masses concerning for metastatic disease. There is evidence of intralesional hemorrhage of the lesions in the left occipital lobe and left cerebellum which demonstrates susceptibility hypointensity and peripheral rind of T1  hyperintensity. Subtle susceptibility hyperintensity in the other lesions could represent subtle intralesional hemorrhage. New enhancing intracranial masses are listed below: *  1.8 cm left parietal lobe and (series 14 image 141) *  0.5 cm left parietal white matter (series 14 image 115) *  Peripherally enhancing lesion with intralesional hemorrhage in the left occipital lobe measuring 2.6 cm (series 14 image 77) *  1.7 cm right occipital lobe (series 14 image 77) *  Subtle linear enhancement superior right cerebellum measuring 0.3 cm (series 14 image 69) *  Irregularly-shaped enhancing lesion with intralesional hemorrhage in the left cerebellum measuring 2.3 cm (series 14 image 55) *  Subtle enhancing 0.3 cm nodule left cerebellum (series 14 image 49) *  0.3 cm nodule left cerebellum (series 14 image 45) There is T2 hyperintense edema around the larger enhancing lesions with particular edema around the hemorrhagic lesions in the left occipital lobe and left cerebellum. No significant midline shift or herniation. Ventricular size is within normal limits without evidence of hydrocephalus. No restricted diffusion in the brain parenchyma to suggest acute infarct. Mild patchy periventricular white matter T2 hyperintensities which are similar to prior and likely due to chronic microvascular ischemic disease. The facial structures appear normal. The major arterial T2 flow voids at the base of the brain appear patent.     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 Surrounding edema around the larger lesions. No significant midline shift, herniation, or hydrocephalus. ABIEL BARRERA MD   SYSTEM ID:  [id number]    CT Chest/Abdomen/Pelvis w Contrast    Result Date: 7/25/2024  EXAM: CT CHEST/ABDOMEN/PELVIS W CONTRAST LOCATION: [facility] DATE: 7/25/2024 INDICATION: hx resected lung cancer.  Hyponatremia + right hip pain r o recurrence. Leukocytosis fever   rule out infection COMPARISON: PET/CT 5/2/2024 TECHNIQUE: CT scan of the chest, abdomen, and pelvis was performed following injection of IV contrast. Multiplanar reformats were obtained. Dose reduction techniques were used. CONTRAST: isovue 370 90ml FINDINGS: LUNGS AND PLEURA: Status post right upper lobectomy. There is architectural distortion at the right hilum due to a combination of surgery and compression of the right lung by the moderate right pleural effusion. There are several enhancing nodules along the pleural surfaces in the azygoesophageal recess and largest in the basal pleural space along the posterior costal pleura measuring 19 mm (series 2, image 68). A staple line is present in the right middle lobe along the mediastinal pleura. There is a 5  mm pulmonary nodule in the right lower lobe along the posterior costal pleura (series 4, image 289). 8 x 11 mm solid pulmonary nodule in the lingula contacting the mediastinal pleura (series 4, image 234) and solid 9 x 11 mm nodule in the lateral left lower lobe (image 269). Upper lung predominant emphysema. MEDIASTINUM: Cardiac chambers are normal in size. No pericardial effusion. Main pulmonary artery is normal in size. Normal caliber thoracic aorta. Mixed attenuation plaque in the great vessels, aortic arch, and descending thoracic aorta. Morphologically abnormal right lower paratracheal lymph node measures 10 mm.. Esophagus is decompressed. CORONARY ARTERY CALCIFICATION: Severe. HEPATOBILIARY: The liver is normal in size. No hepatic parenchymal lesions. Gallbladder is decompressed. No calcified gallstones. The common bile duct is dilated measuring 15 mm. No choledocholithiasis or bile duct wall thickening. PANCREAS: There is a new 13 mm nodule superior to the junction of the pancreas body and neck (series 3, image 165). The remainder of the pancreas is normal in enhancement and architecture.  SPLEEN: Normal. ADRENAL GLANDS: Normal. KIDNEYS/BLADDER: Normal. BOWEL: Normal. LYMPH NODES: Normal. VASCULATURE: Moderate patchy aortoiliac atheromatous calcifications with mild iliac artery tortuosity. No aneurysm. PELVIC ORGANS: Prostate gland is normal in size. Seminal vesicles are symmetric. No pelvic free fluid. MUSCULOSKELETAL: There is a left hip joint replacement. Moderate degenerative osteophytes and disc space narrowing of the thoracic and lumbar spine. Lumbar facet arthropathy. The cortex and medullary space of the posterior right acetabulum is mild the/permeative in appearance compared to the contralateral side suspect for subtle bone metastasis. No pathologic fracture.     IMPRESSION: 1.  Status post right upper lobectomy. Moderate right pleural effusion with several enhancing pleural nodules consistent with pleural metastases. 2.  Morphologically abnormal right lower paratracheal lymph node and several new solid left lung nodules consistent with katelyn and contralateral lung metastases. 3.  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 4.  Permeative appearance of the posterior right acetabulum suspect for bone metastasis. This could be confirmed with right hip MRI.    Pelvis XR w/ unilateral hip right    Result Date: 7/20/2024  EXAM: XR PELVIS AND HIP RIGHT 1 VIEW LOCATION: [facility] DATE: 7/20/2024 INDICATION: pain known clear history of trauma COMPARISON: None.     IMPRESSION: No fracture. Mild degenerative changes in the right hip. Left total hip arthroplasty.    XR Chest Port 1 View    Result Date: 7/5/2024  CHEST ONE VIEW  7/5/2024 2:00 PM HISTORY: assess right internal jugular port position and evaluate for pneumothorax COMPARISON: 5/20/2024     IMPRESSION: Right-sided port with tip over the superior vena cava. Status post right upper lobectomy with elevated right hemidiaphragm. No pneumothorax. No focal  consolidation. Cardiac silhouette within normal limits. Mildly tortuous arthroscopic aorta. JOHN F BRUNNER, MD   SYSTEM ID:  [id number]    XR Surgery PAWAN L/T 5 Min Fluoro w Stills    Result Date: 7/5/2024  This exam was marked as non-reportable because it will not be read by a radiologist or a [place] non-radiologist provider.      Billing  Total time 25 minutes, to include face to face visit, review of EMR, ordering, documentation and coordination of care on date of service   complexity modifier for longitudinal care.     Signed by: Aidee Paige NP

## 2024-08-01 NOTE — PROGRESS NOTES
Infusion Nursing Note:  Dk Randhawa Sr. presents today for Cosela/Etoposide C2D1.    Patient seen by provider today: No   present during visit today: Not Applicable.    Note: Denies any new medical concerns.    Intravenous Access:  Implanted Port accessed by imaging staff. Good blood return noted.  Dye study completed prior to arrival.     Treatment Conditions:  Results reviewed, labs MET treatment parameters, ok to proceed with treatment.    Post Infusion Assessment:  Patient tolerated infusion without incident.  Blood return noted pre and post infusion.  Site patent and intact, free from redness, edema or discomfort.  No evidence of extravasations.   Port left accessed for tomorrow's infusion.    Discharge Plan:   Discharge instructions reviewed with: Patient.  Patient and/or family verbalized understanding of discharge instructions and all questions answered.  AVS to patient via shopp.  Patient will return 8/2/2024 for next appointment.   Patient discharged in stable condition accompanied by: self.  Departure Mode: Ambulatory.    Eveline Campos RN

## 2024-08-02 NOTE — PROGRESS NOTES
Patient is tolerating the 3 of carboplatin with etoposide.  He will probably be starting radiation therapy for brain metastases next week.  He will continue on dexamethasone 4 mg twice daily to help control cerebral edema associated with brain metastases.

## 2024-08-02 NOTE — LETTER
8/2/2024      Dk Randhawa Sr.  [address] [facility] [address]      Dear Colleague,    Thank you for referring your patient, Dk Randhawa Sr., to the [facility]. Please see a copy of my visit note below.    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  He has Afib and is on Xarelto. He was recently started on  carboplatin, etoposide and atezolizumab.      HISTORY OF PRESENT ILLNESS: Mr. 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 He has Afib and is on Xarelto.  He was in his otherwise usual state of health until April 7, 2024 when he presented to the emergency room with hemoptysis.  April 7, 2024 chest CT showed a right upper lobe mass.    On April 16, 2024 EBUS/FNA of 4L, 11L, 7, 11R, 4R, were negative for malignancy.    On April 30, 2024 brain MRI was negative for metastatic disease.     On May 2, 2024 PET/CT showed a hypermetabolic right upper lobe mass measuring 5.9 x 5.2 cm.  Hypermetabolic right hilar node suspicious for metastasis. Mildly avid nonenlarged few other nodes in the mediastinum were indeterminate.  No FDG lesions elsewhere in the body to suggest distant metastasis.     On May 15, 2024 Dr. Peoples took him to the OR for robotic assisted right upper lobe lobectomy.  Path was consistent with large undifferentiated carcinoma.  All lymph nodes sampled were negative for malignancy.  His stage was [id number].  His postoperative course was uneventful.     He saw Dr. Friedell on 5/31/24 in consultation. Adjuvant chemotherapy was discussed and planned. However on July 20, 2024 patient presented again to the emergency room with severe pain in the right hip.  Mets were suspected.  Original chemo plan was deferred and  repeat imaging was ordered.      On July 25, 2024 chest abdomen pelvis CT showed evidence of the right upper lobectomy. Moderate right pleural effusion with several enhancing pleural nodules consistent with pleural metastases. Morphologically abnormal right lower paratracheal lymph node and several new solid left lung nodules consistent with katelyn and contralateral lung metastases.  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 Lesion in the right acetabulum is suspicious for bone metastasis.     On July 30, 2024 brain MRI showed multiple (at least 8) new intracranial metastases including hemorrhagic metastases of the left occipital lobe and left cerebellum compared to prior MR 4/3/2024. Surrounding edema around the larger lesions. No significant midline shift, herniation, or hydrocephalus.  That same day he was started on carboplatin, etoposide and atezolizumab.      Currently the patient feels quite well and is able to perform all of his ADLs.  Denies any nausea, vomiting, headache or new neuro symptoms. He is being seen here today for consideration of gamma knife stereotactic radiosurgery.      REVIEW OF SYSTEMS: As per HPI, a 14-point review of system is otherwise negative.  PAST RADIATION THERAPY:  Denies  PAST CTD/PACEMAKER: Denies    Past Medical History:   Diagnosis Date     Acute non-recurrent maxillary sinusitis      Antiplatelet or antithrombotic long-term use      Arrhythmia      Atrial fibrillation with RVR (H)      Cough secondary to angiotensin converting enzyme inhibitor (ACE-I)      Hyperlipidemia      Hypertension      Mass of upper lobe of right lung      Obesity        Past Surgical History:   Procedure Laterality Date     BRONCHOSCOPY, WITH BIOPSY, ROBOT ASSISTED Bilateral 04/16/2024    Procedure: BRONCHOSCOPY, endobronchial ultrasound, transbronchial needle aspiration, endobronchial biopsies and tumor debulking;  Surgeon: Claude  Lanette Benavidez MD;  Location: [facility]     DAVINCI EXCISE NODES THORACIC N/A 5/15/2024    Procedure: mediastinal lymph node dissection;  Surgeon: Mikael Peoples MD;  Location: [facility] OR     DAVINCI LOBECTOMY LUNG Right 5/15/2024    Procedure: Robot-assisted thoracoscopic right upper lobectomy,;  Surgeon: Mikael Peoples MD;  Location: [facility] OR     ELBOW ARTHROSCOPY Left 03/09/2017     INSERT PORT VASCULAR ACCESS Right 7/5/2024    Procedure: INSERTION, VASCULAR ACCESS PORT;  Surgeon: Henry Veronica MD;  Location: [facility]     PHACOEMULSIFICATION WITH STANDARD INTRAOCULAR LENS IMPLANT Left 02/26/2018    Procedure: PHACOEMULSIFICATION WITH STANDARD INTRAOCULAR LENS IMPLANT;  Left Cataract Removal with Implant;  Surgeon: Mohit Victor MD;  Location: [facility]     PHACOEMULSIFICATION WITH STANDARD INTRAOCULAR LENS IMPLANT Right 01/30/2019    Procedure: Cataract Removal with Implant;  Surgeon: Mohit Victor MD;  Location: [facility]     TONSILLECTOMY  1964     TOTAL HIP ARTHROPLASTY Left 04/12/2022       Family History   Problem Relation Age of Onset     Ovarian Cancer Mother      Alzheimer Disease Mother      Depression Father 45        suicide     Diabetes Sister        Social History     Tobacco Use     Smoking status: Former     Current packs/day: 0.00     Average packs/day: 0.5 packs/day for 20.0 years (10.0 ttl pk-yrs)     Types: Cigarettes     Start date: 1994     Quit date: 2014     Years since quitting: 10.5     Smokeless tobacco: Never   Substance Use Topics     Alcohol use: Not Currently     Current Outpatient Medications   Medication Sig Dispense Refill     acetaminophen (TYLENOL) 325 MG tablet Take 2 tablets (650 mg) by mouth every 4 hours as needed for mild pain 50 tablet 0     acetaminophen (TYLENOL) 500 MG tablet Take 2 tablets (1,000 mg) by mouth every 6 hours       diphenhydrAMINE-acetaminophen (TYLENOL PM)  MG tablet Take 2 tablets by mouth nightly as needed for sleep       fexofenadine (ALLEGRA)  180 MG tablet Take 180 mg by mouth daily       HEMP OIL OR EXTRACT OR OTHER CBD CANNABINOID, NOT MEDICAL CANNABIS, CBD gummies       losartan (COZAAR) 100 MG tablet Take 100 mg by mouth every morning       Melatonin 10 MG TABS tablet Take 10 mg by mouth nightly as needed for sleep       metoprolol succinate ER (TOPROL XL) 100 MG 24 hr tablet Take 1 tablet by mouth every morning       multivitamin (CENTRUM SILVER) tablet Take 1 tablet by mouth daily       oxyCODONE (OXY-IR) 5 MG capsule Take 1 capsule (5 mg) by mouth every 4 hours as needed for severe pain 40 capsule 0     Turmeric (QC TUMERIC COMPLEX PO) Tumeric and ginger with applie cider 1410mg       UNABLE TO FIND MEDICATION NAME: osteo bi flex       UNABLE TO FIND MEDICATION NAME: Emergen-e 1000mg       XARELTO ANTICOAGULANT 20 MG TABS tablet Take 1 tablet (20 mg) by mouth daily (with lunch)       No current facility-administered medications for this visit.     Facility-Administered Medications Ordered in Other Visits   Medication Dose Route Frequency Provider Last Rate Last Admin     dexAMETHasone (DECADRON) 12 mg in sodium chloride 0.9 % 56.2 mL intermittent infusion  12 mg Intravenous Once Friedell, Peter E, MD         etoposide (TOPOSAR) 240 mg in sodium chloride 0.9 % 1,062 mL infusion  100 mg/m2 (Treatment Plan Recorded) Intravenous Once Friedell, Peter E, MD         famotidine (PEPCID) injection 20 mg  20 mg Intravenous Once PRN Friedell, Peter E, MD         heparin lock flush 100 unit/mL injection 5 mL  5 mL Intracatheter Once PRN Friedell, Peter E, MD         sodium chloride (PF) 0.9% PF flush 3-20 mL  3-20 mL Intracatheter q1 min prn Friedell, Peter E, MD         sodium chloride 0.9% BOLUS 250 mL  250 mL Intravenous Once Friedell, Peter E, MD         trilaciclib (COSELA) 570 mg in sodium chloride 0.9 % 313 mL infusion  240 mg/m2 (Treatment Plan Recorded) Intravenous Once Friedell, Peter E, MD           PHYSICAL EXAMINATION:  BP (!) 159/109   Pulse 75    Temp 98  F (36.7  C) (Oral)   Resp 16   Ht 1.829 m (6')   Wt 112.5 kg (248 lb)   SpO2 98%   BMI 33.63 kg/m    GENERAL     Well-appearing gentleman in no acute distress.  HEENT          Normocephalic. Atraumatic. Sclerae anicteric.  CVR               Regular rate and rhythm that is rapid.  No murmurs, rubs, or gallops.  LUNGS          Clear to auscultation bilaterally.  NEURO          No focal deficits. CN II-XII intact. Gait wnl. Difficulty with heel to toe.  Strength is symmetrical in all four extremities. Sensation intact in all areas tested.  No dysdiadochokinesia.  Normal finger to nose test  MSK                 Good ROM.   SKIN                 Warm and well perfused.  Scars well-healed  PSYCH            Alert and oriented x 3        KPS: 90     IMPRESSION/PLAN: Dk Randhawa Sr. 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  He has Afib and is on Xarelto. He was recently started on  carboplatin, etoposide and atezolizumab and is asymptomatic from his brain mets.  He received decadron IV for the past 2 days for the chemotherapy cycle that he is currently on.  He is tolerating chemo well.    Given the size of his lesions and the location of the tumor, fractionated SRT to the tumors alone is a reasonable option. Data has shown good local control in this setting, 80% at 12 months (Aida AGRAWAL et al J Neurosurgery 2019). The risks, benefits, treatment rationale and regimen of stereotactic radiotherapy was discussed with the patient today. The alternative which would be whole brain radiotherapy was also discussed/.  Patient consented to treatment.  Schedule is for patient to return to [facility] Rad Onc Dept for mask/MRI and CT based planning. He will also have an appt with our NUS Dr. Lennon. Treatment will be given on 8/8/24, 3-5 masked-based treatments.    Additional  problem list to be addressed in the following manner:   Finishing cycle 1 today. Follow up with Med Onc for discussion regarding additional systemic agents to be given after GK SRS  Decadron/Keppra: asymptomatic so can hold for now.      There was ample time for questions and all were answered to the patient's satisfaction. Thank you for allowing me to participate in the care of this pleasant patient. If you have any questions, please do not hesitate to contact my office.     Sincerely,  Shoshana Andino MD     [phone] Pager   [phone] [facility]   [phone] [place]  [phone] [facility]      I spent a total of 80 minutes for this encounter, which included some of the following:  -Preparing to see the patient, including reviewing testing results  -Obtaining and/or reviewing separately obtained history  -Performing the examination  -Counseling and educating the patient  -Ordering medications, tests, or procedures  -Referring and communicating with other health care professions, which is not separately reported  -Documenting clinical information in the electronic health record  -Independently interpreting results and communicating the results to the patient/family/caregiver  -Coordinating care, which is not separately reportable            Again, thank you for allowing me to participate in the care of your patient.        Sincerely,        JAMA Andino MD

## 2024-08-02 NOTE — PROGRESS NOTES
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  He has Afib and is on Xarelto. He was recently started on  carboplatin, etoposide and atezolizumab.      HISTORY OF PRESENT ILLNESS: Mr. 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 He has Afib and is on Xarelto.  He was in his otherwise usual state of health until April 7, 2024 when he presented to the emergency room with hemoptysis.  April 7, 2024 chest CT showed a right upper lobe mass.    On April 16, 2024 EBUS/FNA of 4L, 11L, 7, 11R, 4R, were negative for malignancy.    On April 30, 2024 brain MRI was negative for metastatic disease.     On May 2, 2024 PET/CT showed a hypermetabolic right upper lobe mass measuring 5.9 x 5.2 cm.  Hypermetabolic right hilar node suspicious for metastasis. Mildly avid nonenlarged few other nodes in the mediastinum were indeterminate.  No FDG lesions elsewhere in the body to suggest distant metastasis.     On May 15, 2024 Dr. Peoples took him to the OR for robotic assisted right upper lobe lobectomy.  Path was consistent with large undifferentiated carcinoma.  All lymph nodes sampled were negative for malignancy.  His stage was [id number].  His postoperative course was uneventful.     He saw Dr. Friedell on 5/31/24 in consultation. Adjuvant chemotherapy was discussed and planned. However on July 20, 2024 patient presented again to the emergency room with severe pain in the right hip.  Mets were suspected.  Original chemo plan was deferred and repeat imaging was ordered.      On July 25, 2024 chest abdomen pelvis CT showed evidence of the right upper lobectomy. Moderate right pleural effusion with several enhancing pleural nodules consistent with pleural metastases. Morphologically abnormal  right lower paratracheal lymph node and several new solid left lung nodules consistent with katelyn and contralateral lung metastases.  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 Lesion in the right acetabulum is suspicious for bone metastasis.     On July 30, 2024 brain MRI showed multiple (at least 8) new intracranial metastases including hemorrhagic metastases of the left occipital lobe and left cerebellum compared to prior MR 4/3/2024. Surrounding edema around the larger lesions. No significant midline shift, herniation, or hydrocephalus.  That same day he was started on carboplatin, etoposide and atezolizumab.      Currently the patient feels quite well and is able to perform all of his ADLs.  Denies any nausea, vomiting, headache or new neuro symptoms. He is being seen here today for consideration of gamma knife stereotactic radiosurgery.      REVIEW OF SYSTEMS: As per HPI, a 14-point review of system is otherwise negative.  PAST RADIATION THERAPY:  Denies  PAST CTD/PACEMAKER: Denies    Past Medical History:   Diagnosis Date    Acute non-recurrent maxillary sinusitis     Antiplatelet or antithrombotic long-term use     Arrhythmia     Atrial fibrillation with RVR (H)     Cough secondary to angiotensin converting enzyme inhibitor (ACE-I)     Hyperlipidemia     Hypertension     Mass of upper lobe of right lung     Obesity        Past Surgical History:   Procedure Laterality Date    BRONCHOSCOPY, WITH BIOPSY, ROBOT ASSISTED Bilateral 04/16/2024    Procedure: BRONCHOSCOPY, endobronchial ultrasound, transbronchial needle aspiration, endobronchial biopsies and tumor debulking;  Surgeon: Lanette Arce MD;  Location: [facility] OR    DAVINCI EXCISE NODES THORACIC N/A 5/15/2024    Procedure: mediastinal lymph node dissection;  Surgeon: Mikael Peoples MD;  Location: [facility]    DAVINCI LOBECTOMY LUNG Right 5/15/2024    Procedure: Robot-assisted thoracoscopic  right upper lobectomy,;  Surgeon: Mikael Peoples MD;  Location: [facility] OR    ELBOW ARTHROSCOPY Left 03/09/2017    INSERT PORT VASCULAR ACCESS Right 7/5/2024    Procedure: INSERTION, VASCULAR ACCESS PORT;  Surgeon: Henry Veronica MD;  Location: [facility]    PHACOEMULSIFICATION WITH STANDARD INTRAOCULAR LENS IMPLANT Left 02/26/2018    Procedure: PHACOEMULSIFICATION WITH STANDARD INTRAOCULAR LENS IMPLANT;  Left Cataract Removal with Implant;  Surgeon: Mohit Victor MD;  Location: [facility]    PHACOEMULSIFICATION WITH STANDARD INTRAOCULAR LENS IMPLANT Right 01/30/2019    Procedure: Cataract Removal with Implant;  Surgeon: Mohit Victor MD;  Location: [facility]    TONSILLECTOMY  1964    TOTAL HIP ARTHROPLASTY Left 04/12/2022       Family History   Problem Relation Age of Onset    Ovarian Cancer Mother     Alzheimer Disease Mother     Depression Father 45        suicide    Diabetes Sister        Social History     Tobacco Use    Smoking status: Former     Current packs/day: 0.00     Average packs/day: 0.5 packs/day for 20.0 years (10.0 ttl pk-yrs)     Types: Cigarettes     Start date: 1994     Quit date: 2014     Years since quitting: 10.5    Smokeless tobacco: Never   Substance Use Topics    Alcohol use: Not Currently     Current Outpatient Medications   Medication Sig Dispense Refill    acetaminophen (TYLENOL) 325 MG tablet Take 2 tablets (650 mg) by mouth every 4 hours as needed for mild pain 50 tablet 0    acetaminophen (TYLENOL) 500 MG tablet Take 2 tablets (1,000 mg) by mouth every 6 hours      diphenhydrAMINE-acetaminophen (TYLENOL PM)  MG tablet Take 2 tablets by mouth nightly as needed for sleep      fexofenadine (ALLEGRA) 180 MG tablet Take 180 mg by mouth daily      HEMP OIL OR EXTRACT OR OTHER CBD CANNABINOID, NOT MEDICAL CANNABIS, CBD gummies      losartan (COZAAR) 100 MG tablet Take 100 mg by mouth every morning      Melatonin 10 MG TABS tablet Take 10 mg by mouth nightly as needed for sleep       metoprolol succinate ER (TOPROL XL) 100 MG 24 hr tablet Take 1 tablet by mouth every morning      multivitamin (CENTRUM SILVER) tablet Take 1 tablet by mouth daily      oxyCODONE (OXY-IR) 5 MG capsule Take 1 capsule (5 mg) by mouth every 4 hours as needed for severe pain 40 capsule 0    Turmeric (QC TUMERIC COMPLEX PO) Tumeric and ginger with applie cider 1410mg      UNABLE TO FIND MEDICATION NAME: osteo bi flex      UNABLE TO FIND MEDICATION NAME: Emergen-e 1000mg      XARELTO ANTICOAGULANT 20 MG TABS tablet Take 1 tablet (20 mg) by mouth daily (with lunch)       No current facility-administered medications for this visit.     Facility-Administered Medications Ordered in Other Visits   Medication Dose Route Frequency Provider Last Rate Last Admin    dexAMETHasone (DECADRON) 12 mg in sodium chloride 0.9 % 56.2 mL intermittent infusion  12 mg Intravenous Once Friedell, Peter E, MD        etoposide (TOPOSAR) 240 mg in sodium chloride 0.9 % 1,062 mL infusion  100 mg/m2 (Treatment Plan Recorded) Intravenous Once Friedell, Peter E, MD        famotidine (PEPCID) injection 20 mg  20 mg Intravenous Once PRN Friedell, Peter E, MD        heparin lock flush 100 unit/mL injection 5 mL  5 mL Intracatheter Once PRN Friedell, Peter E, MD        sodium chloride (PF) 0.9% PF flush 3-20 mL  3-20 mL Intracatheter q1 min prn Friedell, Peter E, MD        sodium chloride 0.9% BOLUS 250 mL  250 mL Intravenous Once Friedell, Peter E, MD        trilaciclib (COSELA) 570 mg in sodium chloride 0.9 % 313 mL infusion  240 mg/m2 (Treatment Plan Recorded) Intravenous Once Friedell, Peter E, MD           PHYSICAL EXAMINATION:  BP (!) 159/109   Pulse 75   Temp 98  F (36.7  C) (Oral)   Resp 16   Ht 1.829 m (6')   Wt 112.5 kg (248 lb)   SpO2 98%   BMI 33.63 kg/m    GENERAL     Well-appearing gentleman in no acute distress.  HEENT          Normocephalic. Atraumatic. Sclerae anicteric.  CVR               Regular rate and rhythm that is rapid.   No murmurs, rubs, or gallops.  LUNGS          Clear to auscultation bilaterally.  NEURO          No focal deficits. CN II-XII intact. Gait wnl. Difficulty with heel to toe.  Strength is symmetrical in all four extremities. Sensation intact in all areas tested.  No dysdiadochokinesia.  Normal finger to nose test  MSK                 Good ROM.   SKIN                 Warm and well perfused.  Scars well-healed  PSYCH            Alert and oriented x 3        KPS: 90     IMPRESSION/PLAN: Dk Randhawa Sr. 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  He has Afib and is on Xarelto. He was recently started on  carboplatin, etoposide and atezolizumab and is asymptomatic from his brain mets.  He received decadron IV for the past 2 days for the chemotherapy cycle that he is currently on.  He is tolerating chemo well.    Given the size of his lesions and the location of the tumor, fractionated SRT to the tumors alone is a reasonable option. Data has shown good local control in this setting, 80% at 12 months (Aida AGRAWAL et al J Neurosurgery 2019). The risks, benefits, treatment rationale and regimen of stereotactic radiotherapy was discussed with the patient today. The alternative which would be whole brain radiotherapy was also discussed/.  Patient consented to treatment.  Schedule is for patient to return to [facility] Rad Onc Dept for mask/MRI and CT based planning. He will also have an appt with our NUS Dr. Lennon. Treatment will be given on 8/8/24, 3-5 masked-based treatments.    Additional problem list to be addressed in the following manner:   Finishing cycle 1 today. Follow up with Med Onc for discussion regarding additional systemic agents to be given after GK SRS  Decadron/Keppra: asymptomatic so can hold for now.      There was ample time for questions and all were answered to  the patient's satisfaction. Thank you for allowing me to participate in the care of this pleasant patient. If you have any questions, please do not hesitate to contact my office.     Sincerely,  Shoshana Andino MD     [phone] Pager   [phone] [facility]   [phone] [facility]  [phone] [place]      I spent a total of 80 minutes for this encounter, which included some of the following:  -Preparing to see the patient, including reviewing testing results  -Obtaining and/or reviewing separately obtained history  -Performing the examination  -Counseling and educating the patient  -Ordering medications, tests, or procedures  -Referring and communicating with other health care professions, which is not separately reported  -Documenting clinical information in the electronic health record  -Independently interpreting results and communicating the results to the patient/family/caregiver  -Coordinating care, which is not separately reportable

## 2024-08-02 NOTE — PROGRESS NOTES
Infusion Nursing Note:  Dk Randhawa Sr. presents today for Cosela/Etoposide C1D3.    Patient seen by provider today: No   present during visit today: Not Applicable.    Note: Denies any new medical concerns.    Intravenous Access:  Implanted Port.    Treatment Conditions:  Results reviewed, labs MET treatment parameters, ok to proceed with treatment.    Post Infusion Assessment:  Patient tolerated infusion without incident.  Blood return noted pre and post infusion.  Site patent and intact, free from redness, edema or discomfort.  No evidence of extravasations.  Access discontinued per protocol.     Discharge Plan:   Discharge instructions reviewed with: Patient.  Patient and/or family verbalized understanding of discharge instructions and all questions answered.  AVS to patient via [employer].  Patient will return 8/14/2024 for next appointment.   Patient discharged in stable condition accompanied by: self.  Departure Mode: Ambulatory.    Eveline Campos RN

## 2024-08-02 NOTE — NURSING NOTE
REASON FOR APPOINTMENT   Type of Cancer: Large Cell Lung Cancer   Location: brain mets  Date of Symptom Onset: 4/7/24 patient presented to ED with hemoptysis.     TREATMENT TO-DATE FOR THIS CANCER  Surgery ? 5/15/24 Davinci lobectomy with thoracic nodes excision Dr. Peoples   Chemotherapy ? Cycle 1 of Carbo/etop/atezo started on 7/30/24   Other Treatments for this Cancer ? no    PERSONAL HISTORY OF CANCER   Previous Cancer ? no   Prior Radiation ? no   Prior Chemotherapy ? no   Prior Hormonal Therapy ? no     REFERRALS NEEDED  no    VITALS  BP (!) 159/109   Pulse 75   Temp 98  F (36.7  C) (Oral)   Resp 16   Ht 1.829 m (6')   Wt 112.5 kg (248 lb)   SpO2 98%   BMI 33.63 kg/m      PACEMAKER/IMPLANTED CARDIAC DEVICE no    PAIN  Denies pain today, patient reports R hip pain now gone since receiving IV steroids with chemo.     PSYCHOSOCIAL  Marital Status:   Patient lives in home with alone.  Number of children: unknown  Working status: Retired  Do you feel safe in your home? Yes    REVIEW OF SYSTEMS  Skin: negative  Eyes: negative  Ears/Nose/Throat: negative  Respiratory: No shortness of breath, dyspnea on exertion, cough, or hemoptysis  Cardiovascular: positive for irregular heart beat  Gastrointestinal: positive for constipation  Genitourinary: negative  Musculoskeletal: positive for joint pain- occasional R hip pain  Neurologic: positive for numbness or tingling of L fingers and numbness or tingling of feet  Psychiatric: negative  Hematologic/Lymphatic/Immunologic: negative  Endocrine: negative    Radiation Oncology Patient Teaching    Current Concern: Lung Cancer, mets to brain    Person involved with teaching: Patient  Patient asked Questions: Yes  Patient was cooperative: Yes  Patient was receptive (willing to accept information given): Yes    Education Assessment  Comprehension ability: High  Knowledge level: Medium  Factors affecting teaching: None    Education Materials Given  Radiation Therapy  and You, side effects of radiation to the brain    Educational Topics Discussed  Side effects- Fatigue, headaches, hair loss, skin care    Response To Teaching  Verbalizes understanding    Do you have an advanced directive or living will? No  Are you DNR/DNI? No    Nancy Bunn RN

## 2024-08-08 NOTE — LETTER
2024      Dk HARRISON Sydnee Kwon  [address] [facility] [address]      Dear Colleague,    Thank you for referring your patient, Dk Romoace Kwon, to the [facility] RADIATION ONCOLOGY. Please see a copy of my visit note below.    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   1A R occipital 600 cGy [place] 60 2024 0    1B L cerebellum 600 cGy [place] 60 2024 0    1C L parietal 600 cGy [place] 60 2024 0    1D L occipital 600 cGy [place] 60 2024 0    1E LT Parietal  [place] 60       1F RT Vermis  [place] 60       1G LT Cerebellum 2  [place] 60    1H LT Cerebellum 3  [place] 60    1I RT Thalamus   [place] 60        DESCRIPTION OF PROCEDURE:    On 2024 the patient was brought to the [facility] Gamma Knife IconTM suite at [facility] and treated with fractionated stereotactic radiotherapy.     The [employer] Gamma Knife IconTM Plan software was used to create a highly conformal dose distribution using the number and size collimators detailed above.     TREATMENT:    The patient was brought to the Gamma Knife suite. A timeout was performed to confirm the correct patient and correct procedure.    Patient was identified by 2 methods.  Site was verified.    The mask was placed and a cone beam CT was done and fused to the previously approved plan.        The physicist and I evaluated the fusion and confirmed the target coverage after auto-registration.      The treatment was delivered using the [employer] Gamma Knife IconTM without complication.      The mask was removed and the patient was discharged home  in stable condition.    The patient tolerated the treatment well and had no complications.    Approved by:  Shoshana Andino MD      Again, thank you for allowing me to participate in the care of your patient.        Sincerely,        JAMA Andnio MD

## 2024-08-09 NOTE — LETTER
2024      Dk HARRISON Sydnee Mcgarry.  [address] [facility] [address]      Dear Colleague,    Thank you for referring your patient, Dk Waldropyoshi Kwon, to the [facility] RADIATION ONCOLOGY. Please see a copy of my visit note below.      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  1A R occipital 1,200 cGy [place] 60 2024 0 2/  1B L cerebellum 1,200 cGy [place] 60 2024 0   1C L parietal 1,200 cGy [place] 60 2024 0 2/  1D L occipital 1,200 cGy [place] 60 2024 0   1E LT Parietal 2,200 cGy [place] 60 2024 0   1I RT Th[place]  2,200 cGy [place] 60 2024  0   1F RT Vermis  [place] 60      1G LT Cerebellum 2  [place] 60      1H LT Cerebellum 3  [place] 60        DESCRIPTION OF PROCEDURE:    On 2024 the patient was brought to the [facility] Gamma Knife IconTM suite at [facility] and treated with fractionated stereotactic radiotherapy.     The [employer] Gamma Knife IconTM Plan software was used to create a highly conformal dose distribution using the number and size collimators detailed above.     TREATMENT:    The patient was brought to the Gamma Knife suite. A timeout was performed to confirm the correct patient and correct procedure.    Patient was identified by 2 methods. Site was verified.    The mask was placed and a cone beam CT was done and fused to the previously approved plan.        The physicist and I evaluated the fusion and confirmed the target coverage after auto-registration.      The treatment was delivered using the [place] Gamma Knife IconTM without complication.      The mask was removed and the patient was discharged home in stable condition.    The patient tolerated the treatment well and had no complications.    Approved by:   Caroline Bautista MD      Again, thank you for allowing me to participate in the care of your patient.        Sincerely,        Caroline Bautista MD

## 2024-08-12 NOTE — LETTER
2024      Dk HUNTER Randhawa Sr.  [address] [facility] [address]      Dear Colleague,    Thank you for referring your patient, Dk Romoace Kwon, to the [facility] RADIATION ONCOLOGY. Please see a copy of my visit note below.      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  1A R occipital 1,800 cGy [place] 60 2024 0 3/  1B L cerebellum 1,800 cGy [place] 60 2024 0 3/  1C L parietal 1,800 cGy [place] 60 2024 0 3/  1D L occipital 1,800 cGy [place] 60 2024 0 3/  1E LT Parietal 2,200 cGy [place] 60 2024 0   1I RT Thalamus  2,200 cGy [place] 60 2024  0   1F RT Vermis 2,200 cGy [place] 60 2024  1G LT Cerebellum 2  [place] 60      1H LT Cerebellum 3  [place] 60        DESCRIPTION OF PROCEDURE:    On 2024 the patient was brought to the [facility] Gamma Knife IconTM suite at [facility] and treated with fractionated stereotactic radiotherapy.     The [employer] Gamma Knife IconTM Plan software was used to create a highly conformal dose distribution using the number and size collimators detailed above.     TREATMENT:    The patient was brought to the Gamma Knife suite. A timeout was performed to confirm the correct patient and correct procedure.    Patient was identified by 2 methods. Site was verified.    The mask was placed and a cone beam CT was done and fused to the previously approved plan.        The physicist and I evaluated the fusion and confirmed the target coverage after auto-registration.      The treatment was delivered using the [place] Gamma  Knife IconTM without complication.     The mask was removed and the patient was discharged home in stable condition.    The patient tolerated the treatment well and had no complications.    Approved by:   Caroline Bautista MD      Again, thank you for allowing me to participate in the care of your patient.        Sincerely,        Caroline Bautista MD

## 2024-08-12 NOTE — PROGRESS NOTES
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  1A R occipital 1,200 cGy [place] 60 2024 0 2/5  1B L cerebellum 1,200 cGy [place] 60 2024 0 2/  1C L parietal 1,200 cGy [place] 60 2024 0 2/  1D L occipital 1,200 cGy [place] 60 2024 0 2/  1E LT Parietal 2,200 cGy [place] 60 2024 0   1I RT [place]  2,200 cGy [place] 60 2024  0   1F RT Vermis  [place] 60      1G LT Cerebellum 2  [place] 60      1H LT Cerebellum 3  [place] 60        DESCRIPTION OF PROCEDURE:    On 2024 the patient was brought to the [facility] Gamma Knife IconTM suite at [facility] and treated with fractionated stereotactic radiotherapy.     The [employer] Gamma Knife IconTM Plan software was used to create a highly conformal dose distribution using the number and size collimators detailed above.     TREATMENT:    The patient was brought to the Gamma Knife suite. A timeout was performed to confirm the correct patient and correct procedure.    Patient was identified by 2 methods. Site was verified.    The mask was placed and a cone beam CT was done and fused to the previously approved plan.        The physicist and I evaluated the fusion and confirmed the target coverage after auto-registration.      The treatment was delivered using the [place] Gamma Knife IconTM without complication.     The mask was removed and the patient was discharged home in stable condition.    The patient tolerated the treatment well and had no complications.    Approved by:   Caroline Bautista MD

## 2024-08-12 NOTE — PROGRESS NOTES
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   1A R occipital 600 cGy [place] 60 2024 0    1B L cerebellum 600 cGy [place] 60 2024 0    1C L parietal 600 cGy [place] 60 2024 0    1D L occipital 600 cGy [place] 60 2024 0    1E LT Parietal  [place] 60       1F RT Vermis  [place] 60       1G LT Cerebellum 2  [place] 60    1H LT Cerebellum 3  [place] 60    1I RT Thalamus   [place] 60        DESCRIPTION OF PROCEDURE:    On 2024 the patient was brought to the [facility] Gamma Knife IconTM suite at [facility] and treated with fractionated stereotactic radiotherapy.     The [employer] Gamma Knife IconTM Plan software was used to create a highly conformal dose distribution using the number and size collimators detailed above.     TREATMENT:    The patient was brought to the Gamma Knife suite. A timeout was performed to confirm the correct patient and correct procedure.    Patient was identified by 2 methods.  Site was verified.    The mask was placed and a cone beam CT was done and fused to the previously approved plan.        The physicist and I evaluated the fusion and confirmed the target coverage after auto-registration.      The treatment was delivered using the [employer] Gamma Knife IconTM without complication.      The mask was removed and the patient was discharged home in stable condition.    The patient tolerated the treatment well and had no complications.    Approved by:  Shoshana Andino MD

## 2024-08-12 NOTE — PROGRESS NOTES
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  1A R occipital 1,800 cGy [place] 60 2024 0 3/5  1B L cerebellum 1,800 cGy [place] 60 2024 0 3/5  1C L parietal 1,800 cGy [place] 60 2024 0 3/5  1D L occipital 1,800 cGy [place] 60 2024 0 3/  1E LT Parietal 2,200 cGy [place] 60 2024 0   1I RT [place]  2,200 cGy [place] 60 2024  0   1F RT [place] 2,200 cGy [place] 60 2024  1G LT Cerebellum 2  [place] 60      1H LT Cerebellum 3  [place] 60        DESCRIPTION OF PROCEDURE:    On 2024 the patient was brought to the [facility] Gamma Knife IconTM suite at [facility] and treated with fractionated stereotactic radiotherapy.     The [employer] Gamma Knife IconTM Plan software was used to create a highly conformal dose distribution using the number and size collimators detailed above.     TREATMENT:    The patient was brought to the Gamma Knife suite. A timeout was performed to confirm the correct patient and correct procedure.    Patient was identified by 2 methods. Site was verified.    The mask was placed and a cone beam CT was done and fused to the previously approved plan.        The physicist and I evaluated the fusion and confirmed the target coverage after auto-registration.      The treatment was delivered using the [employer] Gamma Knife IconTM without complication.     The mask was removed and the patient was discharged home in stable condition.    The patient tolerated the treatment well and had no complications.    Approved by:   Caroline Bautista MD

## 2024-08-14 NOTE — PROGRESS NOTES
Infusion Nursing Note:  Dk Randhawa Sr. presents today for PAC labs.    Patient seen by provider today: Yes: Dr. Friedell   present during visit today: Not Applicable.    Note: N/A.    Intravenous Access:  Implanted Port.    Treatment Conditions:  Not Applicable.    Post Infusion Assessment:  Blood return noted pre and post infusion.  Site patent and intact, free from redness, edema or discomfort.  No evidence of extravasations.  Access discontinued per protocol.     Discharge Plan:   Patient discharged in stable condition accompanied by: self.  Departure Mode: Ambulatory.      Dennis Ortega RN

## 2024-08-14 NOTE — PROGRESS NOTES
[facility] Hematology and Oncology Progress Note    Patient: Dk Randhawa Sr.  MRN: [id number]  Date of Service: Aug 14, 2024       Reason for Visit    I was consulted by   Mikael Peoples MD   For evaluation and management of large cell lung cancer      Encounter Diagnoses Assessment and Plan:    Problem List Items Addressed This Visit       Malignant neoplasm of upper lobe of right lung (H) - Primary       Patient returns for toxicity check following day 1 cycle 1 of carboplatin, etoposide and tislelizumab with Trilaciclib support.  Patient presently feels fairly well.  His appetite is good.  Though he has lost 17 pounds over the last 2 weeks.  He does have some constipation related to oxycodone.  His white blood cell count is increased to 72,000.  This is probably a combination of trilaciclib dexamethasone.  He has completed his gamma knife therapy.  He will return for cycle 2 on 8/19/2024.  ____________________________________________________________________________    Staging History   Cancer Staging   Malignant neoplasm of upper lobe of right lung (H)  Staging form: Lung, AJCC 8th Edition  - Pathologic: Stage IIB (pT3, pN0, cM0) - Signed by Friedell, Peter E, MD on 6/1/2024  - Clinical stage from 7/29/2024: Stage AJITH (rcTX, cN2, cM1b) - Signed by Friedell, Peter E, MD on 7/29/2024    Foundation 1 studies show a tumor proportion score of 40%.  Microsatellite status is MS stable tumor.  Mutational burden is 1 MUTS/MB   K-juanito-Q61H amplification is noted.  No therapies or clinical trials are available for this mutation    ECOG Performance = 0    History of Present Illness    Mr. Dk Randhawa Sr. is a 68 year old man with a history of atrial fibrillation on Xarelto.  He has a history of exposure to industrial dusts and about a 20 pack year history of cigarette smoking, He quit about 10 years ago.  On 4/7/2024 he presented to the emergency room after several days of hemoptysis.  CT scan of the chest on  4/7/2024 showed a right upper lobe lung mass.  Brain MRI on 4/30/2024 was negative for metastatic disease.  PET CT scan on 5/2/2024 was positive for the RUL mass and there was uptake in some mediastinal lymph nodes.  On 5/16/2024 patient had a robotic assisted right upper lobe lobectomy by Dr. Peoples.  Pathology showed Large Cell Lung Cancer. Mediastinal nodes were negative.  Patient has made an uneventful recovery.    7/20/2024 patient presented to the emergency room with severe pain in the right hip area.  Investigation at that time showed metastatic disease particularly in the chest but likely in the right hip acetabulum as well.  Chemotherapy changed from adjuvant to definitive chemotherapy for advanced large cell lung cancer.    Update 8/14/2024.  Patient returns for toxicity check.  He received day 1 cycle 1 of carboplatin etoposide and atezolizumab on 7/30/2024.  He is tolerated therapy without much difficulty.  He has completed gamma knife treatment for multiple cerebral metastases noted on MRI of 8/8/2024.  He still requires occasional oxycodone for right hip pain.  However he can go about his daily activities with little difficulty.      Review of systems.  Pertinent Findings are included in the History of Present Illness    Physical Exam    /79 (BP Location: Right arm, Patient Position: Sitting, Cuff Size: Adult Large)   Pulse 111   Temp 98.2  F (36.8  C) (Tympanic)   Resp 12   Ht 1.829 m (6')   Wt 104.8 kg (231 lb)   SpO2 99%   BMI 31.33 kg/m       GENERAL APPEARANCE: 69-year-old man in no apparent distress.  HEAD: Atraumatic; normocephalic; without lesions.  EYES: Conjunctiva, corneas and eyelids normal; pupils equal, round, reactive to light; No Icterus.  MOUTH/OROPHARYNX: Oral mucosa intact  NECK: Supple with no nodes.  LUNGS:  Clear to auscultation and percussion with no extra sounds.  HEART: Irregular rhythm and rate; S1 and S2 normal; no murmurs noted.  ABDOMEN: Soft; no masses or  tenderness, no hepatosplenomegaly.  NEUROLOGIC: Alert and oriented.  No obvious focal findings.  EXTREMITIES: No cyanosis, or edema.  SKIN: No abnormal bruising or bleeding. No suspicious lesions noted on exposed skin.  PSYCHIATRIC: Mental status normal; no apparent psychiatric issues      Medications:    Current Outpatient Medications   Medication Sig Dispense Refill    acetaminophen (TYLENOL) 325 MG tablet Take 2 tablets (650 mg) by mouth every 4 hours as needed for mild pain 50 tablet 0    acetaminophen (TYLENOL) 500 MG tablet Take 2 tablets (1,000 mg) by mouth every 6 hours      dexAMETHasone (DECADRON) 4 MG tablet Take 1 tablet (4 mg) by mouth 2 times daily (with meals) 60 tablet 1    diphenhydrAMINE-acetaminophen (TYLENOL PM)  MG tablet Take 2 tablets by mouth nightly as needed for sleep      fexofenadine (ALLEGRA) 180 MG tablet Take 180 mg by mouth daily      HEMP OIL OR EXTRACT OR OTHER CBD CANNABINOID, NOT MEDICAL CANNABIS, CBD gummies      losartan (COZAAR) 100 MG tablet Take 100 mg by mouth every morning      Melatonin 10 MG TABS tablet Take 10 mg by mouth nightly as needed for sleep      metoprolol succinate ER (TOPROL XL) 100 MG 24 hr tablet Take 1 tablet by mouth every morning      multivitamin (CENTRUM SILVER) tablet Take 1 tablet by mouth daily      oxyCODONE (OXY-IR) 5 MG capsule Take 1 capsule (5 mg) by mouth every 4 hours as needed for severe pain 40 capsule 0    Turmeric (QC TUMERIC COMPLEX PO) Tumeric and ginger with applie cider 1410mg      UNABLE TO FIND MEDICATION NAME: osteo bi flex      UNABLE TO FIND MEDICATION NAME: Emergen-e 1000mg      XARELTO ANTICOAGULANT 20 MG TABS tablet Take 1 tablet (20 mg) by mouth daily (with lunch)       No current facility-administered medications for this visit.     Facility-Administered Medications Ordered in Other Visits   Medication Dose Route Frequency Provider Last Rate Last Admin    heparin lock flush 100 unit/mL injection 5 mL  5 mL Intracatheter  Once PRN Friedell, Peter E, MD   5 mL at 08/14/24 1422           Past History    Past Medical History:   Diagnosis Date    Acute non-recurrent maxillary sinusitis     Antiplatelet or antithrombotic long-term use     Arrhythmia     Atrial fibrillation with RVR (H)     Cough secondary to angiotensin converting enzyme inhibitor (ACE-I)     Hyperlipidemia     Hypertension     Mass of upper lobe of right lung     Obesity      Past Surgical History:   Procedure Laterality Date    BRONCHOSCOPY, WITH BIOPSY, ROBOT ASSISTED Bilateral 04/16/2024    Procedure: BRONCHOSCOPY, endobronchial ultrasound, transbronchial needle aspiration, endobronchial biopsies and tumor debulking;  Surgeon: Lanette Arce MD;  Location: [facility] OR    DAVINCI EXCISE NODES THORACIC N/A 5/15/2024    Procedure: mediastinal lymph node dissection;  Surgeon: Mikael Peoples MD;  Location: [facility] OR    DAVINCI LOBECTOMY LUNG Right 5/15/2024    Procedure: Robot-assisted thoracoscopic right upper lobectomy,;  Surgeon: Mikael Peoples MD;  Location: [facility] OR    ELBOW ARTHROSCOPY Left 03/09/2017    INSERT PORT VASCULAR ACCESS Right 7/5/2024    Procedure: INSERTION, VASCULAR ACCESS PORT;  Surgeon: Henry Veronica MD;  Location: [facility] OR    PHACOEMULSIFICATION WITH STANDARD INTRAOCULAR LENS IMPLANT Left 02/26/2018    Procedure: PHACOEMULSIFICATION WITH STANDARD INTRAOCULAR LENS IMPLANT;  Left Cataract Removal with Implant;  Surgeon: Mohit Victor MD;  Location: [facility] OR    PHACOEMULSIFICATION WITH STANDARD INTRAOCULAR LENS IMPLANT Right 01/30/2019    Procedure: Cataract Removal with Implant;  Surgeon: Mohit Victor MD;  Location: [facility] OR    TONSILLECTOMY  1964    TOTAL HIP ARTHROPLASTY Left 04/12/2022     No Known Allergies  Family History   Problem Relation Age of Onset    Ovarian Cancer Mother     Alzheimer Disease Mother     Depression Father 45        suicide    Diabetes Sister      Social History     Socioeconomic History    Marital status:       Spouse name: None    Number of children: None    Years of education: None    Highest education level: None   Tobacco Use    Smoking status: Former     Current packs/day: 0.00     Types: Cigarettes     Quit date: 2014     Years since quitting: 10.4    Smokeless tobacco: Never   Vaping Use    Vaping status: Never Used   Substance and Sexual Activity    Alcohol use: Not Currently    Drug use: No     Social Determinants of Health     Financial Resource Strain: Low Risk  (10/23/2023)    [facility], [facility]    Financial Resource Strain     Difficulty of Paying Living Expenses: 3   Food Insecurity: No Food Insecurity (10/23/2023)    Received [facility] [employer] [facility]    Food Insecurity     Worried About Running Out of Food in the Last Year: 1   Transportation Needs: No Transportation Needs (10/23/2023)    [facility], [facility]    Transportation Needs     Lack of Transportation (Medical): 1   Social Connections: Socially Integrated (10/23/2023)    [facility], [facility]    Social Connections     Frequency of Communication with Friends and Family: 0   Housing Stability: Low Risk  (10/23/2023)    [facility], [facility] [employer] [facility] [employer] [facility]    Housing Stability     Unable to Pay for Housing in the Last Year: 1           Lab Results      Recent Results (from the past 720 hour(s))   Comprehensive metabolic panel    Collection Time: 07/24/24  7:41 AM   Result Value Ref Range    Sodium 126 (L) 135 - 145 mmol/L    Potassium 4.8 3.4 - 5.3 mmol/L    Carbon Dioxide (CO2) 23 22 - 29 mmol/L    Anion Gap 11 7 - 15 mmol/L    Urea Nitrogen 14.9 8.0 -  23.0 mg/dL    Creatinine 0.75 0.67 - 1.17 mg/dL    GFR Estimate >90 >60 mL/min/1.73m2    Calcium 8.8 8.8 - 10.4 mg/dL    Chloride 92 (L) 98 - 107 mmol/L    Glucose 97 70 - 99 mg/dL    Alkaline Phosphatase 121 40 - 150 U/L    AST 18 0 - 45 U/L    ALT 9 0 - 70 U/L    Protein Total 6.5 6.4 - 8.3 g/dL    Albumin 3.9 3.5 - 5.2 g/dL    Bilirubin Total 0.3 <=1.2 mg/dL   Magnesium    Collection Time: 07/24/24  7:41 AM   Result Value Ref Range    Magnesium 2.0 1.7 - 2.3 mg/dL   CBC with platelets and differential    Collection Time: 07/24/24  7:41 AM   Result Value Ref Range    WBC Count 33.0 (H) 4.0 - 11.0 10e3/uL    RBC Count 4.46 4.40 - 5.90 10e6/uL    Hemoglobin 13.1 (L) 13.3 - 17.7 g/dL    Hematocrit 38.6 (L) 40.0 - 53.0 %    MCV 87 78 - 100 fL    MCH 29.4 26.5 - 33.0 pg    MCHC 33.9 31.5 - 36.5 g/dL    RDW 13.8 10.0 - 15.0 %    Platelet Count 314 150 - 450 10e3/uL    NRBCs per 100 WBC 0 <1 /100    Absolute NRBCs 0.0 10e3/uL   Manual Differential    Collection Time: 07/24/24  7:41 AM   Result Value Ref Range    % Neutrophils 91 %    % Lymphocytes 4 %    % Monocytes 4 %    % Eosinophils 0 %    % Basophils 1 %    Absolute Neutrophils 30.0 (H) 1.6 - 8.3 10e3/uL    Absolute Lymphocytes 1.3 0.8 - 5.3 10e3/uL    Absolute Monocytes 1.3 0.0 - 1.3 10e3/uL    Absolute Eosinophils 0.0 0.0 - 0.7 10e3/uL    Absolute Basophils 0.3 (H) 0.0 - 0.2 10e3/uL    RBC Morphology Confirmed RBC Indices     Platelet Assessment  Automated Count Confirmed. Platelet morphology is normal.     Automated Count Confirmed. Platelet morphology is normal.   Osmolality, random urine    Collection Time: 07/24/24  9:58 AM   Result Value Ref Range    Osmolality Urine 572 100 - 1,200 mmol/kg   Sodium random urine    Collection Time: 07/24/24  9:58 AM   Result Value Ref Range    Sodium Urine mmol/L 85 mmol/L   UA Macroscopic with reflex to Microscopic and Culture - Lab Collect    Collection Time: 07/24/24  9:58 AM    Specimen: Urine, NOS   Result Value Ref  Range    Color Urine Yellow Colorless, Straw, Light Yellow, Yellow    Appearance Urine Clear Clear    Glucose Urine Negative Negative mg/dL    Bilirubin Urine Negative Negative    Ketones Urine 5 (A) Negative mg/dL    Specific Gravity Urine 1.017 1.003 - 1.035    Blood Urine Negative Negative    pH Urine 7.0 5.0 - 7.0    Protein Albumin Urine Negative Negative mg/dL    Urobilinogen Urine Normal Normal, 2.0 mg/dL    Nitrite Urine Negative Negative    Leukocyte Esterase Urine Negative Negative   Magnesium    Collection Time: 07/29/24 12:53 PM   Result Value Ref Range    Magnesium 1.9 1.7 - 2.3 mg/dL   Basic metabolic panel  (Ca, Cl, CO2, Creat, Gluc, K, Na, BUN)    Collection Time: 07/29/24 12:53 PM   Result Value Ref Range    Sodium 128 (L) 135 - 145 mmol/L    Potassium 4.5 3.4 - 5.3 mmol/L    Chloride 94 (L) 98 - 107 mmol/L    Carbon Dioxide (CO2) 22 22 - 29 mmol/L    Anion Gap 12 7 - 15 mmol/L    Urea Nitrogen 12.8 8.0 - 23.0 mg/dL    Creatinine 0.74 0.67 - 1.17 mg/dL    GFR Estimate >90 >60 mL/min/1.73m2    Calcium 9.0 8.8 - 10.4 mg/dL    Glucose 117 (H) 70 - 99 mg/dL   CBC with platelets and differential    Collection Time: 07/29/24 12:53 PM   Result Value Ref Range    WBC Count 43.0 (H) 4.0 - 11.0 10e3/uL    RBC Count 4.39 (L) 4.40 - 5.90 10e6/uL    Hemoglobin 12.9 (L) 13.3 - 17.7 g/dL    Hematocrit 37.2 (L) 40.0 - 53.0 %    MCV 85 78 - 100 fL    MCH 29.4 26.5 - 33.0 pg    MCHC 34.7 31.5 - 36.5 g/dL    RDW 13.9 10.0 - 15.0 %    Platelet Count 357 150 - 450 10e3/uL    % Neutrophils 88 %    % Lymphocytes 3 %    % Monocytes 5 %    % Eosinophils 1 %    % Basophils 1 %    % Immature Granulocytes 3 %    NRBCs per 100 WBC 0 <1 /100    Absolute Neutrophils 37.7 (H) 1.6 - 8.3 10e3/uL    Absolute Lymphocytes 1.3 0.8 - 5.3 10e3/uL    Absolute Monocytes 2.2 (H) 0.0 - 1.3 10e3/uL    Absolute Eosinophils 0.4 0.0 - 0.7 10e3/uL    Absolute Basophils 0.2 0.0 - 0.2 10e3/uL    Absolute Immature Granulocytes 1.2 (H) <=0.4 10e3/uL     Absolute NRBCs 0.0 10e3/uL   RBC and Platelet Morphology    Collection Time: 07/29/24 12:53 PM   Result Value Ref Range    RBC Morphology Confirmed RBC Indices     Platelet Assessment  Automated Count Confirmed. Platelet morphology is normal.     Automated Count Confirmed. Platelet morphology is normal.   CBC with platelets and differential    Collection Time: 08/06/24  1:58 PM   Result Value Ref Range    WBC Count 31.3 (H) 4.0 - 11.0 10e3/uL    RBC Count 4.04 (L) 4.40 - 5.90 10e6/uL    Hemoglobin 11.6 (L) 13.3 - 17.7 g/dL    Hematocrit 34.4 (L) 40.0 - 53.0 %    MCV 85 78 - 100 fL    MCH 28.7 26.5 - 33.0 pg    MCHC 33.7 31.5 - 36.5 g/dL    RDW 13.8 10.0 - 15.0 %    Platelet Count 317 150 - 450 10e3/uL    % Neutrophils 92 %    % Lymphocytes 2 %    % Monocytes 1 %    % Eosinophils 0 %    % Basophils 0 %    % Immature Granulocytes 5 %    NRBCs per 100 WBC 0 <1 /100    Absolute Neutrophils 28.9 (H) 1.6 - 8.3 10e3/uL    Absolute Lymphocytes 0.5 (L) 0.8 - 5.3 10e3/uL    Absolute Monocytes 0.2 0.0 - 1.3 10e3/uL    Absolute Eosinophils 0.0 0.0 - 0.7 10e3/uL    Absolute Basophils 0.0 0.0 - 0.2 10e3/uL    Absolute Immature Granulocytes 1.7 (H) <=0.4 10e3/uL    Absolute NRBCs 0.0 10e3/uL   RBC and Platelet Morphology    Collection Time: 08/06/24  1:58 PM   Result Value Ref Range    RBC Morphology Confirmed RBC Indices     Platelet Assessment  Automated Count Confirmed. Platelet morphology is normal.     Automated Count Confirmed. Platelet morphology is normal.    Reactive Lymphocytes Present (A) None Seen   Magnesium    Collection Time: 08/14/24  2:21 PM   Result Value Ref Range    Magnesium 2.2 1.7 - 2.3 mg/dL   Comprehensive metabolic panel    Collection Time: 08/14/24  2:21 PM   Result Value Ref Range    Sodium 132 (L) 135 - 145 mmol/L    Potassium 4.9 3.4 - 5.3 mmol/L    Carbon Dioxide (CO2) 24 22 - 29 mmol/L    Anion Gap 12 7 - 15 mmol/L    Urea Nitrogen 27.7 (H) 8.0 - 23.0 mg/dL    Creatinine 0.94 0.67 - 1.17 mg/dL     GFR Estimate 87 >60 mL/min/1.73m2    Calcium 8.9 8.8 - 10.4 mg/dL    Chloride 96 (L) 98 - 107 mmol/L    Glucose 120 (H) 70 - 99 mg/dL    Alkaline Phosphatase 198 (H) 40 - 150 U/L    AST 17 0 - 45 U/L    ALT 17 0 - 70 U/L    Protein Total 6.6 6.4 - 8.3 g/dL    Albumin 3.8 3.5 - 5.2 g/dL    Bilirubin Total 0.3 <=1.2 mg/dL   CBC with platelets and differential    Collection Time: 08/14/24  2:21 PM   Result Value Ref Range    WBC Count 72.0 (HH) 4.0 - 11.0 10e3/uL    RBC Count 3.94 (L) 4.40 - 5.90 10e6/uL    Hemoglobin 11.6 (L) 13.3 - 17.7 g/dL    Hematocrit 33.7 (L) 40.0 - 53.0 %    MCV 86 78 - 100 fL    MCH 29.4 26.5 - 33.0 pg    MCHC 34.4 31.5 - 36.5 g/dL    RDW 15.9 (H) 10.0 - 15.0 %    Platelet Count 296 150 - 450 10e3/uL    NRBCs per 100 WBC 0 <1 /100    Absolute NRBCs 0.0 10e3/uL   Manual Differential    Collection Time: 08/14/24  2:21 PM   Result Value Ref Range    % Neutrophils 90 %    % Lymphocytes 3 %    % Monocytes 4 %    % Eosinophils 0 %    % Basophils 0 %    % Myelocytes 3 %    Absolute Neutrophils 64.8 (H) 1.6 - 8.3 10e3/uL    Absolute Lymphocytes 2.2 0.8 - 5.3 10e3/uL    Absolute Monocytes 2.9 (H) 0.0 - 1.3 10e3/uL    Absolute Eosinophils 0.0 0.0 - 0.7 10e3/uL    Absolute Basophils 0.0 0.0 - 0.2 10e3/uL    Absolute Myelocytes 2.2 (H) <=0.0 10e3/uL    RBC Morphology Confirmed RBC Indices     Platelet Assessment  Automated Count Confirmed. Platelet morphology is normal.     Automated Count Confirmed. Platelet morphology is normal.         Imaging Results    MRI Brain w contrast    Result Date: 8/8/2024  EXAM: MR BRAIN W CONTRAST  8/8/2024 7:46 AM HISTORY:  Metastasis to brain (H)   COMPARISON:  Brain MR with and without contrast 7/30/2024, multiple priors TECHNIQUE: MR imaging performed with 3-dimensonally acquired T1-weighted sequences performed with intravenous contrast. CONTRAST: 10mL Gadavist. FINDINGS: There are 9 total ring-enhancing metastases. Metastasis in the left parietal lobe measures  approximately 13.6 x 9.2 mm, decreased in size from prior (series 2 image 138). Metastasis in the left posterior periventricular white matter measures approximately 5 mm, unchanged (series 2, image 114). Right thalamic metastasis measures 5.4 mm x 3.2 mm, slightly increased (series 2, image 103). Metastasis in left occipital lobe measures 19.8 mm x 24.2 mm, stable (series 2, image 65). Metastasis in the right occipital lobe measures approximately 14 x 9 mm x 12 mm, stable (series 2, image 62). Metastasis in the vermis of the cerebellum measures 2.6 mm, stable (series 2, image 65). Metastasis in the left posterior cerebellum measures 19.7 mm x 13.9 mm, slightly decreased (series 2, image 51). Additional small metastases in the left cerebellum measures 2.8 and 2.2 mm, stable (series 2, images 46-48). The There is no mass effect, midline shift, or intracranial hemorrhage. The ventricles are proportionate to the cerebral sulci. No suspicious abnormality of the skull marrow signal. Clear paranasal sinuses. Mastoid air cells are clear. No focal abnormality of the pituitary gland, sella, skull base and upper cervical spinal structures on sagittal images. The orbits are normal.     Impression: 1. 9 total ring-enhancing metastases, which were discussed with radiation oncology within the reading room. 2. No intracranial hemorrhage. I have personally reviewed the examination and initial interpretation and I agree with the findings. DAYANA REYES MD   SYSTEM ID:  [id number]    XR Chest/Heart Fluoro    Result Date: 8/1/2024  CHEST/HEART FLUORO  8/1/2024 12:29 PM HISTORY: Port not working. Needed for chemo. Malignant neoplasm of upper lobe of right lung (H). COMPARISON: Chest x-ray dated 7/5/2024. FINDINGS: The port was accessed in sterile fashion. Small amount of Isovue was injected and flowed freely from the tip of the catheter. Blood return could be obtained before and after injection of contrast. The port was flushed with  sterile saline.     IMPRESSION:  Patent port catheter. Blood return was obtained before and after injection. ARIA HWANG MD   SYSTEM ID:  [id number]    MR Brain w/o & w Contrast    Result Date: 7/30/2024  MRI BRAIN WITHOUT AND WITH CONTRAST July 30, 2024 2:35 PM HISTORY: Large cell carcinoma of lung, right. TECHNIQUE: Multiplanar, multisequence MRI of the brain without and with 11 mL Gadavist. COMPARISON: Brain MR 4/30/2024. FINDINGS: Multiple new enhancing intracranial masses concerning for metastatic disease. There is evidence of intralesional hemorrhage of the lesions in the left occipital lobe and left cerebellum which demonstrates susceptibility hypointensity and peripheral rind of T1 hyperintensity. Subtle susceptibility hyperintensity in the other lesions could represent subtle intralesional hemorrhage. New enhancing intracranial masses are listed below: *  1.8 cm left parietal lobe and (series 14 image 141) *  0.5 cm left parietal white matter (series 14 image 115) *  Peripherally enhancing lesion with intralesional hemorrhage in the left occipital lobe measuring 2.6 cm (series 14 image 77) *  1.7 cm right occipital lobe (series 14 image 77) *  Subtle linear enhancement superior right cerebellum measuring 0.3 cm (series 14 image 69) *  Irregularly-shaped enhancing lesion with intralesional hemorrhage in the left cerebellum measuring 2.3 cm (series 14 image 55) *  Subtle enhancing 0.3 cm nodule left cerebellum (series 14 image 49) *  0.3 cm nodule left cerebellum (series 14 image 45) There is T2 hyperintense edema around the larger enhancing lesions with particular edema around the hemorrhagic lesions in the left occipital lobe and left cerebellum. No significant midline shift or herniation. Ventricular size is within normal limits without evidence of hydrocephalus. No restricted diffusion in the brain parenchyma to suggest acute infarct. Mild patchy periventricular white matter T2 hyperintensities  which are similar to prior and likely due to chronic microvascular ischemic disease. The facial structures appear normal. The major arterial T2 flow voids at the base of the brain appear patent.     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 Surrounding edema around the larger lesions. No significant midline shift, herniation, or hydrocephalus. ABIEL BARRERA MD   SYSTEM ID:  [id number]    CT Chest/Abdomen/Pelvis w Contrast    Result Date: 7/25/2024  EXAM: CT CHEST/ABDOMEN/PELVIS W CONTRAST LOCATION: [facility] DATE: 7/25/2024 INDICATION: hx resected lung cancer. Hyponatremia + right hip pain r o recurrence. Leukocytosis fever   rule out infection COMPARISON: PET/CT 5/2/2024 TECHNIQUE: CT scan of the chest, abdomen, and pelvis was performed following injection of IV contrast. Multiplanar reformats were obtained. Dose reduction techniques were used. CONTRAST: isovue 370 90ml FINDINGS: LUNGS AND PLEURA: Status post right upper lobectomy. There is architectural distortion at the right hilum due to a combination of surgery and compression of the right lung by the moderate right pleural effusion. There are several enhancing nodules along the pleural surfaces in the azygoesophageal recess and largest in the basal pleural space along the posterior costal pleura measuring 19 mm (series 2, image 68). A staple line is present in the right middle lobe along the mediastinal pleura. There is a 5  mm pulmonary nodule in the right lower lobe along the posterior costal pleura (series 4, image 289). 8 x 11 mm solid pulmonary nodule in the lingula contacting the mediastinal pleura (series 4, image 234) and solid 9 x 11 mm nodule in the lateral left lower lobe (image 269). Upper lung predominant emphysema. MEDIASTINUM: Cardiac chambers are normal in size. No pericardial effusion. Main pulmonary  artery is normal in size. Normal caliber thoracic aorta. Mixed attenuation plaque in the great vessels, aortic arch, and descending thoracic aorta. Morphologically abnormal right lower paratracheal lymph node measures 10 mm.. Esophagus is decompressed. CORONARY ARTERY CALCIFICATION: Severe. HEPATOBILIARY: The liver is normal in size. No hepatic parenchymal lesions. Gallbladder is decompressed. No calcified gallstones. The common bile duct is dilated measuring 15 mm. No choledocholithiasis or bile duct wall thickening. PANCREAS: There is a new 13 mm nodule superior to the junction of the pancreas body and neck (series 3, image 165). The remainder of the pancreas is normal in enhancement and architecture. SPLEEN: Normal. ADRENAL GLANDS: Normal. KIDNEYS/BLADDER: Normal. BOWEL: Normal. LYMPH NODES: Normal. VASCULATURE: Moderate patchy aortoiliac atheromatous calcifications with mild iliac artery tortuosity. No aneurysm. PELVIC ORGANS: Prostate gland is normal in size. Seminal vesicles are symmetric. No pelvic free fluid. MUSCULOSKELETAL: There is a left hip joint replacement. Moderate degenerative osteophytes and disc space narrowing of the thoracic and lumbar spine. Lumbar facet arthropathy. The cortex and medullary space of the posterior right acetabulum is mild the/permeative in appearance compared to the contralateral side suspect for subtle bone metastasis. No pathologic fracture.     IMPRESSION: 1.  Status post right upper lobectomy. Moderate right pleural effusion with several enhancing pleural nodules consistent with pleural metastases. 2.  Morphologically abnormal right lower paratracheal lymph node and several new solid left lung nodules consistent with katelyn and contralateral lung metastases. 3.  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 4.  Permeative appearance of the posterior right acetabulum suspect for bone metastasis. This could be  confirmed with right hip MRI.    Pelvis XR w/ unilateral hip right    Result Date: 7/20/2024  EXAM: XR PELVIS AND HIP RIGHT 1 VIEW LOCATION: [facility] DATE: 7/20/2024 INDICATION: pain known clear history of trauma COMPARISON: None.     IMPRESSION: No fracture. Mild degenerative changes in the right hip. Left total hip arthroplasty.         I spent 35 minutes on the patient's visit today.  This included preparation for the visit, face-to-face time with the patient and documentation following the visit.  It did not include teaching or procedure time.    Signed by: Peter E. Friedell, MD

## 2024-08-14 NOTE — PROGRESS NOTES
Oncology Rooming Note    August 14, 2024 2:43 PM   Dk Randhawa Sr. is a 69 year old male who presents for:    Chief Complaint   Patient presents with    Oncology Clinic Visit     Large cell carcinoma of lung, right - Labs and provider visits     Initial Vitals: /79 (BP Location: Right arm, Patient Position: Sitting, Cuff Size: Adult Large)   Pulse 111   Temp 98.2  F (36.8  C) (Tympanic)   Resp 12   Ht 1.829 m (6')   Wt 104.8 kg (231 lb)   SpO2 99%   BMI 31.33 kg/m   Estimated body mass index is 31.33 kg/m  as calculated from the following:    Height as of this encounter: 1.829 m (6').    Weight as of this encounter: 104.8 kg (231 lb). Body surface area is 2.31 meters squared.  Extreme Pain (8) Comment: Data Unavailable   No LMP for male patient.  Allergies reviewed: Yes  Medications reviewed: Yes    Medications: Medication refills not needed today.  Pharmacy name entered into [facility]:    [id number] - [place], [place] - [address] [place] [address] [id number] - [place], [place] - [address] [facility]    Frailty Screening:   Is the patient here for a new oncology consult visit in cancer care? 2. No      Clinical concerns:  None      Stefania Dos Santos, CMA

## 2024-08-14 NOTE — CONFIDENTIAL NOTE
DATE/TIME OF CALL RECEIVED FROM LAB:  08/14/24 at 3:04 PM   LAB TEST:  WBC  LAB VALUE:  72  PROVIDER NOTIFIED?: Yes  PROVIDER NAME: Dr. Friedell  DATE/TIME LAB VALUE REPORTED TO PROVIDER: 3:05, 8/14/2024  MECHANISM OF PROVIDER NOTIFICATION:  Epic  PROVIDER RESPONSE: To be determined

## 2024-08-14 NOTE — LETTER
2024      Dk Randhawa Sr.  [address] [facility] [address]      Dear Colleague,    Thank you for referring your patient, Dk Romoace Kwon, to the [facility]. Please see a copy of my visit note below.    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  1A R occipital 3,000 cGy [place] 60 2024 6 5/  1B L cerebellum 3,000 cGy [place] 60 2024 6 5/  1C L parietal 3,000 cGy [place] 60 2024 6 5/5  1D L occipital 3,000 cGy [place] 60 2024 6 5/  1E LT Parietal 2,200 cGy [place] 60 2024 0 /  1I RT Thalamus  2,200 cGy [place] 60 2024  0   1F RT Vermis 2,200 cGy [place] 60 2024 0   1G LT Cerebellum 2 2,200 cGy [place] 60 2024 0   1H LT Cerebellum 3 2,200 cGy [place] 60 2024 0     DESCRIPTION OF PROCEDURE:    On 2024 the patient was brought to the [facility] Gamma Knife IconTM suite at [facility] and treated with fractionated stereotactic radiotherapy.     The [employer] Gamma Knife IconTM Plan software was used to create a highly conformal dose distribution using the number and size collimators detailed above.     TREATMENT:    The patient was brought to the Gamma Knife suite. A timeout was performed to confirm the correct patient and correct procedure.    Patient was identified by 2 methods. Site was verified.    The mask was placed and a cone beam CT was done and fused to the previously approved plan.        The physicist and I evaluated the fusion and confirmed the target coverage after  auto-registration.      The treatment was delivered using the [employer] Gamma Knife IconTM without complication.     The mask was removed and the patient was discharged home in stable condition.    The patient tolerated the treatment well and had no complications.    Approved by:   Fili Holloway MD      Again, thank you for allowing me to participate in the care of your patient.        Sincerely,        Missy Holloway MD

## 2024-08-14 NOTE — LETTER
8/14/2024      Dk Randhawa   [address]  [facility] [address]      Dear Colleague,    Thank you for referring your patient, Dk Randhawa Sr., to the [facility]. Please see a copy of my visit note below.    Oncology Rooming Note    August 14, 2024 2:43 PM   Dk Randhawa Sr. is a 69 year old male who presents for:    Chief Complaint   Patient presents with     Oncology Clinic Visit     Large cell carcinoma of lung, right - Labs and provider visits     Initial Vitals: /79 (BP Location: Right arm, Patient Position: Sitting, Cuff Size: Adult Large)   Pulse 111   Temp 98.2  F (36.8  C) (Tympanic)   Resp 12   Ht 1.829 m (6')   Wt 104.8 kg (231 lb)   SpO2 99%   BMI 31.33 kg/m   Estimated body mass index is 31.33 kg/m  as calculated from the following:    Height as of this encounter: 1.829 m (6').    Weight as of this encounter: 104.8 kg (231 lb). Body surface area is 2.31 meters squared.  Extreme Pain (8) Comment: Data Unavailable   No LMP for male patient.  Allergies reviewed: Yes  Medications reviewed: Yes    Medications: Medication refills not needed today.  Pharmacy name entered into D[facility]:    [id number] - [place], [place] - [address] [place] [address] [id number] - [place], [place] - [address] [facility]    Frailty Screening:   Is the patient here for a new oncology consult visit in cancer care? 2. No      Clinical concerns:  None      Stefania Dos Santos, [facility] Hematology and Oncology Progress Note    Patient: Dk Randhawa Sr.  MRN: [id number]  Date of Service: Aug 14, 2024       Reason for Visit    I was consulted by   Mikael Peoples MD   For evaluation and management of large cell lung cancer      Encounter Diagnoses Assessment and Plan:    Problem List Items Addressed This Visit       Malignant neoplasm of upper lobe of right lung (H) - Primary       Patient returns for toxicity check following day 1 cycle 1 of  carboplatin, etoposide and tislelizumab with Trilaciclib support.  Patient presently feels fairly well.  His appetite is good.  Though he has lost 17 pounds over the last 2 weeks.  He does have some constipation related to oxycodone.  His white blood cell count is increased to 72,000.  This is probably a combination of trilaciclib dexamethasone.  He has completed his gamma knife therapy.  He will return for cycle 2 on 8/19/2024.  ____________________________________________________________________________    Staging History   Cancer Staging   Malignant neoplasm of upper lobe of right lung (H)  Staging form: Lung, AJCC 8th Edition  - Pathologic: Stage IIB (pT3, pN0, cM0) - Signed by Friedell, Peter E, MD on 6/1/2024  - Clinical stage from 7/29/2024: Stage AJITH (rcTX, cN2, cM1b) - Signed by Friedell, Peter E, MD on 7/29/2024    Foundation 1 studies show a tumor proportion score of 40%.  Microsatellite status is MS stable tumor.  Mutational burden is 1 MUTS/MB   K-juanito-Q61H amplification is noted.  No therapies or clinical trials are available for this mutation    ECOG Performance = 0    History of Present Illness    Mr. Dk Randhawa . is a 68 year old man with a history of atrial fibrillation on Xarelto.  He has a history of exposure to industrial dusts and about a 20 pack year history of cigarette smoking, He quit about 10 years ago.  On 4/7/2024 he presented to the emergency room after several days of hemoptysis.  CT scan of the chest on 4/7/2024 showed a right upper lobe lung mass.  Brain MRI on 4/30/2024 was negative for metastatic disease.  PET CT scan on 5/2/2024 was positive for the RUL mass and there was uptake in some mediastinal lymph nodes.  On 5/16/2024 patient had a robotic assisted right upper lobe lobectomy by Dr. Peoples.  Pathology showed Large Cell Lung Cancer. Mediastinal nodes were negative.  Patient has made an uneventful recovery.    7/20/2024 patient presented to the emergency room with  severe pain in the right hip area.  Investigation at that time showed metastatic disease particularly in the chest but likely in the right hip acetabulum as well.  Chemotherapy changed from adjuvant to definitive chemotherapy for advanced large cell lung cancer.    Update 8/14/2024.  Patient returns for toxicity check.  He received day 1 cycle 1 of carboplatin etoposide and atezolizumab on 7/30/2024.  He is tolerated therapy without much difficulty.  He has completed gamma knife treatment for multiple cerebral metastases noted on MRI of 8/8/2024.  He still requires occasional oxycodone for right hip pain.  However he can go about his daily activities with little difficulty.      Review of systems.  Pertinent Findings are included in the History of Present Illness    Physical Exam    /79 (BP Location: Right arm, Patient Position: Sitting, Cuff Size: Adult Large)   Pulse 111   Temp 98.2  F (36.8  C) (Tympanic)   Resp 12   Ht 1.829 m (6')   Wt 104.8 kg (231 lb)   SpO2 99%   BMI 31.33 kg/m       GENERAL APPEARANCE: 69-year-old man in no apparent distress.  HEAD: Atraumatic; normocephalic; without lesions.  EYES: Conjunctiva, corneas and eyelids normal; pupils equal, round, reactive to light; No Icterus.  MOUTH/OROPHARYNX: Oral mucosa intact  NECK: Supple with no nodes.  LUNGS:  Clear to auscultation and percussion with no extra sounds.  HEART: Irregular rhythm and rate; S1 and S2 normal; no murmurs noted.  ABDOMEN: Soft; no masses or tenderness, no hepatosplenomegaly.  NEUROLOGIC: Alert and oriented.  No obvious focal findings.  EXTREMITIES: No cyanosis, or edema.  SKIN: No abnormal bruising or bleeding. No suspicious lesions noted on exposed skin.  PSYCHIATRIC: Mental status normal; no apparent psychiatric issues      Medications:    Current Outpatient Medications   Medication Sig Dispense Refill     acetaminophen (TYLENOL) 325 MG tablet Take 2 tablets (650 mg) by mouth every 4 hours as needed for mild  pain 50 tablet 0     acetaminophen (TYLENOL) 500 MG tablet Take 2 tablets (1,000 mg) by mouth every 6 hours       dexAMETHasone (DECADRON) 4 MG tablet Take 1 tablet (4 mg) by mouth 2 times daily (with meals) 60 tablet 1     diphenhydrAMINE-acetaminophen (TYLENOL PM)  MG tablet Take 2 tablets by mouth nightly as needed for sleep       fexofenadine (ALLEGRA) 180 MG tablet Take 180 mg by mouth daily       HEMP OIL OR EXTRACT OR OTHER CBD CANNABINOID, NOT MEDICAL CANNABIS, CBD gummies       losartan (COZAAR) 100 MG tablet Take 100 mg by mouth every morning       Melatonin 10 MG TABS tablet Take 10 mg by mouth nightly as needed for sleep       metoprolol succinate ER (TOPROL XL) 100 MG 24 hr tablet Take 1 tablet by mouth every morning       multivitamin (CENTRUM SILVER) tablet Take 1 tablet by mouth daily       oxyCODONE (OXY-IR) 5 MG capsule Take 1 capsule (5 mg) by mouth every 4 hours as needed for severe pain 40 capsule 0     Turmeric (QC TUMERIC COMPLEX PO) Tumeric and ginger with applie cider 1410mg       UNABLE TO FIND MEDICATION NAME: osteo bi flex       UNABLE TO FIND MEDICATION NAME: Emergen-e 1000mg       XARELTO ANTICOAGULANT 20 MG TABS tablet Take 1 tablet (20 mg) by mouth daily (with lunch)       No current facility-administered medications for this visit.     Facility-Administered Medications Ordered in Other Visits   Medication Dose Route Frequency Provider Last Rate Last Admin     heparin lock flush 100 unit/mL injection 5 mL  5 mL Intracatheter Once PRN Friedell, Peter E, MD   5 mL at 08/14/24 1422           Past History    Past Medical History:   Diagnosis Date     Acute non-recurrent maxillary sinusitis      Antiplatelet or antithrombotic long-term use      Arrhythmia      Atrial fibrillation with RVR (H)      Cough secondary to angiotensin converting enzyme inhibitor (ACE-I)      Hyperlipidemia      Hypertension      Mass of upper lobe of right lung      Obesity      Past Surgical History:    Procedure Laterality Date     BRONCHOSCOPY, WITH BIOPSY, ROBOT ASSISTED Bilateral 04/16/2024    Procedure: BRONCHOSCOPY, endobronchial ultrasound, transbronchial needle aspiration, endobronchial biopsies and tumor debulking;  Surgeon: Lanette Arce MD;  Location: [facility] OR     DAVINCI EXCISE NODES THORACIC N/A 5/15/2024    Procedure: mediastinal lymph node dissection;  Surgeon: Mikael Peoples MD;  Location: [facility] OR     DAVINCI LOBECTOMY LUNG Right 5/15/2024    Procedure: Robot-assisted thoracoscopic right upper lobectomy,;  Surgeon: Mikael Peoples MD;  Location: [facility] OR     ELBOW ARTHROSCOPY Left 03/09/2017     INSERT PORT VASCULAR ACCESS Right 7/5/2024    Procedure: INSERTION, VASCULAR ACCESS PORT;  Surgeon: Henry Veronica MD;  Location: [facility]     PHACOEMULSIFICATION WITH STANDARD INTRAOCULAR LENS IMPLANT Left 02/26/2018    Procedure: PHACOEMULSIFICATION WITH STANDARD INTRAOCULAR LENS IMPLANT;  Left Cataract Removal with Implant;  Surgeon: Mohit Victor MD;  Location: [facility] OR     PHACOEMULSIFICATION WITH STANDARD INTRAOCULAR LENS IMPLANT Right 01/30/2019    Procedure: Cataract Removal with Implant;  Surgeon: Mohit Victor MD;  Location: [facility] OR     TONSILLECTOMY  1964     TOTAL HIP ARTHROPLASTY Left 04/12/2022     No Known Allergies  Family History   Problem Relation Age of Onset     Ovarian Cancer Mother      Alzheimer Disease Mother      Depression Father 45        suicide     Diabetes Sister      Social History     Socioeconomic History     Marital status:      Spouse name: None     Number of children: None     Years of education: None     Highest education level: None   Tobacco Use     Smoking status: Former     Current packs/day: 0.00     Types: Cigarettes     Quit date: 2014     Years since quitting: 10.4     Smokeless tobacco: Never   Vaping Use     Vaping status: Never Used   Substance and Sexual Activity     Alcohol use: Not Currently     Drug use: No     Social Determinants  of Health     Financial Resource Strain: Low Risk  (10/23/2023)    [facility], [facility]    Financial Resource Strain      Difficulty of Paying Living Expenses: 3   Food Insecurity: No Food Insecurity (10/23/2023)    Received from [facility], [facility]    Food Insecurity      Worried About Running Out of Food in the Last Year: 1   Transportation Needs: No Transportation Needs (10/23/2023)    Received from [facility], [facility]    Transportation Needs      Lack of Transportation (Medical): 1   Social Connections: Socially Integrated (10/23/2023)    Received from [facility], [facility]    Social Connections      Frequency of Communication with Friends and Family: 0   Housing Stability: Low Risk  (10/23/2023)    Received from [facility], [facility]    Housing Stability      Unable to Pay for Housing in the Last Year: 1           Lab Results      Recent Results (from the past 720 hour(s))   Comprehensive metabolic panel    Collection Time: 07/24/24  7:41 AM   Result Value Ref Range    Sodium 126 (L) 135 - 145 mmol/L    Potassium 4.8 3.4 - 5.3 mmol/L    Carbon Dioxide (CO2) 23 22 - 29 mmol/L    Anion Gap 11 7 - 15 mmol/L    Urea Nitrogen 14.9 8.0 - 23.0 mg/dL    Creatinine 0.75 0.67 - 1.17 mg/dL    GFR Estimate >90 >60 mL/min/1.73m2    Calcium 8.8 8.8 - 10.4 mg/dL    Chloride 92 (L) 98 - 107 mmol/L    Glucose 97 70 - 99 mg/dL    Alkaline Phosphatase 121 40 - 150 U/L    AST 18 0 - 45 U/L    ALT 9 0 - 70 U/L    Protein Total 6.5 6.4 - 8.3 g/dL    Albumin 3.9 3.5 - 5.2 g/dL    Bilirubin Total 0.3 <=1.2 mg/dL   Magnesium    Collection Time: 07/24/24  7:41 AM   Result Value  Ref Range    Magnesium 2.0 1.7 - 2.3 mg/dL   CBC with platelets and differential    Collection Time: 07/24/24  7:41 AM   Result Value Ref Range    WBC Count 33.0 (H) 4.0 - 11.0 10e3/uL    RBC Count 4.46 4.40 - 5.90 10e6/uL    Hemoglobin 13.1 (L) 13.3 - 17.7 g/dL    Hematocrit 38.6 (L) 40.0 - 53.0 %    MCV 87 78 - 100 fL    MCH 29.4 26.5 - 33.0 pg    MCHC 33.9 31.5 - 36.5 g/dL    RDW 13.8 10.0 - 15.0 %    Platelet Count 314 150 - 450 10e3/uL    NRBCs per 100 WBC 0 <1 /100    Absolute NRBCs 0.0 10e3/uL   Manual Differential    Collection Time: 07/24/24  7:41 AM   Result Value Ref Range    % Neutrophils 91 %    % Lymphocytes 4 %    % Monocytes 4 %    % Eosinophils 0 %    % Basophils 1 %    Absolute Neutrophils 30.0 (H) 1.6 - 8.3 10e3/uL    Absolute Lymphocytes 1.3 0.8 - 5.3 10e3/uL    Absolute Monocytes 1.3 0.0 - 1.3 10e3/uL    Absolute Eosinophils 0.0 0.0 - 0.7 10e3/uL    Absolute Basophils 0.3 (H) 0.0 - 0.2 10e3/uL    RBC Morphology Confirmed RBC Indices     Platelet Assessment  Automated Count Confirmed. Platelet morphology is normal.     Automated Count Confirmed. Platelet morphology is normal.   Osmolality, random urine    Collection Time: 07/24/24  9:58 AM   Result Value Ref Range    Osmolality Urine 572 100 - 1,200 mmol/kg   Sodium random urine    Collection Time: 07/24/24  9:58 AM   Result Value Ref Range    Sodium Urine mmol/L 85 mmol/L   UA Macroscopic with reflex to Microscopic and Culture - Lab Collect    Collection Time: 07/24/24  9:58 AM    Specimen: Urine, NOS   Result Value Ref Range    Color Urine Yellow Colorless, Straw, Light Yellow, Yellow    Appearance Urine Clear Clear    Glucose Urine Negative Negative mg/dL    Bilirubin Urine Negative Negative    Ketones Urine 5 (A) Negative mg/dL    Specific Gravity Urine 1.017 1.003 - 1.035    Blood Urine Negative Negative    pH Urine 7.0 5.0 - 7.0    Protein Albumin Urine Negative Negative mg/dL    Urobilinogen Urine Normal Normal, 2.0 mg/dL    Nitrite Urine  Negative Negative    Leukocyte Esterase Urine Negative Negative   Magnesium    Collection Time: 07/29/24 12:53 PM   Result Value Ref Range    Magnesium 1.9 1.7 - 2.3 mg/dL   Basic metabolic panel  (Ca, Cl, CO2, Creat, Gluc, K, Na, BUN)    Collection Time: 07/29/24 12:53 PM   Result Value Ref Range    Sodium 128 (L) 135 - 145 mmol/L    Potassium 4.5 3.4 - 5.3 mmol/L    Chloride 94 (L) 98 - 107 mmol/L    Carbon Dioxide (CO2) 22 22 - 29 mmol/L    Anion Gap 12 7 - 15 mmol/L    Urea Nitrogen 12.8 8.0 - 23.0 mg/dL    Creatinine 0.74 0.67 - 1.17 mg/dL    GFR Estimate >90 >60 mL/min/1.73m2    Calcium 9.0 8.8 - 10.4 mg/dL    Glucose 117 (H) 70 - 99 mg/dL   CBC with platelets and differential    Collection Time: 07/29/24 12:53 PM   Result Value Ref Range    WBC Count 43.0 (H) 4.0 - 11.0 10e3/uL    RBC Count 4.39 (L) 4.40 - 5.90 10e6/uL    Hemoglobin 12.9 (L) 13.3 - 17.7 g/dL    Hematocrit 37.2 (L) 40.0 - 53.0 %    MCV 85 78 - 100 fL    MCH 29.4 26.5 - 33.0 pg    MCHC 34.7 31.5 - 36.5 g/dL    RDW 13.9 10.0 - 15.0 %    Platelet Count 357 150 - 450 10e3/uL    % Neutrophils 88 %    % Lymphocytes 3 %    % Monocytes 5 %    % Eosinophils 1 %    % Basophils 1 %    % Immature Granulocytes 3 %    NRBCs per 100 WBC 0 <1 /100    Absolute Neutrophils 37.7 (H) 1.6 - 8.3 10e3/uL    Absolute Lymphocytes 1.3 0.8 - 5.3 10e3/uL    Absolute Monocytes 2.2 (H) 0.0 - 1.3 10e3/uL    Absolute Eosinophils 0.4 0.0 - 0.7 10e3/uL    Absolute Basophils 0.2 0.0 - 0.2 10e3/uL    Absolute Immature Granulocytes 1.2 (H) <=0.4 10e3/uL    Absolute NRBCs 0.0 10e3/uL   RBC and Platelet Morphology    Collection Time: 07/29/24 12:53 PM   Result Value Ref Range    RBC Morphology Confirmed RBC Indices     Platelet Assessment  Automated Count Confirmed. Platelet morphology is normal.     Automated Count Confirmed. Platelet morphology is normal.   CBC with platelets and differential    Collection Time: 08/06/24  1:58 PM   Result Value Ref Range    WBC Count 31.3 (H) 4.0  - 11.0 10e3/uL    RBC Count 4.04 (L) 4.40 - 5.90 10e6/uL    Hemoglobin 11.6 (L) 13.3 - 17.7 g/dL    Hematocrit 34.4 (L) 40.0 - 53.0 %    MCV 85 78 - 100 fL    MCH 28.7 26.5 - 33.0 pg    MCHC 33.7 31.5 - 36.5 g/dL    RDW 13.8 10.0 - 15.0 %    Platelet Count 317 150 - 450 10e3/uL    % Neutrophils 92 %    % Lymphocytes 2 %    % Monocytes 1 %    % Eosinophils 0 %    % Basophils 0 %    % Immature Granulocytes 5 %    NRBCs per 100 WBC 0 <1 /100    Absolute Neutrophils 28.9 (H) 1.6 - 8.3 10e3/uL    Absolute Lymphocytes 0.5 (L) 0.8 - 5.3 10e3/uL    Absolute Monocytes 0.2 0.0 - 1.3 10e3/uL    Absolute Eosinophils 0.0 0.0 - 0.7 10e3/uL    Absolute Basophils 0.0 0.0 - 0.2 10e3/uL    Absolute Immature Granulocytes 1.7 (H) <=0.4 10e3/uL    Absolute NRBCs 0.0 10e3/uL   RBC and Platelet Morphology    Collection Time: 08/06/24  1:58 PM   Result Value Ref Range    RBC Morphology Confirmed RBC Indices     Platelet Assessment  Automated Count Confirmed. Platelet morphology is normal.     Automated Count Confirmed. Platelet morphology is normal.    Reactive Lymphocytes Present (A) None Seen   Magnesium    Collection Time: 08/14/24  2:21 PM   Result Value Ref Range    Magnesium 2.2 1.7 - 2.3 mg/dL   Comprehensive metabolic panel    Collection Time: 08/14/24  2:21 PM   Result Value Ref Range    Sodium 132 (L) 135 - 145 mmol/L    Potassium 4.9 3.4 - 5.3 mmol/L    Carbon Dioxide (CO2) 24 22 - 29 mmol/L    Anion Gap 12 7 - 15 mmol/L    Urea Nitrogen 27.7 (H) 8.0 - 23.0 mg/dL    Creatinine 0.94 0.67 - 1.17 mg/dL    GFR Estimate 87 >60 mL/min/1.73m2    Calcium 8.9 8.8 - 10.4 mg/dL    Chloride 96 (L) 98 - 107 mmol/L    Glucose 120 (H) 70 - 99 mg/dL    Alkaline Phosphatase 198 (H) 40 - 150 U/L    AST 17 0 - 45 U/L    ALT 17 0 - 70 U/L    Protein Total 6.6 6.4 - 8.3 g/dL    Albumin 3.8 3.5 - 5.2 g/dL    Bilirubin Total 0.3 <=1.2 mg/dL   CBC with platelets and differential    Collection Time: 08/14/24  2:21 PM   Result Value Ref Range    WBC  Count 72.0 (HH) 4.0 - 11.0 10e3/uL    RBC Count 3.94 (L) 4.40 - 5.90 10e6/uL    Hemoglobin 11.6 (L) 13.3 - 17.7 g/dL    Hematocrit 33.7 (L) 40.0 - 53.0 %    MCV 86 78 - 100 fL    MCH 29.4 26.5 - 33.0 pg    MCHC 34.4 31.5 - 36.5 g/dL    RDW 15.9 (H) 10.0 - 15.0 %    Platelet Count 296 150 - 450 10e3/uL    NRBCs per 100 WBC 0 <1 /100    Absolute NRBCs 0.0 10e3/uL   Manual Differential    Collection Time: 08/14/24  2:21 PM   Result Value Ref Range    % Neutrophils 90 %    % Lymphocytes 3 %    % Monocytes 4 %    % Eosinophils 0 %    % Basophils 0 %    % Myelocytes 3 %    Absolute Neutrophils 64.8 (H) 1.6 - 8.3 10e3/uL    Absolute Lymphocytes 2.2 0.8 - 5.3 10e3/uL    Absolute Monocytes 2.9 (H) 0.0 - 1.3 10e3/uL    Absolute Eosinophils 0.0 0.0 - 0.7 10e3/uL    Absolute Basophils 0.0 0.0 - 0.2 10e3/uL    Absolute Myelocytes 2.2 (H) <=0.0 10e3/uL    RBC Morphology Confirmed RBC Indices     Platelet Assessment  Automated Count Confirmed. Platelet morphology is normal.     Automated Count Confirmed. Platelet morphology is normal.         Imaging Results    MRI Brain w contrast    Result Date: 8/8/2024  EXAM: MR BRAIN W CONTRAST  8/8/2024 7:46 AM HISTORY:  Metastasis to brain (H)   COMPARISON:  Brain MR with and without contrast 7/30/2024, multiple priors TECHNIQUE: MR imaging performed with 3-dimensonally acquired T1-weighted sequences performed with intravenous contrast. CONTRAST: 10mL Gadavist. FINDINGS: There are 9 total ring-enhancing metastases. Metastasis in the left parietal lobe measures approximately 13.6 x 9.2 mm, decreased in size from prior (series 2 image 138). Metastasis in the left posterior periventricular white matter measures approximately 5 mm, unchanged (series 2, image 114). Right thalamic metastasis measures 5.4 mm x 3.2 mm, slightly increased (series 2, image 103). Metastasis in left occipital lobe measures 19.8 mm x 24.2 mm, stable (series 2, image 65). Metastasis in the right occipital lobe measures  approximately 14 x 9 mm x 12 mm, stable (series 2, image 62). Metastasis in the vermis of the cerebellum measures 2.6 mm, stable (series 2, image 65). Metastasis in the left posterior cerebellum measures 19.7 mm x 13.9 mm, slightly decreased (series 2, image 51). Additional small metastases in the left cerebellum measures 2.8 and 2.2 mm, stable (series 2, images 46-48). The There is no mass effect, midline shift, or intracranial hemorrhage. The ventricles are proportionate to the cerebral sulci. No suspicious abnormality of the skull marrow signal. Clear paranasal sinuses. Mastoid air cells are clear. No focal abnormality of the pituitary gland, sella, skull base and upper cervical spinal structures on sagittal images. The orbits are normal.     Impression: 1. 9 total ring-enhancing metastases, which were discussed with radiation oncology within the reading room. 2. No intracranial hemorrhage. I have personally reviewed the examination and initial interpretation and I agree with the findings. DAYANA REYES MD   SYSTEM ID:  [id number]    XR Chest/Heart Fluoro    Result Date: 8/1/2024  CHEST/HEART FLUORO  8/1/2024 12:29 PM HISTORY: Port not working. Needed for chemo. Malignant neoplasm of upper lobe of right lung (H). COMPARISON: Chest x-ray dated 7/5/2024. FINDINGS: The port was accessed in sterile fashion. Small amount of Isovue was injected and flowed freely from the tip of the catheter. Blood return could be obtained before and after injection of contrast. The port was flushed with sterile saline.     IMPRESSION:  Patent port catheter. Blood return was obtained before and after injection. ARIA HWANG MD   SYSTEM ID:  [id number]    MR Brain w/o & w Contrast    Result Date: 7/30/2024  MRI BRAIN WITHOUT AND WITH CONTRAST July 30, 2024 2:35 PM HISTORY: Large cell carcinoma of lung, right. TECHNIQUE: Multiplanar, multisequence MRI of the brain without and with 11 mL Gadavist. COMPARISON: Brain MR 4/30/2024.  FINDINGS: Multiple new enhancing intracranial masses concerning for metastatic disease. There is evidence of intralesional hemorrhage of the lesions in the left occipital lobe and left cerebellum which demonstrates susceptibility hypointensity and peripheral rind of T1 hyperintensity. Subtle susceptibility hyperintensity in the other lesions could represent subtle intralesional hemorrhage. New enhancing intracranial masses are listed below: *  1.8 cm left parietal lobe and (series 14 image 141) *  0.5 cm left parietal white matter (series 14 image 115) *  Peripherally enhancing lesion with intralesional hemorrhage in the left occipital lobe measuring 2.6 cm (series 14 image 77) *  1.7 cm right occipital lobe (series 14 image 77) *  Subtle linear enhancement superior right cerebellum measuring 0.3 cm (series 14 image 69) *  Irregularly-shaped enhancing lesion with intralesional hemorrhage in the left cerebellum measuring 2.3 cm (series 14 image 55) *  Subtle enhancing 0.3 cm nodule left cerebellum (series 14 image 49) *  0.3 cm nodule left cerebellum (series 14 image 45) There is T2 hyperintense edema around the larger enhancing lesions with particular edema around the hemorrhagic lesions in the left occipital lobe and left cerebellum. No significant midline shift or herniation. Ventricular size is within normal limits without evidence of hydrocephalus. No restricted diffusion in the brain parenchyma to suggest acute infarct. Mild patchy periventricular white matter T2 hyperintensities which are similar to prior and likely due to chronic microvascular ischemic disease. The facial structures appear normal. The major arterial T2 flow voids at the base of the brain appear patent.     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 Surrounding edema around the larger lesions. No significant midline shift,  herniation, or hydrocephalus. ABIEL BARRERA MD   SYSTEM ID:  [id number]    CT Chest/Abdomen/Pelvis w Contrast    Result Date: 7/25/2024  EXAM: CT CHEST/ABDOMEN/PELVIS W CONTRAST LOCATION: [facility] DATE: 7/25/2024 INDICATION: hx resected lung cancer. Hyponatremia + right hip pain r o recurrence. Leukocytosis fever   rule out infection COMPARISON: PET/CT 5/2/2024 TECHNIQUE: CT scan of the chest, abdomen, and pelvis was performed following injection of IV contrast. Multiplanar reformats were obtained. Dose reduction techniques were used. CONTRAST: isovue 370 90ml FINDINGS: LUNGS AND PLEURA: Status post right upper lobectomy. There is architectural distortion at the right hilum due to a combination of surgery and compression of the right lung by the moderate right pleural effusion. There are several enhancing nodules along the pleural surfaces in the azygoesophageal recess and largest in the basal pleural space along the posterior costal pleura measuring 19 mm (series 2, image 68). A staple line is present in the right middle lobe along the mediastinal pleura. There is a 5  mm pulmonary nodule in the right lower lobe along the posterior costal pleura (series 4, image 289). 8 x 11 mm solid pulmonary nodule in the lingula contacting the mediastinal pleura (series 4, image 234) and solid 9 x 11 mm nodule in the lateral left lower lobe (image 269). Upper lung predominant emphysema. MEDIASTINUM: Cardiac chambers are normal in size. No pericardial effusion. Main pulmonary artery is normal in size. Normal caliber thoracic aorta. Mixed attenuation plaque in the great vessels, aortic arch, and descending thoracic aorta. Morphologically abnormal right lower paratracheal lymph node measures 10 mm.. Esophagus is decompressed. CORONARY ARTERY CALCIFICATION: Severe. HEPATOBILIARY: The liver is normal in size. No hepatic parenchymal lesions. Gallbladder is decompressed. No calcified gallstones. The common  bile duct is dilated measuring 15 mm. No choledocholithiasis or bile duct wall thickening. PANCREAS: There is a new 13 mm nodule superior to the junction of the pancreas body and neck (series 3, image 165). The remainder of the pancreas is normal in enhancement and architecture. SPLEEN: Normal. ADRENAL GLANDS: Normal. KIDNEYS/BLADDER: Normal. BOWEL: Normal. LYMPH NODES: Normal. VASCULATURE: Moderate patchy aortoiliac atheromatous calcifications with mild iliac artery tortuosity. No aneurysm. PELVIC ORGANS: Prostate gland is normal in size. Seminal vesicles are symmetric. No pelvic free fluid. MUSCULOSKELETAL: There is a left hip joint replacement. Moderate degenerative osteophytes and disc space narrowing of the thoracic and lumbar spine. Lumbar facet arthropathy. The cortex and medullary space of the posterior right acetabulum is mild the/permeative in appearance compared to the contralateral side suspect for subtle bone metastasis. No pathologic fracture.     IMPRESSION: 1.  Status post right upper lobectomy. Moderate right pleural effusion with several enhancing pleural nodules consistent with pleural metastases. 2.  Morphologically abnormal right lower paratracheal lymph node and several new solid left lung nodules consistent with katelyn and contralateral lung metastases. 3.  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 4.  Permeative appearance of the posterior right acetabulum suspect for bone metastasis. This could be confirmed with right hip MRI.    Pelvis XR w/ unilateral hip right    Result Date: 7/20/2024  EXAM: XR PELVIS AND HIP RIGHT 1 VIEW LOCATION: [facility] DATE: 7/20/2024 INDICATION: pain known clear history of trauma COMPARISON: None.     IMPRESSION: No fracture. Mild degenerative changes in the right hip. Left total hip arthroplasty.         I spent 35 minutes on the patient's visit today.  This included  preparation for the visit, face-to-face time with the patient and documentation following the visit.  It did not include teaching or procedure time.    Signed by: Peter E. Friedell, MD          Again, thank you for allowing me to participate in the care of your patient.        Sincerely,        Peter E. Friedell, MD

## 2024-08-14 NOTE — PROGRESS NOTES
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  1A R occipital 3,000 cGy Ferguson 60 2024 6 5/5  1B L cerebellum 3,000 cGy Ferguson 60 2024 6 5/5  1C L parietal 3,000 cGy Ferguson 60 2024 6 5/5  1D L occipital 3,000 cGy Ferguson 60 2024 6 5/5  1E LT Parietal 2,200 cGy Ferguson 60 2024 0   1I RT Thalamus  2,200 cGy Ferguson 60 2024  0   1F RT Vermis 2,200 cGy Ferguson 60 2024 0   1G LT Cerebellum 2 2,200 cGy Ferguson 60 2024 0   1H LT Cerebellum 3 2,200 cGy Ferguson 60 2024 0     DESCRIPTION OF PROCEDURE:    On 2024 the patient was brought to the [facility] Gamma Knife IconTM suite at [facility] and treated with fractionated stereotactic radiotherapy.     The [employer] Gamma Knife IconTM Plan software was used to create a highly conformal dose distribution using the number and size collimators detailed above.     TREATMENT:    The patient was brought to the Gamma Knife suite. A timeout was performed to confirm the correct patient and correct procedure.    Patient was identified by 2 methods. Site was verified.    The mask was placed and a cone beam CT was done and fused to the previously approved plan.        The physicist and I evaluated the fusion and confirmed the target coverage after auto-registration.      The treatment was delivered using the [facility] Gamma Knife IconTM without complication.     The mask was removed and the patient was discharged home in stable condition.    The patient tolerated the treatment well and had no  complications.    Approved by:   Fili Holloway MD

## 2024-08-15 NOTE — TELEPHONE ENCOUNTER
Phone call made to the patient post completion of gamma knife radiation on 8/14. I reviewed the discharge instructions with the patient today as he was unable to review on 8/14 due to another appointment. Patient states he reviewed the discharge sheet and does not have any questions. He is feeling good today without any issues. Patient has our number to call if needed.

## 2024-08-19 NOTE — PROGRESS NOTES
Radiotherapy Gammaknife  Treatment Summary          Date of Report: 2024    PATIENT: CECILIA SCHULTZ  MEDICAL RECORD NO: [id number]  : 1955    DIAGNOSIS: C79.31 Secondary malignant neoplasm of brain  INTENT OF RADIOTHERAPY: Local Control/Palliative    Details of the treatments summarized below are found in records kept in the Department of Radiation Oncology at [facility].    Treatment Summary:  Radiation Oncology - Course: 1 Protocol:   Treatment Site  Current Dose Modality From  To Elapsed Days  Fx.  1A R occipital   3,000 cGy Snyder 60  2024   6   5  1B L cerebellum   3,000 cGy Snyder 60  2024   6   5  1C L parietal    3,000 cGy Snyder 60  2024   6   5  1D L occipital    3,000 cGy Snyder 60  2024   6   5  1E L parietal 2   2,200 cGy Snyder 60  2024   1  1F R vermis    2,200 cGy Snyder 60  2024   1  1G L cerebellum 2   2,200 cGy Snyder 60  2024    1  1H L cerebellum 3   2,200 cGy Snyder 60  2024    1  1I R thalamus   1,800 cGy Snyder 60  2024    1          COMMENTS:                        No unexpected radiation induced toxicity to report    ED visits/hospitalizations: none    Missed treatments: none    Acute Toxicity Profile by CTC v5.0: none    PAIN MANAGEMENT:    N/A                         FOLLOW UP PLAN:       Rad Onc in 3 months with repeat brain MRI  Follow up with Med Onc in order to resume systemic therapy.                      Staff Physician: Shoshana Andino M.D.

## 2024-08-19 NOTE — LETTER
8/19/2024      Dk Randhawa   [address]  [facility] [address]      Dear Colleague,    Thank you for referring your patient, Dk Randhawa Sr., to the [facility]. Please see a copy of my visit note below.    Oncology Rooming Note    August 19, 2024 8:30 AM   Dk Randhawa Sr. is a 69 year old male who presents for:    Chief Complaint   Patient presents with     Oncology Clinic Visit     Large cell carcinoma of lung, right - Labs provider and infusion     Initial Vitals: /79 (BP Location: Right arm, Patient Position: Sitting, Cuff Size: Adult Large)   Pulse 101   Temp 98  F (36.7  C) (Tympanic)   Resp 12   Ht 1.829 m (6')   Wt 108 kg (238 lb)   SpO2 98%   BMI 32.28 kg/m   Estimated body mass index is 32.28 kg/m  as calculated from the following:    Height as of this encounter: 1.829 m (6').    Weight as of this encounter: 108 kg (238 lb). Body surface area is 2.34 meters squared.  Severe Pain (6) Comment: Data Unavailable   No LMP for male patient.  Allergies reviewed: Yes  Medications reviewed: Yes    Medications: Medication refills not needed today.  Pharmacy name entered into [employer]:    [id number] - [place], [place] - [address] [place] [address] [id number] - [place], [place] - [address] [facility]    Frailty Screening:   Is the patient here for a new oncology consult visit in cancer care? 2. No      Clinical concerns:  None      [facility] Hematology and Oncology Progress Note    Patient: Dk Randhawa Sr.  MRN: [id number]  Date of Service: Aug 19, 2024       Reason for Visit    I was consulted by   Mikael Peoples MD   For evaluation and management of large cell lung cancer      Encounter Diagnoses Assessment and Plan:    Problem List Items Addressed This Visit       Malignant neoplasm of upper lobe of right lung (H)    Relevant Orders    Infusion Appointment Request - Adult    Infusion Appointment  Request - Adult    Infusion Appointment Request - Adult    Large cell carcinoma of lung, right (H)    Relevant Orders    Infusion Appointment Request - Adult    Infusion Appointment Request - Adult    Infusion Appointment Request - Adult    CBC with Platelets & Differential    Comprehensive metabolic panel    Malignant neoplasm metastatic to bone (H)    Relevant Orders    Infusion Appointment Request - Adult    Infusion Appointment Request - Adult    Infusion Appointment Request - Adult    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  He received Trilaciclib support for is first cycle.  His WBC today is 114,000 and trilaciclib is placed on hold.   Patient presently feels fairly well.  His appetite is good.  He has gained back about 7 pounds since his last visit.  He does have some constipation related to oxycodone.    He has completed his gamma knife therapy.  He will proceed with cycle 2 today.  He will return on 8/28/2024 for toxicity check and to monitor his progress.  ____________________________________________________________________________    Staging History   Cancer Staging   Malignant neoplasm of upper lobe of right lung (H)  Staging form: Lung, AJCC 8th Edition  - Pathologic: Stage IIB (pT3, pN0, cM0) - Signed by Friedell, Peter E, MD on 6/1/2024  - Clinical stage from 7/29/2024: Stage AJITH (rcTX, cN2, cM1b) - Signed by Friedell, Peter E, MD on 7/29/2024    Foundation 1 studies show a tumor proportion score of 40%.  Microsatellite status is MS stable tumor.  Mutational burden is 1 MUTS/MB   [id number]  amplification is noted.  No therapies or clinical trials are available for this mutation    ECOG Performance = 0    History of Present Illness    Mr. Dk Randhawa . is a 68 year old man with a history of atrial fibrillation on Xarelto.  He has a history of exposure to industrial dusts and about a 20 pack year history of cigarette smoking, He quit about 10 years ago.  On 4/7/2024 he presented to the emergency room after several days of hemoptysis.  CT scan of the chest on 4/7/2024 showed a right upper lobe lung mass.  Brain MRI on 4/30/2024 was negative for metastatic disease.  PET CT scan on 5/2/2024 was positive for the RUL mass and there was uptake in some mediastinal lymph nodes.  On 5/16/2024 patient had a robotic assisted right upper lobe lobectomy by Dr. Peoples.  Pathology showed Large Cell Lung Cancer. Mediastinal nodes were negative.  Patient has made an uneventful recovery.    7/20/2024 patient presented to the emergency room with severe pain in the right hip area.  Investigation at that time showed metastatic disease particularly in the chest but likely in the right hip acetabulum as well.  Chemotherapy changed from adjuvant to definitive chemotherapy for advanced large cell lung cancer.    Update 8/19/2024.  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  Presently he feels better but he does have persistent pain in the right hip.  Does get relief from oxycodone.  He is not having chills, fevers or sweats.  He is not having cough, chest pain or shortness of breath at rest.  He has constipation with oxycodone and gets relief with stool softener.      Review of systems.  Pertinent Findings are included in the History of Present Illness    Physical Exam    /79 (BP Location: Right arm, Patient Position: Sitting, Cuff Size: Adult Large)   Pulse 101   Temp 98  F (36.7  C) (Tympanic)   Resp 12    Ht 1.829 m (6')   Wt 108 kg (238 lb)   SpO2 98%   BMI 32.28 kg/m       GENERAL APPEARANCE: 69-year-old man in no apparent distress.  HEAD: Atraumatic; normocephalic; without lesions.  EYES: Conjunctiva, corneas and eyelids normal; pupils equal, round, reactive to light; No Icterus.  MOUTH/OROPHARYNX: Oral mucosa intact  NECK: Supple with no nodes.  LUNGS:  Clear to auscultation and percussion with no extra sounds.  HEART: Irregular rhythm and rate; S1 and S2 normal; no murmurs noted.  ABDOMEN: Soft; no masses or tenderness, no hepatosplenomegaly.  NEUROLOGIC: Alert and oriented.  No obvious focal findings.  EXTREMITIES: No cyanosis, or edema.  SKIN: No abnormal bruising or bleeding. No suspicious lesions noted on exposed skin.  PSYCHIATRIC: Mental status normal; no apparent psychiatric issues      Medications:    Current Outpatient Medications   Medication Sig Dispense Refill     acetaminophen (TYLENOL) 325 MG tablet Take 2 tablets (650 mg) by mouth every 4 hours as needed for mild pain 50 tablet 0     acetaminophen (TYLENOL) 500 MG tablet Take 2 tablets (1,000 mg) by mouth every 6 hours       dexAMETHasone (DECADRON) 4 MG tablet Take 1 tablet (4 mg) by mouth 2 times daily (with meals) 60 tablet 1     diphenhydrAMINE-acetaminophen (TYLENOL PM)  MG tablet Take 2 tablets by mouth nightly as needed for sleep       fexofenadine (ALLEGRA) 180 MG tablet Take 180 mg by mouth daily       HEMP OIL OR EXTRACT OR OTHER CBD CANNABINOID, NOT MEDICAL CANNABIS, CBD gummies       losartan (COZAAR) 100 MG tablet Take 100 mg by mouth every morning       Melatonin 10 MG TABS tablet Take 10 mg by mouth nightly as needed for sleep       metoprolol succinate ER (TOPROL XL) 100 MG 24 hr tablet Take 1 tablet by mouth every morning       multivitamin (CENTRUM SILVER) tablet Take 1 tablet by mouth daily       oxyCODONE (OXY-IR) 5 MG capsule Take 1 capsule (5 mg) by mouth every 4 hours as needed for severe pain 40 capsule 0      Turmeric (QC TUMERIC COMPLEX PO) Tumeric and zach with applie cider 1410mg       UNABLE TO FIND MEDICATION NAME: osteo bi flex       UNABLE TO FIND MEDICATION NAME: Emergen-e 1000mg       XARELTO ANTICOAGULANT 20 MG TABS tablet Take 1 tablet (20 mg) by mouth daily (with lunch)       No current facility-administered medications for this visit.     Facility-Administered Medications Ordered in Other Visits   Medication Dose Route Frequency Provider Last Rate Last Admin     acetaminophen (TYLENOL) tablet 975 mg  975 mg Oral Q4H PRN Friedell, Peter E, MD   975 mg at 08/19/24 0942     heparin lock flush 100 unit/mL injection 5 mL  5 mL Intracatheter Once PRN Friedell, Peter E, MD         sodium chloride (PF) 0.9% PF flush 3-20 mL  3-20 mL Intracatheter q1 min prn Friedell, Peter E, MD               Past History    Past Medical History:   Diagnosis Date     Acute non-recurrent maxillary sinusitis      Antiplatelet or antithrombotic long-term use      Arrhythmia      Atrial fibrillation with RVR (H)      Cough secondary to angiotensin converting enzyme inhibitor (ACE-I)      Hyperlipidemia      Hypertension      Mass of upper lobe of right lung      Obesity      Past Surgical History:   Procedure Laterality Date     BRONCHOSCOPY, WITH BIOPSY, ROBOT ASSISTED Bilateral 04/16/2024    Procedure: BRONCHOSCOPY, endobronchial ultrasound, transbronchial needle aspiration, endobronchial biopsies and tumor debulking;  Surgeon: Lanette Arce MD;  Location: [facility] OR     DAVINCI EXCISE NODES THORACIC N/A 5/15/2024    Procedure: mediastinal lymph node dissection;  Surgeon: Mikael Peoples MD;  Location:  OR     DAVINCI LOBECTOMY LUNG Right 5/15/2024    Procedure: Robot-assisted thoracoscopic right upper lobectomy,;  Surgeon: Mikael Peoples MD;  Location:  OR     ELBOW ARTHROSCOPY Left 03/09/2017     INSERT PORT VASCULAR ACCESS Right 7/5/2024    Procedure: INSERTION, VASCULAR ACCESS PORT;  Surgeon: Henry Veronica MD;   Location: [facility] OR     PHACOEMULSIFICATION WITH STANDARD INTRAOCULAR LENS IMPLANT Left 02/26/2018    Procedure: PHACOEMULSIFICATION WITH STANDARD INTRAOCULAR LENS IMPLANT;  Left Cataract Removal with Implant;  Surgeon: Mohit Victor MD;  Location: [facility] OR     PHACOEMULSIFICATION WITH STANDARD INTRAOCULAR LENS IMPLANT Right 01/30/2019    Procedure: Cataract Removal with Implant;  Surgeon: Mohit Victor MD;  Location: [facility] OR     TONSILLECTOMY  1964     TOTAL HIP ARTHROPLASTY Left 04/12/2022     No Known Allergies  Family History   Problem Relation Age of Onset     Ovarian Cancer Mother      Alzheimer Disease Mother      Depression Father 45        suicide     Diabetes Sister      Social History     Socioeconomic History     Marital status:      Spouse name: None     Number of children: None     Years of education: None     Highest education level: None   Tobacco Use     Smoking status: Former     Current packs/day: 0.00     Types: Cigarettes     Quit date: 2014     Years since quitting: 10.4     Smokeless tobacco: Never   Vaping Use     Vaping status: Never Used   Substance and Sexual Activity     Alcohol use: Not Currently     Drug use: No     Social Determinants of Health     Financial Resource Strain: Low Risk  (10/23/2023)    [facility], [employer] [facility]    Financial Resource Strain      Difficulty of Paying Living Expenses: 3   Food Insecurity: No Food Insecurity (10/23/2023)    [facility], [facility]    Food Insecurity      Worried About Running Out of Food in the Last Year: 1   Transportation Needs: No Transportation Needs (10/23/2023)    [facility] from [facility], [facility]    Transportation Needs      Lack of Transportation (Medical): 1   Social Connections:  Socially Integrated (10/23/2023)    Received from [facility], [facility] [employer] [facility]    Social Connections      Frequency of Communication with Friends and Family: 0   Housing Stability: Low Risk  (10/23/2023)    Received from [facility], [facility]    Housing Stability      Unable to Pay for Housing in the Last Year: 1           Lab Results      Recent Results (from the past 720 hour(s))   Comprehensive metabolic panel    Collection Time: 07/24/24  7:41 AM   Result Value Ref Range    Sodium 126 (L) 135 - 145 mmol/L    Potassium 4.8 3.4 - 5.3 mmol/L    Carbon Dioxide (CO2) 23 22 - 29 mmol/L    Anion Gap 11 7 - 15 mmol/L    Urea Nitrogen 14.9 8.0 - 23.0 mg/dL    Creatinine 0.75 0.67 - 1.17 mg/dL    GFR Estimate >90 >60 mL/min/1.73m2    Calcium 8.8 8.8 - 10.4 mg/dL    Chloride 92 (L) 98 - 107 mmol/L    Glucose 97 70 - 99 mg/dL    Alkaline Phosphatase 121 40 - 150 U/L    AST 18 0 - 45 U/L    ALT 9 0 - 70 U/L    Protein Total 6.5 6.4 - 8.3 g/dL    Albumin 3.9 3.5 - 5.2 g/dL    Bilirubin Total 0.3 <=1.2 mg/dL   Magnesium    Collection Time: 07/24/24  7:41 AM   Result Value Ref Range    Magnesium 2.0 1.7 - 2.3 mg/dL   CBC with platelets and differential    Collection Time: 07/24/24  7:41 AM   Result Value Ref Range    WBC Count 33.0 (H) 4.0 - 11.0 10e3/uL    RBC Count 4.46 4.40 - 5.90 10e6/uL    Hemoglobin 13.1 (L) 13.3 - 17.7 g/dL    Hematocrit 38.6 (L) 40.0 - 53.0 %    MCV 87 78 - 100 fL    MCH 29.4 26.5 - 33.0 pg    MCHC 33.9 31.5 - 36.5 g/dL    RDW 13.8 10.0 - 15.0 %    Platelet Count 314 150 - 450 10e3/uL    NRBCs per 100 WBC 0 <1 /100    Absolute NRBCs 0.0 10e3/uL   Manual Differential    Collection Time: 07/24/24  7:41 AM   Result Value Ref Range    % Neutrophils 91 %    % Lymphocytes 4 %    % Monocytes 4 %    % Eosinophils 0 %    % Basophils 1 %    Absolute Neutrophils 30.0 (H) 1.6 - 8.3  10e3/uL    Absolute Lymphocytes 1.3 0.8 - 5.3 10e3/uL    Absolute Monocytes 1.3 0.0 - 1.3 10e3/uL    Absolute Eosinophils 0.0 0.0 - 0.7 10e3/uL    Absolute Basophils 0.3 (H) 0.0 - 0.2 10e3/uL    RBC Morphology Confirmed RBC Indices     Platelet Assessment  Automated Count Confirmed. Platelet morphology is normal.     Automated Count Confirmed. Platelet morphology is normal.   Osmolality, random urine    Collection Time: 07/24/24  9:58 AM   Result Value Ref Range    Osmolality Urine 572 100 - 1,200 mmol/kg   Sodium random urine    Collection Time: 07/24/24  9:58 AM   Result Value Ref Range    Sodium Urine mmol/L 85 mmol/L   UA Macroscopic with reflex to Microscopic and Culture - Lab Collect    Collection Time: 07/24/24  9:58 AM    Specimen: Urine, NOS   Result Value Ref Range    Color Urine Yellow Colorless, Straw, Light Yellow, Yellow    Appearance Urine Clear Clear    Glucose Urine Negative Negative mg/dL    Bilirubin Urine Negative Negative    Ketones Urine 5 (A) Negative mg/dL    Specific Gravity Urine 1.017 1.003 - 1.035    Blood Urine Negative Negative    pH Urine 7.0 5.0 - 7.0    Protein Albumin Urine Negative Negative mg/dL    Urobilinogen Urine Normal Normal, 2.0 mg/dL    Nitrite Urine Negative Negative    Leukocyte Esterase Urine Negative Negative   Magnesium    Collection Time: 07/29/24 12:53 PM   Result Value Ref Range    Magnesium 1.9 1.7 - 2.3 mg/dL   Basic metabolic panel  (Ca, Cl, CO2, Creat, Gluc, K, Na, BUN)    Collection Time: 07/29/24 12:53 PM   Result Value Ref Range    Sodium 128 (L) 135 - 145 mmol/L    Potassium 4.5 3.4 - 5.3 mmol/L    Chloride 94 (L) 98 - 107 mmol/L    Carbon Dioxide (CO2) 22 22 - 29 mmol/L    Anion Gap 12 7 - 15 mmol/L    Urea Nitrogen 12.8 8.0 - 23.0 mg/dL    Creatinine 0.74 0.67 - 1.17 mg/dL    GFR Estimate >90 >60 mL/min/1.73m2    Calcium 9.0 8.8 - 10.4 mg/dL    Glucose 117 (H) 70 - 99 mg/dL   CBC with platelets and differential    Collection Time: 07/29/24 12:53 PM    Result Value Ref Range    WBC Count 43.0 (H) 4.0 - 11.0 10e3/uL    RBC Count 4.39 (L) 4.40 - 5.90 10e6/uL    Hemoglobin 12.9 (L) 13.3 - 17.7 g/dL    Hematocrit 37.2 (L) 40.0 - 53.0 %    MCV 85 78 - 100 fL    MCH 29.4 26.5 - 33.0 pg    MCHC 34.7 31.5 - 36.5 g/dL    RDW 13.9 10.0 - 15.0 %    Platelet Count 357 150 - 450 10e3/uL    % Neutrophils 88 %    % Lymphocytes 3 %    % Monocytes 5 %    % Eosinophils 1 %    % Basophils 1 %    % Immature Granulocytes 3 %    NRBCs per 100 WBC 0 <1 /100    Absolute Neutrophils 37.7 (H) 1.6 - 8.3 10e3/uL    Absolute Lymphocytes 1.3 0.8 - 5.3 10e3/uL    Absolute Monocytes 2.2 (H) 0.0 - 1.3 10e3/uL    Absolute Eosinophils 0.4 0.0 - 0.7 10e3/uL    Absolute Basophils 0.2 0.0 - 0.2 10e3/uL    Absolute Immature Granulocytes 1.2 (H) <=0.4 10e3/uL    Absolute NRBCs 0.0 10e3/uL   RBC and Platelet Morphology    Collection Time: 07/29/24 12:53 PM   Result Value Ref Range    RBC Morphology Confirmed RBC Indices     Platelet Assessment  Automated Count Confirmed. Platelet morphology is normal.     Automated Count Confirmed. Platelet morphology is normal.   CBC with platelets and differential    Collection Time: 08/06/24  1:58 PM   Result Value Ref Range    WBC Count 31.3 (H) 4.0 - 11.0 10e3/uL    RBC Count 4.04 (L) 4.40 - 5.90 10e6/uL    Hemoglobin 11.6 (L) 13.3 - 17.7 g/dL    Hematocrit 34.4 (L) 40.0 - 53.0 %    MCV 85 78 - 100 fL    MCH 28.7 26.5 - 33.0 pg    MCHC 33.7 31.5 - 36.5 g/dL    RDW 13.8 10.0 - 15.0 %    Platelet Count 317 150 - 450 10e3/uL    % Neutrophils 92 %    % Lymphocytes 2 %    % Monocytes 1 %    % Eosinophils 0 %    % Basophils 0 %    % Immature Granulocytes 5 %    NRBCs per 100 WBC 0 <1 /100    Absolute Neutrophils 28.9 (H) 1.6 - 8.3 10e3/uL    Absolute Lymphocytes 0.5 (L) 0.8 - 5.3 10e3/uL    Absolute Monocytes 0.2 0.0 - 1.3 10e3/uL    Absolute Eosinophils 0.0 0.0 - 0.7 10e3/uL    Absolute Basophils 0.0 0.0 - 0.2 10e3/uL    Absolute Immature Granulocytes 1.7 (H) <=0.4  10e3/uL    Absolute NRBCs 0.0 10e3/uL   RBC and Platelet Morphology    Collection Time: 08/06/24  1:58 PM   Result Value Ref Range    RBC Morphology Confirmed RBC Indices     Platelet Assessment  Automated Count Confirmed. Platelet morphology is normal.     Automated Count Confirmed. Platelet morphology is normal.    Reactive Lymphocytes Present (A) None Seen   Magnesium    Collection Time: 08/14/24  2:21 PM   Result Value Ref Range    Magnesium 2.2 1.7 - 2.3 mg/dL   Comprehensive metabolic panel    Collection Time: 08/14/24  2:21 PM   Result Value Ref Range    Sodium 132 (L) 135 - 145 mmol/L    Potassium 4.9 3.4 - 5.3 mmol/L    Carbon Dioxide (CO2) 24 22 - 29 mmol/L    Anion Gap 12 7 - 15 mmol/L    Urea Nitrogen 27.7 (H) 8.0 - 23.0 mg/dL    Creatinine 0.94 0.67 - 1.17 mg/dL    GFR Estimate 87 >60 mL/min/1.73m2    Calcium 8.9 8.8 - 10.4 mg/dL    Chloride 96 (L) 98 - 107 mmol/L    Glucose 120 (H) 70 - 99 mg/dL    Alkaline Phosphatase 198 (H) 40 - 150 U/L    AST 17 0 - 45 U/L    ALT 17 0 - 70 U/L    Protein Total 6.6 6.4 - 8.3 g/dL    Albumin 3.8 3.5 - 5.2 g/dL    Bilirubin Total 0.3 <=1.2 mg/dL   CBC with platelets and differential    Collection Time: 08/14/24  2:21 PM   Result Value Ref Range    WBC Count 72.0 (HH) 4.0 - 11.0 10e3/uL    RBC Count 3.94 (L) 4.40 - 5.90 10e6/uL    Hemoglobin 11.6 (L) 13.3 - 17.7 g/dL    Hematocrit 33.7 (L) 40.0 - 53.0 %    MCV 86 78 - 100 fL    MCH 29.4 26.5 - 33.0 pg    MCHC 34.4 31.5 - 36.5 g/dL    RDW 15.9 (H) 10.0 - 15.0 %    Platelet Count 296 150 - 450 10e3/uL    NRBCs per 100 WBC 0 <1 /100    Absolute NRBCs 0.0 10e3/uL   Manual Differential    Collection Time: 08/14/24  2:21 PM   Result Value Ref Range    % Neutrophils 90 %    % Lymphocytes 3 %    % Monocytes 4 %    % Eosinophils 0 %    % Basophils 0 %    % Myelocytes 3 %    Absolute Neutrophils 64.8 (H) 1.6 - 8.3 10e3/uL    Absolute Lymphocytes 2.2 0.8 - 5.3 10e3/uL    Absolute Monocytes 2.9 (H) 0.0 - 1.3 10e3/uL    Absolute  Eosinophils 0.0 0.0 - 0.7 10e3/uL    Absolute Basophils 0.0 0.0 - 0.2 10e3/uL    Absolute Myelocytes 2.2 (H) <=0.0 10e3/uL    RBC Morphology Confirmed RBC Indices     Platelet Assessment  Automated Count Confirmed. Platelet morphology is normal.     Automated Count Confirmed. Platelet morphology is normal.   Comprehensive metabolic panel    Collection Time: 08/19/24  7:47 AM   Result Value Ref Range    Sodium 125 (L) 135 - 145 mmol/L    Potassium 5.1 3.4 - 5.3 mmol/L    Carbon Dioxide (CO2) 25 22 - 29 mmol/L    Anion Gap 6 (L) 7 - 15 mmol/L    Urea Nitrogen 24.0 (H) 8.0 - 23.0 mg/dL    Creatinine 0.83 0.67 - 1.17 mg/dL    GFR Estimate >90 >60 mL/min/1.73m2    Calcium 8.6 (L) 8.8 - 10.4 mg/dL    Chloride 94 (L) 98 - 107 mmol/L    Glucose 77 70 - 99 mg/dL    Alkaline Phosphatase 301 (H) 40 - 150 U/L    AST 16 0 - 45 U/L    ALT 17 0 - 70 U/L    Protein Total 6.2 (L) 6.4 - 8.3 g/dL    Albumin 3.7 3.5 - 5.2 g/dL    Bilirubin Total 0.2 <=1.2 mg/dL   TSH with free T4 reflex    Collection Time: 08/19/24  7:47 AM   Result Value Ref Range    TSH 1.36 0.30 - 4.20 uIU/mL   CBC with platelets and differential    Collection Time: 08/19/24  7:47 AM   Result Value Ref Range    WBC Count 114.3 (HH) 4.0 - 11.0 10e3/uL    RBC Count 3.79 (L) 4.40 - 5.90 10e6/uL    Hemoglobin 11.2 (L) 13.3 - 17.7 g/dL    Hematocrit 32.9 (L) 40.0 - 53.0 %    MCV 87 78 - 100 fL    MCH 29.6 26.5 - 33.0 pg    MCHC 34.0 31.5 - 36.5 g/dL    RDW 17.4 (H) 10.0 - 15.0 %    Platelet Count 251 150 - 450 10e3/uL    NRBCs per 100 WBC 0 <1 /100    Absolute NRBCs 0.0 10e3/uL   Manual Differential    Collection Time: 08/19/24  7:47 AM   Result Value Ref Range    % Neutrophils 96 %    % Lymphocytes 1 %    % Monocytes 2 %    % Eosinophils 0 %    % Basophils 0 %    % Metamyelocytes 1 %    Absolute Neutrophils 109.7 (H) 1.6 - 8.3 10e3/uL    Absolute Lymphocytes 1.1 0.8 - 5.3 10e3/uL    Absolute Monocytes 2.3 (H) 0.0 - 1.3 10e3/uL    Absolute Eosinophils 0.0 0.0 - 0.7  10e3/uL    Absolute Basophils 0.0 0.0 - 0.2 10e3/uL    Absolute Metamyelocytes 1.1 (H) <=0.0 10e3/uL    RBC Morphology Confirmed RBC Indices     Platelet Assessment  Automated Count Confirmed. Platelet morphology is normal.     Automated Count Confirmed. Platelet morphology is normal.         Imaging Results    MRI Brain w contrast    Result Date: 8/8/2024  EXAM: MR BRAIN W CONTRAST  8/8/2024 7:46 AM HISTORY:  Metastasis to brain (H)   COMPARISON:  Brain MR with and without contrast 7/30/2024, multiple priors TECHNIQUE: MR imaging performed with 3-dimensonally acquired T1-weighted sequences performed with intravenous contrast. CONTRAST: 10mL Gadavist. FINDINGS: There are 9 total ring-enhancing metastases. Metastasis in the left parietal lobe measures approximately 13.6 x 9.2 mm, decreased in size from prior (series 2 image 138). Metastasis in the left posterior periventricular white matter measures approximately 5 mm, unchanged (series 2, image 114). Right thalamic metastasis measures 5.4 mm x 3.2 mm, slightly increased (series 2, image 103). Metastasis in left occipital lobe measures 19.8 mm x 24.2 mm, stable (series 2, image 65). Metastasis in the right occipital lobe measures approximately 14 x 9 mm x 12 mm, stable (series 2, image 62). Metastasis in the vermis of the cerebellum measures 2.6 mm, stable (series 2, image 65). Metastasis in the left posterior cerebellum measures 19.7 mm x 13.9 mm, slightly decreased (series 2, image 51). Additional small metastases in the left cerebellum measures 2.8 and 2.2 mm, stable (series 2, images 46-48). The There is no mass effect, midline shift, or intracranial hemorrhage. The ventricles are proportionate to the cerebral sulci. No suspicious abnormality of the skull marrow signal. Clear paranasal sinuses. Mastoid air cells are clear. No focal abnormality of the pituitary gland, sella, skull base and upper cervical spinal structures on sagittal images. The orbits are normal.      Impression: 1. 9 total ring-enhancing metastases, which were discussed with radiation oncology within the reading room. 2. No intracranial hemorrhage. I have personally reviewed the examination and initial interpretation and I agree with the findings. DAYANA REYES MD   SYSTEM ID:  [id number]    XR Chest/Heart Fluoro    Result Date: 8/1/2024  CHEST/HEART FLUORO  8/1/2024 12:29 PM HISTORY: Port not working. Needed for chemo. Malignant neoplasm of upper lobe of right lung (H). COMPARISON: Chest x-ray dated 7/5/2024. FINDINGS: The port was accessed in sterile fashion. Small amount of Isovue was injected and flowed freely from the tip of the catheter. Blood return could be obtained before and after injection of contrast. The port was flushed with sterile saline.     IMPRESSION:  Patent port catheter. Blood return was obtained before and after injection. ARIA HWANG MD   SYSTEM ID:  [id number]    MR Brain w/o & w Contrast    Result Date: 7/30/2024  MRI BRAIN WITHOUT AND WITH CONTRAST July 30, 2024 2:35 PM HISTORY: Large cell carcinoma of lung, right. TECHNIQUE: Multiplanar, multisequence MRI of the brain without and with 11 mL Gadavist. COMPARISON: Brain MR 4/30/2024. FINDINGS: Multiple new enhancing intracranial masses concerning for metastatic disease. There is evidence of intralesional hemorrhage of the lesions in the left occipital lobe and left cerebellum which demonstrates susceptibility hypointensity and peripheral rind of T1 hyperintensity. Subtle susceptibility hyperintensity in the other lesions could represent subtle intralesional hemorrhage. New enhancing intracranial masses are listed below: *  1.8 cm left parietal lobe and (series 14 image 141) *  0.5 cm left parietal white matter (series 14 image 115) *  Peripherally enhancing lesion with intralesional hemorrhage in the left occipital lobe measuring 2.6 cm (series 14 image 77) *  1.7 cm right occipital lobe (series 14 image 77) *  Subtle  linear enhancement superior right cerebellum measuring 0.3 cm (series 14 image 69) *  Irregularly-shaped enhancing lesion with intralesional hemorrhage in the left cerebellum measuring 2.3 cm (series 14 image 55) *  Subtle enhancing 0.3 cm nodule left cerebellum (series 14 image 49) *  0.3 cm nodule left cerebellum (series 14 image 45) There is T2 hyperintense edema around the larger enhancing lesions with particular edema around the hemorrhagic lesions in the left occipital lobe and left cerebellum. No significant midline shift or herniation. Ventricular size is within normal limits without evidence of hydrocephalus. No restricted diffusion in the brain parenchyma to suggest acute infarct. Mild patchy periventricular white matter T2 hyperintensities which are similar to prior and likely due to chronic microvascular ischemic disease. The facial structures appear normal. The major arterial T2 flow voids at the base of the brain appear patent.     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 Surrounding edema around the larger lesions. No significant midline shift, herniation, or hydrocephalus. ABIEL BARRERA MD   SYSTEM ID:  [id number]    CT Chest/Abdomen/Pelvis w Contrast    Result Date: 7/25/2024  EXAM: CT CHEST/ABDOMEN/PELVIS W CONTRAST LOCATION: [facility] DATE: 7/25/2024 INDICATION: hx resected lung cancer. Hyponatremia + right hip pain r o recurrence. Leukocytosis fever   rule out infection COMPARISON: PET/CT 5/2/2024 TECHNIQUE: CT scan of the chest, abdomen, and pelvis was performed following injection of IV contrast. Multiplanar reformats were obtained. Dose reduction techniques were used. CONTRAST: isovue 370 90ml FINDINGS: LUNGS AND PLEURA: Status post right upper lobectomy. There is architectural distortion at the right hilum due to a combination of surgery and  compression of the right lung by the moderate right pleural effusion. There are several enhancing nodules along the pleural surfaces in the azygoesophageal recess and largest in the basal pleural space along the posterior costal pleura measuring 19 mm (series 2, image 68). A staple line is present in the right middle lobe along the mediastinal pleura. There is a 5  mm pulmonary nodule in the right lower lobe along the posterior costal pleura (series 4, image 289). 8 x 11 mm solid pulmonary nodule in the lingula contacting the mediastinal pleura (series 4, image 234) and solid 9 x 11 mm nodule in the lateral left lower lobe (image 269). Upper lung predominant emphysema. MEDIASTINUM: Cardiac chambers are normal in size. No pericardial effusion. Main pulmonary artery is normal in size. Normal caliber thoracic aorta. Mixed attenuation plaque in the great vessels, aortic arch, and descending thoracic aorta. Morphologically abnormal right lower paratracheal lymph node measures 10 mm.. Esophagus is decompressed. CORONARY ARTERY CALCIFICATION: Severe. HEPATOBILIARY: The liver is normal in size. No hepatic parenchymal lesions. Gallbladder is decompressed. No calcified gallstones. The common bile duct is dilated measuring 15 mm. No choledocholithiasis or bile duct wall thickening. PANCREAS: There is a new 13 mm nodule superior to the junction of the pancreas body and neck (series 3, image 165). The remainder of the pancreas is normal in enhancement and architecture. SPLEEN: Normal. ADRENAL GLANDS: Normal. KIDNEYS/BLADDER: Normal. BOWEL: Normal. LYMPH NODES: Normal. VASCULATURE: Moderate patchy aortoiliac atheromatous calcifications with mild iliac artery tortuosity. No aneurysm. PELVIC ORGANS: Prostate gland is normal in size. Seminal vesicles are symmetric. No pelvic free fluid. MUSCULOSKELETAL: There is a left hip joint replacement. Moderate degenerative osteophytes and disc space narrowing of the thoracic and lumbar spine.  Lumbar facet arthropathy. The cortex and medullary space of the posterior right acetabulum is mild the/permeative in appearance compared to the contralateral side suspect for subtle bone metastasis. No pathologic fracture.     IMPRESSION: 1.  Status post right upper lobectomy. Moderate right pleural effusion with several enhancing pleural nodules consistent with pleural metastases. 2.  Morphologically abnormal right lower paratracheal lymph node and several new solid left lung nodules consistent with katelyn and contralateral lung metastases. 3.  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 4.  Permeative appearance of the posterior right acetabulum suspect for bone metastasis. This could be confirmed with right hip MRI.         I spent 40 minutes on the patient's visit today.  This included preparation for the visit, face-to-face time with the patient and documentation following the visit.  It did not include teaching or procedure time.    Signed by: Peter E. Friedell, MD          Again, thank you for allowing me to participate in the care of your patient.        Sincerely,        Peter E. Friedell, MD

## 2024-08-19 NOTE — PROGRESS NOTES
Oncology Rooming Note    August 19, 2024 8:30 AM   Dk Romoace McgarryBay is a 69 year old male who presents for:    Chief Complaint   Patient presents with    Oncology Clinic Visit     Large cell carcinoma of lung, right - Labs provider and infusion     Initial Vitals: /79 (BP Location: Right arm, Patient Position: Sitting, Cuff Size: Adult Large)   Pulse 101   Temp 98  F (36.7  C) (Tympanic)   Resp 12   Ht 1.829 m (6')   Wt 108 kg (238 lb)   SpO2 98%   BMI 32.28 kg/m   Estimated body mass index is 32.28 kg/m  as calculated from the following:    Height as of this encounter: 1.829 m (6').    Weight as of this encounter: 108 kg (238 lb). Body surface area is 2.34 meters squared.  Severe Pain (6) Comment: Data Unavailable   No LMP for male patient.  Allergies reviewed: Yes  Medications reviewed: Yes    Medications: Medication refills not needed today.  Pharmacy name entered into [employer]:    [id number] - [place], [place] - [address] [place] [address] [id number] - [place], [place] - [address] [facility]    Frailty Screening:   Is the patient here for a new oncology consult visit in cancer care? 2. No      Clinical concerns:  None      Stefania Dos Santos, ADDIS

## 2024-08-19 NOTE — PROGRESS NOTES
Infusion Nursing Note:  Dk Randhawa Sr. presents today for C2D1.    Patient seen by provider today: Yes   present during visit today: Not Applicable.    Note: N/A.      Intravenous Access:  Implanted Port.    Treatment Conditions:  Lab Results   Component Value Date    HGB 11.2 (L) 08/19/2024    .3 (HH) 08/19/2024    ANEU 109.7 (H) 08/19/2024    ANEUTAUTO 28.9 (H) 08/06/2024     08/19/2024        Lab Results   Component Value Date     (L) 08/19/2024    POTASSIUM 5.1 08/19/2024    MAG 2.2 08/14/2024    CR 0.83 08/19/2024    COLLINS 8.6 (L) 08/19/2024    BILITOTAL 0.2 08/19/2024    ALBUMIN 3.7 08/19/2024    ALT 17 08/19/2024    AST 16 08/19/2024       Results reviewed, labs MET treatment parameters, ok to proceed with treatment.      Post Infusion Assessment:  Patient tolerated infusion without incident.  Blood return noted pre and post infusion.  Site patent and intact, free from redness, edema or discomfort.  No evidence of extravasations.       Discharge Plan:   Patient discharged in stable condition accompanied by: self.  Departure Mode: Ambulatory.      Loulou Euceda RN

## 2024-08-19 NOTE — PROGRESS NOTES
[facility] Hematology and Oncology Progress Note    Patient: Dk Randhawa .  MRN: [id number]  Date of Service: Aug 19, 2024       Reason for Visit    I was consulted by   Mikael Peoples MD   For evaluation and management of large cell lung cancer      Encounter Diagnoses Assessment and Plan:    Problem List Items Addressed This Visit       Malignant neoplasm of upper lobe of right lung (H)    Relevant Orders    Infusion Appointment Request - Adult    Infusion Appointment Request - Adult    Infusion Appointment Request - Adult    Large cell carcinoma of lung, right (H)    Relevant Orders    Infusion Appointment Request - Adult    Infusion Appointment Request - Adult    Infusion Appointment Request - Adult    CBC with Platelets & Differential    Comprehensive metabolic panel    Malignant neoplasm metastatic to bone (H)    Relevant Orders    Infusion Appointment Request - Adult    Infusion Appointment Request - Adult    Infusion Appointment Request - Adult    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  He received Trilaciclib support for is first cycle.  His WBC today is 114,000 and trilaciclib is placed on hold.   Patient presently feels fairly well.  His appetite is good.  He has gained back about 7 pounds since his last visit.  He does have some constipation related to oxycodone.    He has completed his gamma knife therapy.  He will proceed with cycle 2 today.  He will return on 8/28/2024 for toxicity check and to monitor his  progress.  ____________________________________________________________________________    Staging History   Cancer Staging   Malignant neoplasm of upper lobe of right lung (H)  Staging form: Lung, AJCC 8th Edition  - Pathologic: Stage IIB (pT3, pN0, cM0) - Signed by Friedell, Peter E, MD on 6/1/2024  - Clinical stage from 7/29/2024: Stage AJITH (rcTX, cN2, cM1b) - Signed by Friedell, Peter E, MD on 7/29/2024    Foundation 1 studies show a tumor proportion score of 40%.  Microsatellite status is MS stable tumor.  Mutational burden is 1 MUTS/MB   K-juanito-Q61H amplification is noted.  No therapies or clinical trials are available for this mutation    ECOG Performance = 0    History of Present Illness    Mr. Dk Randhawa . is a 68 year old man with a history of atrial fibrillation on Xarelto.  He has a history of exposure to industrial dusts and about a 20 pack year history of cigarette smoking, He quit about 10 years ago.  On 4/7/2024 he presented to the emergency room after several days of hemoptysis.  CT scan of the chest on 4/7/2024 showed a right upper lobe lung mass.  Brain MRI on 4/30/2024 was negative for metastatic disease.  PET CT scan on 5/2/2024 was positive for the RUL mass and there was uptake in some mediastinal lymph nodes.  On 5/16/2024 patient had a robotic assisted right upper lobe lobectomy by Dr. Peoples.  Pathology showed Large Cell Lung Cancer. Mediastinal nodes were negative.  Patient has made an uneventful recovery.    7/20/2024 patient presented to the emergency room with severe pain in the right hip area.  Investigation at that time showed metastatic disease particularly in the chest but likely in the right hip acetabulum as well.  Chemotherapy changed from adjuvant to definitive chemotherapy for advanced large cell lung cancer.    Update 8/19/2024.  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  Presently he feels better but he does have persistent pain in the right hip.  Does get relief from oxycodone.  He is not having chills, fevers or sweats.  He is not having cough, chest pain or shortness of breath at rest.  He has constipation with oxycodone and gets relief with stool softener.      Review of systems.  Pertinent Findings are included in the History of Present Illness    Physical Exam    /79 (BP Location: Right arm, Patient Position: Sitting, Cuff Size: Adult Large)   Pulse 101   Temp 98  F (36.7  C) (Tympanic)   Resp 12   Ht 1.829 m (6')   Wt 108 kg (238 lb)   SpO2 98%   BMI 32.28 kg/m       GENERAL APPEARANCE: 69-year-old man in no apparent distress.  HEAD: Atraumatic; normocephalic; without lesions.  EYES: Conjunctiva, corneas and eyelids normal; pupils equal, round, reactive to light; No Icterus.  MOUTH/OROPHARYNX: Oral mucosa intact  NECK: Supple with no nodes.  LUNGS:  Clear to auscultation and percussion with no extra sounds.  HEART: Irregular rhythm and rate; S1 and S2 normal; no murmurs noted.  ABDOMEN: Soft; no masses or tenderness, no hepatosplenomegaly.  NEUROLOGIC: Alert and oriented.  No obvious focal findings.  EXTREMITIES: No cyanosis, or edema.  SKIN: No abnormal bruising or bleeding. No suspicious lesions noted on exposed skin.  PSYCHIATRIC: Mental status normal; no apparent psychiatric issues      Medications:    Current Outpatient Medications   Medication Sig Dispense Refill    acetaminophen (TYLENOL) 325 MG tablet Take 2 tablets (650 mg) by mouth every 4 hours as needed for mild pain 50 tablet 0    acetaminophen (TYLENOL) 500 MG tablet Take 2 tablets (1,000 mg) by mouth every 6 hours      dexAMETHasone (DECADRON) 4 MG tablet Take 1 tablet (4 mg) by mouth 2 times daily (with meals) 60 tablet 1    diphenhydrAMINE-acetaminophen (TYLENOL PM)  MG tablet Take 2 tablets by mouth nightly as needed for sleep      fexofenadine (ALLEGRA) 180 MG tablet Take 180 mg  by mouth daily      HEMP OIL OR EXTRACT OR OTHER CBD CANNABINOID, NOT MEDICAL CANNABIS, CBD gummies      losartan (COZAAR) 100 MG tablet Take 100 mg by mouth every morning      Melatonin 10 MG TABS tablet Take 10 mg by mouth nightly as needed for sleep      metoprolol succinate ER (TOPROL XL) 100 MG 24 hr tablet Take 1 tablet by mouth every morning      multivitamin (CENTRUM SILVER) tablet Take 1 tablet by mouth daily      oxyCODONE (OXY-IR) 5 MG capsule Take 1 capsule (5 mg) by mouth every 4 hours as needed for severe pain 40 capsule 0    Turmeric (QC TUMERIC COMPLEX PO) Tumeric and ginger with applie cider 1410mg      UNABLE TO FIND MEDICATION NAME: osteo bi flex      UNABLE TO FIND MEDICATION NAME: Emergen-e 1000mg      XARELTO ANTICOAGULANT 20 MG TABS tablet Take 1 tablet (20 mg) by mouth daily (with lunch)       No current facility-administered medications for this visit.     Facility-Administered Medications Ordered in Other Visits   Medication Dose Route Frequency Provider Last Rate Last Admin    acetaminophen (TYLENOL) tablet 975 mg  975 mg Oral Q4H PRN Friedell, Peter E, MD   975 mg at 08/19/24 0942    heparin lock flush 100 unit/mL injection 5 mL  5 mL Intracatheter Once PRN Friedell, Peter E, MD        sodium chloride (PF) 0.9% PF flush 3-20 mL  3-20 mL Intracatheter q1 min prn Friedell, Peter E, MD               Past History    Past Medical History:   Diagnosis Date    Acute non-recurrent maxillary sinusitis     Antiplatelet or antithrombotic long-term use     Arrhythmia     Atrial fibrillation with RVR (H)     Cough secondary to angiotensin converting enzyme inhibitor (ACE-I)     Hyperlipidemia     Hypertension     Mass of upper lobe of right lung     Obesity      Past Surgical History:   Procedure Laterality Date    BRONCHOSCOPY, WITH BIOPSY, ROBOT ASSISTED Bilateral 04/16/2024    Procedure: BRONCHOSCOPY, endobronchial ultrasound, transbronchial needle aspiration, endobronchial biopsies and tumor  debulking;  Surgeon: Lanette Arce MD;  Location: [facility] OR    DAVINCI EXCISE NODES THORACIC N/A 5/15/2024    Procedure: mediastinal lymph node dissection;  Surgeon: Mikael Peoples MD;  Location: [facility] OR    DAVINCI LOBECTOMY LUNG Right 5/15/2024    Procedure: Robot-assisted thoracoscopic right upper lobectomy,;  Surgeon: Mikael Peoples MD;  Location: [facility] OR    ELBOW ARTHROSCOPY Left 03/09/2017    INSERT PORT VASCULAR ACCESS Right 7/5/2024    Procedure: INSERTION, VASCULAR ACCESS PORT;  Surgeon: Henry Veronica MD;  Location: [facility]    PHACOEMULSIFICATION WITH STANDARD INTRAOCULAR LENS IMPLANT Left 02/26/2018    Procedure: PHACOEMULSIFICATION WITH STANDARD INTRAOCULAR LENS IMPLANT;  Left Cataract Removal with Implant;  Surgeon: Mohit Victor MD;  Location: [facility] OR    PHACOEMULSIFICATION WITH STANDARD INTRAOCULAR LENS IMPLANT Right 01/30/2019    Procedure: Cataract Removal with Implant;  Surgeon: Mohit Victor MD;  Location: [facility] OR    TONSILLECTOMY  1964    TOTAL HIP ARTHROPLASTY Left 04/12/2022     No Known Allergies  Family History   Problem Relation Age of Onset    Ovarian Cancer Mother     Alzheimer Disease Mother     Depression Father 45        suicide    Diabetes Sister      Social History     Socioeconomic History    Marital status:      Spouse name: None    Number of children: None    Years of education: None    Highest education level: None   Tobacco Use    Smoking status: Former     Current packs/day: 0.00     Types: Cigarettes     Quit date: 2014     Years since quitting: 10.4    Smokeless tobacco: Never   Vaping Use    Vaping status: Never Used   Substance and Sexual Activity    Alcohol use: Not Currently    Drug use: No     Social Determinants of Health     Financial Resource Strain: Low Risk  (10/23/2023)    [facility]    Financial Resource Strain     Difficulty of Paying Living  Expenses: 3   Food Insecurity: No Food Insecurity (10/23/2023)    [facility], [facility]    Food Insecurity     Worried About Running Out of Food in the Last Year: 1   Transportation Needs: No Transportation Needs (10/23/2023)    Received from [facility], [facility]    Transportation Needs     Lack of Transportation (Medical): 1   Social Connections: Socially Integrated (10/23/2023)    Received from [facility], [facility]    Social Connections     Frequency of Communication with Friends and Family: 0   Housing Stability: Low Risk  (10/23/2023)    Received from [facility], [facility]    Housing Stability     Unable to Pay for Housing in the Last Year: 1           Lab Results      Recent Results (from the past 720 hour(s))   Comprehensive metabolic panel    Collection Time: 07/24/24  7:41 AM   Result Value Ref Range    Sodium 126 (L) 135 - 145 mmol/L    Potassium 4.8 3.4 - 5.3 mmol/L    Carbon Dioxide (CO2) 23 22 - 29 mmol/L    Anion Gap 11 7 - 15 mmol/L    Urea Nitrogen 14.9 8.0 - 23.0 mg/dL    Creatinine 0.75 0.67 - 1.17 mg/dL    GFR Estimate >90 >60 mL/min/1.73m2    Calcium 8.8 8.8 - 10.4 mg/dL    Chloride 92 (L) 98 - 107 mmol/L    Glucose 97 70 - 99 mg/dL    Alkaline Phosphatase 121 40 - 150 U/L    AST 18 0 - 45 U/L    ALT 9 0 - 70 U/L    Protein Total 6.5 6.4 - 8.3 g/dL    Albumin 3.9 3.5 - 5.2 g/dL    Bilirubin Total 0.3 <=1.2 mg/dL   Magnesium    Collection Time: 07/24/24  7:41 AM   Result Value Ref Range    Magnesium 2.0 1.7 - 2.3 mg/dL   CBC with platelets and differential    Collection Time: 07/24/24  7:41 AM   Result Value Ref Range    WBC Count 33.0 (H) 4.0 - 11.0 10e3/uL    RBC Count 4.46 4.40 - 5.90 10e6/uL    Hemoglobin  13.1 (L) 13.3 - 17.7 g/dL    Hematocrit 38.6 (L) 40.0 - 53.0 %    MCV 87 78 - 100 fL    MCH 29.4 26.5 - 33.0 pg    MCHC 33.9 31.5 - 36.5 g/dL    RDW 13.8 10.0 - 15.0 %    Platelet Count 314 150 - 450 10e3/uL    NRBCs per 100 WBC 0 <1 /100    Absolute NRBCs 0.0 10e3/uL   Manual Differential    Collection Time: 07/24/24  7:41 AM   Result Value Ref Range    % Neutrophils 91 %    % Lymphocytes 4 %    % Monocytes 4 %    % Eosinophils 0 %    % Basophils 1 %    Absolute Neutrophils 30.0 (H) 1.6 - 8.3 10e3/uL    Absolute Lymphocytes 1.3 0.8 - 5.3 10e3/uL    Absolute Monocytes 1.3 0.0 - 1.3 10e3/uL    Absolute Eosinophils 0.0 0.0 - 0.7 10e3/uL    Absolute Basophils 0.3 (H) 0.0 - 0.2 10e3/uL    RBC Morphology Confirmed RBC Indices     Platelet Assessment  Automated Count Confirmed. Platelet morphology is normal.     Automated Count Confirmed. Platelet morphology is normal.   Osmolality, random urine    Collection Time: 07/24/24  9:58 AM   Result Value Ref Range    Osmolality Urine 572 100 - 1,200 mmol/kg   Sodium random urine    Collection Time: 07/24/24  9:58 AM   Result Value Ref Range    Sodium Urine mmol/L 85 mmol/L   UA Macroscopic with reflex to Microscopic and Culture - Lab Collect    Collection Time: 07/24/24  9:58 AM    Specimen: Urine, NOS   Result Value Ref Range    Color Urine Yellow Colorless, Straw, Light Yellow, Yellow    Appearance Urine Clear Clear    Glucose Urine Negative Negative mg/dL    Bilirubin Urine Negative Negative    Ketones Urine 5 (A) Negative mg/dL    Specific Gravity Urine 1.017 1.003 - 1.035    Blood Urine Negative Negative    pH Urine 7.0 5.0 - 7.0    Protein Albumin Urine Negative Negative mg/dL    Urobilinogen Urine Normal Normal, 2.0 mg/dL    Nitrite Urine Negative Negative    Leukocyte Esterase Urine Negative Negative   Magnesium    Collection Time: 07/29/24 12:53 PM   Result Value Ref Range    Magnesium 1.9 1.7 - 2.3 mg/dL   Basic metabolic panel  (Ca, Cl, CO2, Creat, Gluc, K, Na, BUN)     Collection Time: 07/29/24 12:53 PM   Result Value Ref Range    Sodium 128 (L) 135 - 145 mmol/L    Potassium 4.5 3.4 - 5.3 mmol/L    Chloride 94 (L) 98 - 107 mmol/L    Carbon Dioxide (CO2) 22 22 - 29 mmol/L    Anion Gap 12 7 - 15 mmol/L    Urea Nitrogen 12.8 8.0 - 23.0 mg/dL    Creatinine 0.74 0.67 - 1.17 mg/dL    GFR Estimate >90 >60 mL/min/1.73m2    Calcium 9.0 8.8 - 10.4 mg/dL    Glucose 117 (H) 70 - 99 mg/dL   CBC with platelets and differential    Collection Time: 07/29/24 12:53 PM   Result Value Ref Range    WBC Count 43.0 (H) 4.0 - 11.0 10e3/uL    RBC Count 4.39 (L) 4.40 - 5.90 10e6/uL    Hemoglobin 12.9 (L) 13.3 - 17.7 g/dL    Hematocrit 37.2 (L) 40.0 - 53.0 %    MCV 85 78 - 100 fL    MCH 29.4 26.5 - 33.0 pg    MCHC 34.7 31.5 - 36.5 g/dL    RDW 13.9 10.0 - 15.0 %    Platelet Count 357 150 - 450 10e3/uL    % Neutrophils 88 %    % Lymphocytes 3 %    % Monocytes 5 %    % Eosinophils 1 %    % Basophils 1 %    % Immature Granulocytes 3 %    NRBCs per 100 WBC 0 <1 /100    Absolute Neutrophils 37.7 (H) 1.6 - 8.3 10e3/uL    Absolute Lymphocytes 1.3 0.8 - 5.3 10e3/uL    Absolute Monocytes 2.2 (H) 0.0 - 1.3 10e3/uL    Absolute Eosinophils 0.4 0.0 - 0.7 10e3/uL    Absolute Basophils 0.2 0.0 - 0.2 10e3/uL    Absolute Immature Granulocytes 1.2 (H) <=0.4 10e3/uL    Absolute NRBCs 0.0 10e3/uL   RBC and Platelet Morphology    Collection Time: 07/29/24 12:53 PM   Result Value Ref Range    RBC Morphology Confirmed RBC Indices     Platelet Assessment  Automated Count Confirmed. Platelet morphology is normal.     Automated Count Confirmed. Platelet morphology is normal.   CBC with platelets and differential    Collection Time: 08/06/24  1:58 PM   Result Value Ref Range    WBC Count 31.3 (H) 4.0 - 11.0 10e3/uL    RBC Count 4.04 (L) 4.40 - 5.90 10e6/uL    Hemoglobin 11.6 (L) 13.3 - 17.7 g/dL    Hematocrit 34.4 (L) 40.0 - 53.0 %    MCV 85 78 - 100 fL    MCH 28.7 26.5 - 33.0 pg    MCHC 33.7 31.5 - 36.5 g/dL    RDW 13.8 10.0 -  15.0 %    Platelet Count 317 150 - 450 10e3/uL    % Neutrophils 92 %    % Lymphocytes 2 %    % Monocytes 1 %    % Eosinophils 0 %    % Basophils 0 %    % Immature Granulocytes 5 %    NRBCs per 100 WBC 0 <1 /100    Absolute Neutrophils 28.9 (H) 1.6 - 8.3 10e3/uL    Absolute Lymphocytes 0.5 (L) 0.8 - 5.3 10e3/uL    Absolute Monocytes 0.2 0.0 - 1.3 10e3/uL    Absolute Eosinophils 0.0 0.0 - 0.7 10e3/uL    Absolute Basophils 0.0 0.0 - 0.2 10e3/uL    Absolute Immature Granulocytes 1.7 (H) <=0.4 10e3/uL    Absolute NRBCs 0.0 10e3/uL   RBC and Platelet Morphology    Collection Time: 08/06/24  1:58 PM   Result Value Ref Range    RBC Morphology Confirmed RBC Indices     Platelet Assessment  Automated Count Confirmed. Platelet morphology is normal.     Automated Count Confirmed. Platelet morphology is normal.    Reactive Lymphocytes Present (A) None Seen   Magnesium    Collection Time: 08/14/24  2:21 PM   Result Value Ref Range    Magnesium 2.2 1.7 - 2.3 mg/dL   Comprehensive metabolic panel    Collection Time: 08/14/24  2:21 PM   Result Value Ref Range    Sodium 132 (L) 135 - 145 mmol/L    Potassium 4.9 3.4 - 5.3 mmol/L    Carbon Dioxide (CO2) 24 22 - 29 mmol/L    Anion Gap 12 7 - 15 mmol/L    Urea Nitrogen 27.7 (H) 8.0 - 23.0 mg/dL    Creatinine 0.94 0.67 - 1.17 mg/dL    GFR Estimate 87 >60 mL/min/1.73m2    Calcium 8.9 8.8 - 10.4 mg/dL    Chloride 96 (L) 98 - 107 mmol/L    Glucose 120 (H) 70 - 99 mg/dL    Alkaline Phosphatase 198 (H) 40 - 150 U/L    AST 17 0 - 45 U/L    ALT 17 0 - 70 U/L    Protein Total 6.6 6.4 - 8.3 g/dL    Albumin 3.8 3.5 - 5.2 g/dL    Bilirubin Total 0.3 <=1.2 mg/dL   CBC with platelets and differential    Collection Time: 08/14/24  2:21 PM   Result Value Ref Range    WBC Count 72.0 (HH) 4.0 - 11.0 10e3/uL    RBC Count 3.94 (L) 4.40 - 5.90 10e6/uL    Hemoglobin 11.6 (L) 13.3 - 17.7 g/dL    Hematocrit 33.7 (L) 40.0 - 53.0 %    MCV 86 78 - 100 fL    MCH 29.4 26.5 - 33.0 pg    MCHC 34.4 31.5 - 36.5 g/dL     RDW 15.9 (H) 10.0 - 15.0 %    Platelet Count 296 150 - 450 10e3/uL    NRBCs per 100 WBC 0 <1 /100    Absolute NRBCs 0.0 10e3/uL   Manual Differential    Collection Time: 08/14/24  2:21 PM   Result Value Ref Range    % Neutrophils 90 %    % Lymphocytes 3 %    % Monocytes 4 %    % Eosinophils 0 %    % Basophils 0 %    % Myelocytes 3 %    Absolute Neutrophils 64.8 (H) 1.6 - 8.3 10e3/uL    Absolute Lymphocytes 2.2 0.8 - 5.3 10e3/uL    Absolute Monocytes 2.9 (H) 0.0 - 1.3 10e3/uL    Absolute Eosinophils 0.0 0.0 - 0.7 10e3/uL    Absolute Basophils 0.0 0.0 - 0.2 10e3/uL    Absolute Myelocytes 2.2 (H) <=0.0 10e3/uL    RBC Morphology Confirmed RBC Indices     Platelet Assessment  Automated Count Confirmed. Platelet morphology is normal.     Automated Count Confirmed. Platelet morphology is normal.   Comprehensive metabolic panel    Collection Time: 08/19/24  7:47 AM   Result Value Ref Range    Sodium 125 (L) 135 - 145 mmol/L    Potassium 5.1 3.4 - 5.3 mmol/L    Carbon Dioxide (CO2) 25 22 - 29 mmol/L    Anion Gap 6 (L) 7 - 15 mmol/L    Urea Nitrogen 24.0 (H) 8.0 - 23.0 mg/dL    Creatinine 0.83 0.67 - 1.17 mg/dL    GFR Estimate >90 >60 mL/min/1.73m2    Calcium 8.6 (L) 8.8 - 10.4 mg/dL    Chloride 94 (L) 98 - 107 mmol/L    Glucose 77 70 - 99 mg/dL    Alkaline Phosphatase 301 (H) 40 - 150 U/L    AST 16 0 - 45 U/L    ALT 17 0 - 70 U/L    Protein Total 6.2 (L) 6.4 - 8.3 g/dL    Albumin 3.7 3.5 - 5.2 g/dL    Bilirubin Total 0.2 <=1.2 mg/dL   TSH with free T4 reflex    Collection Time: 08/19/24  7:47 AM   Result Value Ref Range    TSH 1.36 0.30 - 4.20 uIU/mL   CBC with platelets and differential    Collection Time: 08/19/24  7:47 AM   Result Value Ref Range    WBC Count 114.3 (HH) 4.0 - 11.0 10e3/uL    RBC Count 3.79 (L) 4.40 - 5.90 10e6/uL    Hemoglobin 11.2 (L) 13.3 - 17.7 g/dL    Hematocrit 32.9 (L) 40.0 - 53.0 %    MCV 87 78 - 100 fL    MCH 29.6 26.5 - 33.0 pg    MCHC 34.0 31.5 - 36.5 g/dL    RDW 17.4 (H) 10.0 - 15.0 %     Platelet Count 251 150 - 450 10e3/uL    NRBCs per 100 WBC 0 <1 /100    Absolute NRBCs 0.0 10e3/uL   Manual Differential    Collection Time: 08/19/24  7:47 AM   Result Value Ref Range    % Neutrophils 96 %    % Lymphocytes 1 %    % Monocytes 2 %    % Eosinophils 0 %    % Basophils 0 %    % Metamyelocytes 1 %    Absolute Neutrophils 109.7 (H) 1.6 - 8.3 10e3/uL    Absolute Lymphocytes 1.1 0.8 - 5.3 10e3/uL    Absolute Monocytes 2.3 (H) 0.0 - 1.3 10e3/uL    Absolute Eosinophils 0.0 0.0 - 0.7 10e3/uL    Absolute Basophils 0.0 0.0 - 0.2 10e3/uL    Absolute Metamyelocytes 1.1 (H) <=0.0 10e3/uL    RBC Morphology Confirmed RBC Indices     Platelet Assessment  Automated Count Confirmed. Platelet morphology is normal.     Automated Count Confirmed. Platelet morphology is normal.         Imaging Results    MRI Brain w contrast    Result Date: 8/8/2024  EXAM: MR BRAIN W CONTRAST  8/8/2024 7:46 AM HISTORY:  Metastasis to brain (H)   COMPARISON:  Brain MR with and without contrast 7/30/2024, multiple priors TECHNIQUE: MR imaging performed with 3-dimensonally acquired T1-weighted sequences performed with intravenous contrast. CONTRAST: 10mL Gadavist. FINDINGS: There are 9 total ring-enhancing metastases. Metastasis in the left parietal lobe measures approximately 13.6 x 9.2 mm, decreased in size from prior (series 2 image 138). Metastasis in the left posterior periventricular white matter measures approximately 5 mm, unchanged (series 2, image 114). Right thalamic metastasis measures 5.4 mm x 3.2 mm, slightly increased (series 2, image 103). Metastasis in left occipital lobe measures 19.8 mm x 24.2 mm, stable (series 2, image 65). Metastasis in the right occipital lobe measures approximately 14 x 9 mm x 12 mm, stable (series 2, image 62). Metastasis in the vermis of the cerebellum measures 2.6 mm, stable (series 2, image 65). Metastasis in the left posterior cerebellum measures 19.7 mm x 13.9 mm, slightly decreased (series 2, image  51). Additional small metastases in the left cerebellum measures 2.8 and 2.2 mm, stable (series 2, images 46-48). The There is no mass effect, midline shift, or intracranial hemorrhage. The ventricles are proportionate to the cerebral sulci. No suspicious abnormality of the skull marrow signal. Clear paranasal sinuses. Mastoid air cells are clear. No focal abnormality of the pituitary gland, sella, skull base and upper cervical spinal structures on sagittal images. The orbits are normal.     Impression: 1. 9 total ring-enhancing metastases, which were discussed with radiation oncology within the reading room. 2. No intracranial hemorrhage. I have personally reviewed the examination and initial interpretation and I agree with the findings. DAYANA REYES MD   SYSTEM ID:  [id number]    XR Chest/Heart Fluoro    Result Date: 8/1/2024  CHEST/HEART FLUORO  8/1/2024 12:29 PM HISTORY: Port not working. Needed for chemo. Malignant neoplasm of upper lobe of right lung (H). COMPARISON: Chest x-ray dated 7/5/2024. FINDINGS: The port was accessed in sterile fashion. Small amount of Isovue was injected and flowed freely from the tip of the catheter. Blood return could be obtained before and after injection of contrast. The port was flushed with sterile saline.     IMPRESSION:  Patent port catheter. Blood return was obtained before and after injection. ARIA HWANG MD   SYSTEM ID:  [id number]    MR Brain w/o & w Contrast    Result Date: 7/30/2024  MRI BRAIN WITHOUT AND WITH CONTRAST July 30, 2024 2:35 PM HISTORY: Large cell carcinoma of lung, right. TECHNIQUE: Multiplanar, multisequence MRI of the brain without and with 11 mL Gadavist. COMPARISON: Brain MR 4/30/2024. FINDINGS: Multiple new enhancing intracranial masses concerning for metastatic disease. There is evidence of intralesional hemorrhage of the lesions in the left occipital lobe and left cerebellum which demonstrates susceptibility hypointensity and peripheral  rind of T1 hyperintensity. Subtle susceptibility hyperintensity in the other lesions could represent subtle intralesional hemorrhage. New enhancing intracranial masses are listed below: *  1.8 cm left parietal lobe and (series 14 image 141) *  0.5 cm left parietal white matter (series 14 image 115) *  Peripherally enhancing lesion with intralesional hemorrhage in the left occipital lobe measuring 2.6 cm (series 14 image 77) *  1.7 cm right occipital lobe (series 14 image 77) *  Subtle linear enhancement superior right cerebellum measuring 0.3 cm (series 14 image 69) *  Irregularly-shaped enhancing lesion with intralesional hemorrhage in the left cerebellum measuring 2.3 cm (series 14 image 55) *  Subtle enhancing 0.3 cm nodule left cerebellum (series 14 image 49) *  0.3 cm nodule left cerebellum (series 14 image 45) There is T2 hyperintense edema around the larger enhancing lesions with particular edema around the hemorrhagic lesions in the left occipital lobe and left cerebellum. No significant midline shift or herniation. Ventricular size is within normal limits without evidence of hydrocephalus. No restricted diffusion in the brain parenchyma to suggest acute infarct. Mild patchy periventricular white matter T2 hyperintensities which are similar to prior and likely due to chronic microvascular ischemic disease. The facial structures appear normal. The major arterial T2 flow voids at the base of the brain appear patent.     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 Surrounding edema around the larger lesions. No significant midline shift, herniation, or hydrocephalus. ABIEL BARRERA MD   SYSTEM ID:  [id number]    CT Chest/Abdomen/Pelvis w Contrast    Result Date: 7/25/2024  EXAM: CT CHEST/ABDOMEN/PELVIS W CONTRAST LOCATION: [facility] [place] DATE: 7/25/2024 INDICATION: hx resected  lung cancer. Hyponatremia + right hip pain r o recurrence. Leukocytosis fever   rule out infection COMPARISON: PET/CT 5/2/2024 TECHNIQUE: CT scan of the chest, abdomen, and pelvis was performed following injection of IV contrast. Multiplanar reformats were obtained. Dose reduction techniques were used. CONTRAST: isovue 370 90ml FINDINGS: LUNGS AND PLEURA: Status post right upper lobectomy. There is architectural distortion at the right hilum due to a combination of surgery and compression of the right lung by the moderate right pleural effusion. There are several enhancing nodules along the pleural surfaces in the azygoesophageal recess and largest in the basal pleural space along the posterior costal pleura measuring 19 mm (series 2, image 68). A staple line is present in the right middle lobe along the mediastinal pleura. There is a 5  mm pulmonary nodule in the right lower lobe along the posterior costal pleura (series 4, image 289). 8 x 11 mm solid pulmonary nodule in the lingula contacting the mediastinal pleura (series 4, image 234) and solid 9 x 11 mm nodule in the lateral left lower lobe (image 269). Upper lung predominant emphysema. MEDIASTINUM: Cardiac chambers are normal in size. No pericardial effusion. Main pulmonary artery is normal in size. Normal caliber thoracic aorta. Mixed attenuation plaque in the great vessels, aortic arch, and descending thoracic aorta. Morphologically abnormal right lower paratracheal lymph node measures 10 mm.. Esophagus is decompressed. CORONARY ARTERY CALCIFICATION: Severe. HEPATOBILIARY: The liver is normal in size. No hepatic parenchymal lesions. Gallbladder is decompressed. No calcified gallstones. The common bile duct is dilated measuring 15 mm. No choledocholithiasis or bile duct wall thickening. PANCREAS: There is a new 13 mm nodule superior to the junction of the pancreas body and neck (series 3, image 165). The remainder of the pancreas is normal in enhancement and  architecture. SPLEEN: Normal. ADRENAL GLANDS: Normal. KIDNEYS/BLADDER: Normal. BOWEL: Normal. LYMPH NODES: Normal. VASCULATURE: Moderate patchy aortoiliac atheromatous calcifications with mild iliac artery tortuosity. No aneurysm. PELVIC ORGANS: Prostate gland is normal in size. Seminal vesicles are symmetric. No pelvic free fluid. MUSCULOSKELETAL: There is a left hip joint replacement. Moderate degenerative osteophytes and disc space narrowing of the thoracic and lumbar spine. Lumbar facet arthropathy. The cortex and medullary space of the posterior right acetabulum is mild the/permeative in appearance compared to the contralateral side suspect for subtle bone metastasis. No pathologic fracture.     IMPRESSION: 1.  Status post right upper lobectomy. Moderate right pleural effusion with several enhancing pleural nodules consistent with pleural metastases. 2.  Morphologically abnormal right lower paratracheal lymph node and several new solid left lung nodules consistent with katelyn and contralateral lung metastases. 3.  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 4.  Permeative appearance of the posterior right acetabulum suspect for bone metastasis. This could be confirmed with right hip MRI.         I spent 40 minutes on the patient's visit today.  This included preparation for the visit, face-to-face time with the patient and documentation following the visit.  It did not include teaching or procedure time.    Signed by: Peter E. Friedell, MD

## 2024-08-19 NOTE — LETTER
8/19/2024      Dk Randhawa Sr.  [address] [facility] [address]      Dear Colleague,    Thank you for referring your patient, Dk HARRISON Sydnee Kwon, to the [facility]. Please see a copy of my visit note below.    No notes on file    Again, thank you for allowing me to participate in the care of your patient.        Sincerely,        JAMA Andino MD

## 2024-08-20 NOTE — PROGRESS NOTES
Infusion Nursing Note:  Dk Randhawa Sr. presents today for C2D2 toposar.    Patient seen by provider today: No   present during visit today: Not Applicable.    Note: N/A.      Intravenous Access:  Implanted Port.    Treatment Conditions:  Not Applicable.      Post Infusion Assessment:  Patient tolerated infusion without incident.  Blood return noted pre and post infusion.  Site patent and intact, free from redness, edema or discomfort.  No evidence of extravasations.       Discharge Plan:   Discharge instructions reviewed with: Patient.  Patient discharged in stable condition accompanied by: self.  Departure Mode: Ambulatory.      Tammy Boles RN

## 2024-08-20 NOTE — PROCEDURES
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     Treatment Summary:  Radiation Oncology - Course: 1 Protocol:   Treatment Site  Current Dose Modality From To Elapsed Days Fx.  1A R occipital   3,000 cGy [place] 60  2024   6   5  1B L cerebellum   3,000 cGy [place] 60  2024   6   5  1C L parietal   3,000 cGy [place] 60  2024   6   5  1D L occipital   3,000 cGy [place] 60  2024   6   5  1E L parietal 2   2,200 cGy [place] 60  2024   1  1F R vermis   2,200 cGy [place] 60  2024   1  1G L cerebellum 2   2,200 cGy [place] 60  2024   1  1H L cerebellum 3   2,200 cGy [place] 60  2024   1  1I R thalamus   1,800 cGy [place] 60  2024   1          COMMENTS:                         No unexpected radiation induced toxicity to report     ED visits/hospitalizations: none     Missed treatments: none     Acute Toxicity Profile by CTC v5.0: none     PAIN MANAGEMENT:    N/A                         FOLLOW UP PLAN:       Rad Onc in 3 months with repeat brain MRI  Follow up with Med Onc in order to resume systemic therapy.                       Staff Physician: Shoshana Andino M.D.                                        Radiation Oncology:  [facility] [address] [place] [address]

## 2024-08-21 NOTE — PROGRESS NOTES
Infusion Nursing Note:  Dk Randhawa Sr. presents today for Etoposide C2D3    Patient seen by provider today: No   present during visit today: Not Applicable.    Note: No concerns today.     Intravenous Access:  Implanted Port previously accessed.     Treatment Conditions:  Results reviewed, labs MET treatment parameters, ok to proceed with treatment.    Post Infusion Assessment:  Patient tolerated infusion without incident.  Blood return noted pre and post infusion.  Site patent and intact, free from redness, edema or discomfort.  No evidence of extravasations.  Access discontinued per protocol.     Discharge Plan:   Patient discharged in stable condition accompanied by: self.  Departure Mode: Ambulatory.    Dennis Ortega RN

## 2024-08-23 PROBLEM — M84.550A: Status: ACTIVE | Noted: 2024-01-01

## 2024-08-23 NOTE — ED NOTES
"Patient has  [place] to Observation  order. Patient has been given the Observation brochure -  What does Observation mean to me.\"  Patient has been given the opportunity to ask questions about observation status and their plan of care.      Halley Gonzalez RN   "

## 2024-08-23 NOTE — ED TRIAGE NOTES
Pt sent here from oncology with right hip pain. States this hip has been bothering him for a while. Gotten worse over the last week. No known injury. Pt took 2 oxycodone about an hour prior to arrival based on advice from his oncology team. Reports the pain has improved a little bit. Hip feels better when sitting. Uses ice and heat. Hurts worse when walking.      Triage Assessment (Adult)       Row Name 08/23/24 1124          Triage Assessment    Airway WDL WDL        Respiratory WDL    Respiratory WDL WDL        Skin Circulation/Temperature WDL    Skin Circulation/Temperature WDL WDL        Cardiac WDL    Cardiac WDL WDL        Peripheral/Neurovascular WDL    Peripheral Neurovascular WDL WDL        Cognitive/Neuro/Behavioral WDL    Cognitive/Neuro/Behavioral WDL WDL

## 2024-08-23 NOTE — ED PROVIDER NOTES
HPI   Patient is a 69-year-old male presenting with worsened right hip and low back pain.  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  He underwent a lobectomy.  He has metastases to brain and bone.  He had a CT scan performed on 7/25 showing concern for right acetabular metastases.  He is receiving chemotherapy and radiation.  His most recent chemotherapy was performed on 8/20.  He takes oxycodone for pain control.  He has a history of PE.  He presents by private car with his brother who is an independent historian.    The patient has been having off-and-on right hip pain.  This has been ongoing for weeks.  However, it has progressively worsened and over the past 4 days it is constant.  Movement worsens his pain.  Weightbearing worsens his pain.  No radiating pain below the buttock.  He does have low back pain in addition.  This is felt more on the right than the left.  No obvious fall or injury.  No difficulty with bowel or bladder.  No abdominal or pelvic pain.  No flank pain.  No testicular pain, tenderness, or swelling.  No skin rash.  No fever.      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) 07/26/2024     Priority: Medium    Malignant neoplasm metastatic to pancreas (H) 07/26/2024     Priority: Medium    Malignant neoplasm of upper lobe of right lung (H) 05/15/2024     Priority: Medium    Atrial fibrillation with RVR (H) 10/23/2023     Priority: Medium    Obesity (BMI 30.0-34.9) 04/01/2022     Priority: Medium    Cough due to angiotensin-converting enzyme  inhibitor 12/02/2015     Priority: Medium    CARDIOVASCULAR SCREENING; LDL GOAL LESS THAN 160 10/31/2010     Priority: Medium    Essential hypertension, benign 05/18/2010     Priority: Medium    Hyperlipidemia 05/18/2010     Priority: Medium    Pre-diabetes 05/18/2010     Priority: Medium      Past Medical History:    Past Medical History:   Diagnosis Date    Acute non-recurrent maxillary sinusitis     Antiplatelet or antithrombotic long-term use     Arrhythmia     Atrial fibrillation with RVR (H)     Cough secondary to angiotensin converting enzyme inhibitor (ACE-I)     Hyperlipidemia     Hypertension     Mass of upper lobe of right lung     Obesity      Past Surgical History:    Past Surgical History:   Procedure Laterality Date    BRONCHOSCOPY, WITH BIOPSY, ROBOT ASSISTED Bilateral 04/16/2024    Procedure: BRONCHOSCOPY, endobronchial ultrasound, transbronchial needle aspiration, endobronchial biopsies and tumor debulking;  Surgeon: Lanette Arce MD;  Location: [facility] OR    DAVINCI EXCISE NODES THORACIC N/A 5/15/2024    Procedure: mediastinal lymph node dissection;  Surgeon: Mikael Peoples MD;  Location: [facility] OR    DAVINCI LOBECTOMY LUNG Right 5/15/2024    Procedure: Robot-assisted thoracoscopic right upper lobectomy,;  Surgeon: Mikael Peoples MD;  Location:  OR    ELBOW ARTHROSCOPY Left 03/09/2017    INSERT PORT VASCULAR ACCESS Right 7/5/2024    Procedure: INSERTION, VASCULAR ACCESS PORT;  Surgeon: Henry Veronica MD;  Location: [facility] OR    PHACOEMULSIFICATION WITH STANDARD INTRAOCULAR LENS IMPLANT Left 02/26/2018    Procedure: PHACOEMULSIFICATION WITH STANDARD INTRAOCULAR LENS IMPLANT;  Left Cataract Removal with Implant;  Surgeon: Mohit Victor MD;  Location: [facility] OR    PHACOEMULSIFICATION WITH STANDARD INTRAOCULAR LENS IMPLANT Right 01/30/2019    Procedure: Cataract Removal with Implant;  Surgeon: Mohit Victor MD;  Location: [facility] OR    TONSILLECTOMY  1964    TOTAL HIP ARTHROPLASTY Left  04/12/2022     Family History:    Family History   Problem Relation Age of Onset    Ovarian Cancer Mother     Alzheimer Disease Mother     Depression Father 45        suicide    Diabetes Sister      Social History:  Marital Status:   [4]  Social History     Tobacco Use    Smoking status: Former     Current packs/day: 0.00     Average packs/day: 0.5 packs/day for 20.0 years (10.0 ttl pk-yrs)     Types: Cigarettes     Start date: 1994     Quit date: 2014     Years since quitting: 10.6    Smokeless tobacco: Never   Vaping Use    Vaping status: Never Used   Substance Use Topics    Alcohol use: Not Currently    Drug use: No      Medications:    acetaminophen (TYLENOL) 325 MG tablet  acetaminophen (TYLENOL) 500 MG tablet  dexAMETHasone (DECADRON) 4 MG tablet  diphenhydrAMINE-acetaminophen (TYLENOL PM)  MG tablet  fexofenadine (ALLEGRA) 180 MG tablet  HEMP OIL OR EXTRACT OR OTHER CBD CANNABINOID, NOT MEDICAL CANNABIS,  losartan (COZAAR) 100 MG tablet  Melatonin 10 MG TABS tablet  metoprolol succinate ER (TOPROL XL) 100 MG 24 hr tablet  multivitamin (CENTRUM SILVER) tablet  oxyCODONE (OXY-IR) 5 MG capsule  Turmeric (QC TUMERIC COMPLEX PO)  UNABLE TO FIND  UNABLE TO FIND  XARELTO ANTICOAGULANT 20 MG TABS tablet      Review of Systems   All other systems reviewed and are negative.      PE   BP: (!) 112/90  Pulse: 112  Temp: 97.7  F (36.5  C)  Resp: 16  Height: 182.9 cm (6')  Weight: 108 kg (238 lb)  SpO2: 98 %  Physical Exam  Vitals and nursing note reviewed.   Constitutional:       General: He is not in acute distress.  HENT:      Head: Atraumatic.      Right Ear: External ear normal.      Left Ear: External ear normal.      Nose: Nose normal.      Mouth/Throat:      Mouth: Mucous membranes are moist.      Pharynx: Oropharynx is clear.   Eyes:      General: No scleral icterus.     Extraocular Movements: Extraocular movements intact.      Conjunctiva/sclera: Conjunctivae normal.      Pupils: Pupils are equal,  round, and reactive to light.   Cardiovascular:      Rate and Rhythm: Normal rate.   Pulmonary:      Effort: Pulmonary effort is normal. No respiratory distress.   Musculoskeletal:         General: Normal range of motion.      Cervical back: Normal range of motion.   Skin:     General: Skin is warm and dry.   Neurological:      Mental Status: He is alert and oriented to person, place, and time.   Psychiatric:         Behavior: Behavior normal.         ED COURSE and MDM   1311.  Patient had CT imaging of the pelvis and low back as recently as 7/25.  However, the patient has progressive pain that is now constant and severe.  Repeat CT imaging pending.  Patient took 2 oxycodone prior to coming in.  Long-acting morphine likely needed, addition of MS Contin will likely be ordered at the time of discharge.    1630.  Patient with pathologic fractures involving the pelvis as described on CT.  I spoke with Dr. Delgado, [facility] orthopedics, who is willing to see the patient here in the hospital after admission.  No surgical procedures likely, regular diet ordered.  Pain control needed.  Likely palliative care consultation needed.  PT/OT likely needed.  TCU?    Electronic medical chart reviewed, including medical problems, medications, medical allergies, social history.  Recent hospitalizations and surgical procedures reviewed.  Recent clinic visits and consultations reviewed.  Recent labs and test results reviewed.  Nursing notes reviewed.    The patient, their parent if applicable, and/or their medical decision maker(s) and I have reviewed all of the available historical information, applicable PMH, physical exam findings, and objective diagnostic data gathered during this ED visit.  We then discussed all work-up options and then together agreed upon the course taken during this visit.  The ultimate disposition and plan was a cooperative decision made between myself and the patient, their parent if applicable, and/or their  legal decision maker(s).  The risks and benefits of all decisions made during this visit were discussed to the best of my abilities given the circumstances, and all parties are understanding of the pertinent ramifications of these decisions.      LABS  Labs Ordered and Resulted from Time of ED Arrival to Time of ED Departure - No data to display    IMAGING  Images reviewed by me.  Radiology report also reviewed.  CT Lumbar Spine w/o Contrast   Final Result   IMPRESSION:  Five lumbar type vertebrae. Straightening of normal   lumbar lordosis. Alignment otherwise normal. Vertebral body heights   normal. No fractures. Loss of disc space height, posterior disc   bulging and vacuum disc formation at the L1-L2, L2-L3, L4-L5 and L5-S1   levels. Moderate to severe facet arthropathy bilaterally at L2-L3,   L3-L4, L4-L5 and L5-S1. No high-grade spinal canal stenosis of the   lumbar spine. No high-grade neural foraminal stenosis at any level of   the lumbar spine.      Radiation dose for this scan was reduced using automated exposure   control, adjustment of the mA and/or kV according to patient size, or   iterative reconstruction technique.       FRANK CHAVEZ MD            SYSTEM ID:  [id number]      CT Pelvis Bone wo Contrast   Final Result   IMPRESSION:   1.  Comminuted pathologic nondisplaced fracture of the right   acetabulum.   2.  Mildly displaced probable additional pathologic fracture of the   junction of the right inferior pubic ramus/right ischium.   3.  Nondisplaced pathologic fracture in the left superior pubic ramus.   4.  Additional scattered lucent lesions, suspicious for additional   metastases.      NOTE: ABNORMAL REPORT      THE DICTATION ABOVE DESCRIBES AN ABNORMAL REPORT FOR WHICH FOLLOW-UP   IS NEEDED.      KATHERYN MCELROY MD            SYSTEM ID:  [id number]          Procedures    Medications   oxyCODONE (ROXICODONE) tablet 10 mg (10 mg Oral $Given 8/23/24 4275)         IMPRESSION       ICD-10-CM     1. Pathological fracture in neoplastic disease, pelvis, init  M84.550A                Medication List      There are no discharge medications for this visit.                             Shaun Chamberlain MD  08/23/24 7081

## 2024-08-23 NOTE — CONFIDENTIAL NOTE
[facility]: Cancer Care                                                                                          Patient called stating he is having 10/10 right hip pain and states he can barely stand or walk and cannot handle the pain. He is currently taking oxycodone and tylenol for pain and states this is not helping. Discussed with DR. Friedell, patient to take 2 oxycodone now and he should go to the ER due to the amount of stress he is in and will likely need an xray to rule out fracture. Patient in agreement with plan and will have his brother take him in. Called ER to update them also.    Signature:  Carmen Campbell RN

## 2024-08-23 NOTE — ED NOTES
[facility]   Admission Handoff    The patient is Dk Randhawa Sr., 69 year old who arrived in the ED by CAR from home with a complaint of Hip Pain  . The patient's current symptoms are new and during this time the symptoms have decreased. 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, pelvis, init         Needed?: No    Allergies:  No Known Allergies    Past Medical Hx:   Past Medical History:   Diagnosis Date    Acute non-recurrent maxillary sinusitis     Antiplatelet or antithrombotic long-term use     Arrhythmia     Atrial fibrillation with RVR (H)     Cough secondary to angiotensin converting enzyme inhibitor (ACE-I)     Hyperlipidemia     Hypertension     Mass of upper lobe of right lung     Obesity        Initial vitals were: BP: (!) 112/90  Pulse: 112  Temp: 97.7  F (36.5  C)  Resp: 16  Height: 182.9 cm (6')  Weight: 108 kg (238 lb)  SpO2: 98 %   Recent vital Signs: BP (!) 112/90   Pulse 112   Temp 97.7  F (36.5  C) (Oral)   Resp 16   Ht 1.829 m (6')   Wt 108 kg (238 lb)   SpO2 98%   BMI 32.28 kg/m      Elimination Status: Continent: Yes     Activity Level: SBA w/ walker    Fall Status: Reason for falls risk:  Mobility  nonskid shoes/slippers when out of bed, arm band in place, patient and family education, and activity supervised    Baseline Mental status: WDL  Current Mental Status changes: at basesline    Infection present or suspected this encounter: no  Sepsis suspected: No    Isolation type: n/a    Bariatric equipment needed?: No    In the ED these meds were given:   Medications   oxyCODONE (ROXICODONE) tablet 10 mg (10 mg Oral $Given 8/23/24 6273)       Drips running?  No    Home pump  No    Current LDAs: none. obs  none     Results:   Labs/Imaging  Ordered and Resulted from Time of ED Arrival Up to the Time of Departure from the ED  Results for orders placed or performed during the hospital  encounter of 08/23/24 (from the past 24 hour(s))   CT Pelvis Bone wo Contrast    Narrative    EXAM: CT PELVIS BONE WO CONTRAST  DATE/TIME: 8/23/2024 1:49 PM    INDICATION: Right hip and buttock pain.  COMPARISON: Radiograph 7/20/2024.  TECHNIQUE: Noncontrast. Axial, sagittal and coronal thin-section  reconstruction. Dose reduction techniques were used.     FINDINGS:     BONES:  -Comminuted pathologic nondisplaced fracture of the right acetabulum  involving the supra-acetabular ilium, posterior acetabulum, anterior  medial acetabulum (series 3 image 70, series 3 image 78, series 3  image 81).  -Mildly displaced probable additional pathologic fracture at the  junction of the right ischium/inferior pubic ramus (series 5 image  93).  -Additional nondisplaced fracture in the left superior pubic ramus  through a small lucent lesion.  -Additional scattered lucent lesions throughout the pelvic bones  suspicious for metastatic disease with representative examples in the  right posterior medial iliac bone and left supra-acetabular region.  -Left hip arthroplasty without evidence of hardware complication.  There are degenerative changes in the lumbar spine.    SOFT TISSUES:  -Vascular calcifications.      Impression    IMPRESSION:  1.  Comminuted pathologic nondisplaced fracture of the right  acetabulum.  2.  Mildly displaced probable additional pathologic fracture of the  junction of the right inferior pubic ramus/right ischium.  3.  Nondisplaced pathologic fracture in the left superior pubic ramus.  4.  Additional scattered lucent lesions, suspicious for additional  metastases.    NOTE: ABNORMAL REPORT    THE DICTATION ABOVE DESCRIBES AN ABNORMAL REPORT FOR WHICH FOLLOW-UP  IS NEEDED.    KATHERYN MCELROY MD         SYSTEM ID:  [id number]   CT Lumbar Spine w/o Contrast    Narrative    CT OF THE LUMBAR SPINE WITHOUT CONTRAST  8/23/2024 1:51 PM     COMPARISON: None.    HISTORY: Low back pain new since four days ago, right  hip and buttock  pain worsened/constant since four days ago.     TECHNIQUE: Axial images of the lumbar spine were acquired without  intravenous contrast. Multiplanar reformations were created from the  axial source images.        Impression    IMPRESSION:  Five lumbar type vertebrae. Straightening of normal  lumbar lordosis. Alignment otherwise normal. Vertebral body heights  normal. No fractures. Loss of disc space height, posterior disc  bulging and vacuum disc formation at the L1-L2, L2-L3, L4-L5 and L5-S1  levels. Moderate to severe facet arthropathy bilaterally at L2-L3,  L3-L4, L4-L5 and L5-S1. No high-grade spinal canal stenosis of the  lumbar spine. No high-grade neural foraminal stenosis at any level of  the lumbar spine.    Radiation dose for this scan was reduced using automated exposure  control, adjustment of the mA and/or kV according to patient size, or  iterative reconstruction technique.     FRANK CHAVEZ MD         SYSTEM ID:  [id number]   CBC with Platelets & Differential    Narrative    The following orders were created for panel order CBC with Platelets & Differential.  Procedure                               Abnormality         Status                     ---------                               -----------         ------                     CBC with platelets and d...[[id number]]                      In process                   Please view results for these tests on the individual orders.       For the majority of the shift this patient's behavior was Green     Cardiac Rhythm:   Pt needs tele? No  Skin/wound Issues: None    Code Status: Full Code    Pain control: good    Nausea control: pt had none    Abnormal labs/tests/findings requiring intervention: none    Patient tested for COVID 19 prior to admission: NO     OBS brochure/video discussed/provided to patient/family: Yes     Family present during ED course? Yes     Family Comments/Social Situation comments: n/a    Tasks needing completion:  None    Lalitha Neville RN

## 2024-08-23 NOTE — MEDICATION SCRIBE - ADMISSION MEDICATION HISTORY
Medication Scribe Admission Medication History    Admission medication history is complete. The information provided in this note is only as accurate as the sources available at the time of the update.    Information Source(s): Patient via in-person    Pertinent Information: None    Changes made to PTA medication list:  Added: None  Deleted: None  Changed: None    Allergies reviewed with patient and updates made in EHR: yes    Medication History Completed By: Eveline Dubose 8/23/2024 5:05 PM    PTA Med List   Medication Sig Last Dose    acetaminophen (TYLENOL) 325 MG tablet Take 2 tablets (650 mg) by mouth every 4 hours as needed for mild pain 8/23/2024 at prn    acetaminophen (TYLENOL) 500 MG tablet Take 2 tablets (1,000 mg) by mouth every 6 hours (Patient taking differently: Take 1,000 mg by mouth 3 times daily.) 8/23/2024 at am    dexAMETHasone (DECADRON) 4 MG tablet Take 1 tablet (4 mg) by mouth 2 times daily (with meals) 8/23/2024 at am    diphenhydrAMINE-acetaminophen (TYLENOL PM)  MG tablet Take 2 tablets by mouth nightly as needed for sleep 8/22/2024 at hs    fexofenadine (ALLEGRA) 180 MG tablet Take 180 mg by mouth daily Past Week    HEMP OIL OR EXTRACT OR OTHER CBD CANNABINOID, NOT MEDICAL CANNABIS, CBD gummies Unknown    losartan (COZAAR) 100 MG tablet Take 100 mg by mouth every morning 8/23/2024 at am    Melatonin 10 MG TABS tablet Take 10 mg by mouth nightly as needed for sleep 8/22/2024 at hs    metoprolol succinate ER (TOPROL XL) 100 MG 24 hr tablet Take 1 tablet by mouth every morning 8/23/2024 at am    multivitamin (CENTRUM SILVER) tablet Take 1 tablet by mouth daily 8/23/2024 at am    oxyCODONE (OXY-IR) 5 MG capsule Take 1 capsule (5 mg) by mouth every 4 hours as needed for severe pain 8/23/2024 at am    Turmeric (QC TUMERIC COMPLEX PO) Tumeric and ginger with applie cider 1410mg Past Week    UNABLE TO FIND MEDICATION NAME: osteo bi flex Past Week    UNABLE TO FIND MEDICATION NAME:  Emergen-e 1000mg Past Week    XARELTO ANTICOAGULANT 20 MG TABS tablet Take 1 tablet (20 mg) by mouth daily (with lunch) 8/22/2024 at pm

## 2024-08-23 NOTE — H&P
[facility]    History and Physical  Hospital Medicine       Date of Admission:  8/23/2024  Date of Service: 8/23/2024     Assessment & Plan   Dk Randhawa Bay is a 69 year old male who presents on 8/23/2024 with significantly worsening right hip and back pain over the last x 4 days.  Evaluation with imaging in the emergency department showing multiple pathologic fractures involving the pelvis.  Orthopedic surgery was consulted, however will likely benefit from surgical intervention but would still be open to seeing patient.    Multiple pathologic fractures of pelvis    Developed significant worsening of right hip and low back pain x 4 days.  He does have a history of lung cancer with metastasis to brain and recent suspicion to bone (right acetabulum) notable on CT 7/25.  Denies fall or injury.  CT pelvis now showing comminuted displaced fracture of right acetabulum, mildly displaced right inferior pubic ramus/right ischium, nondisplaced fracture left superior pubic ramus, and additional scattered lucent lesions which are suspicious for additional metastasis.  He states no pain without movement.  He is still able to bear weight however has been ambulating with a cane. Orthopedic surgery consulted and no plans to intervene surgically at this time, however would like to continue with formal consultation.  - Orthopedic consultation, appreciated recommendations  - Pain management with scheduled acetaminophen 1000 mg TID, oxycodone 10 mg PRN 2hrs for moderate pain, and hydromorphone 0.3 mg PRN q2hr for severe pain.  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 CT chest on 4/7/2024 showed a right upper lobe lung mass. Brain MRI on 4/30/2024 was negative for metastatic disease. PET CT scan on 5/2/2024 was positive for the RUL mass and there was uptake in some  mediastinal lymph nodes.  On 5/16/2024 patient had a robotic assisted right upper lobe lobectomy. Pathology showed large cell lung cancer. Mediastinal nodes were negative. On 7/20/2024 evaluated for right hip pain and imaging showed metastatic disease particularly in the chest but likely in the right hip acetabulum as well. His chemotherapy changed from adjuvant to definitive chemotherapy for advanced large cell lung cancer. Has now completed 2 of 2 cycles carboplatin, etoposide, and atezolizumab  8/19/24. Received Trilaciclib support for first cycle. Has completed gamma knife therapy.  CT imaging 8/23 pelvis showing multiple pathological fractures (see above). Has chronic cancer pain in which he tries to manage with acetaminophen 1,000 mg TID, dexamethasone 4 mg BID, and oxycodone 5 mg PRN q4hrs  - Consider reaching out to oncology regarding if beneficial to start bisphosphonate treatment as inpatient versus outpatient (cost may not warrant starting inpatient)  - Return 8/28/24 for toxicity check as planned.    Leukocytosis, resolving    Chronic leukocytosis since 4/2024. WBC elevated 57 compared to recent prior elevation since 8/14 (72 -> 114 -> 57). Review of oncology note 8/14 regarding leukocytosis and thought to be secondary to combination of trilaciclib and dexamethasone. He is afebrile and without infectious symptoms such as cough, SOB, abdominal pain, nausea/vomiting, diarrhea, urinary symptoms, or rashes. Antibiotics not warranted at this time.  - Trend with AM CBC    Hyponatremia, chronic    Sodium 128 on admission. Review suggests that recent baseline sodium over last month is near 128.   - NS 75 mL/hr  - Recheck in AM    Essential hypertension    Chronic. Blood pressures largely controlled on admission.  Managed PTA with losartan 100 mg.  - Continue losartan    Atrial fibrillation  Chronic anticoagulation    Chronic. Denies symptoms. Appears stable, heart rate in low 100s.  Rate controlled PTA with  metoprolol succinate 100 mg and stroke prophylaxis with Xarelto 20 mg.  - Continue metoprolol succinate and Xarelto.  - NS 75 mL/hr as above for hyponatremia    Chronic anemia, normocytic    Hemoglobin 11.8 on admission which is near recent baseline 11.5. Denies melena or hematochezia. Likely secondary to active cancer and treatment.     Clinically Significant Risk Factors Present on Admission   # Drug Induced Coagulation Defect: home medication list includes an anticoagulant medication    # Hypertension: Noted on problem list      # Obesity: Estimated body mass index is 31.9 kg/m  as calculated from the following:    Height as of this encounter: 1.829 m (6').    Weight as of this encounter: 106.7 kg (235 lb 3.7 oz).           Diet: Regular Diet Adult    DVT Prophylaxis: DOAC  Lockwood Catheter: Not present  Code Status: Full Code  Lines: PIV    Disposition Plan   Medically Ready for Discharge: Anticipated Tomorrow    I have discussed patient and formulated plan with attending hospitalist physician, Dr. Valenzuela.  MEDINA DAVIS PA-C        Primary Care Physician   Esteban Copeland [phone]    History is obtained from the patient (reliable), emergency department physician, and review of old records via the EMR.    History of Present Illness   Dk Randhawa Bay is a 69 year old male with past medical history of lung cancer status post lobectomy with brain metastasis and bone metastasis on current chemo therapy, hypertension, atrial fibrillation on chronic anticoagulation, chronic anemia, now presents on 8/23/2024 with significantly worsening right hip and low back pain x 4 days.    Homer states that he developed right hip pain and soreness about 3 weeks ago.  He states being a daily stationary cycle is and also walking over 1 mile a day.  He states feeling sore after processing on a stationary bike at home.  The following day he was having difficulty walking and needed the assistance of a cane.  He was evaluated  7/20/24 for right hip pain and at that time x-ray was collected which was negative for evidence for acute fracture, mild degenerative changes were noted.  No lytic effusions were seen.  He was discharged home with instructions for symptomatic treatment with ice and continued use of scheduled acetaminophen.  CT imaging 7/25 showing permeative appearance of the posterior right acetabulum suspect for bone metastasis.  Further imaging with MRI was never obtained.  Patient states that he has been resting and not using his stationary exercise bike or walking as he typically does.  He has been training to manage his pain with acetaminophen and 5 mg oxycodone that he has available for cancer related pains.  His oxycodone is scheduled for every 4 hours as needed, however he has not been utilizing this as much as he could.  He states that this medication typically makes him sleepy.  He has also been sleeping in his reclining chair with a heat pack.  On the evaluation in the emergency department CT imaging showing multiple pathologic pelvic fractures.  Patient is able to ambulate and bear weight however with difficulty.  Orthopedic surgery was consulted, however no plan to intervene surgically at this time.  Orthopedic surgery would still like to have formal consultation with patient.    Review of Systems   Review of Systems   Constitutional:  Positive for malaise/fatigue. Negative for chills and fever.   Respiratory:  Negative for cough, shortness of breath and wheezing.    Cardiovascular:  Positive for leg swelling (3 days). Negative for chest pain and palpitations.   Gastrointestinal:  Positive for constipation (daily bowel movement but firm). Negative for abdominal pain, blood in stool, diarrhea, melena, nausea and vomiting.   Genitourinary:  Negative for dysuria and frequency.   Musculoskeletal:  Positive for back pain (Low back) and joint pain (Right hip). Negative for falls.   Neurological:  Negative for weakness.      Past Medical History    Past Medical History:   Diagnosis Date    Acute non-recurrent maxillary sinusitis     Antiplatelet or antithrombotic long-term use     Arrhythmia     Atrial fibrillation with RVR (H)     Cough secondary to angiotensin converting enzyme inhibitor (ACE-I)     Hyperlipidemia     Hypertension     Mass of upper lobe of right lung     Obesity      Past Surgical History   Past Surgical History:   Procedure Laterality Date    BRONCHOSCOPY, WITH BIOPSY, ROBOT ASSISTED Bilateral 04/16/2024    Procedure: BRONCHOSCOPY, endobronchial ultrasound, transbronchial needle aspiration, endobronchial biopsies and tumor debulking;  Surgeon: Lanette Arce MD;  Location: [facility] OR    DAVINCI EXCISE NODES THORACIC N/A 5/15/2024    Procedure: mediastinal lymph node dissection;  Surgeon: Mikael Peoples MD;  Location: [facility] OR    DAVINCI LOBECTOMY LUNG Right 5/15/2024    Procedure: Robot-assisted thoracoscopic right upper lobectomy,;  Surgeon: Mikael Peoples MD;  Location: [facility] OR    ELBOW ARTHROSCOPY Left 03/09/2017    INSERT PORT VASCULAR ACCESS Right 7/5/2024    Procedure: INSERTION, VASCULAR ACCESS PORT;  Surgeon: Henry Veronica MD;  Location: [facility]    PHACOEMULSIFICATION WITH STANDARD INTRAOCULAR LENS IMPLANT Left 02/26/2018    Procedure: PHACOEMULSIFICATION WITH STANDARD INTRAOCULAR LENS IMPLANT;  Left Cataract Removal with Implant;  Surgeon: Mohit Victor MD;  Location: [facility] OR    PHACOEMULSIFICATION WITH STANDARD INTRAOCULAR LENS IMPLANT Right 01/30/2019    Procedure: Cataract Removal with Implant;  Surgeon: Mohit Victor MD;  Location: [facility] OR    TONSILLECTOMY  1964    TOTAL HIP ARTHROPLASTY Left 04/12/2022      Prior to Admission Medications   Prior to Admission Medications   Prescriptions Last Dose Informant Patient Reported? Taking?   HEMP OIL OR EXTRACT OR OTHER CBD CANNABINOID, NOT MEDICAL CANNABIS, Unknown Self Yes Yes   Sig: CBD gummies   Melatonin 10 MG TABS tablet 8/22/2024 at  hs Self Yes Yes   Sig: Take 10 mg by mouth nightly as needed for sleep   Turmeric (QC TUMERIC COMPLEX PO) Past Week Self Yes Yes   Sig: Tumeric and ginger with applie cider 1410mg   UNABLE TO FIND Past Week Self Yes Yes   Sig: MEDICATION NAME: osteo bi flex   UNABLE TO FIND Past Week Self Yes Yes   Sig: MEDICATION NAME: Emergen-e 1000mg   XARELTO ANTICOAGULANT 20 MG TABS tablet 8/22/2024 at pm Self Yes Yes   Sig: Take 20 mg by mouth every evening.   acetaminophen (TYLENOL) 325 MG tablet 8/23/2024 at prn Self No Yes   Sig: Take 2 tablets (650 mg) by mouth every 4 hours as needed for mild pain   acetaminophen (TYLENOL) 500 MG tablet 8/23/2024 at am Self No Yes   Sig: Take 2 tablets (1,000 mg) by mouth every 6 hours   Patient taking differently: Take 1,000 mg by mouth 3 times daily.   dexAMETHasone (DECADRON) 4 MG tablet 8/23/2024 at am Self No Yes   Sig: Take 1 tablet (4 mg) by mouth 2 times daily (with meals)   diphenhydrAMINE-acetaminophen (TYLENOL PM)  MG tablet 8/22/2024 at hs Self Yes Yes   Sig: Take 2 tablets by mouth nightly as needed for sleep   fexofenadine (ALLEGRA) 180 MG tablet Past Week Self Yes Yes   Sig: Take 180 mg by mouth daily   losartan (COZAAR) 100 MG tablet 8/23/2024 at am Self Yes Yes   Sig: Take 100 mg by mouth every morning   metoprolol succinate ER (TOPROL XL) 100 MG 24 hr tablet 8/23/2024 at am Self Yes Yes   Sig: Take 1 tablet by mouth every morning   multivitamin (CENTRUM SILVER) tablet 8/23/2024 at am Self Yes Yes   Sig: Take 1 tablet by mouth daily   oxyCODONE (OXY-IR) 5 MG capsule 8/23/2024 at am Self No Yes   Sig: Take 1 capsule (5 mg) by mouth every 4 hours as needed for severe pain      Facility-Administered Medications: None     Allergies   No Known Allergies    Family History    Family History   Problem Relation Age of Onset    Ovarian Cancer Mother     Alzheimer Disease Mother     Depression Father 45        suicide    Diabetes Sister      Social History   Social History      Socioeconomic History    Marital status:      Spouse name: Not on file    Number of children: Not on file    Years of education: Not on file    Highest education level: Not on file   Occupational History    Not on file   Tobacco Use    Smoking status: Former     Current packs/day: 0.00     Average packs/day: 0.5 packs/day for 20.0 years (10.0 ttl pk-yrs)     Types: Cigarettes     Start date: 1994     Quit date: 2014     Years since quitting: 10.6    Smokeless tobacco: Never   Vaping Use    Vaping status: Never Used   Substance and Sexual Activity    Alcohol use: Not Currently    Drug use: No    Sexual activity: Not on file   Other Topics Concern    Parent/sibling w/ CABG, MI or angioplasty before 65F 55M? Not Asked   Social History Narrative    Not on file     Social Determinants of Health     Financial Resource Strain: Low Risk  (8/23/2024)    Financial Resource Strain     Within the past 12 months, have you or your family members you live with been unable to get utilities (heat, electricity) when it was really needed?: No   Food Insecurity: Low Risk  (8/23/2024)    Food Insecurity     Within the past 12 months, did you worry that your food would run out before you got money to buy more?: No     Within the past 12 months, did the food you bought just not last and you didn t have money to get more?: No   Transportation Needs: Low Risk  (8/23/2024)    Transportation Needs     Within the past 12 months, has lack of transportation kept you from medical appointments, getting your medicines, non-medical meetings or appointments, work, or from getting things that you need?: No   Physical Activity: Not on file   Stress: Not on file   Social Connections: Socially Integrated (10/23/2023)    Received from [facility], [facility]    Social Connections     Frequency of Communication with Friends and Family: 0   Interpersonal Safety: Not on file    Housing Stability: Low Risk  (8/23/2024)    Housing Stability     Do you have housing? : Yes     Are you worried about losing your housing?: No     Physical Exam   /74   Pulse 117   Temp 97  F (36.1  C) (Oral)   Resp 18   Ht 1.829 m (6')   Wt 106.7 kg (235 lb 3.7 oz)   SpO2 98%   BMI 31.90 kg/m       Weight: 235 lbs 3.69 oz Body mass index is 31.9 kg/m .     Constitutional: Elder male who appears younger than stated age, resting comfortably in bed upon entry, well-developed and, nourished alert, oriented x 4, cooperative, polite, pleasant no apparent distress, appears nontoxic.  Eyes: Eyes are clear, pupils are reactive.  Cardiovascular: Irregularly irregular rhythm and rate borderline tachycardic, normal S1 and S2, and no murmur noted. JVP is normal.  Irregularly irregular radial pulse.  Trace bilateral lower extremity edema in feet.  Respiratory: Clear to auscultation bilaterally.  No wheezes, rhonchi, or crackles.  Normal respiratory effort on room air.  Speaking in full sentences.  GI: Soft, flat, non-tender to palpation throughout, normal bowel sounds, no hepatomegaly.  Genitourinary: Deferred  Musculoskeletal: Normal muscle bulk and tone.  There is tenderness to palpation of the lumbar region right of spine and tenderness to palpation of right greater trochanter.  Skin: Warm and dry, no rashes.   Neurologic: Neck supple. Cranial nerves are grossly intact.  is symmetric.     Data   Data reviewed today:     I have personally reviewed the following data over the past 24 hrs:    57.0 (HH)  \   11.8 (L)   / 232     128 (L) 93 (L) 25.7 (H) /  154 (H)   4.7 24 0.72 \       Recent Results (from the past 24 hour(s))   CT Pelvis Bone wo Contrast    Narrative    EXAM: CT PELVIS BONE WO CONTRAST  DATE/TIME: 8/23/2024 1:49 PM    INDICATION: Right hip and buttock pain.  COMPARISON: Radiograph 7/20/2024.  TECHNIQUE: Noncontrast. Axial, sagittal and coronal thin-section  reconstruction. Dose reduction  techniques were used.     FINDINGS:     BONES:  -Comminuted pathologic nondisplaced fracture of the right acetabulum  involving the supra-acetabular ilium, posterior acetabulum, anterior  medial acetabulum (series 3 image 70, series 3 image 78, series 3  image 81).  -Mildly displaced probable additional pathologic fracture at the  junction of the right ischium/inferior pubic ramus (series 5 image  93).  -Additional nondisplaced fracture in the left superior pubic ramus  through a small lucent lesion.  -Additional scattered lucent lesions throughout the pelvic bones  suspicious for metastatic disease with representative examples in the  right posterior medial iliac bone and left supra-acetabular region.  -Left hip arthroplasty without evidence of hardware complication.  There are degenerative changes in the lumbar spine.    SOFT TISSUES:  -Vascular calcifications.      Impression    IMPRESSION:  1.  Comminuted pathologic nondisplaced fracture of the right  acetabulum.  2.  Mildly displaced probable additional pathologic fracture of the  junction of the right inferior pubic ramus/right ischium.  3.  Nondisplaced pathologic fracture in the left superior pubic ramus.  4.  Additional scattered lucent lesions, suspicious for additional  metastases.    NOTE: ABNORMAL REPORT    THE DICTATION ABOVE DESCRIBES AN ABNORMAL REPORT FOR WHICH FOLLOW-UP  IS NEEDED.    KATHERYN MCELROY MD         SYSTEM ID:  [id number]   CT Lumbar Spine w/o Contrast    Narrative    CT OF THE LUMBAR SPINE WITHOUT CONTRAST  8/23/2024 1:51 PM     COMPARISON: None.    HISTORY: Low back pain new since four days ago, right hip and buttock  pain worsened/constant since four days ago.     TECHNIQUE: Axial images of the lumbar spine were acquired without  intravenous contrast. Multiplanar reformations were created from the  axial source images.        Impression    IMPRESSION:  Five lumbar type vertebrae. Straightening of normal  lumbar lordosis.  Alignment otherwise normal. Vertebral body heights  normal. No fractures. Loss of disc space height, posterior disc  bulging and vacuum disc formation at the L1-L2, L2-L3, L4-L5 and L5-S1  levels. Moderate to severe facet arthropathy bilaterally at L2-L3,  L3-L4, L4-L5 and L5-S1. No high-grade spinal canal stenosis of the  lumbar spine. No high-grade neural foraminal stenosis at any level of  the lumbar spine.    Radiation dose for this scan was reduced using automated exposure  control, adjustment of the mA and/or kV according to patient size, or  iterative reconstruction technique.     FRANK CHAVEZ MD         SYSTEM ID:  [id number]

## 2024-08-23 NOTE — PROGRESS NOTES
[facility] ADMISSION NOTE    Patient admitted to room 2306 at approximately 1800 via wheel chair from emergency room. Patient was accompanied by brother.     Verbal SBAR report received from ED hand off note prior to patient arrival.     Patient did pivot transfer from wheelchair to bed with assist of two, but reports he is able to walk . Patient alert and oriented X 3. Pain is controlled with current analgesics.  Medication(s) being used: narcotic analgesics including oxycodone (Oxycontin, Oxyir).  . Admission vital signs: Blood pressure 110/74, pulse 117, temperature 97  F (36.1  C), temperature source Oral, resp. rate 18, height 1.829 m (6'), weight 106.7 kg (235 lb 3.7 oz), SpO2 98%. Patient was oriented to plan of care, call light, bed controls, tv, telephone, bathroom, and visiting hours.     Risk Assessment    The following safety risks were identified during admission: fall. Yellow risk band applied: YES.     Skin Initial Assessment    This writer admitted this patient and completed a full skin assessment and Earnest score in the Adult PCS flowsheet.   Photo documentation of skin problem and/or wound competed via [employer] application (located under Media):  Yes    Appropriate interventions initiated as needed.     Secondary skin check completed by Edwin    Earnest Risk Assessment  Sensory Perception: 3-->slightly limited  Moisture: 4-->rarely moist  Activity: 3-->walks occasionally  Mobility: 3-->slightly limited  Nutrition: 2-->probably inadequate  Friction and Shear: 3-->no apparent problem  Earnest Score: 18  Mattress: Standard gel/foam mattress (IsoFlex, Atmos Air, etc.)  Bed Frame: Standard width and length    Education    Patient has a [place] to Observation order: Yes  Observation education completed and documented: Yes      Radha Jimenez RN

## 2024-08-23 NOTE — ED NOTES
Pt and brother given food and soda's to drink.  Dinner tray ordered for pt.  WIll medicate for comfort per MD orders.  Pt to be admitted.

## 2024-08-23 NOTE — PROGRESS NOTES
Skin affirmation note    Admitting nurse completed full skin assessment, Earnest score and Earnest interventions. This writer agrees with the initial skin assessment findings.

## 2024-08-24 NOTE — DISCHARGE SUMMARY
[facility]ist Discharge Summary    Dk Randhawa Sr. MRN# [id number]   Age: 69 year old YOB: 1955     Date of Admission:  8/23/2024  Date of Discharge::  8/24/2024  Admitting Physician:  No admitting provider for patient encounter.  Discharge Physician:  Robbie Poole MD  Primary Physician: Esteban Copeland  Transferring Facility: N/A     Home clinic: [facility]          Admission Diagnoses:   Pathological fracture in neoplastic disease, pelvis, init [M84.550A]          Discharge Diagnosis:     Principle diagnosis: Multiple pathologic fractures of pelvis   Secondary diagnoses:  Patient Active Problem List   Diagnosis    CARDIOVASCULAR SCREENING; LDL GOAL LESS THAN 160    Atrial fibrillation with RVR (H)    Essential hypertension, benign    Obesity (BMI 30.0-34.9)    Hyperlipidemia    Cough due to angiotensin-converting enzyme inhibitor    Pre-diabetes    Malignant neoplasm of upper lobe of right lung (H)    Large cell carcinoma of lung, right (H)    Malignant neoplasm metastatic to bone (H)    Metastasis to pleura (H)    Malignant neoplasm metastatic to pancreas (H)    Malignant neoplasm metastatic to brain (H)    Pathological fracture in neoplastic disease, pelvis, init          Brief History of Presenting Illness:   As per admit hx  Dk Randhawa Sr. is a 69 year old male with past medical history of lung cancer status post lobectomy with brain metastasis and bone metastasis on current chemo therapy, hypertension, atrial fibrillation on chronic anticoagulation, chronic anemia, now presents on 8/23/2024 with significantly worsening right hip and low back pain x 4 days.          Hospital Course:   Multiple pathologic fractures of pelvis    Developed significant worsening of right hip and low back pain x 4 days.  He does have a history of lung cancer with metastasis to brain and recent suspicion to bone (right acetabulum) notable on CT 7/25.  Denies fall or injury.  CT pelvis now showing  comminuted displaced fracture of right acetabulum, mildly displaced right inferior pubic ramus/right ischium, nondisplaced fracture left superior pubic ramus, and additional scattered lucent lesions which are suspicious for additional metastasis.  He states no pain without movement.  He is still able to bear weight however has been ambulating with a cane. Orthopedic surgery consulted and no plans to intervene surgically at this time  Pt walking on his own now. Took 10mg oxycodone last evening-none this AM. Comfortable going home. Concerned oxycodone may get over before his appoint on wed. Will give 15 pills on discharge-will f/up with oncology as scheduled.      Large cell carcinoma right lung s/p lobectomy  Secondary malignant neoplasm of brain  Secondary malignant neoplasm of bone    Oncologic course as follows. CT chest on 4/7/2024 showed a right upper lobe lung mass. Brain MRI on 4/30/2024 was negative for metastatic disease. PET CT scan on 5/2/2024 was positive for the RUL mass and there was uptake in some mediastinal lymph nodes.  On 5/16/2024 patient had a robotic assisted right upper lobe lobectomy. Pathology showed large cell lung cancer. Mediastinal nodes were negative. On 7/20/2024 evaluated for right hip pain and imaging showed metastatic disease particularly in the chest but likely in the right hip acetabulum as well. His chemotherapy changed from adjuvant to definitive chemotherapy for advanced large cell lung cancer. Has now completed 2 of 2 cycles carboplatin, etoposide, and atezolizumab  8/19/24. Received Trilaciclib support for first cycle. Has completed gamma knife therapy.  CT imaging 8/23 pelvis showing multiple pathological fractures (see above). Has chronic cancer pain in which he tries to manage with acetaminophen 1,000 mg TID, dexamethasone 4 mg BID, and oxycodone 5 mg PRN q4hrs  F/up oncology as scheduled     Leukocytosis, resolving    Chronic leukocytosis since 4/2024. WBC elevated 57  compared to recent prior elevation since 8/14 (72 -> 114 -> 57). Review of oncology note 8/14 regarding leukocytosis and thought to be secondary to combination of trilaciclib and dexamethasone. He is afebrile and without infectious symptoms such as cough, SOB, abdominal pain, nausea/vomiting, diarrhea, urinary symptoms, or rashes. Antibiotics not warranted at this time.     Hyponatremia, chronic    Sodium 128 on admission. Review suggests that recent baseline sodium over last month is near 128.   Stable 130      Essential hypertension    Chronic. Blood pressures largely controlled on admission.  Managed PTA with losartan 100 mg.  - Continue losartan     Atrial fibrillation  Chronic anticoagulation    Chronic. Denies symptoms. Appears stable, heart rate in low 100s.  Rate controlled PTA with metoprolol succinate 100 mg and stroke prophylaxis with Xarelto 20 mg.  - Continue metoprolol succinate and Xarelto.     Chronic anemia, normocytic    Hemoglobin 11.8 on admission which is near recent baseline 11.5. Denies melena or hematochezia. Likely secondary to active cancer and treatment.                 Procedures:   No procedures performed during this admission         Allergies:    No Known Allergies          Medications Prior to Admission:     Medications Prior to Admission   Medication Sig Dispense Refill Last Dose    acetaminophen (TYLENOL) 325 MG tablet Take 2 tablets (650 mg) by mouth every 4 hours as needed for mild pain 50 tablet 0 8/23/2024 at prn    acetaminophen (TYLENOL) 500 MG tablet Take 2 tablets (1,000 mg) by mouth every 6 hours (Patient taking differently: Take 1,000 mg by mouth 3 times daily.)   8/23/2024 at am    dexAMETHasone (DECADRON) 4 MG tablet Take 1 tablet (4 mg) by mouth 2 times daily (with meals) 60 tablet 1 8/23/2024 at am    diphenhydrAMINE-acetaminophen (TYLENOL PM)  MG tablet Take 2 tablets by mouth nightly as needed for sleep   8/22/2024 at hs    fexofenadine (ALLEGRA) 180 MG tablet  Take 180 mg by mouth daily   Past Week    HEMP OIL OR EXTRACT OR OTHER CBD CANNABINOID, NOT MEDICAL CANNABIS, CBD gummies   Unknown    losartan (COZAAR) 100 MG tablet Take 100 mg by mouth every morning   8/23/2024 at am    Melatonin 10 MG TABS tablet Take 10 mg by mouth nightly as needed for sleep   8/22/2024 at hs    metoprolol succinate ER (TOPROL XL) 100 MG 24 hr tablet Take 1 tablet by mouth every morning   8/23/2024 at am    multivitamin (CENTRUM SILVER) tablet Take 1 tablet by mouth daily   8/23/2024 at am    Turmeric (QC TUMERIC COMPLEX PO) Tumeric and ginger with applie cider 1410mg   Past Week    UNABLE TO FIND MEDICATION NAME: osteo bi flex   Past Week    UNABLE TO FIND MEDICATION NAME: Emergen-e 1000mg   Past Week    XARELTO ANTICOAGULANT 20 MG TABS tablet Take 20 mg by mouth every evening.   8/22/2024 at pm    [DISCONTINUED] oxyCODONE (OXY-IR) 5 MG capsule Take 1 capsule (5 mg) by mouth every 4 hours as needed for severe pain 40 capsule 0 8/23/2024 at am             Discharge Medications:     Current Discharge Medication List        CONTINUE these medications which have CHANGED    Details   oxyCODONE (OXY-IR) 5 MG capsule Take 1 capsule (5 mg) by mouth every 4 hours as needed for severe pain.  Qty: 15 capsule, Refills: 0    Associated Diagnoses: Malignant neoplasm of upper lobe of right lung (H)           CONTINUE these medications which have NOT CHANGED    Details   !! acetaminophen (TYLENOL) 325 MG tablet Take 2 tablets (650 mg) by mouth every 4 hours as needed for mild pain  Qty: 50 tablet, Refills: 0    Associated Diagnoses: Malignant neoplasm of upper lobe of right lung (H)      !! acetaminophen (TYLENOL) 500 MG tablet Take 2 tablets (1,000 mg) by mouth every 6 hours    Associated Diagnoses: Lung mass      dexAMETHasone (DECADRON) 4 MG tablet Take 1 tablet (4 mg) by mouth 2 times daily (with meals)  Qty: 60 tablet, Refills: 1    Associated Diagnoses: Malignant neoplasm metastatic to brain (H)       diphenhydrAMINE-acetaminophen (TYLENOL PM)  MG tablet Take 2 tablets by mouth nightly as needed for sleep      fexofenadine (ALLEGRA) 180 MG tablet Take 180 mg by mouth daily      HEMP OIL OR EXTRACT OR OTHER CBD CANNABINOID, NOT MEDICAL CANNABIS, CBD gummies      losartan (COZAAR) 100 MG tablet Take 100 mg by mouth every morning      Melatonin 10 MG TABS tablet Take 10 mg by mouth nightly as needed for sleep      metoprolol succinate ER (TOPROL XL) 100 MG 24 hr tablet Take 1 tablet by mouth every morning      multivitamin (CENTRUM SILVER) tablet Take 1 tablet by mouth daily      Turmeric (QC TUMERIC COMPLEX PO) Tumeric and ginger with applie cider 1410mg      !! UNABLE TO FIND MEDICATION NAME: osteo bi flex      !! UNABLE TO FIND MEDICATION NAME: Emergen-e 1000mg      XARELTO ANTICOAGULANT 20 MG TABS tablet Take 20 mg by mouth every evening.       !! - Potential duplicate medications found. Please discuss with provider.                Consultations:   No consultations were requested during this admission            Discharge Exam:   Blood pressure (!) 133/90, pulse 114, temperature 98.6  F (37  C), temperature source Oral, resp. rate 18, height 1.829 m (6'), weight 106.7 kg (235 lb 3.7 oz), SpO2 98%.  GENERAL APPEARANCE: healthy, alert and no distress  EYES: conjunctiva clear, eyes grossly normal  HENT: external ears and nose normal   MS: no clubbing, cyanosis; no edema  SKIN: clear without significant rashes or lesions  NEURO: non-focal, moves all 4 extr    Unresulted Labs Ordered in the Past 30 Days of this Admission       No orders found for last 31 day(s).            Recent Results (from the past 24 hour(s))   CT Pelvis Bone wo Contrast    Narrative    EXAM: CT PELVIS BONE WO CONTRAST  DATE/TIME: 8/23/2024 1:49 PM    INDICATION: Right hip and buttock pain.  COMPARISON: Radiograph 7/20/2024.  TECHNIQUE: Noncontrast. Axial, sagittal and coronal thin-section  reconstruction. Dose reduction techniques were  used.     FINDINGS:     BONES:  -Comminuted pathologic nondisplaced fracture of the right acetabulum  involving the supra-acetabular ilium, posterior acetabulum, anterior  medial acetabulum (series 3 image 70, series 3 image 78, series 3  image 81).  -Mildly displaced probable additional pathologic fracture at the  junction of the right ischium/inferior pubic ramus (series 5 image  93).  -Additional nondisplaced fracture in the left superior pubic ramus  through a small lucent lesion.  -Additional scattered lucent lesions throughout the pelvic bones  suspicious for metastatic disease with representative examples in the  right posterior medial iliac bone and left supra-acetabular region.  -Left hip arthroplasty without evidence of hardware complication.  There are degenerative changes in the lumbar spine.    SOFT TISSUES:  -Vascular calcifications.      Impression    IMPRESSION:  1.  Comminuted pathologic nondisplaced fracture of the right  acetabulum.  2.  Mildly displaced probable additional pathologic fracture of the  junction of the right inferior pubic ramus/right ischium.  3.  Nondisplaced pathologic fracture in the left superior pubic ramus.  4.  Additional scattered lucent lesions, suspicious for additional  metastases.    NOTE: ABNORMAL REPORT    THE DICTATION ABOVE DESCRIBES AN ABNORMAL REPORT FOR WHICH FOLLOW-UP  IS NEEDED.    KATHERYN MCELROY MD         SYSTEM ID:  [id number]   CT Lumbar Spine w/o Contrast    Narrative    CT OF THE LUMBAR SPINE WITHOUT CONTRAST  8/23/2024 1:51 PM     COMPARISON: None.    HISTORY: Low back pain new since four days ago, right hip and buttock  pain worsened/constant since four days ago.     TECHNIQUE: Axial images of the lumbar spine were acquired without  intravenous contrast. Multiplanar reformations were created from the  axial source images.        Impression    IMPRESSION:  Five lumbar type vertebrae. Straightening of normal  lumbar lordosis. Alignment otherwise  normal. Vertebral body heights  normal. No fractures. Loss of disc space height, posterior disc  bulging and vacuum disc formation at the L1-L2, L2-L3, L4-L5 and L5-S1  levels. Moderate to severe facet arthropathy bilaterally at L2-L3,  L3-L4, L4-L5 and L5-S1. No high-grade spinal canal stenosis of the  lumbar spine. No high-grade neural foraminal stenosis at any level of  the lumbar spine.    Radiation dose for this scan was reduced using automated exposure  control, adjustment of the mA and/or kV according to patient size, or  iterative reconstruction technique.     FRANK CHAVEZ MD         SYSTEM ID:  [id number]            Pending Tests at Discharge:   None         Discharge Instructions and Follow-Up:     Discharge diet: Regular   Discharge activity: Activity as tolerated   Discharge follow-up: F/up oncology as scheduled           Discharge Disposition:     Discharged to home      Attestation:  I have reviewed today's vital signs, notes, medications, labs and imaging.    Time Spent on this Encounter   I, Robbie Poole MD, personally saw the patient today and spent greater than 30 minutes discharging this patient.    Robbie Pooel MD

## 2024-08-24 NOTE — PROGRESS NOTES
08/24/24 0834   Appointment Info   Signing Clinician's Name / Credentials (PT) Ann Reeves, PT, DPT   Quick Adds   Quick Adds Certification   Living Environment   People in Home alone   Current Living Arrangements apartment  ([address] [facility], one level)   Home Accessibility no concerns   Transportation Anticipated family or friend will provide   Living Environment Comments No concerns   Self-Care   Usual Activity Tolerance good   Current Activity Tolerance moderate   Regular Exercise No   Equipment Currently Used at Home walker, rolling;grab bar, toilet;grab bar, tub/shower;raised toilet seat;shower chair;cane, straight   Fall history within last six months no   Activity/Exercise/Self-Care Comment Does walks around home and community with no device, but has a FWW that his brother brought over he can use due to recent hip pain and hospitalization.   General Information   Onset of Illness/Injury or Date of Surgery 08/23/24   Referring Physician Maciej Escalera PA-C   Patient/Family Therapy Goals Statement (PT) To return back to [facility]   Pertinent History of Current Problem (include personal factors and/or comorbidities that impact the POC) Per H&P, Dk Randhawa Sr. is a 69 year old male who presents on 8/23/2024 with significantly worsening right hip and back pain over the last x 4 days.  Evaluation with imaging in the emergency department showing multiple pathologic fractures involving the pelvis.  Orthopedic surgery was consulted, however will likely benefit from surgical intervention but would still be open to seeing patient.   Existing Precautions/Restrictions no known precautions/restrictions   Cognition   Affect/Mental Status (Cognition) WFL   Orientation Status (Cognition) oriented x 4   Follows Commands (Cognition) WFL   Pain Assessment   Patient Currently in Pain No  (No pain at rest; some pain with walking)   Integumentary/Edema   Integumentary/Edema no deficits were identifed   Posture    Posture  Gia Zaldivar is a 52 y.o. female who presents today for her medical conditions/ complaints as noted below. Gia Zaldivar is c/o of Annual Exam        HPI  Pt presents for well woman exam and pap smear. Has screening mammogram ordered through PCP. Has been 6 weeks no period. Having occasional hot flashes and night sweats, some vaginal dryness. Pt was 12 years old when she started menses.     No LMP recorded.      Past Medical History:   Diagnosis Date    Allergic rhinitis     Asthma     Chest pain of uncertain etiology      Past Surgical History:   Procedure Laterality Date    COLONOSCOPY           Family History   Problem Relation Age of Onset    Lung Cancer Father     Breast Cancer Paternal Cousin 48    Atrial Fibrillation Mother     Coronary Art Dis Other      Social History     Tobacco Use    Smoking status: Former    Smokeless tobacco: Never   Substance Use Topics    Alcohol use: Yes     Comment: occ       Current Outpatient Medications   Medication Sig Dispense Refill    vitamin D (ERGOCALCIFEROL) 1.25 MG (87368 UT) CAPS capsule Take 1 capsule by mouth once a week 12 capsule 1    albuterol sulfate HFA (PROVENTIL;VENTOLIN;PROAIR) 108 (90 Base) MCG/ACT inhaler INHALE 2 PUFFS FOUR TIMES DAILY 8.5 g 3    fluticasone (FLONASE) 50 MCG/ACT nasal spray 2 sprays by Each Nostril route daily 3 Bottle 3    PROAIR  (90 Base) MCG/ACT inhaler INHALE 2 PUFFS FOUR TIMES DAILY 8.5 g 3    triamcinolone (NASACORT ALLERGY 24HR) 55 MCG/ACT nasal inhaler 2 sprays by Nasal route daily 3 Inhaler 3    cetirizine (ZYRTEC) 10 MG tablet Take 1 tablet by mouth daily       No current facility-administered medications for this visit.     Allergies   Allergen Reactions    Codeine     Penicillins     Tramadol      Vitals:    24 0922   BP: 116/79   Pulse: 71     Body mass index is 27.47 kg/m².    Review of Systems   Constitutional: Negative.    HENT: Negative.     Eyes: Negative.    Respiratory: Negative.     Cardiovascular:  Forward head position;Protracted shoulders;Kyphosis   Range of Motion (ROM)   Range of Motion ROM is WFL   Strength (Manual Muscle Testing)   Strength (Manual Muscle Testing) strength is WFL   Bed Mobility   Bed Mobility no deficits identified;supine-sit   Supine-Sit [place] (Bed Mobility) modified independence   Assistive Device (Bed Mobility) bed rails   Comment, (Bed Mobility) HoB slighlty elevated for comfort w/Supine<>sit   Transfers   Transfers sit-stand transfer   Maintains Weight-bearing Status (Transfers) able to maintain   Sit-Stand Transfer   Sit-Stand [place] (Transfers) modified independence   Assistive Device (Sit-Stand Transfers) walker, front-wheeled   Comment, (Sit-Stand Transfer) Mod I w/FWW; one cue for hand placement needed   Gait/Stairs (Locomotion)   [place] Level (Gait) supervision   Assistive Device (Gait) walker, front-wheeled   Distance in Feet (Gait) 300   Pattern (Gait) step-through   Deviations/Abnormal Patterns (Gait) brayden decreased;base of support, wide   Maintains Weight-bearing Status (Gait) able to maintain   Comment, (Gait/Stairs) No safety concerns w/gait; mild increase in R hip pain; pt has no stairs to enter or inside of home, not assessed.   Balance   Balance no deficits were identified   Balance Comments Able to maintain standing balance without UE support; weight shift, and reach outside of [place]; pulls up own shorts in standing   Clinical Impression   Criteria for Skilled Therapeutic Intervention Evaluation only   PT Diagnosis (PT) Impaired functional mobility   Influenced by the following impairments Right hip pain; impaired gait   Functional limitations due to impairments Walking, transfers   Clinical Presentation (PT Evaluation Complexity) stable   Clinical Presentation Rationale Clinical reasoning   Clinical Decision Making (Complexity) low complexity   Planned Therapy Interventions (PT) other (see comments)  (evaluation only)   Risk & Benefits of therapy  have been explained evaluation/treatment results reviewed;care plan/treatment goals reviewed;risks/benefits reviewed;current/potential barriers reviewed;participants voiced agreement with care plan;participants included;patient   PT Total Evaluation Time   PT Eval, Low Complexity Minutes (95498) 20   Therapy Certification   Start of care date 08/24/24   Certification date from 08/24/24   Certification date to 08/25/24   Medical Diagnosis Multiple pathologic fractures of pelvis   Physical Therapy Goals   PT Frequency One time eval and treatment only   PT Discharge Planning   PT Plan Discharge PT; no needs   PT Discharge Recommendation (DC Rec) home   PT Rationale for DC Rec Pt at baseline with functional mobility. Mod I w/transfers and gait w/FWW. Has home equipment already, no stairs. Mild R hip pain not limiting mobility.   PT Brief overview of current status Supine<>sit and STS w/mod I w/FWW. Supervision w/ambulation 300 feet w/FWW, mild increased R hip pain following session.   PT Equipment Needed at Discharge other (see comments)  (Has equipemt at home including FWW)   Total Session Time   Total Session Time (sum of timed and untimed services) 20   [facility]  OUTPATIENT PHYSICAL THERAPY EVALUATION  PLAN OF TREATMENT FOR OUTPATIENT REHABILITATION  (COMPLETE FOR INITIAL CLAIMS ONLY)  Patient's Last Name, First Name, M.I.  YOB: 1955  Dk Randhawa                        Provider's Name  [facility] Medical Record No.  [id number]                             Onset Date:  08/23/24   Start of Care Date:  08/24/24   Type:     _X_PT   ___OT   ___SLP Medical Diagnosis:  Multiple pathologic fractures of pelvis              PT Diagnosis:  Impaired functional mobility Visits from SOC:  1     See note for plan of treatment, functional goals and certification details    I CERTIFY THE NEED FOR THESE SERVICES FURNISHED UNDER        THIS PLAN OF  TREATMENT AND WHILE UNDER MY CARE     (Physician co-signature of this document indicates review and certification of the therapy plan).

## 2024-08-24 NOTE — PLAN OF CARE
Problem: Adult Inpatient Plan of Care  Goal: Optimal Comfort and Wellbeing  Outcome: Progressing     Problem: Skin Injury Risk Increased  Goal: Skin Health and Integrity  Outcome: Progressing     Problem: Orthopaedic Fracture  Goal: Optimal Functional Ability  Outcome: Progressing  Goal: Optimal Pain Control and Function  Outcome: Progressing  Goal: Effective Oxygenation and Ventilation  Outcome: Progressing   Goal Outcome Evaluation:  A&O x4 and A1 due to IV but is steady. Able to make needs known. Uses urinal at beside when voiding. New IV was placed in R arm, NS running @75. Pain has been managed appropriately with scheduled tylenol during shift. Requested PRN stool softener during evening, due to 10mg oxy he received in ED. Stated he didn't want to become constipated.

## 2024-08-24 NOTE — PROGRESS NOTES
Neuro: AOx 3-4. Disoriented to time/date. Denied HA/dizziness. Up with walker and 1 assist, steady on his feet. Saw PT who cleared him for discharge. After that he refused staff help but he did use a walker for longer distances and his brother brought his personal WC for transport to his home a [address] older facility. He set up his own ride.     Minimal pain that he states is well managed with just tylenol. Even walking he states it isn't too bad.     Cardiac: Afebrile. Vitals WNL. State the swelling in his legs is improved- still about 2+. BP (!) 133/90 (BP Location: Right arm)   Pulse 114   Temp 98.6  F (37  C) (Oral)   Resp 18   Ht 1.829 m (6')   Wt 106.7 kg (235 lb 3.7 oz)   SpO2 98%   BMI 31.90 kg/m         Resp: RRR satting well on RA. Denied sore throat or cold sx.      GI/: States had a BM after stool softeners yesterday and isn't requiring the oxycodone which is what backs him up. Good appetite and ate all of his meals. Denied any GI upset.      MSK: Mild hip pain from fractures- moving with ease with walker. Needed assistance with his socks.      Skin: Irritation on is sacral area but no notable redness. Cream applied.      LDAs: PIV CDI,  removed.

## 2024-08-24 NOTE — PROGRESS NOTES
[facility] DISCHARGE NOTE    Patient discharged to home at 1:13 PM via wheel chair. Accompanied by brother and staff. Discharge instructions reviewed with patient, opportunity offered to ask questions. Prescriptions sent to patients preferred pharmacy. All belongings sent with patient.    Annabel Pino RN

## 2024-08-26 NOTE — PROGRESS NOTES
Social Work - Intervention  [facility]    Data/Intervention:  Patient Name: Dk Randhawa Sr. Goes By: Homer    /Age: 1955 (69 year old)     Visit Type: telephone  Referral Source: Dr. Friedell  Reason for Referral: Transportation for Wed appt     Psychosocial Information/Concerns:  Homer calling to inquire about transportation benefit available through health insurance. SW facilitated call to [facility] and confirmed that Homer does not have transportation benefit available through health plan.     SW discussed private pay options, Homer reported that they would be too costly. SW discussed [facility] program, although likely not best transportation source as Homer reports he would prefer to utilize wheelchair for appts given recent fractures and would not want to use walker.     Homer reports he is independent in ADLs and IADLs at present time, is not driving due to taking pain medication. Homer reports that he lives in a [address] building, and does not have any stairs. Brother is out of town for 4 days, he has been helping with rides. Homer reports brother's girlfriend will assist with upcoming transportation need.       Intervention/Education/Resources Provided: (mailed)  [facility] Transportation options  Onc SW contact information     Assessment/Plan:  Onc SW will continue to be available as needed for ongoing psychosocial support. Homer knows to outreach in case of concern or need.      Provided patient/family with contact information and availability.    NIMO Truong, LICSW, OSW-C  Clinical - Adult Oncology  Phone: [phone]  She/Her/Hers  [facility]: Kimberly ORNELAS  8am-4:30pm  [facility]: ISMAEL Figueroa F 8am-4:30pm   Support Groups at [facility]: Social Work Services for Cancer Patients ([email or web address])

## 2024-08-26 NOTE — PROGRESS NOTES
Pt called to report he was in the hospital as of Friday for hip pain and found to have a new fracture. He is not able to drive and his brother who usually gives him a ride is out of town. SW referral placed for transportation options.     Lalitha Rosales RN on 8/26/2024 at 1:21 PM

## 2024-08-27 NOTE — PROGRESS NOTES
Connected Care Resource Center: General acute hospital    Background: Transitional Care Management program identified per system criteria and reviewed by [facility] [place] team for possible outreach.    Assessment: Upon chart review, CCR Team member will not proceed with patient outreach related to this episode of Transitional Care Management program due to reason below:    Patient has active communication with a nurse, provider or care team for reason of post-hospital follow up plan.  Outreach call by CCR[facility] team not indicated to minimize duplicative efforts.     Plan: Transitional Care Management episode addressed appropriately per reason noted above.      Ira Barcenas  Community Health Worker  [facility]  [phone]      *Connected Care Resource Team does NOT follow patient ongoing. Referrals are identified based on internal discharge reports and the outreach is to ensure patient has an understanding of their discharge instructions.

## 2024-08-28 NOTE — LETTER
8/28/2024      Dk Randhawa .  [address]  [facility] [address]      Dear Colleague,    Thank you for referring your patient, Dk Randhawa Bay, to the [facility] [place]. Please see a copy of my visit note below.    .Oncology Rooming Note    August 28, 2024 12:48 PM   Dk Randhawa Sr. is a 69 year old male who presents for:    Chief Complaint   Patient presents with     Clinic Care Coordination - Follow-up     Follow up- tox check     Initial Vitals: /69   Pulse 117   Temp 97.8  F (36.6  C)   Wt 105.1 kg (231 lb 12.8 oz)   SpO2 97%   BMI 31.44 kg/m   Estimated body mass index is 31.44 kg/m  as calculated from the following:    Height as of 8/23/24: 1.829 m (6').    Weight as of this encounter: 105.1 kg (231 lb 12.8 oz). Body surface area is 2.31 meters squared.  Mild Pain (2) Comment: Data Unavailable   No LMP for male patient.  Allergies reviewed: Yes  Medications reviewed: Yes    Medications: MEDICATION REFILLS NEEDED TODAY. Provider was notified.  Pharmacy name entered into [employer]:    [id number] [place] [facility] - [place], [place] - [address] [place] [address] [id number] - [place], [place] - [address] [facility]    Frailty Screening:   Is the patient here for a new oncology consult visit in cancer care? 2. No      Clinical concerns: Patient in hospital over the weekend with right hip pain out of control. Patient taking oxycodone now for pain every 4 hours. Continues on decadron BID also.  Dr. Friedell  was notified.      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  On 8/23/2024 he developed increased pain in his right hip.  He went to the emergency room and imaging showed multiple fractures in the pelvis.  He is now using a walker for ambulation.  His morphine dose was increased.  Long acting MS Contin is prescribed.  Chart sent to radiation oncology for consideration of radiation therapy to the right hip and pelvis.  On 9/9/2024 he will return for cycle 3 of chemotherapy.    ____________________________________________________________________________    Staging History   Cancer Staging   Malignant neoplasm of upper lobe of right lung (H)  Staging form: Lung, AJCC 8th Edition  - Pathologic: Stage IIB (pT3, pN0, cM0) - Signed by Friedell, Peter E, MD on 6/1/2024  - Clinical stage from 7/29/2024: Stage AJITH (rcTX, cN2, cM1b) - Signed by Friedell, Peter E, MD on 7/29/2024    Foundation 1 studies show a tumor proportion score of 40%.  Microsatellite status is MS stable tumor.  Mutational burden is 1 MUTS/MB   K-juanito-Q61H amplification is noted.  No therapies or clinical trials are available for this mutation    ECOG Performance = 1    History of Present Illness    Mr. Dk HARRISON Sydnee . is a 68 year old man with a history of atrial fibrillation on Xarelto.  He has a history of exposure to industrial dusts and about a 20 pack year history of cigarette smoking, He quit about 10 years ago.  On 4/7/2024 he presented to the emergency room after several days of hemoptysis.  CT scan of the chest on 4/7/2024 showed a right upper lobe lung mass.  Brain MRI on 4/30/2024 was negative for metastatic disease.  PET CT  scan on 5/2/2024 was positive for the RUL mass and there was uptake in some mediastinal lymph nodes.  On 5/16/2024 patient had a robotic assisted right upper lobe lobectomy by Dr. Peoples.  Pathology showed Large Cell Lung Cancer. Mediastinal nodes were negative.  Patient has made an uneventful recovery.    7/20/2024 patient presented to the emergency room with severe pain in the right hip area.  Investigation at that time showed metastatic disease particularly in the chest but likely in the right hip acetabulum as well.  Chemotherapy changed from adjuvant to definitive chemotherapy for advanced large cell lung cancer.    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  MRI of the brain shows multiple sites of metastatic disease    From 8/8 to 8/12/2024  patient received gamma knife treatment for CNS metastases.    Update 8/28/2024.  Patient returns for toxicity check and management of right hip pain.  Recent imaging shows pathologic pelvic fracture.   Does get relief from oxycodone.  He is not having chills, fevers or sweats.  He is not having cough, chest pain or shortness of breath at rest.  He has constipation with oxycodone and gets relief with stool softener.      Review of systems.  Pertinent Findings are included in the History of Present Illness    Physical Exam    /69   Pulse 117   Temp 97.8  F (36.6  C)   Wt 105.1 kg (231 lb 12.8 oz)   SpO2 97%   BMI 31.44 kg/m       GENERAL APPEARANCE: 69-year-old man in no apparent distress.  HEAD: Atraumatic; normocephalic; without lesions.  EYES: Conjunctiva, corneas and eyelids normal; pupils equal, round, reactive to light; No Icterus.  MOUTH/OROPHARYNX: Oral mucosa slightly inflamed  NECK: Supple with no nodes.  LUNGS:  Clear to auscultation and percussion with no extra sounds.  HEART: Irregular rhythm and rate; S1 and S2 normal; no murmurs noted.  ABDOMEN: Soft; no masses or  tenderness, no hepatosplenomegaly.  NEUROLOGIC: Alert and oriented.  No obvious focal findings.  EXTREMITIES: No cyanosis, or edema.  SKIN: No abnormal bruising or bleeding. No suspicious lesions noted on exposed skin.  PSYCHIATRIC: Mental status normal; no apparent psychiatric issues      Medications:    Current Outpatient Medications   Medication Sig Dispense Refill     acetaminophen (TYLENOL) 325 MG tablet Take 2 tablets (650 mg) by mouth every 4 hours as needed for mild pain 50 tablet 0     acetaminophen (TYLENOL) 500 MG tablet Take 2 tablets (1,000 mg) by mouth every 6 hours (Patient taking differently: Take 1,000 mg by mouth 3 times daily.)       dexAMETHasone (DECADRON) 4 MG tablet Take 1 tablet (4 mg) by mouth 2 times daily (with meals) 60 tablet 1     diphenhydrAMINE-acetaminophen (TYLENOL PM)  MG tablet Take 2 tablets by mouth nightly as needed for sleep       fexofenadine (ALLEGRA) 180 MG tablet Take 180 mg by mouth daily       HEMP OIL OR EXTRACT OR OTHER CBD CANNABINOID, NOT MEDICAL CANNABIS, CBD gummies       losartan (COZAAR) 100 MG tablet Take 100 mg by mouth every morning       Melatonin 10 MG TABS tablet Take 10 mg by mouth nightly as needed for sleep       metoprolol succinate ER (TOPROL XL) 100 MG 24 hr tablet Take 1 tablet by mouth every morning       morphine (MS CONTIN) 15 MG CR tablet Take 1 tablet (15 mg) by mouth every 12 hours. 60 tablet 0     multivitamin (CENTRUM SILVER) tablet Take 1 tablet by mouth daily       naloxone (NARCAN) 4 MG/0.1ML nasal spray Spray 1 spray (4 mg) into one nostril alternating nostrils as needed for opioid reversal. every 2-3 minutes until assistance arrives 2 each 0     oxyCODONE (OXY-IR) 5 MG capsule Take 1 capsule (5 mg) by mouth every 4 hours as needed for severe pain. May take 2 capsules if needed 120 capsule 0     Turmeric (QC TUMERIC COMPLEX PO) Tumeric and ginger with applie cider 1410mg       UNABLE TO FIND MEDICATION NAME: osteo bi flex        UNABLE TO FIND MEDICATION NAME: Emergen-e 1000mg       XARELTO ANTICOAGULANT 20 MG TABS tablet Take 20 mg by mouth every evening.       No current facility-administered medications for this visit.           Past History    Past Medical History:   Diagnosis Date     Acute non-recurrent maxillary sinusitis      Antiplatelet or antithrombotic long-term use      Arrhythmia      Atrial fibrillation with RVR (H)      Cough secondary to angiotensin converting enzyme inhibitor (ACE-I)      Hyperlipidemia      Hypertension      Mass of upper lobe of right lung      Obesity      Past Surgical History:   Procedure Laterality Date     BRONCHOSCOPY, WITH BIOPSY, ROBOT ASSISTED Bilateral 04/16/2024    Procedure: BRONCHOSCOPY, endobronchial ultrasound, transbronchial needle aspiration, endobronchial biopsies and tumor debulking;  Surgeon: Lanette Arce MD;  Location: [facility] OR     DAVINCI EXCISE NODES THORACIC N/A 5/15/2024    Procedure: mediastinal lymph node dissection;  Surgeon: Mikael Peoples MD;  Location: [facility] OR     DAVINCI LOBECTOMY LUNG Right 5/15/2024    Procedure: Robot-assisted thoracoscopic right upper lobectomy,;  Surgeon: Mikael Peoples MD;  Location:  OR     ELBOW ARTHROSCOPY Left 03/09/2017     INSERT PORT VASCULAR ACCESS Right 7/5/2024    Procedure: INSERTION, VASCULAR ACCESS PORT;  Surgeon: Henry Veronica MD;  Location: [facility]     PHACOEMULSIFICATION WITH STANDARD INTRAOCULAR LENS IMPLANT Left 02/26/2018    Procedure: PHACOEMULSIFICATION WITH STANDARD INTRAOCULAR LENS IMPLANT;  Left Cataract Removal with Implant;  Surgeon: Mohit Victor MD;  Location: [facility] OR     PHACOEMULSIFICATION WITH STANDARD INTRAOCULAR LENS IMPLANT Right 01/30/2019    Procedure: Cataract Removal with Implant;  Surgeon: Mohit Victor MD;  Location: [facility] OR     TONSILLECTOMY  1964     TOTAL HIP ARTHROPLASTY Left 04/12/2022     No Known Allergies  Family History   Problem Relation Age of Onset     Ovarian Cancer  Mother      Alzheimer Disease Mother      Depression Father 45        suicide     Diabetes Sister      Social History     Socioeconomic History     Marital status:      Spouse name: None     Number of children: None     Years of education: None     Highest education level: None   Tobacco Use     Smoking status: Former     Current packs/day: 0.00     Types: Cigarettes     Quit date: 2014     Years since quitting: 10.4     Smokeless tobacco: Never   Vaping Use     Vaping status: Never Used   Substance and Sexual Activity     Alcohol use: Not Currently     Drug use: No     Social Determinants of Health     Financial Resource Strain: Low Risk  (10/23/2023)    [facility]    Financial Resource Strain      Difficulty of Paying Living Expenses: 3   Food Insecurity: No Food Insecurity (10/23/2023)    [facility], [facility]    Food Insecurity      Worried About Running Out of Food in the Last Year: 1   Transportation Needs: No Transportation Needs (10/23/2023)    [facility]    Transportation Needs      Lack of Transportation (Medical): 1   Social Connections: Socially Integrated (10/23/2023)    [facility], [facility] [employer]    Social Connections      Frequency of Communication with Friends and Family: 0   Housing Stability: Low Risk  (10/23/2023)    [facility], [facility]    Housing Stability      Unable to Pay for Housing in the Last Year: 1           Lab Results      Recent Results (from the past 720 hour(s))   CBC with platelets and differential    Collection Time: 08/06/24  1:58 PM   Result  Value Ref Range    WBC Count 31.3 (H) 4.0 - 11.0 10e3/uL    RBC Count 4.04 (L) 4.40 - 5.90 10e6/uL    Hemoglobin 11.6 (L) 13.3 - 17.7 g/dL    Hematocrit 34.4 (L) 40.0 - 53.0 %    MCV 85 78 - 100 fL    MCH 28.7 26.5 - 33.0 pg    MCHC 33.7 31.5 - 36.5 g/dL    RDW 13.8 10.0 - 15.0 %    Platelet Count 317 150 - 450 10e3/uL    % Neutrophils 92 %    % Lymphocytes 2 %    % Monocytes 1 %    % Eosinophils 0 %    % Basophils 0 %    % Immature Granulocytes 5 %    NRBCs per 100 WBC 0 <1 /100    Absolute Neutrophils 28.9 (H) 1.6 - 8.3 10e3/uL    Absolute Lymphocytes 0.5 (L) 0.8 - 5.3 10e3/uL    Absolute Monocytes 0.2 0.0 - 1.3 10e3/uL    Absolute Eosinophils 0.0 0.0 - 0.7 10e3/uL    Absolute Basophils 0.0 0.0 - 0.2 10e3/uL    Absolute Immature Granulocytes 1.7 (H) <=0.4 10e3/uL    Absolute NRBCs 0.0 10e3/uL   RBC and Platelet Morphology    Collection Time: 08/06/24  1:58 PM   Result Value Ref Range    RBC Morphology Confirmed RBC Indices     Platelet Assessment  Automated Count Confirmed. Platelet morphology is normal.     Automated Count Confirmed. Platelet morphology is normal.    Reactive Lymphocytes Present (A) None Seen   Magnesium    Collection Time: 08/14/24  2:21 PM   Result Value Ref Range    Magnesium 2.2 1.7 - 2.3 mg/dL   Comprehensive metabolic panel    Collection Time: 08/14/24  2:21 PM   Result Value Ref Range    Sodium 132 (L) 135 - 145 mmol/L    Potassium 4.9 3.4 - 5.3 mmol/L    Carbon Dioxide (CO2) 24 22 - 29 mmol/L    Anion Gap 12 7 - 15 mmol/L    Urea Nitrogen 27.7 (H) 8.0 - 23.0 mg/dL    Creatinine 0.94 0.67 - 1.17 mg/dL    GFR Estimate 87 >60 mL/min/1.73m2    Calcium 8.9 8.8 - 10.4 mg/dL    Chloride 96 (L) 98 - 107 mmol/L    Glucose 120 (H) 70 - 99 mg/dL    Alkaline Phosphatase 198 (H) 40 - 150 U/L    AST 17 0 - 45 U/L    ALT 17 0 - 70 U/L    Protein Total 6.6 6.4 - 8.3 g/dL    Albumin 3.8 3.5 - 5.2 g/dL    Bilirubin Total 0.3 <=1.2 mg/dL   CBC with platelets and differential    Collection Time: 08/14/24   2:21 PM   Result Value Ref Range    WBC Count 72.0 (HH) 4.0 - 11.0 10e3/uL    RBC Count 3.94 (L) 4.40 - 5.90 10e6/uL    Hemoglobin 11.6 (L) 13.3 - 17.7 g/dL    Hematocrit 33.7 (L) 40.0 - 53.0 %    MCV 86 78 - 100 fL    MCH 29.4 26.5 - 33.0 pg    MCHC 34.4 31.5 - 36.5 g/dL    RDW 15.9 (H) 10.0 - 15.0 %    Platelet Count 296 150 - 450 10e3/uL    NRBCs per 100 WBC 0 <1 /100    Absolute NRBCs 0.0 10e3/uL   Manual Differential    Collection Time: 08/14/24  2:21 PM   Result Value Ref Range    % Neutrophils 90 %    % Lymphocytes 3 %    % Monocytes 4 %    % Eosinophils 0 %    % Basophils 0 %    % Myelocytes 3 %    Absolute Neutrophils 64.8 (H) 1.6 - 8.3 10e3/uL    Absolute Lymphocytes 2.2 0.8 - 5.3 10e3/uL    Absolute Monocytes 2.9 (H) 0.0 - 1.3 10e3/uL    Absolute Eosinophils 0.0 0.0 - 0.7 10e3/uL    Absolute Basophils 0.0 0.0 - 0.2 10e3/uL    Absolute Myelocytes 2.2 (H) <=0.0 10e3/uL    RBC Morphology Confirmed RBC Indices     Platelet Assessment  Automated Count Confirmed. Platelet morphology is normal.     Automated Count Confirmed. Platelet morphology is normal.   Comprehensive metabolic panel    Collection Time: 08/19/24  7:47 AM   Result Value Ref Range    Sodium 125 (L) 135 - 145 mmol/L    Potassium 5.1 3.4 - 5.3 mmol/L    Carbon Dioxide (CO2) 25 22 - 29 mmol/L    Anion Gap 6 (L) 7 - 15 mmol/L    Urea Nitrogen 24.0 (H) 8.0 - 23.0 mg/dL    Creatinine 0.83 0.67 - 1.17 mg/dL    GFR Estimate >90 >60 mL/min/1.73m2    Calcium 8.6 (L) 8.8 - 10.4 mg/dL    Chloride 94 (L) 98 - 107 mmol/L    Glucose 77 70 - 99 mg/dL    Alkaline Phosphatase 301 (H) 40 - 150 U/L    AST 16 0 - 45 U/L    ALT 17 0 - 70 U/L    Protein Total 6.2 (L) 6.4 - 8.3 g/dL    Albumin 3.7 3.5 - 5.2 g/dL    Bilirubin Total 0.2 <=1.2 mg/dL   TSH with free T4 reflex    Collection Time: 08/19/24  7:47 AM   Result Value Ref Range    TSH 1.36 0.30 - 4.20 uIU/mL   CBC with platelets and differential    Collection Time: 08/19/24  7:47 AM   Result Value Ref Range     WBC Count 114.3 (HH) 4.0 - 11.0 10e3/uL    RBC Count 3.79 (L) 4.40 - 5.90 10e6/uL    Hemoglobin 11.2 (L) 13.3 - 17.7 g/dL    Hematocrit 32.9 (L) 40.0 - 53.0 %    MCV 87 78 - 100 fL    MCH 29.6 26.5 - 33.0 pg    MCHC 34.0 31.5 - 36.5 g/dL    RDW 17.4 (H) 10.0 - 15.0 %    Platelet Count 251 150 - 450 10e3/uL    NRBCs per 100 WBC 0 <1 /100    Absolute NRBCs 0.0 10e3/uL   Manual Differential    Collection Time: 08/19/24  7:47 AM   Result Value Ref Range    % Neutrophils 96 %    % Lymphocytes 1 %    % Monocytes 2 %    % Eosinophils 0 %    % Basophils 0 %    % Metamyelocytes 1 %    Absolute Neutrophils 109.7 (H) 1.6 - 8.3 10e3/uL    Absolute Lymphocytes 1.1 0.8 - 5.3 10e3/uL    Absolute Monocytes 2.3 (H) 0.0 - 1.3 10e3/uL    Absolute Eosinophils 0.0 0.0 - 0.7 10e3/uL    Absolute Basophils 0.0 0.0 - 0.2 10e3/uL    Absolute Metamyelocytes 1.1 (H) <=0.0 10e3/uL    RBC Morphology Confirmed RBC Indices     Platelet Assessment  Automated Count Confirmed. Platelet morphology is normal.     Automated Count Confirmed. Platelet morphology is normal.   Basic metabolic panel    Collection Time: 08/23/24  4:31 PM   Result Value Ref Range    Sodium 128 (L) 135 - 145 mmol/L    Potassium 4.7 3.4 - 5.3 mmol/L    Chloride 93 (L) 98 - 107 mmol/L    Carbon Dioxide (CO2) 24 22 - 29 mmol/L    Anion Gap 11 7 - 15 mmol/L    Urea Nitrogen 25.7 (H) 8.0 - 23.0 mg/dL    Creatinine 0.72 0.67 - 1.17 mg/dL    GFR Estimate >90 >60 mL/min/1.73m2    Calcium 8.6 (L) 8.8 - 10.4 mg/dL    Glucose 154 (H) 70 - 99 mg/dL   CBC with platelets and differential    Collection Time: 08/23/24  4:31 PM   Result Value Ref Range    WBC Count 57.0 (HH) 4.0 - 11.0 10e3/uL    RBC Count 3.99 (L) 4.40 - 5.90 10e6/uL    Hemoglobin 11.8 (L) 13.3 - 17.7 g/dL    Hematocrit 33.9 (L) 40.0 - 53.0 %    MCV 85 78 - 100 fL    MCH 29.6 26.5 - 33.0 pg    MCHC 34.8 31.5 - 36.5 g/dL    RDW 18.2 (H) 10.0 - 15.0 %    Platelet Count 232 150 - 450 10e3/uL    NRBCs per 100 WBC 0 <1 /100     Absolute NRBCs 0.0 10e3/uL   Manual Differential    Collection Time: 08/23/24  4:31 PM   Result Value Ref Range    % Neutrophils 99 %    % Lymphocytes 1 %    % Monocytes 0 %    % Eosinophils 0 %    % Basophils 0 %    Absolute Neutrophils 56.4 (H) 1.6 - 8.3 10e3/uL    Absolute Lymphocytes 0.6 (L) 0.8 - 5.3 10e3/uL    Absolute Monocytes 0.0 0.0 - 1.3 10e3/uL    Absolute Eosinophils 0.0 0.0 - 0.7 10e3/uL    Absolute Basophils 0.0 0.0 - 0.2 10e3/uL    RBC Morphology Confirmed RBC Indices     Platelet Assessment  Automated Count Confirmed. Platelet morphology is normal.     Automated Count Confirmed. Platelet morphology is normal.    Toxic Neutrophils Present (A) None Seen   Basic metabolic panel    Collection Time: 08/24/24  6:00 AM   Result Value Ref Range    Sodium 130 (L) 135 - 145 mmol/L    Potassium 4.9 3.4 - 5.3 mmol/L    Chloride 95 (L) 98 - 107 mmol/L    Carbon Dioxide (CO2) 24 22 - 29 mmol/L    Anion Gap 11 7 - 15 mmol/L    Urea Nitrogen 23.5 (H) 8.0 - 23.0 mg/dL    Creatinine 0.64 (L) 0.67 - 1.17 mg/dL    GFR Estimate >90 >60 mL/min/1.73m2    Calcium 8.5 (L) 8.8 - 10.4 mg/dL    Glucose 86 70 - 99 mg/dL   CBC with platelets    Collection Time: 08/24/24  6:00 AM   Result Value Ref Range    WBC Count 48.2 (H) 4.0 - 11.0 10e3/uL    RBC Count 3.91 (L) 4.40 - 5.90 10e6/uL    Hemoglobin 11.5 (L) 13.3 - 17.7 g/dL    Hematocrit 34.6 (L) 40.0 - 53.0 %    MCV 89 78 - 100 fL    MCH 29.4 26.5 - 33.0 pg    MCHC 33.2 31.5 - 36.5 g/dL    RDW 18.7 (H) 10.0 - 15.0 %    Platelet Count 213 150 - 450 10e3/uL   Comprehensive metabolic panel    Collection Time: 08/28/24 12:14 PM   Result Value Ref Range    Sodium 128 (L) 135 - 145 mmol/L    Potassium 4.7 3.4 - 5.3 mmol/L    Carbon Dioxide (CO2) 24 22 - 29 mmol/L    Anion Gap 11 7 - 15 mmol/L    Urea Nitrogen 28.0 (H) 8.0 - 23.0 mg/dL    Creatinine 0.80 0.67 - 1.17 mg/dL    GFR Estimate >90 >60 mL/min/1.73m2    Calcium 8.7 (L) 8.8 - 10.4 mg/dL    Chloride 93 (L) 98 - 107 mmol/L     Glucose 158 (H) 70 - 99 mg/dL    Alkaline Phosphatase 152 (H) 40 - 150 U/L    AST 13 0 - 45 U/L    ALT 16 0 - 70 U/L    Protein Total 6.3 (L) 6.4 - 8.3 g/dL    Albumin 3.7 3.5 - 5.2 g/dL    Bilirubin Total 0.5 <=1.2 mg/dL   CBC with platelets and differential    Collection Time: 08/28/24 12:14 PM   Result Value Ref Range    WBC Count 0.8 (LL) 4.0 - 11.0 10e3/uL    RBC Count 3.47 (L) 4.40 - 5.90 10e6/uL    Hemoglobin 10.1 (L) 13.3 - 17.7 g/dL    Hematocrit 29.9 (L) 40.0 - 53.0 %    MCV 86 78 - 100 fL    MCH 29.1 26.5 - 33.0 pg    MCHC 33.8 31.5 - 36.5 g/dL    RDW 17.2 (H) 10.0 - 15.0 %    Platelet Count 147 (L) 150 - 450 10e3/uL    % Neutrophils 65 %    % Lymphocytes 28 %    % Monocytes 5 %    % Eosinophils 0 %    % Basophils 1 %    % Immature Granulocytes 1 %    NRBCs per 100 WBC 0 <1 /100    Absolute Neutrophils 0.5 (L) 1.6 - 8.3 10e3/uL    Absolute Lymphocytes 0.2 (L) 0.8 - 5.3 10e3/uL    Absolute Monocytes 0.0 0.0 - 1.3 10e3/uL    Absolute Eosinophils 0.0 0.0 - 0.7 10e3/uL    Absolute Basophils 0.0 0.0 - 0.2 10e3/uL    Absolute Immature Granulocytes 0.0 <=0.4 10e3/uL    Absolute NRBCs 0.0 10e3/uL   RBC and Platelet Morphology    Collection Time: 08/28/24 12:14 PM   Result Value Ref Range    RBC Morphology Confirmed RBC Indices     Platelet Assessment  Automated Count Confirmed. Platelet morphology is normal.     Automated Count Confirmed. Platelet morphology is normal.         Imaging Results    CT Lumbar Spine w/o Contrast    Result Date: 8/23/2024  CT OF THE LUMBAR SPINE WITHOUT CONTRAST  8/23/2024 1:51 PM COMPARISON: None. HISTORY: Low back pain new since four days ago, right hip and buttock pain worsened/constant since four days ago.  TECHNIQUE: Axial images of the lumbar spine were acquired without intravenous contrast. Multiplanar reformations were created from the axial source images.      IMPRESSION:  Five lumbar type vertebrae. Straightening of normal lumbar lordosis. Alignment otherwise normal.  Vertebral body heights normal. No fractures. Loss of disc space height, posterior disc bulging and vacuum disc formation at the L1-L2, L2-L3, L4-L5 and L5-S1 levels. Moderate to severe facet arthropathy bilaterally at L2-L3, L3-L4, L4-L5 and L5-S1. No high-grade spinal canal stenosis of the lumbar spine. No high-grade neural foraminal stenosis at any level of the lumbar spine. Radiation dose for this scan was reduced using automated exposure control, adjustment of the mA and/or kV according to patient size, or iterative reconstruction technique. FRANK CHAVEZ MD   SYSTEM ID:  [id number]    CT Pelvis Bone wo Contrast    Result Date: 8/23/2024  EXAM: CT PELVIS BONE WO CONTRAST DATE/TIME: 8/23/2024 1:49 PM INDICATION: Right hip and buttock pain. COMPARISON: Radiograph 7/20/2024. TECHNIQUE: Noncontrast. Axial, sagittal and coronal thin-section reconstruction. Dose reduction techniques were used. FINDINGS: BONES: -Comminuted pathologic nondisplaced fracture of the right acetabulum involving the supra-acetabular ilium, posterior acetabulum, anterior medial acetabulum (series 3 image 70, series 3 image 78, series 3 image 81). -Mildly displaced probable additional pathologic fracture at the junction of the right ischium/inferior pubic ramus (series 5 image 93). -Additional nondisplaced fracture in the left superior pubic ramus through a small lucent lesion. -Additional scattered lucent lesions throughout the pelvic bones suspicious for metastatic disease with representative examples in the right posterior medial iliac bone and left supra-acetabular region. -Left hip arthroplasty without evidence of hardware complication. There are degenerative changes in the lumbar spine. SOFT TISSUES: -Vascular calcifications.     IMPRESSION: 1.  Comminuted pathologic nondisplaced fracture of the right acetabulum. 2.  Mildly displaced probable additional pathologic fracture of the junction of the right inferior pubic ramus/right ischium.  3.  Nondisplaced pathologic fracture in the left superior pubic ramus. 4.  Additional scattered lucent lesions, suspicious for additional metastases. NOTE: ABNORMAL REPORT THE DICTATION ABOVE DESCRIBES AN ABNORMAL REPORT FOR WHICH FOLLOW-UP IS NEEDED. KATHERYN MCELROY MD   SYSTEM ID:  [id number]        I spent 35 minutes on the patient's visit today.  This included preparation for the visit, face-to-face time with the patient and documentation following the visit.  It did not include teaching or procedure time.    Signed by: Peter E. Friedell, MD          Again, thank you for allowing me to participate in the care of your patient.        Sincerely,        Peter E. Friedell, MD

## 2024-08-28 NOTE — PROGRESS NOTES
.Oncology Rooming Note    August 28, 2024 12:48 PM   Dk Randhawa Sr. is a 69 year old male who presents for:    Chief Complaint   Patient presents with    Clinic Care Coordination - Follow-up     Follow up- tox check     Initial Vitals: /69   Pulse 117   Temp 97.8  F (36.6  C)   Wt 105.1 kg (231 lb 12.8 oz)   SpO2 97%   BMI 31.44 kg/m   Estimated body mass index is 31.44 kg/m  as calculated from the following:    Height as of 8/23/24: 1.829 m (6').    Weight as of this encounter: 105.1 kg (231 lb 12.8 oz). Body surface area is 2.31 meters squared.  Mild Pain (2) Comment: Data Unavailable   No LMP for male patient.  Allergies reviewed: Yes  Medications reviewed: Yes    Medications: MEDICATION REFILLS NEEDED TODAY. Provider was notified.  Pharmacy name entered into [facility]:    [id number] - [place], [place] - [address] [place] [address] [id number] - [place], [place] - [address] [facility]    Frailty Screening:   Is the patient here for a new oncology consult visit in cancer care? 2. No      Clinical concerns: Patient in hospital over the weekend with right hip pain out of control. Patient taking oxycodone now for pain every 4 hours. Continues on decadron BID also.  Dr. Friedell  was notified.      Carmen Campbell RN

## 2024-08-28 NOTE — PROGRESS NOTES
[facility] Hematology and Oncology Progress Note    Patient: Dk Randhawa .  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  On 8/23/2024 he developed increased pain in his right hip.  He went to the emergency room and imaging showed multiple fractures in the pelvis.  He is now using a walker for ambulation.  His morphine dose was increased.  Long acting MS Contin is prescribed.  Chart sent to radiation oncology for consideration of radiation therapy to the right hip and pelvis.  On 9/9/2024 he will return for cycle 3 of chemotherapy.    ____________________________________________________________________________    Staging History   Cancer Staging   Malignant neoplasm of upper lobe of right lung (H)  Staging form: Lung, AJCC 8th Edition  - Pathologic: Stage IIB (pT3, pN0, cM0) - Signed by Friedell, Peter E, MD on 6/1/2024  - Clinical stage from 7/29/2024: Stage AJITH (rcTX, cN2, cM1b) - Signed by Friedell, Peter E, MD on 7/29/2024    Foundation 1 studies show a tumor proportion score of 40%.  Microsatellite status is MS stable tumor.  Mutational burden is 1 MUTS/MB   K-juanito-Q61H amplification is noted.  No therapies or clinical trials are available for this mutation    ECOG Performance = 1    History of Present Illness    Mr. Dk Randhawa  is a 68  year old man with a history of atrial fibrillation on Xarelto.  He has a history of exposure to industrial dusts and about a 20 pack year history of cigarette smoking, He quit about 10 years ago.  On 4/7/2024 he presented to the emergency room after several days of hemoptysis.  CT scan of the chest on 4/7/2024 showed a right upper lobe lung mass.  Brain MRI on 4/30/2024 was negative for metastatic disease.  PET CT scan on 5/2/2024 was positive for the RUL mass and there was uptake in some mediastinal lymph nodes.  On 5/16/2024 patient had a robotic assisted right upper lobe lobectomy by Dr. Peoples.  Pathology showed Large Cell Lung Cancer. Mediastinal nodes were negative.  Patient has made an uneventful recovery.    7/20/2024 patient presented to the emergency room with severe pain in the right hip area.  Investigation at that time showed metastatic disease particularly in the chest but likely in the right hip acetabulum as well.  Chemotherapy changed from adjuvant to definitive chemotherapy for advanced large cell lung cancer.    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  MRI of the brain shows multiple sites of metastatic disease    From 8/8 to 8/12/2024  patient received gamma knife treatment for CNS metastases.    Update 8/28/2024.  Patient returns for toxicity check and management of right hip pain.  Recent imaging shows pathologic pelvic fracture.   Does get relief from oxycodone.  He is not having chills, fevers or sweats.  He is not having cough, chest pain or shortness of breath at rest.  He has constipation with oxycodone and gets relief with stool softener.      Review of systems.  Pertinent Findings are included in the History of Present Illness    Physical Exam    /69   Pulse 117   Temp 97.8  F (36.6  C)   Wt 105.1 kg (231 lb 12.8 oz)   SpO2 97%   BMI 31.44 kg/m       GENERAL APPEARANCE: 69-year-old man in no  apparent distress.  HEAD: Atraumatic; normocephalic; without lesions.  EYES: Conjunctiva, corneas and eyelids normal; pupils equal, round, reactive to light; No Icterus.  MOUTH/OROPHARYNX: Oral mucosa slightly inflamed  NECK: Supple with no nodes.  LUNGS:  Clear to auscultation and percussion with no extra sounds.  HEART: Irregular rhythm and rate; S1 and S2 normal; no murmurs noted.  ABDOMEN: Soft; no masses or tenderness, no hepatosplenomegaly.  NEUROLOGIC: Alert and oriented.  No obvious focal findings.  EXTREMITIES: No cyanosis, or edema.  SKIN: No abnormal bruising or bleeding. No suspicious lesions noted on exposed skin.  PSYCHIATRIC: Mental status normal; no apparent psychiatric issues      Medications:    Current Outpatient Medications   Medication Sig Dispense Refill    acetaminophen (TYLENOL) 325 MG tablet Take 2 tablets (650 mg) by mouth every 4 hours as needed for mild pain 50 tablet 0    acetaminophen (TYLENOL) 500 MG tablet Take 2 tablets (1,000 mg) by mouth every 6 hours (Patient taking differently: Take 1,000 mg by mouth 3 times daily.)      dexAMETHasone (DECADRON) 4 MG tablet Take 1 tablet (4 mg) by mouth 2 times daily (with meals) 60 tablet 1    diphenhydrAMINE-acetaminophen (TYLENOL PM)  MG tablet Take 2 tablets by mouth nightly as needed for sleep      fexofenadine (ALLEGRA) 180 MG tablet Take 180 mg by mouth daily      HEMP OIL OR EXTRACT OR OTHER CBD CANNABINOID, NOT MEDICAL CANNABIS, CBD gummies      losartan (COZAAR) 100 MG tablet Take 100 mg by mouth every morning      Melatonin 10 MG TABS tablet Take 10 mg by mouth nightly as needed for sleep      metoprolol succinate ER (TOPROL XL) 100 MG 24 hr tablet Take 1 tablet by mouth every morning      morphine (MS CONTIN) 15 MG CR tablet Take 1 tablet (15 mg) by mouth every 12 hours. 60 tablet 0    multivitamin (CENTRUM SILVER) tablet Take 1 tablet by mouth daily      naloxone (NARCAN) 4 MG/0.1ML nasal spray Spray 1 spray (4 mg) into one  nostril alternating nostrils as needed for opioid reversal. every 2-3 minutes until assistance arrives 2 each 0    oxyCODONE (OXY-IR) 5 MG capsule Take 1 capsule (5 mg) by mouth every 4 hours as needed for severe pain. May take 2 capsules if needed 120 capsule 0    Turmeric (QC TUMERIC COMPLEX PO) Tumeric and ginger with applie cider 1410mg      UNABLE TO FIND MEDICATION NAME: osteo bi flex      UNABLE TO FIND MEDICATION NAME: Emergen-e 1000mg      XARELTO ANTICOAGULANT 20 MG TABS tablet Take 20 mg by mouth every evening.       No current facility-administered medications for this visit.           Past History    Past Medical History:   Diagnosis Date    Acute non-recurrent maxillary sinusitis     Antiplatelet or antithrombotic long-term use     Arrhythmia     Atrial fibrillation with RVR (H)     Cough secondary to angiotensin converting enzyme inhibitor (ACE-I)     Hyperlipidemia     Hypertension     Mass of upper lobe of right lung     Obesity      Past Surgical History:   Procedure Laterality Date    BRONCHOSCOPY, WITH BIOPSY, ROBOT ASSISTED Bilateral 04/16/2024    Procedure: BRONCHOSCOPY, endobronchial ultrasound, transbronchial needle aspiration, endobronchial biopsies and tumor debulking;  Surgeon: Lanette Arce MD;  Location: [facility] OR    DAVINCI EXCISE NODES THORACIC N/A 5/15/2024    Procedure: mediastinal lymph node dissection;  Surgeon: Mikael Peoples MD;  Location: [facility] OR    DAVINCI LOBECTOMY LUNG Right 5/15/2024    Procedure: Robot-assisted thoracoscopic right upper lobectomy,;  Surgeon: Mikael Peoples MD;  Location: [facility] OR    ELBOW ARTHROSCOPY Left 03/09/2017    INSERT PORT VASCULAR ACCESS Right 7/5/2024    Procedure: INSERTION, VASCULAR ACCESS PORT;  Surgeon: Henry Veronica MD;  Location: [facility] OR    PHACOEMULSIFICATION WITH STANDARD INTRAOCULAR LENS IMPLANT Left 02/26/2018    Procedure: PHACOEMULSIFICATION WITH STANDARD INTRAOCULAR LENS IMPLANT;  Left Cataract Removal with Implant;  Surgeon:  Mohit Victor MD;  Location: [facility] OR    PHACOEMULSIFICATION WITH STANDARD INTRAOCULAR LENS IMPLANT Right 01/30/2019    Procedure: Cataract Removal with Implant;  Surgeon: Mohit Victor MD;  Location: [facility] OR    TONSILLECTOMY  1964    TOTAL HIP ARTHROPLASTY Left 04/12/2022     No Known Allergies  Family History   Problem Relation Age of Onset    Ovarian Cancer Mother     Alzheimer Disease Mother     Depression Father 45        suicide    Diabetes Sister      Social History     Socioeconomic History    Marital status:      Spouse name: None    Number of children: None    Years of education: None    Highest education level: None   Tobacco Use    Smoking status: Former     Current packs/day: 0.00     Types: Cigarettes     Quit date: 2014     Years since quitting: 10.4    Smokeless tobacco: Never   Vaping Use    Vaping status: Never Used   Substance and Sexual Activity    Alcohol use: Not Currently    Drug use: No     Social Determinants of Health     Financial Resource Strain: Low Risk  (10/23/2023)    [facility], [facility]    Financial Resource Strain     Difficulty of Paying Living Expenses: 3   Food Insecurity: No Food Insecurity (10/23/2023)    Received from [employer] [facility], [facility] [employer] [facility]    Food Insecurity     Worried About Running Out of Food in the Last Year: 1   Transportation Needs: No Transportation Needs (10/23/2023)    Received from [employer] [facility], [facility] [employer] [facility]    Transportation Needs     Lack of Transportation (Medical): 1   Social Connections: Socially Integrated (10/23/2023)    [facility], [facility] [employer] [facility]    Social Connections     Frequency of Communication with Friends and Family: 0   Housing  Stability: Low Risk  (10/23/2023)    Received from [facility], [facility] [email or web address] [facility]    Housing Stability     Unable to Pay for Housing in the Last Year: 1           Lab Results      Recent Results (from the past 720 hour(s))   CBC with platelets and differential    Collection Time: 08/06/24  1:58 PM   Result Value Ref Range    WBC Count 31.3 (H) 4.0 - 11.0 10e3/uL    RBC Count 4.04 (L) 4.40 - 5.90 10e6/uL    Hemoglobin 11.6 (L) 13.3 - 17.7 g/dL    Hematocrit 34.4 (L) 40.0 - 53.0 %    MCV 85 78 - 100 fL    MCH 28.7 26.5 - 33.0 pg    MCHC 33.7 31.5 - 36.5 g/dL    RDW 13.8 10.0 - 15.0 %    Platelet Count 317 150 - 450 10e3/uL    % Neutrophils 92 %    % Lymphocytes 2 %    % Monocytes 1 %    % Eosinophils 0 %    % Basophils 0 %    % Immature Granulocytes 5 %    NRBCs per 100 WBC 0 <1 /100    Absolute Neutrophils 28.9 (H) 1.6 - 8.3 10e3/uL    Absolute Lymphocytes 0.5 (L) 0.8 - 5.3 10e3/uL    Absolute Monocytes 0.2 0.0 - 1.3 10e3/uL    Absolute Eosinophils 0.0 0.0 - 0.7 10e3/uL    Absolute Basophils 0.0 0.0 - 0.2 10e3/uL    Absolute Immature Granulocytes 1.7 (H) <=0.4 10e3/uL    Absolute NRBCs 0.0 10e3/uL   RBC and Platelet Morphology    Collection Time: 08/06/24  1:58 PM   Result Value Ref Range    RBC Morphology Confirmed RBC Indices     Platelet Assessment  Automated Count Confirmed. Platelet morphology is normal.     Automated Count Confirmed. Platelet morphology is normal.    Reactive Lymphocytes Present (A) None Seen   Magnesium    Collection Time: 08/14/24  2:21 PM   Result Value Ref Range    Magnesium 2.2 1.7 - 2.3 mg/dL   Comprehensive metabolic panel    Collection Time: 08/14/24  2:21 PM   Result Value Ref Range    Sodium 132 (L) 135 - 145 mmol/L    Potassium 4.9 3.4 - 5.3 mmol/L    Carbon Dioxide (CO2) 24 22 - 29 mmol/L    Anion Gap 12 7 - 15 mmol/L    Urea Nitrogen 27.7 (H) 8.0 - 23.0 mg/dL    Creatinine 0.94 0.67 - 1.17 mg/dL    GFR Estimate 87 >60  mL/min/1.73m2    Calcium 8.9 8.8 - 10.4 mg/dL    Chloride 96 (L) 98 - 107 mmol/L    Glucose 120 (H) 70 - 99 mg/dL    Alkaline Phosphatase 198 (H) 40 - 150 U/L    AST 17 0 - 45 U/L    ALT 17 0 - 70 U/L    Protein Total 6.6 6.4 - 8.3 g/dL    Albumin 3.8 3.5 - 5.2 g/dL    Bilirubin Total 0.3 <=1.2 mg/dL   CBC with platelets and differential    Collection Time: 08/14/24  2:21 PM   Result Value Ref Range    WBC Count 72.0 (HH) 4.0 - 11.0 10e3/uL    RBC Count 3.94 (L) 4.40 - 5.90 10e6/uL    Hemoglobin 11.6 (L) 13.3 - 17.7 g/dL    Hematocrit 33.7 (L) 40.0 - 53.0 %    MCV 86 78 - 100 fL    MCH 29.4 26.5 - 33.0 pg    MCHC 34.4 31.5 - 36.5 g/dL    RDW 15.9 (H) 10.0 - 15.0 %    Platelet Count 296 150 - 450 10e3/uL    NRBCs per 100 WBC 0 <1 /100    Absolute NRBCs 0.0 10e3/uL   Manual Differential    Collection Time: 08/14/24  2:21 PM   Result Value Ref Range    % Neutrophils 90 %    % Lymphocytes 3 %    % Monocytes 4 %    % Eosinophils 0 %    % Basophils 0 %    % Myelocytes 3 %    Absolute Neutrophils 64.8 (H) 1.6 - 8.3 10e3/uL    Absolute Lymphocytes 2.2 0.8 - 5.3 10e3/uL    Absolute Monocytes 2.9 (H) 0.0 - 1.3 10e3/uL    Absolute Eosinophils 0.0 0.0 - 0.7 10e3/uL    Absolute Basophils 0.0 0.0 - 0.2 10e3/uL    Absolute Myelocytes 2.2 (H) <=0.0 10e3/uL    RBC Morphology Confirmed RBC Indices     Platelet Assessment  Automated Count Confirmed. Platelet morphology is normal.     Automated Count Confirmed. Platelet morphology is normal.   Comprehensive metabolic panel    Collection Time: 08/19/24  7:47 AM   Result Value Ref Range    Sodium 125 (L) 135 - 145 mmol/L    Potassium 5.1 3.4 - 5.3 mmol/L    Carbon Dioxide (CO2) 25 22 - 29 mmol/L    Anion Gap 6 (L) 7 - 15 mmol/L    Urea Nitrogen 24.0 (H) 8.0 - 23.0 mg/dL    Creatinine 0.83 0.67 - 1.17 mg/dL    GFR Estimate >90 >60 mL/min/1.73m2    Calcium 8.6 (L) 8.8 - 10.4 mg/dL    Chloride 94 (L) 98 - 107 mmol/L    Glucose 77 70 - 99 mg/dL    Alkaline Phosphatase 301 (H) 40 - 150 U/L     AST 16 0 - 45 U/L    ALT 17 0 - 70 U/L    Protein Total 6.2 (L) 6.4 - 8.3 g/dL    Albumin 3.7 3.5 - 5.2 g/dL    Bilirubin Total 0.2 <=1.2 mg/dL   TSH with free T4 reflex    Collection Time: 08/19/24  7:47 AM   Result Value Ref Range    TSH 1.36 0.30 - 4.20 uIU/mL   CBC with platelets and differential    Collection Time: 08/19/24  7:47 AM   Result Value Ref Range    WBC Count 114.3 (HH) 4.0 - 11.0 10e3/uL    RBC Count 3.79 (L) 4.40 - 5.90 10e6/uL    Hemoglobin 11.2 (L) 13.3 - 17.7 g/dL    Hematocrit 32.9 (L) 40.0 - 53.0 %    MCV 87 78 - 100 fL    MCH 29.6 26.5 - 33.0 pg    MCHC 34.0 31.5 - 36.5 g/dL    RDW 17.4 (H) 10.0 - 15.0 %    Platelet Count 251 150 - 450 10e3/uL    NRBCs per 100 WBC 0 <1 /100    Absolute NRBCs 0.0 10e3/uL   Manual Differential    Collection Time: 08/19/24  7:47 AM   Result Value Ref Range    % Neutrophils 96 %    % Lymphocytes 1 %    % Monocytes 2 %    % Eosinophils 0 %    % Basophils 0 %    % Metamyelocytes 1 %    Absolute Neutrophils 109.7 (H) 1.6 - 8.3 10e3/uL    Absolute Lymphocytes 1.1 0.8 - 5.3 10e3/uL    Absolute Monocytes 2.3 (H) 0.0 - 1.3 10e3/uL    Absolute Eosinophils 0.0 0.0 - 0.7 10e3/uL    Absolute Basophils 0.0 0.0 - 0.2 10e3/uL    Absolute Metamyelocytes 1.1 (H) <=0.0 10e3/uL    RBC Morphology Confirmed RBC Indices     Platelet Assessment  Automated Count Confirmed. Platelet morphology is normal.     Automated Count Confirmed. Platelet morphology is normal.   Basic metabolic panel    Collection Time: 08/23/24  4:31 PM   Result Value Ref Range    Sodium 128 (L) 135 - 145 mmol/L    Potassium 4.7 3.4 - 5.3 mmol/L    Chloride 93 (L) 98 - 107 mmol/L    Carbon Dioxide (CO2) 24 22 - 29 mmol/L    Anion Gap 11 7 - 15 mmol/L    Urea Nitrogen 25.7 (H) 8.0 - 23.0 mg/dL    Creatinine 0.72 0.67 - 1.17 mg/dL    GFR Estimate >90 >60 mL/min/1.73m2    Calcium 8.6 (L) 8.8 - 10.4 mg/dL    Glucose 154 (H) 70 - 99 mg/dL   CBC with platelets and differential    Collection Time: 08/23/24  4:31 PM    Result Value Ref Range    WBC Count 57.0 (HH) 4.0 - 11.0 10e3/uL    RBC Count 3.99 (L) 4.40 - 5.90 10e6/uL    Hemoglobin 11.8 (L) 13.3 - 17.7 g/dL    Hematocrit 33.9 (L) 40.0 - 53.0 %    MCV 85 78 - 100 fL    MCH 29.6 26.5 - 33.0 pg    MCHC 34.8 31.5 - 36.5 g/dL    RDW 18.2 (H) 10.0 - 15.0 %    Platelet Count 232 150 - 450 10e3/uL    NRBCs per 100 WBC 0 <1 /100    Absolute NRBCs 0.0 10e3/uL   Manual Differential    Collection Time: 08/23/24  4:31 PM   Result Value Ref Range    % Neutrophils 99 %    % Lymphocytes 1 %    % Monocytes 0 %    % Eosinophils 0 %    % Basophils 0 %    Absolute Neutrophils 56.4 (H) 1.6 - 8.3 10e3/uL    Absolute Lymphocytes 0.6 (L) 0.8 - 5.3 10e3/uL    Absolute Monocytes 0.0 0.0 - 1.3 10e3/uL    Absolute Eosinophils 0.0 0.0 - 0.7 10e3/uL    Absolute Basophils 0.0 0.0 - 0.2 10e3/uL    RBC Morphology Confirmed RBC Indices     Platelet Assessment  Automated Count Confirmed. Platelet morphology is normal.     Automated Count Confirmed. Platelet morphology is normal.    Toxic Neutrophils Present (A) None Seen   Basic metabolic panel    Collection Time: 08/24/24  6:00 AM   Result Value Ref Range    Sodium 130 (L) 135 - 145 mmol/L    Potassium 4.9 3.4 - 5.3 mmol/L    Chloride 95 (L) 98 - 107 mmol/L    Carbon Dioxide (CO2) 24 22 - 29 mmol/L    Anion Gap 11 7 - 15 mmol/L    Urea Nitrogen 23.5 (H) 8.0 - 23.0 mg/dL    Creatinine 0.64 (L) 0.67 - 1.17 mg/dL    GFR Estimate >90 >60 mL/min/1.73m2    Calcium 8.5 (L) 8.8 - 10.4 mg/dL    Glucose 86 70 - 99 mg/dL   CBC with platelets    Collection Time: 08/24/24  6:00 AM   Result Value Ref Range    WBC Count 48.2 (H) 4.0 - 11.0 10e3/uL    RBC Count 3.91 (L) 4.40 - 5.90 10e6/uL    Hemoglobin 11.5 (L) 13.3 - 17.7 g/dL    Hematocrit 34.6 (L) 40.0 - 53.0 %    MCV 89 78 - 100 fL    MCH 29.4 26.5 - 33.0 pg    MCHC 33.2 31.5 - 36.5 g/dL    RDW 18.7 (H) 10.0 - 15.0 %    Platelet Count 213 150 - 450 10e3/uL   Comprehensive metabolic panel    Collection Time: 08/28/24  12:14 PM   Result Value Ref Range    Sodium 128 (L) 135 - 145 mmol/L    Potassium 4.7 3.4 - 5.3 mmol/L    Carbon Dioxide (CO2) 24 22 - 29 mmol/L    Anion Gap 11 7 - 15 mmol/L    Urea Nitrogen 28.0 (H) 8.0 - 23.0 mg/dL    Creatinine 0.80 0.67 - 1.17 mg/dL    GFR Estimate >90 >60 mL/min/1.73m2    Calcium 8.7 (L) 8.8 - 10.4 mg/dL    Chloride 93 (L) 98 - 107 mmol/L    Glucose 158 (H) 70 - 99 mg/dL    Alkaline Phosphatase 152 (H) 40 - 150 U/L    AST 13 0 - 45 U/L    ALT 16 0 - 70 U/L    Protein Total 6.3 (L) 6.4 - 8.3 g/dL    Albumin 3.7 3.5 - 5.2 g/dL    Bilirubin Total 0.5 <=1.2 mg/dL   CBC with platelets and differential    Collection Time: 08/28/24 12:14 PM   Result Value Ref Range    WBC Count 0.8 (LL) 4.0 - 11.0 10e3/uL    RBC Count 3.47 (L) 4.40 - 5.90 10e6/uL    Hemoglobin 10.1 (L) 13.3 - 17.7 g/dL    Hematocrit 29.9 (L) 40.0 - 53.0 %    MCV 86 78 - 100 fL    MCH 29.1 26.5 - 33.0 pg    MCHC 33.8 31.5 - 36.5 g/dL    RDW 17.2 (H) 10.0 - 15.0 %    Platelet Count 147 (L) 150 - 450 10e3/uL    % Neutrophils 65 %    % Lymphocytes 28 %    % Monocytes 5 %    % Eosinophils 0 %    % Basophils 1 %    % Immature Granulocytes 1 %    NRBCs per 100 WBC 0 <1 /100    Absolute Neutrophils 0.5 (L) 1.6 - 8.3 10e3/uL    Absolute Lymphocytes 0.2 (L) 0.8 - 5.3 10e3/uL    Absolute Monocytes 0.0 0.0 - 1.3 10e3/uL    Absolute Eosinophils 0.0 0.0 - 0.7 10e3/uL    Absolute Basophils 0.0 0.0 - 0.2 10e3/uL    Absolute Immature Granulocytes 0.0 <=0.4 10e3/uL    Absolute NRBCs 0.0 10e3/uL   RBC and Platelet Morphology    Collection Time: 08/28/24 12:14 PM   Result Value Ref Range    RBC Morphology Confirmed RBC Indices     Platelet Assessment  Automated Count Confirmed. Platelet morphology is normal.     Automated Count Confirmed. Platelet morphology is normal.         Imaging Results    CT Lumbar Spine w/o Contrast    Result Date: 8/23/2024  CT OF THE LUMBAR SPINE WITHOUT CONTRAST  8/23/2024 1:51 PM COMPARISON: None. HISTORY: Low back pain new  since four days ago, right hip and buttock pain worsened/constant since four days ago.  TECHNIQUE: Axial images of the lumbar spine were acquired without intravenous contrast. Multiplanar reformations were created from the axial source images.      IMPRESSION:  Five lumbar type vertebrae. Straightening of normal lumbar lordosis. Alignment otherwise normal. Vertebral body heights normal. No fractures. Loss of disc space height, posterior disc bulging and vacuum disc formation at the L1-L2, L2-L3, L4-L5 and L5-S1 levels. Moderate to severe facet arthropathy bilaterally at L2-L3, L3-L4, L4-L5 and L5-S1. No high-grade spinal canal stenosis of the lumbar spine. No high-grade neural foraminal stenosis at any level of the lumbar spine. Radiation dose for this scan was reduced using automated exposure control, adjustment of the mA and/or kV according to patient size, or iterative reconstruction technique. FRANK CHAVEZ MD   SYSTEM ID:  [id number]    CT Pelvis Bone wo Contrast    Result Date: 8/23/2024  EXAM: CT PELVIS BONE WO CONTRAST DATE/TIME: 8/23/2024 1:49 PM INDICATION: Right hip and buttock pain. COMPARISON: Radiograph 7/20/2024. TECHNIQUE: Noncontrast. Axial, sagittal and coronal thin-section reconstruction. Dose reduction techniques were used. FINDINGS: BONES: -Comminuted pathologic nondisplaced fracture of the right acetabulum involving the supra-acetabular ilium, posterior acetabulum, anterior medial acetabulum (series 3 image 70, series 3 image 78, series 3 image 81). -Mildly displaced probable additional pathologic fracture at the junction of the right ischium/inferior pubic ramus (series 5 image 93). -Additional nondisplaced fracture in the left superior pubic ramus through a small lucent lesion. -Additional scattered lucent lesions throughout the pelvic bones suspicious for metastatic disease with representative examples in the right posterior medial iliac bone and left supra-acetabular region. -Left hip  arthroplasty without evidence of hardware complication. There are degenerative changes in the lumbar spine. SOFT TISSUES: -Vascular calcifications.     IMPRESSION: 1.  Comminuted pathologic nondisplaced fracture of the right acetabulum. 2.  Mildly displaced probable additional pathologic fracture of the junction of the right inferior pubic ramus/right ischium. 3.  Nondisplaced pathologic fracture in the left superior pubic ramus. 4.  Additional scattered lucent lesions, suspicious for additional metastases. NOTE: ABNORMAL REPORT THE DICTATION ABOVE DESCRIBES AN ABNORMAL REPORT FOR WHICH FOLLOW-UP IS NEEDED. KATHEYRN MCELROY MD   SYSTEM ID:  [id number]        I spent 35 minutes on the patient's visit today.  This included preparation for the visit, face-to-face time with the patient and documentation following the visit.  It did not include teaching or procedure time.    Signed by: Peter E. Friedell, MD

## 2024-08-28 NOTE — Clinical Note
Patient has pathologic pelvic fracture.  Surgical intervention not recommended.  Do you think radiation might help with pain control?

## 2024-09-09 PROBLEM — R60.0 PEDAL EDEMA: Status: ACTIVE | Noted: 2024-01-01

## 2024-09-09 NOTE — PROGRESS NOTES
[facility] Hematology and Oncology Progress Note    Patient: Dk Randhawa Sr.  MRN: [id number]  Date of Service: Sep 9, 2024       Reason for Visit    I was consulted by   Mikael Peoples MD   For evaluation and management of large cell lung cancer      Encounter Diagnoses Assessment and Plan:    Problem List Items Addressed This Visit       Malignant neoplasm of upper lobe of right lung (H)    Relevant Orders    Infusion Appointment Request - Adult    Large cell carcinoma of lung, right (H)    Relevant Medications    furosemide (LASIX) 40 MG tablet    Other Relevant Orders    Infusion Appointment Request - Adult    Malignant neoplasm metastatic to bone (H)    Relevant Orders    Infusion Appointment Request - Adult    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  On 8/23/2024 he developed increased pain in his right hip.  He went to the emergency room and imaging showed multiple fractures in the pelvis.  He is now using a walker for ambulation.  His morphine dose was increased.  Long acting MS Contin is prescribed.  Referral sent to radiation oncology for consideration of radiation therapy to the right hip and pelvis.  He will proceed with cycle 3 of carboplatin etoposide and atezolizumab today.  Leukemoid reaction secondary to steroids plus likely bone marrow involvement from large cell carcinoma of the lung.  Furosemide added for leg edema.  13 pound weight gain since last visit.    ____________________________________________________________________________    Staging History   Cancer Staging   Malignant neoplasm of upper lobe of right lung (H)  Staging form: Lung, AJCC 8th Edition  - Pathologic: Stage IIB (pT3, pN0, cM0) - Signed by  Friedell, Peter E, MD on 6/1/2024  - Clinical stage from 7/29/2024: Stage AJITH (rcTX, cN2, cM1b) - Signed by Friedell, Peter E, MD on 7/29/2024    Foundation 1 studies show a tumor proportion score of 40%.  Microsatellite status is MS stable tumor.  Mutational burden is 1 MUTS/MB   K-juanito-Q61H amplification is noted.  No therapies or clinical trials are available for this mutation    ECOG Performance = 1    History of Present Illness    Mr. Dk Randhawa . is a 69 year old man with a history of atrial fibrillation on Xarelto.  He has a history of exposure to industrial dusts and about a 20 pack year history of cigarette smoking, He quit about 10 years ago.  On 4/7/2024 he presented to the emergency room after several days of hemoptysis.  CT scan of the chest on 4/7/2024 showed a right upper lobe lung mass.  Brain MRI on 4/30/2024 was negative for metastatic disease.  PET CT scan on 5/2/2024 was positive for the RUL mass and there was uptake in some mediastinal lymph nodes.  On 5/16/2024 patient had a robotic assisted right upper lobe lobectomy by Dr. Peoples.  Pathology showed Large Cell Lung Cancer. Mediastinal nodes were negative.  Patient has made an uneventful recovery.    7/20/2024 patient presented to the emergency room with severe pain in the right hip area.  Investigation at that time showed metastatic disease particularly in the chest but likely in the right hip acetabulum as well.  Chemotherapy changed from adjuvant to definitive chemotherapy for advanced large cell lung cancer.    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  MRI of the brain shows multiple sites of metastatic disease    From 8/8 to 8/12/2024  patient received gamma knife treatment for CNS metastases.    Update 9/9/2024 patient returns for cycle 3 of carboplatin etoposide and atezolizumab .  Recent imaging shows pathologic pelvic fracture.   Does get relief from  oxycodone.  He is not having chills, fevers or sweats.  He is not having cough, chest pain or shortness of breath at rest.  He has constipation with oxycodone and gets relief with stool softener.  He has gained 13 pounds with edema of both legs.      Review of systems.  Pertinent Findings are included in the History of Present Illness    Physical Exam    /84 (BP Location: Right arm, Patient Position: Sitting, Cuff Size: Adult Large)   Pulse 120   Temp 98.8  F (37.1  C) (Tympanic)   Resp 20   Ht 1.829 m (6')   Wt 110.7 kg (244 lb)   SpO2 96%   BMI 33.09 kg/m       GENERAL APPEARANCE: 69-year-old man in no apparent distress.  HEAD: Atraumatic; normocephalic; without lesions.  EYES: Conjunctiva, corneas and eyelids normal; pupils equal, round, reactive to light; No Icterus.  MOUTH/OROPHARYNX: Oral mucosa slightly inflamed  NECK: Supple with no nodes.  LUNGS:  Clear to auscultation and percussion with no extra sounds.  HEART: Irregular rhythm and rate; S1 and S2 normal; no murmurs noted.  Tachycardia noted  ABDOMEN: Soft; no masses or tenderness, no hepatosplenomegaly.  NEUROLOGIC: Alert and oriented.  No obvious focal findings.  EXTREMITIES: No cyanosis, 2-3+ edema  SKIN: No abnormal bruising or bleeding. No suspicious lesions noted on exposed skin.  PSYCHIATRIC: Mental status normal; no apparent psychiatric issues      Medications:    Current Outpatient Medications   Medication Sig Dispense Refill    acetaminophen (TYLENOL) 325 MG tablet Take 2 tablets (650 mg) by mouth every 4 hours as needed for mild pain 50 tablet 0    acetaminophen (TYLENOL) 500 MG tablet Take 2 tablets (1,000 mg) by mouth every 6 hours (Patient taking differently: Take 1,000 mg by mouth 3 times daily.)      dexAMETHasone (DECADRON) 4 MG tablet Take 1 tablet (4 mg) by mouth 2 times daily (with meals) 60 tablet 1    diphenhydrAMINE-acetaminophen (TYLENOL PM)  MG tablet Take 2 tablets by mouth nightly as needed for sleep       fexofenadine (ALLEGRA) 180 MG tablet Take 180 mg by mouth daily      furosemide (LASIX) 40 MG tablet Take 1 tablet (40 mg) by mouth daily. 30 tablet 1    HEMP OIL OR EXTRACT OR OTHER CBD CANNABINOID, NOT MEDICAL CANNABIS, CBD gummies      losartan (COZAAR) 100 MG tablet Take 100 mg by mouth every morning      Melatonin 10 MG TABS tablet Take 10 mg by mouth nightly as needed for sleep      metoprolol succinate ER (TOPROL XL) 100 MG 24 hr tablet Take 1 tablet by mouth every morning      morphine (MS CONTIN) 15 MG CR tablet Take 1 tablet (15 mg) by mouth every 12 hours. 60 tablet 0    multivitamin (CENTRUM SILVER) tablet Take 1 tablet by mouth daily      naloxone (NARCAN) 4 MG/0.1ML nasal spray Spray 1 spray (4 mg) into one nostril alternating nostrils as needed for opioid reversal. every 2-3 minutes until assistance arrives 2 each 0    oxyCODONE (OXY-IR) 5 MG capsule Take 1 capsule (5 mg) by mouth every 4 hours as needed for severe pain. May take 2 capsules if needed 120 capsule 0    Turmeric (QC TUMERIC COMPLEX PO) Tumeric and ginger with applie cider 1410mg      UNABLE TO FIND MEDICATION NAME: osteo bi flex      UNABLE TO FIND MEDICATION NAME: Emergen-e 1000mg      XARELTO ANTICOAGULANT 20 MG TABS tablet Take 20 mg by mouth every evening.       No current facility-administered medications for this visit.     Facility-Administered Medications Ordered in Other Visits   Medication Dose Route Frequency Provider Last Rate Last Admin    atezolizumab (TECENTRIQ) 1,200 mg in sodium chloride 0.9 % 120 mL infusion  1,200 mg Intravenous Once Friedell, Peter E, MD        CARBOplatin 750 mg in sodium chloride 0.9 % 575 mL infusion  750 mg Intravenous Once Friedell, Peter E, MD        etoposide (TOPOSAR) 240 mg in sodium chloride 0.9 % 1,012 mL infusion  100 mg/m2 (Treatment Plan Recorded) Intravenous Once Friedell, Peter E, MD        famotidine (PEPCID) injection 20 mg  20 mg Intravenous Once PRN Friedell, Peter E, MD         fosaprepitant (EMEND) 150 mg, dexAMETHasone (DECADRON) 12 mg in sodium chloride 0.9 % 251.2 mL intermittent infusion   Intravenous Once Friedell, Peter E, MD        heparin lock flush 100 unit/mL injection 5 mL  5 mL Intracatheter Once PRN Friedell, Peter E, MD        palonosetron (ALOXI) injection 0.25 mg  0.25 mg Intravenous Once Friedell, Peter E, MD        sodium chloride (PF) 0.9% PF flush 3-20 mL  3-20 mL Intracatheter q1 min prn Friedell, Peter E, MD        sodium chloride 0.9% BOLUS 250 mL  250 mL Intravenous Once Friedell, Peter E, MD               Past History    Past Medical History:   Diagnosis Date    Acute non-recurrent maxillary sinusitis     Antiplatelet or antithrombotic long-term use     Arrhythmia     Atrial fibrillation with RVR (H)     Cough secondary to angiotensin converting enzyme inhibitor (ACE-I)     Hyperlipidemia     Hypertension     Mass of upper lobe of right lung     Obesity      Past Surgical History:   Procedure Laterality Date    BRONCHOSCOPY, WITH BIOPSY, ROBOT ASSISTED Bilateral 04/16/2024    Procedure: BRONCHOSCOPY, endobronchial ultrasound, transbronchial needle aspiration, endobronchial biopsies and tumor debulking;  Surgeon: Lanette Arce MD;  Location: [facility] OR    DAVINCI EXCISE NODES THORACIC N/A 5/15/2024    Procedure: mediastinal lymph node dissection;  Surgeon: Mikael Peoples MD;  Location: [facility] OR    DAVINCI LOBECTOMY LUNG Right 5/15/2024    Procedure: Robot-assisted thoracoscopic right upper lobectomy,;  Surgeon: Mikael Peoples MD;  Location:  OR    ELBOW ARTHROSCOPY Left 03/09/2017    INSERT PORT VASCULAR ACCESS Right 7/5/2024    Procedure: INSERTION, VASCULAR ACCESS PORT;  Surgeon: Henry Veronica MD;  Location: [facility] OR    PHACOEMULSIFICATION WITH STANDARD INTRAOCULAR LENS IMPLANT Left 02/26/2018    Procedure: PHACOEMULSIFICATION WITH STANDARD INTRAOCULAR LENS IMPLANT;  Left Cataract Removal with Implant;  Surgeon: Mohit Victor MD;  Location: [facility] OR     PHACOEMULSIFICATION WITH STANDARD INTRAOCULAR LENS IMPLANT Right 01/30/2019    Procedure: Cataract Removal with Implant;  Surgeon: Mohit Victor MD;  Location: [facility] OR    TONSILLECTOMY  1964    TOTAL HIP ARTHROPLASTY Left 04/12/2022     No Known Allergies  Family History   Problem Relation Age of Onset    Ovarian Cancer Mother     Alzheimer Disease Mother     Depression Father 45        suicide    Diabetes Sister      Social History     Socioeconomic History    Marital status:      Spouse name: None    Number of children: None    Years of education: None    Highest education level: None   Tobacco Use    Smoking status: Former     Current packs/day: 0.00     Types: Cigarettes     Quit date: 2014     Years since quitting: 10.4    Smokeless tobacco: Never   Vaping Use    Vaping status: Never Used   Substance and Sexual Activity    Alcohol use: Not Currently    Drug use: No     Social Determinants of Health     Financial Resource Strain: Low Risk  (10/23/2023)    Received [facility]    Financial Resource Strain     Difficulty of Paying Living Expenses: 3   Food Insecurity: No Food Insecurity (10/23/2023)    Received from [facility]    Food Insecurity     Worried About Running Out of Food in the Last Year: 1   Transportation Needs: No Transportation Needs (10/23/2023)    Received from [facility], [facility]    Transportation Needs     Lack of Transportation (Medical): 1   Social Connections: Socially Integrated (10/23/2023)    Received from [facility], [facility]    Social Connections     Frequency of Communication with Friends and Family: 0   Housing Stability: Low Risk  (10/23/2023)    Received from  [facility], [facility]    Housing Stability     Unable to Pay for Housing in the Last Year: 1           Lab Results    Recent Results (from the past 240 hour(s))   Comprehensive metabolic panel    Collection Time: 09/09/24  8:06 AM   Result Value Ref Range    Sodium 129 (L) 135 - 145 mmol/L    Potassium 5.3 3.4 - 5.3 mmol/L    Carbon Dioxide (CO2) 26 22 - 29 mmol/L    Anion Gap 10 7 - 15 mmol/L    Urea Nitrogen 21.1 8.0 - 23.0 mg/dL    Creatinine 0.70 0.67 - 1.17 mg/dL    GFR Estimate >90 >60 mL/min/1.73m2    Calcium 9.0 8.8 - 10.4 mg/dL    Chloride 93 (L) 98 - 107 mmol/L    Glucose 98 70 - 99 mg/dL    Alkaline Phosphatase 355 (H) 40 - 150 U/L    AST 27 0 - 45 U/L    ALT 24 0 - 70 U/L    Protein Total 6.7 6.4 - 8.3 g/dL    Albumin 3.8 3.5 - 5.2 g/dL    Bilirubin Total <0.2 <=1.2 mg/dL   TSH with free T4 reflex    Collection Time: 09/09/24  8:06 AM   Result Value Ref Range    TSH 1.21 0.30 - 4.20 uIU/mL   CBC with platelets and differential    Collection Time: 09/09/24  8:06 AM   Result Value Ref Range    WBC Count 141.9 (HH) 4.0 - 11.0 10e3/uL    RBC Count 3.34 (L) 4.40 - 5.90 10e6/uL    Hemoglobin 10.2 (L) 13.3 - 17.7 g/dL    Hematocrit 29.9 (L) 40.0 - 53.0 %    MCV 90 78 - 100 fL    MCH 30.5 26.5 - 33.0 pg    MCHC 34.1 31.5 - 36.5 g/dL    RDW 21.2 (H) 10.0 - 15.0 %    Platelet Count 289 150 - 450 10e3/uL    NRBCs per 100 WBC 0 <1 /100    Absolute NRBCs 0.6 10e3/uL   Manual Differential    Collection Time: 09/09/24  8:06 AM   Result Value Ref Range    % Neutrophils 93 %    % Lymphocytes 1 %    % Monocytes 2 %    % Eosinophils 0 %    % Basophils 0 %    % Metamyelocytes 1 %    % Myelocytes 3 %    Absolute Neutrophils 132.0 (H) 1.6 - 8.3 10e3/uL    Absolute Lymphocytes 1.4 0.8 - 5.3 10e3/uL    Absolute Monocytes 2.8 (H) 0.0 - 1.3 10e3/uL    Absolute Eosinophils 0.0 0.0 - 0.7 10e3/uL    Absolute Basophils 0.0 0.0 - 0.2 10e3/uL    Absolute  Metamyelocytes 1.4 (H) <=0.0 10e3/uL    Absolute Myelocytes 4.3 (H) <=0.0 10e3/uL    RBC Morphology Confirmed RBC Indices     Platelet Assessment  Automated Count Confirmed. Platelet morphology is normal.     Automated Count Confirmed. Platelet morphology is normal.    Toxic Neutrophils Present (A) None Seen         Imaging Results    CT Lumbar Spine w/o Contrast    Result Date: 8/23/2024  CT OF THE LUMBAR SPINE WITHOUT CONTRAST  8/23/2024 1:51 PM COMPARISON: None. HISTORY: Low back pain new since four days ago, right hip and buttock pain worsened/constant since four days ago.  TECHNIQUE: Axial images of the lumbar spine were acquired without intravenous contrast. Multiplanar reformations were created from the axial source images.      IMPRESSION:  Five lumbar type vertebrae. Straightening of normal lumbar lordosis. Alignment otherwise normal. Vertebral body heights normal. No fractures. Loss of disc space height, posterior disc bulging and vacuum disc formation at the L1-L2, L2-L3, L4-L5 and L5-S1 levels. Moderate to severe facet arthropathy bilaterally at L2-L3, L3-L4, L4-L5 and L5-S1. No high-grade spinal canal stenosis of the lumbar spine. No high-grade neural foraminal stenosis at any level of the lumbar spine. Radiation dose for this scan was reduced using automated exposure control, adjustment of the mA and/or kV according to patient size, or iterative reconstruction technique. FRANK CHAVEZ MD   SYSTEM ID:  [id number]    CT Pelvis Bone wo Contrast    Result Date: 8/23/2024  EXAM: CT PELVIS BONE WO CONTRAST DATE/TIME: 8/23/2024 1:49 PM INDICATION: Right hip and buttock pain. COMPARISON: Radiograph 7/20/2024. TECHNIQUE: Noncontrast. Axial, sagittal and coronal thin-section reconstruction. Dose reduction techniques were used. FINDINGS: BONES: -Comminuted pathologic nondisplaced fracture of the right acetabulum involving the supra-acetabular ilium, posterior acetabulum, anterior medial acetabulum (series 3 image  70, series 3 image 78, series 3 image 81). -Mildly displaced probable additional pathologic fracture at the junction of the right ischium/inferior pubic ramus (series 5 image 93). -Additional nondisplaced fracture in the left superior pubic ramus through a small lucent lesion. -Additional scattered lucent lesions throughout the pelvic bones suspicious for metastatic disease with representative examples in the right posterior medial iliac bone and left supra-acetabular region. -Left hip arthroplasty without evidence of hardware complication. There are degenerative changes in the lumbar spine. SOFT TISSUES: -Vascular calcifications.     IMPRESSION: 1.  Comminuted pathologic nondisplaced fracture of the right acetabulum. 2.  Mildly displaced probable additional pathologic fracture of the junction of the right inferior pubic ramus/right ischium. 3.  Nondisplaced pathologic fracture in the left superior pubic ramus. 4.  Additional scattered lucent lesions, suspicious for additional metastases. NOTE: ABNORMAL REPORT THE DICTATION ABOVE DESCRIBES AN ABNORMAL REPORT FOR WHICH FOLLOW-UP IS NEEDED. KATHERYN MCELROY MD   SYSTEM ID:  [id number]        I spent 35 minutes on the patient's visit today.  This included preparation for the visit, face-to-face time with the patient and documentation following the visit.  It did not include teaching or procedure time.    Signed by: Peter E. Friedell, MD

## 2024-09-09 NOTE — PROGRESS NOTES
Infusion Nursing Note:  Dk Randhawa Sr. presents today for C3D1 Tecentria, Carbo & Etoposide.    Patient seen by provider today: Yes   present during visit today: Not Applicable.    Note: N/A.      Intravenous Access:  Implanted Port.    Treatment Conditions:  Lab Results   Component Value Date    HGB 10.2 (L) 09/09/2024    .9 (HH) 09/09/2024    ANEU 132.0 (H) 09/09/2024    ANEUTAUTO 0.5 (L) 08/28/2024     09/09/2024        Lab Results   Component Value Date     (L) 09/09/2024    POTASSIUM 5.3 09/09/2024    MAG 2.2 08/14/2024    CR 0.70 09/09/2024    COLLINS 9.0 09/09/2024    BILITOTAL <0.2 09/09/2024    ALBUMIN 3.8 09/09/2024    ALT 24 09/09/2024    AST 27 09/09/2024       Results reviewed, labs MET treatment parameters, ok to proceed with treatment.      Post Infusion Assessment:  Patient tolerated infusion without incident.  Blood return noted pre and post infusion.  Site patent and intact, free from redness, edema or discomfort.  No evidence of extravasations.   Port flushed & secured for tx tomorrow.      Discharge Plan:   Patient discharged in stable condition accompanied by: self.  Departure Mode: Ambulatory w/ walker.      Loulou Euceda RN

## 2024-09-09 NOTE — PROGRESS NOTES
Oncology Rooming Note    September 9, 2024 8:23 AM   Dk HARRISON Sydnee Mcgarry. is a 69 year old male who presents for:    Chief Complaint   Patient presents with    Oncology Clinic Visit     Malignant neoplasm metastatic to brain - Labs provider and infusion     Initial Vitals: /84 (BP Location: Right arm, Patient Position: Sitting, Cuff Size: Adult Large)   Pulse 120   Temp 98.8  F (37.1  C) (Tympanic)   Resp 20   Ht 1.829 m (6')   Wt 110.7 kg (244 lb)   SpO2 96%   BMI 33.09 kg/m   Estimated body mass index is 33.09 kg/m  as calculated from the following:    Height as of this encounter: 1.829 m (6').    Weight as of this encounter: 110.7 kg (244 lb). Body surface area is 2.37 meters squared.  Extreme Pain (8) Comment: Data Unavailable   No LMP for male patient.  Allergies reviewed: Yes  Medications reviewed: Yes    Medications: Medication refills not needed today.  Pharmacy name entered into [facility]:    [id number] [place] [facility] - [place], [place] - [address] [place] [address] [id number] - [place], [place] - [address] [facility]    Frailty Screening:   Is the patient here for a new oncology consult visit in cancer care? 2. No      Clinical concerns:  None      Stefania Dos Santos, CMA

## 2024-09-09 NOTE — LETTER
9/9/2024      Dk Randhawa   [address]  [facility] [address]      Dear Colleague,    Thank you for referring your patient, Dk Randhawa Sr., to the [facility] [place]. Please see a copy of my visit note below.    Oncology Rooming Note    September 9, 2024 8:23 AM   Dk Randhawa Sr. 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    Weight as of this encounter: 110.7 kg (244 lb). Body surface area is 2.37 meters squared.  Extreme Pain (8) Comment: Data Unavailable   No LMP for male patient.  Allergies reviewed: Yes  Medications reviewed: Yes    Medications: Medication refills not needed today.  Pharmacy name entered into Rico:    [id number] - [place], [place] - [address] [place] [address] [id number] - [place], [place] - [address] [facility]    Frailty Screening:   Is the patient here for a new oncology consult visit in cancer care? 2. No      Clinical concerns:  None      Stefania Dos Santos, [facility] Hematology and Oncology Progress Note    Patient: Dk Randhawa Sr.  MRN: [id number]  Date of Service: Sep 9, 2024       Reason for Visit    I was consulted by   Mikael Peoples MD   For evaluation and management of large cell lung cancer      Encounter Diagnoses Assessment and Plan:    Problem List Items Addressed This Visit       Malignant neoplasm of upper lobe of right lung (H)    Relevant Orders    Infusion Appointment Request - Adult    Large cell  carcinoma of lung, right (H)    Relevant Medications    furosemide (LASIX) 40 MG tablet    Other Relevant Orders    Infusion Appointment Request - Adult    Malignant neoplasm metastatic to bone (H)    Relevant Orders    Infusion Appointment Request - Adult    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  On 8/23/2024 he developed increased pain in his right hip.  He went to the emergency room and imaging showed multiple fractures in the pelvis.  He is now using a walker for ambulation.  His morphine dose was increased.  Long acting MS Contin is prescribed.  Referral sent to radiation oncology for consideration of radiation therapy to the right hip and pelvis.  He will proceed with cycle 3 of carboplatin etoposide and atezolizumab today.  Leukemoid reaction secondary to steroids plus likely bone marrow involvement from large cell carcinoma of the lung.  Furosemide added for leg edema.  13 pound weight gain since last visit.    ____________________________________________________________________________    Staging History   Cancer Staging   Malignant neoplasm of upper lobe of right lung (H)  Staging form: Lung, AJCC 8th Edition  - Pathologic: Stage IIB (pT3, pN0, cM0) - Signed by Friedell, Peter E, MD on 6/1/2024  - Clinical stage from 7/29/2024: Stage AJITH (rcTX, cN2, cM1b) - Signed by Friedell, Peter E, MD on 7/29/2024    Foundation 1 studies show a tumor proportion score of 40%.  Microsatellite status is MS stable tumor.  Mutational burden is 1 MUTS/MB   K-juanito-Q61H amplification is noted.  No therapies or clinical trials are available for this mutation    ECOG Performance = 1    History of Present Illness    Mr. Dk Randhawa . is a 69 year old man  with a history of atrial fibrillation on Xarelto.  He has a history of exposure to industrial dusts and about a 20 pack year history of cigarette smoking, He quit about 10 years ago.  On 4/7/2024 he presented to the emergency room after several days of hemoptysis.  CT scan of the chest on 4/7/2024 showed a right upper lobe lung mass.  Brain MRI on 4/30/2024 was negative for metastatic disease.  PET CT scan on 5/2/2024 was positive for the RUL mass and there was uptake in some mediastinal lymph nodes.  On 5/16/2024 patient had a robotic assisted right upper lobe lobectomy by Dr. Peoples.  Pathology showed Large Cell Lung Cancer. Mediastinal nodes were negative.  Patient has made an uneventful recovery.    7/20/2024 patient presented to the emergency room with severe pain in the right hip area.  Investigation at that time showed metastatic disease particularly in the chest but likely in the right hip acetabulum as well.  Chemotherapy changed from adjuvant to definitive chemotherapy for advanced large cell lung cancer.    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  MRI of the brain shows multiple sites of metastatic disease    From 8/8 to 8/12/2024  patient received gamma knife treatment for CNS metastases.    Update 9/9/2024 patient returns for cycle 3 of carboplatin etoposide and atezolizumab .  Recent imaging shows pathologic pelvic fracture.   Does get relief from oxycodone.  He is not having chills, fevers or sweats.  He is not having cough, chest pain or shortness of breath at rest.  He has constipation with oxycodone and gets relief with stool softener.  He has gained 13 pounds with edema of both legs.      Review of systems.  Pertinent Findings are included in the History of Present Illness    Physical Exam    /84 (BP Location: Right arm, Patient Position: Sitting, Cuff Size: Adult Large)   Pulse 120   Temp 98.8  F (37.1  C)  (Tympanic)   Resp 20   Ht 1.829 m (6')   Wt 110.7 kg (244 lb)   SpO2 96%   BMI 33.09 kg/m       GENERAL APPEARANCE: 69-year-old man in no apparent distress.  HEAD: Atraumatic; normocephalic; without lesions.  EYES: Conjunctiva, corneas and eyelids normal; pupils equal, round, reactive to light; No Icterus.  MOUTH/OROPHARYNX: Oral mucosa slightly inflamed  NECK: Supple with no nodes.  LUNGS:  Clear to auscultation and percussion with no extra sounds.  HEART: Irregular rhythm and rate; S1 and S2 normal; no murmurs noted.  Tachycardia noted  ABDOMEN: Soft; no masses or tenderness, no hepatosplenomegaly.  NEUROLOGIC: Alert and oriented.  No obvious focal findings.  EXTREMITIES: No cyanosis, 2-3+ edema  SKIN: No abnormal bruising or bleeding. No suspicious lesions noted on exposed skin.  PSYCHIATRIC: Mental status normal; no apparent psychiatric issues      Medications:    Current Outpatient Medications   Medication Sig Dispense Refill     acetaminophen (TYLENOL) 325 MG tablet Take 2 tablets (650 mg) by mouth every 4 hours as needed for mild pain 50 tablet 0     acetaminophen (TYLENOL) 500 MG tablet Take 2 tablets (1,000 mg) by mouth every 6 hours (Patient taking differently: Take 1,000 mg by mouth 3 times daily.)       dexAMETHasone (DECADRON) 4 MG tablet Take 1 tablet (4 mg) by mouth 2 times daily (with meals) 60 tablet 1     diphenhydrAMINE-acetaminophen (TYLENOL PM)  MG tablet Take 2 tablets by mouth nightly as needed for sleep       fexofenadine (ALLEGRA) 180 MG tablet Take 180 mg by mouth daily       furosemide (LASIX) 40 MG tablet Take 1 tablet (40 mg) by mouth daily. 30 tablet 1     HEMP OIL OR EXTRACT OR OTHER CBD CANNABINOID, NOT MEDICAL CANNABIS, CBD gummies       losartan (COZAAR) 100 MG tablet Take 100 mg by mouth every morning       Melatonin 10 MG TABS tablet Take 10 mg by mouth nightly as needed for sleep       metoprolol succinate ER (TOPROL XL) 100 MG 24 hr tablet Take 1 tablet by mouth every  morning       morphine (MS CONTIN) 15 MG CR tablet Take 1 tablet (15 mg) by mouth every 12 hours. 60 tablet 0     multivitamin (CENTRUM SILVER) tablet Take 1 tablet by mouth daily       naloxone (NARCAN) 4 MG/0.1ML nasal spray Spray 1 spray (4 mg) into one nostril alternating nostrils as needed for opioid reversal. every 2-3 minutes until assistance arrives 2 each 0     oxyCODONE (OXY-IR) 5 MG capsule Take 1 capsule (5 mg) by mouth every 4 hours as needed for severe pain. May take 2 capsules if needed 120 capsule 0     Turmeric (QC TUMERIC COMPLEX PO) Tumeric and ginger with applie cider 1410mg       UNABLE TO FIND MEDICATION NAME: osteo bi flex       UNABLE TO FIND MEDICATION NAME: Emergen-e 1000mg       XARELTO ANTICOAGULANT 20 MG TABS tablet Take 20 mg by mouth every evening.       No current facility-administered medications for this visit.     Facility-Administered Medications Ordered in Other Visits   Medication Dose Route Frequency Provider Last Rate Last Admin     atezolizumab (TECENTRIQ) 1,200 mg in sodium chloride 0.9 % 120 mL infusion  1,200 mg Intravenous Once Friedell, Peter E, MD         CARBOplatin 750 mg in sodium chloride 0.9 % 575 mL infusion  750 mg Intravenous Once Friedell, Peter E, MD         etoposide (TOPOSAR) 240 mg in sodium chloride 0.9 % 1,012 mL infusion  100 mg/m2 (Treatment Plan Recorded) Intravenous Once Friedell, Peter E, MD         famotidine (PEPCID) injection 20 mg  20 mg Intravenous Once PRN Friedell, Peter E, MD         fosaprepitant (EMEND) 150 mg, dexAMETHasone (DECADRON) 12 mg in sodium chloride 0.9 % 251.2 mL intermittent infusion   Intravenous Once Friedell, Peter E, MD         heparin lock flush 100 unit/mL injection 5 mL  5 mL Intracatheter Once PRN Friedell, Peter E, MD         palonosetron (ALOXI) injection 0.25 mg  0.25 mg Intravenous Once Friedell, Peter E, MD         sodium chloride (PF) 0.9% PF flush 3-20 mL  3-20 mL Intracatheter q1 min prn Friedell, Peter E, MD          sodium chloride 0.9% BOLUS 250 mL  250 mL Intravenous Once Friedell, Peter E, MD               Past History    Past Medical History:   Diagnosis Date     Acute non-recurrent maxillary sinusitis      Antiplatelet or antithrombotic long-term use      Arrhythmia      Atrial fibrillation with RVR (H)      Cough secondary to angiotensin converting enzyme inhibitor (ACE-I)      Hyperlipidemia      Hypertension      Mass of upper lobe of right lung      Obesity      Past Surgical History:   Procedure Laterality Date     BRONCHOSCOPY, WITH BIOPSY, ROBOT ASSISTED Bilateral 04/16/2024    Procedure: BRONCHOSCOPY, endobronchial ultrasound, transbronchial needle aspiration, endobronchial biopsies and tumor debulking;  Surgeon: Lanette Arce MD;  Location: [facility] OR     DAVINCI EXCISE NODES THORACIC N/A 5/15/2024    Procedure: mediastinal lymph node dissection;  Surgeon: Mikael Peoples MD;  Location: [facility] OR     DAVINCI LOBECTOMY LUNG Right 5/15/2024    Procedure: Robot-assisted thoracoscopic right upper lobectomy,;  Surgeon: Mikael Peoples MD;  Location: [facility] OR     ELBOW ARTHROSCOPY Left 03/09/2017     INSERT PORT VASCULAR ACCESS Right 7/5/2024    Procedure: INSERTION, VASCULAR ACCESS PORT;  Surgeon: Henry Veronica MD;  Location: [facility]     PHACOEMULSIFICATION WITH STANDARD INTRAOCULAR LENS IMPLANT Left 02/26/2018    Procedure: PHACOEMULSIFICATION WITH STANDARD INTRAOCULAR LENS IMPLANT;  Left Cataract Removal with Implant;  Surgeon: Mohit Victor MD;  Location: [facility]     PHACOEMULSIFICATION WITH STANDARD INTRAOCULAR LENS IMPLANT Right 01/30/2019    Procedure: Cataract Removal with Implant;  Surgeon: Mohit Victor MD;  Location: [facility]     TONSILLECTOMY  1964     TOTAL HIP ARTHROPLASTY Left 04/12/2022     No Known Allergies  Family History   Problem Relation Age of Onset     Ovarian Cancer Mother      Alzheimer Disease Mother      Depression Father 45        suicide     Diabetes Sister      Social  History     Socioeconomic History     Marital status:      Spouse name: None     Number of children: None     Years of education: None     Highest education level: None   Tobacco Use     Smoking status: Former     Current packs/day: 0.00     Types: Cigarettes     Quit date: 2014     Years since quitting: 10.4     Smokeless tobacco: Never   Vaping Use     Vaping status: Never Used   Substance and Sexual Activity     Alcohol use: Not Currently     Drug use: No     Social Determinants of Health     Financial Resource Strain: Low Risk  (10/23/2023)    [facility], [facility]    Financial Resource Strain      Difficulty of Paying Living Expenses: 3   Food Insecurity: No Food Insecurity (10/23/2023)    [facility], [facility] [employer] [facility]    Food Insecurity      Worried About Running Out of Food in the Last Year: 1   Transportation Needs: No Transportation Needs (10/23/2023)    [facility], [facility]    Transportation Needs      Lack of Transportation (Medical): 1   Social Connections: Socially Integrated (10/23/2023)    Received from [facility], [facility] [employer] [facility]    Social Connections      Frequency of Communication with Friends and Family: 0   Housing Stability: Low Risk  (10/23/2023)    Received from [facility], [facility] [employer] [facility]    Housing Stability      Unable to Pay for Housing in the Last Year: 1           Lab Results    Recent Results (from the past 240 hour(s))   Comprehensive metabolic panel    Collection Time: 09/09/24  8:06 AM   Result Value Ref Range    Sodium 129 (L) 135 - 145 mmol/L    Potassium 5.3 3.4 - 5.3 mmol/L    Carbon Dioxide (CO2) 26 22 - 29  mmol/L    Anion Gap 10 7 - 15 mmol/L    Urea Nitrogen 21.1 8.0 - 23.0 mg/dL    Creatinine 0.70 0.67 - 1.17 mg/dL    GFR Estimate >90 >60 mL/min/1.73m2    Calcium 9.0 8.8 - 10.4 mg/dL    Chloride 93 (L) 98 - 107 mmol/L    Glucose 98 70 - 99 mg/dL    Alkaline Phosphatase 355 (H) 40 - 150 U/L    AST 27 0 - 45 U/L    ALT 24 0 - 70 U/L    Protein Total 6.7 6.4 - 8.3 g/dL    Albumin 3.8 3.5 - 5.2 g/dL    Bilirubin Total <0.2 <=1.2 mg/dL   TSH with free T4 reflex    Collection Time: 09/09/24  8:06 AM   Result Value Ref Range    TSH 1.21 0.30 - 4.20 uIU/mL   CBC with platelets and differential    Collection Time: 09/09/24  8:06 AM   Result Value Ref Range    WBC Count 141.9 (HH) 4.0 - 11.0 10e3/uL    RBC Count 3.34 (L) 4.40 - 5.90 10e6/uL    Hemoglobin 10.2 (L) 13.3 - 17.7 g/dL    Hematocrit 29.9 (L) 40.0 - 53.0 %    MCV 90 78 - 100 fL    MCH 30.5 26.5 - 33.0 pg    MCHC 34.1 31.5 - 36.5 g/dL    RDW 21.2 (H) 10.0 - 15.0 %    Platelet Count 289 150 - 450 10e3/uL    NRBCs per 100 WBC 0 <1 /100    Absolute NRBCs 0.6 10e3/uL   Manual Differential    Collection Time: 09/09/24  8:06 AM   Result Value Ref Range    % Neutrophils 93 %    % Lymphocytes 1 %    % Monocytes 2 %    % Eosinophils 0 %    % Basophils 0 %    % Metamyelocytes 1 %    % Myelocytes 3 %    Absolute Neutrophils 132.0 (H) 1.6 - 8.3 10e3/uL    Absolute Lymphocytes 1.4 0.8 - 5.3 10e3/uL    Absolute Monocytes 2.8 (H) 0.0 - 1.3 10e3/uL    Absolute Eosinophils 0.0 0.0 - 0.7 10e3/uL    Absolute Basophils 0.0 0.0 - 0.2 10e3/uL    Absolute Metamyelocytes 1.4 (H) <=0.0 10e3/uL    Absolute Myelocytes 4.3 (H) <=0.0 10e3/uL    RBC Morphology Confirmed RBC Indices     Platelet Assessment  Automated Count Confirmed. Platelet morphology is normal.     Automated Count Confirmed. Platelet morphology is normal.    Toxic Neutrophils Present (A) None Seen         Imaging Results    CT Lumbar Spine w/o Contrast    Result Date: 8/23/2024  CT OF THE LUMBAR SPINE WITHOUT CONTRAST   8/23/2024 1:51 PM COMPARISON: None. HISTORY: Low back pain new since four days ago, right hip and buttock pain worsened/constant since four days ago.  TECHNIQUE: Axial images of the lumbar spine were acquired without intravenous contrast. Multiplanar reformations were created from the axial source images.      IMPRESSION:  Five lumbar type vertebrae. Straightening of normal lumbar lordosis. Alignment otherwise normal. Vertebral body heights normal. No fractures. Loss of disc space height, posterior disc bulging and vacuum disc formation at the L1-L2, L2-L3, L4-L5 and L5-S1 levels. Moderate to severe facet arthropathy bilaterally at L2-L3, L3-L4, L4-L5 and L5-S1. No high-grade spinal canal stenosis of the lumbar spine. No high-grade neural foraminal stenosis at any level of the lumbar spine. Radiation dose for this scan was reduced using automated exposure control, adjustment of the mA and/or kV according to patient size, or iterative reconstruction technique. FRANK CHAVEZ MD   SYSTEM ID:  [id number]    CT Pelvis Bone wo Contrast    Result Date: 8/23/2024  EXAM: CT PELVIS BONE WO CONTRAST DATE/TIME: 8/23/2024 1:49 PM INDICATION: Right hip and buttock pain. COMPARISON: Radiograph 7/20/2024. TECHNIQUE: Noncontrast. Axial, sagittal and coronal thin-section reconstruction. Dose reduction techniques were used. FINDINGS: BONES: -Comminuted pathologic nondisplaced fracture of the right acetabulum involving the supra-acetabular ilium, posterior acetabulum, anterior medial acetabulum (series 3 image 70, series 3 image 78, series 3 image 81). -Mildly displaced probable additional pathologic fracture at the junction of the right ischium/inferior pubic ramus (series 5 image 93). -Additional nondisplaced fracture in the left superior pubic ramus through a small lucent lesion. -Additional scattered lucent lesions throughout the pelvic bones suspicious for metastatic disease with representative examples in the right posterior  medial iliac bone and left supra-acetabular region. -Left hip arthroplasty without evidence of hardware complication. There are degenerative changes in the lumbar spine. SOFT TISSUES: -Vascular calcifications.     IMPRESSION: 1.  Comminuted pathologic nondisplaced fracture of the right acetabulum. 2.  Mildly displaced probable additional pathologic fracture of the junction of the right inferior pubic ramus/right ischium. 3.  Nondisplaced pathologic fracture in the left superior pubic ramus. 4.  Additional scattered lucent lesions, suspicious for additional metastases. NOTE: ABNORMAL REPORT THE DICTATION ABOVE DESCRIBES AN ABNORMAL REPORT FOR WHICH FOLLOW-UP IS NEEDED. KATHERYN MCELROY MD   SYSTEM ID:  [id number]        I spent 35 minutes on the patient's visit today.  This included preparation for the visit, face-to-face time with the patient and documentation following the visit.  It did not include teaching or procedure time.    Signed by: Peter E. Friedell, MD          Again, thank you for allowing me to participate in the care of your patient.        Sincerely,        Peter E. Friedell, MD

## 2024-09-10 NOTE — PATIENT INSTRUCTIONS
Increase MS Contin to 30 mg (2) in AM and continue 15 mg (1) in PM since he's more active during daytime hours. If after 3-4 days, he's still having a lot of pain in evening/overnight, he can increase to 30 mg BID. Remind him he can take 5-10 mg oxycodone for breakthrough. I will put in Pall Care referral if that can get scheduled.

## 2024-09-10 NOTE — PROGRESS NOTES
"Infusion Nursing Note:  Dk Randhawa Sr. presents today for C3D2 Etoposide.    Patient seen by provider today: No   present during visit today: Not Applicable.    Note: Homer is experiencing \"10\"/10 R sided hip pain. Unable to transfer from w/c to recliner. He's taking his pain meds as prescribed. Ice applied & 1 time order for add'l 5mg Oxycodone.       Intravenous Access:  Implanted Port.    Treatment Conditions:  Results reviewed, labs MET treatment parameters, ok to proceed with treatment.      Post Infusion Assessment:  Patient tolerated infusion without incident.  Blood return noted pre and post infusion.  Site patent and intact, free from redness, edema or discomfort.  No evidence of extravasations.       Discharge Plan:   Patient discharged in stable condition accompanied by: brother.  Departure Mode: Wheelchair.    Per KMI Paige APRN: Let's have him increase his MS Contin to 30 mg (2) in AM and continue 15 mg (1) in PM since he's more active during daytime hours. If after 3-4 days, he's still having a lot of pain in evening/overnight, he can increase to 30 mg BID. Remind him he can take 5-10 mg oxycodone for breakthrough. I will put in Pall Care referral if that can get scheduled. These instructions were added to AVS.    Loulou Euceda RN    "

## 2024-09-10 NOTE — PROGRESS NOTES
New Patient Oncology Nurse Navigator Note     Referring provider: Dr. Peter Friedell    Referring Clinic/Organization: [facility]  Referred to: Radiation Oncology [facility] Radiation - [phone]     Requested provider (if applicable): First available - did not specify   Referral Received: 24       Evaluation for :   Diagnosis   C79.51 (ICD-10-CM) - Malignant neoplasm metastatic to bone (H)        Additional Information: Painful pelvic metastases with fracture     Clinical History (per Nurse review of records provided):      2024 PET (bookmarked) showed:   IMPRESSION: In this patient with right upper lobe mass highly  suspicious for primary lung malignancy:     1. Right upper lobe mass is hypermetabolic and is larger compared to  2024 CT. Findings suggestive of malignancy.       2. Hypermetabolic right hilar node is suspicious for metastasis.  Mildly avid nonenlarged few other nodes in the mediastinum are  indeterminate.     3. No FDG lesions elsewhere in the body to suggest distant metastasis.    05/15/2024 Surgical Pathology (bookmarked) showed:   Final Diagnosis   A.  Lymph node, mediastinal, right, 9R, excision-  Benign lymph node with anthracosis and hyalinized granuloma (1)     B.  Lymph nodes, mediastinal, level 7, excision-  Benign lymph nodes (2)     C.  Lymph nodes, mediastinal, right, 10 R, excision-  Benign lymph nodes (2)     D.  Lymph node, mediastinal, right, 4R, excision-  Benign lymph node with hyalinized granulomas (1)     E.  Lymph node, mediastinal, right, 2R, excision-  Benign lymph node with hyalinized granuloma (1)     F.  Lymph nodes, mediastinal, right, 11 R, excision-  Benign lymph nodes (3)     G.  Lung, right, upper lobe, resection-  Large cell carcinoma (large cell undifferentiated carcinoma)  Benign perihilar lymph node (1)  Resection margins free of malignancy  See microscopic description and synoptic report     H.  Lymph node, mediastinal, right, 4R,  excision-  Benign soft tissue stroma, lymph node not identified      Electronically signed by Tanmay Hoffman MD on 5/23/2024 at 11:59 AM       08/23/2024 CT Pelvis Bone (bookmarked) showed:   IMPRESSION:  1.  Comminuted pathologic nondisplaced fracture of the right  acetabulum.  2.  Mildly displaced probable additional pathologic fracture of the  junction of the right inferior pubic ramus/right ischium.  3.  Nondisplaced pathologic fracture in the left superior pubic ramus.  4.  Additional scattered lucent lesions, suspicious for additional  metastases.    08/23/2024 CT Lumbar Spine (bookmarked) showed:   IMPRESSION:  Five lumbar type vertebrae. Straightening of normal  lumbar lordosis. Alignment otherwise normal. Vertebral body heights  normal. No fractures. Loss of disc space height, posterior disc  bulging and vacuum disc formation at the L1-L2, L2-L3, L4-L5 and L5-S1  levels. Moderate to severe facet arthropathy bilaterally at L2-L3,  L3-L4, L4-L5 and L5-S1. No high-grade spinal canal stenosis of the  lumbar spine. No high-grade neural foraminal stenosis at any level of  the lumbar spine.    Clinical Assessment / Barriers to Care (Per Nurse):  Tobacco History    Smoking Status  Former Smoking Start Date  1994 Quit Date  2014 Average Packs/Day  0.5 packs/day for 20.0 years (10.0 ttl pk-yrs) Smoking Tobacco Type  Cigarettes from 1994 to 2014     Previous Radiation therapy: Yes - records within Epic  Records Location: [facility]   Records Needed: None  Additional testing needed prior to consult: None  Pt previously seen by radiation oncology for gamma knife. Message sent to [place] rad onc saji. Per Jose Parmar RN, their team will work on getting Homer scheduled with Dr. Andino for return appt this week.    LOLI Garcia, RN, OCN  [facility] Oncology Nurse Navigator  [phone] / [phone]

## 2024-09-10 NOTE — PROGRESS NOTES
Patient in Infusion today reporting 10/10 right hip pain. This has been ongoing a few weeks when he was found to have multiple pathologic pelvic fractures. He is awaiting a Rad Onc consult. He's limited weight bearing with a walker.    He is taking MS Contin 15 mg BID. Using scheduled Tylenol, dex BID and oxycodone 5 mg every 4 hrs.     Plan:  -Increase MS Contin 30 mg AM and 15 mg evening. If after 3-4 days he is still having a lot of breakthrough pain in evening/overnight, can increase to 30 mg BID. New Rx sent  -Increase oxycodone to 10 mg as needed  -Continue dex  -Continue scheduled Tylenol  -Await Rad Onc consult  -Palliative Care consult placed to assist with pain mgmt.

## 2024-09-10 NOTE — CONFIDENTIAL NOTE
"[facility]: Cancer Care                                                                                        Patient here for infusion today and per infusion nurse, patient is  having 10/10 hip pain.   Discussed with NP who states \"Let's have him increase his MS Contin to 30 mg (2) in AM and continue 15 mg (1) in PM since he's more active during daytime hours. If after 3-4 days, he's still having a lot of pain in evening/overnight, he can increase to 30 mg BID. Remind him he can take 5-10 mg oxycodone for breakthrough. I will put in Pall Care referral if that can get scheduled. \" Information relayed to patient per infusion nurse.         Signature:  Carmen Campbell RN   "

## 2024-09-11 NOTE — PROGRESS NOTES
Infusion Nursing Note:  Dk Randhawa Sr. presents today for Etoposide C3D3.    Patient seen by provider today: No   present during visit today: Not Applicable.    Note: Patient states he increased his morphine and oxycodone. His pain is abou the same as yesterday. He states he still feels safe at home. He lives in senior living. Everything is one level and has handles/bars. He uses his walker wherever he goes and shuffles his feet. He also holds onto furniture to get around. He sleeps in his recliner because it is easier to get out and at night uses a urinal that he keeps by the chair. Patient states he has not fallen or stumbled and will let us know if he doesn't feel safe at home.  Today, he chose to stay in his wheelchair for his infusion.  His legs are swollen and weeping. He states Dr. Friedell is aware and prescribed lasix. His legs are better today. Elevated legs during infusion.     Intravenous Access:  Implanted Port.    Treatment Conditions:  Results reviewed, labs MET treatment parameters, ok to proceed with treatment.    Post Infusion Assessment:  Patient tolerated infusion without incident.  Blood return noted pre and post infusion.  Site patent and intact, free from redness, edema or discomfort.  No evidence of extravasations.  Access discontinued per protocol.     Discharge Plan:   Discharge instructions reviewed with: Patient.  Patient and/or family verbalized understanding of discharge instructions and all questions answered.  AVS to patient via [facility].  Patient will return 9/30/2024 for next appointment.   Patient discharged in stable condition accompanied by: self.  Departure Mode: Wheelchair.    Eveline Campos RN

## 2024-09-12 NOTE — PROGRESS NOTES
Department of Radiation Oncology  Radiation Therapy Center  [facility]  [address], [address]  [place] [address]  [phone]       Consultation Note    Name: Dk Randhawa Sr. MRN: [id number]   : 1955   Date of Service: Sep 13, 2024 Referring: Dr. 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    Oncologic History:   The patient was recently previously seen by Dr Andino; for full HPI please refer to Dr Andino's note from 2024.    Briefly, the patient's workup began on 2024 after presenting to the ED with hemoptysis, where imaging demonstrated a mass in the RUL which was subsequently confirmed via EBUS/FNA (2024).  Brain MRI (2024) was (-) for metastatic disease.  PET/CT (2024) showed a hypermetabolic RUL mass with hypermetabolism in the right hilar node suspicious for malignancy, though there were no other FDG avid lesions in the body suggesting metastatic disease at that time.  The patient then underwent RUL lobectomy (5/15/2024), and all nodes sampled were negative for malignancy.  Patient was staged at that time as [id number].  The patient then saw Dr. Friedell to discuss adjuvant chemotherapy (2024).  However the patient presented to the ED (2024) with severe right hip pain and metastatic disease it was suspected.  Consequently the original chemotherapy plan was deferred until repeat imaging was obtained.  CT CAP (2024) demonstrated several enhancing pleural nodules consistent with pleural metastasis, and abnormal right lower paratracheal lymph node, several new solid left lung nodules that were  consistent with katelyn contralateral lung metastasis, new soft tissue nodule in the pancreas neck/body, suspicious for intra-abdominal metastasis, and a right acetabular lesion suspicious for bone metastasis.    MRI (7/30/2024) showed at least new 8 intracranial metastases.  The patient was started on carboplatin/etoposide/atezolizumab the same day.      Since the patient was last seen by Dr. Andino on 8/2/2024:    8/19/2024: Patient completes GK SRS    8/23/2024, pelvic CT:  - Comminuted pathologic nondisplaced fracture of the right acetabulum  - Probable additional pathologic fractures of the junction of the right inferior pubic ramus/right ischium  - Nondisplaced pathologic fracture in the left superior pubic ramus  - Additional scattered lucent lesions, suspicious for metastatic disease    Chemotherapy History: As above  Radiation History: As above  Pregnant: N/A  Implanted Cardiac Devices: No  Autoimmune History: No    Subjective:  On interview, the patient was accompanied by his brother Devon.  The patient reports right hip pain that he rates at 10/10 and describes the pain as sharp and constant.  Patient reports that the pain significantly worsened within the last week.  The pain radiates into his right hip, buttocks and lower back.  The patient takes 30 mg of morphine in the morning and 15-30 mg of morphine in the afternoon in addition to oxycodone 30 mg every 4 hours, but the patient reports that this does not alleviate any pain. The patient resides in an assisted living facility and [facility] (e.g. relatively close to [facility]).  The patient has been ambulating with the aid of a walker.  The patient was eager to begin radiotherapy in effort to alleviate his pain.    Review of Systems   A 10-point review of systems was performed. Pertinent findings are noted in the HPI.    Physical Exam   ECOG Status: 2    Vitals:  There were no vitals taken for this visit.    Gen: Alert, in NAD  Head:  NC/AT  Eyes: PERRL, EOMI, sclera anicteric  Ears: No external auricular lesions  Nose/sinus: No rhinorrhea or epistaxis  Pulm: No wheezing, stridor or respiratory distress  Musculoskeletal: Normal bulk and tone  Skin: Normal color and turgor  Neuro: A/Ox3, CN II-XII intact, patient currently in a wheelchair.  Imaging/Path/Labs   Imaging: As above  Path: As above  Labs: As above    Assessment/Plan   69-year-old male with metastatic large cell carcinoma with metastatic lesions in the right acetabulum/hip, causing significant pain.    Given the location and severity of this patient's pain, we recommend palliative radiotherapy consisting of 30 Gray/10 fractions via 3D CRT.    The risks and benefits of radiotherapy were discussed in detail with the patient.  The patient expressed understanding.  All questions answered.  Informed consent was obtained.  The patient subsequently proceeded to CT simulation.    Patient was seen and discussed with my attending physician, Dr. Andino.    David Stephen MD, MS PGY4  Radiation Oncology  Department of Radiation Oncology  [facility]  Phone: [phone]          ATTENDING NOTE: This is a 69-year-old gentleman with metastatic large cell lung cancer well-known to me.  I initially saw him in consultation on August 2, 2024.  For detailed review of his presenting history please refer to my note on that date.  In brief he was initially diagnosed in April 2024 with localized disease and had resection by Dr. Peoples.  Unfortunately prior to receiving adjuvant chemotherapy he was found to have distant metastasis.  He was started on chemotherapy on July 30 2024.  Subsequent imaging found brain mets and he was referred for gamma knife stereotactic radiosurgery which he completed on August 14, 2024.  After completing gamma stereotactic radiosurgery he continued on his chemotherapy regimen with Dr. Friedell.  At this time he is currently being seen for pain in his right  hip/low back/buttock.  Imaging finds multiple metastasis within the right pelvis.  On exam he has 5 out of 5 strength in lower extremity.  However cannot move his right leg very well due to the pain.  He is currently in a wheelchair or using a walker and unable to perform his ADLs, taking 30 mg morphine in AM and another 15 mg morphine in PM. He takes an additional 30 mg Oxycodone q4h. This regimen gives him minimal relief. Of note, he also is still taking Decadron 4 mg twice daily and was not tapered GK SRS posttreatment.  I recommend a palliative course of radiation therapy to the right hip to improve local control and decrease the patient's pain symptoms.  I have also asked him to decrease his Decadron p.o. dose to 4 mg every morning only.  He is in between chemotherapy cycles and goal is start palliative radiotherapy sometime next week.      A resident in an [employer] accredited training program was involved in the initial review, preparation, and/or interpretation of this case. I, as the Attending physician, attest that I: (i) reviewed patient clinical records; (ii) reviewed relevant lab test results and imaging; (iii) agree with the report, diagnosis, and interpretation as documented by the resident.        Shoshana Andino MD  Adjunct   Dept of Radiation Oncology  [facility]

## 2024-09-13 PROBLEM — R91.8 MASS OF UPPER LOBE OF RIGHT LUNG: Status: ACTIVE | Noted: 2024-01-01

## 2024-09-13 PROBLEM — C34.91: Status: ACTIVE | Noted: 2024-01-01

## 2024-09-13 NOTE — PROGRESS NOTES
Department of Radiation Oncology  Radiation Therapy Center  [facility]  [address], [address]  [place] [address]  [phone]       Consultation Note    Name: Dk Randhawa Sr. MRN: [id number]   : 1955   Date of Service: Sep 13, 2024 Referring: Dr. Friedell     Reason for consultation: Mr. Randhawa is a 68 y/o male with metastatic non-small cell lung cancer s/p RUL Lobectomy and gamma knife for brain metastases. Patient began chemotherapy on  and has completed 3 of 7 cycles of Atezolizumab, Carboplatin, Trilaciclib, and Etoposide. He is here to discuss palliative RT for new pain in R hip caused by multiple pelvic fractures.  - Pathologic Stage 2024: Stage IIB (pT3, pN0, cM0)   - Clinical stage from 2024: Stage AJITH (rcTX, cN2, cM1b)     Oncologic History:     Mr. Randhawa initially presented to the ED on 2024 after several days of hemoptysis. CT visualized a 4.5 cm RUL lung mass and hilar lymphadenopathy. Brain MRI obtained on 24 was negative for brain metastases. The RUL mass increased in size from 5.1 x 4.6 cm to 5.9 x 5.2 cm w SUV max 33.1 on 24 PET CT. R hilar LN (1.9 x 1.8 cm) and mediastinal nodes, including L lower paratracheal node (1.2 x 1.5 cm) showed FDG avidity (series 4, images 139 and 132 respectively). Dr. Peoples performed a robotic assisted RUL Lobectomy on 5/15/24, obtaining 0.2 cm margins within parenchymal tissue and 1.1 cm margins from vascular and bronchial structures. Pathology of resected lung nodule showed large cell lung cancer; all mediastinal nodes negative.     He presented to the ED on 24 w/ severe R hip pain. CT concerning for possible involvement of R acetabulum. Given that additional metastases within the chest were confirmed, chemotherapy regimen was modified from adjuvant to palliative protocol for advanced large cell lung cancer. CT visualized several enhancing pleural nodules within R pleural  effusion, morphologically abnormal R lower paratracheal LN and several new solid L lung nodules c/w katelyn & contralateral lung metastases. Addiitonally, intra-abdominal metastases superior to the pancreas neck/body junction suspected.     7/30/24 Day 1 cycle 1 of carboplatin, etoposide, Trilaciclib, and atezolizumab for disseminated large cell carcinoma of the lung. 7/30 MRI showed multiple new intracranial metastases including hemorrhagic metastases of the left occipital lobe and left cerebellum. 8/8/24 MRI consistent with prior MRI findings with 9 total ring-enhancing metastases, the majority of which remained unchanged. R thalamic metastases had increased slightly in size whereas the metastases in the L parietal lobe and L posterior cerebellum had decreased slightly in size.      Patient received gamma knife treatment 30 Gy in 5 fx to lesions greater than 2cm and 22Gy in 1 fx to lesions smaller than 2cm from 8/9 to 8/14/2024 for CNS metastases; please refer to Dr. Andino's note on 8/19 for total dose received per region.     Patient was admitted to [facility] on 8/23 for significant R hip pain. Pathologic pelvic fracture visualized on 8/23 CT.     Patient completed cycle 3 of chemotherapy on 9/11/24. Patient has been experiencing a leukemoid reaction secondary to steroids plus likely bone marrow involvement from large cell carcinoma of the lung.        Chemotherapy History: carboplatin, etoposide, Trilaciclib, and atezolizumab   Radiation History: GK SRS 8/9/24 - 8/14/24 (see Dr. Andino note on 8/19/24 for Treatment Summary)    Pregnant: No   Implanted Cardiac Devices: No  Autoimmune History: No    Subjective:  Patient is experiencing 10/10 pain in his R hip that radiates to his L buttock and lower back. He is currently taking 30 mg morphine in AM and another 15 mg morphine in PM. He takes an additional 30 mg Oxycodone q4h. This pain regimen is no longer touching his pain. Patient is hoping for pain reduction  from this therapy as his pain is impairing his ability to participate in ADLs.    Review of Systems   A 10-point review of systems was performed. Pertinent findings are noted in the HPI.    Physical Exam   ECOG Status: 1    Vitals:  BP (!) 89/60   Pulse 87   Temp 97.8  F (36.6  C) (Oral)   Resp 18   Wt 109.8 kg (242 lb)   SpO2 98%   BMI 32.82 kg/m    Gen: Alert, in NAD  Head: NC/AT  Eyes: PERRL, EOMI, sclera anicteric  Ears: No external auricular lesions  Nose/sinus: No rhinorrhea or epistaxis  Oral cavity/oropharynx: MMM, no visible oral cavity lesions, FOM and BOT are soft to palpation  Neck: Full ROM, supple, no palpable adenopathy  Pulm: No wheezing, stridor or respiratory distress  CV: Extremities are warm and well-perfused, no cyanosis, edema extending to knees bilaterally  Abdominal: Normal bowel sounds, soft, nontender, no masses  Musculoskeletal: Patient ambulates with wheelchair or walker. Ambulation limited by pain in R hip.   Skin: Normal color and turgor, 1-4 small lesions visualized on hands/forearms   Neuro: A/Ox3, CN II-XII intact, abnormal gait due to pain    Imaging/Path/Labs   Imaging: Comminuted pathologic nondisplaced fracture of the right acetabulum     Mildly displaced probable additional pathologic fracture at the junction of the right ischium/inferior pubic ramus      Path: As above  Labs: 9/9 .9     Assessment/Plan   Mr. Randhawa is a 70 y/o male with metastatic large cell lung cancer s/p RUL lobectomy. He has metastatic involvement within chest, acetabulum, and brain. He has completed 3 out of 7 Cycles as of 9/11/24 although he is currently experiencing a leukemoid reaction. Patient underwent gamma knife radiation for brain metastases from 8/9 - 8/14/24. He is here today to discuss palliative radiation to his R hip. We are recommending 30 Gy in 10 fx given the severity of his pain. CT planning session scheduled for 9/13/24 and our team will attempt to begin radiation therapy  early next week given the severity of the patient's pain.     The risks and benefits of radiotherapy were discussed in detail with the patient.  The patient expressed understanding.  All questions answered.  Informed consent was obtained.    Patient was seen and discussed with my attending physician, Dr. Andino.    I, Stephanie Matson, assisted with the writing of this note as a medical student.

## 2024-09-13 NOTE — LETTER
2024      Dk Randhawa .  [address]  [facility] [address]      Dear Colleague,    Thank you for referring your patient, Dk Randhawa Sr., to the [facility]. Please see a copy of my visit note below.       Department of Radiation Oncology  Radiation Therapy Center  [facility]  [address], [address]  [place] [address]  [phone]       Consultation Note    Name: Dk Randhawa Sr. MRN: [id number]   : 1955   Date of Service: Sep 13, 2024 Referring: Dr. 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    Oncologic History:   The patient was recently previously seen by Dr Andino; for full HPI please refer to Dr Andino's note from 2024.    Briefly, the patient's workup began on 2024 after presenting to the ED with hemoptysis, where imaging demonstrated a mass in the RUL which was subsequently confirmed via EBUS/FNA (2024).  Brain MRI (2024) was (-) for metastatic disease.  PET/CT (2024) showed a hypermetabolic RUL mass with hypermetabolism in the right hilar node suspicious for malignancy, though there were no other FDG avid lesions in the body suggesting metastatic disease at that time.  The patient then underwent RUL lobectomy (5/15/2024), and all nodes sampled were negative for malignancy.  Patient was staged at that time as yV6O3S1.  The patient then saw Dr. Friedell to discuss adjuvant chemotherapy (2024).  However the patient presented to the ED (2024) with severe right hip pain and metastatic disease it was suspected.  Consequently the original  chemotherapy plan was deferred until repeat imaging was obtained.  CT CAP (7/25/2024) demonstrated several enhancing pleural nodules consistent with pleural metastasis, and abnormal right lower paratracheal lymph node, several new solid left lung nodules that were consistent with katelyn contralateral lung metastasis, new soft tissue nodule in the pancreas neck/body, suspicious for intra-abdominal metastasis, and a right acetabular lesion suspicious for bone metastasis.    MRI (7/30/2024) showed at least new 8 intracranial metastases.  The patient was started on carboplatin/etoposide/atezolizumab the same day.      Since the patient was last seen by Dr. Andino on 8/2/2024:    8/19/2024: Patient completes GK SRS    8/23/2024, pelvic CT:  - Comminuted pathologic nondisplaced fracture of the right acetabulum  - Probable additional pathologic fractures of the junction of the right inferior pubic ramus/right ischium  - Nondisplaced pathologic fracture in the left superior pubic ramus  - Additional scattered lucent lesions, suspicious for metastatic disease    Chemotherapy History: As above  Radiation History: As above  Pregnant: N/A  Implanted Cardiac Devices: No  Autoimmune History: No    Subjective:  On interview, the patient was accompanied by his brother Devon.  The patient reports right hip pain that he rates at 10/10 and describes the pain as sharp and constant.  Patient reports that the pain significantly worsened within the last week.  The pain radiates into his right hip, buttocks and lower back.  The patient takes 30 mg of morphine in the morning and 15-30 mg of morphine in the afternoon in addition to oxycodone 30 mg every 4 hours, but the patient reports that this does not alleviate any pain. The patient resides in an assisted living facility and [facility] (e.g. relatively close to [facility]).  The patient has been ambulating with the aid of a walker.  The patient was eager to begin  radiotherapy in effort to alleviate his pain.    Review of Systems   A 10-point review of systems was performed. Pertinent findings are noted in the HPI.    Physical Exam   ECOG Status: 2    Vitals:  There were no vitals taken for this visit.    Gen: Alert, in NAD  Head: NC/AT  Eyes: PERRL, EOMI, sclera anicteric  Ears: No external auricular lesions  Nose/sinus: No rhinorrhea or epistaxis  Pulm: No wheezing, stridor or respiratory distress  Musculoskeletal: Normal bulk and tone  Skin: Normal color and turgor  Neuro: A/Ox3, CN II-XII intact, patient currently in a wheelchair.  Imaging/Path/Labs   Imaging: As above  Path: As above  Labs: As above    Assessment/Plan   69-year-old male with metastatic large cell carcinoma with metastatic lesions in the right acetabulum/hip, causing significant pain.    Given the location and severity of this patient's pain, we recommend palliative radiotherapy consisting of 30 Gray/10 fractions via 3D CRT.    The risks and benefits of radiotherapy were discussed in detail with the patient.  The patient expressed understanding.  All questions answered.  Informed consent was obtained.  The patient subsequently proceeded to CT simulation.    Patient was seen and discussed with my attending physician, Dr. Andino.    David Stephen MD, MS PGY4  Radiation Oncology  Department of Radiation Oncology  [facility], [place]  Phone: [phone]          ATTENDING NOTE: This is a 69-year-old gentleman with metastatic large cell lung cancer well-known to me.  I initially saw him in consultation on August 2, 2024.  For detailed review of his presenting history please refer to my note on that date.  In brief he was initially diagnosed in April 2024 with localized disease and had resection by Dr. Peoples.  Unfortunately prior to receiving adjuvant chemotherapy he was found to have distant metastasis.  He was started on chemotherapy on July 30 2024.  Subsequent imaging found brain mets and  he was referred for gamma knife stereotactic radiosurgery which he completed on 2024.  After completing gamma stereotactic radiosurgery he continued on his chemotherapy regimen with Dr. Friedell.  At this time he is currently being seen for pain in his right hip/low back/buttock.  Imaging finds multiple metastasis within the right pelvis.  On exam he has 5 out of 5 strength in lower extremity.  However cannot move his right leg very well due to the pain.  He is currently in a wheelchair or using a walker and unable to perform his ADLs, taking 30 mg morphine in AM and another 15 mg morphine in PM. He takes an additional 30 mg Oxycodone q4h. This regimen gives him minimal relief. Of note, he also is still taking Decadron 4 mg twice daily and was not tapered GK SRS posttreatment.  I recommend a palliative course of radiation therapy to the right hip to improve local control and decrease the patient's pain symptoms.  I have also asked him to decrease his Decadron p.o. dose to 4 mg every morning only.  He is in between chemotherapy cycles and goal is start palliative radiotherapy sometime next week.      A resident in an [employer] accredited training program was involved in the initial review, preparation, and/or interpretation of this case. I, as the Attending physician, attest that I: (i) reviewed patient clinical records; (ii) reviewed relevant lab test results and imaging; (iii) agree with the report, diagnosis, and interpretation as documented by the resident.        Shoshana Andino MD  Adjunct   Dept of Radiation Oncology  [facility]         Department of Radiation Oncology  Radiation Therapy Center  [facility]  [address], [address]  [place] [address]  [phone]       Consultation Note    Name: Dk Randhawa . MRN: [id number]   : 1955   Date of Service: Sep 13, 2024 Referring: Dr. Friedell     Reason for consultation: Mr. Randhawa is a  70 y/o male with metastatic non-small cell lung cancer s/p RUL Lobectomy and gamma knife for brain metastases. Patient began chemotherapy on 7/30 and has completed 3 of 7 cycles of Atezolizumab, Carboplatin, Trilaciclib, and Etoposide. He is here to discuss palliative RT for new pain in R hip caused by multiple pelvic fractures.  - Pathologic Stage 6/1/2024: Stage IIB (pT3, pN0, cM0)   - Clinical stage from 7/29/2024: Stage AJITH (rcTX, cN2, cM1b)     Oncologic History:     Mr. Randhawa initially presented to the ED on 4/7/2024 after several days of hemoptysis. CT visualized a 4.5 cm RUL lung mass and hilar lymphadenopathy. Brain MRI obtained on 4/30/24 was negative for brain metastases. The RUL mass increased in size from 5.1 x 4.6 cm to 5.9 x 5.2 cm w SUV max 33.1 on 5/2/24 PET CT. R hilar LN (1.9 x 1.8 cm) and mediastinal nodes, including L lower paratracheal node (1.2 x 1.5 cm) showed FDG avidity (series 4, images 139 and 132 respectively). Dr. Peoples performed a robotic assisted RUL Lobectomy on 5/15/24, obtaining 0.2 cm margins within parenchymal tissue and 1.1 cm margins from vascular and bronchial structures. Pathology of resected lung nodule showed large cell lung cancer; all mediastinal nodes negative.     He presented to the ED on 7/20/24 w/ severe R hip pain. CT concerning for possible involvement of R acetabulum. Given that additional metastases within the chest were confirmed, chemotherapy regimen was modified from adjuvant to palliative protocol for advanced large cell lung cancer. CT visualized several enhancing pleural nodules within R pleural effusion, morphologically abnormal R lower paratracheal LN and several new solid L lung nodules c/w katelyn & contralateral lung metastases. Addiitonally, intra-abdominal metastases superior to the pancreas neck/body junction suspected.     7/30/24 Day 1 cycle 1 of carboplatin, etoposide, Trilaciclib, and atezolizumab for disseminated large cell carcinoma of the  lung. 7/30 MRI showed multiple new intracranial metastases including hemorrhagic metastases of the left occipital lobe and left cerebellum. 8/8/24 MRI consistent with prior MRI findings with 9 total ring-enhancing metastases, the majority of which remained unchanged. R thalamic metastases had increased slightly in size whereas the metastases in the L parietal lobe and L posterior cerebellum had decreased slightly in size.      Patient received gamma knife treatment 30 Gy in 5 fx to lesions greater than 2cm and 22Gy in 1 fx to lesions smaller than 2cm from 8/9 to 8/14/2024 for CNS metastases; please refer to Dr. Andino's note on 8/19 for total dose received per region.     Patient was admitted to [facility] on 8/23 for significant R hip pain. Pathologic pelvic fracture visualized on 8/23 CT.     Patient completed cycle 3 of chemotherapy on 9/11/24. Patient has been experiencing a leukemoid reaction secondary to steroids plus likely bone marrow involvement from large cell carcinoma of the lung.        Chemotherapy History: carboplatin, etoposide, Trilaciclib, and atezolizumab   Radiation History: GK SRS 8/9/24 - 8/14/24 (see Dr. Andino note on 8/19/24 for Treatment Summary)    Pregnant: No   Implanted Cardiac Devices: No  Autoimmune History: No    Subjective:  Patient is experiencing 10/10 pain in his R hip that radiates to his L buttock and lower back. He is currently taking 30 mg morphine in AM and another 15 mg morphine in PM. He takes an additional 30 mg Oxycodone q4h. This pain regimen is no longer touching his pain. Patient is hoping for pain reduction from this therapy as his pain is impairing his ability to participate in ADLs.    Review of Systems   A 10-point review of systems was performed. Pertinent findings are noted in the HPI.    Physical Exam   ECOG Status: 1    Vitals:  BP (!) 89/60   Pulse 87   Temp 97.8  F (36.6  C) (Oral)   Resp 18   Wt 109.8 kg (242 lb)   SpO2 98%   BMI 32.82 kg/m    Gen:  Alert, in NAD  Head: NC/AT  Eyes: PERRL, EOMI, sclera anicteric  Ears: No external auricular lesions  Nose/sinus: No rhinorrhea or epistaxis  Oral cavity/oropharynx: MMM, no visible oral cavity lesions, FOM and BOT are soft to palpation  Neck: Full ROM, supple, no palpable adenopathy  Pulm: No wheezing, stridor or respiratory distress  CV: Extremities are warm and well-perfused, no cyanosis, edema extending to knees bilaterally  Abdominal: Normal bowel sounds, soft, nontender, no masses  Musculoskeletal: Patient ambulates with wheelchair or walker. Ambulation limited by pain in R hip.   Skin: Normal color and turgor, 1-4 small lesions visualized on hands/forearms   Neuro: A/Ox3, CN II-XII intact, abnormal gait due to pain    Imaging/Path/Labs   Imaging: Comminuted pathologic nondisplaced fracture of the right acetabulum     Mildly displaced probable additional pathologic fracture at the junction of the right ischium/inferior pubic ramus      Path: As above  Labs: 9/9 .9     Assessment/Plan   Mr. Randhawa is a 68 y/o male with metastatic large cell lung cancer s/p RUL lobectomy. He has metastatic involvement within chest, acetabulum, and brain. He has completed 3 out of 7 Cycles as of 9/11/24 although he is currently experiencing a leukemoid reaction. Patient underwent gamma knife radiation for brain metastases from 8/9 - 8/14/24. He is here today to discuss palliative radiation to his R hip. We are recommending 30 Gy in 10 fx given the severity of his pain. CT planning session scheduled for 9/13/24 and our team will attempt to begin radiation therapy early next week given the severity of the patient's pain.     The risks and benefits of radiotherapy were discussed in detail with the patient.  The patient expressed understanding.  All questions answered.  Informed consent was obtained.    Patient was seen and discussed with my attending physician, Dr. Andino.    Stephanie MELGAR, assisted with the writing of  this note as a medical student.       Again, thank you for allowing me to participate in the care of your patient.        Sincerely,        JAMA Andino MD

## 2024-09-13 NOTE — NURSING NOTE
Oncology Rooming Note    September 13, 2024 12:59 PM   Dk Randhawa Sr. is a 69 year old male who presents for:    Chief Complaint   Patient presents with    Radiation Therapy     New Radiation Oncology Consult with Dr. Andino      Initial Vitals: BP (!) 89/60   Pulse 87   Temp 97.8  F (36.6  C) (Oral)   Resp 18   Wt 109.8 kg (242 lb)   SpO2 98%   BMI 32.82 kg/m   Estimated body mass index is 32.82 kg/m  as calculated from the following:    Height as of 9/9/24: 1.829 m (6').    Weight as of this encounter: 109.8 kg (242 lb). Body surface area is 2.36 meters squared.  Worst Pain (10) Comment: Data Unavailable   No LMP for male patient.  Allergies reviewed: Yes  Medications reviewed: Yes    Medications: Medication refills not needed today.  Pharmacy name entered into K[facility]:    [id number] [place], [place] - [address] [place] [address] [id number] - [place], [place] - [address] [facility] [address]    Frailty Screening:   Is the patient here for a new oncology consult visit in cancer care? 1. Yes. Over the past month, have you experienced difficulty or required a caregiver to assist with:   1. Balance, walking or general mobility (including any falls)? YES  2. Completion of self-care tasks such as bathing, dressing, toileting, grooming/hygiene?  YES  3. Concentration or memory that affects your daily life?  NO       Clinical concerns: New Radiation Oncology Consult, patient here with brother. Patient in a wheelchair for visit, rating pain as 10/10 in R hip/low back. Patient states he lives in senior living and has not had any falls. He has family and friends to help. Dr. Andino was notified.  Educated patient to decrease Dexamethasone to 4 mg to AM dose only. Dr. Andino will taper during weekly OTV visit next week.    Considerations for radiation treatment   Pregnancy status: Male   Implanted Cardiac Devices: No   Any previous radiation therapy: Yes- Gamma knife completed on 8/14/24 Dr. Andino at [facility].    Radiation  Therapy Patient Education    Person involved with teaching: Patient and brother    Patient educational needs for self management of treatment-related side effects assessment completed.  [facility] Patient Ed tab contains Patient Learning Assessment    Education Materials Given  Coping with Fatigue    Educational Topics Discussed  Side effects expected    Response To Teaching  Verbalizes understanding    GYN Only  Vaginal Dilator-given and educated: N/A    Referrals sent: None    Chemotherapy?  Yes: Currently scheduled for next dose of carbo/etop/atezo on 9/30/24. May move date depending on radiation schedule.      Nancy Bunn RN

## 2024-09-16 PROBLEM — L03.011 CELLULITIS OF FINGER OF RIGHT HAND: Status: ACTIVE | Noted: 2024-01-01

## 2024-09-16 PROBLEM — I48.91 ATRIAL FIBRILLATION WITH RAPID VENTRICULAR RESPONSE (H): Status: ACTIVE | Noted: 2024-01-01

## 2024-09-16 PROBLEM — L03.115 CELLULITIS OF RIGHT LOWER EXTREMITY: Status: ACTIVE | Noted: 2024-01-01

## 2024-09-16 PROBLEM — D72.819 LEUKOPENIA, UNSPECIFIED TYPE: Status: ACTIVE | Noted: 2024-01-01

## 2024-09-16 NOTE — PHARMACY-VANCOMYCIN DOSING SERVICE
Pharmacy Vancomycin Initial Note  Date of Service 2024  Patient's  1955  69 year old, male    Indication: Skin and Soft Tissue Infection    Current estimated CrCl = Estimated Creatinine Clearance: 95.6 mL/min (based on SCr of 0.92 mg/dL).    Creatinine for last 3 days  2024:  3:26 PM Creatinine 0.92 mg/dL    Recent Vancomycin Level(s) for last 3 days  No results found for requested labs within last 3 days.      Vancomycin IV Administrations (past 72 hours)        No vancomycin orders with administrations in past 72 hours.                    Nephrotoxins and other renal medications (From now, onward)      Start     Dose/Rate Route Frequency Ordered Stop    24 0600  vancomycin (VANCOCIN) 1,000 mg in 200 mL dextrose intermittent infusion         1,000 mg  200 mL/hr over 1 Hours Intravenous EVERY 12 HOURS 24 1756      24 1800  vancomycin (VANCOCIN) 1,500 mg in sodium chloride 0.9 % 250 mL intermittent infusion         1,500 mg  over 90 Minutes Intravenous ONCE 24 175              Contrast Orders - past 72 hours (72h ago, onward)      None            [employer] Prediction of Planned Initial Vancomycin Regimen     Loading dose: 1500 mg at 18:00 2024.  Regimen: 1000 mg IV every 12 hours.  Start time: 06:00 on 2024  Exposure target: AUC24 (range)400-600 mg/L.hr   AUC24,ss: 447 mg/L.hr  Probability of AUC24 > 400: 62 %  Ctrough,ss: 14.7 mg/L  Probability of Ctrough,ss > 20: 23 %  Probability of nephrotoxicity (Lodise TAMIKO ): 10 %       Plan:  Start vancomycin  1500 mg x1, then 1000 mg mg IV q12h.   Vancomycin monitoring method: AUC  Vancomycin therapeutic monitoring goal: 400-600 mg*h/L  Pharmacy will check vancomycin levels as appropriate in 1-3 Days.    Serum creatinine levels will be ordered daily for the first week of therapy and at least twice weekly for subsequent weeks.      Mary Aden [facility]

## 2024-09-16 NOTE — H&P
[facility]    History and Physical  Hospital Medicine       Date of Admission:  9/16/2024  Date of Service: 9/16/2024     Assessment & Plan   Dk Randhawa . is a 69 year old male who presents on 9/16/2024 with failure to thrive and acute on chronic right hip pain in setting of lung cancer with prior known bone metastasis involving right hip who presents form radiation clinic 9/16. He was found to have cellulitis involving right finger, right elbow, and right foot. Additionally developed atrial fibrillation with RVR. Amiodarone drip initiation and will be admitted to ICU for RVR.    Atrial fibrillation with rapid ventricular response (RVR)  Chronic anticoagulation    Chronic atrial fibrillation on PTA metoprolol succinate 100 mg and anticoagulation with rivaroxaban 20 mg. Presents tachycardic 125 with variation up to 140's. Bolused 500 ml with ongoing tachycardia. EKG showing atrial fibrillation with . Denies palpitations chest pain, or SOB. Amiodarone drip with bolus started in emergency department. Upon ICU admission RVR improving into 120's with BP WNL.  - Telemetry  - Continue amiodarone drip  - Continue PTA metoprolol succinate    Cellulitis of right index finger  Cellulitis of right elbow  Cellulitis right foot, possible  Immunocompromised    Developed redness and swelling of right index finger over x 1 week PTA. Right index finger with redness, swelling, and warmth. Right elbow with redness, swelling, and warmth. ROM intact. Right foot with redness, swelling, and warmth. No fever on presentation. WBC 0.7. There has been significant variation in WBC since 8/14. Review of oncology note suggest that WBC changes likely due to bone marrow metastasis, steroids, and radiation. Lactate 1.5 (WNL). CRP elevated 299.34. Procalcitonin 2.25. XR right index finger without discrete erosion, finding unremarkable. Initially given dose of ceftriaxone however, there is concern for  worsening infection on admission with the development of a-fib with RVR so vancomycin and cefepime were started.   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 CT chest on 4/7/2024 showed a right upper lobe lung mass. Brain MRI on 4/30/2024 was negative for metastatic disease. PET CT scan on 5/2/2024 was positive for the RUL mass and there was uptake in some mediastinal lymph nodes.  On 5/16/2024 patient had a robotic assisted right upper lobe lobectomy. Pathology showed large cell lung cancer. Mediastinal nodes were negative. On 7/20/2024 evaluated for right hip pain and imaging showed metastatic disease particularly in the chest but likely in the right hip acetabulum as well. His chemotherapy changed from adjuvant to definitive chemotherapy for advanced large cell lung cancer. Has now completed 3 of 7 cycles carboplatin, etoposide, and atezolizumab  9/11/24. Received Trilaciclib support for first cycle. Has completed gamma knife therapy 8/9 - 8/14/24.  CT imaging 8/23 pelvis showing multiple pathological fractures (see above). Has chronic cancer pain in which he tries to manage with morphine BID, acetaminophen 1,000 mg TID, and oxycodone 5 mg PRN q4hrs. Also managed with dexamethasone 4 mg  - Physical therapy and occupational therapy  - Continue dexamethasone 4 mg    Acute on chronic right hip pain secondary to progressive bone metastasis    Presents with worsening right hip pain with known metastasis. Following with hematology/oncology. On 7/20/2024 evaluated for right hip pain and imaging showed metastatic disease particularly in the chest but  likely in the right hip acetabulum as well. Additionally evaluation 8/23 with CT pelvis showing comminuted pathologic nondisplaced fracture of the right acetabulum, probable additional pathologic fractures of the junction of the right inferior pubic ramus/right ischium, nondisplaced pathologic fracture in the left superior pubic ramus, and additional scattered lucent lesions, suspicious for metastatic disease. Following with Dr. Andino with most recent evaluation 9/13 for palliative radiation of right hip metastasis related to bone pain. Underwent first radiation treatment 9/16. Patient wishes to continue with radiation treatments. Pain managed PTA with morphine 30 mg AM and 15 mg PM,  acetaminophen 1,000 mg TID, and oxycodone 5 mg PRN q6hrs  - Continue morphine BID, acetaminophen 1,000 mg TID, and oxycodone 5 mg PRN q2hrs    Pancytopenia, chronic    Chronic, secondary cancer and therapies (see above). Relatively stableWBC 0.7, RBC 2.94, and  on admission. WBC with significant variations over last month, ranging from 0.8 - 141. Review of oncology note suggest that WBC changes likely due to bone marrow metastasis, steroids, and radiation.   - Trend daily with CBC    Bilateral lower extremity edema    Recently started on furosemide 40 mg for lower extremity edema. Bilateral edema with weeping present on admission  - Holding PTA furosemide for low blood pressure    Hyponatremia    Sodium 132 on admission. Clinically insignificant.  - Trend with AM labs    Hypotension (improved)  Essential hypertension    Presented hypotensive 84/55 with response to 500 ml bolus. Chronic hypertension managed PTA with losartan 100 mg.  - Holding losartan on admission    Clinically Significant Risk Factors Present on Admission              # Hypoalbuminemia: Lowest albumin = 3 g/dL at 9/16/2024  3:26 PM, will monitor as appropriate    # Drug Induced Coagulation Defect: home medication list includes an anticoagulant medication  #  Thrombocytopenia: Lowest platelets = 118 in last 2 days, will monitor for bleeding   # Hypertension: Noted on problem list    # Anemia: based on hgb <11  # Obesity: Estimated body mass index is 32.82 kg/m  as calculated from the following:    Height as of this encounter: 1.829 m (6').    Weight as of this encounter: 109.8 kg (242 lb).           Diet: Regular Diet Adult    DVT Prophylaxis: DOAC  Lockwood Catheter: Not present  Code Status: Full Code  Lines: Port-A-Cath, PIV    Disposition Plan   Medically Ready for Discharge: Anticipated in 2-4 Days    I have discussed patient and formulated plan with attending hospitalist physician, Dr. Valenzuela.  MEDINA DAVIS PA-C        Primary Care Physician   Esteban Copeland [phone]    History is obtained from the patient, emergency department physician, and review of old records via the EMR.    History of Present Illness   Dk Randhawa Bay is a 69 year old male with past medical history of lung cancer status post lobectomy with brain metastasis and bone metastasis on current chemoradiation therapy, multiple pathologic fractures, atrial fibrillation on chronic anticoagulation, hypertension, and chronic anemia  now presents on 9/16/2024 with failure to thrive and generalized weakness in the setting of chemoradiation.    Homer states that he was unable to get out of bed this morning and required the help of his brother who is present with him. His strength has been declining with chemotherapy for lung cancer with metastasis to brain, pelvis, and pancrease. Has now completed 3 of 7 cycles carboplatin, etoposide, and atezolizumab  9/11/24. He is now using a walker for support. He denies falls. He just completed his first palliative radiation treatment 9/16 to this right hip in hopes to help control pain in that area. He was recently admitted 8/2024 for worsening right hip and back pain. During that time he was found to have multiple pathologic fractures involving the pelvis. He  is currently taking morphine twice daily with oxycodone as needed. He states feeling very weak and has scattered areas of sores to his elbows, knees, and buttocks from difficulty moving. He states that his right index finger has been red and swollen over the last week approximately with drainage from his nail. He also has noticed redness and swelling to his right foot. He notes scab to elbows bilaterally. There is redness, warmth, and swelling of his right elbow. States does not have issue bending elbow outside of some pain. He states having a flare of left shoulder pain. He denies fever or chills. Denies shortness of breath of cough. Ongoing swelling and weeping of both legs. No chest pain or discomfort. Denies abdominal pain, diarrhea, nausea, or vomiting. Denies urinary symptoms.    Review of Systems   Review of Systems   Constitutional:  Negative for chills and fever.   Respiratory:  Negative for cough and shortness of breath.    Cardiovascular:  Positive for leg swelling (Weeping legs). Negative for chest pain and palpitations.   Gastrointestinal:  Negative for abdominal pain, diarrhea, nausea and vomiting.   Genitourinary:  Negative for dysuria and frequency.   Musculoskeletal:  Positive for joint pain (Left shoulder and right hip). Negative for falls.   Skin:  Positive for rash (Redness to right index finder, right elbow and right foot).   Neurological:  Positive for weakness. Negative for headaches.     Past Medical History    Past Medical History:   Diagnosis Date    Acute non-recurrent maxillary sinusitis     Antiplatelet or antithrombotic long-term use     Arrhythmia     Atrial fibrillation with RVR (H)     Cough secondary to angiotensin converting enzyme inhibitor (ACE-I)     Hyperlipidemia     Hypertension     Malignant neoplasm metastatic to bone (H) 07/26/2024    Mass of upper lobe of right lung     Obesity     Pathological fracture in neoplastic disease, pelvis, init 08/23/2024     Past Surgical  History   Past Surgical History:   Procedure Laterality Date    BRONCHOSCOPY, WITH BIOPSY, ROBOT ASSISTED Bilateral 04/16/2024    Procedure: BRONCHOSCOPY, endobronchial ultrasound, transbronchial needle aspiration, endobronchial biopsies and tumor debulking;  Surgeon: Lanette Arce MD;  Location: [facility] OR    DAVINCI EXCISE NODES THORACIC N/A 5/15/2024    Procedure: mediastinal lymph node dissection;  Surgeon: Mikael Peoples MD;  Location: [facility] OR    DAVINCI LOBECTOMY LUNG Right 5/15/2024    Procedure: Robot-assisted thoracoscopic right upper lobectomy,;  Surgeon: Mikael Peoples MD;  Location: [facility] OR    ELBOW ARTHROSCOPY Left 03/09/2017    INSERT PORT VASCULAR ACCESS Right 7/5/2024    Procedure: INSERTION, VASCULAR ACCESS PORT;  Surgeon: Henry Veronica MD;  Location: [facility] OR    PHACOEMULSIFICATION WITH STANDARD INTRAOCULAR LENS IMPLANT Left 02/26/2018    Procedure: PHACOEMULSIFICATION WITH STANDARD INTRAOCULAR LENS IMPLANT;  Left Cataract Removal with Implant;  Surgeon: Mohit Victor MD;  Location: [facility] OR    PHACOEMULSIFICATION WITH STANDARD INTRAOCULAR LENS IMPLANT Right 01/30/2019    Procedure: Cataract Removal with Implant;  Surgeon: Mohit Victor MD;  Location: [facility] OR    TONSILLECTOMY  1964    TOTAL HIP ARTHROPLASTY Left 04/12/2022      Prior to Admission Medications   Prior to Admission Medications   Prescriptions Last Dose Informant Patient Reported? Taking?   Glucosamine-Chondroitin (OSTEO BI-FLEX REGULAR STRENGTH PO) 9/16/2024 at am Self Yes Yes   Sig: Take 2 tablets by mouth daily.   HEMP OIL OR EXTRACT OR OTHER CBD CANNABINOID, NOT MEDICAL CANNABIS, Past Week at prn Self Yes Yes   Sig: Take 1 chew tab by mouth daily as needed. CBD gummies   Melatonin 10 MG TABS tablet Past Week at hs Self Yes Yes   Sig: Take 10 mg by mouth at bedtime.   Multiple Vitamins-Minerals (EMERGEN-C VITAMIN C) PACK 9/15/2024 at am Self Yes Yes   Sig: Take 1 packet by mouth daily.   NONFORMULARY 9/15/2024 at  pm Self Yes Yes   Sig: Take 2 capsules by mouth every evening. Tumeric/Ginger/Apple Cider 1410 mg   XARELTO ANTICOAGULANT 20 MG TABS tablet 9/15/2024 at pm Self Yes Yes   Sig: Take 20 mg by mouth every evening.   acetaminophen (TYLENOL) 500 MG tablet 9/16/2024 at 1100 Self Yes Yes   Sig: Take 1,000 mg by mouth 3 times daily.   dexAMETHasone (DECADRON) 4 MG tablet 9/16/2024 at am Self Yes Yes   Sig: Take 4 mg by mouth daily (with breakfast).   diphenhydrAMINE-acetaminophen (TYLENOL PM)  MG tablet 9/14/2024 at hs Self Yes Yes   Sig: Take 2 tablets by mouth at bedtime.   fexofenadine (ALLEGRA) 180 MG tablet Past Week at am Self Yes Yes   Sig: Take 180 mg by mouth daily   furosemide (LASIX) 40 MG tablet 9/16/2024 at am Self No Yes   Sig: Take 1 tablet (40 mg) by mouth daily.   losartan (COZAAR) 100 MG tablet 9/16/2024 at am Self Yes Yes   Sig: Take 100 mg by mouth every morning   metoprolol succinate ER (TOPROL XL) 100 MG 24 hr tablet 9/16/2024 at am Self Yes Yes   Sig: Take 1 tablet by mouth every morning   morphine (MS CONTIN) 15 MG CR tablet 9/16/2024 at am Self No Yes   Sig: Take 2 tablets (30 mg) by mouth every morning AND 1 tablet (15 mg) every evening.   multivitamin (CENTRUM SILVER) tablet 9/16/2024 at am Self Yes Yes   Sig: Take 1 tablet by mouth daily   naloxone (NARCAN) 4 MG/0.1ML nasal spray never used at on hand if needed Self No Yes   Sig: Spray 1 spray (4 mg) into one nostril alternating nostrils as needed for opioid reversal. every 2-3 minutes until assistance arrives   oxyCODONE (OXY-IR) 5 MG capsule 9/16/2024 at 1530 2 capsules Self No Yes   Sig: Take 1 capsule (5 mg) by mouth every 4 hours as needed for severe pain. May take 2 capsules if needed      Facility-Administered Medications: None     Allergies   No Known Allergies    Family History    Family History   Problem Relation Age of Onset    Ovarian Cancer Mother     Alzheimer Disease Mother     Depression Father 45        suicide    Diabetes  Sister      Social History   Social History     Socioeconomic History    Marital status:      Spouse name: Not on file    Number of children: Not on file    Years of education: Not on file    Highest education level: Not on file   Occupational History    Not on file   Tobacco Use    Smoking status: Former     Current packs/day: 0.00     Average packs/day: 0.5 packs/day for 20.0 years (10.0 ttl pk-yrs)     Types: Cigarettes     Start date: 1994     Quit date: 2014     Years since quitting: 10.7    Smokeless tobacco: Never   Vaping Use    Vaping status: Never Used   Substance and Sexual Activity    Alcohol use: Not Currently    Drug use: No    Sexual activity: Not on file   Other Topics Concern    Parent/sibling w/ CABG, MI or angioplasty before 65F 55M? Not Asked   Social History Narrative    Not on file     Social Determinants of Health     Financial Resource Strain: Low Risk  (8/23/2024)    Financial Resource Strain     Within the past 12 months, have you or your family members you live with been unable to get utilities (heat, electricity) when it was really needed?: No   Food Insecurity: Low Risk  (8/23/2024)    Food Insecurity     Within the past 12 months, did you worry that your food would run out before you got money to buy more?: No     Within the past 12 months, did the food you bought just not last and you didn t have money to get more?: No   Transportation Needs: Low Risk  (8/23/2024)    Transportation Needs     Within the past 12 months, has lack of transportation kept you from medical appointments, getting your medicines, non-medical meetings or appointments, work, or from getting things that you need?: No   Physical Activity: Not on file   Stress: Not on file   Social Connections: Socially Integrated (10/23/2023)    [facility]    Social Connections     Frequency of Communication with Friends and Family:  0   Interpersonal Safety: Not on file   Housing Stability: Low Risk  (8/23/2024)    Housing Stability     Do you have housing? : Yes     Are you worried about losing your housing?: No     Physical Exam   BP 93/68   Pulse (!) 131   Temp 97.8  F (36.6  C) (Oral)   Resp 13   Ht 1.829 m (6')   Wt 109.8 kg (242 lb)   SpO2 100%   BMI 32.82 kg/m       Weight: 242 lbs 0 oz Body mass index is 32.82 kg/m .     Constitutional: Sitting in bed upright comfortably, appears stated age, alert, oriented x 4, brother near beside, upset, tearful, cooperative, no apparent distress, appears nontoxic.  Eyes: Eyes are clear, pupils are reactive.  HENT: Oropharynx is clear and moist. No evidence of cranial trauma.  Lymph/Hematologic: No epitrochlear, axillary, anterior or posterior cervical, or supraclavicular lymphadenopathy is appreciated.  Cardiovascular: Tachycardic rate and irregular rhythm, normal S1 and S2, and no murmur noted. JVP is normal. Good peripheral pulses in wrists bilaterally. Lower extremity edema bilaterally with weeping fluid.  Respiratory: Clear to auscultation bilaterally. No wheezes, rhonchi, or crackles. Normal respiratory effort on RA. Speaking in full sentences.  GI: Soft, flat, non-tender to palpation throughout, normal bowel sounds, no hepatomegaly.  Genitourinary: Deferred  Musculoskeletal: Normal muscle bulk and tone. Left shoulder with TTP to posterior-laterally.  Skin: Warm and dry. There is redness, swelling, and warmth to the right index finger with small about of drainage from nailbed. There is redness, swelling, and warmth to right elbow, ROM intact. There is redness, warmth, and swelling to the right foot with scattered petechiae bilateral feet extending up legs to thighs.  Neurologic: Neck supple. Cranial nerves are grossly intact.  is symmetric.     Data   Data reviewed today:     I have personally reviewed the following data over the past 24 hrs:    0.7 (LL)  \   8.7 (L)   / 118 (L)      132 (L) 97 (L) 45.5 (H) /  158 (H)   4.9 26 0.92 \     ALT: 23 AST: 24 AP: 163 (H) TBILI: 0.5   ALB: 3.0 (L) TOT PROTEIN: 6.0 (L) LIPASE: N/A     Procal: N/A CRP: 299.84 (H) Lactic Acid: N/A         Recent Results (from the past 24 hour(s))   XR Finger Right G/E 2 Views    Narrative    RIGHT FINGER TWO OR MORE VIEWS  9/16/2024 3:57 PM     HISTORY: Edema, erythema, nodularity with purulent drainage from  distal phalanx; please evaluate for osteo.    COMPARISON: None.      Impression    IMPRESSION:  No acute fracture or subluxation. Severe second distal  interphalangeal joint osteoarthritis. There is surrounding soft tissue  swelling. There is no discrete erosion. MRI would be more sensitive  for early findings of osteomyelitis.    Severe triscaphe osteoarthritis. Moderate second and third  metacarpophalangeal joint osteoarthritis.    QIAN STONER MD         SYSTEM ID:  [id number]

## 2024-09-16 NOTE — MEDICATION SCRIBE - ADMISSION MEDICATION HISTORY
Medication Scribe Admission Medication History    Admission medication history is complete. The information provided in this note is only as accurate as the sources available at the time of the update.    Information Source(s): Patient and CareEverywhere/SureScripts via  with patient in room and finished at desk.    Pertinent Information: He states that a doctor decreased his Dexamethasone from 1 tab bid to 1 tab daily.    Changes made to PTA medication list:  Added: Nonformulary - Turmeric/Ginger/Apple Cider, Osteo Bi-Flex PO, Emergen-C packet.  Deleted: Acetaminophen 325 mg, Turmeric PO, Unable to Find Osteo bi flex, Unable to find Emergen-e 1000 mg.  Changed: Dexamethasone from 1 tab bid to 1 tab daily.  Tylenol PM and Melatonin from nightly prn to nightly.  Tylenol 500 mg from 2 tabs q6h to 2 tabs tid.    Allergies reviewed with patient and updates made in EHR: yes, no allergies.    Medication History Completed By: Kristi Mcleod 9/16/2024 5:32 PM    PTA Med List   Medication Sig Last Dose    acetaminophen (TYLENOL) 500 MG tablet Take 1,000 mg by mouth 3 times daily. 9/16/2024 at 1100    dexAMETHasone (DECADRON) 4 MG tablet Take 4 mg by mouth daily (with breakfast). 9/16/2024 at am    diphenhydrAMINE-acetaminophen (TYLENOL PM)  MG tablet Take 2 tablets by mouth at bedtime. 9/14/2024 at hs    fexofenadine (ALLEGRA) 180 MG tablet Take 180 mg by mouth daily Past Week at am    furosemide (LASIX) 40 MG tablet Take 1 tablet (40 mg) by mouth daily. 9/16/2024 at am    Glucosamine-Chondroitin (OSTEO BI-FLEX REGULAR STRENGTH PO) Take 2 tablets by mouth daily. 9/16/2024 at am    HEMP OIL OR EXTRACT OR OTHER CBD CANNABINOID, NOT MEDICAL CANNABIS, Take 1 chew tab by mouth daily as needed. CBD gummies Past Week at prn    losartan (COZAAR) 100 MG tablet Take 100 mg by mouth every morning 9/16/2024 at am    Melatonin 10 MG TABS tablet Take 10 mg by mouth at bedtime. Past Week at hs    metoprolol succinate ER (TOPROL XL)  100 MG 24 hr tablet Take 1 tablet by mouth every morning 9/16/2024 at am    morphine (MS CONTIN) 15 MG CR tablet Take 2 tablets (30 mg) by mouth every morning AND 1 tablet (15 mg) every evening. 9/16/2024 at am    Multiple Vitamins-Minerals (EMERGEN-C VITAMIN C) PACK Take 1 packet by mouth daily. 9/15/2024 at am    multivitamin (CENTRUM SILVER) tablet Take 1 tablet by mouth daily 9/16/2024 at am    naloxone (NARCAN) 4 MG/0.1ML nasal spray Spray 1 spray (4 mg) into one nostril alternating nostrils as needed for opioid reversal. every 2-3 minutes until assistance arrives never used at on hand if needed    NONFORMULARY Take 2 capsules by mouth every evening. Tumeric/Ginger/Apple Cider 1410 mg 9/15/2024 at pm    oxyCODONE (OXY-IR) 5 MG capsule Take 1 capsule (5 mg) by mouth every 4 hours as needed for severe pain. May take 2 capsules if needed 9/16/2024 at 1530 2 capsules    XARELTO ANTICOAGULANT 20 MG TABS tablet Take 20 mg by mouth every evening. 9/15/2024 at pm

## 2024-09-16 NOTE — ED TRIAGE NOTES
Patient states he wants to admit himself because he can not take care of himself     Triage Assessment (Adult)       Row Name 09/16/24 9505          Triage Assessment    Airway WDL WDL        Respiratory WDL    Respiratory WDL WDL        Skin Circulation/Temperature WDL    Skin Circulation/Temperature WDL WDL        Cardiac WDL    Cardiac WDL X;rhythm     Pulse Rate & Regularity tachycardic        Peripheral/Neurovascular WDL    Peripheral Neurovascular WDL WDL        Cognitive/Neuro/Behavioral WDL    Cognitive/Neuro/Behavioral WDL WDL

## 2024-09-16 NOTE — PROGRESS NOTES
[facility]: Cancer Care                                                                                          Pt stopped in clinic after RT and he states he was not able to care for himself this AM. He had to call his brother over to help. He thinks he needs to be admitted for more help. He has RT this week and next week and he is hoping he can continue with that. He reports his pain is very bad, he is taking MS contin 30mg BID and Ocycodone 10mg PRN.     He also looks to have an infected right pointer finger. It is very red swollen and has puss draining from it. Pt reports he has been caring for this at home.     Pt is hading down to ER now - will update all teams.     Signature:  Lalitha Rosales RN

## 2024-09-16 NOTE — LETTER
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, pelvis, init [M84.550A]  Leukopenia, unspecified type [D72.819]  Malignant neoplasm metastatic to pancreas [C78.89]  Admit Date/Time: 2024  2:24 PM  Discharge Date: 24  Payor Source: Payor: [facility] / Plan: [facility] MEDICARE / Product Type: HMO /          Reason for Communication Hand-off Referral: Fragility/ TCU placement     Discharge Plan: Discharge to [facility] TCU   Discharge Needs Assessment:  Needs      Flowsheet Row Most Recent Value   Equipment Currently Used at Home grab bar, toilet, grab bar, tub/shower, raised toilet seat, shower chair, walker, standard, wheelchair, manual  [recently started using walker]   # of Referrals Placed by CM External Care Coordination, Post Acute Facilities, Transportation          Follow-up plan:    Future Appointments   Date Time Provider Department Center   2024  8:45 AM [facility]   2024  9:15 AM Aidee Paige NP [facility]   2024 10:00 AM [facility]ION NURSE [facility]   10/1/2024  1:00 PM [facility]   10/2/2024 11:30 AM [facility] NURSE HSAYLA RIOS       Any outstanding tests or procedures:              ILYA Pop    AVS/Discharge Summary is the source of truth; this is a helpful guide for improved communication of patient story

## 2024-09-16 NOTE — ED PROVIDER NOTES
History     Chief Complaint   Patient presents with    Hip Pain     Right hip ,came from radiation clinic     HPI  Dk HARRISON Sydnee Mcgarry. 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  This morning unable to get her ambulate and needed assistance of family member to help with his ADLs.  Went to his first radiotherapy session and plan for 10 fractions over the course the next 2 weeks.  Hoping to be admitted to hospital as he can no longer care for himself.  Also has redness and swelling of his right finger for about the past week.  No fevers or chills.  Not sleeping well due to pain.  Does have a good appetite.  Other than the pain from his hip is doing okay.    The patient's PMHx, Surgical Hx, Allergies, and Medications were all reviewed with the patient.    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   Thoracoscopic right upper lobectomy 5/15/2024      Malignant neoplasm of upper lobe of right lung (H) 05/15/2024     Priority: Medium    Mass of upper lobe of right lung 05/03/2024     Priority: Medium    Atrial fibrillation with RVR (H) 10/23/2023     Priority: Medium    Obesity (BMI  30.0-34.9) 04/01/2022     Priority: Medium    Cough due to angiotensin-converting enzyme inhibitor 12/02/2015     Priority: Medium    CARDIOVASCULAR SCREENING; LDL GOAL LESS THAN 160 10/31/2010     Priority: Medium    Essential hypertension, benign 05/18/2010     Priority: Medium    Hyperlipidemia 05/18/2010     Priority: Medium    Pre-diabetes 05/18/2010     Priority: Medium        Past Medical History:    Past Medical History:   Diagnosis Date    Acute non-recurrent maxillary sinusitis     Antiplatelet or antithrombotic long-term use     Arrhythmia     Atrial fibrillation with RVR (H)     Cough secondary to angiotensin converting enzyme inhibitor (ACE-I)     Hyperlipidemia     Hypertension     Malignant neoplasm metastatic to bone (H) 07/26/2024    Mass of upper lobe of right lung     Obesity     Pathological fracture in neoplastic disease, pelvis, init 08/23/2024       Past Surgical History:    Past Surgical History:   Procedure Laterality Date    BRONCHOSCOPY, WITH BIOPSY, ROBOT ASSISTED Bilateral 04/16/2024    Procedure: BRONCHOSCOPY, endobronchial ultrasound, transbronchial needle aspiration, endobronchial biopsies and tumor debulking;  Surgeon: Lanette Arce MD;  Location: [facility] OR    DAVINCI EXCISE NODES THORACIC N/A 5/15/2024    Procedure: mediastinal lymph node dissection;  Surgeon: Mikael Peoples MD;  Location: [facility] OR    DAVINCI LOBECTOMY LUNG Right 5/15/2024    Procedure: Robot-assisted thoracoscopic right upper lobectomy,;  Surgeon: Mikael Peoples MD;  Location:  OR    ELBOW ARTHROSCOPY Left 03/09/2017    INSERT PORT VASCULAR ACCESS Right 7/5/2024    Procedure: INSERTION, VASCULAR ACCESS PORT;  Surgeon: Henry Veronica MD;  Location: [facility]    PHACOEMULSIFICATION WITH STANDARD INTRAOCULAR LENS IMPLANT Left 02/26/2018    Procedure: PHACOEMULSIFICATION WITH STANDARD INTRAOCULAR LENS IMPLANT;  Left Cataract Removal with Implant;  Surgeon: Mohit Victor MD;  Location: [facility] OR     PHACOEMULSIFICATION WITH STANDARD INTRAOCULAR LENS IMPLANT Right 01/30/2019    Procedure: Cataract Removal with Implant;  Surgeon: Mohit Victor MD;  Location: [facility] OR    TONSILLECTOMY  1964    TOTAL HIP ARTHROPLASTY Left 04/12/2022       Family History:    Family History   Problem Relation Age of Onset    Ovarian Cancer Mother     Alzheimer Disease Mother     Depression Father 45        suicide    Diabetes Sister        Social History:  Marital Status:   [4]  Social History     Tobacco Use    Smoking status: Former     Current packs/day: 0.00     Average packs/day: 0.5 packs/day for 20.0 years (10.0 ttl pk-yrs)     Types: Cigarettes     Start date: 1994     Quit date: 2014     Years since quitting: 10.7    Smokeless tobacco: Never   Vaping Use    Vaping status: Never Used   Substance Use Topics    Alcohol use: Not Currently    Drug use: No        Medications:    acetaminophen (TYLENOL) 500 MG tablet  dexAMETHasone (DECADRON) 4 MG tablet  diphenhydrAMINE-acetaminophen (TYLENOL PM)  MG tablet  fexofenadine (ALLEGRA) 180 MG tablet  furosemide (LASIX) 40 MG tablet  Glucosamine-Chondroitin (OSTEO BI-FLEX REGULAR STRENGTH PO)  HEMP OIL OR EXTRACT OR OTHER CBD CANNABINOID, NOT MEDICAL CANNABIS,  losartan (COZAAR) 100 MG tablet  Melatonin 10 MG TABS tablet  metoprolol succinate ER (TOPROL XL) 100 MG 24 hr tablet  morphine (MS CONTIN) 15 MG CR tablet  Multiple Vitamins-Minerals (EMERGEN-C VITAMIN C) PACK  multivitamin (CENTRUM SILVER) tablet  naloxone (NARCAN) 4 MG/0.1ML nasal spray  NONFORMULARY  oxyCODONE (OXY-IR) 5 MG capsule  XARELTO ANTICOAGULANT 20 MG TABS tablet          Review of Systems  Pertinent positives and negatives mentioned in HPI    Physical Exam   BP: (!) 84/55  Pulse: 118  Temp: 97.8  F (36.6  C)  Resp: 20  Height: 182.9 cm (6')  Weight: 109.8 kg (242 lb)  SpO2: 95 %    GEN: Awake, alert, and cooperative. Appears distressed but non toxic.   CV : Extremities warm and well  perfused.  PULM: Normal effort. Speaking in full sentences.  NEURO: Normal speech. Following commands.  Answering questions and interacting appropriately.   EXT: No gross deformity. 2+ pitting edema of lower extremities below knees. Fusiform swelling of right index finger w/ fluctuance of distal phalanx with purulence expressed from proximal nail fold. Erythema and edema of right foot/ankle.   INT: warm and dry. Abrasions to buttocks and posterior elbows.     ED Course        Procedures         ECG.  6:07 PM.  Interpreted by myself.  Narrow complex irregular rhythm with rate of 142 bpm.  Normal R wave progression.  Artifact limits of interpretability.  No ST segment elevations or depressions.  ECG from 5/3/2024 with atrial fibrillation with rate of 86 bpm.  Impression atrial fibrillation with rapid response.    Critical Care time:  was 85 minutes for this patient excluding procedures.               Results for orders placed or performed during the hospital encounter of 09/16/24 (from the past 24 hour(s))   CBC with platelets, differential    Narrative    The following orders were created for panel order CBC with platelets, differential.  Procedure                               Abnormality         Status                     ---------                               -----------         ------                     CBC with platelets and d...[[id number]]  Abnormal            Final result               Manual Differential[[id number]]          Abnormal            Final result                 Please view results for these tests on the individual orders.   Comprehensive metabolic panel   Result Value Ref Range    Sodium 132 (L) 135 - 145 mmol/L    Potassium 4.9 3.4 - 5.3 mmol/L    Carbon Dioxide (CO2) 26 22 - 29 mmol/L    Anion Gap 9 7 - 15 mmol/L    Urea Nitrogen 45.5 (H) 8.0 - 23.0 mg/dL    Creatinine 0.92 0.67 - 1.17 mg/dL    GFR Estimate 89 >60 mL/min/1.73m2    Calcium 8.6 (L) 8.8 - 10.4 mg/dL    Chloride 97 (L) 98 - 107  mmol/L    Glucose 158 (H) 70 - 99 mg/dL    Alkaline Phosphatase 163 (H) 40 - 150 U/L    AST 24 0 - 45 U/L    ALT 23 0 - 70 U/L    Protein Total 6.0 (L) 6.4 - 8.3 g/dL    Albumin 3.0 (L) 3.5 - 5.2 g/dL    Bilirubin Total 0.5 <=1.2 mg/dL   [place] Draw    Narrative    The following orders were created for panel order [place] Draw.  Procedure                               Abnormality         Status                     ---------                               -----------         ------                     Extra Blue Top Tube[[id number]]                              Final result                 Please view results for these tests on the individual orders.   CBC with platelets and differential   Result Value Ref Range    WBC Count 0.7 (LL) 4.0 - 11.0 10e3/uL    RBC Count 2.94 (L) 4.40 - 5.90 10e6/uL    Hemoglobin 8.7 (L) 13.3 - 17.7 g/dL    Hematocrit 25.8 (L) 40.0 - 53.0 %    MCV 88 78 - 100 fL    MCH 29.6 26.5 - 33.0 pg    MCHC 33.7 31.5 - 36.5 g/dL    RDW 21.3 (H) 10.0 - 15.0 %    Platelet Count 118 (L) 150 - 450 10e3/uL    NRBCs per 100 WBC 0 <1 /100    Absolute NRBCs 0.0 10e3/uL   Extra Blue Top Tube   Result Value Ref Range    Hold Specimen [facility]    CRP Inflammation   Result Value Ref Range    CRP Inflammation 299.84 (H) <5.00 mg/L   Manual Differential   Result Value Ref Range    % Neutrophils 86 %    % Lymphocytes 10 %    % Monocytes 2 %    % Eosinophils 0 %    % Basophils 2 %    Absolute Neutrophils 0.6 (L) 1.6 - 8.3 10e3/uL    Absolute Lymphocytes 0.1 (L) 0.8 - 5.3 10e3/uL    Absolute Monocytes 0.0 0.0 - 1.3 10e3/uL    Absolute Eosinophils 0.0 0.0 - 0.7 10e3/uL    Absolute Basophils 0.0 0.0 - 0.2 10e3/uL    RBC Morphology Confirmed RBC Indices     Platelet Assessment  Automated Count Confirmed. Platelet morphology is normal.     Automated Count Confirmed. Platelet morphology is normal.    Toxic Neutrophils Present (A) None Seen   XR Finger Right G/E 2 Views    Narrative    RIGHT FINGER TWO OR MORE VIEWS  9/16/2024  3:57 PM     HISTORY: Edema, erythema, nodularity with purulent drainage from  distal phalanx; please evaluate for osteo.    COMPARISON: None.      Impression    IMPRESSION:  No acute fracture or subluxation. Severe second distal  interphalangeal joint osteoarthritis. There is surrounding soft tissue  swelling. There is no discrete erosion. MRI would be more sensitive  for early findings of osteomyelitis.    Severe triscaphe osteoarthritis. Moderate second and third  metacarpophalangeal joint osteoarthritis.    QIAN STONER MD         SYSTEM ID:  [id number]       Medications   rivaroxaban ANTICOAGULANT (XARELTO) tablet 20 mg (20 mg Oral $Given 9/16/24 1712)   sodium chloride 0.9% BOLUS 500 mL (has no administration in time range)   amiodarone (NEXTERONE) bolus 150 mg (has no administration in time range)   amiodarone (NEXTERONE) 1.8 mg/mL in sodium chloride 0.9% in non-PVC container 500 mL ADULT STANDARD infusion (has no administration in time range)   amiodarone (NEXTERONE) 1.8 mg/mL in sodium chloride 0.9% in non-PVC container 500 mL ADULT STANDARD infusion (has no administration in time range)   vancomycin (VANCOCIN) 1,500 mg in sodium chloride 0.9 % 250 mL intermittent infusion (has no administration in time range)   vancomycin (VANCOCIN) 1,000 mg in 200 mL dextrose intermittent infusion (has no administration in time range)   ceFEPIme (MAXIPIME) 2 g vial to attach to  mL bag for ADULTS or NS 50 mL bag for PEDS (has no administration in time range)   sodium chloride 0.9% BOLUS 500 mL (0 mLs Intravenous Stopped 9/16/24 1651)   cefTRIAXone (ROCEPHIN) 1 g vial to attach to  mL bag for ADULTS or NS 50 mL bag for PEDS (1 g Intravenous $New Bag 9/16/24 1705)       Assessments & Plan (with Medical Decision Making)   69 year old male with past medical history significant for large cell carcinoma of right lung status post resection, with mets to pancreas, right pelvis and brain with inability to care for  self at home secondary to pain.  Started radiotherapy today with plans for 10 fractions for treatment of pain.  Hip pain is what is limiting him the most.  Also has infection of finger.  Was able to express a small amount of purulence from this and wound culture pending.    Patient electively hypotensive with blood pressure of 84/55.  Heart rate of 118.  Was given 500 cc of normal saline with improvement of hypotension but still remains mildly tachycardic now in the  low 100s.      CBC with leukopenia with WBC of 0.7.  ECG 7 days ago was 141 and 2 weeks prior was 0.8.  Thought to be in part due to leukemoid reaction or marrow suppression.  Patient is on chemotherapy has also been getting steroid treatment.  Absolute neutrophil count of 600.  Initially received ceftriaxone.  CRP elevated to 299.  CMP with sodium of 132, BUN of 45.5 with creatinine 0.92.  Glucose of 158 and alk phos of 163 which is likely due to his metastatic disease to bone.  Total protein and albumin also slightly low.  Suspect that he may be a little bit dry as his creatinine is still normal but up a little bit from prior and BUN is slightly up.  Patient does not have history of heart failure but furosemide was started for lower extremity edema.    During ED course patient's heart rate became more substantially elevated in the 130 as of 140s.  ECG obtained and show a rate of 142 with atrial fibrillation.  Satting 100% on room air normal respiratory rate.  Blood pressure 104/78.  He is on metoprolol for rate control.  Do not think he has blood pressure to handle metoprolol and will start on amiodarone.    The patient now fully undressed and also has signs of cellulitis in his right foot/ankle.  Will broaden coverage to include vancomycin and cefepime.  Cardiac drip necessitates  ICU.     I did discuss case with Dr. Figueroa with [facility] who did not feel and anything needs to be done at this point regarding leukopenia but would recommend broadening  coverage.         I have reviewed the nursing notes.         New Prescriptions    No medications on file       Final diagnoses:   Atrial fibrillation with rapid ventricular response (H)   Large cell carcinoma of lung, right (H)   Malignant neoplasm metastatic to pancreas (H)   Pathological fracture in neoplastic disease, pelvis, init   Malignant neoplasm metastatic to brain (H)   Leukopenia, unspecified type   Cellulitis of finger of right hand   Cellulitis of right lower extremity     Tony Marrero MD        9/16/2024   [facility] EMERGENCY DEPT    Disclaimer: This note consists of words and symbols derived from keyboarding and dictation using voice recognition software.  As a result, there may be errors that have gone undetected.  Please consider this when interpreting information found in this note.               Tony Marrero MD  09/16/24 3135

## 2024-09-17 NOTE — PROGRESS NOTES
End Of Shift Note        Neuro: This morning was obtunded- likely with BG that had dropped to 50. D50 given and he awoke, ate breakfast, and has been AOx 3-4 since. No HA or dizziness.       He was quite concerned about missing radiation appointment that was at 1000 today- spoke with MD and he is too critical to be going to that. Radiology contacted and they will follow and see how he is doing going forward.      Cardiac: Temps increased this afternoon to T max 99.6. Amiodarone infusing at 0.5. Remains in afib with rates 1-teens to 120's.  Levo at 0.03 at shift change however BP's still soft- titrated up to 0.07- bolus 500 mls given and then able to titrate back down to 0.03 by around noon. After lunch he took a nap plus the bolus started to wear off and his Pressures dropped so he is back up to 0.07 again.      Resp: Lungs dim. RRR  Sats above 97% on RA. No sore throat of cold sx.      GI/: Abdomen soft, non tender. Last BM PTA. Ate most fo breakfast and lunch, had a good appetite. Denied any GI upset/nausea etc. Primofit changed. Functioning well.      MSK: Quite weak and deconditioned. Log rolling with cares- lots of pain with turns and depending on postioning at rest. PRN and scheduled meds helped along with pillows and heat.      Skin: Multiple wounds- Redressed Bilat feet. CHG/bath completed.      LDAs: Port. PIV. Primofit

## 2024-09-17 NOTE — CONSULTS
Care Management Initial Consult    General Information  Assessment completed with: Patient    Type of CM/SW Visit: Initial Assessment    Primary Care Provider verified and updated as needed: Yes   Readmission within the last 30 days: unable to assess      Reason for Consult: discharge planning  Advance Care Planning:          Communication Assessment  Patient's communication style: spoken language (English or Bilingual)    Hearing Difficulty or Deaf: no   Wear Glasses or Blind: yes    Cognitive  Cognitive/Neuro/Behavioral: WDL                      Living Environment:   People in home: sibling(s)     Current living Arrangements:        Able to return to prior arrangements: yes     Family/Social Support:  Care provided by: self  Provides care for: no one  Marital Status:   Support system:            Description of Support System:         Current Resources:   Patient receiving home care services: No     Community Resources: None  Equipment currently used at home: grab bar, toilet, grab bar, tub/shower, raised toilet seat, shower chair, walker, standard  Supplies currently used at home: None    Employment/Financial:  Employment Status: retired        Financial Concerns: none      Does the patient's insurance plan have a 3 day qualifying hospital stay waiver?  Yes     Which insurance plan 3 day waiver is available? Alternative insurance waiver    Will the waiver be used for post-acute placement? Undetermined at this time    Lifestyle & Psychosocial Needs:  Social Determinants of Health     Food Insecurity: Low Risk  (8/23/2024)    Food Insecurity     Within the past 12 months, did you worry that your food would run out before you got money to buy more?: No     Within the past 12 months, did the food you bought just not last and you didn t have money to get more?: No   Depression: Not at risk (8/2/2024)    PHQ-2     PHQ-2 Score: 0   Housing Stability: Low Risk  (8/23/2024)    Housing Stability     Do you have  housing? : Yes     Are you worried about losing your housing?: No   Tobacco Use: Medium Risk (9/13/2024)    Patient History     Smoking Tobacco Use: Former     Smokeless Tobacco Use: Never     Passive Exposure: Not on file   Financial Resource Strain: Low Risk  (8/23/2024)    Financial Resource Strain     Within the past 12 months, have you or your family members you live with been unable to get utilities (heat, electricity) when it was really needed?: No   Alcohol Use: Not on file   Transportation Needs: Low Risk  (8/23/2024)    Transportation Needs     Within the past 12 months, has lack of transportation kept you from medical appointments, getting your medicines, non-medical meetings or appointments, work, or from getting things that you need?: No   Physical Activity: Not on file   Interpersonal Safety: Low Risk  (9/16/2024)    Interpersonal Safety     Do you feel physically and emotionally safe where you currently live?: Yes     Within the past 12 months, have you been hit, slapped, kicked or otherwise physically hurt by someone?: No     Within the past 12 months, have you been humiliated or emotionally abused in other ways by your partner or ex-partner?: No   Stress: Not on file   Social Connections: Socially Integrated (10/23/2023)    [facility]    Social Connections     Frequency of Communication with Friends and Family: 0   Health Literacy: Not on file     Functional Status:  Prior to admission patient needed assistance:   Dependent ADLs:: Independent  Dependent IADLs:: Independent     Mental Health Status:  Mental Health Status: No Current Concerns       Chemical Dependency Status:  Chemical Dependency Status: No Current Concerns           Values/Beliefs:  Spiritual, Cultural Beliefs, Mandaeism Practices, Values that affect care: no             Discussed  Partnership in Safe Discharge Planning  document with  patient/family: Yes      Additional Information:  Care Management spoke with patient via phone to complete initial assessment. CM introduced self and explained role.     [place] [facility] [place] At baseline, he is independent with all ADLs/IADLs and has no services or HHC at home. He started having severe pain a few days ago and was relying on his brother and neighbors to assist him until he came into ED because he could not function at home anymore.     PT/OT to see patient tomorrow and CM will follow up as needed after recommendations are made. CM briefly discussed Medicare HHC services and TCU with patient should they be needed. Patient prefers to return to home if he is able.       Plan: TBD pending medical progress and PT/OT evaluations   Transport: TBD - likely family      ILYA Nixon  Inpatient Care Coordinator   [place] [phone]  [facility] [phone]

## 2024-09-17 NOTE — ED NOTES
[facility]   Admission Handoff    The patient is Dk Randhawa Sr., 69 year old who arrived in the ED by CAR from home with a complaint of Hip Pain (Right hip ,came from radiation clinic)  . The patient's current symptoms are an exacerbation of a chronic illness and during this time the symptoms have increased. In the ED, patient was diagnosed with   Final diagnoses:   Atrial fibrillation with rapid ventricular response (H)   Large cell carcinoma of lung, right (H)   Malignant neoplasm metastatic to pancreas (H)   Pathological fracture in neoplastic disease, pelvis, init   Malignant neoplasm metastatic to brain (H)   Leukopenia, unspecified type   Cellulitis of finger of right hand   Cellulitis of right lower extremity         Needed?: No    Allergies:  No Known Allergies    Past Medical Hx:   Past Medical History:   Diagnosis Date    Acute non-recurrent maxillary sinusitis     Antiplatelet or antithrombotic long-term use     Arrhythmia     Atrial fibrillation with RVR (H)     Cough secondary to angiotensin converting enzyme inhibitor (ACE-I)     Hyperlipidemia     Hypertension     Malignant neoplasm metastatic to bone (H) 07/26/2024    Mass of upper lobe of right lung     Obesity     Pathological fracture in neoplastic disease, pelvis, init 08/23/2024       Initial vitals were: BP: (!) 84/55  Pulse: 118  Temp: 97.8  F (36.6  C)  Resp: 20  Height: 182.9 cm (6')  Weight: 109.8 kg (242 lb)  SpO2: 95 %   Recent vital Signs: BP 93/68   Pulse (!) 131   Temp 97.8  F (36.6  C) (Oral)   Resp 13   Ht 1.829 m (6')   Wt 109.8 kg (242 lb)   SpO2 100%   BMI 32.82 kg/m      Elimination Status: Continent: Yes     Activity Level: 2 assist - pt requests to stand intermittently to stretch his legs and use the urinal. Elizabeth Alysa utilized for safety during this with success.     Fall Status: Reason for falls risk:  Mobility  bed/chair alarm on, nonskid shoes/slippers when out of bed,  arm band in place, activity supervised, and mobility aid in reach    Baseline Mental status: WDL  Current Mental Status changes: at basesline    Infection present or suspected this encounter: yes skin/wound/contact  Sepsis suspected: No    Isolation type: none    Bariatric equipment needed?: No    In the ED these meds were given:   Medications   amiodarone (NEXTERONE) 1.8 mg/mL in sodium chloride 0.9% in non-PVC container 500 mL ADULT STANDARD infusion (1 mg/min Intravenous Rate/Dose Verify 9/16/24 2041)   amiodarone (NEXTERONE) 1.8 mg/mL in sodium chloride 0.9% in non-PVC container 500 mL ADULT STANDARD infusion (has no administration in time range)   vancomycin (VANCOCIN) 1,500 mg in sodium chloride 0.9 % 250 mL intermittent infusion (1,500 mg Intravenous $New Bag 9/16/24 2025)   vancomycin (VANCOCIN) 1,000 mg in 200 mL dextrose intermittent infusion (has no administration in time range)   ceFEPIme (MAXIPIME) 2 g vial to attach to  mL bag for ADULTS or NS 50 mL bag for PEDS (2 g Intravenous $New Bag 9/16/24 1926)   acetaminophen (TYLENOL) tablet 1,000 mg (has no administration in time range)   dexAMETHasone (DECADRON) tablet 4 mg (has no administration in time range)   furosemide (LASIX) tablet 40 mg ( Oral Automatically Held 9/19/24 0800)   losartan (COZAAR) tablet 100 mg ( Oral Automatically Held 9/20/24 0800)   morphine (MS CONTIN) 12 hr tablet 30 mg (has no administration in time range)     And   morphine (MS CONTIN) 12 hr tablet 15 mg (has no administration in time range)   rivaroxaban ANTICOAGULANT (XARELTO) tablet 20 mg (has no administration in time range)   metoprolol succinate ER (TOPROL XL) 24 hr tablet 100 mg (has no administration in time range)   glucose gel 15-30 g (has no administration in time range)     Or   dextrose 50 % injection 25-50 mL (has no administration in time range)     Or   glucagon injection 1 mg (has no administration in time range)   Patient is already receiving  anticoagulation with heparin, enoxaparin (LOVENOX), warfarin (COUMADIN)  or other anticoagulant medication (has no administration in time range)   ondansetron (ZOFRAN ODT) ODT tab 4 mg (has no administration in time range)     Or   ondansetron (ZOFRAN) injection 4 mg (has no administration in time range)   calcium carbonate (TUMS) chewable tablet 1,000 mg (has no administration in time range)   acetaminophen (TYLENOL) tablet 650 mg (650 mg Oral $Given 9/16/24 2037)   ondansetron (ZOFRAN ODT) ODT tab 4 mg (has no administration in time range)     Or   ondansetron (ZOFRAN) injection 4 mg (has no administration in time range)   oxyCODONE (ROXICODONE) tablet 5 mg (5 mg Oral $Given 9/16/24 2037)   sodium chloride 0.9% BOLUS 500 mL (0 mLs Intravenous Stopped 9/16/24 1651)   cefTRIAXone (ROCEPHIN) 1 g vial to attach to  mL bag for ADULTS or NS 50 mL bag for PEDS (0 g Intravenous Stopped 9/16/24 1737)   sodium chloride 0.9% BOLUS 500 mL (0 mLs Intravenous Stopped 9/16/24 2035)   amiodarone (NEXTERONE) bolus 150 mg (150 mg Intravenous $New Bag 9/16/24 1829)       Drips running?  Yes    Home pump  No    Current LDAs: Peripheral IV: Site Lt AC; Gauge 20  Port: Site chestwall Accessed without difficulty:  YES Labs drawn from IVAD: YES  none     Results:   Labs/Imaging  Ordered and Resulted from Time of ED Arrival Up to the Time of Departure from the ED  Results for orders placed or performed during the hospital encounter of 09/16/24 (from the past 24 hour(s))   CBC with platelets, differential    Narrative    The following orders were created for panel order CBC with platelets, differential.  Procedure                               Abnormality         Status                     ---------                               -----------         ------                     CBC with platelets and d...[[id number]]  Abnormal            Final result               Manual Differential[[id number]]          Abnormal            Final result                  Please view results for these tests on the individual orders.   Comprehensive metabolic panel   Result Value Ref Range    Sodium 132 (L) 135 - 145 mmol/L    Potassium 4.9 3.4 - 5.3 mmol/L    Carbon Dioxide (CO2) 26 22 - 29 mmol/L    Anion Gap 9 7 - 15 mmol/L    Urea Nitrogen 45.5 (H) 8.0 - 23.0 mg/dL    Creatinine 0.92 0.67 - 1.17 mg/dL    GFR Estimate 89 >60 mL/min/1.73m2    Calcium 8.6 (L) 8.8 - 10.4 mg/dL    Chloride 97 (L) 98 - 107 mmol/L    Glucose 158 (H) 70 - 99 mg/dL    Alkaline Phosphatase 163 (H) 40 - 150 U/L    AST 24 0 - 45 U/L    ALT 23 0 - 70 U/L    Protein Total 6.0 (L) 6.4 - 8.3 g/dL    Albumin 3.0 (L) 3.5 - 5.2 g/dL    Bilirubin Total 0.5 <=1.2 mg/dL   [place] Draw    Narrative    The following orders were created for panel order [place] Draw.  Procedure                               Abnormality         Status                     ---------                               -----------         ------                     Extra Blue Top Tube[[id number]]                              Final result                 Please view results for these tests on the individual orders.   CBC with platelets and differential   Result Value Ref Range    WBC Count 0.7 (LL) 4.0 - 11.0 10e3/uL    RBC Count 2.94 (L) 4.40 - 5.90 10e6/uL    Hemoglobin 8.7 (L) 13.3 - 17.7 g/dL    Hematocrit 25.8 (L) 40.0 - 53.0 %    MCV 88 78 - 100 fL    MCH 29.6 26.5 - 33.0 pg    MCHC 33.7 31.5 - 36.5 g/dL    RDW 21.3 (H) 10.0 - 15.0 %    Platelet Count 118 (L) 150 - 450 10e3/uL    NRBCs per 100 WBC 0 <1 /100    Absolute NRBCs 0.0 10e3/uL   Extra Blue Top Tube   Result Value Ref Range    Hold Specimen JIC    CRP Inflammation   Result Value Ref Range    CRP Inflammation 299.84 (H) <5.00 mg/L   Manual Differential   Result Value Ref Range    % Neutrophils 86 %    % Lymphocytes 10 %    % Monocytes 2 %    % Eosinophils 0 %    % Basophils 2 %    Absolute Neutrophils 0.6 (L) 1.6 - 8.3 10e3/uL    Absolute Lymphocytes 0.1 (L) 0.8 - 5.3  10e3/uL    Absolute Monocytes 0.0 0.0 - 1.3 10e3/uL    Absolute Eosinophils 0.0 0.0 - 0.7 10e3/uL    Absolute Basophils 0.0 0.0 - 0.2 10e3/uL    RBC Morphology Confirmed RBC Indices     Platelet Assessment  Automated Count Confirmed. Platelet morphology is normal.     Automated Count Confirmed. Platelet morphology is normal.    Toxic Neutrophils Present (A) None Seen   Procalcitonin   Result Value Ref Range    Procalcitonin 2.25 (H) <0.50 ng/mL   Wound Aerobic Bacterial Culture Routine With Gram Stain    Specimen: Finger, Right; Wound   Result Value Ref Range    Gram Stain Result 3+ Gram positive cocci (A)     Gram Stain Result 3+ WBC seen (A)    XR Finger Right G/E 2 Views    Narrative    RIGHT FINGER TWO OR MORE VIEWS  9/16/2024 3:57 PM     HISTORY: Edema, erythema, nodularity with purulent drainage from  distal phalanx; please evaluate for osteo.    COMPARISON: None.      Impression    IMPRESSION:  No acute fracture or subluxation. Severe second distal  interphalangeal joint osteoarthritis. There is surrounding soft tissue  swelling. There is no discrete erosion. MRI would be more sensitive  for early findings of osteomyelitis.    Severe triscaphe osteoarthritis. Moderate second and third  metacarpophalangeal joint osteoarthritis.    QIAN STONER MD         SYSTEM ID:  [id number]       For the majority of the shift this patient's behavior was Yellow     Cardiac Rhythm: Atrial rhythm RVR  Pt needs tele? Yes  Skin/wound Issues:  Yes, bed sores on buttock, see MD images, pt states its from him trying to get in and out of his recliner at home.     Code Status: Full Code    Pain control: fair    Nausea control: fair    Abnormal labs/tests/findings requiring intervention: yes    Patient tested for COVID 19 prior to admission: NO     OBS brochure/video discussed/provided to patient/family: N/A     Family present during ED course? Yes     Family Comments/Social Situation comments: none    Tasks needing completion:  None    Elizabeth Murphy, RN

## 2024-09-17 NOTE — PROGRESS NOTES
[facility] ADMISSION NOTE    Patient admitted to room 1008 at approximately 2130 via cart from emergency room. Patient was accompanied by nurse.     Verbal SBAR report received from Colton BUCHANAN prior to patient arrival.     Patient trasferred to bed via air sergio. Patient alert and oriented X 3. Pain is controlled with current analgesics.  Medication(s) being used: acetaminophen, narcotic analgesics including oxycodone (Oxycontin, Oxyir), and Lidocaine patch.  . Admission vital signs: Blood pressure 107/82, pulse 117, temperature 98  F (36.7  C), temperature source Oral, resp. rate 19, height 1.829 m (6'), weight 109.8 kg (242 lb), SpO2 98%. Patient was oriented to plan of care, call light, bed controls, tv, telephone, bathroom, and visiting hours.     Risk Assessment    The following safety risks were identified during admission: fall and skin. Yellow risk band applied: YES.     Skin Initial Assessment    This writer admitted this patient and completed a full skin assessment and Earnest score in the Adult PCS flowsheet.   Photo documentation of skin problem and/or wound competed via [employer] application (located under Media):  Yes    Appropriate interventions initiated as needed.     Secondary skin check completed by Radha BUCHANAN.         Education    Patient has a [place] to Observation order: No  Observation education completed and documented: N/A      Aleja Farr RN

## 2024-09-17 NOTE — PROVIDER NOTIFICATION
Notified of soft BP (79/62, MAP 68) and continued Afib with RVR in 110s. Per RN, Amio currently running and patient received Metoprolol XR 100mg last night. However per MAR, Amio was stopped at 0004 and Metoprolol hasn't been given during admission.     Also, patient has had ~2.5L of IVF overnight and has only urinated 200ml with post-void residual of 30ml. Creatinine 0.53.    - Ordered Norepi via port PRN to keep MAP >65  - Consider renal U/S or CT abdomen to evaluate for obstruction  - PICC ordered as Amio and Norepi not compatible     Denzel Lee PA-C

## 2024-09-17 NOTE — PLAN OF CARE
Goal Outcome Evaluation:      Plan of Care Reviewed With: patient          Outcome Evaluation: Return home with potential HHC

## 2024-09-17 NOTE — PROGRESS NOTES
[facility]    Medicine Progress Note - Hospitalist Service    Date of Admission:  9/16/2024    Assessment & Plan   Dk Randhawa . is a 69 year old male with past medical history of lung cancer status post lobectomy with brain metastasis and bone metastasis on current chemoradiation therapy, multiple pathologic fractures, atrial fibrillation on chronic anticoagulation, hypertension, and chronic anemia who presented on 9/16/2024 as no longer able to care for self at home 2/2 weakness and pain associated with known bone metastasis. FTH cellulitis involving right finger, right elbow, and right foot. Developed atrial fibrillation with RVR, sepsis, severe neutropenia. Admitted to ICU on Amiodarone drip.      # Septic shock   BP initially responsive to fluids but required start of NE the morning of 9/17 despite receiving nearly 3 liters of fluid overnight. Patient does have multiple areas of cellulitis and with his sever neutropenia any of these along could have tripped him into being septic. Having several foci also raises the possibility of bacteremia thought blood culture and other classic stigmata remain negative and absent respectively.   - Continued NE to maintain map goal of 65   - Management of infections and neutropenia as below    # Cellulitis of right index finger  # Cellulitis of right elbow  # Cellulitis right foot, possible  # Immunocompromised    Developed redness and swelling of right index finger over x 1 week PTA. Right index finger with redness, swelling, and warmth. Right elbow with redness, swelling, and warmth. ROM intact. Right foot with redness, swelling, and warmth. No fever on presentation. WBC 0.7. There has been significant variation in WBC since 8/14. Review of oncology note suggest that WBC changes likely due to bone marrow metastasis, steroids, and radiation. Lactate 1.5 (WNL). CRP elevated 299.34. Procalcitonin 2.25. XR right index finger without discrete  erosion, finding unremarkable. Initially given dose of ceftriaxone however, there is concern for worsening infection on admission with the development of a-fib with RVR so vancomycin and cefepime were started.   - Continue empiric coverage with vancomycin (pharmacy to dose) and cefepime 2 g IV q8hrs  - Blood culture 9/16 (added after antibiotics)  - Wound culture growing Staph aureus     # Leukopenia   # Severe neutropenia   Leukopenia likley secondary secondary to chemotherapy,  bone marrow metastasis, steroids, and radiation. With Initial WBC count of 0.7 had a neutrophil count of 700. This dropped to WBC count of 0.1 9/17 with a neutrophil count of 70. Given this severe neutropenia in the setting of sepsis started Filgrastin.   - Daily filgrastin pending improvement in neutropenia   - Daily CBC with differential     # Thrombocytopenia   Platelet count of 118 on presentation dropped to 59. Suspect related to sepsis.  - Holding PTA Rivaroxaban    # Acute on chronic normocytic anemia   Baseline around 10. Was 87. On presentation and dropped to 7.3 with fluids the following day. No signs of bleeding.   - CBC with am labs  - Added on type and screen     # Atrial fibrillation with rapid ventricular response (RVR)  # Chronic anticoagulation    Chronic atrial fibrillation on PTA metoprolol succinate 100 mg and anticoagulation with rivaroxaban 20 mg. Presents tachycardic 125 with variation up to 140's. Bolused 500 ml with ongoing tachycardia. EKG showing atrial fibrillation with . Denies palpitations chest pain, or SOB. Amiodarone drip with bolus started in emergency department and continued.   - Telemetry  - Continued amiodarone IV transition to PO pending course   - Holding PTA metoprolol succinate with hypotension     # Generalized weakness with failure to thrive secondary to malignancy  # Large cell carcinoma right lung s/p lobectomy  # Secondary malignant neoplasm of brain  # Secondary malignant neoplasm of  bone    Oncologic course as follows. CT chest on 4/7/2024 showed a right upper lobe lung mass. Brain MRI on 4/30/2024 was negative for metastatic disease. PET CT scan on 5/2/2024 was positive for the RUL mass and there was uptake in some mediastinal lymph nodes.  On 5/16/2024 patient had a robotic assisted right upper lobe lobectomy. Pathology showed large cell lung cancer. Mediastinal nodes were negative. On 7/20/2024 evaluated for right hip pain and imaging showed metastatic disease particularly in the chest but likely in the right hip acetabulum as well. His chemotherapy changed from adjuvant to definitive chemotherapy for advanced large cell lung cancer. Has now completed 3 of 7 cycles carboplatin, etoposide, and atezolizumab  9/11/24. Received Trilaciclib support for first cycle. Has completed gamma knife therapy 8/9 - 8/14/24.  CT imaging 8/23 pelvis showing multiple pathological fractures (see above). Has chronic cancer pain in which he tries to manage with morphine BID, acetaminophen 1,000 mg TID, and oxycodone 5 mg PRN q4hrs. Also managed with dexamethasone 4 mg  - Physical therapy and occupational therapy  - Continue dexamethasone 4 mg    # Acute on chronic right hip pain secondary to progressive bone metastasis    Presents with worsening right hip pain with known metastasis. Following with hematology/oncology. On 7/20/2024 evaluated for right hip pain and imaging showed metastatic disease particularly in the chest but likely in the right hip acetabulum as well. Additionally evaluation 8/23 with CT pelvis showing comminuted pathologic nondisplaced fracture of the right acetabulum, probable additional pathologic fractures of the junction of the right inferior pubic ramus/right ischium, nondisplaced pathologic fracture in the left superior pubic ramus, and additional scattered lucent lesions, suspicious for metastatic disease. Following with Dr. Andino with most recent evaluation 9/13 for palliative radiation  of right hip metastasis related to bone pain. Underwent first radiation treatment 9/16. Patient wishes to continue with radiation treatments. Pain managed PTA with morphine 30 mg AM and 15 mg PM,  acetaminophen 1,000 mg TID, and oxycodone 5 mg PRN q6hrs  - Continue morphine BID, acetaminophen 1,000 mg TID, and oxycodone 5 mg PRN q2hrs      # Bilateral lower extremity edema  Recently started on furosemide 40 mg for lower extremity edema. Bilateral edema with weeping present on admission  - Holding PTA furosemide with sepsis     # Hyponatremia    Sodium 132 on admission. Clinically insignificant.  - Trend with AM BMP     # Essential hypertension  Chronic hypertension managed PTA with losartan 100 mg.  - Holding losartan with sepsis         Diet: Regular Diet Adult    DVT Prophylaxis: Had been on DOAC but held with thrombocytopenia, PCD ordered   [place] Catheter: Not present  Lines: PRESENT      Port a Cath 07/05/24 Single Lumen Right Chest wall-Site Assessment: WDL      Cardiac Monitoring: ACTIVE order. Indication: Tachyarrhythmias, acute (48 hours)  Code Status: Full Code      Clinically Significant Risk Factors Present on Admission         # Hyponatremia: Lowest Na = 130 mmol/L in last 2 days, will monitor as appropriate      # Hypoalbuminemia: Lowest albumin = 2.7 g/dL at 9/17/2024  4:48 AM, will monitor as appropriate  # Drug Induced Coagulation Defect: home medication list includes an anticoagulant medication  # Thrombocytopenia: Lowest platelets = 59 in last 2 days, will monitor for bleeding   # Hypertension: Noted on problem list   # Circulatory Shock: required vasopressors within past 24 hours     # Anemia: based on hgb <11       # Obesity: Estimated body mass index is 30.8 kg/m  as calculated from the following:    Height as of this encounter: 1.829 m (6').    Weight as of this encounter: 103 kg (227 lb 1.2 oz).       # Financial/Environmental Concerns: none       Disposition Plan     Medically Ready for  Discharge: Anticipated in 2-4 Days     Suhas Connors MD  Hospitalist Service  [facility]  Securely message with [employer] (more info)  Text page via [employer] Paging/Directory   ______________________________________________________________________    Interval History   Admitted yesterday. BP remained low overnight despite aggressive fluids given overnight, started on NE this morning. States that he feels well. Denies having had any fevers or chills overnight. Wondering if he will be able to make it to his radiation oncology appointment today.     Physical Exam   Vital Signs: Temp: 99.6  F (37.6  C) Temp src: Oral BP: 96/62 Pulse: 113   Resp: 18 SpO2: 100 % O2 Device: None (Room air)    Weight: 227 lbs 1.18 oz    General Appearance: Awake and alert, sitting up in bed, in no acute distress, eating breakfast   Respiratory: Breathing easily on room air, lungs clear to ascultation bilaterally   Cardiovascular: Tachycardic with an irregular rhythm, pitting edema in bilateral lower extremities   GI: Abdomen soft, non-tender, and non-distended   Skin: Erythematous right finger with some purulent appearing drainage, right elbow bandaged but with some surrounding erythema, erythema noted on right foot and leg   Other: Appropriate mood and affect, no focal deficits appreciated      Medical Decision Making       45 MINUTES SPENT BY ME on the date of service doing chart review, history, exam, documentation & further activities per the note.      Data     I have personally reviewed the following data over the past 24 hrs:    0.1 (LL)  \   7.3 (L)   / 59 (L)     130 (L) 98 26.4 (H) /  139 (H)   4.0 23 0.53 (L) \     ALT: 20 AST: 20 AP: 136 TBILI: 0.6   ALB: 2.7 (L) TOT PROTEIN: 5.3 (L) LIPASE: N/A     Trop: 21 BNP: N/A     Procal: N/A CRP: N/A Lactic Acid: 1.3         Imaging results reviewed over the past 24 hrs:   No results found for this or any previous visit (from the past 24 hour(s)).

## 2024-09-17 NOTE — PROGRESS NOTES
Patient slightly HoTN,low/concentrated UOP via primofit,  Discussed with MD, 500 ml bolus ordered.No improvement in BP after the bolus, MD aware, order for another 500 ml NS bolus,Toprol XL held and continue Amio drip.

## 2024-09-17 NOTE — PROGRESS NOTES
Entering patient's chart to review medical plan for potential admit to the med-surg unit per ED charge nurse.

## 2024-09-18 NOTE — CONSULTS
[facility]  WO Nurse Inpatient Assessment     Consulted for: coccyx, heels, legs, elbows    Summary: Pt with recent history of chemotherapy and radiation. Unsure if discoloration/wounds on feet may be secondary to these treatments? Pt with boggy area on left lateral foot - xray, surgical consult vs podiatry or other appropriate imaging to determine extent of wound.    Pt also with scattered areas on bilateral legs that appear to be excoriation with dry drainage but pt denies itching. Possibly radiation/chemotherapy related? None open at this time.    Patient History (according to provider note(s):      Dk Randhawa Sr. is a 69 year old male who presents on 9/16/2024 with failure to thrive and acute on chronic right hip pain in setting of lung cancer with prior known bone metastasis involving right hip who presents form radiation clinic 9/16. He was found to have cellulitis involving right finger, right elbow, and right foot. Additionally developed atrial fibrillation with RVR. Amiodarone drip initiation and will be admitted to ICU for RVR.    Cellulitis of right index finger  Cellulitis of right elbow  Cellulitis right foot, possible  Immunocompromised    Developed redness and swelling of right index finger over x 1 week PTA. Right index finger with redness, swelling, and warmth. Right elbow with redness, swelling, and warmth. ROM intact. Right foot with redness, swelling, and warmth. No fever on presentation. WBC 0.7. There has been significant variation in WBC since 8/14. Review of oncology note suggest that WBC changes likely due to bone marrow metastasis, steroids, and radiation. Lactate 1.5 (WNL). CRP elevated 299.34. Procalcitonin 2.25. XR right index finger without discrete erosion, finding unremarkable. Initially given dose of ceftriaxone however, there is concern for worsening infection on admission with the development of a-fib with RVR so vancomycin and cefepime were  started.   - Continue empiric coverage with vancomycin (pharmacy to dose) and cefepime 2 g IV q8hrs  - Blood culture 9/16 (added after antibiotics)  - Aerobic culture 9/16 with gram + cocci on stain          Assessment:      Areas visualized during today's visit: Focused: see below    Pressure Injury Location: Right elbow    Last photo: 9/18/24  Wound type: Pressure Injury and Friction     Pressure Injury Stage: either Stage 2 or 3 deferring definitive staging until wound base has evolved, present on admission      This is a Medical Device Related Pressure Injury (MDRPI) due to n/z  Wound history/plan of care:   Pt was using his elbows to push himself up in his recliner.  Wound base: see photos - 5 wounds within area measured with a mix of tan eschar, clean pink, and yellow     Palpation of the wound bed: normal      Drainage: small     Description of drainage: serosanguinous     Measurements (length x width x depth, in cm): 4.5cm x 3.5cm x 0.1cm     Tunneling: N/A     Undermining: N/A  Periwound skin: Intact and Edematous      Color: red      Temperature: normal   Odor: none  Pain: not with assessment  Pain interventions prior to dressing change: N/A - cares done by bedside nurse  Treatment goal: Heal , Protection, and Soften necrotic tissue  STATUS: initial assessment  Supplies ordered: supplies stored on unit, discussed with RN, and discussed with patient      Pressure Injury Location:Left elbow    Last photo: 9/18/24  Wound type: Pressure Injury and Friction     Pressure Injury Stage: 2, present on admission      This is a Medical Device Related Pressure Injury (MDRPI) due to n/a  Wound history/plan of care:   Pt was using his elbows to push himself up in his recliner.  Wound base: mix of clean pink and yellow     Palpation of the wound bed: normal      Drainage: scant     Description of drainage: serosanguinous     Measurements (length x width x depth, in cm): 2.5cm x 2.7cm x 0.1cm      Tunneling: N/A      Undermining: N/A  Periwound skin: Intact      Color: normal and consistent with surrounding tissue      Temperature: normal   Odor: none  Pain: not with assessment  Pain interventions prior to dressing change: N/A - cares done by bedside nurse  Treatment goal: Heal , Protection, and Promote epidermal migration  STATUS: initial assessment  Supplies ordered: supplies stored on unit, discussed with RN, and discussed with patient      Wound location: Right hip/lateral proximal thigh    Last photo: 9/18/24  Wound type:  possible radiation burn? See location of radiation marker  Wound history/plan of care:   n/a    Wound base: denuded skin is a mix of clean pink and pale tissue     Palpation of the wound bed: normal      Drainage: scant     Description of drainage: serosanguinous     Measurements (length x width x depth, in cm) 9cm x 12.5cm x 0.1cm (with 3 distinct areas within area measured)     Tunneling N/A     Undermining N/A  Periwound skin: Intact and some dried drainage      Color: normal and consistent with surrounding tissue      Temperature: normal   Odor: none  Pain: mild and during dressing change  Pain intervention prior to dressing change: patient tolerated well, slow and gentle cares , and distraction  Treatment goal: Heal   STATUS: initial assessment  Supplies ordered: supplies stored on unit, discussed with RN, and discussed with patient       Pressure Injury Location: Coccyx    9/18/24 9/18/24  Last photo: 9/18/24  Wound due to: Pressure Injury and Incontinence Associated Dermatitis (IAD)  Pressure Injury Stage: Unstageable, present on admission      This is a Medical Device Related Pressure Injury (MDRPI) due to n/a  Wound history/plan of care: n/a - pt was sitting in recliner at home for several days unable to get up  Wound base: 4-5 areas of yellow slough; other open areas with mix of clean red, pink, or pale tissue     Palpation of the wound bed: normal      Drainage: small     Description of  drainage: serosanguinous     Measurements (length x width x depth, in cm): 13cm x 14cm x 0.1cm includes all areas in coccyx region     Tunneling: N/A     Undermining: N/A  Periwound skin: some Denuded areas otherwise intact      Color: pink and some purple macules      Temperature: normal   Odor: none  Pain: moderate and during dressing change  Pain interventions prior to dressing change: slow and gentle cares  and distraction  Treatment goal: Infection control/prevention, Protection, and Soften necrotic tissue  STATUS: initial assessment  Supplies ordered: supplies stored on unit, discussed with RN, and discussed with patient      Wound location: Right Foot/Ankle    9/18/24 9/18/24 9/18/24  Last photo: 9/18/24  Wound due to: Unknown Etiology - possible radiation or chemotherapy related?  Wound history/plan of care: n/a  Wound base: only scattered denuded areas noted within areas of discoloration     Palpation of the wound bed: normal      Drainage: none     Description of drainage: none     Measurements (length x width x depth, in cm): no distinct areas to measure     Tunneling: N/A     Undermining: N/A  Periwound skin: Intact      Color: purple      Temperature: normal   Odor: none  Pain: denies , none  Pain interventions prior to dressing change: patient tolerated well  Treatment goal: Drainage control, Infection control/prevention, and Protection  STATUS: initial assessment  Supplies ordered: gathered, supplies stored on unit, discussed with RN, and discussed with patient      Pressure Injury Location: Right heel    Last photo: 9/18/24  Wound type: Pressure Injury     Pressure Injury Stage: Deep Tissue Pressure Injury (DTPI), present on admission      This is a Medical Device Related Pressure Injury (MDRPI) due to  n/a  Wound history/plan of care:  n/a    Wound base: 100% intact blood blister     Palpation of the wound bed:  soft because of fluid but with intact epithelium       Drainage: none      Description of drainage: none     Measurements (length x width x depth, in cm) 1.3cm x 3.3cm     Tunneling N/A     Undermining N/A  Periwound skin: Intact      Color: normal and consistent with surrounding tissue      Temperature: normal   Odor: none  Pain: denies , none  Pain intervention prior to dressing change: patient tolerated well and no significant pain present   Treatment goal: Infection control/prevention and Protection  STATUS: initial assessment  Supplies ordered: supplies stored on unit, discussed with RN, and discussed with patient     Wound location: Left Foot    9/18/24 9/18/24 9/18/24    Last photo: 9/18/24  Wound due to: Unknown Etiology - possible radiation or chemotherapy related?  Wound history/plan of care: n/a  Wound base: dorsal foot - dry yellow/tan; lateral foot - 100% pale; some skin sloughed off area pale area with cleansing (initially thought area was macerated but doesn't seem to be the case     Palpation of the wound bed: boggy      Drainage: small     Description of drainage: serosanguinous     Measurements (length x width x depth, in cm): distinct dry wound on dorsal foot measures 0.5cm x 2.5cm x 0.1cm; area on lateral foot measures 2cm x 5.5cm     Tunneling: N/A     Undermining: N/A  Periwound skin: purple macules      Color: purple      Temperature: normal   Odor: moderate  Pain: denies , none  Pain intervention prior to dressing change: patient tolerated well and no significant pain present   Treatment goal: Infection control/prevention and Protection  STATUS: initial assessment  Supplies ordered: supplies stored on unit, discussed with RN, and discussed with patient     Pressure Injury Location: Left heel    Last photo: 9/18/24  Wound type: Pressure Injury     Pressure Injury Stage: Deep Tissue Pressure Injury (DTPI), present on admission      This is a Medical Device Related Pressure Injury (MDRPI) due to n/a  Wound history/plan of care:  n/a    Wound base: 100% intact  blood blister     Palpation of the wound bed: soft because of fluid but with intact epithelium      Drainage: none     Description of drainage: none     Measurements (length x width x depth, in cm) 2.3cm x 3.7cm      Tunneling N/A     Undermining N/A  Periwound skin: Intact      Color: normal and consistent with surrounding tissue      Temperature: normal   Odor: none  Pain: denies , none  Pain intervention prior to dressing change: patient tolerated well and no significant pain present   Treatment goal: Infection control/prevention and Protection  STATUS: initial assessment  Supplies ordered: supplies stored on unit, discussed with RN, and discussed with patient     My PI Risk Assessment     Sensory Perception: 3 - Slightly Limited     Moisture: 3 - Occasionally moist      Activity: 2 - Chairfast     Mobility: 2 - Slightly limited      Nutrition: 2 - Probably inadequate      Friction/Shear: 1 - Problem     TOTAL: 13   Treatment Plan:     Wound(s):  see below    Bilateral elbow wounds: every 3 days and prn; do not peel back for twice daily skin checks  Cleanse with saline or wound cleanser. Pat dry.  Apply Mepilex dressing (4x4 or 6x6).    Right hip wounds: every 3 days and prn; do not peel back for twice daily skin checks  Cleanse with saline or wound cleanser. Pat dry.  Apply Mepilex dressing of appropriate sizes to cover wounds    Coccyx wounds: BID and prn incontinence  Clean area with Aurelia Cleanse and Protect and soft disposable wash cloths  Apply a generous layer of Triad paste to cover entire area  When cleansing, remove soiled paste only, no need to clean off all paste  Apply additional Triad to keep skin covered and protected     Bilateral foot wounds: daily   Clean feet with Vashe on gauze and wrap around all areas of wounds (except heels). Allow this to stay in place for 5-10 minutes. Gently clean wounds with Vashe moistened gauze. Pat dry.  Cover any draining areas (especially left lateral foot) with a  "Mepilex Lite 4x4.  Paint heels with iodine swab and allow to dry.  Prevalon offloading boots bilaterally with legs on pillows to help with swelling    Pressure Injury Prevention (PIP) Plan:  If patient is declining pressure injury prevention interventions: Explore reason why and address patient's concerns, Educate on pressure injury risk and prevention intervention(s), If patient is still declining, document \"informed refusal\" , and Ensure Care team is aware ( provider, charge nurse, etc)  Mattress: Follow bed algorithm, add Low Air Loss (Air+) mattress pump if skin is very moist or constantly moist.   HOB: Maintain at or below 30 degrees, unless contraindicated  Repositioning in bed: Every 1-2 hours , Left/right positioning; avoid supine, and Raise foot of bed prior to raising head of bed, to reduce patient sliding down (shear)  Heels: Pillows under calves and Heel lift boots  Protective Dressing:  wound cares per orders  Positioning Equipment:Chair positioning system  (#[id number]) Sling must have contact with chair, no pillow or chair cushion beneath OR pillow  Chair positioning: Chair cushion (#[id number]) OR SPS; max of 1 hour of sitting up to 3 hours/day; no donut pillow for offloading  If patient has a buttock pressure injury, or high risk for PI use chair cushion or SPS.  Moisture Management: Perineal cleansing /protection: Follow Incontinence Protocol, Avoid brief in bed, and Clean and dry skin folds with bathing   Under Devices: Inspect skin under all medical devices during skin inspection , Ensure tubes are stabilized without tension, and Ensure patient is not lying on medical devices or equipment when repositioned  Ask provider to discontinue device when no longer needed.      Orders: Written    RECOMMEND PRIMARY TEAM ORDER: Surgical consult and imaging to left foot  Education provided: importance of repositioning, plan of care, and Off-loading pressure  Discussed plan of care with: Patient, Nurse, and " Physician  WOC nurse follow-up plan: weekly  Notify WOC if wound(s) deteriorate.  Nursing to notify the Provider(s) and re-consult the WOC Nurse if new skin concern.    DATA:     Current support surface: Standard  Low air loss (JEVON pump, Isolibrium, Pulsate)  Containment of urine/stool: Incontinent pad in bed  BMI: Body mass index is 30.8 kg/m .   Active diet order: Orders Placed This Encounter      Regular Diet Adult     Output: I/O last 3 completed shifts:  In: 3291.68 [P.O.:2030; I.V.:1261.68]  Out: 3250 [Urine:3250]     Labs:   Recent Labs   Lab 09/18/24  0515   ALBUMIN 2.5*   HGB 7.5*   WBC 0.1*     Pressure injury risk assessment:   Sensory Perception: 3-->slightly limited  Moisture: 3-->occasionally moist  Activity: 2-->chairfast  Mobility: 2-->very limited  Nutrition: 3-->adequate  Friction and Shear: 2-->potential problem  Earnest Score: 15    Mariam Evans RN, CWOCN    Natalia group: [facility] Nurse [facility]  Dept. Office Number: [phone]

## 2024-09-18 NOTE — PROGRESS NOTES
[facility]    Hospitalist Progress Note    Assessment & Plan   Dk Randhawa Sr. is a 69 year old male with past medical history of lung cancer status post lobectomy with brain metastasis and bone metastasis on current chemoradiation therapy, multiple pathologic fractures, atrial fibrillation on chronic anticoagulation, hypertension, and chronic anemia who presented on 9/16/2024 as no longer able to care for self at home due to weakness and pain associated with known bone metastasis. FTH cellulitis involving right finger, right elbow, and right foot. Developed atrial fibrillation with RVR, sepsis, severe neutropenia. Admitted to ICU on Amiodarone drip.       Septic shock   BP initially responsive to fluids but required start of NE the morning of 9/17 despite receiving nearly 3 liters of fluid overnight. Patient does have multiple areas of cellulitis and with his sever neutropenia any of these along could have tripped him into being septic. Having several foci also raises the possibility of bacteremia thought blood culture and other classic stigmata remain negative and absent respectively.   - Continued NE to maintain map goal of 65   - Management of infections and neutropenia as below     Cellulitis of right index finger  Cellulitis of right elbow  Cellulitis right foot, possible  Immunocompromised    Developed redness and swelling of right index finger over x 1 week PTA. Right index finger with redness, swelling, and warmth. Right elbow with redness, swelling, and warmth. ROM intact. Right foot with redness, swelling, and warmth. No fever on presentation. WBC 0.7. There has been significant variation in WBC since 8/14. Review of oncology note suggest that WBC changes likely due to bone marrow metastasis, steroids, and radiation. Lactate 1.5 (WNL). CRP elevated 299.34. Procalcitonin 2.25. XR right index finger without discrete erosion, finding unremarkable. Initially given dose of  ceftriaxone however, there is concern for worsening infection on admission with the development of a-fib with RVR so vancomycin and cefepime were started.   - Continue empiric coverage with vancomycin (pharmacy to dose) and cefepime 2 g IV q8hrs  - Blood culture 9/16 (added after antibiotics), NGTD  - Wound culture growing Staph aureus      Leukopenia   Severe neutropenia   Leukopenia likley secondary secondary to chemotherapy,  bone marrow metastasis, steroids, and radiation. With Initial WBC count of 0.7 had a neutrophil count of 700. This dropped to WBC count of 0.1 9/17 with a neutrophil count of 70. Given this severe neutropenia in the setting of sepsis started Filgrastin.   - Daily filgrastin pending improvement in neutropenia   - WBC 0.1 in 9/18  - Daily CBC with differential      Thrombocytopenia   Platelet count of 118 on presentation dropped to 59. Suspect related to sepsis.  - Holding PTA Rivaroxaban     Acute on chronic normocytic anemia   Baseline around 10. Was 87. On presentation and dropped to 7.3 with fluids the following day. No signs of bleeding.   - CBC with am labs  - Added on type and screen      Atrial fibrillation with rapid ventricular response (RVR)  Chronic anticoagulation    Chronic atrial fibrillation on PTA metoprolol succinate 100 mg and anticoagulation with rivaroxaban 20 mg. Presents tachycardic 125 with variation up to 140's. Bolused 500 ml with ongoing tachycardia. EKG showing atrial fibrillation with . Denies palpitations chest pain, or SOB. Amiodarone drip with bolus started in emergency department and continued.   - Telemetry  - Continued amiodarone IV   - Holding PTA metoprolol succinate with hypotension   - Loading dose of digoxin 500 mcg IV followed by 250 mcg IV every 6 hours x 2, then 125 mcg daily starting tomorrow.     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 CT chest on 4/7/2024 showed a right upper lobe lung mass. Brain MRI on 4/30/2024 was negative for metastatic disease. PET CT scan on 5/2/2024 was positive for the RUL mass and there was uptake in some mediastinal lymph nodes.  On 5/16/2024 patient had a robotic assisted right upper lobe lobectomy. Pathology showed large cell lung cancer. Mediastinal nodes were negative. On 7/20/2024 evaluated for right hip pain and imaging showed metastatic disease particularly in the chest but likely in the right hip acetabulum as well. His chemotherapy changed from adjuvant to definitive chemotherapy for advanced large cell lung cancer. Has now completed 3 of 7 cycles carboplatin, etoposide, and atezolizumab  9/11/24. Received Trilaciclib support for first cycle. Has completed gamma knife therapy 8/9 - 8/14/24.  CT imaging 8/23 pelvis showing multiple pathological fractures (see above). Has chronic cancer pain in which he tries to manage with morphine BID, acetaminophen 1,000 mg TID, and oxycodone 5 mg PRN q4hrs. Also managed with dexamethasone 4 mg  - Physical therapy and occupational therapy  - Continue dexamethasone 4 mg     Acute on chronic right hip pain secondary to progressive bone metastasis    Presents with worsening right hip pain with known metastasis. Following with hematology/oncology. On 7/20/2024 evaluated for right hip pain and imaging showed metastatic disease particularly in the chest but likely in the right hip acetabulum as well. Additionally evaluation 8/23 with CT pelvis showing comminuted pathologic nondisplaced fracture of the right acetabulum, probable additional pathologic fractures of the junction of the right inferior pubic ramus/right ischium, nondisplaced pathologic fracture in the left superior pubic ramus, and additional scattered lucent lesions, suspicious for metastatic disease. Following with Dr. Andino with most recent  evaluation 9/13 for palliative radiation of right hip metastasis related to bone pain. Underwent first radiation treatment 9/16. Patient wishes to continue with radiation treatments. Pain managed PTA with morphine 30 mg AM and 15 mg PM,  acetaminophen 1,000 mg TID, and oxycodone 5 mg PRN q6hrs  - Continue morphine BID, acetaminophen 1,000 mg TID, and oxycodone 5 mg PRN q2hrs      Bilateral lower extremity edema  Recently started on furosemide 40 mg for lower extremity edema. Bilateral edema with weeping present on admission  - Holding PTA furosemide with sepsis      Hyponatremia    Sodium 132 on admission. Clinically insignificant.  - Trend with AM BMP      Essential hypertension  Chronic hypertension managed PTA with losartan 100 mg.  - Holding losartan with sepsis      Diet: Regular Diet Adult    DVT Prophylaxis: Had been on DOAC but held with thrombocytopenia, PCD ordered   Lockwood Catheter: Not present  Lines: PRESENT      Port a Cath 07/05/24 Single Lumen Right Chest wall-Site Assessment: WDL   Cardiac Monitoring: ACTIVE order. Indication: Tachyarrhythmias, acute (48 hours)  Code Status: Full Code      Clinically Significant Risk Factors      # Hyponatremia: Lowest Na = 130 mmol/L in last 2 days, will monitor as appropriate    # Hypomagnesemia: Lowest Mg = 1.5 mg/dL in last 2 days, will replace as needed   # Hypoalbuminemia: Lowest albumin = 2.5 g/dL at 9/18/2024  5:15 AM, will monitor as appropriate   # Thrombocytopenia: Lowest platelets = 46 in last 2 days, will monitor for bleeding   # Hypertension: Noted on problem list     # Acute Hypoxic Respiratory Failure: Documented O2 saturation < 90%. Continue supplemental oxygen as needed    # Obesity: Estimated body mass index is 30.8 kg/m  as calculated from the following:    Height as of this encounter: 1.829 m (6').    Weight as of this encounter: 103 kg (227 lb 1.2 oz)., PRESENT ON ADMISSION     # Financial/Environmental Concerns: none       Date of Service (when  I saw the patient): 09/18/2024    Disposition: Expected discharge in 3 to 5 days pending control of rapid ventricular response and resolution of sepsis.    45 MINUTES SPENT BY ME on the date of service doing chart review, history, exam, documentation & further activities per the note.    Maurizio Catalan MD    Interval History   Patient is resting comfortably in bed, although his appetite is poor.  Seems to struggle understanding need for rate control versus adequate control of blood pressure.    -Data reviewed today: I reviewed all new labs and imaging results over the last 24 hours. I personally reviewed no images or EKG's today.    Physical Exam   Temp: 98.7  F (37.1  C) Temp src: Axillary BP: (!) 121/93 Pulse: 118   Resp: (!) 34 SpO2: 99 % O2 Device: None (Room air)    Vitals:    09/16/24 2200 09/17/24 0400 09/17/24 0530   Weight: 101.7 kg (224 lb 3.3 oz) 102 kg (224 lb 13.9 oz) 103 kg (227 lb 1.2 oz)     Vital Signs with Ranges  Temp:  [97.7  F (36.5  C)-99.6  F (37.6  C)] 98.7  F (37.1  C)  Pulse:  [] 118  Resp:  [8-39] 34  BP: ()/() 121/93  SpO2:  [95 %-100 %] 99 %  I/O last 3 completed shifts:  In: 2736.04 [P.O.:1470; I.V.:1266.04]  Out: 1300 [Urine:1300]    Gen: Obese debilitated male, alert and oriented x 3, no acute distressed  HEENT: Atraumatic, normocephalic; sclera non-injected, anicterric; oral mucosa moist, no lesion, no exudate  Lungs: Clear to ausculation, no wheezes, no rhonchi, no rales  Heart: Regular rate, regular rhythm, no gallops, no rubs, no murmurs  GI: Bowel sound normal, no hepatosplenomegaly, no masses, non-tender, non-distended, no guarding, no rebound tenderness  Lymph: No lymphadenopathy, 3+ BLE edema  Skin: Right second finger erythema, BLE in rook boots.    Medications   Current Facility-Administered Medications   Medication Dose Route Frequency Provider Last Rate Last Admin    amiodarone (NEXTERONE) 1.8 mg/mL in sodium chloride 0.9% in non-PVC container 500 mL  ADULT STANDARD infusion  0.5 mg/min Intravenous Continuous Maciej Escalera PA-C 16.67 mL/hr at 09/18/24 0800 0.5 mg/min at 09/18/24 0800    norepinephrine (LEVOPHED) 4 mg in  mL infusion PREMIX  0.01-0.6 mcg/kg/min Intravenous Continuous Denzel Lee PA-C   Stopped at 09/18/24 0638    Patient is already receiving anticoagulation with heparin, enoxaparin (LOVENOX), warfarin (COUMADIN)  or other anticoagulant medication   Does not apply Continuous PRN Maciej Escalera PA-C        sodium chloride 0.9 % infusion   Intravenous Continuous Dany Valenzuela, DO 10 mL/hr at 09/16/24 2331 New Bag at 09/16/24 2331     Current Facility-Administered Medications   Medication Dose Route Frequency Provider Last Rate Last Admin    acetaminophen (TYLENOL) tablet 1,000 mg  1,000 mg Oral TID Maciej Escalera PA-C   1,000 mg at 09/18/24 1213    ceFEPIme (MAXIPIME) 2 g vial to attach to  mL bag for ADULTS or NS 50 mL bag for PEDS  2 g Intravenous Q8H Maciej Escalera PA-C 0 mL/hr at 09/16/24 2021 2 g at 09/18/24 0944    dexAMETHasone (DECADRON) tablet 4 mg  4 mg Oral Daily with breakfast Maciej Escalera PA-C   4 mg at 09/18/24 0816    digoxin (LANOXIN) injection 250 mcg  250 mcg Intravenous Q6H Maurizio Catalan MD        [START ON 9/19/2024] digoxin (LANOXIN) tablet 125 mcg  125 mcg Oral Daily Maurizio Catalan MD        filgrastim-aafi (NIVESTYM) injection 480 mcg  480 mcg Subcutaneous Daily at 8 pm Suhas Connors MD   480 mcg at 09/17/24 2110    [Held by provider] furosemide (LASIX) tablet 40 mg  40 mg Oral Daily Maciej Escalera PA-C        Lidocaine (LIDOCARE) 4 % Patch 1 patch  1 patch Transdermal Q24h Maciej Escalera PA-C   1 patch at 09/17/24 2058    [Held by provider] losartan (COZAAR) tablet 100 mg  100 mg Oral QAM Maciej Escalera PA-C        magnesium sulfate 4 g in 100 mL sterile water intermittent infusion  4 g Intravenous Once Maurizio Catalan MD 50 mL/hr at 09/18/24 1152 4 g at 09/18/24  1152    [Held by provider] metoprolol succinate ER (TOPROL XL) 24 hr tablet 100 mg  100 mg Oral QAM Maciej Escalera PA-C        morphine (MS CONTIN) 12 hr tablet 30 mg  30 mg Oral QAM Maciej Escalera PA-C   30 mg at 09/18/24 0816    And    morphine (MS CONTIN) 12 hr tablet 15 mg  15 mg Oral QPM Maciej Escalera PA-C   15 mg at 09/17/24 1811    [Held by provider] rivaroxaban ANTICOAGULANT (XARELTO) tablet 20 mg  20 mg Oral QPM Maciej Escalera PA-C        sodium chloride (PF) 0.9% PF flush 10-40 mL  10-40 mL Intracatheter Q7 Days Suhas Connors MD   30 mL at 09/17/24 2144    vancomycin (VANCOCIN) 1,500 mg in sodium chloride 0.9 % 250 mL intermittent infusion  1,500 mg Intravenous Q12H Suhas Connors MD           Data   Recent Labs   Lab 09/18/24  0815 09/18/24  0515 09/17/24  1218 09/17/24  1217 09/17/24  0451 09/17/24  0448 09/16/24  2330 09/16/24  1526   WBC  --  0.1*  --  0.1*  --  0.2*  --  0.7*   HGB  --  7.5*  --  7.3*  --  7.4*  --  8.7*   MCV  --  87  --  89  --  88  --  88   PLT  --  46*  --  59*  --  77*  --  118*   NA  --  132*  --   --   --  130*  --  132*   POTASSIUM  --  4.1  --   --   --  4.0  --  4.9   CHLORIDE  --  100  --   --   --  98  --  97*   CO2  --  22  --   --   --  23  --  26   BUN  --  20.4  --   --   --  26.4*  --  45.5*   CR  --  0.53*  --   --   --  0.53*  --  0.92   ANIONGAP  --  10  --   --   --  9  --  9   COLLINS  --  8.2*  --   --   --  8.1*  --  8.6*   * 142* 139*  --    < > 105*   < > 158*   ALBUMIN  --  2.5*  --   --   --  2.7*  --  3.0*   PROTTOTAL  --  5.4*  --   --   --  5.3*  --  6.0*   BILITOTAL  --  0.5  --   --   --  0.6  --  0.5   ALKPHOS  --  136  --   --   --  136  --  163*   ALT  --  22  --   --   --  20  --  23   AST  --  11  --   --   --  20  --  24    < > = values in this interval not displayed.       Recent Results (from the past 24 hour(s))   XR Chest Port 1 View    Narrative    EXAM: XR CHEST PORT 1 VIEW  LOCATION: [facility]  CENTER  DATE: 9/17/2024    INDICATION: PICC placement  COMPARISON: 7/5/2024      Impression    IMPRESSION: Left PICC tip near the cavoatrial junction. Stable right IJ portacatheter. Stable right lung base opacity. Remaining lungs are clear. Stable right costophrenic angle blunting (could be small pleural effusion). No pneumothorax. Stable   cardiomediastinal silhouette.

## 2024-09-18 NOTE — PROGRESS NOTES
Assumed care:  1900 to 0700    Neuro:  Alert/oriented.  Makes needs known.  Frequently seeks out staff.      Cardiac:  A fib with rates in low 100's.  On amiodarone gtt.  Also on levophed, but rate decreased significantly overnight, as BP has been increasing.      Respiratory:  RA.  LS diminished.      GI/:  Primofit in place.  Fair amount of urine output.  Complains of constipation.  Attempted bedpan several times throughout shift with no results.  Order obtained for colace, and was given.      Activity:  Heavy assist of 2 with turning and cares.  Frequently refuses repositioning.      Pain:  Oxycodone given for hip pain x1.  Now c/o abdominal discomfort.  Declined further pain medications at this time.      Skin/wound:  many pressure injuries.  Dressings intact.  Left foot dressing redressed.  Soaking right hand.     LDA's:  Left triple lumen PICC.  Left PIV--SL.  Right port.       Levophed stopped at 0638.

## 2024-09-18 NOTE — DISCHARGE INSTRUCTIONS
"Wound(s): see below    Bilateral elbow wounds: every 3 days and prn; do not peel back for twice daily skin checks  Cleanse with saline or wound cleanser. Pat dry.  Apply Mepilex dressing (4x4 or 6x6).    Right hip wounds: every 3 days and prn; do not peel back for twice daily skin checks  Cleanse with saline or wound cleanser. Pat dry.  Apply Mepilex dressing of appropriate sizes to cover wounds    Coccyx wounds: BID and prn incontinence  Clean area with Aurelia Cleanse and Protect and soft disposable wash cloths  Apply a generous layer of Triad paste to cover entire area  When cleansing, remove soiled paste only, no need to clean off all paste  Apply additional Triad to keep skin covered and protected     Bilateral foot wounds: daily   Clean feet with Vashe on gauze and wrap around all areas of wounds (except heels). Allow this to stay in place for 5-10 minutes. Gently clean wounds with Vashe moistened gauze. Pat dry.  Cover any draining areas (especially left lateral foot) with a Mepilex Lite 4x4.  Paint heels with iodine swab and allow to dry.  Prevalon offloading boots bilaterally with legs on pillows to help with swelling    Pressure Injury Prevention (PIP) Plan:  If patient is declining pressure injury prevention interventions: Explore reason why and address patient's concerns, Educate on pressure injury risk and prevention intervention(s), If patient is still declining, document \"informed refusal\" , and Ensure Care team is aware ( provider, charge nurse, etc)  Mattress: Follow bed algorithm, add Low Air Loss (Air+) mattress pump if skin is very moist or constantly moist.   HOB: Maintain at or below 30 degrees, unless contraindicated  Repositioning in bed: Every 1-2 hours , Left/right positioning; avoid supine, and Raise foot of bed prior to raising head of bed, to reduce patient sliding down (shear)  Heels: Pillows under calves and Heel lift boots  Protective Dressing: wound cares per orders  Positioning " Equipment:Chair positioning system  (#[id number]) Sling must have contact with chair, no pillow or chair cushion beneath OR pillow  Chair positioning: Chair cushion (#[id number]) OR SPS; max of 1 hour of sitting up to 3 hours/day; no donut pillow for offloading  If patient has a buttock pressure injury, or high risk for PI use chair cushion or SPS.  Moisture Management: Perineal cleansing /protection: Follow Incontinence Protocol, Avoid brief in bed, and Clean and dry skin folds with bathing   Under Devices: Inspect skin under all medical devices during skin inspection , Ensure tubes are stabilized without tension, and Ensure patient is not lying on medical devices or equipment when repositioned  Ask provider to discontinue device when no longer needed.

## 2024-09-18 NOTE — PLAN OF CARE
Goal Outcome Evaluation:      Plan of Care Reviewed With: patient    Overall Patient Progress: improvingOverall Patient Progress: improving      Problem: Adult Inpatient Plan of Care  Goal: Absence of Hospital-Acquired Illness or Injury  Intervention: Prevent Skin Injury  Recent Flowsheet Documentation  Taken 9/18/2024 1800 by Radha Jimenez RN  Body Position:   left   side-lying   side-lying 30 degrees  Taken 9/18/2024 1430 by Radha Jimenez RN  Body Position:   left   right   turned  Taken 9/18/2024 1255 by Radha Jimenez RN  Body Position: supine  Taken 9/18/2024 1215 by Radha Jimenez RN  Body Position:   left   side-lying  Taken 9/18/2024 1015 by Radha Jimenez RN  Body Position: supine  Taken 9/18/2024 0828 by Radha Jimenez RN  Body Position:   left   right   turned     Problem: Adult Inpatient Plan of Care  Goal: Optimal Comfort and Wellbeing  Outcome: Progressing  Intervention: Monitor Pain and Promote Comfort  Recent Flowsheet Documentation  Taken 9/18/2024 1500 by Radha Jimenez RN  Pain Management Interventions: declines  Taken 9/18/2024 1213 by Radha Jimenez RN  Pain Management Interventions:   medication (see MAR)   repositioned  Intervention: Provide Person-Centered Care  Recent Flowsheet Documentation  Taken 9/18/2024 1600 by Radha Jimenez RN  Trust Relationship/Rapport:   care explained   choices provided   questions answered   empathic listening provided   reassurance provided   thoughts/feelings acknowledged  Taken 9/18/2024 1200 by Radha Jimenez RN  Trust Relationship/Rapport:   care explained   choices provided   questions answered   empathic listening provided   reassurance provided   thoughts/feelings acknowledged  Taken 9/18/2024 0801 by Radha Jimenez RN  Trust Relationship/Rapport:   care explained   choices provided   questions answered   empathic listening provided   reassurance provided   thoughts/feelings acknowledged    Had 2 bowel movements today, reports constipation symptoms much  improved.  Urinary retention and indwelling grace placed with large output.  Reports improved relief of back pain and received one dose of prn oxycodone for breakthrough pain.  Repositioned in bed, cooperative with position changes.  Wound nurse evaluation today.  Xray completed of foot.  Surgeon evaluated, and no intervention needed at this time.  Heart rate 100-upper 120's, started scheduled IV push digoxin this shift.  Good appetite, feeds self.

## 2024-09-18 NOTE — PHARMACY-VANCOMYCIN DOSING SERVICE
Pharmacy Vancomycin Note  Date of Service 2024  Patient's  1955   69 year old, male    Indication: Skin and Soft Tissue Infection  Day of Therapy: 3  Current vancomycin regimen:  1000 mg IV q12h  Current vancomycin monitoring method: AUC  Current vancomycin therapeutic monitoring goal: 400-600 mg*h/L    [employer] Prediction of Current Vancomycin Regimen  Regimen: 1000 mg IV every 12 hours.  Start time: 17:54 on 2024  Exposure target: AUC24 (range)400-600 mg/L.hr   AUC24,ss: 328 mg/L.hr  Probability of AUC24 > 400: 11 %  Ctrough,ss: 9.4 mg/L  Probability of Ctrough,ss > 20: 0 %  Probability of nephrotoxicity (Lodise TAMIKO ): 5 %    Current estimated CrCl = Estimated Creatinine Clearance: 161.1 mL/min (A) (based on SCr of 0.53 mg/dL (L)).    Creatinine for last 3 days  2024:  3:26 PM Creatinine 0.92 mg/dL  2024:  4:48 AM Creatinine 0.53 mg/dL  2024:  5:15 AM Creatinine 0.53 mg/dL    Recent Vancomycin Levels (past 3 days)  2024:  5:15 AM Vancomycin 9.1 ug/mL    Vancomycin IV Administrations (past 72 hours)                     vancomycin (VANCOCIN) 1,000 mg in 200 mL dextrose intermittent infusion (mg) 1,000 mg New Bag 24 0554     1,000 mg New Bag 24 1900     1,000 mg New Bag  0636    vancomycin (VANCOCIN) 1,500 mg in sodium chloride 0.9 % 250 mL intermittent infusion (mg) 1,500 mg New Bag 24                    Nephrotoxins and other renal medications (From now, onward)      Start     Dose/Rate Route Frequency Ordered Stop    24 1400  vancomycin (VANCOCIN) 1,500 mg in sodium chloride 0.9 % 250 mL intermittent infusion         1,500 mg  over 90 Minutes Intravenous EVERY 12 HOURS 24 0603      24 0630  norepinephrine (LEVOPHED) 4 mg in  mL infusion PREMIX         0.01-0.6 mcg/kg/min × 103 kg  3.9-231.8 mL/hr  Intravenous CONTINUOUS 24 0628      24  [Held by provider]  furosemide (LASIX) tablet 40 mg        (On  hold since Mon 9/16/2024 at 1955 until manually unheld; held by Maciej Escalera PA-CHold Reason: Change in Vitals)   Note to Pharmacy: PTA Sig:Take 1 tablet (40 mg) by mouth daily.      40 mg Oral DAILY 09/16/24 1955                 Contrast Orders - past 72 hours (72h ago, onward)      None            Interpretation of levels and current regimen:  Vancomycin level is reflective of AUC less than 400    Has serum creatinine changed greater than 50% in last 72 hours: yes (0.92 -> 0.53)    Urine output:  unable to determine    Renal Function: Stable    [employer] Prediction of Planned New Vancomycin Regimen  Regimen: 1500 mg IV every 12 hours.  Start time: 17:54 on 09/18/2024  Exposure target: AUC24 (range)400-600 mg/L.hr   AUC24,ss: 490 mg/L.hr  Probability of AUC24 > 400: 90 %  Ctrough,ss: 14.3 mg/L  Probability of Ctrough,ss > 20: 8 %  Probability of nephrotoxicity (Lodise TAMIKO 2009): 9 %      Plan:  Increase Dose to 1500mg IV q12h  Vancomycin monitoring method: AUC  Vancomycin therapeutic monitoring goal: 400-600 mg*h/L  Pharmacy will check vancomycin levels as appropriate in 1-3 Days.  Serum creatinine levels will be ordered daily for the first week of therapy and at least twice weekly for subsequent weeks.    Vipin Farrar [facility]

## 2024-09-18 NOTE — PROCEDURES
[facility]    Triple Lumen PICC Placement    Date/Time: 9/17/2024 8:45 PM    Performed by: Maura Hunter RN  Authorized by: Suhas Connors MD  Indications: vascular access      UNIVERSAL PROTOCOL   Site Marked: Yes  Prior Images Obtained and Reviewed:  Yes  Required items: Required blood products, implants, devices and special equipment available    Patient identity confirmed:  Verbally with patient, arm band and hospital-assigned identification number  Patient was reevaluated immediately before administering moderate or deep sedation or anesthesia  Confirmation Checklist:  Patient's identity using two indicators and correct equipment/implants were available  Time out: Immediately prior to the procedure a time out was called    Universal Protocol: the Joint Commission Universal Protocol was followed    Preparation: Patient was prepped and draped in usual sterile fashion    ESBL (mL):  2     ANESTHESIA    Anesthesia:  See MAR for details  Anesthetic Total (mL):  2      SEDATION    Patient Sedated: No        Preparation: skin prepped with 2% chlorhexidine  Skin prep agent: skin prep agent completely dried prior to procedure  Sterile barriers: maximum sterile barriers were used: cap, mask, sterile gown, sterile gloves, and large sterile sheet  Hand hygiene: hand hygiene performed prior to central venous catheter insertion  Type of line used: PICC  Catheter type: triple lumen  Lumen type: valved and power PICC  Lumen Identification: Gray, White and Red  Catheter size: 5 Fr  Brand: Bard  Lot number: [id number]  Placement method: venipuncture, MST and ultrasound  Number of attempts: 1  Difficulty threading catheter: no  Successful placement: yes  Orientation: left  Catheter to Vein (%): 32  Location: brachial vein (lateral)  Tip Location: SVC  Site rationale: Large vessel  Arm circumference: adults 10 cm  Extremity circumference: 32  Visible catheter length: 0  Total catheter length:  52  Dressing and securement: blood cleaned with CHG, adhesive securement device, chlorhexidine patch applied, dressing applied, gloves changed prior to final dressing, line secured, securement device, site cleansed, statlock and transparent dressing  Post procedure assessment: blood return through all ports and placement verified by x-ray  PROCEDURE   Patient Tolerance:  Patient tolerated the procedure well with no immediate complicationsDescribe Procedure: Uncomplicated PICC placement, patient tolerated procedure well. Line okay to use, primary RN notified   Xray read by Dr. Chaudhari, [facility] in [facility]  Disposal: sharps and needle count correct at the end of procedure, needles and guidewire disposed in sharps container

## 2024-09-19 NOTE — PROGRESS NOTES
Clarified with Dr. Catalan that platelets to be transfused per orders.  Blood bank aware that released platelet order will not be sent or transfused at this time.  Next lab check will be ordered for am.

## 2024-09-19 NOTE — PLAN OF CARE
Goal Outcome Evaluation:      Plan of Care Reviewed With: patient    Pt has demonstrated better pain control and good urine output post grace placement.  He has slept the majority of the evening with some pain requiring additional PRN acetaminophen and oxycodone.  Patient has been pleasant and cooperative; hoping to work with PT and OT today to get out of bed and start moving more, as he feels this will help him have a bowel movement.  No concerns noted at this time.

## 2024-09-19 NOTE — PROGRESS NOTES
Care Management Follow Up    Length of Stay (days): 3    Expected Discharge Date: 09/19/2024     Concerns to be Addressed: discharge planning     Patient plan of care discussed at interdisciplinary rounds: Yes    Anticipated Discharge Disposition: TBD; pt remains medically unstable to work with PT/OT at this time.  Unable to send referrals for TCU without therapy recommendations.     Anticipated Discharge Services:   Anticipated Discharge DME: None    Patient/family educated on Medicare website which has current facility and service quality ratings: yes  Education Provided on the Discharge Plan: Yes  Patient/Family in Agreement with the Plan: yes    Referrals Placed by CM/SW:  None today  Private pay costs discussed:  NA today    Discussed  Partnership in Safe Discharge Planning  document with patient/family: Yes: patient     Additional Information:  SW met with pt at bedside to discuss discharge planning.  SW educated that while he remains hospitalized, PT/OT to work with pt to assess his needs for a safe discharge plan of care.    SW educated on Medicare.gov, how insurance pays for post hospital level of cares, and discussed home care vs TCU cares.  SW also discussed TCU facilities in the geographic area.    Pt shared he attends chemo & radiation at the [facility], so would like to be close to here for transportation.  SW explained that most TCU facilities will not allow people to go to chemo or radiation during the stay due to costs associated back to the TCU facility.    We discussed that his brother, Devon, lives in [place], [place].  Pt would like TCU referrals sent to the local facilities near [place], [place] once PT/OT have assessed.    Next Steps:  Pt to achieve medical stability to work with PT/OT so recommendations are in the EMR and referrals can be sent.    Care Management team to follow for discharge plans.      ILYA Cannon

## 2024-09-19 NOTE — PLAN OF CARE
"End Of Shift Note    Subjective/Objective:    Neuro: alert and orientated x 4.  Not impulsive     Cardiac: A-fib, tachycardia.  Will transition to oral amiodarone tonight and stop drip     Resp: denies shortness of breath despite hgb of 6.3.  One unit of blood near complete.  Labs to be rechecked in am per MD.  On room air and no signs of respiratory distress.    GI/: grace, good output.  One incontinent stool.  Good oral intake and fair appetite.    MSK: therapies held today due to low hgb. Patient eager to participate in therapies when stable.  Utilized ceiling lift to aid in bed mobility.      Skin: multiple wounds, cooperative with repositioning, but does not like to lay on right side.  Heel protective boots in place.     LDAs: triple lumen PICC, portacath heparin locked and peripheral IV saline locked.    Daughter came in and received update from MD.  Patient reports pain improved from admission and has been 5/10.  Declined prn oxycodone, received scheduled narcotic, and one dose of tylenol held due to warning of cumulative volumes.     Problem: Adult Inpatient Plan of Care  Goal: Plan of Care Review  Description: The Plan of Care Review/Shift note should be completed every shift.  The Outcome Evaluation is a brief statement about your assessment that the patient is improving, declining, or no change.  This information will be displayed automatically on your shift  note.  Outcome: Progressing  Flowsheets (Taken 9/19/2024 1068)  Plan of Care Reviewed With: patient  Overall Patient Progress: improving  Goal: Patient-Specific Goal (Individualized)  Description: You can add care plan individualizations to a care plan. Examples of Individualization might be:  \"Parent requests to be called daily at 9am for status\", \"I have a hard time hearing out of my right ear\", or \"Do not touch me to wake me up as it startles  me\".  Outcome: Progressing  Goal: Absence of Hospital-Acquired Illness or Injury  Outcome: " Progressing  Intervention: Identify and Manage Fall Risk  Recent Flowsheet Documentation  Taken 9/19/2024 1542 by Radha Jimenez RN  Safety Promotion/Fall Prevention:   activity supervised   nonskid shoes/slippers when out of bed   clutter free environment maintained  Taken 9/19/2024 1200 by Radha Jimenez RN  Safety Promotion/Fall Prevention:   activity supervised   nonskid shoes/slippers when out of bed   clutter free environment maintained  Taken 9/19/2024 0800 by Radha Jimenez RN  Safety Promotion/Fall Prevention:   activity supervised   nonskid shoes/slippers when out of bed   clutter free environment maintained  Intervention: Prevent Skin Injury  Recent Flowsheet Documentation  Taken 9/19/2024 1542 by Radha Jimenez RN  Body Position:   weight shifting   heels elevated  Device Skin Pressure Protection:   absorbent pad utilized/changed   adhesive use limited  Taken 9/19/2024 1400 by Radha Jimenez RN  Body Position:   weight shifting   supine  Taken 9/19/2024 1200 by Radha Jimenez RN  Device Skin Pressure Protection:   absorbent pad utilized/changed   adhesive use limited  Taken 9/19/2024 1146 by Radha Jimenez RN  Body Position:   right   side-lying  Taken 9/19/2024 0800 by Radha Jimenez RN  Device Skin Pressure Protection:   absorbent pad utilized/changed   adhesive use limited  Intervention: Prevent and Manage VTE (Venous Thromboembolism) Risk  Recent Flowsheet Documentation  Taken 9/19/2024 1542 by Radha Jimenez RN  VTE Prevention/Management: SCDs on (sequential compression devices)  Taken 9/19/2024 1200 by Radha Jimenez RN  VTE Prevention/Management: SCDs on (sequential compression devices)  Taken 9/19/2024 0800 by Radha Jimenez RN  VTE Prevention/Management: SCDs on (sequential compression devices)  Goal: Optimal Comfort and Wellbeing  Outcome: Progressing  Intervention: Monitor Pain and Promote Comfort  Recent Flowsheet Documentation  Taken 9/19/2024 1530 by Radha Jimenez RN  Pain Management  Interventions: declines  Taken 9/19/2024 1200 by Radha Jimenez RN  Pain Management Interventions: declines  Taken 9/19/2024 0859 by Radha Jimenez RN  Pain Management Interventions:   medication (see MAR)   repositioned  Intervention: Provide Person-Centered Care  Recent Flowsheet Documentation  Taken 9/19/2024 1542 by Radha Jimenez RN  Trust Relationship/Rapport:   care explained   choices provided   questions answered   empathic listening provided   reassurance provided   thoughts/feelings acknowledged  Taken 9/19/2024 1200 by Radha Jimenez RN  Trust Relationship/Rapport:   care explained   choices provided   questions answered   empathic listening provided   reassurance provided   thoughts/feelings acknowledged  Taken 9/19/2024 0800 by Radha Jimenez RN  Trust Relationship/Rapport:   care explained   choices provided   questions answered   empathic listening provided   reassurance provided   thoughts/feelings acknowledged  Goal: Readiness for Transition of Care  Outcome: Progressing     Problem: Risk for Delirium  Goal: Optimal Coping  Outcome: Progressing  Intervention: Optimize Psychosocial Adjustment to Delirium  Recent Flowsheet Documentation  Taken 9/19/2024 1542 by Radha Jimenez RN  Supportive Measures:   active listening utilized   positive reinforcement provided  Taken 9/19/2024 1200 by Radha Jimenez RN  Supportive Measures:   active listening utilized   positive reinforcement provided  Taken 9/19/2024 0800 by Radha Jimenez RN  Supportive Measures:   active listening utilized   positive reinforcement provided  Goal: Improved Behavioral Control  Outcome: Progressing  Intervention: Minimize Safety Risk  Recent Flowsheet Documentation  Taken 9/19/2024 1542 by Radha Jimenez, CHANEL  Communication Enhancement Strategies:   call light answered in person   extra time allowed for response  Enhanced Safety Measures:   pain management   review medications for side effects with activity  Trust  Relationship/Rapport:   care explained   choices provided   questions answered   empathic listening provided   reassurance provided   thoughts/feelings acknowledged  Taken 9/19/2024 1200 by Radha Jimenez RN  Communication Enhancement Strategies:   call light answered in person   extra time allowed for response  Enhanced Safety Measures:   pain management   review medications for side effects with activity  Trust Relationship/Rapport:   care explained   choices provided   questions answered   empathic listening provided   reassurance provided   thoughts/feelings acknowledged  Taken 9/19/2024 0800 by Radha Jimenez RN  Communication Enhancement Strategies:   call light answered in person   extra time allowed for response  Enhanced Safety Measures:   pain management   review medications for side effects with activity  Trust Relationship/Rapport:   care explained   choices provided   questions answered   empathic listening provided   reassurance provided   thoughts/feelings acknowledged  Goal: Improved Attention and Thought Clarity  Outcome: Progressing  Intervention: Maximize Cognitive Function  Recent Flowsheet Documentation  Taken 9/19/2024 1542 by Radha Jimenez RN  Sensory Stimulation Regulation:   auditory stimulation minimized   lighting decreased   care clustered   quiet environment promoted  Reorientation Measures: clock in view  Taken 9/19/2024 1200 by Radha Jimenez RN  Sensory Stimulation Regulation:   auditory stimulation minimized   lighting decreased   care clustered   quiet environment promoted  Reorientation Measures: clock in view  Taken 9/19/2024 0800 by Radha Jimenez RN  Sensory Stimulation Regulation:   auditory stimulation minimized   lighting decreased   care clustered   quiet environment promoted  Reorientation Measures: clock in view  Goal: Improved Sleep  Outcome: Progressing     Problem: Dysrhythmia  Goal: Normalized Cardiac Rhythm  Outcome: Progressing  Intervention: Monitor and Manage  Cardiac Rhythm Effect  Recent Flowsheet Documentation  Taken 9/19/2024 1542 by Radha Jimenez, CHANEL  VTE Prevention/Management: SCDs on (sequential compression devices)  Taken 9/19/2024 1200 by Radha Jimenez, CHANEL  VTE Prevention/Management: SCDs on (sequential compression devices)  Taken 9/19/2024 0800 by Radha Jimenez, CHANEL  VTE Prevention/Management: SCDs on (sequential compression devices)     Problem: Skin or Soft Tissue Infection  Goal: Absence of Infection Signs and Symptoms  Outcome: Progressing   Goal Outcome Evaluation:      Plan of Care Reviewed With: patient    Overall Patient Progress: improvingOverall Patient Progress: improving

## 2024-09-19 NOTE — PROGRESS NOTES
[facility]    Hospitalist Progress Note    Assessment & Plan   Dk Randhawa . is a 69 year old male with past medical history of lung cancer status post lobectomy with brain metastasis and bone metastasis on current chemoradiation therapy, multiple pathologic fractures, atrial fibrillation on chronic anticoagulation, hypertension, and chronic anemia who presented on 9/16/2024 as no longer able to care for self at home due to weakness and pain associated with known bone metastasis. FTH cellulitis involving right finger, right elbow, and right foot. Developed atrial fibrillation with RVR, sepsis, severe neutropenia. Admitted to ICU on Amiodarone drip.       Septic shock   BP initially responsive to fluids but required start of NE the morning of 9/17 despite receiving nearly 3 liters of fluid overnight. Patient does have multiple areas of cellulitis and with his sever neutropenia any of these along could have tripped him into being septic. Having several foci also raises the possibility of bacteremia thought blood culture and other classic stigmata remain negative and absent respectively.   - Weaned off NE since 9/18  - Management of infections and neutropenia as below     Cellulitis of right index finger  Cellulitis of right elbow  Cellulitis right foot, possible  Immunocompromised    Developed redness and swelling of right index finger over x 1 week PTA. Right index finger with redness, swelling, and warmth. Right elbow with redness, swelling, and warmth. ROM intact. Right foot with redness, swelling, and warmth. No fever on presentation. WBC 0.7. There has been significant variation in WBC since 8/14. Review of oncology note suggest that WBC changes likely due to bone marrow metastasis, steroids, and radiation. Lactate 1.5 (WNL). CRP elevated 299.34. Procalcitonin 2.25. XR right index finger without discrete erosion, finding unremarkable. Initially given dose of ceftriaxone however,  there is concern for worsening infection on admission with the development of a-fib with RVR so vancomycin and cefepime were started.   - Initially treated with empiric coverage of vancomycin (pharmacy to dose) and cefepime 2 g IV q8hrs  - Blood culture 9/16 (added after antibiotics), NGTD  - Wound culture growing methicillin sensitive Staph aureus   - Discontinue cefepime and vancomycin and start Augmentin on 9/19     Leukopenia   Severe neutropenia   Leukopenia likley secondary secondary to chemotherapy,  bone marrow metastasis, steroids, and radiation. With Initial WBC count of 0.7 had a neutrophil count of 700. This dropped to WBC count of 0.1 9/17 with a neutrophil count of 70. Given this severe neutropenia in the setting of sepsis started Filgrastin.   - Daily filgrastin pending improvement in neutropenia   - WBC 0.1 in 9/18  - Daily CBC with differential      Thrombocytopenia   Platelet count of 118 on presentation dropped to 59. Suspect related to sepsis.  - Holding PTA Rivaroxaban  - Platelets 18 and 9/19, will transfuse for less than 10,000     Acute on chronic normocytic anemia   Baseline around 10. Was 87. On presentation and dropped to 7.3 with fluids the following day. No signs of bleeding.   - CBC with am labs  - Added on type and screen   - Hemoglobin 6.3 on 9/19, will transfuse 1 unit packed red blood cells     Atrial fibrillation with rapid ventricular response (RVR)  Chronic anticoagulation    Chronic atrial fibrillation on PTA metoprolol succinate 100 mg and anticoagulation with rivaroxaban 20 mg. Presents tachycardic 125 with variation up to 140's. Bolused 500 ml with ongoing tachycardia. EKG showing atrial fibrillation with . Denies palpitations chest pain, or SOB. Amiodarone drip with bolus started in emergency department and continued.   - Telemetry  - Continued amiodarone IV   - Holding PTA metoprolol succinate with hypotension   - Loading dose on 9/18 of digoxin 500 mcg IV followed  by 250 mcg IV every 6 hours x 2, then 125 mcg daily starting tomorrow.  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 CT chest on 4/7/2024 showed a right upper lobe lung mass. Brain MRI on 4/30/2024 was negative for metastatic disease. PET CT scan on 5/2/2024 was positive for the RUL mass and there was uptake in some mediastinal lymph nodes.  On 5/16/2024 patient had a robotic assisted right upper lobe lobectomy. Pathology showed large cell lung cancer. Mediastinal nodes were negative. On 7/20/2024 evaluated for right hip pain and imaging showed metastatic disease particularly in the chest but likely in the right hip acetabulum as well. His chemotherapy changed from adjuvant to definitive chemotherapy for advanced large cell lung cancer. Has now completed 3 of 7 cycles carboplatin, etoposide, and atezolizumab  9/11/24. Received Trilaciclib support for first cycle. Has completed gamma knife therapy 8/9 - 8/14/24.  CT imaging 8/23 pelvis showing multiple pathological fractures (see above). Has chronic cancer pain in which he tries to manage with morphine BID, acetaminophen 1,000 mg TID, and oxycodone 5 mg PRN q4hrs. Also managed with dexamethasone 4 mg  - Physical therapy and occupational therapy on hold until hemodynamically stable  - Continue dexamethasone 4 mg     Acute on chronic right hip pain secondary to progressive bone metastasis    Presents with worsening right hip pain with known metastasis. Following with hematology/oncology. On 7/20/2024 evaluated for right hip pain and imaging showed metastatic disease particularly in the chest but likely in the right hip acetabulum as well. Additionally evaluation 8/23 with CT pelvis showing comminuted pathologic nondisplaced fracture of the  right acetabulum, probable additional pathologic fractures of the junction of the right inferior pubic ramus/right ischium, nondisplaced pathologic fracture in the left superior pubic ramus, and additional scattered lucent lesions, suspicious for metastatic disease. Following with Dr. Andino with most recent evaluation 9/13 for palliative radiation of right hip metastasis related to bone pain. Underwent first radiation treatment 9/16. Patient wishes to continue with radiation treatments. Pain managed PTA with morphine 30 mg AM and 15 mg PM,  acetaminophen 1,000 mg TID, and oxycodone 5 mg PRN q6hrs  - Continue morphine BID, acetaminophen 1,000 mg TID, and oxycodone 5 mg PRN q2hrs      Bilateral lower extremity edema  Recently started on furosemide 40 mg for lower extremity edema. Bilateral edema with weeping present on admission  - Holding PTA furosemide with sepsis      Hyponatremia    Sodium 132 on admission. Clinically insignificant.  - Trend with AM BMP      Essential hypertension  Chronic hypertension managed PTA with losartan 100 mg.  - Holding losartan with sepsis      Diet: Regular Diet Adult    DVT Prophylaxis: Had been on DOAC but held with thrombocytopenia, PCD ordered   Lockwood Catheter: Not present  Lines: PRESENT      Port a Cath 07/05/24 Single Lumen Right Chest wall-Site Assessment: WDL   Cardiac Monitoring: ACTIVE order. Indication: Tachyarrhythmias, acute (48 hours)  Code Status: Full Code      Clinically Significant Risk Factors      # Hyponatremia: Lowest Na = 132 mmol/L in last 2 days, will monitor as appropriate    # Hypomagnesemia: Lowest Mg = 1.5 mg/dL in last 2 days, will replace as needed   # Hypoalbuminemia: Lowest albumin = 2.1 g/dL at 9/19/2024  6:23 AM, will monitor as appropriate   # Thrombocytopenia: Lowest platelets = 18 in last 2 days, will monitor for bleeding   # Hypertension: Noted on problem list     # Acute Hypoxic Respiratory Failure: Documented O2 saturation < 90%. Continue  supplemental oxygen as needed    # Obesity: Estimated body mass index is 30.8 kg/m  as calculated from the following:    Height as of this encounter: 1.829 m (6').    Weight as of this encounter: 103 kg (227 lb 1.2 oz)., PRESENT ON ADMISSION       # Financial/Environmental Concerns: none       Date of Service (when I saw the patient): 09/19/2024    Disposition: Expected discharge in 3 to 5 days pending control of rapid ventricular response and resolution of sepsis.    55 MINUTES SPENT BY ME on the date of service doing chart review, history, exam, documentation & further activities per the note, and discussing plan of care with daughter.    Maurizio Catalan MD    Interval History   Patient is resting comfortably in bed, he has no acute complaints.  No events overnight    -Data reviewed today: I reviewed all new labs and imaging results over the last 24 hours. I personally reviewed no images or EKG's today.    Physical Exam   Temp: 98.7  F (37.1  C) Temp src: Oral BP: 121/80 Pulse: 101   Resp: 20 SpO2: 98 % O2 Device: None (Room air)    Vitals:    09/16/24 2200 09/17/24 0400 09/17/24 0530   Weight: 101.7 kg (224 lb 3.3 oz) 102 kg (224 lb 13.9 oz) 103 kg (227 lb 1.2 oz)     Vital Signs with Ranges  Temp:  [97.7  F (36.5  C)-98.7  F (37.1  C)] 98.7  F (37.1  C)  Pulse:  [] 101  Resp:  [13-28] 20  BP: ()/(55-80) 121/80  SpO2:  [94 %-98 %] 98 %  I/O last 3 completed shifts:  In: 3048.07 [P.O.:1650; I.V.:1398.07]  Out: 5100 [Urine:5100]    Gen: Obese debilitated male, alert and oriented x 3, no acute distressed  HEENT: Atraumatic, normocephalic; sclera non-injected, anicterric; oral mucosa moist, no lesion, no exudate  Lungs: Clear to ausculation, no wheezes, no rhonchi, no rales  Heart: Regular rate, regular rhythm, no gallops, no rubs, no murmurs  GI: Bowel sound normal, no hepatosplenomegaly, no masses, non-tender, non-distended, no guarding, no rebound tenderness  Lymph: No lymphadenopathy, 3+ BLE  edema  Skin: Right second finger erythema, BLE in rook boots, shallow ulcerations on left lateral foot and right lateral foot    Medications   Current Facility-Administered Medications   Medication Dose Route Frequency Provider Last Rate Last Admin    amiodarone (NEXTERONE) 1.8 mg/mL in sodium chloride 0.9% in non-PVC container 500 mL ADULT STANDARD infusion  0.5 mg/min Intravenous Continuous Maciej Escalera PA-C 16.67 mL/hr at 09/19/24 1200 0.5 mg/min at 09/19/24 1200    norepinephrine (LEVOPHED) 4 mg in  mL infusion PREMIX  0.01-0.6 mcg/kg/min Intravenous Continuous Denzel Lee PA-C   Stopped at 09/18/24 0638    Patient is already receiving anticoagulation with heparin, enoxaparin (LOVENOX), warfarin (COUMADIN)  or other anticoagulant medication   Does not apply Continuous PRN Maciej Escalera PA-C        sodium chloride 0.9 % infusion   Intravenous Continuous Dany Valenzuela, DO 10 mL/hr at 09/19/24 0600 10 mL/hr at 09/19/24 0600     Current Facility-Administered Medications   Medication Dose Route Frequency Provider Last Rate Last Admin    acetaminophen (TYLENOL) tablet 1,000 mg  1,000 mg Oral TID Maciej Escalera PA-C   1,000 mg at 09/19/24 0817    amiodarone (PACERONE) tablet 400 mg  400 mg Oral or FT or NG tube BID Maurizio Catalan MD        Followed by    [START ON 9/23/2024] amiodarone (PACERONE) tablet 200 mg  200 mg Oral or FT or NG tube BID Maurizio Catalan MD        Followed by    [START ON 9/26/2024] amiodarone (PACERONE) tablet 200 mg  200 mg Oral or FT or NG tube Daily Maurizio Catalan MD        amoxicillin-clavulanate (AUGMENTIN) 875-125 MG per tablet 1 tablet  1 tablet Oral Q12H [facility] (08/20) Maurizio Catalan MD        dexAMETHasone (DECADRON) tablet 4 mg  4 mg Oral Daily with breakfast Maciej Escalera PA-C   4 mg at 09/19/24 0818    digoxin (LANOXIN) tablet 125 mcg  125 mcg Oral Daily Maurizio Catalan MD   125 mcg at 09/19/24 0818    filgrastim-aafi (NIVESTYM)  injection 480 mcg  480 mcg Subcutaneous Daily at 8 pm Suhas Connors MD   480 mcg at 09/18/24 2039    [Held by provider] furosemide (LASIX) tablet 40 mg  40 mg Oral Daily Maciej Escalera PA-C        Lidocaine (LIDOCARE) 4 % Patch 1 patch  1 patch Transdermal Q24h Maciej Escalera PA-C   1 patch at 09/18/24 2041    [Held by provider] losartan (COZAAR) tablet 100 mg  100 mg Oral ALVAROM Maciej Escalera PA-C        [Held by provider] metoprolol succinate ER (TOPROL XL) 24 hr tablet 100 mg  100 mg Oral QAM Maciej Escalera PA-C        morphine (MS CONTIN) 12 hr tablet 30 mg  30 mg Oral ALVAROM Maciej Escalera PA-C   30 mg at 09/19/24 0817    And    morphine (MS CONTIN) 12 hr tablet 15 mg  15 mg Oral QPM Maciej Escalera PA-C   15 mg at 09/18/24 1735    [Held by provider] rivaroxaban ANTICOAGULANT (XARELTO) tablet 20 mg  20 mg Oral QPM Maciej Escalera PA-C        sodium chloride (PF) 0.9% PF flush 10-40 mL  10-40 mL Intracatheter Q7 Days Suhas Connors MD   30 mL at 09/17/24 2144       Data   Recent Labs   Lab 09/19/24  1213 09/19/24  0805 09/19/24  0623 09/18/24  0815 09/18/24  0515 09/17/24  1218 09/17/24  1217 09/17/24  0451 09/17/24  0448   WBC  --   --  0.2*  --  0.1*  --  0.1*  --  0.2*   HGB  --   --  6.3*  --  7.5*  --  7.3*  --  7.4*   MCV  --   --  87  --  87  --  89  --  88   PLT  --   --  18*  --  46*  --  59*  --  77*   NA  --   --  132*  --  132*  --   --   --  130*   POTASSIUM  --   --  4.2  --  4.1  --   --   --  4.0   CHLORIDE  --   --  101  --  100  --   --   --  98   CO2  --   --  21*  --  22  --   --   --  23   BUN  --   --  18.1  --  20.4  --   --   --  26.4*   CR  --   --  0.58*  --  0.53*  --   --   --  0.53*   ANIONGAP  --   --  10  --  10  --   --   --  9   COLLINS  --   --  7.8*  --  8.2*  --   --   --  8.1*   * 124* 121*   < > 142*   < >  --    < > 105*   ALBUMIN  --   --  2.1*  --  2.5*  --   --   --  2.7*   PROTTOTAL  --   --  4.7*  --  5.4*  --   --   --  5.3*   BILITOTAL  --   --   0.2  --  0.5  --   --   --  0.6   ALKPHOS  --   --  110  --  136  --   --   --  136   ALT  --   --  18  --  22  --   --   --  20   AST  --   --  11  --  11  --   --   --  20    < > = values in this interval not displayed.       Recent Results (from the past 24 hour(s))   X-ray lt Foot G/E 3 vws*    Narrative    XR FOOT LEFT G/E 3 VIEWS 9/18/2024 3:37 PM     HISTORY: macerated wound, check for subcu gas    COMPARISON: None.         Impression    IMPRESSION: Mild degenerative change at the first MTP joint. Small  accessory os navicularis. No evidence for acute fracture. Plantar  calcaneus spurring. No foreign body material is identified.    CONSTANTIN STEPHENSON MD         SYSTEM ID:  [id number]

## 2024-09-20 NOTE — PROGRESS NOTES
09/20/24 1108   Appointment Info   Signing Clinician's Name / Credentials (PT) Tori Stapleton, PT   Living Environment   People in Home alone   Current Living Arrangements apartment   Home Accessibility no concerns   Living Environment Comments [address]+ townhome, no stairs   Self-Care   Equipment Currently Used at Home grab bar, toilet;grab bar, tub/shower;raised toilet seat;shower chair;walker, standard;wheelchair, manual  (recently started using walker)   Fall history within last six months no   Activity/Exercise/Self-Care Comment indep with mobility, recently started using walker, has w/c for longer distances. indep with ADL's but has been more challenging. grocery delivery through [employer], indep meal prep.   General Information   Onset of Illness/Injury or Date of Surgery 09/16/24   Referring Physician Maciej Escalera PA-C   Patient/Family Therapy Goals Statement (PT) none stated   Pertinent History of Current Problem (include personal factors and/or comorbidities that impact the POC) Dk HARRISON Sydnee Kwon is a 69 year old male with past medical history of lung cancer status post lobectomy with brain metastasis and bone metastasis on current chemoradiation therapy, multiple pathologic fractures, atrial fibrillation on chronic anticoagulation, hypertension, and chronic anemia who presented on 9/16/2024 as no longer able to care for self at home due to weakness and pain associated with known bone metastasis. FTH cellulitis involving right finger, right elbow, and right foot. Developed atrial fibrillation with RVR, sepsis, severe neutropenia. Admitted to ICU on Amiodarone drip.   Cognition   Follows Commands (Cognition) WFL   Pain Assessment   Patient Currently in Pain Yes, see Vital Sign flowsheet  (chronic R hip pain, L shoulder pain)   Integumentary/Edema   Integumentary/Edema Comments edematous BUE/hands, minimal to no swelling in BLE   Range of Motion (ROM)   Range of Motion ROM deficits secondary to weakness    Strength (Manual Muscle Testing)   Strength (Manual Muscle Testing) Deficits observed during functional mobility   Strength Comments significant LE weakness from reduced mobility and medical status   Bed Mobility   Comment, (Bed Mobility) supine<>sit with modA x2   Transfers   Comment, (Transfers) not able to assess due to medical status and weakness   Gait/Stairs (Locomotion)   Comment, (Gait/Stairs) not assessed   Clinical Impression   Criteria for Skilled Therapeutic Intervention Yes, treatment indicated   PT Diagnosis (PT) impaired functional mobility   Influenced by the following impairments pain, LE weakness, reduced activity tolerance   Functional limitations due to impairments impaired bed mobility, transfers, gait   Clinical Presentation (PT Evaluation Complexity) evolving   Clinical Presentation Rationale clinical reasoning   Clinical Decision Making (Complexity) moderate complexity   Planned Therapy Interventions (PT) balance training;bed mobility training;gait training;home exercise program;patient/family education;ROM (range of motion);strengthening;transfer training;progressive activity/exercise;risk factor education;home program guidelines   Risk & Benefits of therapy have been explained evaluation/treatment results reviewed;care plan/treatment goals reviewed;risks/benefits reviewed;current/potential barriers reviewed;participants voiced agreement with care plan;participants included;patient   PT Total Evaluation Time   PT Eval, Moderate Complexity Minutes (38308) 10   Physical Therapy Goals   PT Frequency 5x/week  (monitor frequency and adjust based on medical status)   PT Predicted Duration/Target Date for Goal Attainment 09/27/24   PT Goals Bed Mobility;Transfers;Gait   PT: Bed Mobility Minimal assist;Supine to/from sit   PT: Transfers Minimal assist;Bed to/from chair;Assistive device   PT: Gait Minimal assist;Standard walker;25 feet   Interventions   Interventions Quick Adds Therapeutic  Procedure   Therapeutic Procedure/Exercise   Ther. Procedure: strength, endurance, ROM, flexibillity Minutes (29688) 12   Symptoms Noted During/After Treatment fatigue   Treatment Detail/Skilled Intervention edu on role of PT to assess mobility and promote OOB activity. co-treat with OT due to medical status and significant weakness. cues for sequencing of bed mobility, limited due to pain. tolerated sitting EOB x5 minutes with SBA, stable BP and HR while sitting EOB. returns to supine with modA. edu on LE ROM exercises including ankle pumps, quad sets, glute sets to increase strength. remains in bed, bed alarm on and call light in reach.   PT Discharge Planning   PT Plan Fri 1/5; bed mobility, LE strengthening, progress as medically appropriate   PT Discharge Recommendation (DC Rec) Transitional Care Facility   PT Rationale for DC Rec rec TCU today as pt requires Ax2 for bed mobility, not able to progress further due to medical status (receiving platelets, high BP/HB)   PT Brief overview of current status supine<>sit with modA x2   PT Equipment Needed at Discharge   (TBD pending progress)   Total Session Time   Timed Code Treatment Minutes 12   Total Session Time (sum of timed and untimed services) 22

## 2024-09-20 NOTE — PROGRESS NOTES
[facility]    Hospitalist Progress Note    Assessment & Plan   Dk Randhawa . is a 69 year old male with past medical history of lung cancer status post lobectomy with brain metastasis and bone metastasis on current chemoradiation therapy, multiple pathologic fractures, atrial fibrillation on chronic anticoagulation, hypertension, and chronic anemia who presented on 9/16/2024 as no longer able to care for self at home due to weakness and pain associated with known bone metastasis. FTH cellulitis involving right finger, right elbow, and right foot. Developed atrial fibrillation with RVR, sepsis, severe neutropenia. Admitted to ICU on Amiodarone drip.       Septic shock   BP initially responsive to fluids but required start of NE the morning of 9/17 despite receiving nearly 3 liters of fluid overnight. Patient does have multiple areas of cellulitis and with his sever neutropenia any of these along could have tripped him into being septic. Having several foci also raises the possibility of bacteremia thought blood culture and other classic stigmata remain negative and absent respectively.   - Weaned off NE since 9/18  - Management of infections and neutropenia as below     Cellulitis of right index finger  Cellulitis of right elbow  Cellulitis right foot, possible  Immunocompromised    Developed redness and swelling of right index finger over x 1 week PTA. Right index finger with redness, swelling, and warmth. Right elbow with redness, swelling, and warmth. ROM intact. Right foot with redness, swelling, and warmth. No fever on presentation. WBC 0.7. There has been significant variation in WBC since 8/14. Review of oncology note suggest that WBC changes likely due to bone marrow metastasis, steroids, and radiation. Lactate 1.5 (WNL). CRP elevated 299.34. Procalcitonin 2.25. XR right index finger without discrete erosion, finding unremarkable. Initially given dose of ceftriaxone however,  there is concern for worsening infection on admission with the development of a-fib with RVR so vancomycin and cefepime were started.   - Initially treated with empiric coverage of vancomycin (pharmacy to dose) and cefepime 2 g IV q8hrs  - Blood culture 9/16 (added after antibiotics), NGTD  - Wound culture growing methicillin sensitive Staph aureus   - Discontinue cefepime and vancomycin and start Augmentin on 9/19    Pancytopenia  On 9/20, WBC 0.6, hemoglobin 8.8, and platelets 10.   - Therapy plan as below     Leukopenia   Severe neutropenia   Leukopenia likley secondary secondary to chemotherapy,  bone marrow metastasis, steroids, and radiation. With Initial WBC count of 0.7 had a neutrophil count of 700. This dropped to WBC count of 0.1 9/17 with a neutrophil count of 70. Given this severe neutropenia in the setting of sepsis started Filgrastin.   - Daily filgrastin pending improvement in neutropenia   - WBC 0.1 ?  0.6   - Daily CBC with differential      Thrombocytopenia   Platelet count of 118 on presentation dropped to 59. Suspect related to sepsis.  - Holding PTA Rivaroxaban  - Platelets 18 and 9/19, will transfuse for less than 10,000  - Platelets 10 on 9/20, will transfuse for 1 unit     Acute on chronic normocytic anemia   Baseline around 10. Was 87. On presentation and dropped to 7.3 with fluids the following day. No signs of bleeding.   - Added on type and screen   - Hemoglobin 6.3 on 9/19, will transfuse 1 unit packed red blood cells  - Hemoglobin improved to 8.8 on 9/20  - CBC with am labs     Atrial fibrillation with rapid ventricular response (RVR)  Chronic anticoagulation    Chronic atrial fibrillation on PTA metoprolol succinate 100 mg and anticoagulation with rivaroxaban 20 mg. Presents tachycardic 125 with variation up to 140's. Bolused 500 ml with ongoing tachycardia. EKG showing atrial fibrillation with . Denies palpitations chest pain, or SOB. Amiodarone drip with bolus started in  emergency department and continued.   - Telemetry  - Continued amiodarone IV   - Holding PTA metoprolol succinate with hypotension   - Loading dose on 9/18 of digoxin 500 mcg IV followed by 250 mcg IV every 6 hours x 2, then 125 mcg daily starting tomorrow.  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 CT chest on 4/7/2024 showed a right upper lobe lung mass. Brain MRI on 4/30/2024 was negative for metastatic disease. PET CT scan on 5/2/2024 was positive for the RUL mass and there was uptake in some mediastinal lymph nodes.  On 5/16/2024 patient had a robotic assisted right upper lobe lobectomy. Pathology showed large cell lung cancer. Mediastinal nodes were negative. On 7/20/2024 evaluated for right hip pain and imaging showed metastatic disease particularly in the chest but likely in the right hip acetabulum as well. His chemotherapy changed from adjuvant to definitive chemotherapy for advanced large cell lung cancer. Has now completed 3 of 7 cycles carboplatin, etoposide, and atezolizumab  9/11/24. Received Trilaciclib support for first cycle. Has completed gamma knife therapy 8/9 - 8/14/24.  CT imaging 8/23 pelvis showing multiple pathological fractures (see above). Has chronic cancer pain in which he tries to manage with morphine BID, acetaminophen 1,000 mg TID, and oxycodone 5 mg PRN q4hrs. Also managed with dexamethasone 4 mg  - Physical therapy and occupational therapy on hold until hemodynamically stable  - Continue dexamethasone 4 mg     Acute on chronic right hip pain secondary to progressive bone metastasis    Presents with worsening right hip pain with known metastasis. Following with hematology/oncology. On 7/20/2024 evaluated for right hip  pain and imaging showed metastatic disease particularly in the chest but likely in the right hip acetabulum as well. Additionally evaluation 8/23 with CT pelvis showing comminuted pathologic nondisplaced fracture of the right acetabulum, probable additional pathologic fractures of the junction of the right inferior pubic ramus/right ischium, nondisplaced pathologic fracture in the left superior pubic ramus, and additional scattered lucent lesions, suspicious for metastatic disease. Following with Dr. Andino with most recent evaluation 9/13 for palliative radiation of right hip metastasis related to bone pain. Underwent first radiation treatment 9/16. Patient wishes to continue with radiation treatments. Pain managed PTA with morphine 30 mg AM and 15 mg PM,  acetaminophen 1,000 mg TID, and oxycodone 5 mg PRN q6hrs  - Continue morphine BID, acetaminophen 1,000 mg TID, and oxycodone 5 mg PRN q2hrs      Bilateral lower extremity edema  Recently started on furosemide 40 mg for lower extremity edema. Bilateral edema with weeping present on admission  - Holding PTA furosemide with sepsis      Hyponatremia    Sodium 132 on admission. Clinically insignificant.  - Trend with AM BMP      Essential hypertension  Chronic hypertension managed PTA with losartan 100 mg.  - Holding losartan with sepsis      Diet: Regular Diet Adult    DVT Prophylaxis: Had been on DOAC but held with thrombocytopenia, PCD ordered   Lockwood Catheter: Not present  Lines: PRESENT      Port a Cath 07/05/24 Single Lumen Right Chest wall-Site Assessment: WDL   Cardiac Monitoring: ACTIVE order. Indication: Tachyarrhythmias, acute (48 hours)  Code Status: Full Code      Clinically Significant Risk Factors      # Hyponatremia: Lowest Na = 130 mmol/L in last 2 days, will monitor as appropriate    # Hypomagnesemia: Lowest Mg = 1.6 mg/dL in last 2 days, will replace as needed   # Hypoalbuminemia: Lowest albumin = 2.1 g/dL at 9/19/2024  6:23 AM, will monitor as  appropriate   # Thrombocytopenia: Lowest platelets = 10 in last 2 days, will monitor for bleeding   # Hypertension: Noted on problem list     # Acute Hypoxic Respiratory Failure: Documented O2 saturation < 90%. Continue supplemental oxygen as needed    # Obesity: Estimated body mass index is 32.89 kg/m  as calculated from the following:    Height as of this encounter: 1.829 m (6').    Weight as of this encounter: 110 kg (242 lb 8.1 oz)., PRESENT ON ADMISSION       # Financial/Environmental Concerns: none       Date of Service (when I saw the patient): 09/20/2024    Disposition: Expected discharge in 3 to 5 days pending control of rapid ventricular response and resolution of sepsis.    40 MINUTES SPENT BY ME on the date of service doing chart review, history, exam, documentation & further activities per the note, and discussing plan of care with daughter.    Maurizio Catalan MD    Interval History   Patient is resting comfortably in bed, he has no acute complaints.  No events overnight    -Data reviewed today: I reviewed all new labs and imaging results over the last 24 hours. I personally reviewed no images or EKG's today.    Physical Exam   Temp: 98  F (36.7  C) Temp src: Oral BP: (!) 181/118 Pulse: (!) 130   Resp: 11 SpO2: 98 % O2 Device: None (Room air)    Vitals:    09/17/24 0400 09/17/24 0530 09/20/24 0552   Weight: 102 kg (224 lb 13.9 oz) 103 kg (227 lb 1.2 oz) 110 kg (242 lb 8.1 oz)     Vital Signs with Ranges  Temp:  [97.4  F (36.3  C)-98.7  F (37.1  C)] 98  F (36.7  C)  Pulse:  [] 130  Resp:  [11-37] 11  BP: (133-181)/() 181/118  SpO2:  [94 %-99 %] 98 %  I/O last 3 completed shifts:  In: 1856.71 [P.O.:1340; I.V.:216.71]  Out: 2950 [Urine:2950]    Gen: Obese debilitated male, alert and oriented x 3, no acute distressed  HEENT: Atraumatic, normocephalic; sclera non-injected, anicterric; oral mucosa moist, no lesion, no exudate  Lungs: Clear to ausculation, no wheezes, no rhonchi, no  rales  Heart: Tachycardic, irregular rhythm, no gallops, no rubs, no murmurs  GI: Bowel sound normal, no hepatosplenomegaly, no masses, non-tender, non-distended, no guarding, no rebound tenderness  Lymph: No lymphadenopathy, 3+ BLE edema  Skin: Right second finger erythema, BLE in rook boots, shallow ulcerations on left lateral foot and right lateral foot    Medications   Current Facility-Administered Medications   Medication Dose Route Frequency Provider Last Rate Last Admin    diltiazem (CARDIZEM) 125 mg in dextrose 5 % 125 mL infusion  5-15 mg/hr Intravenous Continuous Maurizio Catalan MD        No anticoagulants IF patient has had acute trauma/surgery or recent intracranial, GI or urinary tract bleeding.    Other DOES NOT GO TO Maurizio Leone MD        norepinephrine (LEVOPHED) 4 mg in  mL infusion PREMIX  0.01-0.6 mcg/kg/min Intravenous Continuous Denzel Lee PA-C   Stopped at 09/18/24 0638    Patient is already receiving anticoagulation with heparin, enoxaparin (LOVENOX), warfarin (COUMADIN)  or other anticoagulant medication   Does not apply Continuous PRN Maicej Escalera PA-C        sodium chloride 0.9 % infusion   Intravenous Continuous Dany Valenzuela DO 10 mL/hr at 09/19/24 0600 10 mL/hr at 09/19/24 0600     Current Facility-Administered Medications   Medication Dose Route Frequency Provider Last Rate Last Admin    acetaminophen (TYLENOL) tablet 1,000 mg  1,000 mg Oral TID Maciej Escalera PA-C   1,000 mg at 09/20/24 1204    amiodarone (PACERONE) tablet 400 mg  400 mg Oral or FT or NG tube BID Maurizio Catalan MD   400 mg at 09/20/24 0830    Followed by    [START ON 9/23/2024] amiodarone (PACERONE) tablet 200 mg  200 mg Oral or FT or NG tube BID Maurizio Catalan MD        Followed by    [START ON 9/26/2024] amiodarone (PACERONE) tablet 200 mg  200 mg Oral or FT or NG tube Daily Maurizio Catalan MD        amoxicillin-clavulanate (AUGMENTIN) 875-125 MG per tablet 1  tablet  1 tablet Oral Q12H [facility] (08/20) Maurizio Catalan MD   1 tablet at 09/20/24 0828    dexAMETHasone (DECADRON) tablet 4 mg  4 mg Oral Daily with breakfast Maciej Escalera PA-C   4 mg at 09/20/24 0831    digoxin (LANOXIN) tablet 125 mcg  125 mcg Oral Daily Maurizio Catalan MD   125 mcg at 09/20/24 0831    diltiazem (CARDIZEM) injection 5-20 mg  5-20 mg Intravenous Once Maurizio Catalan MD        diltiazem (CARDIZEM) injection 5-20 mg  5-20 mg Intravenous Once Maurizio Catalan MD        filgrastim-aafi (NIVESTYM) injection 480 mcg  480 mcg Subcutaneous Daily at 8 pm Suhas Connors MD   480 mcg at 09/19/24 1942    [Held by provider] furosemide (LASIX) tablet 40 mg  40 mg Oral Daily Maciej Escalera PA-C        Lidocaine (LIDOCARE) 4 % Patch 1 patch  1 patch Transdermal Q24h Maciej Escalera PA-C   1 patch at 09/19/24 1941    [Held by provider] losartan (COZAAR) tablet 100 mg  100 mg Oral QAM Maciej Escalera PA-C        [Held by provider] metoprolol succinate ER (TOPROL XL) 24 hr tablet 100 mg  100 mg Oral QAM Maciej Escalera PA-C        morphine (MS CONTIN) 12 hr tablet 30 mg  30 mg Oral QAM Maciej Escalera PA-C   30 mg at 09/20/24 0828    And    morphine (MS CONTIN) 12 hr tablet 15 mg  15 mg Oral QPM Maciej Escalera PA-C   15 mg at 09/19/24 1825    [Held by provider] rivaroxaban ANTICOAGULANT (XARELTO) tablet 20 mg  20 mg Oral QPM Maciej Escalera PA-C        sodium chloride (PF) 0.9% PF flush 10-40 mL  10-40 mL Intracatheter Q7 Days Suhas Connors MD   30 mL at 09/17/24 2144       Data   Recent Labs   Lab 09/20/24  0636 09/19/24  1213 09/19/24  0805 09/19/24  0623 09/18/24  0815 09/18/24  0515   WBC 0.6*  --   --  0.2*  --  0.1*   HGB 8.8*  --   --  6.3*  --  7.5*   MCV 86  --   --  87  --  87   PLT 10*  --   --  18*  --  46*   *  --   --  132*  --  132*   POTASSIUM 4.5  --   --  4.2  --  4.1   CHLORIDE 98  --   --  101  --  100   CO2 23  --   --  21*  --  22   BUN 19.8  --    --  18.1  --  20.4   CR 0.58*  --   --  0.58*  --  0.53*   ANIONGAP 9  --   --  10  --  10   COLLINS 8.1*  --   --  7.8*  --  8.2*   * 148* 124* 121*   < > 142*   ALBUMIN 2.2*  --   --  2.1*  --  2.5*   PROTTOTAL 5.1*  --   --  4.7*  --  5.4*   BILITOTAL 0.4  --   --  0.2  --  0.5   ALKPHOS 121  --   --  110  --  136   ALT 22  --   --  18  --  22   AST 11  --   --  11  --  11    < > = values in this interval not displayed.       No results found for this or any previous visit (from the past 24 hour(s)).

## 2024-09-20 NOTE — PROGRESS NOTES
09/20/24 1200   Appointment Info   Signing Clinician's Name / Credentials (OT) Destiin Wilson, OTR/L   Living Environment   People in Home alone   Current Living Arrangements apartment  (55+ apartment)   Home Accessibility no concerns   Self-Care   Equipment Currently Used at Home grab bar, toilet;grab bar, tub/shower;raised toilet seat;shower chair;walker, standard;wheelchair, manual  (recently started using walker)   Fall history within last six months no   Activity/Exercise/Self-Care Comment at baseline: independent with ADLs, however states this has been becoming more difficult. Recently started using FWW and has w/c for longer distances.   Instrumental Activities of Daily Living (IADL)   IADL Comments gets grocery delivery and brother assists with getting him out to appointments.   General Information   Onset of Illness/Injury or Date of Surgery 09/16/24   Referring Physician Maciej Escalera PA-C   Patient/Family Therapy Goal Statement (OT) to increase strength   Additional Occupational Profile Info/Pertinent History of Current Problem Dk Randhawa Sr. is a 69 year old male with past medical history of lung cancer status post lobectomy with brain metastasis and bone metastasis on current chemoradiation therapy, multiple pathologic fractures, atrial fibrillation on chronic anticoagulation, hypertension, and chronic anemia who presented on 9/16/2024 as no longer able to care for self at home due to weakness and pain associated with known bone metastasis. FTH cellulitis involving right finger, right elbow, and right foot. Developed atrial fibrillation with RVR, sepsis, severe neutropenia. Admitted to ICU on Amiodarone drip.   General Observations and Info Per MD in rounds okay to sit EOB today.   Cognitive Status Examination   Orientation Status orientation to person, place and time   Pain Assessment   Patient Currently in Pain Yes, see Vital Sign flowsheet  (R hip/groin area.)   Range of Motion  Comprehensive   Comment, General Range of Motion L shoulder FF= 0 degrees. States that this is baseline. States significant stiffness in elbow, however WFL with AAROM. R UE ROM: WNL   Strength Comprehensive (MMT)   Comment, General Manual Muscle Testing (MMT) Assessment Generalized weakness noted. fatigues easily getting to EOB.   Bed Mobility   Comment (Bed Mobility) Mod Ax2 for supine to sit with HOB elevated and use of bed rail.   Balance   Balance Comments able to sit EOB with close SBA.   Activities of Daily Living   BADL Assessment/Intervention lower body dressing;upper body dressing;toileting   Upper Body Dressing Assessment/Training   Carter Level (Upper Body Dressing) moderate assist (50% patient effort)   Lower Body Dressing Assessment/Training   Carter Level (Lower Body Dressing) maximum assist (25% patient effort)   Toileting   Comment, (Toileting) unable to complete toilet transfer d/t medical status.   Clinical Impression   Criteria for Skilled Therapeutic Interventions Met (OT) Yes, treatment indicated   OT Diagnosis decreased independence with ADLs and related mobility   OT Problem List-Impairments impacting ADL problems related to;activity tolerance impaired;strength;pain   Assessment of Occupational Performance 5 or more Performance Deficits   Identified Performance Deficits dressing, toileting, showering, functional mobility   Planned Therapy Interventions (OT) ADL retraining;strengthening;progressive activity/exercise   Clinical Decision Making Complexity (OT) problem focused assessment/low complexity   Risk & Benefits of therapy have been explained patient;participants included;participants voiced agreement with care plan;current/potential barriers reviewed;risks/benefits reviewed;care plan/treatment goals reviewed;evaluation/treatment results reviewed   OT Total Evaluation Time   OT Eval, Low Complexity Minutes (44884) 10   OT Goals   Therapy Frequency (OT) 5 times/week   OT  Predicted Duration/Target Date for Goal Attainment 09/27/24   OT Goals Lower Body Dressing;Upper Body Dressing;Hygiene/Grooming;Toilet Transfer/Toileting   OT: Hygiene/Grooming supervision/stand-by assist   OT: Upper Body Dressing Supervision/stand-by assist   OT: Lower Body Dressing Minimal assist   OT: Toilet Transfer/Toileting Minimal assist   Interventions   Interventions Quick Adds Therapeutic Activity   Therapeutic Activities   Therapeutic Activity Minutes (76353) 12   Symptoms noted during/after treatment fatigue   Treatment Detail/Skilled Intervention co-tx with PT d/t medical status and Ax2. Educated pt on UE ROM to complete. Needs assist to complete L elbow FF initially then following x5 reps of AAROM is able to progress to AROM. Educated pt on grasp/release, elbow flex/ext and shoulder flex/ext. Mod Ax2 for supine to sit. Pt able to sit EOB x5 min with close SBA and therapist monitoring vitals. Returns supine with Max Ax2. Educated pt on POC, verbalizes understanding. Pt left supine with all needs wihtin reach.   OT Discharge Planning   OT Plan POC Friday 1/5; co-tx with PT. sit EOB pending vitals.   OT Discharge Recommendation (DC Rec) Transitional Care Facility   OT Rationale for DC Rec TCU to increase independence with ADLs and related mobility   OT Brief overview of current status Ax2 for supine to sit. Able to sit EOB with SBA x5 min.   Total Session Time   Timed Code Treatment Minutes 12   Total Session Time (sum of timed and untimed services) 22

## 2024-09-20 NOTE — PLAN OF CARE
Reviewed plan of care with pt.  Some improvement in level of pain noted.  Continues to void adequately via grace catheter; vital signs have remained stable since amiodarone was turned off and pt was placed on PO dosing. Pt denies any issues at this time.  He would like to find out how to absentee vote for the election as today is the first day for early voting.

## 2024-09-20 NOTE — PLAN OF CARE
Problem: Adult Inpatient Plan of Care  Goal: Absence of Hospital-Acquired Illness or Injury  Intervention: Prevent Skin Injury  Recent Flowsheet Documentation  Taken 9/20/2024 1630 by Heidi Mclaughlin RN  Body Position:   right   turned  Taken 9/20/2024 1400 by Heidi Mclaughlin RN  Body Position:   left   turned  Taken 9/20/2024 1343 by Heidi Mclaughlin RN  Body Position: position changed independently  Taken 9/20/2024 1200 by Heidi Mclaughlin RN  Body Position:   turned   weight shifting  Taken 9/20/2024 1000 by Heidi Mclaughlin RN  Body Position: turned  Taken 9/20/2024 0830 by Heidi Mclaughlin RN  Body Position: position changed independently     Problem: Risk for Delirium  Goal: Optimal Coping  Intervention: Optimize Psychosocial Adjustment to Delirium  Recent Flowsheet Documentation  Taken 9/20/2024 1630 by Heidi Mclaughlin RN  Supportive Measures: active listening utilized  Family/Support System Care: self-care encouraged  Taken 9/20/2024 1343 by Heidi Mclaughlin RN  Supportive Measures: active listening utilized  Family/Support System Care: self-care encouraged  Taken 9/20/2024 0830 by Heidi Mclaughlin RN  Supportive Measures: active listening utilized  Family/Support System Care: self-care encouraged     Problem: Dysrhythmia  Goal: Normalized Cardiac Rhythm  Intervention: Monitor and Manage Cardiac Rhythm Effect  Recent Flowsheet Documentation  Taken 9/20/2024 1630 by Heidi Mclaughlin RN  VTE Prevention/Management: SCDs off (sequential compression devices)  Taken 9/20/2024 1343 by Heidi Mclaughlin RN  VTE Prevention/Management: SCDs off (sequential compression devices)  Taken 9/20/2024 0830 by Heidi Mclaughlin RN  VTE Prevention/Management: SCDs off (sequential compression devices)     Goal Outcome Evaluation:    End Of Shift Note      Subjective/Objective:    Neuro: alert and orientated     Cardiac: Afib RVR, started on drip for RVR. BP tolerating well/. BLE, BUE edema  noted.     Resp: Course, diminished. Occasional cough. Hx of lobectomy.     GI/: Lockwood patent for retention. Patient had formed stool, c/o constipation. Holding off softeners r/t buttock wounds. BS+ passing flatus    MSK: Very weak. Sat at side of bed with therapy and noted HR increase. Left shoulder arm pain and generalized pain.     Skin: Wound care per orders. Mepilex multiple areas intact. Buttock cleaned and cared for per orders. Boots on. Patient refused to turn at beginning of shift and did weight shifting. Not wanting to keep HOB at at recommended level but after wound care re explained again and patient has been more cooperative with turning and repositioning.     LDAs: Port accessed. PICC and PIV.

## 2024-09-21 NOTE — PROGRESS NOTES
[facility]   Cross Cover Note  Date of Service: 9/20/2024     BP stable off pressors. On diltiazem infusion with improving rate control. Appears stable for IM status.   Magdy Shearer MD  [facility]

## 2024-09-21 NOTE — PROGRESS NOTES
[facility]    Hospitalist Progress Note    Assessment & Plan   Dk Randhawa . is a 69 year old male with past medical history of lung cancer status post lobectomy with brain metastasis and bone metastasis on current chemoradiation therapy, multiple pathologic fractures, atrial fibrillation on chronic anticoagulation, hypertension, and chronic anemia who presented on 9/16/2024 as no longer able to care for self at home due to weakness and pain associated with known bone metastasis. FTH cellulitis involving right finger, right elbow, and right foot. Developed atrial fibrillation with RVR, sepsis, severe neutropenia. Admitted to ICU on Amiodarone drip.       Septic shock   BP initially responsive to fluids but required start of NE the morning of 9/17 despite receiving nearly 3 liters of fluid overnight. Patient does have multiple areas of cellulitis and with his sever neutropenia any of these along could have tripped him into being septic. Having several foci also raises the possibility of bacteremia thought blood culture and other classic stigmata remain negative and absent respectively.   - Weaned off NE since 9/18  - Management of infections and neutropenia as below     Cellulitis of right index finger  Cellulitis of right elbow  Cellulitis right foot, possible  Immunocompromised    Developed redness and swelling of right index finger over x 1 week PTA. Right index finger with redness, swelling, and warmth. Right elbow with redness, swelling, and warmth. ROM intact. Right foot with redness, swelling, and warmth. No fever on presentation. WBC 0.7. There has been significant variation in WBC since 8/14. Review of oncology note suggest that WBC changes likely due to bone marrow metastasis, steroids, and radiation. Lactate 1.5 (WNL). CRP elevated 299.34. Procalcitonin 2.25. XR right index finger without discrete erosion, finding unremarkable. Initially given dose of ceftriaxone however,  there is concern for worsening infection on admission with the development of a-fib with RVR so vancomycin and cefepime were started.   - Initially treated with empiric coverage of vancomycin (pharmacy to dose) and cefepime 2 g IV q8hrs  - Blood culture 9/16 (added after antibiotics), NGTD  - Wound culture growing methicillin sensitive Staph aureus   - Discontinue cefepime and vancomycin and start Augmentin on 9/19    Pancytopenia  On 9/20, WBC 0.6, hemoglobin 8.8, and platelets 10.   - Therapy plan as below     Leukopenia   Severe neutropenia   Leukopenia likley secondary secondary to chemotherapy,  bone marrow metastasis, steroids, and radiation. With Initial WBC count of 0.7 had a neutrophil count of 700. This dropped to WBC count of 0.1 9/17 with a neutrophil count of 70. Given this severe neutropenia in the setting of sepsis started Filgrastin.    - WBC 0.1 ?  0.6 ? 6.1 (ANC 1.6)   - Discontinue filgrastim   - Daily CBC with differential      Thrombocytopenia   Platelet count of 118 on presentation dropped to 59. Suspect related to sepsis.   - Holding PTA Rivaroxaban   - Platelets 18 and 9/19, will transfuse for less than 10,000   - Platelets 10 on 9/20, will transfuse for 1 unit   - Platelets 48 on 9/21     Acute on chronic normocytic anemia   Baseline around 10. Was 87. On presentation and dropped to 7.3 with fluids the following day. No signs of bleeding.    - Added on type and screen    - Hemoglobin 6.3 on 9/19, will transfuse 1 unit packed red blood cells   - Hemoglobin improved to 8.8 on 9/20   - CBC with am labs     Atrial fibrillation with rapid ventricular response (RVR)  Chronic anticoagulation    Chronic atrial fibrillation on PTA metoprolol succinate 100 mg and anticoagulation with rivaroxaban 20 mg. Presents tachycardic 125 with variation up to 140's. Bolused 500 ml with ongoing tachycardia. EKG showing atrial fibrillation with . Denies palpitations chest pain, or SOB. Amiodarone drip with  bolus started in emergency department and continued.   - Telemetry  - Holding PTA metoprolol succinate with hypotension   - Loading dose on 9/18 of digoxin 500 mcg IV followed by 250 mcg IV every 6 hours x 2, then 125 mcg daily starting tomorrow.  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 CT chest on 4/7/2024 showed a right upper lobe lung mass. Brain MRI on 4/30/2024 was negative for metastatic disease. PET CT scan on 5/2/2024 was positive for the RUL mass and there was uptake in some mediastinal lymph nodes.  On 5/16/2024 patient had a robotic assisted right upper lobe lobectomy. Pathology showed large cell lung cancer. Mediastinal nodes were negative. On 7/20/2024 evaluated for right hip pain and imaging showed metastatic disease particularly in the chest but likely in the right hip acetabulum as well. His chemotherapy changed from adjuvant to definitive chemotherapy for advanced large cell lung cancer. Has now completed 3 of 7 cycles carboplatin, etoposide, and atezolizumab  9/11/24. Received Trilaciclib support for first cycle. Has completed gamma knife therapy 8/9 - 8/14/24.  CT imaging 8/23 pelvis showing multiple pathological fractures (see above). Has chronic cancer pain in which he tries to manage with morphine BID, acetaminophen 1,000 mg TID, and oxycodone 5 mg PRN q4hrs. Also managed with dexamethasone 4 mg  - Physical therapy and occupational therapy  - Continue dexamethasone 4 mg     Acute on chronic right hip pain secondary to progressive bone metastasis    Presents with worsening right hip pain with known metastasis. Following with  hematology/oncology. On 7/20/2024 evaluated for right hip pain and imaging showed metastatic disease particularly in the chest but likely in the right hip acetabulum as well. Additionally evaluation 8/23 with CT pelvis showing comminuted pathologic nondisplaced fracture of the right acetabulum, probable additional pathologic fractures of the junction of the right inferior pubic ramus/right ischium, nondisplaced pathologic fracture in the left superior pubic ramus, and additional scattered lucent lesions, suspicious for metastatic disease. Following with Dr. Andino with most recent evaluation 9/13 for palliative radiation of right hip metastasis related to bone pain. Underwent first radiation treatment 9/16. Patient wishes to continue with radiation treatments. Pain managed PTA with morphine 30 mg AM and 15 mg PM,  acetaminophen 1,000 mg TID, and oxycodone 5 mg PRN q6hrs  - Continue morphine BID, acetaminophen 1,000 mg TID, and oxycodone 5 mg PRN q2hrs      Bilateral lower extremity edema  Recently started on furosemide 40 mg for lower extremity edema. Bilateral edema with weeping present on admission   - Held PTA furosemide due to sepsis   - Resume Lasix 40 mg daily by mouth on 9/21 due to improvement in hemodynamic stability     Hyponatremia    Sodium 132 on admission. Clinically insignificant.  - Trend with AM BMP      Essential hypertension  Chronic hypertension managed PTA with losartan 100 mg.  - Holding losartan with sepsis      Diet: Regular Diet Adult    DVT Prophylaxis: Had been on DOAC but held with thrombocytopenia, PCD ordered   Lockwood Catheter: Not present  Lines: PRESENT      Port a Cath 07/05/24 Single Lumen Right Chest wall-Site Assessment: WDL   Cardiac Monitoring: ACTIVE order. Indication: Tachyarrhythmias, acute (48 hours)  Code Status: Full Code      Clinically Significant Risk Factors      # Hyponatremia: Lowest Na = 128 mmol/L in last 2 days, will monitor as appropriate    # Hypomagnesemia: Lowest  Mg = 1.5 mg/dL in last 2 days, will replace as needed   # Hypoalbuminemia: Lowest albumin = 2.1 g/dL at 9/19/2024  6:23 AM, will monitor as appropriate   # Thrombocytopenia: Lowest platelets = 10 in last 2 days, will monitor for bleeding   # Hypertension: Noted on problem list     # Acute Hypoxic Respiratory Failure: Documented O2 saturation < 90%. Continue supplemental oxygen as needed    # Obesity: Estimated body mass index is 32.89 kg/m  as calculated from the following:    Height as of this encounter: 1.829 m (6').    Weight as of this encounter: 110 kg (242 lb 8.1 oz).        # Financial/Environmental Concerns: none       Date of Service (when I saw the patient): 09/21/2024    Disposition: Expected discharge in 2-3 days pending control of rapid ventricular response and resolution of sepsis.    40 MINUTES SPENT BY ME on the date of service doing chart review, history, exam, documentation & further activities per the note, and discussing plan of care with daughter.    Maurizio Catalan MD    Interval History   Patient is resting comfortably in bed, he has no acute complaints.  Rate control improved overnight    -Data reviewed today: I reviewed all new labs and imaging results over the last 24 hours. I personally reviewed no images or EKG's today.    Physical Exam   Temp: 98  F (36.7  C) Temp src: Oral BP: 122/69 Pulse: 102   Resp: 20 SpO2: 94 % O2 Device: None (Room air)    Vitals:    09/17/24 0400 09/17/24 0530 09/20/24 0552   Weight: 102 kg (224 lb 13.9 oz) 103 kg (227 lb 1.2 oz) 110 kg (242 lb 8.1 oz)     Vital Signs with Ranges  Temp:  [97.8  F (36.6  C)-98.4  F (36.9  C)] 98  F (36.7  C)  Pulse:  [] 102  Resp:  [11-85] 20  BP: (122-173)/() 122/69  SpO2:  [84 %-97 %] 94 %  I/O last 3 completed shifts:  In: 1888.25 [P.O.:1080; I.V.:388.25]  Out: 3750 [Urine:3750]    Gen: Obese debilitated male, alert and oriented x 3, no acute distressed  HEENT: Atraumatic, normocephalic; sclera non-injected,  anicterric; oral mucosa moist, no lesion, no exudate  Lungs: Clear to ausculation, no wheezes, no rhonchi, no rales  Heart: Tachycardic, irregular rhythm, no gallops, no rubs, no murmurs  GI: Bowel sound normal, no hepatosplenomegaly, no masses, non-tender, non-distended, no guarding, no rebound tenderness  Lymph: No lymphadenopathy, 3+ BLE edema  Skin: Right second finger erythema, BLE in rook boots, shallow ulcerations on left lateral foot and right lateral foot    Medications   Current Facility-Administered Medications   Medication Dose Route Frequency Provider Last Rate Last Admin    diltiazem (CARDIZEM) 125 mg in dextrose 5 % 125 mL infusion  5-15 mg/hr Intravenous Continuous Magdy Shearer MD 10 mL/hr at 09/21/24 1200 10 mg/hr at 09/21/24 1200    No anticoagulants IF patient has had acute trauma/surgery or recent intracranial, GI or urinary tract bleeding.    Other DOES NOT GO TO Magdy Mosquera MD        norepinephrine (LEVOPHED) 4 mg in  mL infusion PREMIX  0.01-0.6 mcg/kg/min Intravenous Continuous Magdy Shearer MD   Stopped at 09/18/24 0638    Patient is already receiving anticoagulation with heparin, enoxaparin (LOVENOX), warfarin (COUMADIN)  or other anticoagulant medication   Does not apply Continuous PRN Magdy Shearer MD        sodium chloride 0.9 % infusion   Intravenous Continuous Magdy Shearer MD 10 mL/hr at 09/20/24 1321 Restarted at 09/20/24 1321     Current Facility-Administered Medications   Medication Dose Route Frequency Provider Last Rate Last Admin    acetaminophen (TYLENOL) tablet 1,000 mg  1,000 mg Oral TID Magdy Shearer MD   1,000 mg at 09/21/24 0803    amiodarone (PACERONE) tablet 400 mg  400 mg Oral or FT or NG tube BID Magdy Shearer MD   400 mg at 09/21/24 0801    Followed by    [START ON 9/23/2024] amiodarone (PACERONE) tablet 200 mg  200 mg Oral or FT or NG tube BID Magdy Shearer MD        Followed by    [START ON  9/26/2024] amiodarone (PACERONE) tablet 200 mg  200 mg Oral or FT or NG tube Daily Magdy Shearer MD        amoxicillin-clavulanate (AUGMENTIN) 875-125 MG per tablet 1 tablet  1 tablet Oral Q12H HUBER (08/20) Magdy Shearer MD   1 tablet at 09/21/24 0803    dexAMETHasone (DECADRON) tablet 4 mg  4 mg Oral Daily with breakfast Magdy Shearer MD   4 mg at 09/21/24 0803    digoxin (LANOXIN) tablet 125 mcg  125 mcg Oral Daily Magdy Shearer MD   125 mcg at 09/21/24 0802    filgrastim-aafi (NIVESTYM) injection 480 mcg  480 mcg Subcutaneous Daily at 8 pm Magdy Shearer MD   480 mcg at 09/20/24 2140    [Held by provider] furosemide (LASIX) tablet 40 mg  40 mg Oral Daily Magdy Shearer MD        heparin lock flush 10 unit/mL injection 5-10 mL  5-10 mL Intracatheter Q24H Magdy Shearer MD   5 mL at 09/20/24 2046    heparin lock flush 100 unit/mL injection 5-10 mL  5-10 mL Intracatheter Q28 Days Magdy Shearer MD   5 mL at 09/21/24 1051    Lidocaine (LIDOCARE) 4 % Patch 1 patch  1 patch Transdermal Q24h Magdy Shearer MD   1 patch at 09/20/24 2043    [Held by provider] losartan (COZAAR) tablet 100 mg  100 mg Oral Magdy Humphries MD        [Held by provider] metoprolol succinate ER (TOPROL XL) 24 hr tablet 100 mg  100 mg Oral Magdy Humphries MD        morphine (MS CONTIN) 12 hr tablet 30 mg  30 mg Oral Magdy Humphries MD   30 mg at 09/21/24 0803    And    morphine (MS CONTIN) 12 hr tablet 15 mg  15 mg Oral QPM Magdy Shearer MD   15 mg at 09/20/24 1721    [Held by provider] rivaroxaban ANTICOAGULANT (XARELTO) tablet 20 mg  20 mg Oral QPM Magdy Shearer MD        sodium chloride (PF) 0.9% PF flush 10-40 mL  10-40 mL Intracatheter Q7 Days Magdy Shearer MD   30 mL at 09/17/24 2144       Data   Recent Labs   Lab 09/21/24  1126 09/21/24  0534 09/20/24  0636 09/19/24  1213 09/19/24  0805 09/19/24  0623   WBC 9.4 6.1 0.6*   --   --  0.2*   HGB 8.2* 8.0* 8.8*  --   --  6.3*   MCV 86 86 86  --   --  87   PLT 48* 46* 10*  --   --  18*   NA  --  128* 130*  --   --  132*   POTASSIUM  --  4.7 4.5  --   --  4.2   CHLORIDE  --  94* 98  --   --  101   CO2  --  23 23  --   --  21*   BUN  --  19.6 19.8  --   --  18.1   CR  --  0.53* 0.58*  --   --  0.58*   ANIONGAP  --  11 9  --   --  10   COLLINS  --  7.9* 8.1*  --   --  7.8*   GLC  --  104* 121* 148*   < > 121*   ALBUMIN  --  2.2* 2.2*  --   --  2.1*   PROTTOTAL  --  5.0* 5.1*  --   --  4.7*   BILITOTAL  --  0.3 0.4  --   --  0.2   ALKPHOS  --  124 121  --   --  110   ALT  --  28 22  --   --  18   AST  --  19 11  --   --  11    < > = values in this interval not displayed.       No results found for this or any previous visit (from the past 24 hour(s)).

## 2024-09-21 NOTE — PLAN OF CARE
End Of Shift Note      Subjective/Objective:    Neuro: Moderate pain, pt says he tolerates it. No PRN pain meds needed/given.     Cardiac: Dilt drip continued at 10 mg/hr. HR ~100.     Resp: Diminished but clear.    GI/: Lockwood in place. One BM overnight in bedpan.    Endo: WLD    MSK: Weak, turned in bed.     Skin: Changed buttocks mepilex.     LDAs: Triple lumen picc in left. Port is still accessed, but I did heparin lock it.

## 2024-09-21 NOTE — PLAN OF CARE
Problem: Dysrhythmia  Goal: Normalized Cardiac Rhythm  Outcome: Progressing     Problem: Adult Inpatient Plan of Care  Goal: Absence of Hospital-Acquired Illness or Injury  Intervention: Prevent Skin Injury  Recent Flowsheet Documentation  Taken 9/21/2024 1654 by Heidi Mclaughlin RN  Body Position:   turned   supine  Taken 9/21/2024 1600 by Heidi Mclaughlin RN  Body Position:   turned   supine  Taken 9/21/2024 1400 by Heidi Mclaughlin RN  Body Position:   right   turned  Taken 9/21/2024 1000 by Heidi Mclaughlin RN  Body Position:   left   turned  Taken 9/21/2024 0803 by Heidi Mclaughlin RN  Body Position:   turned   supine     Problem: Adult Inpatient Plan of Care  Goal: Optimal Comfort and Wellbeing  Intervention: Monitor Pain and Promote Comfort  Recent Flowsheet Documentation  Taken 9/21/2024 0803 by Hedii Mclaughlin RN  Pain Management Interventions: medication (see MAR)  Intervention: Provide Person-Centered Care  Recent Flowsheet Documentation  Taken 9/21/2024 1654 by Heidi Mclaughlin RN  Trust Relationship/Rapport: care explained  Taken 9/21/2024 0803 by Heidi Mclaughlin RN  Trust Relationship/Rapport: care explained   Goal Outcome Evaluation:  End Of Shift Note        Subjective/Objective:    Neuro: alert and oriented, appears more awake today.     Cardiac: afib. Diltiazem drip stopped- see MAR. Patient has been tolerating well. HR . Edema continues in extremities. Encouraged elevation.     Resp: Clear on room air. Occasional cough noted, non productive.     GI/: Lockwood patent for retention, good amounts of yellow urine noted. Patient had large formed/hared stool on the commode    MSK: Patient stood at bedside x3 with MAX assist, RN and PT. Patient was unable to take steps to commode and Michelle Steady used to move patient to commode. Patient unable to stand back up on michelle steady and ceiling lift used to get patient back to bed. Patient more willing today and taking more input  on doing things himself. Still not moving left arm well, encouraging patient that he needs to do own range of motion and why. Noted that patient was doing it when he was awake. Also patient is moving with cues. Patient still continues to need max set up with tray.     Skin: SEE flowsheet. Patient's legs cleansed and dressed per WOC orders. No increase in redness or irritation noted. Noted less drainage and chucks pulled out of heel boots to allow for maximum effect. Patient's buttock cleansed and barrier oer order applied x3. Patient allowing nursing to turn and reposition every 2 hours and HOB down to 30 or less more/     LDAs: PICC. Port flushed per order and de-accessed.

## 2024-09-22 NOTE — PROGRESS NOTES
[facility]    Hospitalist Progress Note    Assessment & Plan   Dk Randhawa . is a 69 year old male with past medical history of lung cancer status post lobectomy with brain metastasis and bone metastasis on current chemoradiation therapy, multiple pathologic fractures, atrial fibrillation on chronic anticoagulation, hypertension, and chronic anemia who presented on 9/16/2024 as no longer able to care for self at home due to weakness and pain associated with known bone metastasis. FTH cellulitis involving right finger, right elbow, and right foot. Developed atrial fibrillation with RVR, sepsis, severe neutropenia. Admitted to ICU on Amiodarone drip.       Septic shock   BP initially responsive to fluids but required start of NE the morning of 9/17 despite receiving nearly 3 liters of fluid overnight. Patient does have multiple areas of cellulitis and with his sever neutropenia any of these along could have tripped him into being septic. Having several foci also raises the possibility of bacteremia thought blood culture and other classic stigmata remain negative and absent respectively.   - Weaned off NE since 9/18  - Management of infections and neutropenia as below     Cellulitis of right index finger  Cellulitis of right elbow  Cellulitis right foot, possible  Immunocompromised    Developed redness and swelling of right index finger over x 1 week PTA. Right index finger with redness, swelling, and warmth. Right elbow with redness, swelling, and warmth. ROM intact. Right foot with redness, swelling, and warmth. No fever on presentation. WBC 0.7. There has been significant variation in WBC since 8/14. Review of oncology note suggest that WBC changes likely due to bone marrow metastasis, steroids, and radiation. Lactate 1.5 (WNL). CRP elevated 299.34. Procalcitonin 2.25. XR right index finger without discrete erosion, finding unremarkable. Initially given dose of ceftriaxone however,  there is concern for worsening infection on admission with the development of a-fib with RVR so vancomycin and cefepime were started.   - Initially treated with empiric coverage of vancomycin (pharmacy to dose) and cefepime 2 g IV q8hrs  - Blood culture 9/16 (added after antibiotics), NGTD  - Wound culture growing methicillin sensitive Staph aureus   - Discontinue cefepime and vancomycin and start Augmentin on 9/19    Pancytopenia  On 9/20, WBC 0.6, hemoglobin 8.8, and platelets 10.   - Therapy plan as below     Leukopenia   Severe neutropenia   Leukopenia likley secondary secondary to chemotherapy,  bone marrow metastasis, steroids, and radiation. With Initial WBC count of 0.7 had a neutrophil count of 700. This dropped to WBC count of 0.1 9/17 with a neutrophil count of 70. Given this severe neutropenia in the setting of sepsis started Filgrastin.    - WBC 0.1 ?  0.6 ? 6.1 (ANC 1.6) ? 26.5 (ANC 17.5)   - Discontinued filgrastim on 9/21   - Daily CBC with differential      Thrombocytopenia   Platelet count of 118 on presentation dropped to 59. Suspect related to sepsis.   - Holding PTA Rivaroxaban   - Platelets 18 and 9/19, will transfuse for less than 10,000   - Platelets 10 on 9/20, will transfuse for 1 unit   - Platelets 48 on 9/21   - Platelets 55 on 9/22     Acute on chronic normocytic anemia   Baseline around 10. Was 87. On presentation and dropped to 7.3 with fluids the following day. No signs of bleeding.    - Added on type and screen    - Hemoglobin 6.3 on 9/19, will transfuse 1 unit packed red blood cells   - Hemoglobin improved to 8.8 on 9/20   - CBC with am labs     Atrial fibrillation with rapid ventricular response (RVR)  Chronic anticoagulation    Chronic atrial fibrillation on PTA metoprolol succinate 100 mg and anticoagulation with rivaroxaban 20 mg. Presents tachycardic 125 with variation up to 140's. Bolused 500 ml with ongoing tachycardia. EKG showing atrial fibrillation with . Denies  palpitations chest pain, or SOB. Amiodarone drip with bolus started in emergency department and continued.   - Telemetry  - Holding PTA metoprolol succinate with hypotension   - Loading dose on 9/18 of digoxin 500 mcg IV followed by 250 mcg IV every 6 hours x 2, then 125 mcg daily starting tomorrow.  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 CT chest on 4/7/2024 showed a right upper lobe lung mass. Brain MRI on 4/30/2024 was negative for metastatic disease. PET CT scan on 5/2/2024 was positive for the RUL mass and there was uptake in some mediastinal lymph nodes.  On 5/16/2024 patient had a robotic assisted right upper lobe lobectomy. Pathology showed large cell lung cancer. Mediastinal nodes were negative. On 7/20/2024 evaluated for right hip pain and imaging showed metastatic disease particularly in the chest but likely in the right hip acetabulum as well. His chemotherapy changed from adjuvant to definitive chemotherapy for advanced large cell lung cancer. Has now completed 3 of 7 cycles carboplatin, etoposide, and atezolizumab  9/11/24. Received Trilaciclib support for first cycle. Has completed gamma knife therapy 8/9 - 8/14/24.  CT imaging 8/23 pelvis showing multiple pathological fractures (see above). Has chronic cancer pain in which he tries to manage with morphine BID, acetaminophen 1,000 mg TID, and oxycodone 5 mg PRN q4hrs. Also managed with dexamethasone 4 mg  - Physical therapy and occupational therapy  - Continue dexamethasone 4 mg     Acute on chronic right hip pain secondary to progressive bone metastasis    Presents with worsening  right hip pain with known metastasis. Following with hematology/oncology. On 7/20/2024 evaluated for right hip pain and imaging showed metastatic disease particularly in the chest but likely in the right hip acetabulum as well. Additionally evaluation 8/23 with CT pelvis showing comminuted pathologic nondisplaced fracture of the right acetabulum, probable additional pathologic fractures of the junction of the right inferior pubic ramus/right ischium, nondisplaced pathologic fracture in the left superior pubic ramus, and additional scattered lucent lesions, suspicious for metastatic disease. Following with Dr. Andino with most recent evaluation 9/13 for palliative radiation of right hip metastasis related to bone pain. Underwent first radiation treatment 9/16. Patient wishes to continue with radiation treatments. Pain managed PTA with morphine 30 mg AM and 15 mg PM,  acetaminophen 1,000 mg TID, and oxycodone 5 mg PRN q6hrs  - Continue morphine BID, acetaminophen 1,000 mg TID, and oxycodone 5 mg PRN q2hrs      Bilateral lower extremity edema  Recently started on furosemide 40 mg for lower extremity edema. Bilateral edema with weeping present on admission   - Held PTA furosemide due to sepsis   - Resume Lasix 40 mg daily by mouth on 9/21 due to improvement in hemodynamic stability   - 2 g sodium diet and fluid restriction 1.8 L     Hyponatremia    Sodium 132 on admission. Clinically insignificant.  - Trend with AM BMP      Essential hypertension  Chronic hypertension managed PTA with losartan 100 mg.  - Holding losartan with sepsis      Diet: Regular Diet Adult    DVT Prophylaxis: Had been on DOAC but held with thrombocytopenia, PCD ordered   Lockwood Catheter: Not present  Lines: PRESENT      Port a Cath 07/05/24 Single Lumen Right Chest wall-Site Assessment: WDL   Cardiac Monitoring: ACTIVE order. Indication: Tachyarrhythmias, acute (48 hours)  Code Status: Full Code      Clinically Significant Risk Factors      #  Hyponatremia: Lowest Na = 128 mmol/L in last 2 days, will monitor as appropriate    # Hypomagnesemia: Lowest Mg = 1.5 mg/dL in last 2 days, will replace as needed   # Hypoalbuminemia: Lowest albumin = 2.1 g/dL at 9/19/2024  6:23 AM, will monitor as appropriate   # Thrombocytopenia: Lowest platelets = 46 in last 2 days, will monitor for bleeding   # Hypertension: Noted on problem list     # Acute Hypoxic Respiratory Failure: Documented O2 saturation < 90%. Continue supplemental oxygen as needed    # Obesity: Estimated body mass index is 32.74 kg/m  as calculated from the following:    Height as of this encounter: 1.829 m (6').    Weight as of this encounter: 109.5 kg (241 lb 6.5 oz).        # Financial/Environmental Concerns: none       Date of Service (when I saw the patient): 09/22/2024    Disposition: Anticipate discharge to TCU in 1 to 2 days    35 MINUTES SPENT BY ME on the date of service doing chart review, history, exam, documentation & further activities per the note, and discussing plan of care with daughter.    Maurizio Catalan MD    Interval History   Patient is resting comfortably in bed, he has no acute complaints.  Rate control improved overnight    -Data reviewed today: I reviewed all new labs and imaging results over the last 24 hours. I personally reviewed no images or EKG's today.    Physical Exam   Temp: 98.3  F (36.8  C) Temp src: Axillary BP: 124/81 Pulse: 111   Resp: 14 SpO2: 95 % O2 Device: None (Room air)    Vitals:    09/17/24 0530 09/20/24 0552 09/22/24 0500   Weight: 103 kg (227 lb 1.2 oz) 110 kg (242 lb 8.1 oz) 109.5 kg (241 lb 6.5 oz)     Vital Signs with Ranges  Temp:  [97.7  F (36.5  C)-98.9  F (37.2  C)] 98.3  F (36.8  C)  Pulse:  [] 111  Resp:  [14-28] 14  BP: (115-135)/(69-85) 124/81  SpO2:  [91 %-96 %] 95 %  I/O last 3 completed shifts:  In: 2240.5 [P.O.:1920; I.V.:320.5]  Out: 4800 [Urine:4800]    Gen: Obese debilitated male, alert and oriented x 3, no acute  distressed  HEENT: Atraumatic, normocephalic; sclera non-injected, anicterric; oral mucosa moist, no lesion, no exudate  Lungs: Clear to ausculation, no wheezes, no rhonchi, no rales  Heart: Tachycardic, irregular rhythm, no gallops, no rubs, no murmurs  GI: Bowel sound normal, no hepatosplenomegaly, no masses, non-tender, non-distended, no guarding, no rebound tenderness  Lymph: No lymphadenopathy, 3+ BLE edema  Skin: Right second finger erythema, BLE in rook boots, shallow ulcerations on left lateral foot and right lateral foot    Medications   Current Facility-Administered Medications   Medication Dose Route Frequency Provider Last Rate Last Admin    No anticoagulants IF patient has had acute trauma/surgery or recent intracranial, GI or urinary tract bleeding.    Other DOES NOT GO TO Magdy Mosquera MD        Patient is already receiving anticoagulation with heparin, enoxaparin (LOVENOX), warfarin (COUMADIN)  or other anticoagulant medication   Does not apply Continuous PRN Magdy Shearer MD        sodium chloride 0.9 % infusion   Intravenous Continuous Magdy Shearer MD 10 mL/hr at 09/20/24 1321 Restarted at 09/20/24 1321     Current Facility-Administered Medications   Medication Dose Route Frequency Provider Last Rate Last Admin    acetaminophen (TYLENOL) tablet 1,000 mg  1,000 mg Oral TID Magdy Shearer MD   1,000 mg at 09/22/24 0810    amiodarone (PACERONE) tablet 400 mg  400 mg Oral or FT or NG tube BID Magdy Shearer MD   400 mg at 09/22/24 0810    Followed by    [START ON 9/23/2024] amiodarone (PACERONE) tablet 200 mg  200 mg Oral or FT or NG tube BID Magdy Shearer MD        Followed by    [START ON 9/26/2024] amiodarone (PACERONE) tablet 200 mg  200 mg Oral or FT or NG tube Daily Magdy Shearer MD        amoxicillin-clavulanate (AUGMENTIN) 875-125 MG per tablet 1 tablet  1 tablet Oral Q12H [facility] (08/20) Magdy Shearer MD   1 tablet at 09/22/24 0808     dexAMETHasone (DECADRON) tablet 4 mg  4 mg Oral Daily with breakfast Magdy Shearer MD   4 mg at 09/22/24 0808    digoxin (LANOXIN) tablet 125 mcg  125 mcg Oral Daily Magdy Shearer MD   125 mcg at 09/22/24 0811    diltiazem ER COATED BEADS (CARDIZEM CD/CARTIA XT) 24 hr capsule 240 mg  240 mg Oral Daily Maurizio Catalan MD   240 mg at 09/22/24 0809    furosemide (LASIX) tablet 40 mg  40 mg Oral Daily Maurizio Catalan MD   40 mg at 09/22/24 0811    Lidocaine (LIDOCARE) 4 % Patch 1 patch  1 patch Transdermal Q24h Magdy Shearer MD   1 patch at 09/21/24 2029    [Held by provider] losartan (COZAAR) tablet 100 mg  100 mg Oral QA Magdy Shearer MD        [Held by provider] metoprolol succinate ER (TOPROL XL) 24 hr tablet 100 mg  100 mg Oral ALVARO Magdy Shearer MD        morphine (MS CONTIN) 12 hr tablet 30 mg  30 mg Oral QAM Magdy Shearer MD   30 mg at 09/22/24 0809    And    morphine (MS CONTIN) 12 hr tablet 15 mg  15 mg Oral QPM Magdy Shearer MD   15 mg at 09/21/24 1720    [Held by provider] rivaroxaban ANTICOAGULANT (XARELTO) tablet 20 mg  20 mg Oral QPM Magdy Shearer MD        sodium chloride (PF) 0.9% PF flush 10-40 mL  10-40 mL Intracatheter Q7 Days Magdy Shearer MD   30 mL at 09/17/24 2144       Data   Recent Labs   Lab 09/22/24  0617 09/21/24  1126 09/21/24  0534 09/20/24  0636   WBC 26.5* 9.4 6.1 0.6*   HGB 8.5* 8.2* 8.0* 8.8*   MCV 87 86 86 86   PLT 55* 48* 46* 10*   *  --  128* 130*   POTASSIUM 4.7  --  4.7 4.5   CHLORIDE 91*  --  94* 98   CO2 25  --  23 23   BUN 17.8  --  19.6 19.8   CR 0.52*  --  0.53* 0.58*   ANIONGAP 12  --  11 9   COLLINS 7.9*  --  7.9* 8.1*   *  --  104* 121*   ALBUMIN 2.3*  --  2.2* 2.2*   PROTTOTAL 5.2*  --  5.0* 5.1*   BILITOTAL 0.3  --  0.3 0.4   ALKPHOS 145  --  124 121   ALT 31  --  28 22   AST 22  --  19 11       No results found for this or any previous visit (from the past 24 hour(s)).

## 2024-09-22 NOTE — PROGRESS NOTES
[facility]   Cross Cover Note  Date of Service: 9/21/2024     Stable off diltiazem infusion. Transfer from IMC to med/surg status.  Magdy Shearer MD  [facility]

## 2024-09-22 NOTE — PLAN OF CARE
"PT continues in rate controlled Afib. Pain has been managed with scheduled tylenol/morphine and prn oxycodone x1. Pt to discharge to TCU in 1-2 days.  Problem: Adult Inpatient Plan of Care  Goal: Plan of Care Review  Description: The Plan of Care Review/Shift note should be completed every shift.  The Outcome Evaluation is a brief statement about your assessment that the patient is improving, declining, or no change.  This information will be displayed automatically on your shift  note.  Outcome: Progressing  Goal: Patient-Specific Goal (Individualized)  Description: You can add care plan individualizations to a care plan. Examples of Individualization might be:  \"Parent requests to be called daily at 9am for status\", \"I have a hard time hearing out of my right ear\", or \"Do not touch me to wake me up as it startles  me\".  Outcome: Progressing  Goal: Absence of Hospital-Acquired Illness or Injury  Outcome: Progressing  Intervention: Identify and Manage Fall Risk  Recent Flowsheet Documentation  Taken 9/22/2024 1502 by Teresa Rodriguez RN  Safety Promotion/Fall Prevention:   activity supervised   lighting adjusted   patient and family education  Intervention: Prevent Skin Injury  Recent Flowsheet Documentation  Taken 9/22/2024 1600 by Teresa Rodriguez RN  Body Position: weight shifting  Taken 9/22/2024 1400 by Teresa Rodriguez RN  Body Position:   turned   right  Taken 9/22/2024 1200 by Teresa Rodriguez RN  Body Position: weight shifting  Goal: Optimal Comfort and Wellbeing  Outcome: Progressing  Intervention: Monitor Pain and Promote Comfort  Recent Flowsheet Documentation  Taken 9/22/2024 1330 by Teresa Rodriguez RN  Pain Management Interventions:   care clustered   pillow support provided   quiet environment facilitated   rest   repositioned  Taken 9/22/2024 1248 by Teresa Rodriguez RN  Pain Management Interventions:   medication (see MAR)   care clustered   pillow support " provided   quiet environment facilitated   rest   repositioned  Taken 9/22/2024 1158 by Teresa Rodriguez RN  Pain Management Interventions:   medication (see MAR)   care clustered   pillow support provided   quiet environment facilitated   rest   repositioned  Goal: Readiness for Transition of Care  Outcome: Progressing     Problem: Risk for Delirium  Goal: Optimal Coping  Outcome: Progressing  Goal: Improved Behavioral Control  Outcome: Progressing  Intervention: Minimize Safety Risk  Recent Flowsheet Documentation  Taken 9/22/2024 1502 by Teresa Rodriguez RN  Enhanced Safety Measures: pain management  Goal: Improved Attention and Thought Clarity  Outcome: Progressing  Goal: Improved Sleep  Outcome: Progressing     Problem: Dysrhythmia  Goal: Normalized Cardiac Rhythm  Outcome: Progressing     Problem: Skin or Soft Tissue Infection  Goal: Absence of Infection Signs and Symptoms  Outcome: Progressing   Goal Outcome Evaluation:

## 2024-09-22 NOTE — PROGRESS NOTES
Care Management Follow Up    Length of Stay (days): 6    Expected Discharge Date: 09/23/2024     Concerns to be Addressed: discharge planning     Patient plan of care discussed at interdisciplinary rounds: Yes    Anticipated Discharge Disposition: Transitional Care     Anticipated Discharge Services: Transportation Services  Anticipated Discharge DME: None    Patient/family educated on Medicare website which has current facility and service quality ratings: yes  Education Provided on the Discharge Plan: Yes  Patient/Family in Agreement with the Plan: yes    Referrals Placed by CM/SW: External Care Coordination, Post Acute Facilities, Transportation  Private pay costs discussed: transportation costs    Discussed  Partnership in Safe Discharge Planning  document with patient/family: Yes: with pt     Additional Information:  Per IDT rounds today, MD team states that pt IS NOT medically stable for discharge today.  MD anticipates pt to remain hospitalized another day.     PT/OT recommends TCU upon discharge, and pt/family is in agreement with this plan of care.      As pt's medical stability resolving quicker than anticipated, GUDELIA sent out updated TCU referrals today to the following facilities which are based off of pt's request due to both his geographic & his brother's geographic location:      Service Provider Request Status Selected Services Address Phone Fax Patient Preferred   [facility] [place] - REFERRAL ONLY (SNF)  Pending - Request Sent N/A [id number] [facility] [address] [phone] --   [facility] (SNF)  Pending - Request Sent N/A [address] [place] [place] [address] [phone] --   [facility] (SNF)  Pending - Request Sent N/A [address] [facility] [address] [phone] --         [facility] (SNF)  Pending - Request Sent N/A [address] [facility] [address]  [phone] --         [facility] ([facility])  Pending - Request Sent N/A [address] [facility] [id number] [phone] --   [facility])  Pending - Request Sent N/A [address] [facility] [id number] [phone] --           Transportation discussed with pt & he is aware that he may need to have [facility] wheelchair be used due to weakness.  Pt was informed on cost of transportation services for out of pocket costs.      Care Management team to follow for discharge plans.    ILYA Cannon    3

## 2024-09-22 NOTE — PLAN OF CARE
End Of Shift Note      Subjective/Objective:    Neuro: A/O 4x, 5/10 pain tolerable with scheduled pain meds. Makes needs known.     Cardiac: Rate controlled A.fib.     Resp: Lungs clear, diminished. Productive cough. Room air.     GI/: Lockwood in place. Very good output.     Endo: WdL    MSK: Weak on bedrest. Q2 turns.    Skin: Dressing CDI, skin is very fragile. Heel boots in place.     LDAs: PICC in left arm. Flushed and blood return.

## 2024-09-23 NOTE — PROGRESS NOTES
[place] [facility] DISCHARGE NOTE    Patient discharged to transitional care unit at 1255 PM via wheel chair. Accompanied by other:EMS staff. Discharge instructions reviewed with patient, opportunity offered to ask questions. Prescriptions sent with patient to fill . All belongings sent with patient.    Adilene Jc RN    Report called to RN at [place].  Two hard scripts sent upon discharge: MS CONTIN 15 mg and OXYCODONE 5 mg.

## 2024-09-23 NOTE — PLAN OF CARE
End Of Shift Note      Subjective/Objective:    Neuro: WDL, cooperative.     Cardiac: VSS, a.fib.     Resp: cLear, diminished lung sounds. RA    GI/: Lockwood in place, commode for BM    Endo: WDL    MSK: Very weak, ceiling lift.    Skin: Lots of skin breakdown, mepilexes all CDI. Turned q2.    LDAs: PICC in left arm.

## 2024-09-23 NOTE — PLAN OF CARE
Physical Therapy Discharge Summary    Reason for therapy discharge:    Discharged to transitional care facility.    Progress towards therapy goal(s). See goals on Care Plan in [facility] electronic health record for goal details.  Goals partially met.  Barriers to achieving goals:   discharge from facility.    Therapy recommendation(s):    Continued therapy is recommended.  Rationale/Recommendations:  Recommend continued PT at TCU to maximize safe and indep mobility prior to return home.

## 2024-09-23 NOTE — PROGRESS NOTES
"Care Management Discharge Note    Discharge Date: 09/23/2024     Discharge Disposition: Transitional Care    Discharge Services: Transportation Services    Discharge DME: walker    Discharge Transportation: agency-[facility] via wheelchair     Private pay costs discussed: transportation costs    Does the patient's insurance plan have a 3 day qualifying hospital stay waiver?  Yes     Which insurance plan 3 day waiver is available? Alternative insurance waiver    Will the waiver be used for post-acute placement? No    PAS Confirmation Code:  [id number]  Patient/family educated on Medicare website which has current facility and service quality ratings: yes    Education Provided on the Discharge Plan: Yes  Persons Notified of Discharge Plans: Patient   Patient/Family in Agreement with the Plan: yes    Handoff Referral Completed: Yes, non-[facility] PCP: External handoff communication completed    Additional Information:  Update received during IDT rounds. Informed Pt is medically stable for discharge to TCU today.     Secured message received from [facility]'s Director.   Bed available at [facility].   --Requested to have MD orders \"Chemo/Radiation Treatments on HOLD during TCU stay\"  And also cannot accommodate pt's CBD gummies at facility.     CM spoke with the Patient regarding the acceptance and requirements to hold Chemo/Radiation  Pt verbalized acceptance with plan and reported no concerns.     Requested CM arrange a wheelchair transport.   Aware of private pay charges.       PLAN: Discharge to [facility]   Phone: [phone]   Fax: [phone]    [facility] via wheelchair    window 12:30-1:23pm        ILYA Pop  [facility] [phone]   [facility]  [phone]        "

## 2024-09-23 NOTE — PROGRESS NOTES
Occupational Therapy Discharge Summary    Reason for therapy discharge:    Discharged to transitional care facility.    Progress towards therapy goal(s). See goals on Care Plan in [facility] electronic health record for goal details.  Goals partially met.  Barriers to achieving goals:   discharge from facility.    Therapy recommendation(s):    Continued therapy is recommended.  Rationale/Recommendations:  TCU to increase independence with ADLs and related mobility.

## 2024-09-23 NOTE — DISCHARGE SUMMARY
[facility]  Hospitalist Discharge Summary       Date of Admission:  9/16/2024  Date of Discharge:  9/23/2024  Discharging Provider: Maurizio Catalan MD      Discharge Diagnoses     Septic shock due to cellulitis  Cellulitis of right index finger  Cellulitis of right elbow  Cellulitis right foot, possible  Immunocompromised  Pancytopenia  Leukopenia   Severe neutropenia   Thrombocytopenia   Acute on chronic normocytic anemia   Atrial fibrillation with rapid ventricular response (RVR)  Chronic anticoagulation  Generalized weakness with failure to thrive secondary to malignancy  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  The following labs/tests are   recommended: CMP and CBC with differential.          Unresulted Labs Ordered in the Past 30 Days of this Admission       Date and Time Order Name Status Description    9/19/2024 12:50 PM CONDITIONAL Prepare pheresed platelets (unit) Preliminary         These results will be followed up by Maurizio Catalan or PCP    Discharge Disposition   Discharged to short-term care facility    Hospital Course   Dk Randhawa Sr. is a 69 year old male with past medical history of lung cancer status post lobectomy with brain metastasis and bone metastasis on current chemoradiation therapy, multiple pathologic fractures, atrial fibrillation on chronic anticoagulation, hypertension, and chronic anemia who presented on 9/16/2024 as no longer able to care for self at home due to weakness and pain  associated with known bone metastasis. FT cellulitis involving right finger, right elbow, and right foot. Developed atrial fibrillation with RVR, sepsis, severe neutropenia. Admitted to ICU on Amiodarone drip.       Septic shock due to cellulitis  BP initially responsive to fluids but required start of NE the morning of 9/17 despite receiving nearly 3 liters of fluid overnight. Patient does have multiple areas of cellulitis and with his sever neutropenia any of these along could have tripped him into being septic. Having several foci also raises the possibility of bacteremia thought blood culture and other classic stigmata remain negative and absent respectively.   - Septic shock resolved  - Weaned off NE since 9/18  - Management of infections and neutropenia as below     Cellulitis of right index finger  Cellulitis of right elbow  Cellulitis right foot, possible  Immunocompromised    Developed redness and swelling of right index finger over x 1 week PTA. Right index finger with redness, swelling, and warmth. Right elbow with redness, swelling, and warmth. ROM intact. Right foot with redness, swelling, and warmth. No fever on presentation. WBC 0.7. There has been significant variation in WBC since 8/14. Review of oncology note suggest that WBC changes likely due to bone marrow metastasis, steroids, and radiation. Lactate 1.5 (WNL). CRP elevated 299.34. Procalcitonin 2.25. XR right index finger without discrete erosion, finding unremarkable. Initially given dose of ceftriaxone however, there is concern for worsening infection on admission with the development of a-fib with RVR so vancomycin and cefepime were started.   - Initially treated with empiric coverage of vancomycin (pharmacy to dose) and cefepime 2 g IV q8hrs  - Blood culture 9/16 (added after antibiotics), NGTD  - Wound culture growing methicillin sensitive Staph aureus   - Discontinue cefepime and vancomycin and start Augmentin on 9/19  - Discharged on  short course of Augmentin    Pancytopenia  On 9/20, WBC 0.6, hemoglobin 8.8, and platelets 10.   - Therapy plan as below   - CBC with differential at follow-up     Leukopenia   Severe neutropenia   Leukopenia likley secondary secondary to chemotherapy,  bone marrow metastasis, steroids, and radiation. With Initial WBC count of 0.7 had a neutrophil count of 700. This dropped to WBC count of 0.1 9/17 with a neutrophil count of 70. Given this severe neutropenia in the setting of sepsis started Filgrastin.    - WBC 0.1 ?  0.6 ? 6.1 (ANC 1.6) ? 26.5 (ANC 17.5)   - Discontinued filgrastim on 9/21   - CBC with differential at follow-up     Thrombocytopenia   Platelet count of 118 on presentation dropped to 59. Suspect related to sepsis.   - Holding PTA Rivaroxaban   - Platelets 18 and 9/19, will transfuse for less than 10,000   - Platelets 10 on 9/20, will transfuse for 1 unit   - Platelets 48 on 9/21   - Platelets 55 on 9/22   - CBC with differential at follow-up     Acute on chronic normocytic anemia   Baseline around 10. Was 87. On presentation and dropped to 7.3 with fluids the following day. No signs of bleeding.    - Added on type and screen    - Hemoglobin 6.3 on 9/19, will transfuse 1 unit packed red blood cells   - Hemoglobin improved to 8.8 on 9/20   - CBC with differential at follow-up     Atrial fibrillation with rapid ventricular response (RVR)  Chronic anticoagulation    Chronic atrial fibrillation on PTA metoprolol succinate 100 mg and anticoagulation with rivaroxaban 20 mg. Presents tachycardic 125 with variation up to 140's. Bolused 500 ml with ongoing tachycardia. EKG showing atrial fibrillation with . Denies palpitations chest pain, or SOB. Amiodarone drip with bolus started in emergency department and continued.   - Telemetry  - Holding PTA metoprolol succinate due to hypotension   - Loading dose on 9/18 of digoxin 500 mcg IV followed by 250 mcg IV every 6 hours x 2, then 125 mcg daily starting  tomorrow.  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 CT chest on 4/7/2024 showed a right upper lobe lung mass. Brain MRI on 4/30/2024 was negative for metastatic disease. PET CT scan on 5/2/2024 was positive for the RUL mass and there was uptake in some mediastinal lymph nodes.  On 5/16/2024 patient had a robotic assisted right upper lobe lobectomy. Pathology showed large cell lung cancer. Mediastinal nodes were negative. On 7/20/2024 evaluated for right hip pain and imaging showed metastatic disease particularly in the chest but likely in the right hip acetabulum as well. His chemotherapy changed from adjuvant to definitive chemotherapy for advanced large cell lung cancer. Has now completed 3 of 7 cycles carboplatin, etoposide, and atezolizumab  9/11/24. Received Trilaciclib support for first cycle. Has completed gamma knife therapy 8/9 - 8/14/24.  CT imaging 8/23 pelvis showing multiple pathological fractures (see above). Has chronic cancer pain in which he tries to manage with morphine BID, acetaminophen 1,000 mg TID, and oxycodone 5 mg PRN q4hrs. Also managed with dexamethasone 4 mg  - Physical therapy and occupational therapy  - Continue dexamethasone 4 mg     Acute on chronic right hip pain secondary to progressive bone metastasis    Presents with worsening right hip pain with known metastasis. Following with hematology/oncology. On 7/20/2024 evaluated for right hip pain and imaging showed metastatic disease particularly in the chest but likely in the right hip  acetabulum as well. Additionally evaluation 8/23 with CT pelvis showing comminuted pathologic nondisplaced fracture of the right acetabulum, probable additional pathologic fractures of the junction of the right inferior pubic ramus/right ischium, nondisplaced pathologic fracture in the left superior pubic ramus, and additional scattered lucent lesions, suspicious for metastatic disease. Following with Dr. Andino with most recent evaluation 9/13 for palliative radiation of right hip metastasis related to bone pain. Underwent first radiation treatment 9/16. Patient wishes to continue with radiation treatments. Pain managed PTA with morphine 30 mg AM and 15 mg PM,  acetaminophen 1,000 mg TID, and oxycodone 5 mg PRN q6hrs  - Continue morphine BID, acetaminophen 1,000 mg TID, and oxycodone 5 mg PRN q2hrs      Bilateral lower extremity edema  Recently started on furosemide 40 mg for lower extremity edema. Bilateral edema with weeping present on admission   - Held PTA furosemide due to sepsis   - Resume Lasix 40 mg daily by mouth on 9/21 due to improvement in hemodynamic stability   - 2 g sodium diet and fluid restriction 1.5 L     Hyponatremia    Sodium 132 on admission. Clinically insignificant.  - Sodium 129 at discharge  - Fluid restrict 1.5 L/day  - Trend with follow-up BMP      Essential hypertension  Chronic hypertension managed PTA with losartan 100 mg.  - Holding losartan with hypotension     Diet: Regular Diet Adult    DVT Prophylaxis: Had been on DOAC but held with thrombocytopenia, PCD ordered   Lockwood Catheter: Not present  Lines: PRESENT      Port a Cath 07/05/24 Single Lumen Right Chest wall-Site Assessment: WDL   Cardiac Monitoring: ACTIVE order. Indication: Tachyarrhythmias, acute (48 hours)  Code Status: Full Code      Clinically Significant Risk Factors      # Hyponatremia: Lowest Na = 128 mmol/L in last 2 days, will monitor as appropriate    # Hypomagnesemia: Lowest Mg = 1.6 mg/dL in last 2 days, will  replace as needed   # Hypoalbuminemia: Lowest albumin = 2.1 g/dL at 9/19/2024  6:23 AM, will monitor as appropriate   # Thrombocytopenia: Lowest platelets = 48 in last 2 days, will monitor for bleeding   # Hypertension: Noted on problem list      # Obesity: Estimated body mass index is 32.74 kg/m  as calculated from the following:    Height as of this encounter: 1.829 m (6').    Weight as of this encounter: 109.5 kg (241 lb 6.5 oz).      # Financial/Environmental Concerns: none       Consultations This Hospital Stay   PHARMACY TO DOSE VANCO  PHYSICAL THERAPY ADULT IP CONSULT  OCCUPATIONAL THERAPY ADULT IP CONSULT  WOUND OSTOMY CONTINENCE NURSE  IP CONSULT  CARE MANAGEMENT / SOCIAL WORK IP CONSULT  VASCULAR ACCESS ADULT IP CONSULT  PHYSICAL THERAPY ADULT IP CONSULT  OCCUPATIONAL THERAPY ADULT IP CONSULT    Code Status   Full Code    40 MINUTES SPENT BY ME on the date of service doing chart review, history, exam, documentation & further activities per the note.         Maurizio Catalan MD  [facility]  ______________________________________________________________________    Physical Exam   Vital Signs: Temp: 98.6  F (37  C) Temp src: Oral BP: 126/85 Pulse: 96   Resp: 20 SpO2: 95 % O2 Device: None (Room air)    Weight: 241 lbs 6.46 oz       Gen: Obese debilitated male, alert and oriented x 3, no acute distressed  HEENT: Atraumatic, normocephalic; sclera non-injected, anicterric; oral mucosa moist, no lesion, no exudate  Lungs: Clear to ausculation, no wheezes, no rhonchi, no rales  Heart: Tachycardic, irregular rhythm, no gallops, no rubs, no murmurs  GI: Bowel sound normal, no hepatosplenomegaly, no masses, non-tender, non-distended, no guarding, no rebound tenderness  Lymph: No lymphadenopathy, 3+ BLE edema  Skin: Right second finger erythema, BLE in rook boots, shallow ulcerations on left lateral foot and right lateral foot    Primary Care Physician   Esteban Copeland    Discharge Orders       General info for SNF    Length of Stay Estimate: Short Term Care: Estimated # of Days <30  Condition at Discharge: Improving  Level of care:skilled   Rehabilitation Potential: Good  Admission H&P remains valid and up-to-date: Yes  Recent Chemotherapy: N/A  Use Nursing Home Standing Orders: Yes     Mantoux instructions    Give two-step Mantoux (PPD) Per Facility Policy Yes     Reason for your hospital stay    This is a 6-year-old male admitted with septic shock due to cellulitis.     Follow Up and recommended labs and tests    Follow up with assisted physician.  The following labs/tests are recommended: CMP and CBC with differential.     Activity - Up with nursing assistance     Daily weights    Call Provider for weight gain of more than 2 pounds per day or 5 pounds per week.     Lockwood catheter    To straight gravity drainage. Change catheter every 2 weeks and PRN for leaking or decreased urine output with signs of bladder distention. DO NOT change catheter without a specific Provider order IF diagnosis of benign prostatic hypertrophy (BPH), neurogenic bladder, or other urological conditions     Physical Therapy Adult Consult    Evaluate and treat as clinically indicated.    Reason:  Physical deconditioning     Occupational Therapy Adult Consult    Evaluate and treat as clinically indicated.    Reason:  Physical deconditioning     Fall precautions     Diet    Follow this diet upon discharge: Orders Placed This Encounter      Fluid restriction 1500 ML FLUID      2 Gram Sodium Diet       Significant Results and Procedures     Discharge Medications   Current Discharge Medication List        START taking these medications    Details   amiodarone (PACERONE) 200 MG tablet Take 1 tablet (200 mg) by mouth or FT or NG tube 2 times daily for 2 days, THEN 1 tablet (200 mg) daily.    Associated Diagnoses: Atrial fibrillation with rapid ventricular response      amoxicillin-clavulanate (AUGMENTIN) 875-125 MG tablet Take  1 tablet by mouth every 12 hours for 7 doses.    Associated Diagnoses: Cellulitis of right lower extremity      digoxin (LANOXIN) 125 MCG tablet Take 1 tablet (125 mcg) by mouth daily.    Associated Diagnoses: Atrial fibrillation with rapid ventricular response      diltiazem ER (DILT-XR) 120 MG 24 hr capsule Take 3 capsules (360 mg) by mouth daily.    Associated Diagnoses: Atrial fibrillation with rapid ventricular response      oxyCODONE (ROXICODONE) 5 MG tablet Take 1 tablet (5 mg) by mouth every 4 hours as needed for severe pain.  Qty: 30 tablet, Refills: 0    Associated Diagnoses: Malignant neoplasm of upper lobe of right lung           CONTINUE these medications which have CHANGED    Details   morphine (MS CONTIN) 15 MG CR tablet Take 2 tablets (30 mg) by mouth every morning AND 1 tablet (15 mg) every evening.  Qty: 30 tablet, Refills: 0    Associated Diagnoses: Malignant neoplasm of upper lobe of right lung           CONTINUE these medications which have NOT CHANGED    Details   acetaminophen (TYLENOL) 500 MG tablet Take 1,000 mg by mouth 3 times daily.      dexAMETHasone (DECADRON) 4 MG tablet Take 4 mg by mouth daily (with breakfast).      fexofenadine (ALLEGRA) 180 MG tablet Take 180 mg by mouth daily      furosemide (LASIX) 40 MG tablet Take 1 tablet (40 mg) by mouth daily.  Qty: 30 tablet, Refills: 1    Associated Diagnoses: Large cell carcinoma of lung, right; Pedal edema      Glucosamine-Chondroitin (OSTEO BI-FLEX REGULAR STRENGTH PO) Take 2 tablets by mouth daily.      HEMP OIL OR EXTRACT OR OTHER CBD CANNABINOID, NOT MEDICAL CANNABIS, Take 1 chew tab by mouth daily as needed. CBD gummies      Melatonin 10 MG TABS tablet Take 10 mg by mouth at bedtime.      Multiple Vitamins-Minerals (EMERGEN-C VITAMIN C) PACK Take 1 packet by mouth daily.           STOP taking these medications       diphenhydrAMINE-acetaminophen (TYLENOL PM)  MG tablet Comments:   Reason for Stopping:         losartan (COZAAR)  100 MG tablet Comments:   Reason for Stopping:         metoprolol succinate ER (TOPROL XL) 100 MG 24 hr tablet Comments:   Reason for Stopping:         multivitamin (CENTRUM SILVER) tablet Comments:   Reason for Stopping:         naloxone (NARCAN) 4 MG/0.1ML nasal spray Comments:   Reason for Stopping:         NONFORMULARY Comments:   Reason for Stopping:         oxyCODONE (OXY-IR) 5 MG capsule Comments:   Reason for Stopping:         XARELTO ANTICOAGULANT 20 MG TABS tablet Comments:   Reason for Stopping:             Allergies   No Known Allergies

## 2024-09-25 NOTE — PROGRESS NOTES
[facility] GERIATRICS  INITIAL VISIT NOTE  September 25, 2024    PRIMARY CARE PROVIDER AND CLINIC: Esteban Copeland [address] [facility] [address]    [facility] Medical Record Number: [id number]  [facility] [[id number]]    Chief Complaint   Patient presents with    Hospital F/U     [facility] 9/16/2024 - 9/23/2024     HPI:    Dk HARRISON Sydnee Kwon is a 69 year old (1955) male was admitted to the above facility from [facility]. Hospital stay 9/16/24 through 9/24/24 where they were admitted for cellulitis. Now admitted to this facility for rehab, medical management, and nursing care.      History obtained from: facility chart records, facility staff, patient report, [facility] chart review, and Care Everywhere [facility] chart review.      Brief Hospital Course: PMH of lung cancer with mets to brain and bone, s/p lobectomy, currently on chemoradiation, afib, HTN, chronic anemia who presented with weakness, unable to care for self at home. Found to have cellulitis of right finger, elbow, and foot, developed septic shock. Cultures grew MSSA, initially on cefepime and vancomycin, changed to Augmentin. Was started on Filgrastin for neutropenia, Xarelto held due to thrombocytopenia. Was transfused 1 unit PRBC for anemia. Had afib with RVR, metoprolol and losartan on hold due to hypotension.  Received amiodarone bolus/gtt. Transitioned to oral amiodarone, diltiazem, digoxin with improved rate control. On fluid restriction for edema, hyponatremia with NA of 129. Shock resolved. When medically stable was discharged to TCU for further rehab and medical management.     TCU Course: Seen today resting in bed. He reports he is feeling much better, just is still very weak needing EZ stand to transfer. Needs to get stronger before he goes home. Lives in a handicap accessible town home alone, but his brother and  neighbors help him. He has pain , but it is controlled. He sometimes only takes one instead of two MS Contin in the morning, is hoping he will be able to come off narcotics. Appetite is ok, he has not had a B Min two days, is nervous about taking laxatives as they previously caused him diarrhea. Denies any SOB.     CODE STATUS/ADVANCE DIRECTIVES: CPR/Full code     ALLERGIES:  No Known Allergies    PAST MEDICAL HISTORY:   Past Medical History:   Diagnosis Date    Acute non-recurrent maxillary sinusitis     Antiplatelet or antithrombotic long-term use     Arrhythmia     Atrial fibrillation with RVR (H)     Cough secondary to angiotensin converting enzyme inhibitor (ACE-I)     Hyperlipidemia     Hypertension     Malignant neoplasm metastatic to bone (H) 07/26/2024    Mass of upper lobe of right lung     Obesity     Pathological fracture in neoplastic disease, pelvis, init 08/23/2024     PAST SURGICAL HISTORY:   Past Surgical History:   Procedure Laterality Date    BRONCHOSCOPY, WITH BIOPSY, ROBOT ASSISTED Bilateral 04/16/2024    Procedure: BRONCHOSCOPY, endobronchial ultrasound, transbronchial needle aspiration, endobronchial biopsies and tumor debulking;  Surgeon: Lanette Arce MD;  [facility]: [facility] OR    DAVINCI EXCISE NODES THORACIC N/A 5/15/2024    Procedure: mediastinal lymph node dissection;  Surgeon: Mikael Peoples MD;  Location: [facility] OR    DAVINCI LOBECTOMY LUNG Right 5/15/2024    Procedure: Robot-assisted thoracoscopic right upper lobectomy,;  Surgeon: Mikael Peoples MD;  Location:  OR    ELBOW ARTHROSCOPY Left 03/09/2017    INSERT PORT VASCULAR ACCESS Right 7/5/2024    Procedure: INSERTION, VASCULAR ACCESS PORT;  Surgeon: Henry Veronica MD;  Location: [facility] OR    PHACOEMULSIFICATION WITH STANDARD INTRAOCULAR LENS IMPLANT Left 02/26/2018    Procedure: PHACOEMULSIFICATION WITH STANDARD INTRAOCULAR LENS IMPLANT;  Left Cataract Removal with Implant;  Surgeon: Mohit Victor MD;  Location: [facility] OR     PHACOEMULSIFICATION WITH STANDARD INTRAOCULAR LENS IMPLANT Right 01/30/2019    Procedure: Cataract Removal with Implant;  Surgeon: Mohit Victor MD;  Location: [facility] OR    TONSILLECTOMY  1964    TOTAL HIP ARTHROPLASTY Left 04/12/2022     FAMILY HISTORY:   Family History   Problem Relation Age of Onset    Ovarian Cancer Mother     Alzheimer Disease Mother     Depression Father 45        suicide    Diabetes Sister        SOCIAL HISTORY:   Patient's living condition: lives alone    MEDICATIONS  Post Discharge Medication Reconciliation Status: discharge medications reconciled and changed, per note/orders.  Current Outpatient Medications   Medication Sig Dispense Refill    acetaminophen (TYLENOL) 500 MG tablet Take 1,000 mg by mouth 3 times daily.      amiodarone (PACERONE) 200 MG tablet Take 1 tablet (200 mg) by mouth or FT or NG tube 2 times daily for 2 days, THEN 1 tablet (200 mg) daily.      amoxicillin-clavulanate (AUGMENTIN) 875-125 MG tablet Take 1 tablet by mouth every 12 hours for 7 doses.      dexAMETHasone (DECADRON) 4 MG tablet Take 4 mg by mouth daily (with breakfast).      digoxin (LANOXIN) 125 MCG tablet Take 1 tablet (125 mcg) by mouth daily.      diltiazem ER (DILT-XR) 120 MG 24 hr capsule Take 3 capsules (360 mg) by mouth daily.      fexofenadine (ALLEGRA) 180 MG tablet Take 180 mg by mouth daily      furosemide (LASIX) 40 MG tablet Take 1 tablet (40 mg) by mouth daily. 30 tablet 1    Glucosamine-Chondroitin (OSTEO BI-FLEX REGULAR STRENGTH PO) Take 2 tablets by mouth daily.      HEMP OIL OR EXTRACT OR OTHER CBD CANNABINOID, NOT MEDICAL CANNABIS, Take 1 chew tab by mouth daily as needed. CBD gummies      Melatonin 10 MG TABS tablet Take 10 mg by mouth at bedtime.      morphine (MS CONTIN) 15 MG CR tablet Take 2 tablets (30 mg) by mouth every morning AND 1 tablet (15 mg) every evening. 30 tablet 0    Multiple Vitamins-Minerals (EMERGEN-C VITAMIN C) PACK Take 1 packet by mouth daily.      oxyCODONE  (ROXICODONE) 5 MG tablet Take 1 tablet (5 mg) by mouth every 4 hours as needed for severe pain. 30 tablet 0     ROS:  10 point ROS neg other than the symptoms noted above in the HPI.      PHYSICAL EXAM:  /78   Pulse 92   Temp 97.9  F (36.6  C)   Resp 20   Ht 1.829 m (6')   Wt 93.9 kg (207 lb)   SpO2 97%   BMI 28.07 kg/m    Physical Exam  Cardiovascular:      Rate and Rhythm: Normal rate. Rhythm irregular.      Heart sounds: Normal heart sounds.   Pulmonary:      Effort: Pulmonary effort is normal.      Breath sounds: Normal breath sounds.   Abdominal:      General: Bowel sounds are normal.      Palpations: Abdomen is soft.   Skin:     Comments: Dressing to BLE CDI   Neurological:      Mental Status: He is alert and oriented to person, place, and time.   Psychiatric:         Mood and Affect: Mood normal.         Thought Content: Thought content normal.          LABORATORY/IMAGING DATA:  Reviewed as per [facility] and/or [facility]    ASSESSMENT/PLAN:  Cellulitis of right lower extremity  Cellulitis of finger of right hand  Physical deconditioning  Appears to be healing. Afebrile.  Discussed with patient, nursing staff.   - continue current wound cares   - completed augmentin   - PT/OT  -  to assist with discharge planning.     Atrial fibrillation with rapid ventricular response (H)  Metoprolol discontinued due to hypotension. New on amiodarone, digoxin, diltiazem. Xarelto on hold due to thrombocytopenia. HR  in TCU.  Discussed with patient, nursing staff.   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 S/p RUL lobectomy. Has  completed 3/7 rounds of chemo. Completed gamma knife therapy. CT showed multiple pathological fractures.   - continue dexamethasone 4mg daily  - analgesia with MS contin 30mg q AM, 15mg at bedtime and oxycodone PRN, APAP TID     Essential hypertension, benign  BLE edema  Had hypotension, likely due to sepsis, losartan and metoprolol discontinued. -140   - monitor,   - continue diltiazem ER  - continue lasix 40mg daily     Hyponatremia  , on 1500ml FWR,  on labs today   - increased FWR to 1800ml given ongoing edmea  - BMP  on 9/30    Antineoplastic chemotherapy induced pancytopenia (H24)  Likely due to bone marrow mets, steroids and radiation. Received 4 doses of filgastrim while IP. Reviewed labs done to day- labs improving. Reviewed with patient.   - CBC to monitor for stability     Leukocytosis, unspecified type  WBC critically high at 89 today, did get 4 doses of filgastrim while IP.  Discussed with patient, nursing staff.   - CBC to monitor on 9/30  - monitor       Orders:   Discontinue PICC line  May take only 15mg of MS contin in AM if patient requests  BMP, CBC on 9/30 DX Leukocytosis, hyponatremia  Senna-s tab 1 daily PRN    Total time spent with patient visit at the skilled nursing facility was 45 min including patient visit and review of past records. Total time spent reviewing records from hospitalization within my organization, review of TCU facility records, medication reconciliation, discussion of plan of care with patient, nursing staff and therapy as stated above as well as time spent on documentation.      Electronically signed by:  DIEGO Jara CNP

## 2024-09-26 NOTE — LETTER
9/26/2024      Dk Randhawa Sr.  [address]  [facility] [address]        [facility] GERIATRICS  INITIAL VISIT NOTE  September 25, 2024    PRIMARY CARE PROVIDER AND CLINIC: Esteban Copeland [address] [facility] [address]    [facility] Medical Record Number: [id number]  Place [facility] where encounter took [facility] [[id number]]    Chief Complaint   Patient presents with     Hospital F/U     [facility] 9/16/2024 - 9/23/2024     HPI:    Dk Randhawa Sr. is a 69 year old (1955) male was admitted to the above facility from [facility]. Hospital stay 9/16/24 through 9/24/24 where they were admitted for cellulitis. Now admitted to this facility for rehab, medical management, and nursing care.      History obtained from: facility chart records, facility staff, patient report, [facility] chart review, and Care Everywhere [facility] chart review.      Brief Hospital Course: PMH of lung cancer with mets to brain and bone, s/p lobectomy, currently on chemoradiation, afib, HTN, chronic anemia who presented with weakness, unable to care for self at home. Found to have cellulitis of right finger, elbow, and foot, developed septic shock. Cultures grew MSSA, initially on cefepime and vancomycin, changed to Augmentin. Was started on Filgrastin for neutropenia, Xarelto held due to thrombocytopenia. Was transfused 1 unit PRBC for anemia. Had afib with RVR, metoprolol and losartan on hold due to hypotension.  Received amiodarone bolus/gtt. Transitioned to oral amiodarone, diltiazem, digoxin with improved rate control. On fluid restriction for edema, hyponatremia with NA of 129. Shock resolved. When medically stable was discharged to TCU for further rehab and medical management.     TCU Course: Seen today resting in bed. He reports he is feeling much better, just is still very weak needing EZ stand to transfer. Needs to get stronger before  he goes home. Lives in a handicap accessible town home alone, but his brother and neighbors help him. He has pain , but it is controlled. He sometimes only takes one instead of two MS Contin in the morning, is hoping he will be able to come off narcotics. Appetite is ok, he has not had a B Min two days, is nervous about taking laxatives as they previously caused him diarrhea. Denies any SOB.     CODE STATUS/ADVANCE DIRECTIVES: CPR/Full code     ALLERGIES:  No Known Allergies    PAST MEDICAL HISTORY:   Past Medical History:   Diagnosis Date     Acute non-recurrent maxillary sinusitis      Antiplatelet or antithrombotic long-term use      Arrhythmia      Atrial fibrillation with RVR (H)      Cough secondary to angiotensin converting enzyme inhibitor (ACE-I)      Hyperlipidemia      Hypertension      Malignant neoplasm metastatic to bone (H) 07/26/2024     Mass of upper lobe of right lung      Obesity      Pathological fracture in neoplastic disease, pelvis, init 08/23/2024     PAST SURGICAL HISTORY:   Past Surgical History:   Procedure Laterality Date     BRONCHOSCOPY, WITH BIOPSY, ROBOT ASSISTED Bilateral 04/16/2024    Procedure: BRONCHOSCOPY, endobronchial ultrasound, transbronchial needle aspiration, endobronchial biopsies and tumor debulking;  Surgeon: Lanette Arce MD;  Location: [facility] OR     DAVINCI EXCISE NODES THORACIC N/A 5/15/2024    Procedure: mediastinal lymph node dissection;  Surgeon: Mikael Peoples MD;  Location: [facility] OR     DAVINCI LOBECTOMY LUNG Right 5/15/2024    Procedure: Robot-assisted thoracoscopic right upper lobectomy,;  Surgeon: Mikael Peoples MD;  Location: [facility] OR     ELBOW ARTHROSCOPY Left 03/09/2017     INSERT PORT VASCULAR ACCESS Right 7/5/2024    Procedure: INSERTION, VASCULAR ACCESS PORT;  Surgeon: Henry Veronica MD;  Location: [facility] OR     PHACOEMULSIFICATION WITH STANDARD INTRAOCULAR LENS IMPLANT Left 02/26/2018    Procedure: PHACOEMULSIFICATION WITH STANDARD INTRAOCULAR LENS  IMPLANT;  Left Cataract Removal with Implant;  Surgeon: Mohit Victor MD;  Location: [facility] OR     PHACOEMULSIFICATION WITH STANDARD INTRAOCULAR LENS IMPLANT Right 01/30/2019    Procedure: Cataract Removal with Implant;  Surgeon: Mohit Victor MD;  Location: [facility] OR     TONSILLECTOMY  1964     TOTAL HIP ARTHROPLASTY Left 04/12/2022     FAMILY HISTORY:   Family History   Problem Relation Age of Onset     Ovarian Cancer Mother      Alzheimer Disease Mother      Depression Father 45        suicide     Diabetes Sister        SOCIAL HISTORY:   Patient's living condition: lives alone    MEDICATIONS  Post Discharge Medication Reconciliation Status: discharge medications reconciled and changed, per note/orders.  Current Outpatient Medications   Medication Sig Dispense Refill     acetaminophen (TYLENOL) 500 MG tablet Take 1,000 mg by mouth 3 times daily.       amiodarone (PACERONE) 200 MG tablet Take 1 tablet (200 mg) by mouth or FT or NG tube 2 times daily for 2 days, THEN 1 tablet (200 mg) daily.       amoxicillin-clavulanate (AUGMENTIN) 875-125 MG tablet Take 1 tablet by mouth every 12 hours for 7 doses.       dexAMETHasone (DECADRON) 4 MG tablet Take 4 mg by mouth daily (with breakfast).       digoxin (LANOXIN) 125 MCG tablet Take 1 tablet (125 mcg) by mouth daily.       diltiazem ER (DILT-XR) 120 MG 24 hr capsule Take 3 capsules (360 mg) by mouth daily.       fexofenadine (ALLEGRA) 180 MG tablet Take 180 mg by mouth daily       furosemide (LASIX) 40 MG tablet Take 1 tablet (40 mg) by mouth daily. 30 tablet 1     Glucosamine-Chondroitin (OSTEO BI-FLEX REGULAR STRENGTH PO) Take 2 tablets by mouth daily.       HEMP OIL OR EXTRACT OR OTHER CBD CANNABINOID, NOT MEDICAL CANNABIS, Take 1 chew tab by mouth daily as needed. CBD gummies       Melatonin 10 MG TABS tablet Take 10 mg by mouth at bedtime.       morphine (MS CONTIN) 15 MG CR tablet Take 2 tablets (30 mg) by mouth every morning AND 1 tablet (15 mg)  every evening. 30 tablet 0     Multiple Vitamins-Minerals (EMERGEN-C VITAMIN C) PACK Take 1 packet by mouth daily.       oxyCODONE (ROXICODONE) 5 MG tablet Take 1 tablet (5 mg) by mouth every 4 hours as needed for severe pain. 30 tablet 0     ROS:  10 point ROS neg other than the symptoms noted above in the HPI.      PHYSICAL EXAM:  /78   Pulse 92   Temp 97.9  F (36.6  C)   Resp 20   Ht 1.829 m (6')   Wt 93.9 kg (207 lb)   SpO2 97%   BMI 28.07 kg/m    Physical Exam  Cardiovascular:      Rate and Rhythm: Normal rate. Rhythm irregular.      Heart sounds: Normal heart sounds.   Pulmonary:      Effort: Pulmonary effort is normal.      Breath sounds: Normal breath sounds.   Abdominal:      General: Bowel sounds are normal.      Palpations: Abdomen is soft.   Skin:     Comments: Dressing to BLE CDI   Neurological:      Mental Status: He is alert and oriented to person, place, and time.   Psychiatric:         Mood and Affect: Mood normal.         Thought Content: Thought content normal.          LABORATORY/IMAGING DATA:  Reviewed as per [facility] and/or [facility]    ASSESSMENT/PLAN:  Cellulitis of right lower extremity  Cellulitis of finger of right hand  Physical deconditioning  Appears to be healing. Afebrile.  Discussed with patient, nursing staff.   - continue current wound cares   - completed augmentin   - PT/OT  -  to assist with discharge planning.     Atrial fibrillation with rapid ventricular response (H)  Metoprolol discontinued due to hypotension. New on amiodarone, digoxin, diltiazem. Xarelto on hold due to thrombocytopenia. HR  in TCU.  Discussed with patient, nursing staff.   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 S/p RUL lobectomy. Has completed 3/7 rounds of chemo. Completed gamma knife therapy. CT showed multiple pathological fractures.   - continue dexamethasone 4mg daily  - analgesia with MS contin 30mg q AM, 15mg at bedtime and oxycodone PRN, APAP TID     Essential hypertension, benign  BLE edema  Had hypotension, likely due to sepsis, losartan and metoprolol discontinued. -140   - monitor,   - continue diltiazem ER  - continue lasix 40mg daily     Hyponatremia  , on 1500ml FWR,  on labs today   - increased FWR to 1800ml given ongoing edmea  - BMP  on 9/30    Antineoplastic chemotherapy induced pancytopenia (H24)  Likely due to bone marrow mets, steroids and radiation. Received 4 doses of filgastrim while IP. Reviewed labs done to day- labs improving. Reviewed with patient.   - CBC to monitor for stability     Leukocytosis, unspecified type  WBC critically high at 89 today, did get 4 doses of filgastrim while IP.  Discussed with patient, nursing staff.   - CBC to monitor on 9/30  - monitor       Orders:   Discontinue PICC line  May take only 15mg of MS contin in AM if patient requests  BMP, CBC on 9/30 DX Leukocytosis, hyponatremia  Senna-s tab 1 daily PRN    Total time spent with patient visit at the skilled nursing facility was 45 min including patient visit and review of past records. Total time spent reviewing records from hospitalization within my organization, review of TCU facility records, medication reconciliation, discussion of plan of care with patient, nursing staff and therapy as stated above as well as time spent on documentation.      Electronically signed by:  DIEGO Jara CNP       Sincerely,        DIEGO Jara CNP

## 2024-09-27 NOTE — TELEPHONE ENCOUNTER
Spoke to Homer's brother, Devon, today to follow up after Homer's hospitalization. Devon states that Homer transitioned to a TCU yesterday and they have not talked about discharge date yet. Devon states that Homer may still be interested in radiation after he is discharged. We discussed this RN will update Dr. Andino and continue to monitor his chart for pending discharge date.     Nancy Bunn RN

## 2024-10-02 NOTE — PROGRESS NOTES
[facility] GERIATRICS  ACUTE/EPISODIC VISIT    [facility] Medical Record Number: [id number]  Place of Service where encounter took [facility]) [[id number]]    Chief Complaint   Patient presents with    RECHECK     HPI:    Dk Randhawa Sr. is a 69 year old (1955), who is being seen today for an episodic care visit. HPI information obtained from: facility chart records, facility staff, patient report, [facility] chart review, and Care Everywhere [facility] chart review.    Today's concern is: Recent hospitalization with cellulitis, shock. Seen today resting in bed. He reports that he is feeling much better. Was able to stand for 2-3 minutes at the parallel bars with therapy. Also able to tolerate sitting up for longer periods of time. He had a large BM yesterday, appetite is improved. Denies any SOB. Continues to have pain in his left hip, has been using ice and Biofreeze both of which have been helpful.     SLUMS 16/30    ALLERGIES: No Known Allergies   MEDICATIONS:  Post Discharge Medication Reconciliation Status: medication reconcilation previously completed during another office visit.     Current Outpatient Medications   Medication Sig Dispense Refill    acetaminophen (TYLENOL) 500 MG tablet Take 1,000 mg by mouth 3 times daily.      amiodarone (PACERONE) 200 MG tablet Take 1 tablet (200 mg) by mouth or FT or NG tube 2 times daily for 2 days, THEN 1 tablet (200 mg) daily.      dexAMETHasone (DECADRON) 4 MG tablet Take 4 mg by mouth daily (with breakfast).      digoxin (LANOXIN) 125 MCG tablet Take 1 tablet (125 mcg) by mouth daily.      diltiazem ER (DILT-XR) 120 MG 24 hr capsule Take 3 capsules (360 mg) by mouth daily.      fexofenadine (ALLEGRA) 180 MG tablet Take 180 mg by mouth daily      furosemide (LASIX) 40 MG tablet Take 1 tablet (40 mg) by mouth daily. 30 tablet 1    Glucosamine-Chondroitin (OSTEO BI-FLEX REGULAR STRENGTH PO) Take 2 tablets by mouth  daily.      HEMP OIL OR EXTRACT OR OTHER CBD CANNABINOID, NOT MEDICAL CANNABIS, Take 1 chew tab by mouth daily as needed. CBD gummies      Melatonin 10 MG TABS tablet Take 10 mg by mouth at bedtime.      morphine (MS CONTIN) 15 MG CR tablet Take 2 tablets (30 mg) by mouth every morning AND 1 tablet (15 mg) every evening. 45 tablet 0    Multiple Vitamins-Minerals (EMERGEN-C VITAMIN C) PACK Take 1 packet by mouth daily.      oxyCODONE (ROXICODONE) 5 MG tablet Take 1 tablet (5 mg) by mouth every 4 hours as needed for severe pain. 30 tablet 0     Medications reviewed:  Medications reconciled to facility chart and changes were made to reflect current medications as identified as above med list. Below are the changes that were made:   Medications stopped since last EPIC medication reconciliation:   There are no discontinued medications.    Medications started since last [facility] medication reconciliation:  No orders of the defined types were placed in this encounter.        REVIEW OF SYSTEMS:  4 point ROS neg other than the symptoms noted above in the HPI.    PHYSICAL EXAM:  /84   Pulse 97   Temp 98  F (36.7  C)   Resp 18   Ht 1.829 m (6')   Wt 94.5 kg (208 lb 6.4 oz)   SpO2 95%   BMI 28.26 kg/m    Physical Exam  Cardiovascular:      Rate and Rhythm: Normal rate and regular rhythm.      Heart sounds: Normal heart sounds.   Pulmonary:      Effort: Pulmonary effort is normal.      Breath sounds: Normal breath sounds.   Abdominal:      General: Bowel sounds are normal.   Skin:     Capillary Refill: Capillary refill takes 2 to 3 seconds.      Comments: Dressing to Bilat feet CDI, no erythema   Neurological:      Mental Status: He is alert and oriented to person, place, and time.      Motor: Weakness present.   Psychiatric:         Mood and Affect: Mood normal.         Thought Content: Thought content normal.         ASSESSMENT / PLAN:  Cellulitis of right lower extremity  Cellulitis of finger of right hand  Appears  resolved, completed course of Augmentin  - continue current wound cares    Atrial fibrillation with rapid ventricular response (H)  HR controlled 80-90, metoprolol discontinued while IP due to hypotension. New on amiodarone, digoxin, diltiazem. Xarelto on hold due to thrombocytopenia.   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 S/p RUL lobectomy. Has completed 3/7 rounds of chemo. Completed gamma knife therapy. CT showed multiple pathological fractures.   - continue dexamethasone 4mg daily  - analgesia with MS contin 30mg q AM, 15mg at bedtime and oxycodone PRN, APAP TID    Hyponatremia  -131 labs today  - Na to monitor for stability on 10/7    Leukocytosis, unspecified type   did get 4 doses of filgastrim while IP, WBC 96->86 on labs today. Afebrile no s/s of infection  - CBC to monitor for stability     Essential hypertension, benign  -130,   - continue lasix daily, diltiazem ER  - monitor and adjust       Orders:  CBC, NA on 10/7  Biofreeze QID PRN    Electronically signed by:  DIEGO Jara CNP

## 2024-10-03 NOTE — LETTER
10/3/2024      Dk Randhawa Sr.  [address]  [place] [id number]        [facility] GERIATRICS  ACUTE/EPISODIC VISIT    [facility] Medical Record Number: [id number]  Place of Service where encounter took place: [facility] ([facility]) [[id number]]    Chief Complaint   Patient presents with     RECHECK     HPI:    Dk Randhawa Sr. is a 69 year old (1955), who is being seen today for an episodic care visit. HPI information obtained from: facility chart records, facility staff, patient report, [facility] chart review, and Care Everywhere [facility] chart review.    Today's concern is: Recent hospitalization with cellulitis, shock. Seen today resting in bed. He reports that he is feeling much better. Was able to stand for 2-3 minutes at the parallel bars with therapy. Also able to tolerate sitting up for longer periods of time. He had a large BM yesterday, appetite is improved. Denies any SOB. Continues to have pain in his left hip, has been using ice and Biofreeze both of which have been helpful.     UMS 16/30    ALLERGIES: No Known Allergies   MEDICATIONS:  Post Discharge Medication Reconciliation Status: medication reconcilation previously completed during another office visit.     Current Outpatient Medications   Medication Sig Dispense Refill     acetaminophen (TYLENOL) 500 MG tablet Take 1,000 mg by mouth 3 times daily.       amiodarone (PACERONE) 200 MG tablet Take 1 tablet (200 mg) by mouth or FT or NG tube 2 times daily for 2 days, THEN 1 tablet (200 mg) daily.       dexAMETHasone (DECADRON) 4 MG tablet Take 4 mg by mouth daily (with breakfast).       digoxin (LANOXIN) 125 MCG tablet Take 1 tablet (125 mcg) by mouth daily.       diltiazem ER (DILT-XR) 120 MG 24 hr capsule Take 3 capsules (360 mg) by mouth daily.       fexofenadine (ALLEGRA) 180 MG tablet Take 180 mg by mouth daily       furosemide (LASIX) 40 MG tablet Take 1 tablet (40 mg) by mouth daily. 30  tablet 1     Glucosamine-Chondroitin (OSTEO BI-FLEX REGULAR STRENGTH PO) Take 2 tablets by mouth daily.       HEMP OIL OR EXTRACT OR OTHER CBD CANNABINOID, NOT MEDICAL CANNABIS, Take 1 chew tab by mouth daily as needed. CBD gummies       Melatonin 10 MG TABS tablet Take 10 mg by mouth at bedtime.       morphine (MS CONTIN) 15 MG CR tablet Take 2 tablets (30 mg) by mouth every morning AND 1 tablet (15 mg) every evening. 45 tablet 0     Multiple Vitamins-Minerals (EMERGEN-C VITAMIN C) PACK Take 1 packet by mouth daily.       oxyCODONE (ROXICODONE) 5 MG tablet Take 1 tablet (5 mg) by mouth every 4 hours as needed for severe pain. 30 tablet 0     Medications reviewed:  Medications reconciled to facility chart and changes were made to reflect current medications as identified as above med list. Below are the changes that were made:   Medications stopped since last EPIC medication reconciliation:   There are no discontinued medications.    Medications started since last [facility] medication reconciliation:  No orders of the defined types were placed in this encounter.        REVIEW OF SYSTEMS:  4 point ROS neg other than the symptoms noted above in the HPI.    PHYSICAL EXAM:  /84   Pulse 97   Temp 98  F (36.7  C)   Resp 18   Ht 1.829 m (6')   Wt 94.5 kg (208 lb 6.4 oz)   SpO2 95%   BMI 28.26 kg/m    Physical Exam  Cardiovascular:      Rate and Rhythm: Normal rate and regular rhythm.      Heart sounds: Normal heart sounds.   Pulmonary:      Effort: Pulmonary effort is normal.      Breath sounds: Normal breath sounds.   Abdominal:      General: Bowel sounds are normal.   Skin:     Capillary Refill: Capillary refill takes 2 to 3 seconds.      Comments: Dressing to Bilat feet CDI, no erythema   Neurological:      Mental Status: He is alert and oriented to person, place, and time.      Motor: Weakness present.   Psychiatric:         Mood and Affect: Mood normal.         Thought Content: Thought content normal.          ASSESSMENT / PLAN:  Cellulitis of right lower extremity  Cellulitis of finger of right hand  Appears resolved, completed course of Augmentin  - continue current wound cares    Atrial fibrillation with rapid ventricular response (H)  HR controlled 80-90, metoprolol discontinued while IP due to hypotension. New on amiodarone, digoxin, diltiazem. Xarelto on hold due to thrombocytopenia.   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 S/p RUL lobectomy. Has completed 3/7 rounds of chemo. Completed gamma knife therapy. CT showed multiple pathological fractures.   - continue dexamethasone 4mg daily  - analgesia with MS contin 30mg q AM, 15mg at bedtime and oxycodone PRN, APAP TID    Hyponatremia  -131 labs today  - Na to monitor for stability on 10/7    Leukocytosis, unspecified type   did get 4 doses of filgastrim while IP, WBC 96->86 on labs today. Afebrile no s/s of infection  - CBC to monitor for stability     Essential hypertension, benign  -130,   - continue lasix daily, diltiazem ER  - monitor and adjust       Orders:  CBC, NA on 10/7  Biofreeze QID PRN    Electronically signed by:  DIEGO Jara CNP      Sincerely,        DIEGO Jara CNP

## 2024-10-07 NOTE — LETTER
10/7/2024      Dk Randhawa Sr.  [address]  [place] [facility] [facility] [address]        [facility] GERIATRICS  ACUTE/EPISODIC VISIT    [facility] Medical Record Number: [id number]  Place of Service where encounter took place: [facility] ([facility]) [[id number]]    Chief Complaint   Patient presents with     RECHECK     HPI:    Dk Randhawa Sr. is a 69 year old (1955), who is being seen today for an episodic care visit. HPI information obtained from: facility chart records, facility staff, patient report, and [facility] chart review.    Today's concern is: Recent hospitalization with cellulitis, shock; underlying stage IV lung cancer with bone mets. Seen today as staff requesting foot wound be evaluated. They also report some redness and drainage to right elbow. Staff started applying medihoney to foot wounds. He feels that he is making progress with therapy, has been able to stand at parallel bars but continues to need EZ stand for transfers. Appetite is good, he had a BM yesterday, thinks he might need a scheduled laxative. Denies any SOB. Continues to have pain in right hip and in buttocks if sits in the wrong position.     ALLERGIES: No Known Allergies   MEDICATIONS:  Post Discharge Medication Reconciliation Status: medication reconcilation previously completed during another office visit.     Current Outpatient Medications   Medication Sig Dispense Refill     acetaminophen (TYLENOL) 500 MG tablet Take 1,000 mg by mouth 3 times daily.       amiodarone (PACERONE) 200 MG tablet Take 1 tablet (200 mg) by mouth or FT or NG tube 2 times daily for 2 days, THEN 1 tablet (200 mg) daily.       dexAMETHasone (DECADRON) 4 MG tablet Take 4 mg by mouth daily (with breakfast).       digoxin (LANOXIN) 125 MCG tablet Take 1 tablet (125 mcg) by mouth daily.       diltiazem ER (DILT-XR) 120 MG 24 hr capsule Take 3 capsules (360 mg) by mouth daily.       fexofenadine (ALLEGRA) 180 MG tablet  Take 180 mg by mouth daily       furosemide (LASIX) 40 MG tablet Take 1 tablet (40 mg) by mouth daily. 30 tablet 1     Glucosamine-Chondroitin (OSTEO BI-FLEX REGULAR STRENGTH PO) Take 2 tablets by mouth daily.       HEMP OIL OR EXTRACT OR OTHER CBD CANNABINOID, NOT MEDICAL CANNABIS, Take 1 chew tab by mouth daily as needed. CBD gummies       Melatonin 10 MG TABS tablet Take 10 mg by mouth at bedtime.       morphine (MS CONTIN) 15 MG CR tablet Take 2 tablets (30 mg) by mouth every morning AND 1 tablet (15 mg) every evening. 45 tablet 0     Multiple Vitamins-Minerals (EMERGEN-C VITAMIN C) PACK Take 1 packet by mouth daily.       oxyCODONE (ROXICODONE) 5 MG tablet Take 1 tablet (5 mg) by mouth every 4 hours as needed for severe pain. 30 tablet 0     Medications reviewed:  Medications reconciled to facility chart and changes were made to reflect current medications as identified as above med list. Below are the changes that were made:   Medications stopped since last EPIC medication reconciliation:   There are no discontinued medications.    Medications started since last [facility] medication reconciliation:  No orders of the defined types were placed in this encounter.        REVIEW OF SYSTEMS:  4 point ROS neg other than the symptoms noted above in the HPI.      PHYSICAL EXAM:  /82   Pulse 94   Temp 97.8  F (36.6  C)   Resp 20   Ht 1.829 m (6')   Wt 86.8 kg (191 lb 4.8 oz)   SpO2 96%   BMI 25.94 kg/m    Physical Exam  Cardiovascular:      Rate and Rhythm: Normal rate and regular rhythm.      Heart sounds: Normal heart sounds.   Pulmonary:      Effort: Pulmonary effort is normal.      Breath sounds: Normal breath sounds.   Abdominal:      General: Bowel sounds are normal.      Palpations: Abdomen is soft.   Musculoskeletal:      Right lower leg: No edema.      Left lower leg: No edema.   Skin:     Comments: Right elbow pink, moderate tan drainage  Large areas of eschar both feet that is mobile on both feet,  small amount of white/tan drainage  Mild pinkness to left elbow   Neurological:      Mental Status: He is alert.         ASSESSMENT / PLAN:  Cellulitis of right lower extremity  Appears resolved, no drainage from right elbow and foot wounds, mild erythema to bilat elbows, but no concerns for infection currently    Wound of left foot  Wound of right foot  Both covered with eschar tissue, which has loosened on the wounds, some drainage  - ok to continue with medihoney    Pressure injury of buttock, unstageable, bilateral  - continue current wound cares,   - continue air mattress, has ordered Roho cushion for wheelchair    Malignant neoplasm of upper lobe of right lung (H)  Malignant neoplasm metastatic to bone (H)  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 S/p RUL lobectomy. Has completed 3/7 rounds of chemo. Completed gamma knife therapy. CT showed multiple pathological fractures. Ongoing pain in right hip, limits mobility   - continue dexamethasone 4mg daily  - analgesia with MS contin 30mg q AM, 15mg at bedtime and oxycodone PRN, APAP TID    Leukocytosis, unspecified type  WBC remains stable ~90K, did receive 4 doses of filgastrim while IP. Afebrile. No s/s of infection currently  - CBC in 1 week     Slow transit constipation  + bm, but reporting some constipation now off IV abx.   - SENNA-docusate sodium (SENNA S) 8.6-50 MG tablet; Take 1 tablet by mouth at bedtime.      Orders:  Medihoney to bilat feet  CBC on 10/14  Senna-s 1 tab PO daily     Electronically signed by:  DIEGO Jara CNP      Sincerely,        DIEGO Jara CNP

## 2024-10-07 NOTE — PROGRESS NOTES
[facility] GERIATRICS  ACUTE/EPISODIC VISIT    [facility] Medical Record Number: [id number]  [facility] [[id number]]    Chief Complaint   Patient presents with    RECHECK     HPI:    Dk Randhawa Sr. is a 69 year old (1955), who is being seen today for an episodic care visit. HPI information obtained from: facility chart records, facility staff, patient report, and [facility] chart review.    Today's concern is: Recent hospitalization with cellulitis, shock; underlying stage IV lung cancer with bone mets. Seen today as staff requesting foot wound be evaluated. They also report some redness and drainage to right elbow. Staff started applying medihoney to foot wounds. He feels that he is making progress with therapy, has been able to stand at parallel bars but continues to need EZ stand for transfers. Appetite is good, he had a BM yesterday, thinks he might need a scheduled laxative. Denies any SOB. Continues to have pain in right hip and in buttocks if sits in the wrong position.     ALLERGIES: No Known Allergies   MEDICATIONS:  Post Discharge Medication Reconciliation Status: medication reconcilation previously completed during another office visit.     Current Outpatient Medications   Medication Sig Dispense Refill    acetaminophen (TYLENOL) 500 MG tablet Take 1,000 mg by mouth 3 times daily.      amiodarone (PACERONE) 200 MG tablet Take 1 tablet (200 mg) by mouth or FT or NG tube 2 times daily for 2 days, THEN 1 tablet (200 mg) daily.      dexAMETHasone (DECADRON) 4 MG tablet Take 4 mg by mouth daily (with breakfast).      digoxin (LANOXIN) 125 MCG tablet Take 1 tablet (125 mcg) by mouth daily.      diltiazem ER (DILT-XR) 120 MG 24 hr capsule Take 3 capsules (360 mg) by mouth daily.      fexofenadine (ALLEGRA) 180 MG tablet Take 180 mg by mouth daily      furosemide (LASIX) 40 MG tablet Take 1 tablet (40 mg) by mouth  daily. 30 tablet 1    Glucosamine-Chondroitin (OSTEO BI-FLEX REGULAR STRENGTH PO) Take 2 tablets by mouth daily.      HEMP OIL OR EXTRACT OR OTHER CBD CANNABINOID, NOT MEDICAL CANNABIS, Take 1 chew tab by mouth daily as needed. CBD gummies      Melatonin 10 MG TABS tablet Take 10 mg by mouth at bedtime.      morphine (MS CONTIN) 15 MG CR tablet Take 2 tablets (30 mg) by mouth every morning AND 1 tablet (15 mg) every evening. 45 tablet 0    Multiple Vitamins-Minerals (EMERGEN-C VITAMIN C) PACK Take 1 packet by mouth daily.      oxyCODONE (ROXICODONE) 5 MG tablet Take 1 tablet (5 mg) by mouth every 4 hours as needed for severe pain. 30 tablet 0     Medications reviewed:  Medications reconciled to facility chart and changes were made to reflect current medications as identified as above med list. Below are the changes that were made:   Medications stopped since last EPIC medication reconciliation:   There are no discontinued medications.    Medications started since last [facility] medication reconciliation:  No orders of the defined types were placed in this encounter.        REVIEW OF SYSTEMS:  4 point ROS neg other than the symptoms noted above in the HPI.      PHYSICAL EXAM:  /82   Pulse 94   Temp 97.8  F (36.6  C)   Resp 20   Ht 1.829 m (6')   Wt 86.8 kg (191 lb 4.8 oz)   SpO2 96%   BMI 25.94 kg/m    Physical Exam  Cardiovascular:      Rate and Rhythm: Normal rate and regular rhythm.      Heart sounds: Normal heart sounds.   Pulmonary:      Effort: Pulmonary effort is normal.      Breath sounds: Normal breath sounds.   Abdominal:      General: Bowel sounds are normal.      Palpations: Abdomen is soft.   Musculoskeletal:      Right lower leg: No edema.      Left lower leg: No edema.   Skin:     Comments: Right elbow pink, moderate tan drainage  Large areas of eschar both feet that is mobile on both feet, small amount of white/tan drainage  Mild pinkness to left elbow   Neurological:      Mental Status: He is  alert.         ASSESSMENT / PLAN:  Cellulitis of right lower extremity  Appears resolved, no drainage from right elbow and foot wounds, mild erythema to bilat elbows, but no concerns for infection currently    Wound of left foot  Wound of right foot  Both covered with eschar tissue, which has loosened on the wounds, some drainage  - ok to continue with medihoney    Pressure injury of buttock, unstageable, bilateral  - continue current wound cares,   - continue air mattress, has ordered Roho cushion for wheelchair    Malignant neoplasm of upper lobe of right lung (H)  Malignant neoplasm metastatic to bone (H)  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 S/p RUL lobectomy. Has completed 3/7 rounds of chemo. Completed gamma knife therapy. CT showed multiple pathological fractures. Ongoing pain in right hip, limits mobility   - continue dexamethasone 4mg daily  - analgesia with MS contin 30mg q AM, 15mg at bedtime and oxycodone PRN, APAP TID    Leukocytosis, unspecified type  WBC remains stable ~90K, did receive 4 doses of filgastrim while IP. Afebrile. No s/s of infection currently  - CBC in 1 week     Slow transit constipation  + bm, but reporting some constipation now off IV abx.   - SENNA-docusate sodium (SENNA S) 8.6-50 MG tablet; Take 1 tablet by mouth at bedtime.      Orders:  Medihoney to bilat feet  CBC on 10/14  Senna-s 1 tab PO daily     Electronically signed by:  DIEGO Jara CNP

## 2024-10-08 NOTE — PROGRESS NOTES
"[facility] GERIATRICS    PRIMARY CARE PROVIDER AND CLINIC:  Esteban Copeland MD, [address] [facility] [address]  Chief Complaint   Patient presents with    Hospital F/U      [facility] Medical Record Number:  [id number]  Place of Service where encounter took place:  [facility] ([facility]) [[id number]]    Dk Randhawa Sr.  is a 69 year old  (1955), admitted to the above facility from  [facility]. Hospital stay 9/16/24 through 9/23/24..   HPI:    As per GNP's note:\"Brief Hospital Course: PMH of lung cancer with mets to brain and bone, s/p lobectomy, currently on chemoradiation, afib, HTN, chronic anemia who presented with weakness, unable to care for self at home. Found to have cellulitis of right finger, elbow, and foot, developed septic shock. Cultures grew MSSA, initially on cefepime and vancomycin, changed to Augmentin. Was started on Filgrastin for neutropenia, Xarelto held due to thrombocytopenia. Was transfused 1 unit PRBC for anemia. Had afib with RVR, metoprolol and losartan on hold due to hypotension.  Received amiodarone bolus/gtt. Transitioned to oral amiodarone, diltiazem, digoxin with improved rate control. On fluid restriction for edema, hyponatremia with NA of 129. Shock resolved. When medically stable was discharged to TCU for further rehab and medical management. \"      TODAY:  -Metastatic dz/ C-RTx / neutropenia  -Anemia/thrombocytopenia  -Neutropenia/MSSA sepsis/Cellulitis  .completed IV ab  Patient denies fever or chills.  Reports he had 1 session of radiotherapy to the lesion in his right hip.  Still 9 More to go but that will be resumed once he is done with the rehab therapy here.    -A-fib/Edema/Low Na:  .FWR increased to 1800  Denies chest pain, shortness of breath.    -Rehab:  Reports is making progress.  He is very happy with the therapy.  He is hoping to be able to transfer himself from the bed to the wheelchair independently.  " Currently doing para lumbar.    -Cognition:  SLUM 16/30      -Unstageable  PI over buttock:  -Feet wound:  Patient reports that the wound over his elbows, bottom and over the feet were acquired at home.  Medihoney initiated for buttock wound.  Patient reports that all the wounds are getting better.    ====================================================================    CODE STATUS/ADVANCE DIRECTIVES DISCUSSION:  Full Code  CPR/Full code   ALLERGIES: No Known Allergies   PAST MEDICAL HISTORY:   Past Medical History:   Diagnosis Date    Acute non-recurrent maxillary sinusitis     Antiplatelet or antithrombotic long-term use     Arrhythmia     Atrial fibrillation with RVR (H)     Cough secondary to angiotensin converting enzyme inhibitor (ACE-I)     Hyperlipidemia     Hypertension     Malignant neoplasm metastatic to bone (H) 07/26/2024    Mass of upper lobe of right lung     Obesity     Pathological fracture in neoplastic disease, pelvis, init 08/23/2024      PAST SURGICAL HISTORY:   has a past surgical history that includes Phacoemulsification with standard intraocular lens implant (Left, 02/26/2018); Phacoemulsification with standard intraocular lens implant (Right, 01/30/2019); Bronchoscopy, With Biopsy, Robot Assisted (Bilateral, 04/16/2024); tonsillectomy (1964); Total Hip Arthroplasty (Left, 04/12/2022); ELBOW ARTHROSCOPY (Left, 03/09/2017); Davinci lobectomy lung (Right, 5/15/2024); DaVINCI excise nodes thoracic (N/A, 5/15/2024); and Insert port vascular access (Right, 7/5/2024).  FAMILY HISTORY: family history includes Alzheimer Disease in his mother; Depression (age of onset: 45) in his father; Diabetes in his sister; Ovarian Cancer in his mother.  SOCIAL HISTORY:   reports that he quit smoking about 10 years ago. His smoking use included cigarettes. He started smoking about 30 years ago. He has a 10 pack-year smoking history. He has never used smokeless tobacco. He reports that he does not currently use  alcohol. He reports that he does not use drugs.  Patient's living condition: lives alone    Post Discharge Medication Reconciliation Status:   MED REC REQUIRED  Post Medication Reconciliation Status: medication reconcilation previously completed during another office visit       Current Outpatient Medications   Medication Sig Dispense Refill    acetaminophen (TYLENOL) 500 MG tablet Take 1,000 mg by mouth 3 times daily.      amiodarone (PACERONE) 200 MG tablet Take 1 tablet (200 mg) by mouth or FT or NG tube 2 times daily for 2 days, THEN 1 tablet (200 mg) daily.      dexAMETHasone (DECADRON) 4 MG tablet Take 4 mg by mouth daily (with breakfast).      digoxin (LANOXIN) 125 MCG tablet Take 1 tablet (125 mcg) by mouth daily.      diltiazem ER (DILT-XR) 120 MG 24 hr capsule Take 3 capsules (360 mg) by mouth daily.      fexofenadine (ALLEGRA) 180 MG tablet Take 180 mg by mouth daily      furosemide (LASIX) 40 MG tablet Take 1 tablet (40 mg) by mouth daily. 30 tablet 1    Melatonin 10 MG TABS tablet Take 10 mg by mouth at bedtime.      morphine (MS CONTIN) 15 MG CR tablet Take 2 tablets (30 mg) by mouth every morning AND 1 tablet (15 mg) every evening. 45 tablet 0    oxyCODONE (ROXICODONE) 5 MG tablet Take 1 tablet (5 mg) by mouth every 4 hours as needed for severe pain. 30 tablet 0    SENNA-docusate sodium (SENNA S) 8.6-50 MG tablet Take 1 tablet by mouth at bedtime.       No current facility-administered medications for this visit.       ROS:  10 point ROS of systems including Constitutional, Eyes, Respiratory, Cardiovascular, Gastroenterology, Genitourinary, Integumentary, Musculoskeletal, Psychiatric were all negative except for pertinent positives noted in my HPI.    Vitals:  BP (!) 143/84   Pulse 94   Temp 98.3  F (36.8  C)   Resp 20   Ht 1.829 m (6')   Wt 95 kg (209 lb 6.4 oz)   SpO2 97%   BMI 28.40 kg/m    Exam  GENERAL APPEARANCE:  in no distress,   RESP:  Unlabored breathing. CTA b/l.   CV:  S1S2 audible,  regular HR, no murmur appreciated.   ABDOMEN:  soft, NT/ND, BS audible.   M/S:   traces edema over feet. no joint deformity noted on observation.   SKIN: grace catheter,urine bag with schroeder yellow fluid. A-port over right side of the chest wall on the anterior surface. Surgical dressing over elbows. Legs covered with dressing.   NEURO:   No NFD appreciated on observation.   PSYCH:  affect and mood normal    Lab/Diagnostic data: Reviewed in the chart and EHR.      ASSESSMENT/PLAN:  ---------------------------  Pressure injury of buttock, unstageable, bilateral  Wound of left foot  Wound of right foot  Cellulitis of right lower extremity  - wound care in place. Improving.   -Dietician recommendations.   - completed abx.       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   -Hb/Hct slightly dropped.   -next CBC on 14th. Follow  -Update Hem/Onc team  - plan to continue RTx to right help Pt hopes that his cancer be cured.       Chronic atrial fibrillation (H)  Essential hypertension, benign  Hyponatremia  - CVR on rate and rhythmic control  -Na slightly dropped.   - appears stable  - cardiology follow up    Other chronic pain  - analgesia management in place. No concern      Neurogenic urinary bladder:  -Grace catheter was inserted while hospitalized.  Is unclear if voiding trial was done.  However patient has a prolonged and complicated hospitalization and also ICU admission.  Also has sacral wound and diminished mobility.  Discussed with the primary team we will keep the Grace's catheter for couple days and will attempt to remove it on Monday in 4 days.      Orders:   NNO    Electronically signed by:  Isra Cintron MD

## 2024-10-10 NOTE — LETTER
" 10/10/2024      Dk Randhawa Sr.  [address]  [facility] [address]        [facility] GERIATRICS    PRIMARY CARE PROVIDER AND CLINIC:  Esteban Copeland MD, [address] [facility] [address]  Chief Complaint   Patient presents with     Hospital F/U      [place] Medical Record Number:  [id number]  Place of Service where encounter took place:  [facility] ([facility]) [[id number]]    Dk Randhawa Sr.  is a 69 year old  (1955), admitted to the above facility from  [facility]. Hospital stay 9/16/24 through 9/23/24..   HPI:    As per GNP's note:\"Brief Hospital Course: PMH of lung cancer with mets to brain and bone, s/p lobectomy, currently on chemoradiation, afib, HTN, chronic anemia who presented with weakness, unable to care for self at home. Found to have cellulitis of right finger, elbow, and foot, developed septic shock. Cultures grew MSSA, initially on cefepime and vancomycin, changed to Augmentin. Was started on Filgrastin for neutropenia, Xarelto held due to thrombocytopenia. Was transfused 1 unit PRBC for anemia. Had afib with RVR, metoprolol and losartan on hold due to hypotension.  Received amiodarone bolus/gtt. Transitioned to oral amiodarone, diltiazem, digoxin with improved rate control. On fluid restriction for edema, hyponatremia with NA of 129. Shock resolved. When medically stable was discharged to TCU for further rehab and medical management. \"      TODAY:  -Metastatic dz/ C-RTx / neutropenia  -Anemia/thrombocytopenia  -Neutropenia/MSSA sepsis/Cellulitis  .completed IV ab  Patient denies fever or chills.  Reports he had 1 session of radiotherapy to the lesion in his right hip.  Still 9 More to go but that will be resumed once he is done with the rehab therapy here.    -A-fib/Edema/Low Na:  .FWR increased to 1800  Denies chest pain, shortness of breath.    -Rehab:  Reports is making progress.  He is very happy with the therapy.  He " is hoping to be able to transfer himself from the bed to the wheelchair independently.  Currently doing para lumbar.    -Cognition:  SLUM 16/30      -Unstageable  PI over buttock:  -Feet wound:  Patient reports that the wound over his elbows, bottom and over the feet were acquired at home.  Medihoney initiated for buttock wound.  Patient reports that all the wounds are getting better.    ====================================================================    CODE STATUS/ADVANCE DIRECTIVES DISCUSSION:  Full Code  CPR/Full code   ALLERGIES: No Known Allergies   PAST MEDICAL HISTORY:   Past Medical History:   Diagnosis Date     Acute non-recurrent maxillary sinusitis      Antiplatelet or antithrombotic long-term use      Arrhythmia      Atrial fibrillation with RVR (H)      Cough secondary to angiotensin converting enzyme inhibitor (ACE-I)      Hyperlipidemia      Hypertension      Malignant neoplasm metastatic to bone (H) 07/26/2024     Mass of upper lobe of right lung      Obesity      Pathological fracture in neoplastic disease, pelvis, init 08/23/2024      PAST SURGICAL HISTORY:   has a past surgical history that includes Phacoemulsification with standard intraocular lens implant (Left, 02/26/2018); Phacoemulsification with standard intraocular lens implant (Right, 01/30/2019); Bronchoscopy, With Biopsy, Robot Assisted (Bilateral, 04/16/2024); tonsillectomy (1964); Total Hip Arthroplasty (Left, 04/12/2022); ELBOW ARTHROSCOPY (Left, 03/09/2017); Davinci lobectomy lung (Right, 5/15/2024); DaVINCI excise nodes thoracic (N/A, 5/15/2024); and Insert port vascular access (Right, 7/5/2024).  FAMILY HISTORY: family history includes Alzheimer Disease in his mother; Depression (age of onset: 45) in his father; Diabetes in his sister; Ovarian Cancer in his mother.  SOCIAL HISTORY:   reports that he quit smoking about 10 years ago. His smoking use included cigarettes. He started smoking about 30 years ago. He has a 10  pack-year smoking history. He has never used smokeless tobacco. He reports that he does not currently use alcohol. He reports that he does not use drugs.  Patient's living condition: lives alone    Post Discharge Medication Reconciliation Status:   MED REC REQUIRED  Post Medication Reconciliation Status: medication reconcilation previously completed during another office visit       Current Outpatient Medications   Medication Sig Dispense Refill     acetaminophen (TYLENOL) 500 MG tablet Take 1,000 mg by mouth 3 times daily.       amiodarone (PACERONE) 200 MG tablet Take 1 tablet (200 mg) by mouth or FT or NG tube 2 times daily for 2 days, THEN 1 tablet (200 mg) daily.       dexAMETHasone (DECADRON) 4 MG tablet Take 4 mg by mouth daily (with breakfast).       digoxin (LANOXIN) 125 MCG tablet Take 1 tablet (125 mcg) by mouth daily.       diltiazem ER (DILT-XR) 120 MG 24 hr capsule Take 3 capsules (360 mg) by mouth daily.       fexofenadine (ALLEGRA) 180 MG tablet Take 180 mg by mouth daily       furosemide (LASIX) 40 MG tablet Take 1 tablet (40 mg) by mouth daily. 30 tablet 1     Melatonin 10 MG TABS tablet Take 10 mg by mouth at bedtime.       morphine (MS CONTIN) 15 MG CR tablet Take 2 tablets (30 mg) by mouth every morning AND 1 tablet (15 mg) every evening. 45 tablet 0     oxyCODONE (ROXICODONE) 5 MG tablet Take 1 tablet (5 mg) by mouth every 4 hours as needed for severe pain. 30 tablet 0     SENNA-docusate sodium (SENNA S) 8.6-50 MG tablet Take 1 tablet by mouth at bedtime.       No current facility-administered medications for this visit.       ROS:  10 point ROS of systems including Constitutional, Eyes, Respiratory, Cardiovascular, Gastroenterology, Genitourinary, Integumentary, Musculoskeletal, Psychiatric were all negative except for pertinent positives noted in my HPI.    Vitals:  BP (!) 143/84   Pulse 94   Temp 98.3  F (36.8  C)   Resp 20   Ht 1.829 m (6')   Wt 95 kg (209 lb 6.4 oz)   SpO2 97%   BMI  28.40 kg/m    Exam  GENERAL APPEARANCE:  in no distress,   RESP:  Unlabored breathing. CTA b/l.   CV:  S1S2 audible, regular HR, no murmur appreciated.   ABDOMEN:  soft, NT/ND, BS audible.   M/S:   traces edema over feet. no joint deformity noted on observation.   SKIN: grace catheter,urine bag with schroeder yellow fluid. A-port over right side of the chest wall on the anterior surface. Surgical dressing over elbows. Legs covered with dressing.   NEURO:   No NFD appreciated on observation.   PSYCH:  affect and mood normal    Lab/Diagnostic data: Reviewed in the chart and EHR.      ASSESSMENT/PLAN:  ---------------------------  Pressure injury of buttock, unstageable, bilateral  Wound of left foot  Wound of right foot  Cellulitis of right lower extremity  - wound care in place. Improving.   -Dietician recommendations.   - completed abx.       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   -Hb/Hct slightly dropped.   -next CBC on 14th. Follow  -Update Hem/Onc team  - plan to continue RTx to right help Pt hopes that his cancer be cured.       Chronic atrial fibrillation (H)  Essential hypertension, benign  Hyponatremia  - CVR on rate and rhythmic control  -Na slightly dropped.   - appears stable  - cardiology follow up    Other chronic pain  - analgesia management in place. No concern      Neurogenic urinary bladder:  -Grace catheter was inserted while hospitalized.  Is unclear if voiding trial was done.  However patient has a prolonged and complicated hospitalization and also ICU admission.  Also has sacral wound and diminished mobility.  Discussed with the primary team we will keep the Grace's catheter for couple days and will attempt to  remove it on Monday in 4 days.      Orders:   NNO    Electronically signed by:  Isra Cintron MD                     Sincerely,        Isra Cintron MD

## 2024-10-15 NOTE — PROGRESS NOTES
[facility] GERIATRICS  ACUTE/EPISODIC VISIT    [facility] Medical Record Number: [id number]  Place of Service where encounter took place: [facility] ([facility]) [[id number]]    Chief Complaint   Patient presents with    RECHECK     HPI:    Dk Randhawa Sr. is a 69 year old (1955), who is being seen today for an episodic care visit. HPI information obtained from: facility chart records, facility staff, patient report, [facility] chart review, and Care Everywhere [facility] chart review.    Today's concern is: Recent hospitalization with cellulitis, shock; underlying stage IV lung cancer with bone mets. Seen today as staff report productive cough. Cough started a couple of day ago, started coughing up green phlegm yesterday. Does not feel SOB, has not noticed any wheezing. Does spend a lot of the day lying in bed because it is too uncomfortable to sit up for long. He does feel constipated. Appetite is good.     Discussed progress with therapy as he continues to need EZ stand for transfers. He previously lived at home alone. Says his home is handicap accessible.His goal remains to get stronger and go home and resume his radiation and chemo treatments. Discussed that we will continue to work with him towards restoring mobility, but asked him to start thinking of other options in case we are not able to get to the point of independent transfers. He has thought about LTC but it is very expensive and does not think he would be able to afford it. Discussed he could apply for MA. He also asked if he would be allowed to resumed his cancer treatments in LTC and I assured him that he could. He stated he did not want to discuss this any more, just wanted to focus on today.     ALLERGIES: No Known Allergies   MEDICATIONS:  Post Discharge Medication Reconciliation Status: medication reconcilation previously completed during another office visit.     Current Outpatient Medications   Medication Sig  Dispense Refill    acetaminophen (TYLENOL) 500 MG tablet Take 1,000 mg by mouth 3 times daily.      amiodarone (PACERONE) 200 MG tablet Take 1 tablet (200 mg) by mouth or FT or NG tube 2 times daily for 2 days, THEN 1 tablet (200 mg) daily.      dexAMETHasone (DECADRON) 4 MG tablet Take 4 mg by mouth daily (with breakfast).      digoxin (LANOXIN) 125 MCG tablet Take 1 tablet (125 mcg) by mouth daily.      diltiazem ER (DILT-XR) 120 MG 24 hr capsule Take 3 capsules (360 mg) by mouth daily.      fexofenadine (ALLEGRA) 180 MG tablet Take 180 mg by mouth daily      furosemide (LASIX) 40 MG tablet Take 1 tablet (40 mg) by mouth daily. 30 tablet 1    Melatonin 10 MG TABS tablet Take 10 mg by mouth at bedtime.      morphine (MS CONTIN) 15 MG CR tablet Take 2 tablets (30 mg) by mouth every morning AND 1 tablet (15 mg) at bedtime. 45 tablet 0    oxyCODONE (ROXICODONE) 5 MG tablet Take 1 tablet (5 mg) by mouth every 4 hours as needed for severe pain. 30 tablet 0    SENNA-docusate sodium (SENNA S) 8.6-50 MG tablet Take 1 tablet by mouth at bedtime.       Medications reviewed:  Medications reconciled to facility chart and changes were made to reflect current medications as identified as above med list. Below are the changes that were made:   Medications stopped since last EPIC medication reconciliation:   There are no discontinued medications.    Medications started since last [facility] medication reconciliation:  No orders of the defined types were placed in this encounter.        REVIEW OF SYSTEMS:  4 point ROS neg other than the symptoms noted above in the HPI.      PHYSICAL EXAM:  There were no vitals taken for this visit.  Physical Exam  Cardiovascular:      Rate and Rhythm: Normal rate and regular rhythm.      Heart sounds: Normal heart sounds.   Pulmonary:      Effort: Pulmonary effort is normal.      Breath sounds: Wheezing and rhonchi present.      Comments: Productive cough, green phlegm  Abdominal:      General: Bowel  sounds are normal.   Musculoskeletal:      Right lower leg: No edema.      Left lower leg: No edema.   Neurological:      Mental Status: He is alert.   Psychiatric:         Mood and Affect: Mood normal.         Thought Content: Thought content normal.        ASSESSMENT / PLAN:  Acute cough  Is at risk for PNA given underlying lung CA, and spending so much time in bed. Afrebrile, on RA  - CXR today  - ipratropium - albuterol 0.5 mg/2.5 mg/3 mL (DUONEB) 0.5-2.5 (3) MG/3ML neb solution; Take 1 vial (3 mLs) by nebulization 4 times daily as needed for shortness of breath, wheezing or cough.  - guaiFENesin (MUCINEX) 600 MG 12 hr tablet; Take 1 tablet (600 mg) by mouth 2 times daily for 7 days.  - monitor and adjust    UPDATE: CXR showed mild CHF, small right pleural effusion  - give an extra 40mg of lasix PO x1 this evening    Pressure injury of buttock, unstageable, bilateral  Wound of left foot  Wound of right foot  - continue current wound cares   - continue pressure reduction measures     Cellulitis of right lower extremity  Resolved  - monitor give ongoing wounds     Malignant neoplasm of upper lobe of right lung (H)  Malignant neoplasm metastatic to bone (H)  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 S/p RUL lobectomy. Has completed 3/7 rounds of chemo. Completed gamma knife therapy. CT showed multiple pathological fractures. Ongoing pain in right hip, limits mobility.   - continue dexamethasone 4mg daily  - analgesia with MS contin 30mg q AM, 15mg at bedtime and oxycodone PRN, APAP TID  - follow-up with oncology     Other elevated white blood cell (WBC) count  WBC up to the 90's, received 4 doses of filgastrim while IP. WBC improved to 42 on most recent labs  - check CBC PRN     Chronic anemia  Hgb stable 9-10  - check CBC PRN    Chronic atrial fibrillation (H)  HR   - continue digoxin, diltiazem, amiodarone    -  monitor and adjust     Essential hypertension, benign  -120  - continue diltiazem ER, lasix 40mg daily.     Neurogenic bladder  Grace catheter in place  Discussed doing voiding trial, he is not sure he is ready for this.   - continue current grace cares  - consider voiding trail next week    Drug induced constipation  Reports needed supp yesterday to have a BM; is on narcotics  - increased senna-s from daily to BID       Orders:  CXR today  Mucinex BID  Duo nebs QID PRN wheezing  Increase senna-s to 1 tab BID  Encourage use of IS    Electronically signed by:  DIEGO Jara CNP

## 2024-10-17 NOTE — LETTER
10/17/2024      Dk Randhawa Sr.  [address]  [facility] [address]        [facility] GERIATRICS  ACUTE/EPISODIC VISIT    [facility] Medical Record Number: [id number]  Place of Service where encounter took place: [facility] ([facility]) [[id number]]    Chief Complaint   Patient presents with     RECHECK     HPI:    Dk Randhawa Sr. is a 69 year old (1955), who is being seen today for an episodic care visit. HPI information obtained from: facility chart records, facility staff, patient report, [facility] chart review, and Care Everywhere [facility] chart review.    Today's concern is: Recent hospitalization with cellulitis, shock; underlying stage IV lung cancer with bone mets. Seen today as staff report productive cough. Cough started a couple of day ago, started coughing up green phlegm yesterday. Does not feel SOB, has not noticed any wheezing. Does spend a lot of the day lying in bed because it is too uncomfortable to sit up for long. He does feel constipated. Appetite is good.     Discussed progress with therapy as he continues to need [employer] for transfers. He previously lived at home alone. Says his home is handicap accessible.His goal remains to get stronger and go home and resume his radiation and chemo treatments. Discussed that we will continue to work with him towards restoring mobility, but asked him to start thinking of other options in case we are not able to get to the point of independent transfers. He has thought about LTC but it is very expensive and does not think he would be able to afford it. Discussed he could apply for MA. He also asked if he would be allowed to resumed his cancer treatments in LTC and I assured him that he could. He stated he did not want to discuss this any more, just wanted to focus on today.     ALLERGIES: No Known Allergies   MEDICATIONS:  Post Discharge Medication Reconciliation Status: medication reconcilation previously  completed during another office visit.     Current Outpatient Medications   Medication Sig Dispense Refill     acetaminophen (TYLENOL) 500 MG tablet Take 1,000 mg by mouth 3 times daily.       amiodarone (PACERONE) 200 MG tablet Take 1 tablet (200 mg) by mouth or FT or NG tube 2 times daily for 2 days, THEN 1 tablet (200 mg) daily.       dexAMETHasone (DECADRON) 4 MG tablet Take 4 mg by mouth daily (with breakfast).       digoxin (LANOXIN) 125 MCG tablet Take 1 tablet (125 mcg) by mouth daily.       diltiazem ER (DILT-XR) 120 MG 24 hr capsule Take 3 capsules (360 mg) by mouth daily.       fexofenadine (ALLEGRA) 180 MG tablet Take 180 mg by mouth daily       furosemide (LASIX) 40 MG tablet Take 1 tablet (40 mg) by mouth daily. 30 tablet 1     Melatonin 10 MG TABS tablet Take 10 mg by mouth at bedtime.       morphine (MS CONTIN) 15 MG CR tablet Take 2 tablets (30 mg) by mouth every morning AND 1 tablet (15 mg) at bedtime. 45 tablet 0     oxyCODONE (ROXICODONE) 5 MG tablet Take 1 tablet (5 mg) by mouth every 4 hours as needed for severe pain. 30 tablet 0     SENNA-docusate sodium (SENNA S) 8.6-50 MG tablet Take 1 tablet by mouth at bedtime.       Medications reviewed:  Medications reconciled to facility chart and changes were made to reflect current medications as identified as above med list. Below are the changes that were made:   Medications stopped since last EPIC medication reconciliation:   There are no discontinued medications.    Medications started since last [facility] medication reconciliation:  No orders of the defined types were placed in this encounter.        REVIEW OF SYSTEMS:  4 point ROS neg other than the symptoms noted above in the HPI.      PHYSICAL EXAM:  There were no vitals taken for this visit.  Physical Exam  Cardiovascular:      Rate and Rhythm: Normal rate and regular rhythm.      Heart sounds: Normal heart sounds.   Pulmonary:      Effort: Pulmonary effort is normal.      Breath sounds: Wheezing  and rhonchi present.      Comments: Productive cough, green phlegm  Abdominal:      General: Bowel sounds are normal.   Musculoskeletal:      Right lower leg: No edema.      Left lower leg: No edema.   Neurological:      Mental Status: He is alert.   Psychiatric:         Mood and Affect: Mood normal.         Thought Content: Thought content normal.        ASSESSMENT / PLAN:  Acute cough  Is at risk for PNA given underlying lung CA, and spending so much time in bed. Afrebrile, on RA  - CXR today  - ipratropium - albuterol 0.5 mg/2.5 mg/3 mL (DUONEB) 0.5-2.5 (3) MG/3ML neb solution; Take 1 vial (3 mLs) by nebulization 4 times daily as needed for shortness of breath, wheezing or cough.  - guaiFENesin (MUCINEX) 600 MG 12 hr tablet; Take 1 tablet (600 mg) by mouth 2 times daily for 7 days.  - monitor and adjust    Pressure injury of buttock, unstageable, bilateral  Wound of left foot  Wound of right foot  - continue current wound cares   - continue pressure reduction measures     Cellulitis of right lower extremity  Resolved  - monitor give ongoing wounds     Malignant neoplasm of upper lobe of right lung (H)  Malignant neoplasm metastatic to bone (H)  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 S/p RUL lobectomy. Has completed 3/7 rounds of chemo. Completed gamma knife therapy. CT showed multiple pathological fractures. Ongoing pain in right hip, limits mobility.   - continue dexamethasone 4mg daily  - analgesia with MS contin 30mg q AM, 15mg at bedtime and oxycodone PRN, APAP TID  - follow-up with oncology     Other elevated white blood cell (WBC) count  WBC up to the 90's, received 4 doses of filgastrim while IP. WBC improved to 42 on most recent labs  - check CBC PRN     Chronic anemia  Hgb stable 9-10  - check CBC PRN    Chronic atrial fibrillation (H)  HR   - continue digoxin, diltiazem, amiodarone    - monitor and  adjust     Essential hypertension, benign  -120  - continue diltiazem ER, lasix 40mg daily.     Neurogenic bladder  Grace catheter in place  Discussed doing voiding trial, he is not sure he is ready for this.   - continue current grace cares  - consider voiding trail next week    Drug induced constipation  Reports needed supp yesterday to have a BM; is on narcotics  - increased senna-s from daily to BID       Orders:  CXR today  Mucinex BID  Duo nebs QID PRN wheezing  Increase senna-s to 1 tab BID  Encourage use of IS    Electronically signed by:  DIEGO Jara CNP      Sincerely,        DIEGO Jara CNP

## 2024-10-18 PROBLEM — A41.9 SEPSIS, DUE TO UNSPECIFIED ORGANISM, UNSPECIFIED WHETHER ACUTE ORGAN DYSFUNCTION PRESENT (H): Status: ACTIVE | Noted: 2024-01-01

## 2024-10-18 PROBLEM — L08.9 NECK INFECTION: Status: ACTIVE | Noted: 2024-01-01

## 2024-10-18 PROBLEM — J96.01 ACUTE RESPIRATORY FAILURE WITH HYPOXIA (H): Status: ACTIVE | Noted: 2024-01-01

## 2024-10-18 PROBLEM — M86.9 OSTEOMYELITIS, UNSPECIFIED SITE, UNSPECIFIED TYPE (H): Status: ACTIVE | Noted: 2024-01-01

## 2024-10-18 PROBLEM — J90 PLEURAL EFFUSION ON RIGHT: Status: ACTIVE | Noted: 2024-01-01

## 2024-10-18 NOTE — ED TRIAGE NOTES
Pt presents to ED from TCU for concerns of mass that appeared on left neck this AM. Pt reports left shoulder pain and decreased mobility. Per TCU nurse, pt has had recent cough and CXR yesterday showed possible CHF exacerbation.      Triage Assessment (Adult)       Row Name 10/18/24 1028          Triage Assessment    Airway WDL WDL        Respiratory WDL    Respiratory WDL WDL        Skin Circulation/Temperature WDL    Skin Circulation/Temperature WDL WDL        Cardiac WDL    Cardiac WDL X;rhythm     Pulse Rate & Regularity tachycardic        Peripheral/Neurovascular WDL    Peripheral Neurovascular WDL WDL        Cognitive/Neuro/Behavioral WDL    Cognitive/Neuro/Behavioral WDL WDL

## 2024-10-18 NOTE — MEDICATION SCRIBE - ADMISSION MEDICATION HISTORY
Medication Scribe Admission Medication History    Admission medication history is complete. The information provided in this note is only as accurate as the sources available at the time of the update.    Information Source(s): Facility ([facility]/) medication list/MAR via in-person    Pertinent Information: MAR sent from [facility] [address]. Per MAR, Diltiazem was not administered this AM    Changes made to PTA medication list:  Added: Biofreeze Gel, Bisacodyl 10 mg suppository  Deleted: Melatonin 10 mg  Changed: None    Allergies reviewed with patient and updates made in EHR: yes    Medication History Completed By: Eveline Dubose 10/18/2024 12:30 PM    PTA Med List   Medication Sig Last Dose    acetaminophen (TYLENOL) 500 MG tablet Take 1,000 mg by mouth 3 times daily. 10/18/2024 at 0600    amiodarone (PACERONE) 200 MG tablet Take 1 tablet (200 mg) by mouth or FT or NG tube 2 times daily for 2 days, THEN 1 tablet (200 mg) daily. (Patient taking differently: 1 tablet (200 mg) once daily) 10/18/2024 at am    bisacodyl (DULCOLAX) 10 MG suppository Place 10 mg rectally daily as needed for constipation. 10/17/2024 at prn    dexAMETHasone (DECADRON) 4 MG tablet Take 4 mg by mouth daily (with breakfast). 10/18/2024 at am    digoxin (LANOXIN) 125 MCG tablet Take 1 tablet (125 mcg) by mouth daily. 10/18/2024 at am    diltiazem ER (DILT-XR) 120 MG 24 hr capsule Take 3 capsules (360 mg) by mouth daily. 10/17/2024 at am    fexofenadine (ALLEGRA) 180 MG tablet Take 180 mg by mouth daily 10/18/2024 at am    furosemide (LASIX) 40 MG tablet Take 1 tablet (40 mg) by mouth daily. 10/18/2024 at am    guaiFENesin (MUCINEX) 600 MG 12 hr tablet Take 1 tablet (600 mg) by mouth 2 times daily for 7 days. 10/18/2024 at 0800    ipratropium - albuterol 0.5 mg/2.5 mg/3 mL (DUONEB) 0.5-2.5 (3) MG/3ML neb solution Take 1 vial (3 mLs) by nebulization 4 times daily as needed for shortness of breath, wheezing or cough. Unknown at prn    Menthol,  Topical Analgesic, (BIOFREEZE EX) Externally apply topically 4 times daily as needed. Past Week at prn    morphine (MS CONTIN) 15 MG CR tablet Take 2 tablets (30 mg) by mouth every morning AND 1 tablet (15 mg) at bedtime. 10/18/2024 at 0800    oxyCODONE (ROXICODONE) 5 MG tablet Take 1 tablet (5 mg) by mouth every 4 hours as needed for severe pain. 10/17/2024 at prn    SENNA-docusate sodium (SENNA S) 8.6-50 MG tablet Take 1 tablet by mouth 2 times daily. (Patient taking differently: Take 1 tablet by mouth 2 times daily as needed.) 10/17/2024 at prn

## 2024-10-18 NOTE — H&P
[facility] ICU H&P  10/18/2024    Date of Hospital Admission: 10/18/24  Date of ICU Admission: 10/18/24  Reason for Critical Care Admission: hypoxic respiratory failure requiring BiPap  Date of Service (when I saw the patient): 10/18/2024    ASSESSMENT: kD Waldropyoshi Kwon is a 69 year old male with PMH RUL Large cell lung cancer with mets to the brain, bone w/pathologic fractures, pancreas, L lung, pleura, and intrathoracic nodes (on palliative chemo and radiation, s/p RUL lobectomy), chronic anemia, chronic atrial fibrillation, neurogenic bladder with chronic grace, and recent hospitalization for cellulitis.     Presented to [facility] from his TCU 10/18/24 with acute L neck swelling and associated shoulder pain found to have significant leukocytosis and imaging findings concerning for osteomyelitis of the L sternoclavicular junction vs progression of malignancy and acute hypoxic respiratory failure with bilateral lower lobe PNA and moderate R hydropneumothorax. Transferred to [facility]- ICU 10/18/24 for ENT evaluation and biopsy / I&D of L neck swelling.       CHANGES and MAJOR THINGS TODAY:   - Admit to ICU  - ENT consult    PLAN:    Neurological:  # Chronic pain r/t progressive bony metastasis with pathologic fractures  # CNS metastasis, s/p gamma knife   Pain greatest in pt's hips where he has a known acetabular bony met and was unfortunately only able to tolerate 1 round of palliative radiation to the site.   - Monitor neuro exam  - Continue decadron 4 mg every day (PTA med for brain mets)  - Takes MS contin ER 30 mg AM and 15 mg PM + Oxycodone 5 mg q4h PRN --> drowsy on ICU arrival   - Hold MS contin ER in favor of shorter acting opioids given risk for resp depression   - Dilaudid 0.3-0.5 mg q2h PRN   - Oxycodone 5-10 mg q4h PRN   - Discuss with pharmacy in AM if lower dose long acting opioid agents are available     HEENT:  # L sided neck swelling with imaging concerns for osteomyelitis vs progression of  "cancer   - ENT following:   - Laryngoscopy x2 without evidence of airway compromise   - Stop airway dose decadron  - IR consult for consideration of biopsy in AM, currently NPO, holding all blood thinners      Pulmonary:   # Acute Hypoxic Respiratory Failure  # Bilateral lower lobe PNA  # R lung large cell carcinoma with numerous metastasis (including L lung, pleura, and intrathoracic nodes), s/p lobectomy  Chest CT at OSH negative for PE, did show ongoing moderate hydropneumothorax with a trace amount of air and fluid level decreased in size in comparison, bilateral lower lobe PNA, moderate sized gas and fluid collection in the L neck centered in the sternoclavicular joint with cortical destruction of the L clavicular head suspicious for septic arthritis (but progression of malignancy cannot be excluded), and progression of metastatic disease with new & enlarging lung nodules, new osseous mets (ribs), and ongoing lymphadenopathy.     Labored WOB with hypoxia in ED and placed on BiPAP. Has been able to be weaned from 18/10 70% --> now 14/7 50% and is able to tolerate short breaks on HFNC. ENT eval of airway as above shows a widely patent airway.  Discussed intubation with pt on admission and he endorsed he would \"try intubation\" for a few days to explore the etiology of his new neck mass, but recognizes he has terminal cancer which is unfortunately progressing accompanied by worsening frailty.     - CXR daily and PRN to monitor for worsening of hydropneumothorax  - Consider pleural fluid sampling  - BiPAP 14/7 50%, wean FiO2 to keep SpO2 > 90%  - Alternate with HFNC during the day  - PRN duo nebs        Cardiovascular:    # Lactic acidosis without hypotension   # Afib with RVR  # HTN, h/o  LA 3.5 on presentation with BNP 1214. Received 2L crystalloid for resuscitation and was started on broad spectrum abx. He was initially in Afib with RVR, but was given a 1x dose of diltiazem with improvement in HR. Despite the " fact he has been normotensive, his LA amanda to 4.4 on arrival. EKG of poor quality, but denies chest pain and no overt ST anomalies. Pulses palpable in all 4 extremities. Treating for infection. Will continue cautious rehydration and monitor lactic acid.     Per home med list, Homer takes amiodarone, diltiazem, and digoxin for rate control and rivaroxaban for anticoagulation to treat his chronic Afib. However, DOAC on hold since Sept d/t chemo-induced thrombocytopenia. Plts recovered, but continue to hold acx anyway in glen of probable IR biopsy in AM.      - Trend LA until peak  - EKG: Afib, no overt ST segment anomalies  - Check digoxin level  - Goal MAP > 65 mmHg  - Fluid: 2.5L crystalloid thusfar, will bolus in small aliquots   - Continue PTA amiodarone 200 mg every day  - Holding diltiazem  mg/day & digoxin at this time, resume as hemodynamics allow    10/31/23 TTE @ [facility]   Final Conclusion    1. Normal left ventricular chamber size.Mild concentrically increased left ventricular wall   thickness.    Normal left ventricular systolic function.Estimated left ventricular ejection fraction is   50-55%.    No regional wall motion abnormalities.    2.Normal right ventricular size and systolic function.    Right ventricular systolic pressure cannot be estimated due to inability to detect peak   tricuspid regurgitation Doppler velocity.    3.Trileaflet aortic valve.Mildly thickened aortic valve.    Aortic valve sclerosis without stenosis.No aortic valve regurgitation.    4.No pericardial effusion.    5.Aortic sinus of Valsalva is normal in size (3.7 cm, ZScore = -0.61).Ascending aorta was not   well visualized.    There were no prior studies available for comparison.    Estimated EF: 50-55%     Gastroenterology/Nutrition:  # Pancreatic nodule worrisome for metastasis   # Elevated ALP without hyperbilirubinemia, ALP more likely from bone   # Overweight  # Risk for malnutrition, see RD eval  - NPO in  anticipation of possible invasive procedure today  - Consider SLP consult if c/f aspiration once able to take po --> bilat lower lobe infiltrates accompanied by L neck mass could be explained by dysphagia. Pt denies trouble swallowing but is poor historian   - RD consult for malnutrition assessment and supplement recs   - Scheduled and PRN bowel regimen      Renal/Fluids/Electrolytes:   # AGMA 2/2 lactic acidosis   # hyperphosphatemia   # Neurogenic bladder with chronic graec   Baseline Cr ~ 0.5. Cr 0.77 on ICU arrival. Not quite criteria for TEA, but will monitor. Currently making adequate urine.   - Exchange grace, send UA  - Trend lactic acid and chemistries  - I&O & daily weights  - High intensity electrolyte replacement protocols, low threshold to downgrade or discontinue if renal function worsens      Endocrine:  # Prediabetes  # Stress and steroid induced hyperglycemia   - Sliding scale insulin (medium) q4h      ID:  # Concern for L sternoclavicular joint osteomyelitis with possible associated soft tissue infection/cellulitis/phlegmon with possible abscess  # Bilateral lower lobe PNA, query aspiration?  # GPC in blood culture  # Leukemoid reaction   Presented with a 1 day hx of L sided neck swelling. Recently hospitalized for treatment of septic shock with cellulitis involving the RUE and possibly R foot. CT imaging in ED noted bilateral lower lobe infiltrates suspicious for PNA and  moderate sized gas and fluid collection in the L neck centered in the sternoclavicular joint with cortical destruction of the L clavicular head suspicious for septic arthritis. R pointer and middle finger also remain red and swolllen with decreased ROM, but xrays without s/s of soft tissue or bone infection, rather suggest osteoarthritis. Also with significant wounds to the sacrum and bilateral heels on the lateral aspect, but none appear cellulitic. On broad spectrum abx targeting both soft tissue infection and PNA.    -  Consider ID consult and TTE if BC verigene suggestive of bacteremia  - IR consult for biopsy of L sternoclavicular space, consider tapping pleural fluid   - ENT following, could require thoracic surgery additionally if candidate for surgical source control   - Cultures:   - 10/18 BC - GPC 1 of 2 bottles   - 10/18 MRSA - pending   - 10/19 UA with reflex, needs to be collected   - 10/19 sputum, needs to be collected if able   - 10/19 BC - assess for clearance  - Abx:   - Vanco (10/18 - )   - Zosyn (10/18 - )   - Clindamycin (10/18 - )     Positive cultures:  - 10/18 BC - GPCs 1 of 2 bottles     Heme/Oncology:     # Stage 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 Friedell. His current intent of treatment is palliative. Diagnosed with RUL large cell carcinoma after presenting on 4/7/24 with hemoptysis, confirmed on bx 4/16/24. Underwent RUL lobectomy 5/15/24 with adjacent nodes negative for malignancy. He was to start adjuvant chemotherapy, but presented to ED with hip pain and was found to have bony mets 7/20/24. Has since been found to have metastasis involving the L lung, various intrathoracic nodes, brain, and bones with pathological fractures. Likely with metastasis to pancrease with a nodule noted on CT imaging. . He has underwent gamma knife radiation for brain mets, has completed 3 or 4/7 cycles of carboplatin, etoposide, and atezolizumab, and per the pt has undergone 1/10 radiation treatments to his hip. Has had delays in treatments due to hospitalization for septic shock 2/2 cellulitis while neutropenic and failure to thrive.       - Holding off on oncology consult as concern for active infection  - Discussed goals of care with patient, he is open to short term intubation but agreed DNR is most consistent with his current terminal cancer in mind -->  continue ongoing goals of care discussions as more information becomes available  - Trend CBC  - Holding acx in anticipation of invasive procedures --> rivaroxaban on hold early in Oct d/t thrombocytopenia and has not been resumed yet now that plts recovered    Musculoskeletal:  # Generalized weakness and FTT 2/2 advancing malignancy  # Pathologic fractures involving the pelvis   # Weakness and deconditioning of critical illness   - Physical and occupational therapy consult      Skin:  # Bilateral ankle wounds, lateral aspect  # Sacral wound  # Edema  - WOC consult, appreciate recs     General Cares/Prophylaxis:    DVT Prophylaxis: Pneumatic Compression Devices  GI Prophylaxis: Not indicated  Restraints: Not indicated   Family Communication:  x2 left for brother Devon   Code Status: DNR, ok for pre-arrest intubation --> verified with pt on admission    Lines/tubes/drains:  - R chest port-a-cath --> no blood return, attempting TPA this AM  - PIV x2  - Lockwood, exchanged 10/19    Disposition:  - [facility]      Critical time spent 60 mins        Clinically Significant Risk Factors Present on Admission                   # Hypertension: Noted on problem list   # Acute Hypoxic Respiratory Failure: Documented O2 saturation < 90%. Continue supplemental oxygen as needed   # Anemia: based on hgb <11       # Overweight: Estimated body mass index is 27.97 kg/m  as calculated from the following:    Height as of 10/17/24: 1.829 m (6').    Weight as of 10/17/24: 93.5 kg (206 lb 3.2 oz).       # Financial/Environmental Concerns:        # Anemia: based on hgb <11             -----------------------------------------------------------------------    HISTORY PRESENTING ILLNESS:     Dk HARRISON Sydnee Sr. is a 69 year old male with PMH RUL Large cell lung cancer with mets to the brain, bone w/pathologic fractures, pancreas, L lung, pleura, and intrathoracic nodes (on palliative chemo and radiation, s/p RUL lobectomy), chronic  anemia, chronic atrial fibrillation, neurogenic bladder with chronic grace, and recent hospitalization for cellulitis.     Presented to [facility] from his TCU 10/18/24 with acute L neck swelling and associated shoulder pain found to have significant leukocytosis and imaging findings concerning for osteomyelitis of the L sternoclavicular junction vs progression of malignancy and acute hypoxic respiratory failure with bilateral lower lobe PNA and moderate R hydropneumothorax. Transferred to [facility] ICU 10/18/24 for ENT evaluation and biopsy / I&D of L neck swelling.    In ED was found to be in A-fib with RVR with hypoxia and significantly labored work of breathing, but BP normotensive.  Initially placed on 2 L nasal cannula, and progressed to requiring BiPAP by evening.  Labs notable for LA 3.5, BNP 1214, , procalcitonin 2.42, WBC 49.7, Hgb 8.7.  Renal function, hepatic function, and ventilation all acceptable.  EKG showed A-fib with RVR.  CAT scans of the neck and chest negative for PE, did show ongoing moderate hydropneumothorax with  trace amount of air, bilateral lower lobe PNA, moderate sized gas and fluid collection in the L neck centered in the sternoclavicular joint with cortical destruction of the L clavicular head suspicious for septic arthritis (but progression of malignancy cannot be excluded), and progression of metastatic disease with new & enlarging lung nodules, new osseous mets (ribs), and ongoing lymphadenopathy.  Resuscitated with 2 L crystalloid, diltiazem given for heart rate control, and started on broad-spectrum antibiotics and transferred to [facility] ICU for ENT evaluation and ongoing management.    On arrival to [facility] he was awake and alert, hemodynamically normal, and improved O2 requirements but continued to have labored work of breathing on BiPAP.  ROS limited by labored work of breathing, endorsed night sweats and shortness of breath, denied chest pain, Abdo pain, and dysuria.   Evaluated by ENT with laryngoscopy which showed a widely patent airway with no edema.  Airway dosed Decadron held.  Plan to continue broad-spectrum antibiotics, IR consultation in a.m. for consideration of biopsy of left sternoclavicular joint with further care depending on results.  Discussed goals of care with patient and he chose to be DNR, but still wishes for prearrest intubation if indicated.      REVIEW OF SYSTEMS: See HPI, ROS limited by respiratory distress    PAST MEDICAL HISTORY:   Past Medical History:   Diagnosis Date    Acute non-recurrent maxillary sinusitis     Antiplatelet or antithrombotic long-term use     Arrhythmia     Atrial fibrillation with RVR (H)     Cough secondary to angiotensin converting enzyme inhibitor (ACE-I)     Hyperlipidemia     Hypertension     Malignant neoplasm metastatic to bone (H) 07/26/2024    Mass of upper lobe of right lung     Obesity     Pathological fracture in neoplastic disease, pelvis, init 08/23/2024     SURGICAL HISTORY:  Past Surgical History:   Procedure Laterality Date    BRONCHOSCOPY, WITH BIOPSY, ROBOT ASSISTED Bilateral 04/16/2024    Procedure: BRONCHOSCOPY, endobronchial ultrasound, transbronchial needle aspiration, endobronchial biopsies and tumor debulking;  Surgeon: Lanette Arce MD;  Location: [facility] OR    DAVINCI EXCISE NODES THORACIC N/A 5/15/2024    Procedure: mediastinal lymph node dissection;  Surgeon: Mikael Peoples MD;  Location: [facility] OR    DAVINCI LOBECTOMY LUNG Right 5/15/2024    Procedure: Robot-assisted thoracoscopic right upper lobectomy,;  Surgeon: Mikael Peoples MD;  Location: [facility] OR    ELBOW ARTHROSCOPY Left 03/09/2017    INSERT PORT VASCULAR ACCESS Right 7/5/2024    Procedure: INSERTION, VASCULAR ACCESS PORT;  Surgeon: Henry Veronica MD;  Location: [facility] OR    PHACOEMULSIFICATION WITH STANDARD INTRAOCULAR LENS IMPLANT Left 02/26/2018    Procedure: PHACOEMULSIFICATION WITH STANDARD INTRAOCULAR LENS IMPLANT;  Left Cataract Removal with  Implant;  Surgeon: Mohit Victor MD;  Location: [facility] OR    PHACOEMULSIFICATION WITH STANDARD INTRAOCULAR LENS IMPLANT Right 01/30/2019    Procedure: Cataract Removal with Implant;  Surgeon: Mohit Victor MD;  Location: [facility] OR    TONSILLECTOMY  1964    TOTAL HIP ARTHROPLASTY Left 04/12/2022     SOCIAL HISTORY:  Social History     Socioeconomic History    Marital status:    Tobacco Use    Smoking status: Former     Current packs/day: 0.00     Average packs/day: 0.5 packs/day for 20.0 years (10.0 ttl pk-yrs)     Types: Cigarettes     Start date: 1994     Quit date: 2014     Years since quitting: 10.8    Smokeless tobacco: Never   Vaping Use    Vaping status: Never Used   Substance and Sexual Activity    Alcohol use: Not Currently    Drug use: No     Social Determinants of Health     Financial Resource Strain: Low Risk  (9/19/2024)    Financial Resource Strain     Within the past 12 months, have you or your family members you live with been unable to get utilities (heat, electricity) when it was really needed?: No   Food Insecurity: Low Risk  (9/19/2024)    Food Insecurity     Within the past 12 months, did you worry that your food would run out before you got money to buy more?: No     Within the past 12 months, did the food you bought just not last and you didn t have money to get more?: No   Transportation Needs: Low Risk  (9/19/2024)    Transportation Needs     Within the past 12 months, has lack of transportation kept you from medical appointments, getting your medicines, non-medical meetings or appointments, work, or from getting things that you need?: No   Social Connections: Socially Integrated (10/23/2023)    Received from [facility], [facility]    Social Connections     Frequency of Communication with Friends and Family: 0   Interpersonal Safety: Low Risk  (9/16/2024)    Interpersonal Safety     Do you feel  physically and emotionally safe where you currently live?: Yes     Within the past 12 months, have you been hit, slapped, kicked or otherwise physically hurt by someone?: No     Within the past 12 months, have you been humiliated or emotionally abused in other ways by your partner or ex-partner?: No   Housing Stability: Low Risk  (9/19/2024)    Housing Stability     Do you have housing? : Yes     Are you worried about losing your housing?: No     FAMILY HISTORY:   Family History   Problem Relation Age of Onset    Ovarian Cancer Mother     Alzheimer Disease Mother     Depression Father 45        suicide    Diabetes Sister      ALLERGIES:   No Known Allergies  MEDICATIONS:  No current facility-administered medications for this encounter.     Current Outpatient Medications   Medication Sig Dispense Refill    acetaminophen (TYLENOL) 500 MG tablet Take 1,000 mg by mouth 3 times daily.      amiodarone (PACERONE) 200 MG tablet Take 1 tablet (200 mg) by mouth or FT or NG tube 2 times daily for 2 days, THEN 1 tablet (200 mg) daily. (Patient taking differently: 1 tablet (200 mg) once daily)      bisacodyl (DULCOLAX) 10 MG suppository Place 10 mg rectally daily as needed for constipation.      dexAMETHasone (DECADRON) 4 MG tablet Take 4 mg by mouth daily (with breakfast).      digoxin (LANOXIN) 125 MCG tablet Take 1 tablet (125 mcg) by mouth daily.      diltiazem ER (DILT-XR) 120 MG 24 hr capsule Take 3 capsules (360 mg) by mouth daily.      fexofenadine (ALLEGRA) 180 MG tablet Take 180 mg by mouth daily      furosemide (LASIX) 40 MG tablet Take 1 tablet (40 mg) by mouth daily. 30 tablet 1    guaiFENesin (MUCINEX) 600 MG 12 hr tablet Take 1 tablet (600 mg) by mouth 2 times daily for 7 days.      ipratropium - albuterol 0.5 mg/2.5 mg/3 mL (DUONEB) 0.5-2.5 (3) MG/3ML neb solution Take 1 vial (3 mLs) by nebulization 4 times daily as needed for shortness of breath, wheezing or cough.      Menthol, Topical Analgesic, (BIOFREEZE EX)  Externally apply topically 4 times daily as needed.      morphine (MS CONTIN) 15 MG CR tablet Take 2 tablets (30 mg) by mouth every morning AND 1 tablet (15 mg) at bedtime. 45 tablet 0    oxyCODONE (ROXICODONE) 5 MG tablet Take 1 tablet (5 mg) by mouth every 4 hours as needed for severe pain. 30 tablet 0    SENNA-docusate sodium (SENNA S) 8.6-50 MG tablet Take 1 tablet by mouth 2 times daily. (Patient taking differently: Take 1 tablet by mouth 2 times daily as needed.)       Facility-Administered Medications Ordered in Other Encounters   Medication Dose Route Frequency Provider Last Rate Last Admin    carboxymethylcellulose PF (REFRESH PLUS) 0.5 % ophthalmic solution 1 drop  1 drop Both Eyes Q1H PRN Iván Crabtree MD        ipratropium - albuterol 0.5 mg/2.5 mg/3 mL (DUONEB) neb solution 3 mL  3 mL Nebulization Once Sarthak Polo MD        LORazepam (ATIVAN) injection 0.5 mg  0.5 mg Intravenous Q4H PRN Iván Crabtree MD   0.5 mg at 10/18/24 1634    No lozenges or gum should be given while patient on BIPAP/AVAPS/AVAPS AE   Does not apply Continuous PRN Iván Crabtree MD        oxymetazoline (AFRIN) 0.05 % spray 2 spray  2 spray Both Nostrils BID Iván Crabtree MD   2 spray at 10/18/24 1714    Patient may continue current oral medications   Does not apply Continuous PRN Iván Crabtree MD        pharmacy alert - intermittent dosing  1 each Other See Admin Instructions Iván Crabtree MD        piperacillin-tazobactam (ZOSYN) 3.375 g vial to attach to  mL bag  3.375 g Intravenous Q6H Iván Crabtree MD   Stopped at 10/18/24 1511    sodium chloride 0.9% BOLUS 1,000 mL  1,000 mL Intravenous Once Iván Crabtree  mL/hr at 10/18/24 1634 1,000 mL at 10/18/24 1634    [START ON 10/19/2024] vancomycin (VANCOCIN) 1,000 mg in 200 mL dextrose intermittent infusion  1,000 mg Intravenous Q8H Iván Crabtree MD        vancomycin (VANCOCIN) 1,750 mg in sodium chloride 0.9 % 567.5 mL intermittent infusion  1,750 mg  Intravenous Once Iván Crabtree MD   1,750 mg at 10/18/24 1558       PHYSICAL EXAMINATION:  Temp:  [97.4  F (36.3  C)] 97.4  F (36.3  C)  Pulse:  [112-140] 113  Resp:  [14-42] 33  BP: (108-126)/(75-87) 113/77  SpO2:  [84 %-98 %] 98 %    GEN: Awake and alert, in moderate respiratory distress, appears chronically ill  EYES: PERRL, Anicteric sclera.   HEENT:  Normocephalic, atraumatic, trachea midline, visible and palpable mass to the center and left neck, no stridor  CV: Irregularly irregular, extremities warm, pulses palpable  PULM/CHEST: Coarse breath sounds bilaterally and throughout, moderately labored work of breathing with accessory muscle use on BiPAP  GI: normal bowel sounds, soft, non-tender, no rebound tenderness or guarding, no masses  : grace catheter in place, urine zofia and clear  EXTREMITIES: 1-2+ peripheral edema, moving all extremities, peripheral pulses intact.  Right pointer and middle finger red, swollen with limited range of motion and tenderness to touch  NEURO: Cranial nerves II-XII grossly intact, no motor-sensory deficits noted  SKIN: Bilateral foot wounds covered in dressings, does not appear cellulitic, sacral pressure wound with dressing in place, UTV elbow wounds      LABS: Reviewed.   Arterial Blood Gases   No lab results found in last 7 days.  Complete Blood Count   Recent Labs   Lab 10/18/24  1226 10/14/24  0820   WBC 49.7* 42.2*   HGB 8.7* 9.2*    157     Basic Metabolic Panel  Recent Labs   Lab 10/18/24  1226      POTASSIUM 4.2   CHLORIDE 102   CO2 25   BUN 37.2*   CR 0.60*   *     Liver Function Tests  Recent Labs   Lab 10/18/24  1226   AST 18   ALT 18   ALKPHOS 265*   BILITOTAL 0.5   ALBUMIN 2.7*     Coagulation Profile  No lab results found in last 7 days.    IMAGING:  Recent Results (from the past 24 hour(s))   Soft tissue neck CT w contrast    Narrative    CT SCAN OF THE NECK WITH CONTRAST  10/18/2024 1:31 PM     HISTORY: Neck swelling.    TECHNIQUE: Axial  CT images of the neck following administration of  intravenous contrast with reformations. Radiation dose for this scan  was reduced using automated exposure control, adjustment of the mA  and/or kV according to patient size, or iterative reconstruction  technique. 90 mL Isovue 370 IV.     COMPARISON: CT of the chest dated 7/25/2024. Correlation is also made  with MRI of the brain 8/8/2024.    FINDINGS: There appears to be new mild anterior subluxation across the  left sternoclavicular joint with mild relative anterior positioning of  the medial left clavicular head. New abnormal ill-defined radiolucency  and cortical irregularity involving the articular surface of the  medial left clavicle at the level of the sternoclavicular joint  (series 3 image 122, series 5 image 34). Ill-defined surrounding  heterogeneous soft tissue swelling and fat stranding is noted along  the posterior and superior aspects of the left sternoclavicular joint.  Ovoid heterogeneous soft tissue process with a couple foci of internal  mineralization along the posterior margin of the medial left clavicle  measuring 5.8 x 2.3 cm in axial plane (series 3 image 119). Contiguous  soft tissue as well as multiple small foci of air extending superiorly  to involve the lower left strap musculature and there appears to be a  fluid collection with internal foci of air in the left strap muscles  anterolateral to the left thyroid lobe measuring 17 mm x 18 mm x 31 mm  (series 3 image 105, series 5 image 39), raising concern for possible  abscess. Additionally, there are multiple fluid collections within the  left sternocleidomastoid muscle, which is asymmetrically enlarged  (series 3 image 87). The most prominent fluid collection measures  approximately 15 mm AP x 12 mm transverse x 45 mm craniocaudal (series  3 image 94, series 5 image 35). These may represent intramuscular  abscesses. There is prominent ill-defined fat stranding centered about  the left  sternoclavicular joint and also involving the left  supraclavicular/retroclavicular soft tissues and left anterior chest  wall superiorly and also in the left neck surrounding the  sternocleidomastoid muscle. Given the patient's history of malignancy,  a neoplastic/tumor component of this process cannot be excluded.  Tissue sampling and imaging follow-up is recommended. No definite  pathologic cervical lymphadenopathy identified by size or morphologic  criteria.    The visualized aspects of the orbits and paranasal sinuses appear  clear. The mastoid and middle ear cavities appear essentially clear.  The previously demonstrated enhancing intracranial masses are not well  seen on this CT exam, which may be due to differences in technique or  possibly some posttreatment changes. Visualized aspects of the oral  cavity/oropharynx are somewhat limited due to prominent streak  artifact from the patient's dental amalgam. No definite  aggressive-appearing mucosal space mass in the upper aerodigestive  tract is identified. Patulous appearance of the pharynx. The bilateral  submandibular and parotid glands appear normal. No dominant  intrathyroidal mass or nodule seen.    Bandlike opacity in the right lung apex may represent scarring. There  appears to be trace pleural air along the anterolateral aspect of the  right upper lung zone (series 4 image 134). Right anterior chest wall  Port-A-Cath tubing extends into the right internal jugular vein with  tip terminating in the internal jugular vein, similar to prior CT.  Multilevel degenerative changes of the cervical spine are noted.  Ankylosis across the C2-C3 disc space and facets.      Impression    IMPRESSION:  1. New cortical irregularity and ill-defined radiolucency centered  about the left sternoclavicular joint, primarily involving the  articular surface of the medial left clavicle. There also appears to  be new mild anterior subluxation across the left  sternoclavicular  joint. There is a heterogeneous surrounding abnormal soft tissue  density with multiple foci of air and surrounding fat stranding in the  medial left retroclavicular and supraclavicular spaces. Fluid  collection with associated soft tissue swelling and multiple foci of  air in the mid to lower left strap muscles, and multiple fluid  collections in the left sternocleidomastoid muscle. Findings  altogether are concerning for septic arthritis of the left  sternoclavicular joint with associated soft tissue  infection/cellulitis/phlegmon and potential multiple intramuscular  abscesses of the left sternocleidomastoid and left strap muscles. The  possibility that all of these findings are posttraumatic is thought  less likely. Given the patient's history of malignancy, a component of  neoplasm/metastatic disease cannot be excluded. Recommend tissue  sampling/fluid aspiration of one of the fluid collections in the left  sternocleidomastoid muscle. Follow-up CT neck with contrast is  recommended.  2. Please refer to separate report from CTA chest of same day for  additional details.    Findings were discussed by myself with Dr. Crabtree at approximately 2:05  PM 10/18/2024.    FERNY GRESHAM MD         SYSTEM ID:  [id number]   CT Chest Pulmonary Embolism w Contrast    Narrative    CT CHEST PULMONARY EMBOLISM W CONTRAST 10/18/2024 1:31 PM    CLINICAL HISTORY: neck swelling, hypoxia  TECHNIQUE: CT angiogram chest during arterial phase injection IV  contrast. 2D and 3D MIP reconstructions were performed by the CT  technologist. Dose reduction techniques were used.     CONTRAST: 90ml    COMPARISON: 7/25/2024    FINDINGS:  ANGIOGRAM CHEST: Pulmonary arteries are normal caliber and negative  for pulmonary emboli. Thoracic aorta is negative for dissection. No CT  evidence of right heart strain.    LUNGS AND PLEURA: Right upper lobectomy. Right hydropneumothorax with  a trace amount air and small to moderate amount of  layering fluid. The  fluid component has decreased since 7/25/2024. Increased nodular  thickening of the pleura at the  medial inferior right lung base (series 4, image 212), suspicious for  worsening pleural metastases.    Multiple new nodular and groundglass opacities with bronchial wall  thickening in the bilateral lower lobes, suspicious for pneumonia.     Stable emphysema. A 0.9 cm solid nodule in the lingula is stable  (series 7, image 192). There are however several new and enlarging  solid small nodules. For example, a 6 mm subpleural nodule (series 7,  image 228) is new, a 6 mm nodule more inferiorly in the lingula  (series 4, image 252) previously measured 3 mm and a 5 mm left upper  lobe nodule superiorly previously measured 2 mm (7/129)..    MEDIASTINUM/AXILLAE: Centered in the left sternoclavicular joint,  there is a collection gas and fluid extending into the left neck  measuring approximately 5.4 x 4.7 x 5.5 cm (series 4, image 36 and  series 9, image 94). There is new cortical irregularity off the head  of the left clavicle at the sternoclavicular joint. Findings are  highly concerning for septic arthritis of the sternoclavicular joint.  Stable indeterminate mediastinal lymphadenopathy and hilar  lymphadenopathy. For example, a 1.4 cm right paratracheal lymph node  previously measured 1.4 cm (series 4, image 96). No thoracic aortic  aneurysm. Moderate coronary artery calcification. No pericardial  effusion.    UPPER ABDOMEN: Increased size of a gastrohepatic ligament lymph node  measuring 1.6 cm, previously 1.1 cm (series 4, image 274). Stable  nodular thickening of the left adrenal gland.    MUSCULOSKELETAL: See discussion above regarding the left  sternoclavicular joint. In addition, there is a new destructive lesion  in the anterior right third rib (series 4, image 84) and subtle new  lucent lesions in multiple ribs.      Impression    IMPRESSION:  1.  No pulmonary emboli.  2.  Moderate right  hydropneumothorax with a trace amount of air, and  small to moderate amount of layering fluid.  3.  Findings concerning for pneumonia in bilateral lower lobes.  4.  A moderate-sized gas and fluid collection in the left neck  centered in the left sternoclavicular joint with cortical destruction  of the left clavicular head. Findings are suspicious for septic  arthritis of the left sternoclavicular joint. Underlying metastatic  lesion in the left clavicular head is not excluded.  5.  Progression of metastatic disease with a few new and enlarging  solid nodules in the lungs, new osseous metastases, worsening of right  pleural metastases, enlarging gastrohepatic ligament lymph node and  stable mediastinal lymphadenopathy.    SHELBY LOFTON MD         SYSTEM ID:  [id number]

## 2024-10-18 NOTE — CONFIDENTIAL NOTE
[facility]: Cancer Care                                                                                          Nurse Shyann from [facility] called and states patient woke up this morning with a large firm left neck mass extending down to sternum and notes decreased mobility in his left shoulder associated with pain. Let her know he needs to go to the ER for evaluation. Nurse verbalized agreement to plan.     Signature:  Carmen Campbell RN

## 2024-10-18 NOTE — LETTER
Transition Communication Hand-off for Care Transitions to Next Level of Care Provider    Name: Dk HARRISON Sydnee Kwon  : 1955  MRN #: [id number]  Primary Care Provider: Esteban Copeland     Primary Clinic: [address] [facility] [address]     Reason for Hospitalization:  Osteomyelitis  Osteomyelitis, unspecified site, unspecified type (H)  Pleural effusion on right  Sepsis, due to unspecified organism, unspecified whether acute organ dysfunction present (H)  Neck infection  Acute respiratory failure with hypoxia (H)  Atrial fibrillation with RVR (H)  Admit Date/Time: 10/18/2024  9:22 PM  Discharge Date: 10/28/24  Payor Source: Payor: [employer] / Plan: [employer] MEDICARE / Product Type: HMO /          Reason for Communication Hand-off Referral: Other Continuation of care    Discharge Plan: Hospice at LT SNF.    Concern for non-adherence with plan of care:   Y/N   Discharge Needs Assessment:  Needs      Flowsheet Row Most Recent Value   Equipment Currently Used at Home walker, standard, grab bar, toilet, grab bar, tub/shower, wheelchair, manual, lift device, other (see comments)  [alternating pressure mattress]          Follow-up specialty is recommended: No    Follow-up plan:  No future appointments.    Any outstanding tests or procedures:    Procedures       Future Labs/Procedures    Oxygen (SNF/TCU) Discharge     Comments:    As needed for comfort                Funes Recommendations:      TERENCE Avila    AVS/Discharge Summary is the source of truth; this is a helpful guide for improved communication of patient story

## 2024-10-18 NOTE — PROGRESS NOTES
"Transfer Type: [facility]  Transfer Triage Note    Date of call: 10/18/24  Time of call: 3:21 PM    Current Patient Location: [facility]  Current Level of Care: ED    Vitals: BP:108/75 HR: 112-113 O2 Sats: 84-93% on RA, afebrile    Diagnosis: Left neck and left Sternoclavicular abscess vs fluid collection  Reason for requested transfer: Further diagnostic work up, management, and consultation for specialized care   Isolation Needs: None    Care everywhere has been updated and reviewed: Yes  Necessary images have been sent through PACS: Yes    If patient is transferring for specialty care or specific procedure, the specialist required has participated in the transfer call and agreed with need for transfer and anticipated timeline: No - ED doc had already spoken to ENT at [facility] who agreed transfer was appropriate    I was called to discuss this patient:   Homer Randhawa is a 69 y.o. man with past medical history of lung cancer status post lobectomy (May 2024) with brain metastasis and bone metastasis on current chemoradiation therapy, multiple pathologic fractures, atrial fibrillation on chronic anticoagulation, hypertension, and chronic anemia who presented with left neck swelling. Also with hypoxia and afib with RVR (has a history of this as well). Per the ED provider he had not yet taken his diltiazem for the day.   Imaging of soft tissue of the neck showed the following:   \"New cortical irregularity and ill-defined radiolucency centered  about the left sternoclavicular joint, primarily involving the  articular surface of the medial left clavicle. There also appears to  be new mild anterior subluxation across the left sternoclavicular  joint. There is a heterogeneous surrounding abnormal soft tissue  density with multiple foci of air and surrounding fat stranding in the  medial left retroclavicular and supraclavicular spaces. Fluid  collection with associated soft tissue swelling and multiple foci of  air in the " "mid to lower left strap muscles, and multiple fluid  collections in the left sternocleidomastoid muscle. Findings  altogether are concerning for septic arthritis of the left  sternoclavicular joint with associated soft tissue  infection/cellulitis/phlegmon and potential multiple intramuscular  abscesses of the left sternocleidomastoid and left strap muscles.  Recommend clinical correlation and tissue sampling. The possibility  that all of these findings are posttraumatic is thought less likely.  Given the patient's history of malignancy, a component of  neoplasm/metastatic disease cannot be excluded. Recommend tissue  sampling/fluid aspiration of one of the fluid collections in the left  sternocleidomastoid muscle. Follow-up CT neck with contrast is  recommended.\"    The ED doc stated that their local ENT doesn't typically operate on this level of complexity (?) and so he was inquiring about transfer. [place] would be ideal (as it has ENT + oncology) but there's a waitlist. Considered [facility] but given this patients ongoing cancer picture, felt that being closer to [place] was ideal so ended up recommending transfer to [facility] at least for I&D of the area (by ENT) for better diagnostics as there is concern that this is malignancy rather than infection.     Elected for [facility] due to his level of illness.     Transfer accepted: Yes  Stability of Patient: Patient is vitally stable, with no critical labs, and will likely remain stable throughout the transfer process  Is the patient appropriate for [facility]? Yes-- initially this is the best site to get I&D of the fluid collection and if needed, could transfer to [place] afterward if his current oncology status necessitates being on [place]  Level of Care Needed: IMC  Telemetry Needed:  Cardiac Telemetry  Expected Time of Arrival for Transfer: 0-8 hours  Arrival Location:  [facility] "     Recommendations for Management and Stabilization: Not needed      Petty Luz MD    10/18/24  Around 4 PM when I was looking at the chart I noticed he had been on 15 LPM of oxygen and then was placed on BiPAP. I requested our patient placement specialists discuss the possibility of ICU instead of IMC at time of transfer. I spoke with Dr. Erazo (tele ICU doc) and she agreed with his trajectory that he would be better placed in ICU. [facility].    Petty Luz MD  Internal Medicine/Pediatrics Hospitalist

## 2024-10-18 NOTE — ED PROVIDER NOTES
History     Chief Complaint   Patient presents with    Mass     Left neck mass that appeared this AM.      HPI  Dk Waldropling . is a 69 year old male who presents with a history of right upper lobe lung cancer.  He has brain metastases history of leukopenia.  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  Noted to have acute swelling left neck both laterally on the left side and anteriorly.  Some associated shoulder pain and decreased mobility recently recent cough chest x-ray yesterday with possible CHF exacerbation.    Arrives with new onset of swelling anterior neck laterally on the left side and posteriorly right side that was more acute onset.  Newly requiring oxygen.  History of atrial fibrillation with rapid ventricular response.  Unclear what [facility] facility is already given him this morning for his rate control he is on diltiazem amiodarone digoxin.      Allergies:  No Known Allergies    Problem List:    Patient Active Problem List    Diagnosis Date Noted    Atrial fibrillation with rapid ventricular response (H) 09/16/2024     Priority: Medium    Cellulitis of finger of right hand 09/16/2024     Priority: Medium    Cellulitis of right lower extremity 09/16/2024     Priority: Medium    Leukopenia, unspecified type 09/16/2024     Priority: Medium    Pedal edema 09/09/2024     Priority: Medium    Pathological fracture in neoplastic disease, pelvis, init 08/23/2024     Priority: Medium    Malignant neoplasm metastatic to brain (H) 08/01/2024     Priority: Medium    Malignant neoplasm metastatic to bone (H) 07/26/2024     Priority: Medium    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   Thoracoscopic right upper lobectomy 5/15/2024      Malignant  neoplasm of upper lobe of right lung (H) 05/15/2024     Priority: Medium    Mass of upper lobe of right lung 05/03/2024     Priority: Medium    Atrial fibrillation with RVR (H) 10/23/2023     Priority: Medium    Obesity (BMI 30.0-34.9) 04/01/2022     Priority: Medium    Cough due to angiotensin-converting enzyme inhibitor 12/02/2015     Priority: Medium    CARDIOVASCULAR SCREENING; LDL GOAL LESS THAN 160 10/31/2010     Priority: Medium    Essential hypertension, benign 05/18/2010     Priority: Medium    Hyperlipidemia 05/18/2010     Priority: Medium    Pre-diabetes 05/18/2010     Priority: Medium        Past Medical History:    Past Medical History:   Diagnosis Date    Acute non-recurrent maxillary sinusitis     Antiplatelet or antithrombotic long-term use     Arrhythmia     Atrial fibrillation with RVR (H)     Cough secondary to angiotensin converting enzyme inhibitor (ACE-I)     Hyperlipidemia     Hypertension     Malignant neoplasm metastatic to bone (H) 07/26/2024    Mass of upper lobe of right lung     Obesity     Pathological fracture in neoplastic disease, pelvis, init 08/23/2024       Past Surgical History:    Past Surgical History:   Procedure Laterality Date    BRONCHOSCOPY, WITH BIOPSY, ROBOT ASSISTED Bilateral 04/16/2024    Procedure: BRONCHOSCOPY, endobronchial ultrasound, transbronchial needle aspiration, endobronchial biopsies and tumor debulking;  Surgeon: Lanette Arce MD;  Location: [facility] OR    DAVINCI EXCISE NODES THORACIC N/A 5/15/2024    Procedure: mediastinal lymph node dissection;  Surgeon: Mikael Peoples MD;  Location:  OR    DAVINCI LOBECTOMY LUNG Right 5/15/2024    Procedure: Robot-assisted thoracoscopic right upper lobectomy,;  Surgeon: Mikael Peoples MD;  Location:  OR    ELBOW ARTHROSCOPY Left 03/09/2017    INSERT PORT VASCULAR ACCESS Right 7/5/2024    Procedure: INSERTION, VASCULAR ACCESS PORT;  Surgeon: Henry Veronica MD;  Location: [facility] OR    PHACOEMULSIFICATION WITH  STANDARD INTRAOCULAR LENS IMPLANT Left 02/26/2018    Procedure: PHACOEMULSIFICATION WITH STANDARD INTRAOCULAR LENS IMPLANT;  Left Cataract Removal with Implant;  Surgeon: Mohit Victor MD;  Location: [facility] OR    PHACOEMULSIFICATION WITH STANDARD INTRAOCULAR LENS IMPLANT Right 01/30/2019    Procedure: Cataract Removal with Implant;  Surgeon: Mohit Victor MD;  Location: [facility] OR    TONSILLECTOMY  1964    TOTAL HIP ARTHROPLASTY Left 04/12/2022       Family History:    Family History   Problem Relation Age of Onset    Ovarian Cancer Mother     Alzheimer Disease Mother     Depression Father 45        suicide    Diabetes Sister        Social History:  Marital Status:   [4]  Social History     Tobacco Use    Smoking status: Former     Current packs/day: 0.00     Average packs/day: 0.5 packs/day for 20.0 years (10.0 ttl pk-yrs)     Types: Cigarettes     Start date: 1994     Quit date: 2014     Years since quitting: 10.8    Smokeless tobacco: Never   Vaping Use    Vaping status: Never Used   Substance Use Topics    Alcohol use: Not Currently    Drug use: No        Medications:    acetaminophen (TYLENOL) 500 MG tablet  amiodarone (PACERONE) 200 MG tablet  dexAMETHasone (DECADRON) 4 MG tablet  digoxin (LANOXIN) 125 MCG tablet  diltiazem ER (DILT-XR) 120 MG 24 hr capsule  fexofenadine (ALLEGRA) 180 MG tablet  furosemide (LASIX) 40 MG tablet  guaiFENesin (MUCINEX) 600 MG 12 hr tablet  ipratropium - albuterol 0.5 mg/2.5 mg/3 mL (DUONEB) 0.5-2.5 (3) MG/3ML neb solution  Melatonin 10 MG TABS tablet  morphine (MS CONTIN) 15 MG CR tablet  oxyCODONE (ROXICODONE) 5 MG tablet  SENNA-docusate sodium (SENNA S) 8.6-50 MG tablet          Review of Systems   Constitutional:  Negative for chills, diaphoresis and fever.   HENT:  Negative for ear pain, sinus pressure and sore throat.    Eyes:  Negative for visual disturbance.   Respiratory:  Positive for shortness of breath. Negative for cough and wheezing.     Cardiovascular:  Negative for chest pain and palpitations.   Gastrointestinal:  Negative for abdominal pain, blood in stool, constipation, diarrhea, nausea and vomiting.   Genitourinary:  Negative for dysuria, frequency and urgency.   Skin:  Negative for rash.   Neurological:  Negative for headaches.   All other systems reviewed and are negative.      Physical Exam   BP: 108/79  Pulse: (!) 124  Temp: 97.4  F (36.3  C)  Resp: 28  SpO2: 92 %      Physical Exam  Constitutional:       General: He is in acute distress.      Appearance: He is not diaphoretic.   Eyes:      Conjunctiva/sclera: Conjunctivae normal.   Cardiovascular:      Rate and Rhythm: Regular rhythm. Tachycardia present.      Heart sounds: No murmur heard.  Pulmonary:      Effort: Respiratory distress present.      Breath sounds: No stridor. No wheezing or rhonchi.   Abdominal:      General: Abdomen is flat. There is no distension.      Palpations: Abdomen is soft. There is no mass.      Tenderness: There is no abdominal tenderness. There is no guarding.   Musculoskeletal:      Cervical back: Neck supple.      Right lower leg: No edema.      Left lower leg: No edema.   Skin:     Coloration: Skin is not pale.      Findings: No rash.   Neurological:      Mental Status: He is alert.      Motor: No weakness.         ED [facility]    Procedure: Nasolaryngoscopy    Date/Time: 10/18/2024 6:02 PM    Performed by: Iván Crabtree MD  Authorized by: Iván Crabtree MD    Risks, benefits and alternatives discussed.       ANESTHESIA    Local Anesthetic:  Topical anesthetic      PROCEDURE  Describe Procedure: afrin and lidocaine/epi.     Indications: evaluation of airway     Procedure: After verbal informed consent was obtained, the patient was placed in the upright position. The fiberoptic laryngoscope was inserted into the patient's right naris and advanced along the floor of the nasal cavity into the posterior oropharynx.  The base of the tongue, vallecula, and epiglottis were visualized. The scope was removed without difficulty at completion of the procedure.     Findings: The cords are easily visualized and there is no encroachment on the airway.  The epiglottis was normal.  No masses palpated.  There are no obvious pustular findings within the airway.      Patient Tolerance:  Patient tolerated the procedure well with no immediate complications                  EKG Interpretation:      Interpreted by Iván Crabtree MD  EKG done at 1210 hrs. demonstrates a atrial fibrillation at 125 bpm with a normal axis and marginal ST depression in lead V2 and V3.  Flattening T waves laterally and inferiorly.  There is a normal R progression.  No Q waves.  Normal intervals.  Normal conduction.  No ectopy.  Impression sinus tachycardia 125 bpm no significant acute change      Critical Care time:  none     The patient has signs of sepsis   Sepsis ED evaluation   The patient has signs of sepsis as evidenced by:  1. Presence of 2 SIRS criteria, suspected infection, AND  2. Organ dysfunction:  identified both elevated lactic acid (and unclear if related to resp cause or sepsis,?CHF, a fib RVR - followed by clear source of infection)    Time zero:  1405  on 10/18/24 as this was the time when CT resulted with clear infectious source CT resulted, raising suspicion for bacterial infection.    Note: Due to a national blood culture bottle shortage, reduced blood cultures may have been drawn on this patient.    Lactic Acid Results:  Recent Labs   Lab Test 10/18/24  1545 10/18/24  1226 09/17/24  1444   LACT 3.5* 3.5* 1.3       3 Hour Bundle 6 Hour Bundle (Reassessment)   Blood Cultures before IV Antibiotics: Yes  Antibiotics given: see below  Prehospital fluid volume (mL):                     Total fluids given (ED +Pre-hospital):  Full 30 mL/kg bolus intentionally NOT administered to this patient due to CHF and acute Pulmonary Edema. The target volume to  "infuse in this patient is 1000 cc, followed by bedside ultrasound, then second 1000 cc.  - 2100 cc = 30 cc/kg    Ideal body weight: 77.6 kg (171 lb 1.2 oz)  Adjusted ideal body weight: 84 kg (185 lb 2 oz)   Repeat Lactic Acid Level: Ordered by reflex for 2 hours after initial lactic acid collection.  Vasopressors: MAP>65 after initial IVF bolus, will continue to monitor fluid status and vital signs.  Repeat perfusion exam: I attest to having performed a repeat sepsis exam and assessment of perfusion at  1500 - adequate .   BMI Readings from Last 1 Encounters:   10/17/24 27.97 kg/m        Anti-infectives (From admission through now)      Start     Dose/Rate Route Frequency Ordered Stop    10/18/24 1420  vancomycin (VANCOCIN) 1,750 mg in sodium chloride 0.9 % 567.5 mL intermittent infusion         1,750 mg  over 120 Minutes Intravenous ONCE 10/18/24 1416 10/18/24 1804    10/18/24 1410  piperacillin-tazobactam (ZOSYN) 3.375 g vial to attach to  mL bag        Note to Pharmacy: For [facility], [facility] and [facility]: For Zosyn-naive patients, use the \"Zosyn initial dose + extended infusion\" order panel.    3.375 g  over 30 Minutes Intravenous EVERY 6 HOURS 10/18/24 1407      10/18/24 1400  clindamycin (CLEOCIN) 900 mg in 50 mL D5W intermittent infusion         900 mg  100 mL/hr over 30 Minutes Intravenous ONCE 10/18/24 1356 10/18/24 1557                     Results for orders placed or performed during the hospital encounter of 10/18/24 (from the past 24 hour(s))   CBC with platelets differential    Narrative    The following orders were created for panel order CBC with platelets differential.  Procedure                               Abnormality         Status                     ---------                               -----------         ------                     CBC with platelets and d...[[id number]]  Abnormal            Final result               RBC and Platelet Morphology[[id number]]  Abnormal            Final result            "      Please view results for these tests on the individual orders.   Comprehensive metabolic panel   Result Value Ref Range    Sodium 141 135 - 145 mmol/L    Potassium 4.2 3.4 - 5.3 mmol/L    Carbon Dioxide (CO2) 25 22 - 29 mmol/L    Anion Gap 14 7 - 15 mmol/L    Urea Nitrogen 37.2 (H) 8.0 - 23.0 mg/dL    Creatinine 0.60 (L) 0.67 - 1.17 mg/dL    GFR Estimate >90 >60 mL/min/1.73m2    Calcium 9.4 8.8 - 10.4 mg/dL    Chloride 102 98 - 107 mmol/L    Glucose 199 (H) 70 - 99 mg/dL    Alkaline Phosphatase 265 (H) 40 - 150 U/L    AST 18 0 - 45 U/L    ALT 18 0 - 70 U/L    Protein Total 6.5 6.4 - 8.3 g/dL    Albumin 2.7 (L) 3.5 - 5.2 g/dL    Bilirubin Total 0.5 <=1.2 mg/dL   Blood gas venous   Result Value Ref Range    pH Venous 7.47 (H) 7.32 - 7.43    pCO2 Venous 38 (L) 40 - 50 mm Hg    pO2 Venous 40 25 - 47 mm Hg    Bicarbonate Venous 27 21 - 28 mmol/L    Base Excess/Deficit Venous 3.4 (H) -3.0 - 3.0 mmol/L    FIO2 3     Oxyhemoglobin Venous 72 70 - 75 %    O2 Sat, Venous 74.4 70.0 - 75.0 %    Narrative    In healthy individuals, oxyhemoglobin (O2Hb) and oxygen saturation (SO2) are approximately equal. In the presence of dyshemoglobins, oxyhemoglobin can be considerably lower than oxygen saturation.   Lactic acid whole blood   Result Value Ref Range    Lactic Acid 3.5 (H) 0.7 - 2.0 mmol/L   NT pro BNP   Result Value Ref Range    N terminal Pro BNP Inpatient 1,214 (H) 0 - 900 pg/mL   CBC with platelets and differential   Result Value Ref Range    WBC Count 49.7 (H) 4.0 - 11.0 10e3/uL    RBC Count 2.81 (L) 4.40 - 5.90 10e6/uL    Hemoglobin 8.7 (L) 13.3 - 17.7 g/dL    Hematocrit 27.1 (L) 40.0 - 53.0 %    MCV 96 78 - 100 fL    MCH 31.0 26.5 - 33.0 pg    MCHC 32.1 31.5 - 36.5 g/dL    RDW 25.5 (H) 10.0 - 15.0 %    Platelet Count 243 150 - 450 10e3/uL    % Neutrophils 92 %    % Lymphocytes 3 %    % Monocytes 1 %    % Eosinophils 0 %    % Basophils 0 %    % Immature Granulocytes 5 %    NRBCs per 100 WBC 0 <1 /100    Absolute  Neutrophils 45.5 (H) 1.6 - 8.3 10e3/uL    Absolute Lymphocytes 1.4 0.8 - 5.3 10e3/uL    Absolute Monocytes 0.6 0.0 - 1.3 10e3/uL    Absolute Eosinophils 0.0 0.0 - 0.7 10e3/uL    Absolute Basophils 0.0 0.0 - 0.2 10e3/uL    Absolute Immature Granulocytes 2.2 (H) <=0.4 10e3/uL    Absolute NRBCs 0.1 10e3/uL   RBC and Platelet Morphology   Result Value Ref Range    RBC Morphology Confirmed RBC Indices     Platelet Assessment  Automated Count Confirmed. Platelet morphology is normal.     Automated Count Confirmed. Platelet morphology is normal.    Elliptocytes Slight (A) None Seen    Polychromasia Slight (A) None Seen    RBC Fragments Rare (A) None Seen    Toxic Neutrophils Present (A) None Seen   Soft tissue neck CT w contrast    Narrative    CT SCAN OF THE NECK WITH CONTRAST  10/18/2024 1:31 PM     HISTORY: Neck swelling.    TECHNIQUE: Axial CT images of the neck following administration of  intravenous contrast with reformations. Radiation dose for this scan  was reduced using automated exposure control, adjustment of the mA  and/or kV according to patient size, or iterative reconstruction  technique. 90 mL Isovue 370 IV.     COMPARISON: CT of the chest dated 7/25/2024. Correlation is also made  with MRI of the brain 8/8/2024.    FINDINGS: There appears to be new mild anterior subluxation across the  left sternoclavicular joint with mild relative anterior positioning of  the medial left clavicular head. New abnormal ill-defined radiolucency  and cortical irregularity involving the articular surface of the  medial left clavicle at the level of the sternoclavicular joint  (series 3 image 122, series 5 image 34). Ill-defined surrounding  heterogeneous soft tissue swelling and fat stranding is noted along  the posterior and superior aspects of the left sternoclavicular joint.  Ovoid heterogeneous soft tissue process with a couple foci of internal  mineralization along the posterior margin of the medial left  clavicle  measuring 5.8 x 2.3 cm in axial plane (series 3 image 119). Contiguous  soft tissue as well as multiple small foci of air extending superiorly  to involve the lower left strap musculature and there appears to be a  fluid collection with internal foci of air in the left strap muscles  anterolateral to the left thyroid lobe measuring 17 mm x 18 mm x 31 mm  (series 3 image 105, series 5 image 39), raising concern for possible  abscess. Additionally, there are multiple fluid collections within the  left sternocleidomastoid muscle, which is asymmetrically enlarged  (series 3 image 87). The most prominent fluid collection measures  approximately 15 mm AP x 12 mm transverse x 45 mm craniocaudal (series  3 image 94, series 5 image 35). These may represent intramuscular  abscesses. There is prominent ill-defined fat stranding centered about  the left sternoclavicular joint and also involving the left  supraclavicular/retroclavicular soft tissues and left anterior chest  wall superiorly and also in the left neck surrounding the  sternocleidomastoid muscle. Given the patient's history of malignancy,  a neoplastic/tumor component of this process cannot be excluded.  Tissue sampling and imaging follow-up is recommended. No definite  pathologic cervical lymphadenopathy identified by size or morphologic  criteria.    The visualized aspects of the orbits and paranasal sinuses appear  clear. The mastoid and middle ear cavities appear essentially clear.  The previously demonstrated enhancing intracranial masses are not well  seen on this CT exam, which may be due to differences in technique or  possibly some posttreatment changes. Visualized aspects of the oral  cavity/oropharynx are somewhat limited due to prominent streak  artifact from the patient's dental amalgam. No definite  aggressive-appearing mucosal space mass in the upper aerodigestive  tract is identified. Patulous appearance of the pharynx. The  bilateral  submandibular and parotid glands appear normal. No dominant  intrathyroidal mass or nodule seen.    Bandlike opacity in the right lung apex may represent scarring. There  appears to be trace pleural air along the anterolateral aspect of the  right upper lung zone (series 4 image 134). Right anterior chest wall  Port-A-Cath tubing extends into the right internal jugular vein with  tip terminating in the internal jugular vein, similar to prior CT.  Multilevel degenerative changes of the cervical spine are noted.  Ankylosis across the C2-C3 disc space and facets.      Impression    IMPRESSION:  1. New cortical irregularity and ill-defined radiolucency centered  about the left sternoclavicular joint, primarily involving the  articular surface of the medial left clavicle. There also appears to  be new mild anterior subluxation across the left sternoclavicular  joint. There is a heterogeneous surrounding abnormal soft tissue  density with multiple foci of air and surrounding fat stranding in the  medial left retroclavicular and supraclavicular spaces. Fluid  collection with associated soft tissue swelling and multiple foci of  air in the mid to lower left strap muscles, and multiple fluid  collections in the left sternocleidomastoid muscle. Findings  altogether are concerning for septic arthritis of the left  sternoclavicular joint with associated soft tissue  infection/cellulitis/phlegmon and potential multiple intramuscular  abscesses of the left sternocleidomastoid and left strap muscles. The  possibility that all of these findings are posttraumatic is thought  less likely. Given the patient's history of malignancy, a component of  neoplasm/metastatic disease cannot be excluded. Recommend tissue  sampling/fluid aspiration of one of the fluid collections in the left  sternocleidomastoid muscle. Follow-up CT neck with contrast is  recommended.  2. Please refer to separate report from [facility] chest of same day  for  additional details.    Findings were discussed by myself with Dr. Crabtree at approximately 2:05  PM 10/18/2024.    FERNY GRESHAM MD         SYSTEM ID:  [id number]   CT Chest Pulmonary Embolism w Contrast    Narrative    CT CHEST PULMONARY EMBOLISM W CONTRAST 10/18/2024 1:31 PM    CLINICAL HISTORY: neck swelling, hypoxia  TECHNIQUE: CT angiogram chest during arterial phase injection IV  contrast. 2D and 3D MIP reconstructions were performed by the CT  technologist. Dose reduction techniques were used.     CONTRAST: 90ml    COMPARISON: 7/25/2024    FINDINGS:  ANGIOGRAM CHEST: Pulmonary arteries are normal caliber and negative  for pulmonary emboli. Thoracic aorta is negative for dissection. No CT  evidence of right heart strain.    LUNGS AND PLEURA: Right upper lobectomy. Right hydropneumothorax with  a trace amount air and small to moderate amount of layering fluid. The  fluid component has decreased since 7/25/2024. Increased nodular  thickening of the pleura at the  medial inferior right lung base (series 4, image 212), suspicious for  worsening pleural metastases.    Multiple new nodular and groundglass opacities with bronchial wall  thickening in the bilateral lower lobes, suspicious for pneumonia.     Stable emphysema. A 0.9 cm solid nodule in the lingula is stable  (series 7, image 192). There are however several new and enlarging  solid small nodules. For example, a 6 mm subpleural nodule (series 7,  image 228) is new, a 6 mm nodule more inferiorly in the lingula  (series 4, image 252) previously measured 3 mm and a 5 mm left upper  lobe nodule superiorly previously measured 2 mm (7/129)..    MEDIASTINUM/AXILLAE: Centered in the left sternoclavicular joint,  there is a collection gas and fluid extending into the left neck  measuring approximately 5.4 x 4.7 x 5.5 cm (series 4, image 36 and  series 9, image 94). There is new cortical irregularity off the head  of the left clavicle at the sternoclavicular  joint. Findings are  highly concerning for septic arthritis of the sternoclavicular joint.  Stable indeterminate mediastinal lymphadenopathy and hilar  lymphadenopathy. For example, a 1.4 cm right paratracheal lymph node  previously measured 1.4 cm (series 4, image 96). No thoracic aortic  aneurysm. Moderate coronary artery calcification. No pericardial  effusion.    UPPER ABDOMEN: Increased size of a gastrohepatic ligament lymph node  measuring 1.6 cm, previously 1.1 cm (series 4, image 274). Stable  nodular thickening of the left adrenal gland.    MUSCULOSKELETAL: See discussion above regarding the left  sternoclavicular joint. In addition, there is a new destructive lesion  in the anterior right third rib (series 4, image 84) and subtle new  lucent lesions in multiple ribs.      Impression    IMPRESSION:  1.  No pulmonary emboli.  2.  Moderate right hydropneumothorax with a trace amount of air, and  small to moderate amount of layering fluid.  3.  Findings concerning for pneumonia in bilateral lower lobes.  4.  A moderate-sized gas and fluid collection in the left neck  centered in the left sternoclavicular joint with cortical destruction  of the left clavicular head. Findings are suspicious for septic  arthritis of the left sternoclavicular joint. Underlying metastatic  lesion in the left clavicular head is not excluded.  5.  Progression of metastatic disease with a few new and enlarging  solid nodules in the lungs, new osseous metastases, worsening of right  pleural metastases, enlarging gastrohepatic ligament lymph node and  stable mediastinal lymphadenopathy.    SHELBY LOFTON MD         SYSTEM ID:  [id number]   Lactic acid whole blood   Result Value Ref Range    Lactic Acid 3.5 (H) 0.7 - 2.0 mmol/L   Blood gas venous   Result Value Ref Range    pH Venous 7.45 (H) 7.32 - 7.43    pCO2 Venous 37 (L) 40 - 50 mm Hg    pO2 Venous 40 25 - 47 mm Hg    Bicarbonate Venous 26 21 - 28 mmol/L    Base Excess/Deficit Venous  1.9 -3.0 - 3.0 mmol/L    FIO2 0     Oxyhemoglobin Venous 72 70 - 75 %    O2 Sat, Venous 73.7 70.0 - 75.0 %    Narrative    In healthy individuals, oxyhemoglobin (O2Hb) and oxygen saturation (SO2) are approximately equal. In the presence of dyshemoglobins, oxyhemoglobin can be considerably lower than oxygen saturation.   Extra Tube    Narrative    The following orders were created for panel order Extra Tube.  Procedure                               Abnormality         Status                     ---------                               -----------         ------                     Extra Green Top (Lithium...[[id number]]                      Final result                 Please view results for these tests on the individual orders.   Extra Green Top (Lithium Heparin) Tube   Result Value Ref Range    Hold Specimen JI        Medications   piperacillin-tazobactam (ZOSYN) 3.375 g vial to attach to  mL bag (0 g Intravenous Stopped 10/18/24 1511)   vancomycin (VANCOCIN) 1,000 mg in 200 mL dextrose intermittent infusion (has no administration in time range)   pharmacy alert - intermittent dosing (has no administration in time range)   oxymetazoline (AFRIN) 0.05 % spray 2 spray (2 sprays Both Nostrils Positive 10/18/24 1714)   No lozenges or gum should be given while patient on BIPAP/AVAPS/AVAPS AE (has no administration in time range)   carboxymethylcellulose PF (REFRESH PLUS) 0.5 % ophthalmic solution 1 drop (has no administration in time range)   Patient may continue current oral medications (has no administration in time range)   sodium chloride 0.9% BOLUS 1,000 mL (1,000 mLs Intravenous $New Bag 10/18/24 1634)   LORazepam (ATIVAN) injection 0.5 mg (0.5 mg Intravenous $Given 10/18/24 1634)   ipratropium - albuterol 0.5 mg/2.5 mg/3 mL (DUONEB) neb solution 3 mL (has no administration in time range)   iopamidol (ISOVUE-370) solution 90 mL (90 mLs Intravenous $Given 10/18/24 1252)   sodium chloride 0.9 % bag 500mL for CT  scan flush use (100 mLs Intravenous $Given 10/18/24 1252)   diltiazem (CARDIZEM) tablet 360 mg (360 mg Oral $Given 10/18/24 1352)   clindamycin (CLEOCIN) 900 mg in 50 mL D5W intermittent infusion (0 mg Intravenous Stopped 10/18/24 1557)   sodium chloride 0.9% BOLUS 1,000 mL (0 mLs Intravenous Stopped 10/18/24 1634)   vancomycin (VANCOCIN) 1,750 mg in sodium chloride 0.9 % 567.5 mL intermittent infusion (0 mg Intravenous Stopped 10/18/24 1804)   dexAMETHasone PF (DECADRON) injection 10 mg (10 mg Intravenous $Given 10/18/24 1437)   ipratropium - albuterol 0.5 mg/2.5 mg/3 mL (DUONEB) neb solution 3 mL (3 mLs Nebulization $Given 10/18/24 1447)       Assessments & Plan (with Medical Decision Making)     MDM: Dk HARRISON Sydnee Kwon is a 69 year old male who presents with new onset neck swelling history of lung cancer.  Sense of increased dyspnea.  Abrupt onset of swelling lateral left neck posterior neck.  Not on ACE inhibitor's no obvious angioedema.  History of heart failure and on furosemide diltiazem digoxin.  a fib - Will require likely rate control but do not have which meds he is already taken today.  Unclear if this is also a secondary A-fib related to the events ongoing.  I did not hear stridor or wheezing.  He has a chronically increased respiratory rate - he tells me.  Mild hypoxia on presentation - 88% on RA.    CT of his chest PE protocol as well as CT of the soft tissue neck were initiated to evaluate for underlying mass, superior vena cava syndrome, pulmonary embolism.  The CT demonstrated what is either a malignancy with erosion of the sternoclavicular joint and additional necrotic nodes in this region of lateral neck or infection of the sternal clavicular joint with osteomyelitis and extension into the lateral neck.  He has an elevated white count of 45,000 and a lactic acid markedly elevated.  Initially it was unclear if the elevated heart rate was a primary atrial fibrillation with rapid ventricular rate  due to lack of his home medication rate control or if this represented associated findings consistent with SIRS.  He was given his home diltiazem meds his systolic blood pressures remained in a reassuring range.  He was given initially 1 L of fluid repeat abdomen ultrasound demonstrating a collapsed IVC an additional liter of saline was given to make 30 cc/kg.  During this time his systolic blood pressure remained stable.  Lactate was repeated continues to be 3.5.    His airway was assessed by nasolaryngoscopy.  I see no airway compromise.  The epiglottis and cords were easily seen without mass.  There is no encroachment onto the airway.  On his lung exam there is no stridor or wheezing.  The cause of his increased respiratory rate does not appear to be congestive heart failure related..  He has a very small pneumothorax,  with a chronic fluid collection in the right lung possibly postsurgical and has been present on prior CTs and was reduced from prior.  This may very well be unrelated to his presentation today.  However is unclear why he requires respiratory support.  He has been hypoxic and is required several liters and increasing oxygen requirements.  Have placed him on BiPAP and will need to watch his pneumothorax over time but this point did not require chest tube.  He tolerates BiPAP well and I have initiated Ativan to allow him as needed use for comfort on BiPAP.  He was given nebs send had suctioning of his airway due to some thicker secretions.  Mucous plugging is possible.    His settings on BiPAP at around 1500 hrs. were 18/10 with an FiO2 of 50%.  He will need to be observed closely and VBG will be repeated.  Will need to observe for nina failure as he may wear out and required intubation.      I have spoken to several ear nose and throat doctors including Dr. Marco LIM in this region on call.  He reviewed the imaging and agrees that transfer to higher level of care is absolutely needed.  He agrees  with the antibiotic coverage.    I spoke with [facility] -who recommends that the patient have that region aspirated I would have her region they are at will need to identify whether this is infectious or cancer.  She also recommended that I speak with local ENT which I have done.  She recommended nasolaryngoscopy which I performed and demonstrated no airway compromise.  I spoken to Dr. Luz at [facility] who will accept the patient for medicine but recommends that we look for the closest and most available bed which may be at [facility] or the [facility] at this point.  At this time I spoke with her the patient was not yet on BiPAP.  That is subsequently changed and the patient may require intubation in which case ICU transfer would be appropriate.    I have initiated after cultures, vancomycin and Zosyn and clindamycin.  The clindamycin was added because of the gas seen in some regions of possible infection    I have spoken to the patient several times about the seriousness of the findings and my concerns.  I spoke to Dr. Polo about the patient and have signed him out to him pending transfer.      I have reviewed the nursing notes.    I have reviewed the findings, diagnosis, plan and need for follow up with the patient.           Medical Decision Making  The patient's presentation was of high complexity (an acute health issue posing potential threat to life or bodily function).    The patient's evaluation involved:  ordering and/or review of 3+ test(s) in this encounter (see separate area of note for details)  discussion of management or test interpretation with another health professional (see separate area of note for details)    The patient's management necessitated moderate risk (a decision regarding minor procedure (nasolayngoscopy) with risk factors of respiratory disease), moderate risk (IV contrast administration), and high risk (a decision  regarding hospitalization).        New Prescriptions    No medications on file       Final diagnoses:   Osteomyelitis, unspecified site, unspecified type (H)   Pleural effusion on right   Sepsis, due to unspecified organism, unspecified whether acute organ dysfunction present (H)   Neck infection   Acute respiratory failure with hypoxia (H)   Atrial fibrillation with RVR (H)       10/18/2024   [facility] EMERGENCY DEPT       Iván Crabtree MD  10/18/24 7630

## 2024-10-18 NOTE — LETTER
[facility] MED SURG  [address] [address]  [phone]    FACSIMILE TRANSMITTAL SHEET    TO: Gerardo Farias    FAX NUMBER: [phone]      FROM: Ira  PHONE: [phone]  DATE: 10/24/24      _____URGENT _____REVIEW ONLY _____PLEASE COMMENT____PLEASE REPLY    NOTES/COMMENTS: Please review for admission. Thank you.                                      IF YOU DID NOT RECEIVE THE CORRECT NUMBER OF PAGES OR THE FAX DID NOT COME THROUGH CLEARLY, PLEASE CALL THE SENDER     CONFIDENTIALITY STATEMENT: Confidential information that may accompany this transmission contains protected health information under state and federal law and is legally privileged. This information is intended only for the use of the individual or entity named above and may be used only for carrying out treatment, payment or other healthcare operations. The recipient or person responsible for delivering this information is prohibited by law from disclosing this information without proper authorization to any other party, unless required to do so by law or regulation. If you are not the intended recipient, you are hereby notified that any review, dissemination, distribution, or copying of this message is strictly prohibited. If you have received this communication in error, please destroy the materials and contact us immediately by calling the number listed above. No response indicates that the information was received by the appropriate authorized party

## 2024-10-18 NOTE — CONSULTS
"Pharmacy Vancomycin Initial Note  Date of Service 2024  Patient's  1955  69 year old, male    Indication: Skin and Soft Tissue Infection, osteomyelitis     Current estimated CrCl = Estimated Creatinine Clearance: 136.1 mL/min (A) (based on SCr of 0.6 mg/dL (L)).    Creatinine for last 3 days  10/18/2024: 12:26 PM Creatinine 0.60 mg/dL    Recent Vancomycin Level(s) for last 3 days  No results found for requested labs within last 3 days.      Vancomycin IV Administrations (past 72 hours)        No vancomycin orders with administrations in past 72 hours.                    Nephrotoxins and other renal medications (From now, onward)      Start     Dose/Rate Route Frequency Ordered Stop    10/18/24 1420  vancomycin (VANCOCIN) 1,750 mg in sodium chloride 0.9 % 567.5 mL intermittent infusion         1,750 mg  over 120 Minutes Intravenous ONCE 10/18/24 1416      10/18/24 1410  piperacillin-tazobactam (ZOSYN) 3.375 g vial to attach to  mL bag        Note to Pharmacy: For [facility], [facility] and [facility]: For Zosyn-naive patients, use the \"Zosyn initial dose + extended infusion\" order panel.    3.375 g  over 30 Minutes Intravenous EVERY 6 HOURS 10/18/24 1407              Contrast Orders - past 72 hours (72h ago, onward)      Start     Dose/Rate Route Frequency Stop    10/18/24 1205  iopamidol (ISOVUE-370) solution 90 mL         90 mL Intravenous ONCE 10/18/24 1252            [employer]ight[employer] Prediction of Planned Initial Vancomycin Regimen  Loading dose: 1750 mg at 15:30 10/18/2024.  Regimen: 1000 mg IV every 8 hours.  Start time: 23:30 on 10/18/2024  Exposure target: AUC24 (range)400-600 mg/L.hr   AUC24,ss: 553 mg/L.hr  Probability of AUC24 > 400: 80 %  Ctrough,ss: 18.4 mg/L  Probability of Ctrough,ss > 20: 43 %  Probability of nephrotoxicity (Lodise TAMIKO ): 15 %        Plan:  Start vancomycin  1000 mg IV q8h.   Vancomycin monitoring method: AUC  Vancomycin therapeutic monitoring goal: 400-600 mg*h/L  Pharmacy will " check vancomycin levels as appropriate in 3-5 Days.    Serum creatinine levels will be ordered daily for the first week of therapy and at least twice weekly for subsequent weeks.      Lance Hayden RPH

## 2024-10-18 NOTE — LETTER
[facility] MED SURG  [address] [address] [phone]    FACSIMILE TRANSMITTAL SHEET    TO: Fiordaliza TOVAR: [place] Anika Dubois  FAX NUMBER: [phone]       FROM: [facility]/Ira  PHONE: [phone]  DATE: 10/25/24      _____URGENT _____REVIEW ONLY _____PLEASE COMMENT____PLEASE REPLY    NOTES/COMMENTS: Please review for Long Term Care with Hospice.                                      IF YOU DID NOT RECEIVE THE CORRECT NUMBER OF PAGES OR THE FAX DID NOT COME THROUGH CLEARLY, PLEASE CALL THE SENDER     CONFIDENTIALITY STATEMENT: Confidential information that may accompany this transmission contains protected health information under state and federal law and is legally privileged. This information is intended only for the use of the individual or entity named above and may be used only for carrying out treatment, payment or other healthcare operations. The recipient or person responsible for delivering this information is prohibited by law from disclosing this information without proper authorization to any other party, unless required to do so by law or regulation. If you are not the intended recipient, you are hereby notified that any review, dissemination, distribution, or copying of this message is strictly prohibited. If you have received this communication in error, please destroy the materials and contact us immediately by calling the number listed above. No response indicates that the information was received by the appropriate authorized party

## 2024-10-18 NOTE — PROGRESS NOTES
Deep suction performed on this patient. Both nares secured by nasal trompette. Large amount of this tenacious yellow secretions. Patient is now on oximask at 15 liters

## 2024-10-18 NOTE — TELEPHONE ENCOUNTER
[facility] Triage Nurse Telephone Encounter    Provider: DIEGO Armstrong CNP  Facility: [facility]  Facility Type:  TCU    Caller: Shyann  Call Back Number: [phone]    Allergies:  No Known Allergies     Reason for call: Nurse called to report that patient has a newly discovered hard mass to his neck extending down to his sternum.  Patient also complaining of pain to his left shoulder with very limited movement.  Oncology was consulted and they want patient to be sent to the ER.  Patient's BP is 95/65 and pulse ranging from 122-128.  Patient is to receive Digoxin and Diltiazem.  Oncology deferring to PCP on whether or not staff should administer those medications.       Verbal Order/Direction given by Provider: Give Digoxin but hold Diltiazem this morning.      Provider giving Order:  DIEGO Armstrong CNP    Verbal Order given to: Shyann Ferreira RN

## 2024-10-18 NOTE — CONSULTS
BRIEF OTOLARYNGOLOGY CONSULT    Called by Dr Crabtree to review the neck CT scans.    Presenting history, previous history reviewed, today's neck CT scan images personally reviewed as well as most recent CAP CT scans done in July 2024.    The main area of abnormality surrounds the LEFT acromioclavicular joint.  The cortex is severely and irregularly eroded with some soft tissue mass appearing to be deep to the area as well as extending into the lower parts of the neck and superior mediastinum.  No discrete fluid collection, but irregular mass and osseous destruction is concerning for either pathologic fracture/malignancy or osteomyelitis.    Further up the neck there is a series of centimeter/sub centimeter fluid filled spaces like pearls on a string tracking within the LEFT SCM muscle.  There is some localized fat stranding as well, but no deep neck space fluid collections.      Otherwise the upper aerodigestive tract is widely patent.    A/P - Admission of IV antibiotics and steroids is reasonable.  However, at some point the area around the AC joint will need to have a tissue diagnosis.  If the cellulitis fails to progress, consideration of transfer to the  should be entertained as there would not be the capacity to do a wide debridement of the neck and upper chest at [facility]

## 2024-10-19 NOTE — PROGRESS NOTES
[facility] ICU PROGRESS NOTE  10/19/2024      Date of Service (when I saw the patient): 10/19/2024    ASSESSMENT: Dk Romoace Kwon is a 69 year old male with PMH RUL Large cell lung cancer with mets to the brain, bone w/pathologic fractures, pancreas, L lung, pleura, and intrathoracic nodes (on palliative chemo and radiation, s/p RUL lobectomy), chronic anemia, chronic atrial fibrillation, neurogenic bladder with chronic grace, and recent hospitalization for cellulitis.      Presented to [facility] from his TCU 10/18/24 with acute L neck swelling and associated shoulder pain found to have significant leukocytosis and imaging findings concerning for osteomyelitis of the L sternoclavicular junction vs progression of malignancy and acute hypoxic respiratory failure with bilateral lower lobe PNA and moderate R hydropneumothorax. Transferred to [facility]- ICU 10/18/24 for ENT evaluation and biopsy / I&D of L neck swelling.     Patient has now decided to transition to comfort cares today.    CHANGES and MAJOR THINGS TODAY:   - Goals of care conversation was held at bedside with patient and his brother, Devon with Dr. Chino and myself. Patient is decisional. His medical conditions were explained including his current infection and bacteremia. We also discussed concern for further cancer metastasis. We discussed surgical options as well. Patient has expressed he does not wish to pursue further medical cares or surgeries. He would like to pursue comfort measures. He expressed the desire to have his last rites read by a . He feels his time has come and he is ready to switch to comfort measures. Patient's brother is in support of the patient's decision. His brother will plan to call family and friends who would like to visit  - STAT spiritual care consult  - Palliative consult  - Could consider attempting to enroll in inpatient hospice  - Transfer to hospital medicine      PLAN:    Neurological:  # Chronic pain  r/t progressive bony metastasis with pathologic fractures  # CNS metastasis, s/p gamma knife   Pain greatest in pt's hips where he has a known acetabular bony met and was unfortunately only able to tolerate 1 round of palliative radiation to the site.   - Monitor neuro exam  - have stopped PTA decadron given patient's wishes to pursue comfort cares  - dilaudid 0.3-0.5 Q1 hour, ativan 1 mg Q3 hours, could always increase in frequency as needed for patient comfort during the dying process. Patient currently is comfortable.      HEENT:  # L sided neck swelling with imaging concerns for osteomyelitis vs progression of cancer   - ENT following:               - Laryngoscopy x2 without evidence of airway compromise               - Stop airway dose decadron  - Have discontinued the IR consult given patient wishes to pursue comfort measures        Pulmonary:   # Acute Hypoxic Respiratory Failure  # Bilateral lower lobe PNA  # R lung large cell carcinoma with numerous metastasis (including L lung, pleura, and intrathoracic nodes), s/p lobectomy  Chest CT at OSH negative for PE, did show ongoing moderate hydropneumothorax with a trace amount of air and fluid level decreased in size in comparison, bilateral lower lobe PNA, moderate sized gas and fluid collection in the L neck centered in the sternoclavicular joint with cortical destruction of the L clavicular head suspicious for septic arthritis (but progression of malignancy cannot be excluded), and progression of metastatic disease with new & enlarging lung nodules, new osseous mets (ribs), and ongoing lymphadenopathy.   - Patient comfortable now on heated high flow. We will not escalate any further treatments for oxygenation at this point. BiPap has been discontinued d/t patient's wishes to pursue comfort measures.         Cardiovascular:    # Lactic acidosis without hypotension   # Afib with RVR  # HTN, h/o  LA 3.5 on presentation with BNP 1214. Received 2L crystalloid for  resuscitation and was started on broad spectrum abx. He was initially in Afib with RVR, but was given a 1x dose of diltiazem with improvement in HR. Despite the fact he has been normotensive, his LA amanda to 4.4 on arrival. EKG of poor quality, but denies chest pain and no overt ST anomalies. Pulses palpable in all 4 extremities. Treating for infection. Will continue cautious rehydration and monitor lactic acid.      Per home med list, Homer takes amiodarone, diltiazem, and digoxin for rate control and rivaroxaban for anticoagulation to treat his chronic Afib. However, DOAC on hold since Sept d/t chemo-induced thrombocytopenia. Plts recovered, but continue to hold acx anyway in glen of probable IR biopsy in AM.       - we will not resume PTA medications given patient wishes to pursue comfort measures  - no escalation of cares  - no further lab checks     10/31/23 TTE @ [employer]   Final Conclusion    1. Normal left ventricular chamber size.Mild concentrically increased left ventricular wall   thickness.    Normal left ventricular systolic function.Estimated left ventricular ejection fraction is   50-55%.    No regional wall motion abnormalities.    2.Normal right ventricular size and systolic function.    Right ventricular systolic pressure cannot be estimated due to inability to detect peak   tricuspid regurgitation Doppler velocity.    3.Trileaflet aortic valve.Mildly thickened aortic valve.    Aortic valve sclerosis without stenosis.No aortic valve regurgitation.    4.No pericardial effusion.    5.Aortic sinus of Valsalva is normal in size (3.7 cm, ZScore = -0.61).Ascending aorta was not   well visualized.    There were no prior studies available for comparison.    Estimated EF: 50-55%      Gastroenterology/Nutrition:  # Pancreatic nodule worrisome for metastasis   # Elevated ALP without hyperbilirubinemia, ALP more likely from bone   # Overweight  # Risk for malnutrition, see RD susanne  - as patient is now  comfort measures okay to eat what he wishes as tolerated     Renal/Fluids/Electrolytes:   # AGMA 2/2 lactic acidosis   # hyperphosphatemia   # Neurogenic bladder with chronic grace   Baseline Cr ~ 0.5. Cr 0.77 on ICU arrival. Not quite criteria for TEA, but will monitor. Currently making adequate urine.   - leave grace for comfort      Endocrine:  # Prediabetes  # Stress and steroid induced hyperglycemia   - insulin discontinued as patient is pursuing comfort measures     ID:  # Concern for L sternoclavicular joint osteomyelitis with possible associated soft tissue infection/cellulitis/phlegmon with possible abscess  # Bilateral lower lobe PNA, query aspiration?  # GPC in blood culture  # Leukemoid reaction   Presented with a 1 day hx of L sided neck swelling. Recently hospitalized for treatment of septic shock with cellulitis involving the RUE and possibly R foot. CT imaging in ED noted bilateral lower lobe infiltrates suspicious for PNA and  moderate sized gas and fluid collection in the L neck centered in the sternoclavicular joint with cortical destruction of the L clavicular head suspicious for septic arthritis. R pointer and middle finger also remain red and swolllen with decreased ROM, but xrays without s/s of soft tissue or bone infection, rather suggest osteoarthritis. Also with significant wounds to the sacrum and bilateral heels on the lateral aspect, but none appear cellulitic. On broad spectrum abx targeting both soft tissue infection and PNA.  - ID, ortho, ENT consults no longer needed as patient will be pursuing comfort measures  - stopped all antibiotics    - Cultures:               - 10/18 BC - GPC 1 of 2 bottles               - 10/18 MRSA - pending               - 10/19 UA with reflex, needs to be collected               - 10/19 sputum, needs to be collected if able               - 10/19 BC - assess for clearance  - Abx:               - Vanco (10/18 -10/19)               - Zosyn (10/18 -10/19)                - Clindamycin (10/18 -10/19 )     Positive cultures:  - 10/18 BC - GPCs 2 of 2 bottles     Heme/Oncology:     # Stage 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 Friedell. His current intent of treatment is palliative. Diagnosed with RUL large cell carcinoma after presenting on 4/7/24 with hemoptysis, confirmed on bx 4/16/24. Underwent RUL lobectomy 5/15/24 with adjacent nodes negative for malignancy. He was to start adjuvant chemotherapy, but presented to ED with hip pain and was found to have bony mets 7/20/24. Has since been found to have metastasis involving the L lung, various intrathoracic nodes, brain, and bones with pathological fractures. Likely with metastasis to pancrease with a nodule noted on CT imaging. . He has underwent gamma knife radiation for brain mets, has completed 3 or 4/7 cycles of carboplatin, etoposide, and atezolizumab, and per the pt has undergone 1/10 radiation treatments to his hip. Has had delays in treatments due to hospitalization for septic shock 2/2 cellulitis while neutropenic and failure to thrive.       - patient switching to comfort measures     Musculoskeletal:  # Generalized weakness and FTT 2/2 advancing malignancy  # Pathologic fractures involving the pelvis   # Weakness and deconditioning of critical illness   - Physical and occupational therapy consult      Skin:  # Bilateral ankle wounds, lateral aspect  # Sacral wound  # Edema  - WOC consult discontinued, patient is comfort measures     General Cares   Family Communication: brother Devon  Code Status: DNR/DNI     Lines/tubes/drains:  - R chest port-a-cath not currently accessed  - PIV x2  - [place], exchanged 10/19     Disposition:  - Okay to transfer to hospital medicine, possible inpatient hospice consult       Patient seen and findings/plan discussed  with medical ICU staff, Dr. Chino.  Time Spent: 45 minutes    DIEGO Weathers CNP    Clinically Significant Risk Factors Present on Admission         # Hypernatremia: Highest Na = 149 mmol/L in last 2 days, will monitor as appropriate  # Hyperchloremia: Highest Cl = 112 mmol/L in last 2 days, will monitor as appropriate       # Hypercalcemia: corrected calcium is >10.1, will monitor as appropriate    # Hypoalbuminemia: Lowest albumin = 2.6 g/dL at 10/18/2024 10:34 PM, will monitor as appropriate  # Coagulation Defect: INR = 1.20 (Ref range: 0.85 - 1.15) and/or PTT = 27 Seconds (Ref range: 22 - 38 Seconds), will monitor for bleeding    # Hypertension: Noted on problem list   # Acute Hypoxic Respiratory Failure: Documented O2 saturation < 90%. Continue supplemental oxygen as needed   # Anemia: based on hgb <11       # Overweight: Estimated body mass index is 28.64 kg/m  as calculated from the following:    Height as of this encounter: 1.829 m (6').    Weight as of this encounter: 95.8 kg (211 lb 3.2 oz).       # Financial/Environmental Concerns:        # Anemia: based on hgb <11   Code Status: No CPR- Do NOT Intubate           ====================================  INTERVAL HISTORY:   Patient has elected to pursue comfort measures. Okay to transfer to hospital medicine.     OBJECTIVE:   1. VITAL SIGNS:   Temp:  [97.4  F (36.3  C)-99.1  F (37.3  C)] 98.3  F (36.8  C)  Pulse:  [] 85  Resp:  [14-42] 23  BP: ()/() 86/68  FiO2 (%):  [35 %-70 %] 35 %  SpO2:  [84 %-100 %] 98 %  FiO2 (%): 35 %, Resp: 23  2. INTAKE/ OUTPUT:   I/O last 3 completed shifts:  In: 1600.64 [P.O.:100; I.V.:500.64; IV Piggyback:1000]  Out: 800 [Urine:800]    3. PHYSICAL EXAMINATION:    GEN: Awake and alert,  appears chronically ill   HEENT:  Normocephalic, atraumatic, trachea midline, visible and palpable mass to the center and left neck, no stridor  CV: Irregularly irregular, extremities warm, pulses palpable  PULM/CHEST:  breathing comfortably on heated high flow, lung sounds clear anteriorlyu  GI: normal bowel sounds, soft, non-tender  : grace catheter in place, urine zofia and clear  EXTREMITIES: 1-2+ peripheral edema, moving all extremities, peripheral pulses intact.  Right pointer and middle finger red, swollen with limited range of motion and tenderness to touch  NEURO: no focal deficits  SKIN: Bilateral foot wounds covered in dressings, does not appear cellulitic, sacral pressure wound with dressing in place, UTV elbow wounds       4. LABS:   Arterial Blood Gases   No lab results found in last 7 days.  Complete Blood Count   Recent Labs   Lab 10/19/24  0606 10/19/24  0522 10/18/24  2234 10/18/24  1226   WBC 51.0* 58.9* 54.4* 49.7*   HGB 7.6* 5.3* 8.5* 8.7*    292 262 243     Basic Metabolic Panel  Recent Labs   Lab 10/19/24  0522 10/19/24  0351 10/19/24  0050 10/18/24  2234 10/18/24  2126 10/18/24  1226   *  --   --  140  --  141   POTASSIUM 4.2  --   --  4.8  --  4.2   CHLORIDE 112*  --   --  104  --  102   CO2 23  --   --  19*  --  25   BUN 13.0  --   --  43.9*  --  37.2*   CR 0.74  --   --  0.77  --  0.60*   * 249* 232* 257*   < > 199*    < > = values in this interval not displayed.     Liver Function Tests  Recent Labs   Lab 10/19/24  0606 10/18/24  2234 10/18/24  1226   AST  --  11 18   ALT  --  16 18   ALKPHOS  --  238* 265*   BILITOTAL  --  0.5 0.5   ALBUMIN  --  2.6* 2.7*   INR 1.20* 1.18*  --      Coagulation Profile  Recent Labs   Lab 10/19/24  0606 10/18/24  2234   INR 1.20* 1.18*   PTT 27  --        5. RADIOLOGY:   Recent Results (from the past 24 hour(s))   Soft tissue neck CT w contrast    Narrative    CT SCAN OF THE NECK WITH CONTRAST  10/18/2024 1:31 PM     HISTORY: Neck swelling.    TECHNIQUE: Axial CT images of the neck following administration of  intravenous contrast with reformations. Radiation dose for this scan  was reduced using automated exposure control, adjustment of the  mA  and/or kV according to patient size, or iterative reconstruction  technique. 90 mL Isovue 370 IV.     COMPARISON: CT of the chest dated 7/25/2024. Correlation is also made  with MRI of the brain 8/8/2024.    FINDINGS: There appears to be new mild anterior subluxation across the  left sternoclavicular joint with mild relative anterior positioning of  the medial left clavicular head. New abnormal ill-defined radiolucency  and cortical irregularity involving the articular surface of the  medial left clavicle at the level of the sternoclavicular joint  (series 3 image 122, series 5 image 34). Ill-defined surrounding  heterogeneous soft tissue swelling and fat stranding is noted along  the posterior and superior aspects of the left sternoclavicular joint.  Ovoid heterogeneous soft tissue process with a couple foci of internal  mineralization along the posterior margin of the medial left clavicle  measuring 5.8 x 2.3 cm in axial plane (series 3 image 119). Contiguous  soft tissue as well as multiple small foci of air extending superiorly  to involve the lower left strap musculature and there appears to be a  fluid collection with internal foci of air in the left strap muscles  anterolateral to the left thyroid lobe measuring 17 mm x 18 mm x 31 mm  (series 3 image 105, series 5 image 39), raising concern for possible  abscess. Additionally, there are multiple fluid collections within the  left sternocleidomastoid muscle, which is asymmetrically enlarged  (series 3 image 87). The most prominent fluid collection measures  approximately 15 mm AP x 12 mm transverse x 45 mm craniocaudal (series  3 image 94, series 5 image 35). These may represent intramuscular  abscesses. There is prominent ill-defined fat stranding centered about  the left sternoclavicular joint and also involving the left  supraclavicular/retroclavicular soft tissues and left anterior chest  wall superiorly and also in the left neck surrounding  the  sternocleidomastoid muscle. Given the patient's history of malignancy,  a neoplastic/tumor component of this process cannot be excluded.  Tissue sampling and imaging follow-up is recommended. No definite  pathologic cervical lymphadenopathy identified by size or morphologic  criteria.    The visualized aspects of the orbits and paranasal sinuses appear  clear. The mastoid and middle ear cavities appear essentially clear.  The previously demonstrated enhancing intracranial masses are not well  seen on this CT exam, which may be due to differences in technique or  possibly some posttreatment changes. Visualized aspects of the oral  cavity/oropharynx are somewhat limited due to prominent streak  artifact from the patient's dental amalgam. No definite  aggressive-appearing mucosal space mass in the upper aerodigestive  tract is identified. Patulous appearance of the pharynx. The bilateral  submandibular and parotid glands appear normal. No dominant  intrathyroidal mass or nodule seen.    Bandlike opacity in the right lung apex may represent scarring. There  appears to be trace pleural air along the anterolateral aspect of the  right upper lung zone (series 4 image 134). Right anterior chest wall  Port-A-Cath tubing extends into the right internal jugular vein with  tip terminating in the internal jugular vein, similar to prior CT.  Multilevel degenerative changes of the cervical spine are noted.  Ankylosis across the C2-C3 disc space and facets.      Impression    IMPRESSION:  1. New cortical irregularity and ill-defined radiolucency centered  about the left sternoclavicular joint, primarily involving the  articular surface of the medial left clavicle. There also appears to  be new mild anterior subluxation across the left sternoclavicular  joint. There is a heterogeneous surrounding abnormal soft tissue  density with multiple foci of air and surrounding fat stranding in the  medial left retroclavicular and  supraclavicular spaces. Fluid  collection with associated soft tissue swelling and multiple foci of  air in the mid to lower left strap muscles, and multiple fluid  collections in the left sternocleidomastoid muscle. Findings  altogether are concerning for septic arthritis of the left  sternoclavicular joint with associated soft tissue  infection/cellulitis/phlegmon and potential multiple intramuscular  abscesses of the left sternocleidomastoid and left strap muscles. The  possibility that all of these findings are posttraumatic is thought  less likely. Given the patient's history of malignancy, a component of  neoplasm/metastatic disease cannot be excluded. Recommend tissue  sampling/fluid aspiration of one of the fluid collections in the left  sternocleidomastoid muscle. Follow-up CT neck with contrast is  recommended.  2. Please refer to separate report from CTA chest of same day for  additional details.    Findings were discussed by myself with Dr. Crabtree at approximately 2:05  PM 10/18/2024.    FERNY GRESHAM MD         SYSTEM ID:  [id number]   CT Chest Pulmonary Embolism w Contrast    Narrative    CT CHEST PULMONARY EMBOLISM W CONTRAST 10/18/2024 1:31 PM    CLINICAL HISTORY: neck swelling, hypoxia  TECHNIQUE: CT angiogram chest during arterial phase injection IV  contrast. 2D and 3D MIP reconstructions were performed by the CT  technologist. Dose reduction techniques were used.     CONTRAST: 90ml    COMPARISON: 7/25/2024    FINDINGS:  ANGIOGRAM CHEST: Pulmonary arteries are normal caliber and negative  for pulmonary emboli. Thoracic aorta is negative for dissection. No CT  evidence of right heart strain.    LUNGS AND PLEURA: Right upper lobectomy. Right hydropneumothorax with  a trace amount air and small to moderate amount of layering fluid. The  fluid component has decreased since 7/25/2024. Increased nodular  thickening of the pleura at the  medial inferior right lung base (series 4, image 212), suspicious  for  worsening pleural metastases.    Multiple new nodular and groundglass opacities with bronchial wall  thickening in the bilateral lower lobes, suspicious for pneumonia.     Stable emphysema. A 0.9 cm solid nodule in the lingula is stable  (series 7, image 192). There are however several new and enlarging  solid small nodules. For example, a 6 mm subpleural nodule (series 7,  image 228) is new, a 6 mm nodule more inferiorly in the lingula  (series 4, image 252) previously measured 3 mm and a 5 mm left upper  lobe nodule superiorly previously measured 2 mm (7/129)..    MEDIASTINUM/AXILLAE: Centered in the left sternoclavicular joint,  there is a collection gas and fluid extending into the left neck  measuring approximately 5.4 x 4.7 x 5.5 cm (series 4, image 36 and  series 9, image 94). There is new cortical irregularity off the head  of the left clavicle at the sternoclavicular joint. Findings are  highly concerning for septic arthritis of the sternoclavicular joint.  Stable indeterminate mediastinal lymphadenopathy and hilar  lymphadenopathy. For example, a 1.4 cm right paratracheal lymph node  previously measured 1.4 cm (series 4, image 96). No thoracic aortic  aneurysm. Moderate coronary artery calcification. No pericardial  effusion.    UPPER ABDOMEN: Increased size of a gastrohepatic ligament lymph node  measuring 1.6 cm, previously 1.1 cm (series 4, image 274). Stable  nodular thickening of the left adrenal gland.    MUSCULOSKELETAL: See discussion above regarding the left  sternoclavicular joint. In addition, there is a new destructive lesion  in the anterior right third rib (series 4, image 84) and subtle new  lucent lesions in multiple ribs.      Impression    IMPRESSION:  1.  No pulmonary emboli.  2.  Moderate right hydropneumothorax with a trace amount of air, and  small to moderate amount of layering fluid.  3.  Findings concerning for pneumonia in bilateral lower lobes.  4.  A moderate-sized gas and  fluid collection in the left neck  centered in the left sternoclavicular joint with cortical destruction  of the left clavicular head. Findings are suspicious for septic  arthritis of the left sternoclavicular joint. Underlying metastatic  lesion in the left clavicular head is not excluded.  5.  Progression of metastatic disease with a few new and enlarging  solid nodules in the lungs, new osseous metastases, worsening of right  pleural metastases, enlarging gastrohepatic ligament lymph node and  stable mediastinal lymphadenopathy.    SHELBY LOFTON MD         SYSTEM ID:  [id number]   POC US CHEST B-SCAN    Impression    [facility] Procedure Note      Limited Bedside ED Cardiac Ultrasound:    PROCEDURE: PERFORMED BY: Dr. Iván Crabtree MD  INDICATIONS/SYMPTOM:  Shortness of Breath  PROBE: Cardiac phased array probe and High frequency linear probe  BODY LOCATION: Chest  FINDINGS:   The ultrasound was performed utilizing the subcostal and parasternal long axis views.  Cardiac contractility:  Present  Gross estimation of cardiac kinesis: hyperkinetic  Pericardial Effusion:  None  RV:LV ratio: LV > RV  IVC:    Diameter:  IVC diameter expiration (IVCe) 1-2 cm                                                   IVC diameter inspiration (IVCi) 0 cm                                                       Collapsibility:  IVC collapses > 50% with inspiration  INTERPRETATION:    Chamber size and motion were grossly normal with LV > RV, hyperkinetic  No pericardial effusion was found.  IVC visualized and findings indicate hypovolemia.  IMAGE DOCUMENTATION: Images were archived to PACs system.      [facility] Procedure Note      Limited Bedside ED Ultrasound of Thorax:    PROCEDURE: PERFORMED BY: Dr. Iván Crabtree MD  INDICATIONS/SYMPTOM:  shortness of breath  PROBE: High frequency linear probe  BODY LOCATION: Chest  FINDINGS:  Images of both lung hemithoracies taken in 2D in multiple rib spaces        Right side:   Lung sliding artifact  Present     Comet tail artifacts  Present   Left side:  Lung sliding artifact  Present     Comet tail artifacts  Absent   Hemothorax: Right side Absent     Left side Absent   Pleural effusion: Right side Absent      Left side Absent    INTERPRETATION: The exam was normal. There was no free fluid identified in the chest cavity. No evidence of pneumothorax, hemothorax or pleural effusion.  IMAGE DOCUMENTATION: Images were archived to PACs system.         XR Finger Port Right G/E 2 Views    Narrative    EXAM: XR FINGER PORT RIGHT G/E 2 VIEWS  LOCATION: [facility]  DATE: 10/19/2024    INDICATION: Redness, swelling, and decraesed ROM to pointer and middle fingers. Recently treated for cellulitis of this hand. Eval for soft tissue gas, fracture, or signs of osteo  COMPARISON: None.      Impression    IMPRESSION: No soft tissue gas or bony erosive changes. No acute process identified. 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

## 2024-10-19 NOTE — ACP (ADVANCE CARE PLANNING)
Critical Care Update and ACP Conversation    Dk Randhawa is a 69 y.o. M with widely metastatic lung cancer who is admitted with acute L neck swelling and associated shoulder pain found to have imaging findings concerning for osteomyelitis of the L sternoclavicular junction vs progression of malignancy and acute hypoxic respiratory failure with bilateral lower lobe PNA and moderate R hydropneumothorax. Transferred to [facility] ICU 10/18/24 for ENT evaluation and biopsy / I&D of L neck swelling.     Discussed goals of care on admission and pt endorsed he would prefer to be DNR, but would be ok with pre-arrest intubation. Overnight found to have Staph aureus bacteremia and most suspicious the L sternoclavicular junction is likely infected. Additionally, Hgb was found to be 5.3 with no overt signs of active bleeding. Re-draw pending. Anisocoria also noted this AM without signs of focal deficits.     Updated Homer on the plan for further consults, diagnostics, and possible treatment options this AM. Homer stated he does not want to receive a blood transfusion if the re-check Hgb is < 7.0, nor does he want further CT scans, IR biopsy, or surgeries if offered. He stated he knows he is dying from cancer and feels the burden of current diagnostics and therapies are too great with the context of his terminal cancer in mind. He stated he wanted to speak with his brother and have his last rights read to him by a . We have been unable to speak with Devon overnight, but hopefully we can connect with him this AM.  Homer stated he will likely elect to transition to hospice this afternoon.    Homer elected to be DNR/DNI at this time. He was offered low-dose precedex to keep him comfortable, which he accepted. Will cancel ID and ortho consults, as well as the TTE. Day team will re-visit goals of care with Homer this morning to ensure he still does not wish to pursue restorative cares.     Katie Brewster, CNP

## 2024-10-19 NOTE — CONSULTS
Care Management Initial Consult    General Information  Assessment completed with: Patient, Other, VM-chart review (Brother), Rajiv  Type of CM/SW Visit: Initial Assessment  Primary Care Provider verified and updated as needed: Yes   Readmission within the last 30 days: current reason for admission unrelated to previous admission   Return Category: Progression of disease  Reason for Consult: discharge planning, utilization management concerns  Advance Care Planning: Advance Care Planning Reviewed: no concerns identified          Communication Assessment  Patient's communication style: spoken language (English or Bilingual)    Hearing Difficulty or Deaf: no   Wear Glasses or Blind: yes    Cognitive  Cognitive/Neuro/Behavioral: .WDL except, arousability  Level of Consciousness: lethargic  Arousal Level: arouses to voice  Orientation: oriented x 4  Mood/Behavior: calm  Best Language: 0 - No aphasia  Speech: clear, logical    Living Environment:   People in home: facility resident     Current living Arrangements: other (see comments) (TCU)  [facility] Facility[facility]   Able to return to prior arrangements: other (see comments) (Yes, but likely patient will not)       Family/Social Support:  Care provided by: other (see comments) (facility staff)  Provides care for: no one, unable/limited ability to care for self  Marital Status:   Support system: Sibling(s)          Description of Support System: Supportive, Involved    Support Assessment: Adequate family and caregiver support    Current Resources:   Patient receiving home care services: Yes  Skilled Home Care Services: Skilled Nursing, Physical Therapy, Occupational Therapy, Speech Therapy  Community Resources: None  Equipment currently used at home: walker, standard, grab bar, toilet, grab bar, tub/shower, wheelchair, manual, lift device, other (see comments) (alternating pressure mattress)  Supplies currently used at home: Incontinence  Supplies, Oxygen Tubing/Supplies    Employment/Financial:  Employment Status: retired     Financial Concerns: none   Referral to Financial Worker: No       Does the patient's insurance plan have a 3 day qualifying hospital stay waiver?  No    Lifestyle & Psychosocial Needs:  Social Determinants of Health     Food Insecurity: Low Risk  (9/19/2024)    Food Insecurity     Within the past 12 months, did you worry that your food would run out before you got money to buy more?: No     Within the past 12 months, did the food you bought just not last and you didn t have money to get more?: No   Depression: Not at risk (8/2/2024)    PHQ-2     PHQ-2 Score: 0   Housing Stability: Low Risk  (9/19/2024)    Housing Stability     Do you have housing? : Yes     Are you worried about losing your housing?: No   Tobacco Use: Medium Risk (9/13/2024)    Patient History     Smoking Tobacco Use: Former     Smokeless Tobacco Use: Never     Passive Exposure: Not on file   Financial Resource Strain: Low Risk  (9/19/2024)    Financial Resource Strain     Within the past 12 months, have you or your family members you live with been unable to get utilities (heat, electricity) when it was really needed?: No   Alcohol Use: Not on file   Transportation Needs: Low Risk  (9/19/2024)    Transportation Needs     Within the past 12 months, has lack of transportation kept you from medical appointments, getting your medicines, non-medical meetings or appointments, work, or from getting things that you need?: No   Physical Activity: Not on file   Interpersonal Safety: Low Risk  (9/16/2024)    Interpersonal Safety     Do you feel physically and emotionally safe where you currently live?: Yes     Within the past 12 months, have you been hit, slapped, kicked or otherwise physically hurt by someone?: No     Within the past 12 months, have you been humiliated or emotionally abused in other ways by your partner or ex-partner?: No   Stress: Not on file   Social  "Connections: Socially Integrated (10/23/2023)    Received [facility]    Social Connections     Frequency of Communication with Friends and Family: 0   Health Literacy: Not on file       Functional Status:  Prior to admission patient needed assistance:   Dependent ADLs:: Bathing, Dressing, Grooming, Incontinence, Positioning, Transfers, Toileting, Eating  Dependent IADLs:: Cleaning, Cooking, Laundry, Shopping, Medication Management, Money Management, Transportation, Meal Preparation, Incontinence  Assesssment of Functional Status: Not at baseline with ADL Functioning    Mental Health Status:  Mental Health Status: No Current Concerns       Chemical Dependency Status:  Chemical Dependency Status: No Current Concerns             Values/Beliefs:  Spiritual, Cultural Beliefs, Episcopalian Practices, Values that affect care: yes  Description of Beliefs that Will Affect Care: Holiness    Cultural/Episcopalian Practices Patient Routinely Participates In: prayer       Discussed  Partnership in Safe Discharge Planning  document with patient/family: No    Additional Information:  Care Management Assessment completed due to elevated risk score. GUDELIA met with patient at bedside to complete assessment. Assessment also completed with patient's brother, Devon, over the phone.    GUDELIA was messaged by patient's nurse regarding his concerns about paying a bed hold at the [facility]. GUDELIA asked patient about this; he appeared unconcerned and said his brother was handling it. Devon told GUDELIA that he will ask the facility to discontinue the bed hold. Devon plans on gathering all of patient's belongings as both he and patient believe he will not be returning.    Patient tells SW that he just met with the  and was giving his last rites. Patient tells GUDELIA that in the last 24 hours he has accepted that he is dying and is \"ready to be with Umair.\" Patient discussed " "frustrations with the progression of his disease and his increased dependency on facility staff for his cares.     Patient became increasingly frustrated with SW's questions throughout visit and said he \"didn't want to talk about the past.\" SW excused self from room.    Depending on how patient progresses through his hospital stay, Amaya says he hopes patient can return home on hospice. SW told him that Care Management will be back in touch regarding options if that is the route.    Next Steps:   - follow for discharge planning/hospice     Cherri Frank, NIMO   10/19/2024       Social Work and Care Management Department       SEARCHABLE in [facility] - search SOCIAL WORK       [place] (0800 - 1630) Saturday and Sunday     Units: 4A Vocera, 4C Vocera, & 4E Vocera        Units: 5A 2880-1903 Vocera, 5A 2630-3572 Vocera , BMT SW 1 BMT SW 2, BMT SW 3 & BMT SW 4  5C Off Service 5401 - 5416  5C Off Service [id number]     Units: 6A Vocera & 6B Vocera      Units: 6C Vocera     Units: 7A Vocera & 7B Vocera      Units: 7C Med Surg 7401 thru 7418 and 7C Med Surg 7502 thru 7521      Unit: [place] ED Vocera & [place] Obs Vocera     [facility] (1023-6466) Saturday and Sunday      Units: 5 Ortho Vocera, 5 Med Surg Vocera & WB ED Vocera     Units: 6 Med Surg Vocera, 8 Med Surg Vocera, & 10 ICU Vocera      After hours Vocera [facility] and After Hours Vocera [place]     Please NOTE changes to times below:    **Saturday & Sunday (1630 - 2030)    **Mon-Fri (2355-5159)     **[facility] Recognized Holidays  (3912-4382)    Units: ALL   - see above VOCERA links to units            "

## 2024-10-19 NOTE — CONSULTS
SPIRITUAL HEALTH SERVICES  SPIRITUAL ASSESSMENT Consult Note  [facility] ([facility]) ICU     REFERRAL SOURCE: Weekend on call    I visited with patient Homer Randhawa on 10/19/2024. Homer was interested in last rights, I contacted  on call and facilitated prayers with Homer at bedside. Fr. Esperanza chakrabortyinted.     PLAN: Chaplains are available for additional support as needed.     Temi Collins MDiv  Chaplain Resident    Spiritual Health Services is available 24/7 for emergent requests and consults, either by paging the on-call  or by entering an ASAP/STAT consult in [facility], which will also page the on-call .

## 2024-10-19 NOTE — PROGRESS NOTES
Brief ENT Note:    Subjective - No acute events overnight. Is resting comfortably. Reports no new neck pain and no shortness of breath. Was found to have gram positive bacteremia.     Objective  BP (!) 86/68 (BP Location: Left arm)   Pulse 85   Temp 98.3  F (36.8  C) (Oral)   Resp 23   Ht 1.829 m (6')   Wt 95.8 kg (211 lb 3.2 oz)   SpO2 98%   BMI 28.64 kg/m    He is sitting up in bed and resting comfortably. He is on HFNC. No stridor. Neck swelling appears stable compared to yesterday. It is firm without fluctuance.     Assessment/Plan  Dk Randhawa [place] is a 69 year old male complicated medical history including lung cancer status post lobectomy with brain metastasis and bone metastasis on current chemoradiation therapy, multiple pathologic fractures, atrial fibrillation on chronic anticoagulation, hypertension, chronic anemia, and recent hospitalizations for septic shock secondary to cellulitis who presented to outside ED 10/18 with new left neck swelling and oxygen requirement, CT concerning for septic arthritis of left sternoclavicular joint with associated intramuscular absesses of left SCM and strap muscles and bilateral lower lobe pneumonias.     -No acute ENT intervention  -No decadron needed purely from airway perspective as there is no evidence of edema on flex scope evaluation  -Continue broad spectrum antibiotics per primary  -Recommend IR consult for image-guided biopsy of fluid collections within left neck for pathology/culture  -ENT will plan to see patient next on 10/21    Stephanie Parra MD on 10/19/2024 at 9:27 AM

## 2024-10-19 NOTE — PLAN OF CARE
10A ICU End of Shift Summary.     Changes this shift: Patient arrived from [facility] around 2130 10/18. ENT performed scope, precedex briefly on during procedure. 1L NS bolus given over night.    Neuro: A&Ox4, forgetful. Afebrile.  Cardiac: A fib, HR mid 80s-low 90s. Normotensive.   Respiratory: Tachypneic, labored breathing. Diminished lung sounds to bases. Bipap- 14/7, FiO2 35%. High flow nasal cannula 50L, FiO2 45%.  GI/: LBM 10/17. Chronic catheter in place, adequate amount of yellow urine out.  Diet/appetite: NPO, sips with meds  Pain: Pt reports 3-8/10 chronic pain to left shoulder and right hip. PRN dilaudid given x1 and PRN oxycodone given x1.  Skin: Wounds to buttocks, bilateral ankles, and bilateral elbows.  LDA's: PIV x2  Activities: Bedrest, q2h turns    Drips: Precedex at 0.2mcg/kg/hr.       Plan: Family meeting.  See flowsheets for vital signs and detailed assessment.

## 2024-10-19 NOTE — CONSULTS
"Consult placed due to questions about port with no blood return. Spoke with bedside RN. Told her that if port is accessed and there is no blood return, get a chest x-ray to verify tip is within the port. If needle is in the port and still no blood return, could attempt to use TPA to see if that allows blood to be pulled back.    RN said that she de-accessed port and will have the adult flyer try tonight, or have someone else attempt tomorrow morning.     For additional needs place \"Nursing to Consult for Vascular Access\" [id number] order in EPIC.  "

## 2024-10-19 NOTE — PLAN OF CARE
Goal Outcome Evaluation:      Plan of Care Reviewed With: patient, sibling    Overall Patient Progress: no changeOverall Patient Progress: no change     Care Management Assessment completed due to elevated risk score.

## 2024-10-19 NOTE — PROGRESS NOTES
"CLINICAL NUTRITION SERVICES - BRIEF NOTE     Nutrition Prescription    RECOMMENDATIONS FOR MDs/PROVIDERS TO ORDER:  none    Malnutrition Status:    Did not complete NFPE.  Pt now comfort cares.     Recommendations already ordered by Registered Dietitian (RD):  Snacks/meal additions ordered per pt requests.    Future/Additional Recommendations:  No nutrition follow-up warranted at this time. RD to sign off. Please consult if further needs arise     REASON FOR ASSESSMENT  Dk Randhawa  is a/an 69 year old male assessed by the dietitian for Provider Order.  Since order placed, pt has made decision to comfort cares.  Plans being made for pt to discharge with hospice.     Findings  Orders Placed This Encounter      Regular Diet Adult    Pt diet is regular but pt states he has not received food since yesterday.  Would like soft foods, ice cream, jello, pudding.      INTERVENTIONS  Implementation  Have entered some suggested options into order so patient will receive these on an intermittent, regular basis.    Pt also will be able to order from the menu as desires.      Follow up/Monitoring  No nutrition follow-up warranted at this time. RD to sign off. Please consult if further needs arise    Edith Tony, MPH, RDN, LD, [facility]  Weekend RD Vocera: \"Weekend Holiday Clinical Dietitian\"       "

## 2024-10-19 NOTE — PROGRESS NOTES
Admitted/transferred from: [facility]  Reason for admission/transfer: Hypoxic respiratory failure  2 RN skin assessment: completed by Lee Thomas and Sarbjit Oreilly  Result of skin assessment and interventions/actions: open wound to buttocks, wounds to bilateral ankles and bilateral elbows  Height, weight, drug calc weight: Done  Patient belongings (see Flowsheet)  MDRO education added to care planNo  ?

## 2024-10-19 NOTE — CONSULTS
"Otolaryngology Consult Note  October 18, 2024      CC: \"L neck mass suspicious for cancer vs osteomyelitis. Transferred to  for further ENT eval from [facility] given lack of bed on . Please assist with evaluation and management of neck mass as indicated\"    HPI: Dk Randhawa . 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 CT neck and flex scope was done with no airway compromise noted, however results were concerning for sternoclavicular joint and associated soft tissue infection/cellulitis/phlegmon and possible abscess.  Patient was started on broad spectrum antibiotics, steroids, and BiPap and was subsequently transferred to [facility] for further management.    Patient states left neck swelling started earlier today. Only mildly tender. Denies fevers. No new dysphagia, odynophagia, dysphonia. He does feel the swelling may have decreased since earlier today. Does not use oxygen at home normally. Did not feel short of breath at all today. Further hx limited by patient mental status.      ROS: 12 point review of systems is negative unless noted in HPI.    PMH:  Past Medical History:   Diagnosis Date    Acute non-recurrent maxillary sinusitis     Antiplatelet or antithrombotic long-term use     Arrhythmia     Atrial fibrillation with RVR (H)     Cough secondary to angiotensin converting enzyme inhibitor (ACE-I)     Hyperlipidemia     Hypertension     Malignant neoplasm metastatic to bone (H) 07/26/2024    Mass of upper lobe of right lung     Obesity     Pathological fracture in neoplastic disease, pelvis, init 08/23/2024       PSH:  Past Surgical History:   Procedure Laterality Date    BRONCHOSCOPY, WITH BIOPSY, ROBOT " ASSISTED Bilateral 04/16/2024    Procedure: BRONCHOSCOPY, endobronchial ultrasound, transbronchial needle aspiration, endobronchial biopsies and tumor debulking;  Surgeon: Lanette Arce MD;  Location: [facility] OR    DAVINCI EXCISE NODES THORACIC N/A 5/15/2024    Procedure: mediastinal lymph node dissection;  Surgeon: Mikael Peoples MD;  Location: [facility] OR    DAVINCI LOBECTOMY LUNG Right 5/15/2024    Procedure: Robot-assisted thoracoscopic right upper lobectomy,;  Surgeon: Mikael Peoples MD;  Location: [facility] OR    ELBOW ARTHROSCOPY Left 03/09/2017    INSERT PORT VASCULAR ACCESS Right 7/5/2024    Procedure: INSERTION, VASCULAR ACCESS PORT;  Surgeon: Henry Veronica MD;  Location: [facility] OR    PHACOEMULSIFICATION WITH STANDARD INTRAOCULAR LENS IMPLANT Left 02/26/2018    Procedure: PHACOEMULSIFICATION WITH STANDARD INTRAOCULAR LENS IMPLANT;  Left Cataract Removal with Implant;  Surgeon: Mohit Victor MD;  Location: [facility] OR    PHACOEMULSIFICATION WITH STANDARD INTRAOCULAR LENS IMPLANT Right 01/30/2019    Procedure: Cataract Removal with Implant;  Surgeon: Mohit Victor MD;  Location: [facility] OR    TONSILLECTOMY  1964    TOTAL HIP ARTHROPLASTY Left 04/12/2022   Tonsillectomy  No other HN surgery    Meds:  No current outpatient medications on file.       Allergies:   No Known Allergies    Social hx:  Social History     Socioeconomic History    Marital status:      Spouse name: Not on file    Number of children: Not on file    Years of education: Not on file    Highest education level: Not on file   Occupational History    Not on file   Tobacco Use    Smoking status: Former     Current packs/day: 0.00     Average packs/day: 0.5 packs/day for 20.0 years (10.0 ttl pk-yrs)     Types: Cigarettes     Start date: 1994     Quit date: 2014     Years since quitting: 10.8    Smokeless tobacco: Never   Vaping Use    Vaping status: Never Used   Substance and Sexual Activity    Alcohol use: Not Currently    Drug use:  No    Sexual activity: Not on file   Other Topics Concern    Parent/sibling w/ CABG, MI or angioplasty before 65F 55M? Not Asked   Social History Narrative    Not on file     Social Determinants of Health     Financial Resource Strain: Low Risk  (9/19/2024)    Financial Resource Strain     Within the past 12 months, have you or your family members you live with been unable to get utilities (heat, electricity) when it was really needed?: No   Food Insecurity: Low Risk  (9/19/2024)    Food Insecurity     Within the past 12 months, did you worry that your food would run out before you got money to buy more?: No     Within the past 12 months, did the food you bought just not last and you didn t have money to get more?: No   Transportation Needs: Low Risk  (9/19/2024)    Transportation Needs     Within the past 12 months, has lack of transportation kept you from medical appointments, getting your medicines, non-medical meetings or appointments, work, or from getting things that you need?: No   Physical Activity: Not on file   Stress: Not on file   Social Connections: Socially Integrated (10/23/2023)    Received from [facility], [facility]    Social Connections     Frequency of Communication with Friends and Family: 0   Interpersonal Safety: Low Risk  (9/16/2024)    Interpersonal Safety     Do you feel physically and emotionally safe where you currently live?: Yes     Within the past 12 months, have you been hit, slapped, kicked or otherwise physically hurt by someone?: No     Within the past 12 months, have you been humiliated or emotionally abused in other ways by your partner or ex-partner?: No   Housing Stability: Low Risk  (9/19/2024)    Housing Stability     Do you have housing? : Yes     Are you worried about losing your housing?: No       Family hx:  Not assessed  Family History   Problem Relation Age of Onset    Ovarian Cancer Mother      Alzheimer Disease Mother     Depression Father 45        suicide    Diabetes Sister          PHYSICAL EXAM:  General: laying in bed, no acute distress, confused and not oriented to place  /65   Pulse 81   Temp 99.1  F (37.3  C) (Oral)   Resp 25   Ht 1.829 m (6')   Wt 95.8 kg (211 lb 3.2 oz)   SpO2 95%   BMI 28.64 kg/m    HEAD: normocephalic, atraumatic  Face: symmetrical, CN VII intact bilaterally (HB 1), no swelling, edema, or erythema. Sensation V1-V3 intact and equal bilaterally.   Eyes: EOMI without spontaneous or gaze evoked nystagmus, PERRL, clear sclera  Ears: no tragal tenderness  Nose: no anterior drainage, septum intact and midline  Mouth: extremely dry, no ulcers, no jaw or tooth tenderness, tongue midline and symmetric  Oropharynx: tonsils within normal limits, uvula midline, no oropharyngeal erythema  Neck: firm, nonmobile, nontender left sided neck swelling, nonfluctuant. Approx 3cm in greatest diameter along path of SCM  Neuro: cranial nerves 2-12 grossly intact  Respiratory: breathing non-labored on HFNC 50%, 50LPM  Skin: no rashes or skin lesions of the face/neck  Psych: pleasant affect  Cardio: extremities warm and well perfused           FIBEROPTIC ENDOSCOPY:  Due to increased oxygen needs today, fiberoptic laryngoscopy was indicated. After obtaining verbal consent, the nose was topically decongested and anesthetized. The fiberoptic laryngoscope was passed under endoscopic vision through the right, then left nasal passages. The turbinates were normal. The inferior and middle meati were clear bilaterally without purulence, masses, or polyps. The nasopharynx was clear. The eustachian tubes were clear. The soft palate appeared normal with good mobility. The epiglottis was sharp, and the visualized portion of the vallecula was clear. The larynx was clear with mobile cords. The arytenoids were clear, and there was no pooling in the hypopharynx. All mucosal surfaces were visualized to be  significantly dry, however no airway edema.    ROUTINE IP LABS (Last four results)  BMP  Recent Labs   Lab 10/18/24  2234 10/18/24  2126 10/18/24  1226     --  141   POTASSIUM 4.8  --  4.2   CHLORIDE 104  --  102   COLLINS 9.3  --  9.4   CO2 19*  --  25   BUN 43.9*  --  37.2*   CR 0.77  --  0.60*   * 217* 199*     CBC  Recent Labs   Lab 10/18/24  2234 10/18/24  1226 10/14/24  0820   WBC 54.4* 49.7* 42.2*   RBC 2.80* 2.81* 2.96*   HGB 8.5* 8.7* 9.2*   HCT 27.3* 27.1* 28.6*   MCV 98 96 97   MCH 30.4 31.0 31.1   MCHC 31.1* 32.1 32.2   RDW 25.3* 25.5* 25.1*    243 157     INR  Recent Labs   Lab 10/18/24  2234   INR 1.18*     .81    Imaging:  10/18/24 CT neck - independently reviewed and agree with radiology read  1. New cortical irregularity and ill-defined radiolucency centered  about the left sternoclavicular joint, primarily involving the  articular surface of the medial left clavicle. There also appears to  be new mild anterior subluxation across the left sternoclavicular  joint. There is a heterogeneous surrounding abnormal soft tissue  density with multiple foci of air and surrounding fat stranding in the  medial left retroclavicular and supraclavicular spaces. Fluid  collection with associated soft tissue swelling and multiple foci of  air in the mid to lower left strap muscles, and multiple fluid  collections in the left sternocleidomastoid muscle. Findings  altogether are concerning for septic arthritis of the left  sternoclavicular joint with associated soft tissue  infection/cellulitis/phlegmon and potential multiple intramuscular  abscesses of the left sternocleidomastoid and left strap muscles. The  possibility that all of these findings are posttraumatic is thought  less likely. Given the patient's history of malignancy, a component of  neoplasm/metastatic disease cannot be excluded. Recommend tissue  sampling/fluid aspiration of one of the fluid collections in the  left  sternocleidomastoid muscle. Follow-up CT neck with contrast is  recommended.  2. Please refer to separate report from [facility] chest of same day for  additional details.      Assessment and Plan  Dk Randhawa Sr. is a 69 year old male complicated medical history including lung cancer status post lobectomy with brain metastasis and bone metastasis on current chemoradiation therapy, multiple pathologic fractures, atrial fibrillation on chronic anticoagulation, hypertension, chronic anemia, and recent hospitalizations for septic shock secondary to cellulitis who presented to outside ED 10/18 with new left neck swelling and oxygen requirement, CT concerning for septic arthritis of left sternoclavicular joint with associated intramuscular absesses of left SCM and strap muscles and bilateral lower lobe pneumonias.    -No acute ENT intervention  -No decadron needed purely from airway perspective as there is no evidence of edema on flex scope evaluation  -Continue broad spectrum antibiotics - currently on vancomycin/zosyn/clindamycin  -Recommend IR consult for image-guided biopsy of fluid collections within left neck for pathology/culture  -ENT will continue to follow, please page with additional questions/concerns    Patient discussed with chief resident Dr. Kim and staff Attending Dr. Vasquez.    Sherri Mak MD  Otolaryngology-Head & Neck Surgery  Please page ENT with questions by dialing [phone] and entering job code [phone] when prompted.

## 2024-10-19 NOTE — PROGRESS NOTES
Assessments and vital signs documented. Patient switched to comfort measures after discussion with Renee BLEDSOE, patient and brother. Comfort care orders followed. Patient stated he is comfortably and no other needs at this time. Call light within reach. Report given to Phillip BUCHANAN.

## 2024-10-19 NOTE — CONSULTS
"Palliative Care Consultation Note  [facility]      Patient: Dk Randhawa Sr.  Date of Admission:  10/18/2024    Requesting Clinician / Team: ICU  Reason for consult: Symptom management  Goals of care  Patient and family support     Recommendations & Counseling     GOALS OF CARE:   Comfort focused   Homer states he is very much at peace with his decision to transition to comfort and he is not afraid of dying.He is tired of talking about his decision and feels strongly that it is the correct decision for him and he doesn't want people to try and \"talk him out of it\". I asked Homer if I could talk about next steps and he said he did not want to talk about next steps \"as there were no further steps to discuss.\" Homer did not want to engage in further discussions today or discuss the possibility of leaving hospital with hospice but if he stabilizes off high flow O2, I recommend discussions around discharging to a hospice facility and if not able to get off high flow O2, I recommend [facility] hosp[facility].   Social work consult for possible hospice discharge.     ADVANCE CARE PLANNING:  No health care directive on file. Per system policy, Surrogate Decision-makers for Patients With Diminished Decision-making Capacity offers guidance on possible decision-makers. brother Osorio has been identified as a surrogate decision maker.   There is a POLST form on file, but will need to be updated prior to discharge.  Code status: No CPR- Do NOT Intubate    MEDICAL MANAGEMENT:   Comfort focused: today Homer is feeling very comfortable, denies SOB or pain or other symptoms. He states he is very much at peace with his decision to transition to comfort and he is not afraid of dying.  Comfort Care Plan     #Air hunger/Dyspnea   Continue to wean off high flow O2  Oxycodone high concentrate SL 5-10mg q3hrs PRN   Hydromorphone 0.3-0.5mg IV q2hrs PRN for shortness of breath not controlled with oxycodone      " #Pain - pt has been on opioids for cancer associated pain for months and will therefore continue those medications. He was on MS Contin 30mg qAM and 15mg at bedtime but now having trouble swallowing pills so will schedule oxycodone to make up for the MS Contin and have PRN available.    Oxycodone 10mg QID    Oxycodone and hydromorphone PRN as above    Below are common symptoms routinely seen in patients with comfort focused goals and at the end of life. This patient may not currently exhibit all of these symptoms; however, if these were to occur our recommendations are as follows:  #Anxiety    1st choice: Lorazepam PO/SL q 1 mg  q 3 hour as needed.   2nd choice: Lorazepam IV q 1 mg  q 3 hour as needed     #Secretion burden   Robinul 0.1 mg (PO/IV) q 4 hours as needed. If ineffective, increase up to 0.3 mg   Consider atropine if Robinul ineffective after dose increase     #Nausea   Zofran and compazine PRN are available.     #Agitation  Aggressive symptoms control first, then  1st choice: Zyprexa 5 mg ODT or IM   2nd choice: Increase dose of Zyprexa to 10 mg or consider switch to Haldol   3rd choice: Lorazepam as above      Constipation prevention - pt on opioids, appetite good. At risk for constipation  Senna 1-2 tabs BID scheduled - increase if needed   Miralax BID scheduled       PSYCHOSOCIAL/SPIRITUAL:  Family pt states that his brother is his best friend. He has a daughter but states that she has a lot of her own issues - his brother is contacting her to let her know his decision  [place] community: [employer] day   Would appreciate Spiritual Health Services, Consult has been placed for support and pt was anointed by Fr Mata.  Pt would benefit from ongoing  support.     Our team: does not plan on following further, however do not hesitate to call or re-consult if we can be of further assistance to the patient/family.. Thank you for the consult and allowing us to aid in the care of Dk Randhawa  "Sr..    These recommendations have been discussed with primary ICU team.    Stephanie Burger MD  Securely message with [employer] (more info)  Text page via [employer] Paging/Directory     During regular M-F work hours -- please contact team via Securely message with the Vocera Web Console (learn more here)  After regular work hours and on weekends/holidays, you can call our answering service at [phone]. Also, who's on call for us is available in [employer] Smart Web.   If you are not sure who specifically to contact -- please text the \"Palliative Care [facility]\" voice group in Smappo.       Palliative Summary / HPI     Dk Randhawa Sr. is a 69 year old male with a past medical history of RUL Large cell lung cancer with mets to the brain, bone w/pathologic fractures, pancreas, L lung, pleura, and intrathoracic nodes (on palliative chemo and radiation, s/p RUL lobectomy), chronic anemia, chronic atrial fibrillation, neurogenic bladder with chronic grace, and recent hospitalization for cellulitis.      [facility]ed to [facility] from his TCU on 10/18/24 with new left neck swelling and increased oxygen requirement, CT concerning for osteomyelitis of the L sternoclavicular junction vs progression of malignancy and acute hypoxic respiratory failure with bilateral lower lobe PNA and moderate R hydropneumothorax. He was transferred to [facility] ICU for ENT evaluation. Workup showed finding concerning for septic arthritis of the left sternoclavicular joint with associated soft tissue infection/cellulitis/phlegmon and potential multiple intramuscular abscesses of the left sternocleidomastoid and left strap muscles. Pt had a goals of care conversation with ICU team and made decision that he did not want any further treatments and wanted instead to transition to comfort cares. Palliative care was consulted for support and symptom management. .      Today, the patient was seen for:  Metastatic lung cancer  Intramuscular " abscesses  Acute respiratory failure  Goals of care  Support   Palliative care encounter     History of Present Illness:  History gathered today from: patient, medical chart, medical team members, unit team members  I introduced our role as an extra layer of support and how we help patients and families dealing with serious, potentially life-limiting illnesses. I explained the composition of the palliative care team.  Palliative care helps patients and families navigate their care while focusing on the whole person; providing emotional, social and spiritual support  Palliative care often assists with symptom management, information sharing about what to expect from the illness, available treatment options and what effect those options may have on the disease course, and provide effective communication and caring support.      Prognosis, Goals, & Planning:    Functional Status just prior to this current hospitalization:  10/10 TCU report:   Rehab:  Reports is making progress.  He is very happy with the therapy.  He is hoping to be able to transfer himself from the bed to the wheelchair independently.  Currently doing para lumbar.     -Cognition:  SLUM 16/30    Prognosis, Goals, and/or Advance Care Planning:  Patient was very clear that his goals are to be comfortable until he dies.  He does not want any more testing or interventions and just wants to be comfortable.  He did not want to talk about how long he might live as he said that all he did all day was talk and now he just wanted to be able to eat his food and be comfortable.    Code Status was addressed today:   Not addressed by me.  ICU team had a care conference with Homer and his brother and CODE STATUS was addressed at that time and Homer wanted to be DNR/DNI    Patient's decision making preferences: shared with support from loved ones        Patient has decision-making capacity today for complex decisions:Intact            Coping, Meaning, & Spirituality:    Mood, coping, and/or meaning in the context of serious illness were addressed today: Yes    Social:   Living situation: Homer tells me that for the past several months he has not been able to live at home due to his cancer and needing more help and he says he has either been in the hospital or in a rehab facility  Important relationships/caregivers: Homer states that his brother is his best friend.  He is also close with his daughter but states that she has a lot of issues going on and therefore is not able to get to the hospital  Areas of fulfillment/madison: Fishing and hunting with his brother    ROS:  Comprehensive ROS is reviewed and is negative except as here & per HPI:     Medications:  I have reviewed this patient's medication profile and medications from this hospitalization.     [place] Board of Pharmacy Data Base Reviewed: Yes:   reviewed - controlled substances reflected in medication list.    Physical Exam:  Vital Signs: Temp: 98.3  F (36.8  C) Temp src: Oral BP: (!) 84/56 Pulse: 88   Resp: 23 SpO2: 100 % O2 Device: High Flow Nasal Cannula (HFNC) Oxygen Delivery: (S) 40 LPM  Weight: 211 lbs 3.21 oz  Gen: Patient alert, sitting upright in bed, high flow nasal cannula in place, patient appears comfortable, no increased work of breathing, no acute distress, large body habitus, able to engage, sensorium intact    Data reviewed:  Reviewed recent labs and pertinent imaging  ROUTINE ICU LABS (Last four results)  CMP  Recent Labs   Lab 10/19/24  0848 10/19/24  0522 10/19/24  0351 10/19/24  0050 10/18/24  2234 10/18/24  2126 10/18/24  1226   NA  --  149*  --   --  140  --  141   POTASSIUM  --  4.2  --   --  4.8  --  4.2   CHLORIDE  --  112*  --   --  104  --  102   CO2  --  23  --   --  19*  --  25   ANIONGAP  --  14  --   --  17*  --  14   * 250* 249* 232* 257*   < > 199*   BUN  --  13.0  --   --  43.9*  --  37.2*   CR  --  0.74  --   --  0.77  --  0.60*   GFRESTIMATED  --  >90  --   --  >90  --  >90    COLLINS  --  8.8  --   --  9.3  --  9.4   MAG  --  3.6*  --   --  1.9  --   --    PHOS  --  4.4  --   --  6.4*  --   --    PROTTOTAL  --   --   --   --  6.5  --  6.5   ALBUMIN  --   --   --   --  2.6*  --  2.7*   BILITOTAL  --   --   --   --  0.5  --  0.5   ALKPHOS  --   --   --   --  238*  --  265*   AST  --   --   --   --  11  --  18   ALT  --   --   --   --  16  --  18    < > = values in this interval not displayed.     CBC  Recent Labs   Lab 10/19/24  0606 10/19/24  0522 10/18/24  2234 10/18/24  1226   WBC 51.0* 58.9* 54.4* 49.7*   RBC 2.48* 1.72* 2.80* 2.81*   HGB 7.6* 5.3* 8.5* 8.7*   HCT 24.2* 16.7* 27.3* 27.1*   MCV 98 97 98 96   MCH 30.6 30.8 30.4 31.0   MCHC 31.4* 31.7 31.1* 32.1   RDW 25.1* 25.2* 25.3* 25.5*    292 262 243     INR  Recent Labs   Lab 10/19/24  0606 10/18/24  2234   INR 1.20* 1.18*     Arterial Blood Gas  Recent Labs   Lab 10/19/24  0522 10/18/24  2234 10/18/24  1809 10/18/24  1545   O2PER 40 50 48 0     CT 10/18:   IMPRESSION:  1. New cortical irregularity and ill-defined radiolucency centered about the left sternoclavicular joint, primarily involving the articular surface of the medial left clavicle. There also appears to be new mild anterior subluxation across the left sternoclavicular joint. There is a heterogeneous surrounding abnormal soft tissue density with multiple foci of air and surrounding fat stranding in the  medial left retroclavicular and supraclavicular spaces. Fluid collection with associated soft tissue swelling and multiple foci of air in the mid to lower left strap muscles, and multiple fluid  collections in the left sternocleidomastoid muscle. Findings altogether are concerning for septic arthritis of the left sternoclavicular joint with associated soft tissue infection/cellulitis/phlegmon and potential multiple intramuscular abscesses of the left sternocleidomastoid and left strap muscles. The possibility that all of these findings are posttraumatic is thought  less likely. Given the patient's history of malignancy, a component of neoplasm/metastatic disease cannot be excluded.       Medical Decision Making

## 2024-10-19 NOTE — PROGRESS NOTES
Brief Otolaryngology Note    I was called by outside ED regarding this patient. This patient has a complicated medical history including lung cancer status post lobectomy with brain metastasis and bone metastasis on current chemoradiation therapy, multiple pathologic fractures, atrial fibrillation on chronic anticoagulation, hypertension, and chronic anemia. He has recent hospitalizations for septic shock secondary to cellulitis.     He presented to the ED today with new left neck swelling. A CT scan was performed and I reviewed this.  This shows a cortical irregularity and ill-defined radiolucency centered  about the left sternoclavicular joint. There is a heterogeneous surrounding abnormal soft tissue density with multiple foci of air and surrounding fat stranding in the  medial left retroclavicular and supraclavicular spaces. Fluid  collection with associated soft tissue swelling and multiple foci of air in the mid to lower left strap muscles, and multiple fluid collections in the left sternocleidomastoid muscle. Findings altogether are concerning for septic arthritis of the left sternoclavicular joint with associated soft tissue  infection/cellulitis/phlegmon and potential multiple intramuscular abscesses of the left sternocleidomastoid and left strap muscles.     A sampling of the areas concerning for abscesses need to be sampled prior to any large surgical debridement, especially given his current medical status. He should be started on broad spectrum antibiotics. Our team is happy to see patient upon arrival to scope and make sure airway is stable if there is a concern there but our recommendation would be for image guided sampling to send for both pathology and culture. No immediate surgical intervention is warranted from an ENT standpoint.     Ijeoma Vasquez MD

## 2024-10-19 NOTE — H&P
[facility]    History and Physical - Hospitalist Service, GOLD TEAM        Date of Admission:  10/18/2024    Assessment & Plan      Dk HARRISON Sydnee Kwon is a 69 year old male with PMH RUL large cell lung cancer with metastases to the brain, bone w/ pathologic fractures, pancreas, L lung, pleura, and intrathoracic nodes (on palliative chemo and radiation, s/p RUL lobectomy), chronic anemia, chronic atrial fibrillation, neurogenic bladder with chronic grace admitted on 10/18/2024.     # Stage IV R lung large cell carcinoma with mets to the bone, brain, contralateral lung, intrathoracic lymph nodes, and likely the pancreas --> progressing   # Comfort-focused care  Follows with [facility] - Dr. Friedell. Initially diagnosed with RUL large cell carcinoma after presenting on April 2024 with hemoptysis, confirmed on biopsy. Underwent RUL lobectomy May 2024 with adjacent nodes negative for malignancy. In the following months he was found to have metastasis involving the L lung, intrathoracic nodes, pancreas, brain, and bones with pathological fractures. He underwent gamma knife radiation for brain mets, completed 3 or 4/7 cycles of carboplatin, etoposide, and atezolizumab, and underwent 1/10 radiation treatments to his hip. He has had delays in treatments due to hospitalization for septic shock 2/2 cellulitis while neutropenic and failure to thrive. Patient presented to outside ED on 10/18 with acute left neck swelling and associated shoulder pain. On admission he was found to have significant leukocytosis and imaging findings concerning for osteomyelitis of the L sternoclavicular junction vs progression of malignancy, as well as acute hypoxic respiratory failure with bilateral lower lobe PNA and moderate R hydropneumothorax. Chest CT at OSH negative for PE, did show ongoing moderate hydropneumothorax with a trace amount of air and fluid level decreased in size in  comparison, bilateral lower lobe PNA, moderate sized gas and fluid collection in the L neck centered in the sternoclavicular joint with cortical destruction of the L clavicular head suspicious for septic arthritis (but progression of malignancy cannot be excluded), and progression of metastatic disease with new & enlarging lung nodules, new osseous mets (ribs), and ongoing lymphadenopathy. Transferred to [facility] ICU where ENT was consulted for possible biopsy. Due to concern for further cancer metastasis, current infection, and bacteremia, patient transitioned to comfort-focused care on 10/19/24. Pt is currently on high flow O2.   - If stable off high flow O2, may discharge to hospice facility. Otherwise, will plan to consult [facility] hospice in AM  - Palliative consulted, recommendations appreciated  - Oxycodone 10 mg QID (was on chronic opioids for cancer associated pain)  - Oxycodone 5-10 mg Q3H PRN  - Dilaudid 0.3 mg - 0.5 mg Q2H PRN  - Ativan 1 mg Q3H PRN    # L sided neck swelling with imaging concerns for osteomyelitis vs progression of cancer   # CNS metastasis, s/p gamma knife   # Chronic pain r/t progressive bony metastasis with pathologic fractures  # Acute hypoxic respiratory failure  # Bilateral lower lobe PNA  # GPC in blood culture  # Leukemoid reaction   # Lactic acidosis  # Afib with RVR   # Lactic acidosis  # Hypernatremia  # Pathologic fractures involving the pelvis   # Generalized weakness and FTT 2/2 advancing malignancy  # Bilateral ankle wounds, lateral aspect  # Sacral wound  # Edema        Diet: Regular Diet Adult  Snacks/Supplements Adult: Other; pudding, jello, mashed potatoes - see below; With Meals    DVT Prophylaxis: Comfort focused care  [place] Catheter: PRESENT, indication: Other (Comment) (neurogenic bladder), Other (Comment);Acute retention or obstruction (chronic)  Lines: None     Cardiac Monitoring: None  Code Status: No CPR- Do NOT Intubate      Clinically Significant Risk  Factors Present on Admission         # Hypernatremia: Highest Na = 149 mmol/L in last 2 days, will monitor as appropriate  # Hyperchloremia: Highest Cl = 112 mmol/L in last 2 days, will monitor as appropriate       # Hypercalcemia: corrected calcium is >10.1, will monitor as appropriate    # Hypoalbuminemia: Lowest albumin = 2.6 g/dL at 10/18/2024 10:34 PM, will monitor as appropriate    # Coagulation Defect: INR = 1.20 (Ref range: 0.85 - 1.15) and/or PTT = 27 Seconds (Ref range: 22 - 38 Seconds), will monitor for bleeding    # Hypertension: Noted on problem list    # Anemia: based on hgb <11       # Overweight: Estimated body mass index is 28.64 kg/m  as calculated from the following:    Height as of this encounter: 1.829 m (6').    Weight as of this encounter: 95.8 kg (211 lb 3.2 oz).         # Financial/Environmental Concerns: none         Disposition Plan     Medically Ready for Discharge: Anticipated in 2-4 Days     The patient's care was discussed with the Patient and Palliative Team.    Amisha Lopez PA-C  Hospitalist Service, [facility]  Securely message with [employer] (more info)  Text page via [facility] Paging/Directory   See signed in provider for up to date coverage information    ______________________________________________________________________    Chief Complaint   Left-sided neck swelling    History is obtained from the patient    History of Present Illness   Dk HARRISON Sydnee Mcgarry. is a 69 year old male with PMH RUL large cell lung cancer with metastases to the brain, bone w/ pathologic fractures, pancreas, L lung, pleura, and intrathoracic nodes (on palliative chemo and radiation, s/p RUL lobectomy), chronic anemia, chronic atrial fibrillation, neurogenic bladder with chronic grace admitted on 10/18/2024.    Patient with known stage 4 lung cancer, diagnosed in April 2024. He presented to an outside ED yesterday with acute left neck swelling and  associated shoulder pain. Imaging findings were concerning for bone infection of the left shoulder vs progression of malignancy, as well as acute hypoxic respiratory failure with pneumonia. He was transferred to [facility] ICU where a goals of care conversation took place today, and patient has decided to pursue comfort-focused care and hospice. Transitioning out of the ICU to the Medicine team today.    Past Medical History    Past Medical History:   Diagnosis Date    Acute non-recurrent maxillary sinusitis     Antiplatelet or antithrombotic long-term use     Arrhythmia     Atrial fibrillation with RVR (H)     Cough secondary to angiotensin converting enzyme inhibitor (ACE-I)     Hyperlipidemia     Hypertension     Malignant neoplasm metastatic to bone (H) 07/26/2024    Mass of upper lobe of right lung     Obesity     Pathological fracture in neoplastic disease, pelvis, init 08/23/2024       Past Surgical History   Past Surgical History:   Procedure Laterality Date    BRONCHOSCOPY, WITH BIOPSY, ROBOT ASSISTED Bilateral 04/16/2024    Procedure: BRONCHOSCOPY, endobronchial ultrasound, transbronchial needle aspiration, endobronchial biopsies and tumor debulking;  Surgeon: Lanette Arce MD;  Location: [facility] OR    DAVINCI EXCISE NODES THORACIC N/A 5/15/2024    Procedure: mediastinal lymph node dissection;  Surgeon: Mikael Peoples MD;  Location: [facility] OR    DAVINCI LOBECTOMY LUNG Right 5/15/2024    Procedure: Robot-assisted thoracoscopic right upper lobectomy,;  Surgeon: Mikael Peoples MD;  Location: [facility] OR    ELBOW ARTHROSCOPY Left 03/09/2017    INSERT PORT VASCULAR ACCESS Right 7/5/2024    Procedure: INSERTION, VASCULAR ACCESS PORT;  Surgeon: Henry Veronica MD;  Location: [facility] OR    PHACOEMULSIFICATION WITH STANDARD INTRAOCULAR LENS IMPLANT Left 02/26/2018    Procedure: PHACOEMULSIFICATION WITH STANDARD INTRAOCULAR LENS IMPLANT;  Left Cataract Removal with Implant;  Surgeon: Mohit Victor MD;   Location: [facility] OR    PHACOEMULSIFICATION WITH STANDARD INTRAOCULAR LENS IMPLANT Right 01/30/2019    Procedure: Cataract Removal with Implant;  Surgeon: Mohit Victor MD;  Location: [facility] OR    TONSILLECTOMY  1964    TOTAL HIP ARTHROPLASTY Left 04/12/2022       Prior to Admission Medications   Prior to Admission Medications   Prescriptions Last Dose Informant Patient Reported? Taking?   Menthol, Topical Analgesic, (BIOFREEZE EX) Past Week Nursing Home Yes Yes   Sig: Externally apply topically 4 times daily as needed.   SENNA-docusate sodium (SENNA S) 8.6-50 MG tablet 10/17/2024 Nursing Home No Yes   Sig: Take 1 tablet by mouth 2 times daily.   Patient taking differently: Take 1 tablet by mouth 2 times daily as needed.   acetaminophen (TYLENOL) 500 MG tablet 10/18/2024 Nursing Home Yes Yes   Sig: Take 1,000 mg by mouth 3 times daily.   amiodarone (PACERONE) 200 MG tablet 10/18/2024 jail No Yes   Sig: Take 1 tablet (200 mg) by mouth or FT or NG tube 2 times daily for 2 days, THEN 1 tablet (200 mg) daily.   Patient taking differently: 1 tablet (200 mg) once daily   bisacodyl (DULCOLAX) 10 MG suppository 10/17/2024 [facility] Home Yes Yes   Sig: Place 10 mg rectally daily as needed for constipation.   dexAMETHasone (DECADRON) 4 MG tablet 10/18/2024 [facility] Home Yes Yes   Sig: Take 4 mg by mouth daily (with breakfast).   digoxin (LANOXIN) 125 MCG tablet 10/18/2024 jail No Yes   Sig: Take 1 tablet (125 mcg) by mouth daily.   diltiazem ER (DILT-XR) 120 MG 24 hr capsule 10/17/2024 jail No Yes   Sig: Take 3 capsules (360 mg) by mouth daily.   fexofenadine (ALLEGRA) 180 MG tablet 10/18/2024 Nursing Home Yes Yes   Sig: Take 180 mg by mouth daily   furosemide (LASIX) 40 MG tablet 10/18/2024 jail No Yes   Sig: Take 1 tablet (40 mg) by mouth daily.   guaiFENesin (MUCINEX) 600 MG 12 hr tablet 10/18/2024 jail No Yes   Sig: Take 1 tablet (600 mg) by mouth 2 times daily for 7 days.    ipratropium - albuterol 0.5 mg/2.5 mg/3 mL (DUONEB) 0.5-2.5 (3) MG/3ML neb solution Unknown Nursing Home No Yes   Sig: Take 1 vial (3 mLs) by nebulization 4 times daily as needed for shortness of breath, wheezing or cough.   morphine (MS CONTIN) 15 MG CR tablet 10/18/2024 custodial No Yes   Sig: Take 2 tablets (30 mg) by mouth every morning AND 1 tablet (15 mg) at bedtime.   oxyCODONE (ROXICODONE) 5 MG tablet 10/17/2024 Nursing Home No Yes   Sig: Take 1 tablet (5 mg) by mouth every 4 hours as needed for severe pain.      Facility-Administered Medications: None         Physical Exam   Vital Signs: Temp: 98.6  F (37  C) Temp src: Axillary BP: 105/68 Pulse: 104   Resp: 30 SpO2: 97 % O2 Device: High Flow Nasal Cannula (HFNC) Oxygen Delivery: (S) 30 LPM  Weight: 211 lbs 3.21 oz    Exam deferred due to comfort-focused care status    Medical Decision Making       50 MINUTES SPENT BY ME on the date of service doing chart review, history, exam, documentation & further activities per the note.      Data     I have personally reviewed the following data over the past 24 hrs:    51.0 (HH)  \   7.6 (L)   / 248     149 (H) 112 (H) 13.0 /  197 (H)   4.2 23 0.74 \     ALT: 16 AST: 11 AP: 238 (H) TBILI: 0.5   ALB: 2.6 (L) TOT PROTEIN: 6.5 LIPASE: 7 (L)     TSH: N/A T4: N/A A1C: 6.3 (H)     Procal: 2.42 (H) CRP: 256.81 (H) Lactic Acid: 3.3 (H)       INR:  1.20 (H) PTT:  27   D-dimer:  N/A Fibrinogen:  865 (H)       Imaging results reviewed over the past 24 hrs:   Recent Results (from the past 24 hour(s))   XR Finger Port Right G/E 2 Views    Narrative    EXAM: XR FINGER PORT RIGHT G/E 2 VIEWS  LOCATION: [facility]  DATE: 10/19/2024    INDICATION: Redness, swelling, and decraesed ROM to pointer and middle fingers. Recently treated for cellulitis of this hand. Eval for soft tissue gas, fracture, or signs of osteo  COMPARISON: None.      Impression    IMPRESSION: No soft tissue gas or  bony erosive changes. No acute process identified. 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     XR Chest Port 1 View    Narrative    XR CHEST PORT 1 VIEW 10/19/2024 8:51 AM      HISTORY: Interval monitoring of hydropneumothorax    COMPARISON: Chest CT with contrast 10/18/2024. Chest radiograph  9/17/2024.     FINDINGS: Frontal view of the chest. Right upper lobectomy. Right  Port-A-Cath tip projects over the innominate vein and is somewhat  kinked which is thought to be projectional. Stable heart size. Aortic  arch calcification. Left lung pulmonary nodules more conspicuous on  chest CT. Indistinct air space opacities in the lung bases. Small  amount of pleural air in the right lower lung. Blunting of the right  costophrenic angle.      Impression    IMPRESSION:   1. Stable right basilar hydropneumothorax.  2. Persistent bibasilar infectious/inflammatory opacities.    I have personally reviewed the examination and initial interpretation  and I agree with the findings.    SINGH DIAL MD         SYSTEM ID:  [id number]

## 2024-10-20 NOTE — PROGRESS NOTES
[facility]    Medicine Progress Note - Hospitalist Service, GOLD TEAM 19    Date of Admission:  10/18/2024    Assessment & Plan      Dk HARRISON Sydnee Mcgarry. is a 69 year old male with PMH RUL large cell lung cancer with metastases to the brain, bone w/ pathologic fractures, pancreas, L lung, pleura, and intrathoracic nodes (on palliative chemo and radiation, s/p RUL lobectomy), chronic anemia, chronic atrial fibrillation, neurogenic bladder with chronic grace admitted on 10/18/2024.     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 Friedell. Initially diagnosed with RUL large cell carcinoma after presenting on April 2024 with hemoptysis, confirmed on biopsy. Underwent RUL lobectomy May 2024 with adjacent nodes negative for malignancy. In the following months he was found to have metastasis involving the L lung, intrathoracic nodes, pancreas, brain, and bones with pathological fractures. He underwent gamma knife radiation for brain mets, completed 3 or 4/7 cycles of carboplatin, etoposide, and atezolizumab, and underwent 1/10 radiation treatments to his hip. He has had delays in treatments due to hospitalization for septic shock 2/2 cellulitis while neutropenic and failure to thrive. Patient presented to outside ED on 10/18 with acute left neck swelling and associated shoulder pain. On admission he was found to have significant leukocytosis and imaging findings concerning for osteomyelitis of the L sternoclavicular junction vs progression of malignancy, as well as acute hypoxic respiratory failure with bilateral lower lobe PNA and moderate R hydropneumothorax. Chest CT at OSH negative for PE, did show ongoing moderate hydropneumothorax with a trace amount of air and fluid level decreased in size in  comparison, bilateral lower lobe PNA, moderate sized gas and fluid collection in the L neck centered in the sternoclavicular joint with cortical destruction of the L clavicular head suspicious for septic arthritis (but progression of malignancy cannot be excluded), and progression of metastatic disease with new & enlarging lung nodules, new osseous mets (ribs), and ongoing lymphadenopathy. Transferred to [facility] ICU where ENT was consulted for possible biopsy. Due to concern for further cancer metastasis, current infection, and bacteremia, patient transitioned to comfort-focused care on 10/19/24. Pt is currently on high flow O2.   - If stable off high flow O2, may discharge to hospice facility. Otherwise, will plan to consult [facility] hospice in AM  - Palliative consulted on admission, suspect will evaluate on 10/21 when back on service  - continue comfort measures  - Oxycodone 10 mg QID (was on chronic opioids for cancer associated pain)  - Oxycodone 5-10 mg Q3H PRN  - Dilaudid 0.3 mg - 0.5 mg Q2H PRN  - Ativan 1 mg Q3H PRN    # L sided neck swelling with imaging concerns for osteomyelitis vs progression of cancer   # CNS metastasis, s/p gamma knife   # Chronic pain r/t progressive bony metastasis with pathologic fractures  # Acute hypoxic respiratory failure  # Bilateral lower lobe PNA  # GPC in blood culture  # Leukemoid reaction   # Lactic acidosis  # Afib with RVR   # Lactic acidosis  # Hypernatremia  # Pathologic fractures involving the pelvis   # Generalized weakness and FTT 2/2 advancing malignancy  # Bilateral ankle wounds, lateral aspect  # Sacral wound  # Edema  - comfort measures as above        Diet: Regular Diet Adult  Snacks/Supplements Adult: Other; pudding, jello, mashed potatoes - see below; With Meals    DVT Prophylaxis: none- comfort care  [place] Catheter: PRESENT, indication: Other (Comment) (Neurogenic bladder), Other (Comment);Acute retention or obstruction (chronic)  Lines: None     Cardiac  Monitoring: None  Code Status: No CPR- Do NOT Intubate      Clinically Significant Risk Factors         # Hypernatremia: Highest Na = 149 mmol/L in last 2 days, will monitor as appropriate  # Hyperchloremia: Highest Cl = 112 mmol/L in last 2 days, will monitor as appropriate       # Hypercalcemia: corrected calcium is >10.1, will monitor as appropriate    # Hypoalbuminemia: Lowest albumin = 2.6 g/dL at 10/18/2024 10:34 PM, will monitor as appropriate  # Coagulation Defect: INR = 1.20 (Ref range: 0.85 - 1.15) and/or PTT = 27 Seconds (Ref range: 22 - 38 Seconds), will monitor for bleeding    # Hypertension: Noted on problem list           # Overweight: Estimated body mass index is 28.64 kg/m  as calculated from the following:    Height as of this encounter: 1.829 m (6').    Weight as of this encounter: 95.8 kg (211 lb 3.2 oz)., PRESENT ON ADMISSION     # Financial/Environmental Concerns: none         Disposition Plan     Medically Ready for Discharge: Anticipated in 2-4 Days             Tammi Pina MD  Hospitalist Service, GOLD TEAM 19  [facility]  Securely message with [employer] (more info)  Text page via [facility] Paging/Directory   See signed in provider for up to date coverage information  ______________________________________________________________________    Interval History   - admitted overnight  - pt currently comfortable, no complaints    Physical Exam   Vital Signs: Temp: 98.6  F (37  C) Temp src: Axillary BP: 105/68 Pulse: 104   Resp: 30 SpO2: 94 % O2 Device: High Flow Nasal Cannula (HFNC) Oxygen Delivery: 20 LPM  Weight: 211 lbs 3.21 oz    General: NAD, ill appearing, comfortable currently, generalized weakness and appears tired  HEENT: palpable mass in anterior neck midline, nontender, L neck palpable mass, nontender  Resp: CTAB, no w/r/r, no increased WOB  CV: RRR, no m/r/g, pulses intact and equal, 2+ BLE edema, 3+ BUE edema  GI: soft, non-tender,  non-distended  Neuro: no focal neuro deficits       Medical Decision Making       50 MINUTES SPENT BY ME on the date of service doing chart review, history, exam, documentation & further activities per the note.      Data   ------------------------- PAST 24 HR DATA REVIEWED -----------------------------------------------    I have personally reviewed the following data over the past 24 hrs:    Procal: N/A CRP: N/A Lactic Acid: 3.3 (H)         Imaging results reviewed over the past 24 hrs:   Recent Results (from the past 24 hour(s))   XR Chest Port 1 View    Narrative    XR CHEST PORT 1 VIEW 10/19/2024 8:51 AM      HISTORY: Interval monitoring of hydropneumothorax    COMPARISON: Chest CT with contrast 10/18/2024. Chest radiograph  9/17/2024.     FINDINGS: Frontal view of the chest. Right upper lobectomy. Right  Port-A-Cath tip projects over the innominate vein and is somewhat  kinked which is thought to be projectional. Stable heart size. Aortic  arch calcification. Left lung pulmonary nodules more conspicuous on  chest CT. Indistinct air space opacities in the lung bases. Small  amount of pleural air in the right lower lung. Blunting of the right  costophrenic angle.      Impression    IMPRESSION:   1. Stable right basilar hydropneumothorax.  2. Persistent bibasilar infectious/inflammatory opacities.    I have personally reviewed the examination and initial interpretation  and I agree with the findings.    SINGH DIAL MD         SYSTEM ID:  [id number]

## 2024-10-20 NOTE — PROGRESS NOTES
"Patient A&O times 4 with periods of forgetfulness; lethargic, patient transitioned to comfort cares and vital signs nor assessed and cardiac and oximeter monitors discontinued. Patient refused repositioning and skin assessment and provider notified. Patient requested to see Rae teague again and education provided to patient that  is unavailable and required a consult. Patient requested \"Michelle Rojas Prayer\" and prayer printed in large fonts and brought to patient because he stated he needs to read prayer ten times. Patient verbalized appreciation of prayer. Totally fed with good appetite. High flow oxygen weaned down to 20 liters from 30 liter during shift and patient tolerated.  [place] patent and intact with good amounts of urine. Continue with comfort cares.  "

## 2024-10-20 NOTE — PLAN OF CARE
Assumed cares: 2300 to 0730.    Patient A&O x 4 with intermittent forgetfulness and lethargic. Afebrile overnight. Refused skin checks. Screams in pain with turns. Refused vital signs. No urine output from [place] this shift. Blood culture + for gram (+) cocci in cluster. Hospitalist paged with an FYI. Patient remains on comfort cares. Occasional sips of water. Continue with comfort cares.

## 2024-10-20 NOTE — PROGRESS NOTES
6MS ADMISSION     D: Patient admitted/transferred from 10 ICU via patient transport for comfort cares.     I: Upon arrival to the unit patient was oriented to room, unit, and call light. Patient s height, weight, and vital signs were not obtained -- patient on comfort cares. Allergies reviewed and allergy band applied. Provider notified of patient s arrival on the unit. Adult AVS completed. Head to toe assessment completed. Education assessment completed. Care plan initiated.    A: Patient rates pain at 10/10. Two RN skin assessment completed? Yes. Second RN was Josefina MONTIEL Significant Skin Findings include wound on anterior L foot, peeling/scaling heels. [facility] Nurse Consult Ordered? No . Bed Algorithm can be found in PCS flow sheets (Support Surface Algorithm) and on IP [facility] NURSE RESOURCE TAB, was this used during this assessment? No. Was a bariatric bed frame ordered? No. Was an air pump added to the Isoflex mattress? No    P: Continue to monitor patient s pain and intervene as needed. Continue with plan of care. Notify provider with any concerns or changes in patient status.

## 2024-10-21 NOTE — PROGRESS NOTES
"Shift 1280-4250    VS: /68   Pulse 104   Temp 98.6  F (37  C) (Axillary)   Resp 30   Ht 1.829 m (6')   Wt 95.8 kg (211 lb 3.2 oz)   SpO2 94%   BMI 28.64 kg/m       O2: On 4L NC, denies chest pain or SOB    Output: Grace- draining yellow    Last BM: 10/20 soft and formed during shift, BS active x4   Activity: Bedrest, turns q2 hours    Skin: Bilateral elbows, L anterior foot , and scattered bruises , wounds to buttocks    Pain: Managed with scheduled oxy    CMS: A&OX4   Dressing: Bilateral elbows, BLE, ,meplex to sacral wound   Diet: Reg    LDA: R PIV x2    Equipment: Monitor    Plan: Continue comfort measures    Additional Info: During repositioning, grace catheter noted leaking. Pt denies pain or discomfort around the site. Notified provider. Provided stated \" If it is not causing him pain or discomfort, we can leave alone\" pt aware.     Turns Q2, oral hygiene , pain management     Sacral wound cleansed and applied meplex         "

## 2024-10-21 NOTE — PROGRESS NOTES
Brief ENT Progress Note  10/21/2024    ENT visited patient at bedside. He indicated he has made the decision to move to comfort cares. He has been given his last rites and would like to go to heaven at this time. He is comfortable with this. Thus, he is declining any intervention regarding the abscess in his neck. ENT expressed understanding and wished him comfort in the coming future.    Please contact ENT with any questions.    --Patient discussed with Dr. Piña, VIDHI pg via [facility] or [facility]  Otolaryngology-Head & Neck Surgery  Please contact ENT by dialing [phone] and entering job code [phone].

## 2024-10-21 NOTE — PROGRESS NOTES
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     He initially presented to [facility] from his TCU on 10/18/24 with acute L neck swelling and associated shoulder pain.  He was found to have significant leukocytosis and imaging findings concerning for osteomyelitis of the L sternoclavicular junction vs progression of malignancy and acute hypoxic respiratory failure with bilateral lower lobe PNA and moderate R hydropneumothorax.  Transferred to [facility] ICU 10/18/24 for ENT evaluation and biopsy / I&D of L neck swelling.   Transferred to hospitalist service on 10/19/24.    #  TODAY'S UPDATE  ---   Pt no longer interested on restorative care  ---   Pt is currently on comfort care  ---   Social work is working on hospice placement    # Stage IV R lung large cell carcinoma with mets to the bone, brain, contralateral lung, intrathoracic lymph nodes, and likely the pancreas --> progressing   # Comfort-focused care  Follows with [facility] - Dr. Friedell. Initially diagnosed with RUL large cell carcinoma after presenting on April 2024 with hemoptysis, confirmed on biopsy. Underwent RUL lobectomy May 2024 with adjacent nodes negative for malignancy. In the following months he was found to have metastasis involving the L lung, intrathoracic nodes, pancreas, brain, and bones with pathological fractures. He underwent gamma knife radiation for brain mets, completed 3 or 4/7 cycles of carboplatin, etoposide, and atezolizumab, and underwent  1/10 radiation treatments to his hip. He has had delays in treatments due to hospitalization for septic shock 2/2 cellulitis while neutropenic and failure to thrive. Patient presented to outside ED on 10/18 with acute left neck swelling and associated shoulder pain. On admission he was found to have significant leukocytosis and imaging findings concerning for osteomyelitis of the L sternoclavicular junction vs progression of malignancy, as well as acute hypoxic respiratory failure with bilateral lower lobe PNA and moderate R hydropneumothorax. Chest CT at OSH negative for PE, did show ongoing moderate hydropneumothorax with a trace amount of air and fluid level decreased in size in comparison, bilateral lower lobe PNA, moderate sized gas and fluid collection in the L neck centered in the sternoclavicular joint with cortical destruction of the L clavicular head suspicious for septic arthritis (but progression of malignancy cannot be excluded), and progression of metastatic disease with new & enlarging lung nodules, new osseous mets (ribs), and ongoing lymphadenopathy. Transferred to [facility] ICU where ENT was consulted for possible biopsy. Due to concern for further cancer metastasis, current infection, and bacteremia, patient transitioned to comfort-focused care on 10/19/24. Pt is currently on high flow O2.   - If stable off high flow O2, may discharge to hospice facility. Otherwise, will plan to consult [facility] hospice in AM  - Palliative consulted on admission, suspect will evaluate on 10/21 when back on service  - continue comfort measures  - Oxycodone 10 mg QID (was on chronic opioids for cancer associated pain)  - Oxycodone 5-10 mg Q3H PRN  - Dilaudid 0.3 mg - 0.5 mg Q2H PRN  - Ativan 1 mg Q3H PRN    # L sided neck swelling with imaging concerns for osteomyelitis vs progression of cancer   # CNS metastasis, s/p gamma knife   # Chronic pain r/t progressive bony metastasis with pathologic fractures  # Acute  hypoxic respiratory failure  # Bilateral lower lobe PNA  # GPC in blood culture  # Leukemoid reaction   # Lactic acidosis  # Afib with RVR   # Lactic acidosis  # Hypernatremia  # Pathologic fractures involving the pelvis   # Generalized weakness and FTT 2/2 advancing malignancy  # Bilateral ankle wounds, lateral aspect  # Sacral wound  # Edema  ---   Comfort care measures as above          Diet: Regular Diet Adult  Snacks/Supplements Adult: Other; pudding, jello, mashed potatoes - see below; With Meals    DVT Prophylaxis: none- comfort care  Lockwood Catheter: PRESENT, indication: Other (Comment) (neurogenic bladder), Other (Comment);Acute retention or obstruction (chronic)  Lines: None     Cardiac Monitoring: None  Code Status: No CPR- Do NOT Intubate    Disposition Plan    Medically Ready for Discharge:   Ready now for transfer to hospice facility              Kortney Van MD  Hospitalist Service, GOLD TEAM [address] [place] [address]  Securely message with [employer] (more info)  Text page via [email or web address] Paging/Directory   See signed in provider for up to date coverage information  ______________________________________________________________________    Interval History   On comfort measure  Pt says he is at peace with his decision  He said he knows he is dying and he is going to heaven  He does not want restorative care any more.    Awaiting for placement to hospice facility      Physical Exam   Vital Signs:             SpO2: 92 % O2 Device: Nasal cannula Oxygen Delivery: 4 LPM  Weight: 211 lbs 3.21 oz    General: NAD, ill appearing, comfortable currently, generalized weakness and appears tired  HEENT: palpable mass in anterior neck midline, nontender, L neck palpable mass, nontender  Resp: CTAB, no w/r/r, no increased WOB  CV: RRR, no m/r/g, pulses intact and equal, 2+ BLE edema, 3+ BUE edema  GI: soft, non-tender, non-distended  Neuro: no focal neuro deficits       Medical Decision  Making       50 MINUTES SPENT BY ME on the date of service doing chart review, history, exam, documentation & further activities per the note.      Data   ------------------------- PAST 24 HR DATA REVIEWED -----------------------------------------------        Imaging results reviewed over the past 24 hrs:   No results found for this or any previous visit (from the past 24 hour(s)).

## 2024-10-21 NOTE — PLAN OF CARE
"Goal Outcome Evaluation:      Plan of Care Reviewed With: patient    Overall Patient Progress: no changeOverall Patient Progress: no change    Outcome Evaluation: Pt A&Ox4, right PIV x2, receiving comfort cares, stated pain 4/10 this morning, pain managed with scheduled medication, pt able to rest comfotably. O2 4L NC, denied chest pain or SOB. Pt accepted water and apple juice, provided oral hygiene, declined television, stated \"I'm at peace, I'm ready to go.\" Pt is repositioned q 2 hrs, grace draining urine yellow, grace leaks during repositioning, alexis/grace care provided, linens changed. Elbow protection present, mepilex changed on sacral area early this morning NOC shift. Later ate chocolate pudding. Pts BLE cool to touch, feet were covered with sheet and blanket, pt declined warm blanket. Lunch ordered for pt, requests \"soft foods like peaches and chocolate ice cream.\" Does not want mashed potatoes and gravy, \"No, nothing heavy.\"      Problem: Adult Inpatient Plan of Care  Goal: Patient-Specific Goal (Individualized)  Description: You can add care plan individualizations to a care plan. Examples of Individualization might be:  \"Parent requests to be called daily at 9am for status\", \"I have a hard time hearing out of my right ear\", or \"Do not touch me to wake me up as it startles  me\".  Outcome: Progressing  Flowsheets (Taken 10/21/2024 1033)  Individualized Care Needs: Repositioning, oral cares, provide fluids.  Anxieties, Fears or Concerns: \"I just want to go to heaven\"     Problem: Adult Inpatient Plan of Care  Goal: Absence of Hospital-Acquired Illness or Injury  Outcome: Progressing  Intervention: Identify and Manage Fall Risk  Flowsheets (Taken 10/21/2024 1033)  Safety Promotion/Fall Prevention:   assistive device/personal items within reach   clutter free environment maintained   increased rounding and observation   increase visualization of patient   patient and family education   room near nurse's " station   safety round/check completed  Intervention: Prevent Skin Injury  Flowsheets (Taken 10/21/2024 1033)  Body Position:   left   right   turned   heels elevated   legs elevated   lower extremity elevated   supine, legs elevated   supine, head elevated   upper extremity elevated  Skin Protection: (protective elbow pads)   incontinence pads utilized   other (see comments)  Device Skin Pressure Protection: absorbent pad utilized/changed  Intervention: Prevent and Manage VTE (Venous Thromboembolism) Risk  Flowsheets (Taken 10/21/2024 1033)  VTE Prevention/Management: SCDs off (sequential compression devices)  Intervention: Prevent Infection  Flowsheets (Taken 10/21/2024 1033)  Infection Prevention:   single patient room provided   rest/sleep promoted     Problem: Adult Inpatient Plan of Care  Goal: Optimal Comfort and Wellbeing  Outcome: Progressing  Intervention: Monitor Pain and Promote Comfort  Flowsheets  Taken 10/21/2024 1033  Pain Management Interventions:   medication (see MAR)   emotional support   environmental changes   pain management plan reviewed with patient/caregiver   prayer   quiet environment facilitated   rest  Taken 10/21/2024 0812  Pain Management Interventions:   medication (see MAR)   care clustered   emotional support   prayer   rest  Intervention: Provide Person-Centered Care  Flowsheets (Taken 10/21/2024 1033)  Trust Relationship/Rapport:   care explained   choices provided   emotional support provided   empathic listening provided   questions answered   questions encouraged   reassurance provided   thoughts/feelings acknowledged     Problem: Skin Injury Risk Increased  Goal: Skin Health and Integrity  Outcome: Progressing  Intervention: Plan: Nurse Driven Intervention: Moisture Management  Recent Flowsheet Documentation  Taken 10/21/2024 0800 by Nohemy Thacker, RN  Moisture Interventions:   No brief in bed   Incontinence pad   Urinary collection device   Perineal  cleanser  Bathing/Skin Care:   incontinence care   linen changed  Intervention: Optimize Skin Protection  Recent Flowsheet Documentation  Taken 10/21/2024 1033 by Nohemy Thacker, RN  Skin Protection: (protective elbow pads)   incontinence pads utilized   other (see comments)

## 2024-10-21 NOTE — PROGRESS NOTES
"Care Management Follow Up    Length of Stay (days): 3    Expected Discharge Date: 10/22/2024     Concerns to be Addressed: Discharge Planning       Patient plan of care discussed at interdisciplinary rounds: Yes    Anticipated Discharge Disposition: Home, Hospice     Anticipated Discharge Services: Transportation Services    Anticipated Discharge DME: None    Patient/family educated on Medicare website which has current facility and service quality ratings: no    Education Provided on the Discharge Plan: No    Patient/Family in Agreement with the Plan: yes    Referrals Placed by CM/SW: Not at this time     Private pay costs discussed: Not applicable    Discussed  Partnership in Safe Discharge Planning  document with patient/family: No     Handoff Completed: No, handoff not indicated or clinically appropriate    Additional Information:    Writer met with patient to discuss end of life wishes. Patient reported that he was, \"going to Heaven\" and \"dear Umair just take me now.\" Writer asked him if he wanted to pass at home and he said, \"No.\" Patient reported that he doesn't have anyone to care for him. Writer offered to discuss Skilled Nursing Facilities and Hospice Homes, but patient was not open to discussion at this time. Writer will continue to follow for support and discharge planning needs that arise. Writer asked MD to see if patient qualifies for GIP Hospice.    Next Steps:     Send Hospice Referrals as appropriate.    TERENCE Avila, MSW  6th Floor Medical Surgical Unit  Phone [phone]      Message me securely in Vocera       "

## 2024-10-21 NOTE — CONSULTS
"SPIRITUAL HEALTH SERVICES Consult Note  [facility] ([facility]) 6B    Provided supportive visit to pt Homer per indication that spirituality affects his care and anointing over the weekend. Homer was drowsy but woke to voice and was able to affirm how meaningful it was to be anointed on Saturday by on-call . Homer endorses feeling \"ready\" to die, and asked that I simply \"hold my hand\" and pray with him, which I provided. Homer is welcoming of continued  visits.     Lena Peter M.Div.  Associate   Pager [phone]  Reachable via [employer]    * [facility] remains available 24/7 for emergent requests/referrals, either by having the switchboard page the on-call  or by entering an ASAP/STAT consult in Epic (this will also page the on-call ). Routine Epic consults receive an initial response within 24 hours.*    "

## 2024-10-21 NOTE — PLAN OF CARE
"  Problem: Adult Inpatient Plan of Care  Goal: Patient-Specific Goal (Individualized)  Description: You can add care plan individualizations to a care plan. Examples of Individualization might be:  \"Parent requests to be called daily at 9am for status\", \"I have a hard time hearing out of my right ear\", or \"Do not touch me to wake me up as it startles  me\".  Outcome: Progressing  Goal: Absence of Hospital-Acquired Illness or Injury  Outcome: Progressing  Intervention: Prevent Skin Injury  Recent Flowsheet Documentation  Taken 10/20/2024 1907 by Lazara Minor RN  Skin Protection: adhesive use limited  Taken 10/20/2024 1705 by Lazara Minor RN  Body Position:   refuses positioning   position maintained  Goal: Optimal Comfort and Wellbeing  Outcome: Progressing  Intervention: Monitor Pain and Promote Comfort  Recent Flowsheet Documentation  Taken 10/20/2024 1907 by Lazara Minor RN  Pain Management Interventions:   medication (see MAR)   rest   care clustered  Goal: Readiness for Transition of Care  Outcome: Progressing     Problem: Risk for Delirium  Goal: Optimal Coping  Outcome: Progressing  Intervention: Optimize Psychosocial Adjustment to Delirium  Flowsheets (Taken 10/20/2024 1907)  Supportive Measures:   active listening utilized   verbalization of feelings encouraged  Family/Support System Care: support provided  Goal: Improved Behavioral Control  Outcome: Progressing  Goal: Improved Attention and Thought Clarity  Outcome: Progressing  Intervention: Maximize Cognitive Function  Recent Flowsheet Documentation  Taken 10/20/2024 1907 by Lazara Minor RN  Sensory Stimulation Regulation:   quiet environment promoted   care clustered   auditory stimulation minimized   visual stimulation minimized   lighting decreased  Goal: Improved Sleep  Outcome: Progressing  Intervention: Promote Sleep  Recent Flowsheet Documentation  Taken 10/20/2024 1907 by Lazara Minor RN  Sleep/Rest Enhancement:   awakenings " minimized   comfort measures   regular sleep/rest pattern promoted   room darkened   noise level reduced     Problem: Skin Injury Risk Increased  Goal: Skin Health and Integrity  Outcome: Progressing  Intervention: Optimize Skin Protection  Flowsheets  Taken 10/20/2024 1907  Pressure Reduction Techniques: heels elevated off bed  Pressure Reduction Devices:   elbow protectors utilized   foam padding utilized  Skin Protection: adhesive use limited  Activity Management:   bedrest   activity adjusted per tolerance  Head of Bed (HOB) Positioning:   HOB at 15 degrees   HOB at 20 degrees  Taken 10/20/2024 1705  Head of Bed (HOB) Positioning: HOB flat  Intervention: Promote and Optimize Oral Intake  Flowsheets (Taken 10/20/2024 1907)  Nutrition Interventions: (pt on regular diet) other (see comments)  Oral Nutrition Promotion: (see diet orders) other (see comments)     Problem: Palliative Care  Goal: Enhanced Quality of Life  Outcome: Progressing  Intervention: Maximize Comfort  Flowsheets (Taken 10/20/2024 1907)  Pain Management Interventions:   medication (see MAR)   rest   care clustered  Nutrition Interventions: (pt on regular diet) other (see comments)  Oral Care: patient refused intervention  Intervention: Optimize Function  Flowsheets (Taken 10/20/2024 1907)  Sensory Stimulation Regulation:   quiet environment promoted   care clustered   auditory stimulation minimized   visual stimulation minimized   lighting decreased  Fatigue Management: activity assistance provided  Sleep/Rest Enhancement:   awakenings minimized   comfort measures   regular sleep/rest pattern promoted   room darkened   noise level reduced  Intervention: Promote Advance Care Planning  Flowsheets (Taken 10/20/2024 1907)  Life Transition/Adjustment: end-of-life care initiated  Intervention: Optimize Psychosocial Wellbeing  Flowsheets (Taken 10/20/2024 1907)  Supportive Measures:   active listening utilized   verbalization of feelings  encouraged  Family/Support System Care: support provided   Goal Outcome Evaluation:       Pt transferred from 10 ICU. 4 L O2 via NC for comfort. Assist of 2 with repositioning and pericares. Incontinent bowel and bladder -- last BM 10/20. Wounds noted on bilateral elbows, L anterior foot, and scattered bruises. 2 R PIVs SL. Pt on comfort cares. Offered repositioning pt refused. No acute events. Pt expresses no needs at this time.

## 2024-10-22 NOTE — PLAN OF CARE
A/Ox4. VSS on 4L NC. Denied SOB, CP, Cough, N/V, N/T, Dizziness, Headache. Pain treated with scheduled oxy. Regular diet, thin liquids, takes pills whole. Pt only wants soft cold foods. Grace intact but leaking with repositioning. MD aware, staff to leave grace as is. Large BM 10/22/24. Pt not OOB. Assist of 1 or 2 with repositioning and linen changes. 2 R PIV SL, patent with flush. Call light within reach. Appears to be sleeping during rounds. Continue POC.    Pt refused full skin assessment and most Q2 hr repositions and stated he just wanted to go to [facility] and he is tired and ready to die.

## 2024-10-22 NOTE — PROGRESS NOTES
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      He initially presented to [facility] from his TCU on 10/18/24 with acute L neck swelling and associated shoulder pain.  He was found to have significant leukocytosis and imaging findings concerning for osteomyelitis of the L sternoclavicular junction vs progression of malignancy and acute hypoxic respiratory failure with bilateral lower lobe PNA and moderate R hydropneumothorax.  Transferred to [facility] ICU 10/18/24 for ENT evaluation and biopsy / I&D of L neck swelling.   Transferred to hospitalist service on 10/19/24.     Clinical updates 10/22/2024:  Patient resting comfortably, feels comfort needs adequately addressed.  Dyspnea well-controlled, pain well-controlled.  Having good bowel movements and passing flatus.  Still drinking okay and eating some.  Hoping to \"go to heaven soon\".  Is  with 2 sons.  His brother Devon is his alternate decision-maker who he would like to be contacted regarding any placement issues.  Grace catheter replaced today due to leakage around the catheter.  -Continue comfort cares  -Would be appropriate for residential hospice, discussed with care coordination team  -If any acute decline in the meantime, we will consult inpatient hospice     # Stage IV R lung large cell carcinoma with mets to the bone, brain, contralateral lung, intrathoracic lymph nodes, and likely the pancreas --> progressing   # Comfort-focused care  Follows with [facility] - " Dr. Friedell. Initially diagnosed with RUL large cell carcinoma after presenting on April 2024 with hemoptysis, confirmed on biopsy. Underwent RUL lobectomy May 2024 with adjacent nodes negative for malignancy. In the following months he was found to have metastasis involving the L lung, intrathoracic nodes, pancreas, brain, and bones with pathological fractures. He underwent gamma knife radiation for brain mets, completed 3 or 4/7 cycles of carboplatin, etoposide, and atezolizumab, and underwent 1/10 radiation treatments to his hip. He has had delays in treatments due to hospitalization for septic shock 2/2 cellulitis while neutropenic and failure to thrive. Patient presented to outside ED on 10/18 with acute left neck swelling and associated shoulder pain. On admission he was found to have significant leukocytosis and imaging findings concerning for osteomyelitis of the L sternoclavicular junction vs progression of malignancy, as well as acute hypoxic respiratory failure with bilateral lower lobe PNA and moderate R hydropneumothorax. Chest CT at OSH negative for PE, did show ongoing moderate hydropneumothorax with a trace amount of air and fluid level decreased in size in comparison, bilateral lower lobe PNA, moderate sized gas and fluid collection in the L neck centered in the sternoclavicular joint with cortical destruction of the L clavicular head suspicious for septic arthritis (but progression of malignancy cannot be excluded), and progression of metastatic disease with new & enlarging lung nodules, new osseous mets (ribs), and ongoing lymphadenopathy. Transferred to [facility] ICU where ENT was consulted for possible biopsy. Due to concern for further cancer metastasis, current infection, and bacteremia, patient transitioned to comfort-focused care on 10/19/24. Pt is currently on high flow O2.   - If stable off high flow O2, may discharge to hospice facility. Otherwise, will plan to consult [facility] hosp[facility]  in AM  - Palliative consulted on admission, suspect will evaluate on 10/21 when back on service  - continue comfort measures  - Oxycodone 10 mg QID (was on chronic opioids for cancer associated pain)  - Oxycodone 5-10 mg Q3H PRN  - Dilaudid 0.3 mg - 0.5 mg Q2H PRN  - Ativan 1 mg Q3H PRN     # L sided neck swelling with imaging concerns for osteomyelitis vs progression of cancer   # CNS metastasis, s/p gamma knife   # Chronic pain r/t progressive bony metastasis with pathologic fractures  # Acute hypoxic respiratory failure  # Bilateral lower lobe PNA  # GPC in blood culture  # Leukemoid reaction   # Lactic acidosis  # Afib with RVR   # Lactic acidosis  # Hypernatremia  # Pathologic fractures involving the pelvis   # Generalized weakness and FTT 2/2 advancing malignancy  # Bilateral ankle wounds, lateral aspect  # Sacral wound  # Edema  ---   Comfort care measures as above        Diet: Regular Diet Adult  Snacks/Supplements Adult: Other; pudding, jello, mashed potatoes - see below; With Meals    DVT Prophylaxis: comfort cares   Lockwood Catheter: PRESENT, indication: End of life, Acute retention or obstruction;End of life, Other (Comment);Acute retention or obstruction (chronic)  Lines: None     Cardiac Monitoring: None  Code Status: No CPR- Do NOT Intubate           Clive Palmer MD  Hospitalist Service, GOLD TEAM 19  [facility]  Securely message with [employer] (more info)  Text page via [facility] Paging/Directory   See signed in provider for up to date coverage information  ______________________________________________________________________    Interval History   Resting comfortably.  Feels his dyspnea and generalized pains are well-controlled on current regimen.  Still drinking and eating some.  Having good bowel movements and passing flatus.  Lockwood catheter was leaking today, changed by nursing.    Physical Exam   Vital Signs: Temp: 99.6  F (37.6  C) Temp src: Oral  BP: 97/71 Pulse: (!) 121   Resp: 16 SpO2: 96 % O2 Device: Nasal cannula Oxygen Delivery: 4 LPM  Weight: 211 lbs 3.21 oz  General: Alert and oriented, weak, slightly sedated.  Chest: CTA anteriorly  CV: Irregular, tachycardic  Abdomen: Obese.  NABS.  Soft, nontender.  : Yellow urine in Lockwood catheter bag

## 2024-10-22 NOTE — PLAN OF CARE
"Goal Outcome Evaluation:      Plan of Care Reviewed With: patient    Overall Patient Progress: no changeOverall Patient Progress: no change    Outcome Evaluation: Pt A&Ox4, right PIV x2, receiving comfort cares, stated pain 3-4/10 this morning later this afternoon 7/10, pain managed with scheduled medication, grace was leaking constantly requiring pt to be changed and new bedding several times, flyer on unit to help trouble shoot leaking grace, grace replaced with larger cath size 18 Fr, able to rest comfotably. O2 4L NC, denied chest pain or SOB. Pt accepted water and apple juice, provided oral hygiene, declined television,  Pt declines repositioning d/t \"pain in my right hip from cancer.\"  alexis/grace care provided, linens changed. Elbow protection present. Pt ate pudding, apple sauce, ice cream, jello, drinking fluids. Provider notified this morning for order to change grace cath size/requested help from flyer.      Problem: Adult Inpatient Plan of Care  Goal: Patient-Specific Goal (Individualized)  Description: You can add care plan individualizations to a care plan. Examples of Individualization might be:  \"Parent requests to be called daily at 9am for status\", \"I have a hard time hearing out of my right ear\", or \"Do not touch me to wake me up as it startles  me\".  10/22/2024 1743 by Nohemy Thacker, RN  Outcome: Not Progressing  Flowsheets (Taken 10/22/2024 1743)  Individualized Care Needs: provide fluids, feed pt, oral cares  Anxieties, Fears or Concerns: pain  10/22/2024 1742 by Nohemy Thacker, RN  Outcome: Progressing     Problem: Adult Inpatient Plan of Care  Goal: Absence of Hospital-Acquired Illness or Injury  10/22/2024 1743 by Nohemy Thacker, RN  Outcome: Not Progressing  10/22/2024 1742 by Nohemy Thacker, RN  Outcome: Progressing  Intervention: Identify and Manage Fall Risk  Flowsheets (Taken 10/22/2024 1743)  Safety Promotion/Fall Prevention:   increased rounding and observation   " increase visualization of patient   room near nurse's station   safety round/check completed  Intervention: Prevent Skin Injury  Flowsheets (Taken 10/22/2024 1743)  Body Position:   position maintained   legs elevated   refuses positioning   supine, legs elevated   supine   supine, head elevated  Skin Protection: (elbow pads, pillows) other (see comments)  Device Skin Pressure Protection: absorbent pad utilized/changed  Intervention: Prevent and Manage VTE (Venous Thromboembolism) Risk  Flowsheets (Taken 10/22/2024 1743)  VTE Prevention/Management: SCDs off (sequential compression devices)  Intervention: Prevent Infection  Flowsheets (Taken 10/22/2024 1743)  Infection Prevention:   rest/sleep promoted   single patient room provided     Problem: Adult Inpatient Plan of Care  Goal: Optimal Comfort and Wellbeing  10/22/2024 1743 by Nohemy Thacker RN  Outcome: Not Progressing  10/22/2024 1742 by Nohemy Thacker, RN  Outcome: Progressing  Intervention: Monitor Pain and Promote Comfort  Flowsheets (Taken 10/22/2024 1743)  Pain Management Interventions:   medication (see MAR)   awakened for pain meds per patient request   care clustered   food   pain management plan reviewed with patient/caregiver   pillow support provided   prayer   rest  Intervention: Provide Person-Centered Care  Flowsheets (Taken 10/22/2024 1743)  Trust Relationship/Rapport:   care explained   choices provided   emotional support provided   empathic listening provided   questions answered   questions encouraged   reassurance provided   thoughts/feelings acknowledged     Problem: Risk for Delirium  Goal: Improved Sleep  10/22/2024 1743 by Nohemy Thacker, RN  Outcome: Not Progressing  10/22/2024 1742 by Nohemy Thacker RN  Outcome: Progressing     Problem: Palliative Care  Goal: Enhanced Quality of Life  10/22/2024 1743 by Nohemy Thacker, RN  Outcome: Not Progressing  10/22/2024 1742 by Nohemy Thacker, RN  Outcome:  Progressing  Intervention: Maximize Comfort  Recent Flowsheet Documentation  Taken 10/22/2024 1743 by Nohemy Thacker, RN  Pain Management Interventions:   medication (see MAR)   awakened for pain meds per patient request   care clustered   food   pain management plan reviewed with patient/caregiver   pillow support provided   prayer   rest

## 2024-10-22 NOTE — CONSULTS
Care Management Follow Up    Length of Stay (days): 4    Expected Discharge Date: TBD     Concerns to be Addressed: Discharge Planning       Patient plan of care discussed at interdisciplinary rounds: Yes    Anticipated Discharge Disposition: Hospice Home     Anticipated Discharge Services: Hospice, ADL Assistance, Transportation Services    Anticipated Discharge DME: None    Patient/family educated on Medicare website which has current facility and service quality ratings: N/A    Education Provided on the Discharge Plan: No    Patient/Family in Agreement with the Plan: Yes    Referrals Placed by CM/SW: Not at this time    Private pay costs discussed: Not applicable    Discussed  Partnership in Safe Discharge Planning  document with patient/family: No     Handoff Completed: No, handoff not indicated or clinically appropriate    Additional Information:    Writer left message with patient's brother Devon asking for a call back to discuss discharge disposition options. Writer mentioned the options of Hospice Home and Skilled Nursing Facility with Hospice. Patient reports that he wants his brother to determine discharge disposition.    Next Steps:     Send referrals as appropriate.    TERENCE Avila, MSW  6th Floor Medical Surgical Unit  Phone [phone]      Message me securely in [employer]

## 2024-10-23 NOTE — PROGRESS NOTES
"[facility]    Medicine Progress Note - Hospitalist Service, GOLD TEAM 19    Date of Admission:  10/18/2024    Assessment & Plan   Dk Romoace Kwon is a 69 year old male with PMH RUL large cell lung cancer with metastases to the brain, bone w/ pathologic fractures, pancreas, L lung, pleura, and intrathoracic nodes (on palliative chemo and radiation, s/p RUL lobectomy), chronic anemia, chronic atrial fibrillation, neurogenic bladder with chronic grace admitted on 10/18/2024.      He initially presented to [facility] from his TCU on 10/18/24 with acute L neck swelling and associated shoulder pain.  He was found to have significant leukocytosis and imaging findings concerning for osteomyelitis of the L sternoclavicular junction vs progression of malignancy and acute hypoxic respiratory failure with bilateral lower lobe PNA and moderate R hydropneumothorax.  Transferred to [facility] ICU 10/18/24 for ENT evaluation and biopsy / I&D of L neck swelling.   Transferred to hospitalist service on 10/19/24.     Clinical updates 10/23/2024:  Patient resting comfortably, feels comfort needs adequately addressed.  Dyspnea well-controlled, pain well-controlled.  Having good bowel movements and passing flatus.  Continues to eat and drink remarkably well.  States \"I am so tired, I am ready to see Umair.  God is calling me to heaven\".  Is  with 2 sons.  His brother Devon is his alternate decision-maker who he would like to be contacted regarding any placement issues.  Grace catheter functioning well.  Denies any constipation.  -Continue comfort cares  -Would be appropriate for residential hospice, discussed with care coordination team  -If any acute decline in the meantime, we will consult inpatient hospice     # Stage IV R lung large cell carcinoma with mets to the bone, brain, contralateral lung, intrathoracic lymph nodes, and likely the pancreas --> progressing   # " Comfort-focused care  Follows with [facility] - Dr. Friedell. Initially diagnosed with RUL large cell carcinoma after presenting on April 2024 with hemoptysis, confirmed on biopsy. Underwent RUL lobectomy May 2024 with adjacent nodes negative for malignancy. In the following months he was found to have metastasis involving the L lung, intrathoracic nodes, pancreas, brain, and bones with pathological fractures. He underwent gamma knife radiation for brain mets, completed 3 or 4/7 cycles of carboplatin, etoposide, and atezolizumab, and underwent 1/10 radiation treatments to his hip. He has had delays in treatments due to hospitalization for septic shock 2/2 cellulitis while neutropenic and failure to thrive. Patient presented to outside ED on 10/18 with acute left neck swelling and associated shoulder pain. On admission he was found to have significant leukocytosis and imaging findings concerning for osteomyelitis of the L sternoclavicular junction vs progression of malignancy, as well as acute hypoxic respiratory failure with bilateral lower lobe PNA and moderate R hydropneumothorax. Chest CT at OSH negative for PE, did show ongoing moderate hydropneumothorax with a trace amount of air and fluid level decreased in size in comparison, bilateral lower lobe PNA, moderate sized gas and fluid collection in the L neck centered in the sternoclavicular joint with cortical destruction of the L clavicular head suspicious for septic arthritis (but progression of malignancy cannot be excluded), and progression of metastatic disease with new & enlarging lung nodules, new osseous mets (ribs), and ongoing lymphadenopathy. Transferred to [facility] ICU where ENT was consulted for possible biopsy. Due to concern for further cancer metastasis, current infection, and bacteremia, patient transitioned to comfort-focused care on 10/19/24. Pt is currently on high flow O2.   - If stable off high flow O2, may discharge to hospice  "facility. Otherwise, will plan to consult [facility] hospice in AM  - Palliative consulted on admission, suspect will evaluate on 10/21 when back on service  - continue comfort measures  - Oxycodone 10 mg QID (was on chronic opioids for cancer associated pain)  - Oxycodone 5-10 mg Q3H PRN  - Dilaudid 0.3 mg - 0.5 mg Q2H PRN  - Ativan 1 mg Q3H PRN     # L sided neck swelling with imaging concerns for osteomyelitis vs progression of cancer   # CNS metastasis, s/p gamma knife   # Chronic pain r/t progressive bony metastasis with pathologic fractures  # Acute hypoxic respiratory failure  # Bilateral lower lobe PNA  # GPC in blood culture  # Leukemoid reaction   # Lactic acidosis  # Afib with RVR   # Lactic acidosis  # Hypernatremia  # Pathologic fractures involving the pelvis   # Generalized weakness and FTT 2/2 advancing malignancy  # Bilateral ankle wounds, lateral aspect  # Sacral wound  # Edema  ---   Comfort care measures as above        Diet: Regular Diet Adult  Snacks/Supplements Adult: Other; pudding, jello, mashed potatoes - see below; With Meals    DVT Prophylaxis: comfort cares   Lockwood Catheter: PRESENT, indication: Surgical procedure, Surgical procedure, Other (Comment);Acute retention or obstruction (chronic)  Lines: None     Cardiac Monitoring: None  Code Status: No CPR- Do NOT Intubate           Clive Palmer MD  Hospitalist Service, GOLD TEAM [address] [place] [address]  Securely message with [email or web address] (more info)  Text page via [facility] Paging/Directory   See signed in provider for up to date coverage information  ______________________________________________________________________    Interval History   Resting comfortably.  \"I am so tired, I am ready to see Umair.  God is calling me to have been\".  Still eating and drinking well.  Feels his pain and dyspnea are well-controlled.  Does not feel constipated.  No nausea.    Physical Exam   Vital Signs: Temp: 98.2  F (36.8 "  C) Temp src: Oral BP: 93/75 Pulse: 64   Resp: 17 SpO2: 99 % O2 Device: None (Room air)    Weight: 211 lbs 3.21 oz  General: Alert and oriented, weak, mildly lethargic but converses well.  Chest: CTA anteriorly  CV: Irregular, tachycardic  Abdomen: Obese.  NABS.  Soft, nontender.  : Yellow urine in Lockwood catheter bag

## 2024-10-23 NOTE — PROGRESS NOTES
Shift: 2300 - 0700     VS: Blood pressure 97/71, pulse (!) 121, temperature 99.6  F (37.6  C), temperature source Oral, resp. rate 16, height 1.829 m (6'), weight 95.8 kg (211 lb 3.2 oz), SpO2 96%.     Pain:  Pt rates their pain a 4/10. Pain located on the right hip.   Neuro: Alert and oriented x4.   Resp: WDL  Diet: Regular diet.   Skin: Bilateral elbows, BLE, meplex to sacral wound  LDA: R- PIVx2   Activity: Bedrest, turns q2 hours   Output: Lockwood draining yellow   Additional Info/shift updates: Pt was complaining of right hip pain PRN meds were given.   Call light within reach     Plan of care ongoing

## 2024-10-23 NOTE — PROGRESS NOTES
"Care Management Follow Up    Length of Stay (days): 5    Expected Discharge Date: 10/30/2024     Concerns to be Addressed: discharge planning       Patient plan of care discussed at interdisciplinary rounds: Yes    Anticipated Discharge Disposition: Skilled Nursing Facility, Hospice Home     Anticipated Discharge Services: Skilled Nursing, Hospice, ADL/IADL Assistance, Transportation Services    Anticipated Discharge DME: None    Patient/family educated on Medicare website which has current facility and service quality ratings: Yes    Education Provided on the Discharge Plan: Yes    Patient/Family in Agreement with the Plan: Yes    Referrals Placed by CM/SW: Hospice Home    Private pay costs discussed: Not applicable    Discussed  Partnership in Safe Discharge Planning  document with patient/family: No     Handoff Completed: No, handoff not indicated or clinically appropriate    Additional Information:    Writer met with patient, brother and niece to discuss discharge planning. Writer provided explanation of why patient couldn't remain in the hospital and options for discharge. Writer provided empathy and validation of feelings of frustration and frustration related to wishing to \"be with Umair\". Patient is being followed by [facility] health.     Writer provided medicare care compare lists for facilities in the [facility] and [facility] Writer sent referral to the [facility]. Writer discussed Medical Assistance with patient as well as private pay for Skilled Nursing. Writer made referral to Financial Counseling as it sounds like patient may be able to pay for care for a while, but not for long.     Pending Referrals    [facility]  [address] [place] [address]  [phone]  10/23: Referral Sent    Next Steps:     Writer to work with patient's family to coordinate discharge. Writer to send referrals once preferences are chosen. Writer to " provide support to patient. Writer to continue to follow for support and discharge planning needs that arise.     TERENCE Avila, MSW  6th Floor Medical Surgical Unit  Phone [phone]      Message me securely in [employer]        Principal Discharge DX:	COPD exacerbation

## 2024-10-23 NOTE — PLAN OF CARE
[id number]    A/Ox4. VSS on 4L NC. Denied SOB, CP, Cough, N/V, N/T, Dizziness, Headache. Regular diet, thin liquids, takes pills whole. Pt only wants soft cold foods. Lockwood intact with no leaking. Pt non bedrest. Assist of 1 or 2 with repositioning and linen changes. 2 R PIV SL, patent with flush. Call light within reach, uses appropriately. Appears to be sleeping during rounds. Continue POC.     Repositioned from supine to 5-10 degrees on left side.

## 2024-10-24 NOTE — PROGRESS NOTES
[facility]    Medicine Progress Note - Hospitalist Service, GOLD TEAM 19    Date of Admission:  10/18/2024    Assessment & Plan   Dk Randhawa Bay is a 69 year old male with PMH RUL large cell lung cancer with metastases to the brain, bone w/ pathologic fractures, pancreas, L lung, pleura, and intrathoracic nodes (on palliative chemo and radiation, s/p RUL lobectomy), chronic anemia, chronic atrial fibrillation, neurogenic bladder with chronic grace admitted on 10/18/2024.      He initially presented to [facility] from his TCU on 10/18/24 with acute L neck swelling and associated shoulder pain.  He was found to have significant leukocytosis and imaging findings concerning for osteomyelitis of the L sternoclavicular junction vs progression of malignancy and acute hypoxic respiratory failure with bilateral lower lobe PNA and moderate R hydropneumothorax.  Transferred to [facility]- ICU 10/18/24 for ENT evaluation and biopsy / I&D of L neck swelling.   Transferred to hospitalist service on 10/19/24.     Clinical updates 10/24/2024:  Patient again comfortably, feels comfort needs adequately addressed.  Dyspnea well-controlled, pain well-controlled.  Continues to eat and drink fairly well, enjoys fresh fruits, ice cream. Denies any constipation.  His brother and daughter visited today.  -Continue comfort cares  -Would be appropriate for residential hospice, discussed with care coordination team  -If any acute decline in the meantime, we will consult inpatient hospice     # Stage IV R lung large cell carcinoma with mets to the bone, brain, contralateral lung, intrathoracic lymph nodes, and likely the pancreas --> progressing   # Comfort-focused care  Follows with [facility] - Dr. Friedell. Initially diagnosed with RUL large cell carcinoma after presenting on April 2024 with hemoptysis, confirmed on biopsy. Underwent RUL lobectomy May 2024 with adjacent nodes  negative for malignancy. In the following months he was found to have metastasis involving the L lung, intrathoracic nodes, pancreas, brain, and bones with pathological fractures. He underwent gamma knife radiation for brain mets, completed 3 or 4/7 cycles of carboplatin, etoposide, and atezolizumab, and underwent 1/10 radiation treatments to his hip. He has had delays in treatments due to hospitalization for septic shock 2/2 cellulitis while neutropenic and failure to thrive. Patient presented to outside ED on 10/18 with acute left neck swelling and associated shoulder pain. On admission he was found to have significant leukocytosis and imaging findings concerning for osteomyelitis of the L sternoclavicular junction vs progression of malignancy, as well as acute hypoxic respiratory failure with bilateral lower lobe PNA and moderate R hydropneumothorax. Chest CT at OSH negative for PE, did show ongoing moderate hydropneumothorax with a trace amount of air and fluid level decreased in size in comparison, bilateral lower lobe PNA, moderate sized gas and fluid collection in the L neck centered in the sternoclavicular joint with cortical destruction of the L clavicular head suspicious for septic arthritis (but progression of malignancy cannot be excluded), and progression of metastatic disease with new & enlarging lung nodules, new osseous mets (ribs), and ongoing lymphadenopathy. Transferred to [facility] ICU where ENT was consulted for possible biopsy. Due to concern for further cancer metastasis, current infection, and bacteremia, patient transitioned to comfort-focused care on 10/19/24. Pt is currently on high flow O2.   - If stable off high flow O2, may discharge to hospice facility. Otherwise, will plan to consult [facility] hospice in AM  - Palliative consulted on admission, suspect will evaluate on 10/21 when back on service  - continue comfort measures  - Oxycodone 10 mg QID (was on chronic opioids for cancer  associated pain)  - Oxycodone 5-10 mg Q3H PRN  - Dilaudid 0.3 mg - 0.5 mg Q2H PRN  - Ativan 1 mg Q3H PRN     # L sided neck swelling with imaging concerns for osteomyelitis vs progression of cancer   # CNS metastasis, s/p gamma knife   # Chronic pain r/t progressive bony metastasis with pathologic fractures  # Acute hypoxic respiratory failure  # Bilateral lower lobe PNA  # GPC in blood culture  # Leukemoid reaction   # Lactic acidosis  # Afib with RVR   # Lactic acidosis  # Hypernatremia  # Pathologic fractures involving the pelvis   # Generalized weakness and FTT 2/2 advancing malignancy  # Bilateral ankle wounds, lateral aspect  # Sacral wound  # Edema  ---   Comfort care measures as above        Diet: Regular Diet Adult  Snacks/Supplements Adult: Other; pudding, jello, mashed potatoes - see below; With Meals    DVT Prophylaxis: comfort cares   Lockwood Catheter: PRESENT, indication: Surgical procedure, Surgical procedure, Other (Comment);Acute retention or obstruction (chronic)  Lines: None     Cardiac Monitoring: None  Code Status: No CPR- Do NOT Intubate           Clive Palmer MD  Hospitalist Service, GOLD TEAM [address] [place] [address]  Securely message with [employer] (more info)  Text page via [facility] Paging/Directory   See signed in provider for up to date coverage information  ______________________________________________________________________    Interval History   Resting comfortably.  Throat dry, felt good to have a drink of ice water.  Feels his comfort needs are well met.  Utilizing opiates when needed.  His brother and daughter visited today.    Physical Exam   Vital Signs: Temp: 97.8  F (36.6  C) Temp src: Oral                Weight: 211 lbs 3.21 oz  General: Alert and oriented, weak, somewhat lethargic but converses well.  Chest: CTA anteriorly  CV: Somewhat Irregular, tachycardic  Abdomen: Obese.  NABS.  Soft, nontender.  : Concentrated yellow urine in  Lockwood catheter bag

## 2024-10-24 NOTE — PLAN OF CARE
Goal Outcome Evaluation:      Plan of Care Reviewed With: patient    Overall Patient Progress: no changeOverall Patient Progress: no change      A/Ox4. VSS on 4L NC. Denied SOB, CP, Cough, N/V, N/T, Dizziness, Headache. Pain treated with scheduled oxy. Regular diet, thin liquids, takes pills whole. Pt ate well this shift. Lockwood intact . Pt not OOB. Assist of 1 or 2 with repositioning and linen changes. 2 R PIV SL, patent with flush. Call light within reach. Appears to be sleeping during rounds. Continue POC.

## 2024-10-24 NOTE — PROGRESS NOTES
Care Management Follow Up    Length of Stay (days): 6    Expected Discharge Date: 10/30/2024     Concerns to be Addressed: Discharge Planning       Patient plan of care discussed at interdisciplinary rounds: Yes    Anticipated Discharge Disposition: SNF LTC with Hospice     Anticipated Discharge Services: Transportation Services, LTC SNF, Hospice     Anticipated Discharge DME: None    Patient/family educated on Medicare website which has current facility and service quality ratings: Yes    Education Provided on the Discharge Plan: Yes     Patient/Family in Agreement with the Plan: Yes    Referrals Placed by CM/SW: SNF, Hospice     Private pay costs discussed: Not applicable    Discussed  Partnership in Safe Discharge Planning  document with patient/family: No     Handoff Completed: No, handoff not indicated or clinically appropriate    Additional Information:    Writer met with patient, brother and niece to discuss discharge planning. Niece reported that patient has funds in his account to cover placement.     Pending Referrals    [facility] (Assisted Living)  [address] [place] [address]  [phone]  10/24: Referral Sent    [facility] (Residential Hospice)  [address] [place] [address]  [phone]  10/24: Referral Sent    [facility] (Hospice)  [address] [facility] [address]  [phone]  10/24: Referral Sent    Declined Referrals    [facility] (SNF)  [address] [facility] [address]  [phone]    Next Steps:     Writer to follow up with referrals and set up discharge appropriately.    TERENCE Avila, MSW  6th Floor Medical Surgical Unit  Phone [phone]      Message me securely in [employer]

## 2024-10-24 NOTE — PLAN OF CARE
" Goal Outcome Evaluation:  Problem: Adult Inpatient Plan of Care  Goal: Plan of Care Review  10/24/2024 1557 by Nohemy Thacker, RN  Outcome: Not Progressing  Flowsheets (Taken 10/24/2024 1554)  Outcome Evaluation: Pt A&Ox4, right PIV x2, receiving comfort cares, stated increased pain from previous days in right hip 8/10 this morning, supplement scheduled oxy with prn dilaudid which was effective, pt reported pain 5/10 when moving and 3/10 when still. Able to rest comfotably, grace in place, O2 4L NC, denied chest pain or SOB. Pt fed meals, offer fluids between meals also, provided oral hygiene, bed bath and changed dressings on sacral and buttocks. Pt declines repositioning d/t right hip pain from cancer. Jaylene/grace care provided, elbow protection present. Pt ate % of meals, mac and cheese, pudding, apple sauce, ice cream, jello, drinking fluids. Visitors (4) this morning, pt able to rest/sleep most of shift today. Pt given dilaudid prn at 1830. Pt would like to be given pain meds during the night also. Per pt right hip is extremely painful.    Problem: Palliative Care  Goal: Enhanced Quality of Life  10/24/2024 1557 by Nohemy Thacker, RN  Outcome: Not Progressing  10/24/2024 1553 by Nohemy Thacker, RN  Outcome: Progressing  Intervention: Maximize Comfort  Recent Flowsheet Documentation  Taken 10/24/2024 1557 by Nohemy Thacker, RN  Pain Management Interventions:   medication (see MAR)   care clustered   emotional support   food   pain management plan reviewed with patient/caregiver   prayer   quiet environment facilitated   rest     Problem: Adult Inpatient Plan of Care  Goal: Patient-Specific Goal (Individualized)  Description: You can add care plan individualizations to a care plan. Examples of Individualization might be:  \"Parent requests to be called daily at 9am for status\", \"I have a hard time hearing out of my right ear\", or \"Do not touch me to wake me up as it " "startles  me\".  10/24/2024 1557 by Nohemy Thacker, RN  Outcome: Not Progressing  Flowsheets (Taken 10/24/2024 1557)  Individualized Care Needs: cluster cares  Anxieties, Fears or Concerns: pain  Patient/Family-Specific Goals (Include Timeframe): \"I'm ready to go to heaven\"  10/24/2024 1553 by Nohemy Thacker, RN  Outcome: Progressing     Problem: Adult Inpatient Plan of Care  Goal: Absence of Hospital-Acquired Illness or Injury  Intervention: Prevent Skin Injury  Flowsheets (Taken 10/24/2024 1557)  Body Position: (Pt declines repositioning due to painful right hip)   position maintained   refuses positioning  Skin Protection: (elbow pads)   incontinence pads utilized   silicone foam dressing in place     Problem: Adult Inpatient Plan of Care  Goal: Absence of Hospital-Acquired Illness or Injury  Intervention: Prevent Infection  Flowsheets (Taken 10/24/2024 1557)  Infection Prevention:   rest/sleep promoted   single patient room provided     Problem: Adult Inpatient Plan of Care  Goal: Optimal Comfort and Wellbeing  10/24/2024 1557 by Nohemy Thacker, RN  Outcome: Not Progressing  10/24/2024 1553 by Nohemy Thacker, RN  Outcome: Progressing  Intervention: Monitor Pain and Promote Comfort  Flowsheets (Taken 10/24/2024 1557)  Pain Management Interventions:   medication (see MAR)   care clustered   emotional support   food   pain management plan reviewed with patient/caregiver   prayer   quiet environment facilitated   rest  Intervention: Provide Person-Centered Care  Flowsheets (Taken 10/24/2024 1557)  Trust Relationship/Rapport:   care explained   choices provided   emotional support provided   empathic listening provided   questions answered   thoughts/feelings acknowledged   reassurance provided   questions encouraged                           "

## 2024-10-24 NOTE — PLAN OF CARE
[id number]        Pt A&Ox4, Fall precautions maintained with bed alarm on, bed locked and in lowest position. Pain managed with PRN meds per mar, comfort care measures taken. POC discussed, questions encouraged and answered. No acute events during shift. Frequent round and checks done. POC continues.

## 2024-10-24 NOTE — CONSULTS
"SPIRITUAL HEALTH SERVICES Consult Note  [facility] ([facility]) 6B    Provided supportive follow up visit to Homer per his desire for prayers as he remains on comfort cares. Homer's sister, brother, niece, and friend were all present. Per Homer and family's wishes, we all prayed the Hail [place] and Lord's Prayer, as Homer's Spiritism rebekah brings him the utmost comfort. Family reflected that Homer is a \"good man\" and individuals became tearful. I acknowledged the difficulty, that Homer voices his readiness to \"be with God\", while some loved ones struggle with this decision and will miss him. Homer asked that I continue to pray for him, and finds it comforting to hold people's hands. I introduced self and oriented family to [facility], should the group or individuals find another  visit supportive. [facility] remains available.     Lena Peter M.Div.  Associate   Pager [phone]  Reachable via [employer]    * [facility] remains available 24/7 for emergent requests/referrals, either by having the switchboard page the on-call  or by entering an ASAP/STAT consult in Epic (this will also page the on-call ). Routine Epic consults receive an initial response within 24 hours.*    "

## 2024-10-25 NOTE — PROGRESS NOTES
Care Management Follow Up    Length of Stay (days): 7    Expected Discharge Date: 10/30/2024     Concerns to be Addressed: discharge planning       Patient plan of care discussed at interdisciplinary rounds: Yes    Anticipated Discharge Disposition: Home, Hospice     Anticipated Discharge Services: Transportation Services    Anticipated Discharge DME: None    Patient/family educated on Medicare website which has current facility and service quality ratings: Yes    Education Provided on the Discharge Plan: Yes    Patient/Family in Agreement with the Plan: Yes    Referrals Placed by CM/SW: SNF    Private pay costs discussed: Not applicable    Discussed  Partnership in Safe Discharge Planning  document with patient/family:      Handoff Completed: No, handoff not indicated or clinically appropriate    Additional Information:    Writer spoke with patient's niece Petra via phone. She reported that patient's brother toured [facility] and wasn't very impressed with the facility. She and patient's brother are touring [facility] and [facility] today. Niece will email writer with how the tours go.     Accepted Referrals    [facility] (Hospice)  [address]  [facility] [address]  [phone]    Pending Referrals    [facility] (SNF)  [address] [place] [address]   [phone]  10/25: Referral Sent    [facility] (SNF)  [address]  [facility] [address]  [phone]  10/24: Referral Sent  10/25: Writer left message with Spenser in admissions requesting a call back regarding referral status.    [facility] (Assisted Living)  [address] [place] [address]  [phone]  10/24: Referral Sent  10/25: Writer left message with Milad in admissions requesting a call back regarding referral status.    Discontinued Referrals    [facility] (Residential Hospice)  [address] [place] [address]  [phone]  10/24:  Referral Sent     Declined Referrals     [facility] ([facility])  [address] [facility] [address]  [phone]     Next Steps:      Writer to follow up with referrals and set up discharge appropriately.    Writer to set up stretcher ride, complete PCS, PAS, Discharge Order and Hand Off.    TERENCE Avila, MSW  6th Floor Medical Surgical Unit  Phone [phone]

## 2024-10-25 NOTE — PROGRESS NOTES
[facility]    Medicine Progress Note - Hospitalist Service, GOLD TEAM 19    Date of Admission:  10/18/2024    Assessment & Plan   Dk Randhawa Sr. is a 69 year old male with PMH RUL large cell lung cancer with metastases to the brain, bone w/ pathologic fractures, pancreas, L lung, pleura, and intrathoracic nodes (on palliative chemo and radiation, s/p RUL lobectomy), chronic anemia, chronic atrial fibrillation, neurogenic bladder with chronic grace admitted on 10/18/2024.      He initially presented to [facility] from his TCU on 10/18/24 with acute L neck swelling and associated shoulder pain.  He was found to have significant leukocytosis and imaging findings concerning for osteomyelitis of the L sternoclavicular junction vs progression of malignancy and acute hypoxic respiratory failure with bilateral lower lobe PNA and moderate R hydropneumothorax.  Transferred to [facility]- ICU 10/18/24 for ENT evaluation and biopsy / I&D of L neck swelling.   Transferred to hospitalist service on 10/19/24.     Clinical updates 10/25/2024:  Seems a little more lethargic, clammy today and tachypneic.  Having some right hip discomfort.  Encouraged patient to request analgesics, also discussed with nursing staff.  -Continue comfort cares  -Would be appropriate for residential hospice, discussed with care coordination team and working with patient's family on disposition  -If any acute decline in the meantime, we will consult inpatient hospice     # Stage IV R lung large cell carcinoma with mets to the bone, brain, contralateral lung, intrathoracic lymph nodes, and likely the pancreas --> progressing   # Comfort-focused care  Follows with [facility] - Dr. Friedell. Initially diagnosed with RUL large cell carcinoma after presenting on April 2024 with hemoptysis, confirmed on biopsy. Underwent RUL lobectomy May 2024 with adjacent nodes negative for malignancy. In the following  months he was found to have metastasis involving the L lung, intrathoracic nodes, pancreas, brain, and bones with pathological fractures. He underwent gamma knife radiation for brain mets, completed 3 or 4/7 cycles of carboplatin, etoposide, and atezolizumab, and underwent 1/10 radiation treatments to his hip. He has had delays in treatments due to hospitalization for septic shock 2/2 cellulitis while neutropenic and failure to thrive. Patient presented to outside ED on 10/18 with acute left neck swelling and associated shoulder pain. On admission he was found to have significant leukocytosis and imaging findings concerning for osteomyelitis of the L sternoclavicular junction vs progression of malignancy, as well as acute hypoxic respiratory failure with bilateral lower lobe PNA and moderate R hydropneumothorax. Chest CT at OSH negative for PE, did show ongoing moderate hydropneumothorax with a trace amount of air and fluid level decreased in size in comparison, bilateral lower lobe PNA, moderate sized gas and fluid collection in the L neck centered in the sternoclavicular joint with cortical destruction of the L clavicular head suspicious for septic arthritis (but progression of malignancy cannot be excluded), and progression of metastatic disease with new & enlarging lung nodules, new osseous mets (ribs), and ongoing lymphadenopathy. Transferred to [facility] ICU where ENT was consulted for possible biopsy. Due to concern for further cancer metastasis, current infection, and bacteremia, patient transitioned to comfort-focused care on 10/19/24. Pt is currently on high flow O2.   - If stable off high flow O2, may discharge to hospice facility. Otherwise, will plan to consult [facility] hospice in AM  - Palliative consulted on admission, suspect will evaluate on 10/21 when back on service  - continue comfort measures  - Oxycodone 10 mg QID (was on chronic opioids for cancer associated pain)  - Oxycodone 5-10 mg Q3H  PRN  - Dilaudid 0.3 mg - 0.5 mg Q2H PRN  - Ativan 1 mg Q3H PRN     # L sided neck swelling with imaging concerns for osteomyelitis vs progression of cancer   # CNS metastasis, s/p gamma knife   # Chronic pain r/t progressive bony metastasis with pathologic fractures  # Acute hypoxic respiratory failure  # Bilateral lower lobe PNA  # GPC in blood culture  # Leukemoid reaction   # Lactic acidosis  # Afib with RVR   # Lactic acidosis  # Hypernatremia  # Pathologic fractures involving the pelvis   # Generalized weakness and FTT 2/2 advancing malignancy  # Bilateral ankle wounds, lateral aspect  # Sacral wound  # Edema  ---   Comfort care measures as above        Diet: Regular Diet Adult  Snacks/Supplements Adult: Other; pudding, jello, mashed potatoes - see below; With Meals    DVT Prophylaxis: comfort cares   Lockwood Catheter: PRESENT, indication: Surgical procedure, Surgical procedure, Other (Comment);Acute retention or obstruction (chronic)  Lines: None     Cardiac Monitoring: None  Code Status: No CPR- Do NOT Intubate           Clive Palmer MD  Hospitalist Service, GOLD TEAM [address] [place] [address]  Securely message with [employer] (more info)  Text page via [facility] Paging/Directory   See signed in provider for up to date coverage information  ______________________________________________________________________    Interval History   Patient states he is comfortable except for some mild right hip discomfort.  Appears more tachypneic however and clammy.  States he still eating and drinking.  Starting to decline.    Physical Exam   Vital Signs:                    Weight: 211 lbs 3.21 oz  General: Alert and oriented, weak, somewhat lethargic but converses well.  Chest: Somewhat tachypneic, CTA anteriorly  CV: Somewhat Irregular, tachycardic  Abdomen: Obese.  NABS.  Soft, nontender.  : Concentrated yellow urine in Lockwood catheter bag

## 2024-10-25 NOTE — PLAN OF CARE
Goal Outcome Evaluation:  Plan of Care Reviewed With: patient  Overall Patient Progress: no changeOverall Patient Progress: no change     VS: Comfort cares, no Vs   Output/Last BM: Lockwood cath , No BM this shift    Activity: Assist x2 for repositioning refused repositioning most of the shift    Skin/Dressing: Elbows, mepliex on bottom    Pain: Managed w/ PRN dilaudid IV and scheduled oxycodone   CMS: A&Ox4    Diet: Regular, prefers soft cold foods    LDA: R PIV x2, patent   Equipment: Personal belongings    Plan: Discharge to LTC w/ hospice    Additional Info: 4L 02 via NC . Two family members visited the patient today

## 2024-10-25 NOTE — PROGRESS NOTES
Pt AO x4, on 4 LPM via NC. Comfort Care, no vital signs. Lockwood Cath in place. R PIV x2, patent.   No new events this shift. POC is ongoing.

## 2024-10-25 NOTE — PROGRESS NOTES
Brief Care Management Note:    Accommodations form for pt to break his lease d/t medical conditions completed by provider. Original provided to pt in room. Copy placed in chart. Writer LVM for pt's brother, Devon, informing of completion and offering to send/fax if needed. No information on form stating where it should be sent.       Selin Bianchi, RN   Nurse Coordinator    6 Med/Surg  Phone: [phone]    Social Work and Care Management Department     SEARCHABLE in [facility] - search CARE COORDINATOR   [facility] ([phone]) Saturday & Sunday; ([phone])  Recognized Holidays   Units: 6 Med Surg Vocera & 8 Med Surg Vocera Pager [phone]

## 2024-10-25 NOTE — PLAN OF CARE
Goal Outcome Evaluation:      Plan of Care Reviewed With: patient    Overall Patient Progress: no changeOverall Patient Progress: no change       VS: Comfort cares no VS    Output/Last BM: Lockwood cath , No BM this shift    Activity: Ax2 for repositioning refused repositioning most of the shift    Skin/Dressing: Elbows, mepliex on bottom    Pain: Managed w/ PRN dilaudid IV and scheduled oxy    CMS: A&Ox4    Diet: Regular, prefers soft cold foods    LDA: R PIV x2    Equipment: Personal belongings    Plan: Discharge to LTC w/ hospice    Additional Info: 4L 02 via NC

## 2024-10-26 NOTE — PROGRESS NOTES
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      He initially presented to [facility] from his TCU on 10/18/24 with acute L neck swelling and associated shoulder pain.  He was found to have significant leukocytosis and imaging findings concerning for osteomyelitis of the L sternoclavicular junction vs progression of malignancy and acute hypoxic respiratory failure with bilateral lower lobe PNA and moderate R hydropneumothorax.  Transferred to [facility]- ICU 10/18/24 for ENT evaluation and biopsy / I&D of L neck swelling.   Transferred to hospitalist service on 10/19/24.     Clinical updates 10/26/2024:  Overall stable.  Comfort needs well-managed.  Requesting analgesics when needed.  Still drinking well, eating fairly well.  -Continue comfort cares  -Anticipating discharge to LTC with hospice 10/28.  Appreciate SW, care coordination.     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 Friedell. Initially diagnosed with RUL large cell carcinoma after presenting on April 2024 with hemoptysis, confirmed on biopsy. Underwent RUL lobectomy May 2024 with adjacent nodes negative for malignancy. In the following months he was found to have metastasis involving the L lung, intrathoracic nodes, pancreas, brain, and bones with pathological fractures. He underwent gamma knife  radiation for brain mets, completed 3 or 4/7 cycles of carboplatin, etoposide, and atezolizumab, and underwent 1/10 radiation treatments to his hip. He has had delays in treatments due to hospitalization for septic shock 2/2 cellulitis while neutropenic and failure to thrive. Patient presented to outside ED on 10/18 with acute left neck swelling and associated shoulder pain. On admission he was found to have significant leukocytosis and imaging findings concerning for osteomyelitis of the L sternoclavicular junction vs progression of malignancy, as well as acute hypoxic respiratory failure with bilateral lower lobe PNA and moderate R hydropneumothorax. Chest CT at OSH negative for PE, did show ongoing moderate hydropneumothorax with a trace amount of air and fluid level decreased in size in comparison, bilateral lower lobe PNA, moderate sized gas and fluid collection in the L neck centered in the sternoclavicular joint with cortical destruction of the L clavicular head suspicious for septic arthritis (but progression of malignancy cannot be excluded), and progression of metastatic disease with new & enlarging lung nodules, new osseous mets (ribs), and ongoing lymphadenopathy. Transferred to [facility] ICU where ENT was consulted for possible biopsy. Due to concern for further cancer metastasis, current infection, and bacteremia, patient transitioned to comfort-focused care on 10/19/24. Pt is currently on high flow O2.   - If stable off high flow O2, may discharge to hospice facility. Otherwise, will plan to consult [facility] hosp[facility] in AM  - Palliative consulted on admission, suspect will evaluate on 10/21 when back on service  - continue comfort measures  - Oxycodone 10 mg QID (was on chronic opioids for cancer associated pain)  - Oxycodone 5-10 mg Q3H PRN  - Dilaudid 0.3 mg - 0.5 mg Q2H PRN  - Ativan 1 mg Q3H PRN     # L sided neck swelling with imaging concerns for osteomyelitis vs progression of cancer   # CNS  metastasis, s/p gamma knife   # Chronic pain r/t progressive bony metastasis with pathologic fractures  # Acute hypoxic respiratory failure  # Bilateral lower lobe PNA  # GPC in blood culture  # Leukemoid reaction   # Lactic acidosis  # Afib with RVR   # Lactic acidosis  # Hypernatremia  # Pathologic fractures involving the pelvis   # Generalized weakness and FTT 2/2 advancing malignancy  # Bilateral ankle wounds, lateral aspect  # Sacral wound  # Edema  ---   Comfort care measures as above        Diet: Regular Diet Adult  Snacks/Supplements Adult: Other; pudding, jello, mashed potatoes - see below; With Meals    DVT Prophylaxis: comfort cares   Lockwood Catheter: PRESENT, indication: Surgical procedure, Surgical procedure, Other (Comment);Acute retention or obstruction (chronic)  Lines: None     Cardiac Monitoring: None  Code Status: No CPR- Do NOT Intubate           Clive Palmer MD  Hospitalist Service, GOLD TEAM 19  [facility]  Securely message with [employer] (more info)  Text page via [facility] Paging/Directory   See signed in provider for up to date coverage information  ______________________________________________________________________    Interval History   Feels his comfort needs are well-managed.  Breathing comfortably today, resting comfortably.  Requested water, drank it well and voice markedly improved afterwards.  Right hip pain better after being repositioned.  No nausea, vomiting or constipation.    Physical Exam   Vital Signs:                    Weight: 211 lbs 3.21 oz  General: Alert and oriented, weak but converses well.  Voice normalizes after drinking water.  Chest: Breathing comfortably, CTA anteriorly  CV: Somewhat Irregular, tachycardic  Abdomen: Obese.  NABS.  Soft, nontender.  : Concentrated yellow urine in Lockwood catheter bag

## 2024-10-26 NOTE — PROGRESS NOTES
Care Management Discharge Note    Discharge Date: 10/28/24       Discharge Disposition: [facility] Ph: [phone] with [facility]. Ph: [phone]  Discharge Services: LTC and Hospice Services    Discharge DME: Oxygen    Discharge Transportation: [employer] Stretcher Ride between 1119-1204PM.     Private pay costs discussed: transportation costs    Does the patient's insurance plan have a 3 day qualifying hospital stay waiver?  No    PAS Confirmation Code:  [id number]  Patient/family educated on Medicare website which has current facility and service quality ratings: no    Education Provided on the Discharge Plan: No  Persons Notified of Discharge Plans: Pt, Pt's family. SW will update providers on Monday morning when plans are finalized.  Patient/Family in Agreement with the Plan: yes    Handoff Referral Completed: No, handoff not indicated or clinically appropriate    Additional Information:  SW met with Pt and Pt's family to coordinate discharge planning. Pt's family identified [facility] as their preferred discharge location and identified having connected with [facility] in admissions. SW initiated a [facility] referral.  SW set up [employer] Stretcher transportation. SW Completed PCS and PAS was completed by colleagues. SW/RNCC will continue to follow for safe discharge placement and planning.     Gene Cook, NIMO   10/26/2024       Social Work and Care Management Department       SEARCHABLE in [facility] - search SOCIAL WORK       [place] ([phone]) Saturday and Sunday     Units: 4A Vocera, 4C Vocera, & 4E Vocera        Units: 5A 6902-1414 Vocera, 5A 3474-3704 Vocera , BMT  1 BMT SW 2, BMT SW 3 & BMT SW 4  5C Off Service 5401 - 5416  5C Off Service [id number]     Units: 6A Vocera & 6B Vocera      Units: 6C Vocera     Units: 7A Vocera & 7B Vocera      Units: 7C Med Surg 7401 thru 7418 and 7C Med Surg 7502 thru 7521      Unit: [facility]  Bank ED Vocera & [place] Obs Vocera     [facility] ([phone]) Saturday and Sunday      Units: 5 Ortho Vocera, 5 Med Surg Vocera & WB ED Vocera     Units: 6 Med Surg Vocera, 8 Med Surg Vocera, & 10 ICU Vocera      After hours Vocera [facility] and After Hours Vocera [place]     Please NOTE changes to times below:    **Saturday & Sunday (1630 - 2030)    **Mon-Fri (4440-1964)     **FV Recognized Holidays  ([id number])    Units: ALL   - see above VOCERA links to units

## 2024-10-26 NOTE — PROGRESS NOTES
Pt os AO x4, on 4 LPM via NC. Pain managed with meds (see MAR), reposition Q 2hrly. Pt occasionally refuses to be repositioned. 2 PIVs to R arm patent and SL.  This patient on Comfort Care. POC is ongoing.

## 2024-10-26 NOTE — PLAN OF CARE
Problem: Adult Inpatient Plan of Care  Goal: Optimal Comfort and Wellbeing  Intervention: Monitor Pain and Promote Comfort  Recent Flowsheet Documentation  Taken 10/25/2024 1939 by Miguel Guerra RN  Pain Management Interventions: medication (see MAR)     Problem: Adult Inpatient Plan of Care  Goal: Readiness for Transition of Care  Outcome: Progressing  Flowsheets (Taken 10/26/2024 0442)  Transportation Anticipated: other (see comments)  Barriers to Discharge: Patient reports feeling ready to die and go to the after life.  Intervention: Mutually Develop Transition Plan  Recent Flowsheet Documentation  Taken 10/26/2024 0442 by Miguel Guerra RN  Transportation Anticipated: other (see comments)     Problem: Skin Injury Risk Increased  Goal: Skin Health and Integrity  Intervention: Plan: Nurse Driven Intervention: Moisture Management  Recent Flowsheet Documentation  Taken 10/26/2024 0027 by Miguel Guerra RN  Moisture Interventions:   No brief in bed   Urinary collection device  Taken 10/25/2024 2000 by Miguel Guerra RN  Moisture Interventions:   No brief in bed   Urinary collection device     Problem: Palliative Care  Goal: Enhanced Quality of Life  Outcome: Progressing  Intervention: Maximize Comfort  Recent Flowsheet Documentation  Taken 10/25/2024 1939 by Miguel Guerra RN  Pain Management Interventions: medication (see MAR)   Goal Outcome Evaluation: Reported 10/10 pain at the start of the shift. Pain has been while controlled since. Repositioned but refused at times. No acute changes over night; plan of care on going.      Plan of Care Reviewed With: patient    Overall Patient Progress: improvingOverall Patient Progress: improving

## 2024-10-27 NOTE — PROGRESS NOTES
"[facility]    Medicine Progress Note - Hospitalist Service, GOLD TEAM 19    Date of Admission:  10/18/2024    Assessment & Plan   Dk Randhawa Bay is a 69 year old male with PMH RUL large cell lung cancer with metastases to the brain, bone w/ pathologic fractures, pancreas, L lung, pleura, and intrathoracic nodes (on palliative chemo and radiation, s/p RUL lobectomy), chronic anemia, chronic atrial fibrillation, neurogenic bladder with chronic grace admitted on 10/18/2024.      He initially presented to [facility] from his TCU on 10/18/24 with acute L neck swelling and associated shoulder pain.  He was found to have significant leukocytosis and imaging findings concerning for osteomyelitis of the L sternoclavicular junction vs progression of malignancy and acute hypoxic respiratory failure with bilateral lower lobe PNA and moderate R hydropneumothorax.  Transferred to [facility]- ICU 10/18/24 for ENT evaluation and biopsy / I&D of L neck swelling.   Transferred to hospitalist service on 10/19/24.     Clinical updates 10/27/2024:  Overall remains stable.  Comfort needs well-managed.  States he is requesting analgesics when needed.  Still drinking well, eating fairly well. \"I'm just very tired\".   -Continue comfort cares  -Anticipating discharge to LTC with hospice 10/28.  Appreciate SW, care coordination.     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 Friedell. Initially diagnosed with RUL large cell carcinoma after presenting on April 2024 with hemoptysis, confirmed on biopsy. Underwent RUL lobectomy May 2024 with adjacent nodes negative for malignancy. In the following months he was found to have metastasis involving the L lung, intrathoracic nodes, pancreas, brain, and bones with pathological " fractures. He underwent gamma knife radiation for brain mets, completed 3 or 4/7 cycles of carboplatin, etoposide, and atezolizumab, and underwent 1/10 radiation treatments to his hip. He has had delays in treatments due to hospitalization for septic shock 2/2 cellulitis while neutropenic and failure to thrive. Patient presented to outside ED on 10/18 with acute left neck swelling and associated shoulder pain. On admission he was found to have significant leukocytosis and imaging findings concerning for osteomyelitis of the L sternoclavicular junction vs progression of malignancy, as well as acute hypoxic respiratory failure with bilateral lower lobe PNA and moderate R hydropneumothorax. Chest CT at OSH negative for PE, did show ongoing moderate hydropneumothorax with a trace amount of air and fluid level decreased in size in comparison, bilateral lower lobe PNA, moderate sized gas and fluid collection in the L neck centered in the sternoclavicular joint with cortical destruction of the L clavicular head suspicious for septic arthritis (but progression of malignancy cannot be excluded), and progression of metastatic disease with new & enlarging lung nodules, new osseous mets (ribs), and ongoing lymphadenopathy. Transferred to [facility] ICU where ENT was consulted for possible biopsy. Due to concern for further cancer metastasis, current infection, and bacteremia, patient transitioned to comfort-focused care on 10/19/24. Pt is currently on high flow O2.   - If stable off high flow O2, may discharge to hospice facility. Otherwise, will plan to consult [facility] in AM  - Palliative consulted on admission, suspect will evaluate on 10/21 when back on service  - continue comfort measures  - Oxycodone 10 mg QID (was on chronic opioids for cancer associated pain)  - Oxycodone 5-10 mg Q3H PRN  - Dilaudid 0.3 mg - 0.5 mg Q2H PRN  - Ativan 1 mg Q3H PRN     # L sided neck swelling with imaging concerns for osteomyelitis  "vs progression of cancer   # CNS metastasis, s/p gamma knife   # Chronic pain r/t progressive bony metastasis with pathologic fractures  # Acute hypoxic respiratory failure  # Bilateral lower lobe PNA  # GPC in blood culture  # Leukemoid reaction   # Lactic acidosis  # Afib with RVR   # Lactic acidosis  # Hypernatremia  # Pathologic fractures involving the pelvis   # Generalized weakness and FTT 2/2 advancing malignancy  # Bilateral ankle wounds, lateral aspect  # Sacral wound  # Edema  ---   Comfort care measures as above        Diet: Regular Diet Adult  Snacks/Supplements Adult: Other; pudding, jello, mashed potatoes - see below; With Meals    DVT Prophylaxis: comfort cares   Lockwood Catheter: PRESENT, indication: Surgical procedure, Surgical procedure, Other (Comment);Acute retention or obstruction (chronic)  Lines: None     Cardiac Monitoring: None  Code Status: No CPR- Do NOT Intubate           Clive Palmer MD  Hospitalist Service, GOLD TEAM 19  [facility]  Securely message with [employer] (more info)  Text page via [employer] Paging/Directory   See signed in provider for up to date coverage information  ______________________________________________________________________    Interval History   Discomfort needs are being met.  \"I just feel really tired, I am ready to go to [facility]\".  Still drinking well, eating some.  No nausea or constipation.  Pain well-controlled does not feel dyspneic.    Physical Exam   Vital Signs:                    Weight: 211 lbs 3.21 oz  General: Alert and oriented, weak but converses well.  Voice normalizes after drinking water.  Chest: Breathing comfortably, CTA anteriorly  CV: Somewhat Irregular, tachycardic  Abdomen: Obese.  NABS.  Soft, nontender.  : Concentrated yellow urine in Lockwood catheter bag            "

## 2024-10-27 NOTE — PLAN OF CARE
"Goal Outcome Evaluation:      Plan of Care Reviewed With: patient    Overall Patient Progress: no change    Outcome Evaluation: pt has been refusing reposition this shift. pt has been getting PRNs and schedule pain meds. pt has been stable.      Problem: Adult Inpatient Plan of Care  Goal: Plan of Care Review  Description: The Plan of Care Review/Shift note should be completed every shift.  The Outcome Evaluation is a brief statement about your assessment that the patient is improving, declining, or no change.  This information will be displayed automatically on your shift  note.  Outcome: Progressing  Flowsheets (Taken 10/27/2024 0625)  Outcome Evaluation: pt has been refusing reposition this shift. pt has been getting PRNs and schedule pain meds. pt has been stable.  Plan of Care Reviewed With: patient  Overall Patient Progress: no change  Goal: Patient-Specific Goal (Individualized)  Description: You can add care plan individualizations to a care plan. Examples of Individualization might be:  \"Parent requests to be called daily at 9am for status\", \"I have a hard time hearing out of my right ear\", or \"Do not touch me to wake me up as it startles  me\".  Outcome: Progressing  Goal: Absence of Hospital-Acquired Illness or Injury  Outcome: Progressing  Intervention: Identify and Manage Fall Risk  Recent Flowsheet Documentation  Taken 10/26/2024 2240 by Petra Gupta RN  Safety Promotion/Fall Prevention:   clutter free environment maintained   increased rounding and observation   patient and family education   room near nurse's station   safety round/check completed  Intervention: Prevent Skin Injury  Recent Flowsheet Documentation  Taken 10/26/2024 2240 by Petra Gupta RN  Body Position: (pt refused repo)   position maintained   other (see comments)  Taken 10/26/2024 1952 by Petra Gupta RN  Body Position: (pt refused repo)   position maintained   other (see comments)  Intervention: Prevent and Manage VTE " (Venous Thromboembolism) Risk  Recent Flowsheet Documentation  Taken 10/26/2024 2240 by Petra Gupta RN  VTE Prevention/Management: SCDs off (sequential compression devices)  Intervention: Prevent Infection  Recent Flowsheet Documentation  Taken 10/26/2024 2240 by Petra Gupta RN  Infection Prevention:   rest/sleep promoted   single patient room provided  Goal: Optimal Comfort and Wellbeing  Outcome: Progressing  Intervention: Provide Person-Centered Care  Recent Flowsheet Documentation  Taken 10/26/2024 2240 by Petra Gupta RN  Trust Relationship/Rapport: care explained  Goal: Readiness for Transition of Care  Outcome: Progressing     Problem: Risk for Delirium  Goal: Optimal Coping  Outcome: Progressing  Goal: Improved Behavioral Control  Outcome: Progressing  Intervention: Minimize Safety Risk  Recent Flowsheet Documentation  Taken 10/26/2024 2240 by Petra Gupta RN  Enhanced Safety Measures:   pain management   room near unit station  Trust Relationship/Rapport: care explained  Goal: Improved Attention and Thought Clarity  Outcome: Progressing  Goal: Improved Sleep  Outcome: Progressing     Problem: Skin Injury Risk Increased  Goal: Skin Health and Integrity  Outcome: Progressing  Intervention: Plan: Nurse Driven Intervention: Moisture Management  Recent Flowsheet Documentation  Taken 10/26/2024 2240 by Petra Gupta RN  Moisture Interventions: No brief in bed  Taken 10/26/2024 2000 by Petra Gupta RN  Moisture Interventions: No brief in bed  Bathing/Skin Care: patient refused  Intervention: Plan: Nurse Driven Intervention: Friction and Shear  Recent Flowsheet Documentation  Taken 10/26/2024 2240 by Petra Gupta RN  Friction/Shear Interventions: Pad bony prominence (elbow pads, heel pads, ear protectors)  Intervention: Optimize Skin Protection  Recent Flowsheet Documentation  Taken 10/26/2024 2240 by Ptera Gupta RN  Head of Bed (HOB) Positioning: HOB at 30-45 degrees  Taken  10/26/2024 1952 by Petra Gupta, RN  Head of Bed (HOB) Positioning: HOB at 30-45 degrees     Problem: Palliative Care  Goal: Enhanced Quality of Life  Outcome: Progressing

## 2024-10-28 NOTE — PLAN OF CARE
"Goal Outcome Evaluation:      Plan of Care Reviewed With: patient    Overall Patient Progress: no change    Outcome Evaluation: pt has been stable this shift. no changes. pt cont. to refuse repo.      Problem: Adult Inpatient Plan of Care  Goal: Plan of Care Review  Description: The Plan of Care Review/Shift note should be completed every shift.  The Outcome Evaluation is a brief statement about your assessment that the patient is improving, declining, or no change.  This information will be displayed automatically on your shift  note.  Outcome: Not Progressing  Flowsheets (Taken 10/28/2024 0645)  Outcome Evaluation: pt has been stable this shift. no changes. pt cont. to refuse repo.  Plan of Care Reviewed With: patient  Overall Patient Progress: no change  Goal: Patient-Specific Goal (Individualized)  Description: You can add care plan individualizations to a care plan. Examples of Individualization might be:  \"Parent requests to be called daily at 9am for status\", \"I have a hard time hearing out of my right ear\", or \"Do not touch me to wake me up as it startles  me\".  Outcome: Not Progressing  Goal: Absence of Hospital-Acquired Illness or Injury  Outcome: Not Progressing  Intervention: Identify and Manage Fall Risk  Recent Flowsheet Documentation  Taken 10/27/2024 2002 by Petra Gupta RN  Safety Promotion/Fall Prevention:   assistive device/personal items within reach   clutter free environment maintained   lighting adjusted   safety round/check completed   room near nurse's station  Intervention: Prevent Skin Injury  Recent Flowsheet Documentation  Taken 10/27/2024 2002 by Petra Gupta RN  Body Position: (pt refused repo) position maintained  Intervention: Prevent and Manage VTE (Venous Thromboembolism) Risk  Recent Flowsheet Documentation  Taken 10/27/2024 2002 by Petra Gupta RN  VTE Prevention/Management: SCDs off (sequential compression devices)  Intervention: Prevent Infection  Recent Flowsheet " Documentation  Taken 10/27/2024 2002 by Petra Gupta RN  Infection Prevention:   rest/sleep promoted   single patient room provided  Goal: Optimal Comfort and Wellbeing  Outcome: Not Progressing  Goal: Readiness for Transition of Care  Outcome: Not Progressing     Problem: Risk for Delirium  Goal: Optimal Coping  Outcome: Not Progressing  Goal: Improved Behavioral Control  Outcome: Not Progressing  Intervention: Minimize Safety Risk  Recent Flowsheet Documentation  Taken 10/27/2024 2002 by Petra Gupta RN  Enhanced Safety Measures:   pain management   room near unit station  Goal: Improved Attention and Thought Clarity  Outcome: Not Progressing  Goal: Improved Sleep  Outcome: Not Progressing     Problem: Skin Injury Risk Increased  Goal: Skin Health and Integrity  Outcome: Not Progressing  Intervention: Plan: Nurse Driven Intervention: Moisture Management  Recent Flowsheet Documentation  Taken 10/27/2024 2002 by Petra Gupta RN  Moisture Interventions: Incontinence pad  Taken 10/27/2024 2000 by Petra Gupta RN  Moisture Interventions: Incontinence pad  Bathing/Skin Care: patient refused  Intervention: Plan: Nurse Driven Intervention: Friction and Shear  Recent Flowsheet Documentation  Taken 10/27/2024 2002 by Petra Gupta RN  Friction/Shear Interventions:   HOB 30 degrees or less   Pad bony prominence (elbow pads, heel pads, ear protectors)  Intervention: Optimize Skin Protection  Recent Flowsheet Documentation  Taken 10/27/2024 2002 by Petra Gupta RN  Head of Bed (HOB) Positioning: HOB at 30-45 degrees     Problem: Palliative Care  Goal: Enhanced Quality of Life  Outcome: Not Progressing

## 2024-10-28 NOTE — PROGRESS NOTES
Care Management Discharge Note    Discharge Date: 10/28/2024     Discharge Disposition: SNF    [facility] (SNF)  [address]  [facility] [address]  [phone]    Discharge Services: Transportation Services, Hospice, LTC SNF    [facility]  [address]  [facility] [address]  [phone]    Discharge DME: None    Discharge Transportation: [facility] stretcher transportation between 11:19 am and 12:04 pm [phone]    Private pay costs discussed: transportation costs    Does the patient's insurance plan have a 3 day qualifying hospital stay waiver?  No    PAS Confirmation Code: [id number]      Patient/family educated on Medicare website which has current facility and service quality ratings: No    Education Provided on the Discharge Plan: No    Persons Notified of Discharge Plans: Charge Nurse, Bedside Nurse, MD, Patient, Patient's Brother and Niece, SNF Staff, Hospice Staff    Patient/Family in Agreement with the Plan: Yes    Handoff Referral Completed: Yes, non-[facility] PCP: External handoff communication completed    Additional Information:    Patient to discharge to LTC SNF with [facility] today via [facility] Stretcher transportation between 11:19 am and 12:04 pm. Ph: [phone]. PCS Form and IMM completed by weekend SW. Discharge orders sent via [employer]. Nurse to nurse report to be completed by calling [phone]. Hand off to patient's primary care physician completed.      TERENCE Avila, MSW  6th Floor Medical Surgical Unit  Phone [phone]      Message me securely in [employer]

## 2024-10-28 NOTE — PROGRESS NOTES
Prior Authorization Approval    Medication: LORAZEPAM 1 MG PO TABS  PA Initiated: 10/28/2024  PA Type: Clinical    Insurance: [employer] - Phone [phone] Fax [phone]  [facility] Key / Reference #: [id number] / [id number]   Authorization Effective Dates: 10/28/2024 - 10/28/2025    Expected CoPay: $ 1.60  CoPay Card Eligible: No    Filling Pharmacy: [place] [id number]  Comments:  Plan approved only 5 tablets per day.    Petra Strong  [facility] Pharm[facility] Liaison, [place]  Ph: [phone]  Fax: [phone]  Available on Teams and Vocera

## 2024-10-28 NOTE — DISCHARGE SUMMARY
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 Comfort-focused care, enrolling in Hospice           Discharge Disposition   Discharged to nursing home with [facility]   Condition at discharge: Terminal    Hospital Course   Dk [place] is a 69 year old male with PMH RUL large cell lung cancer with metastases to the brain, bone w/ pathologic fractures, pancreas, L lung, pleura, and intrathoracic nodes (on palliative chemo and radiation, s/p RUL lobectomy), chronic anemia, chronic atrial fibrillation, neurogenic bladder with chronic grace admitted on 10/18/2024.      He initially presented to [facility] from his TCU on 10/18/24 with acute L neck swelling and associated shoulder pain.  He was found to have significant leukocytosis and imaging findings concerning for osteomyelitis of the L sternoclavicular junction vs progression of malignancy and acute hypoxic respiratory failure with bilateral lower lobe PNA and moderate R hydropneumothorax.  Transferred to [facility]- ICU 10/18/24 for ENT evaluation and biopsy / I&D of L neck swelling.   Transferred to hospitalist service on 10/19/24.     Clinical updates 10/28/2024:  Overall remains stable.  Comfort needs well-managed.  He is requesting analgesics when needed.  Still drinking well, eating fairly well. \"I'm just very tired, I am ready to go to heaven\".   -Continue comfort cares  -Discharge to LTC with [facility] 10/28.      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 Friedell. Initially diagnosed with RUL large cell carcinoma after presenting on April 2024 with hemoptysis, confirmed on biopsy. Underwent RUL lobectomy May 2024 with adjacent nodes negative for malignancy. In the following months he was found to have metastasis involving the L lung, intrathoracic nodes, pancreas, brain, and bones with pathological fractures. He underwent gamma knife radiation for brain mets, completed 3 or 4/7 cycles of carboplatin, etoposide, and atezolizumab, and underwent 1/10 radiation treatments to his hip. He has had delays in treatments due to hospitalization for septic shock 2/2 cellulitis while neutropenic and failure to thrive. Patient presented to outside ED on 10/18 with acute left neck swelling and associated shoulder pain. On admission he was found to have significant leukocytosis and imaging findings concerning for osteomyelitis of the L sternoclavicular junction vs progression of malignancy, as well as acute hypoxic respiratory failure with bilateral lower lobe PNA and moderate R hydropneumothorax. Chest CT at OSH negative for PE, did show ongoing moderate hydropneumothorax with a trace amount of air and fluid level decreased in size in comparison, bilateral lower lobe PNA, moderate sized gas and fluid collection in the L neck centered in the sternoclavicular joint with cortical destruction of the L clavicular head suspicious for septic arthritis (but progression of malignancy cannot be excluded), and progression of metastatic disease with new & enlarging lung nodules, new osseous mets (ribs), and ongoing lymphadenopathy. Transferred to [facility] ICU where ENT was consulted for possible biopsy. Due to concern for further cancer metastasis, current infection, and bacteremia, patient transitioned to comfort-focused care on 10/19/24.   -Continue O2 as needed for comfort  - continue comfort measures  -  Oxycodone 10 mg QID (was on chronic opioids for cancer associated pain)  - Oxycodone 5-10 mg Q3H PRN  - Ativan 1 mg Q3H PRN  -Continue current bowel regimen  -do not remove grace catheter. H/O neurogenic bladder with chronic grace.      # L sided neck swelling with imaging concerns for osteomyelitis vs progression of cancer   # CNS metastasis, s/p gamma knife   # Chronic pain r/t progressive bony metastasis with pathologic fractures  # Neurogenic Bladder with chronic grace  # Acute hypoxic respiratory failure  # Bilateral lower lobe PNA  # GPC in blood culture  # Leukemoid reaction   # Lactic acidosis  # Afib with RVR   # Lactic acidosis  # Hypernatremia  # Pathologic fractures involving the pelvis   # Generalized weakness and FTT 2/2 advancing malignancy  # Bilateral ankle wounds, lateral aspect  # Sacral wound  # Edema  ---   Comfort care measures as above        Consultations This Hospital Stay   PHARMACY TO DOSE VANCO  PHYSICAL THERAPY ADULT IP CONSULT  OCCUPATIONAL THERAPY ADULT IP CONSULT  ENT IP CONSULT  INTERVENTIONAL RADIOLOGY ADULT/PEDS IP CONSULT  WOUND OSTOMY CONTINENCE NURSE  IP CONSULT  NURSING TO CONSULT FOR VASCULAR ACCESS CARE IP CONSULT  NUTRITION SERVICES ADULT IP CONSULT  CARE MANAGEMENT / SOCIAL WORK IP CONSULT  INFECTIOUS DISEASE [facility] ADULT IP CONSULT  ORTHOPAEDIC SURGERY ADULT/PEDS IP CONSULT  INFECTIOUS DISEASE [facility] ADULT IP CONSULT  SPIRITUAL HEALTH SERVICES IP CONSULT  PALLIATIVE CARE ADULT IP CONSULT  SPIRITUAL HEALTH SERVICES IP CONSULT  SPIRITUAL HEALTH SERVICES IP CONSULT  CARE MANAGEMENT / SOCIAL WORK IP CONSULT  SOCIAL WORK IP CONSULT  SPIRITUAL HEALTH SERVICES IP CONSULT  SPIRITUAL HEALTH SERVICES IP CONSULT    Code Status   No CPR- Do NOT Intubate    Time Spent on this Encounter   I, Clive Palmer MD, personally saw the patient today and spent greater than 30 minutes discharging this patient.       Clive Palmer MD  [facility]  SURG  [address] [facility] [address]  Phone: [phone]  Fax: [phone]  ______________________________________________________________________    Physical Exam   Vital Signs:                    Weight: 211 lbs 3.21 oz  General: Alert and oriented, weak but converses well.  Voice normalizes after drinking water.  Chest: Breathing comfortably, CTA anteriorly  CV: Somewhat Irregular, tachycardic  Abdomen: Obese.  NABS.  Soft, nontender.  : Concentrated yellow urine in Lockwood catheter bag           Primary Care Physician   Esteban Copeland    Discharge Orders      General info for SNF    Length of Stay Estimate: Long Term Care  Condition at Discharge: Terminal  Level of care:skilled   Rehabilitation Potential: Poor  Admission H&P remains valid and up-to-date: Yes  Recent Chemotherapy: N/A  Use Nursing Home Standing Orders: Yes     Mantoux instructions    Give two-step Mantoux (PPD) Per Facility Policy Yes     Follow Up and recommended labs and tests    Follow up with Nursing home physician.  No follow up labs or test are needed. On comfort cares with hospice     Reason for your hospital stay    Metastatic lung cancer with terminal diagnosis, now on comfort care, enrolling in hospice     Lockwood catheter    To straight gravity drainage. DO NOT change catheter without a specific Provider order IF diagnosis of benign prostatic hypertrophy (BPH), neurogenic bladder, or other urological conditions     Activity - Up with nursing assistance     No CPR- Do NOT Intubate    Comfort care with hospice     Oxygen (SNF/TCU) Discharge    As needed for comfort     Diet    Follow this diet upon discharge:       Snacks/Supplements Adult: Other; pudding, jello, mashed potatoes - see below; With Meals      Regular Diet Adult as tolerated for comfort       Discharge Follow-up Details         Significant Results and Procedures   Most Recent 3 CBC's:  Recent Labs   Lab Test 10/19/24  0606 10/19/24  0522 10/18/24  5520    WBC 51.0* 58.9* 54.4*   HGB 7.6* 5.3* 8.5*   MCV 98 97 98    292 262     Most Recent 3 BMP's:  Recent Labs   Lab Test 10/19/24  0848 10/19/24  0522 10/19/24  0351 10/19/24  0050 10/18/24  2234 10/18/24  2126 10/18/24  1226   NA  --  149*  --   --  140  --  141   POTASSIUM  --  4.2  --   --  4.8  --  4.2   CHLORIDE  --  112*  --   --  104  --  102   CO2  --  23  --   --  19*  --  25   BUN  --  13.0  --   --  43.9*  --  37.2*   CR  --  0.74  --   --  0.77  --  0.60*   ANIONGAP  --  14  --   --  17*  --  14   COLLINS  --  8.8  --   --  9.3  --  9.4   * 250* 249*   < > 257*   < > 199*    < > = values in this interval not displayed.     Most Recent 2 LFT's:  Recent Labs   Lab Test 10/18/24  2234 10/18/24  1226   AST 11 18   ALT 16 18   ALKPHOS 238* 265*   BILITOTAL 0.5 0.5   ,   Results for orders placed or performed during the hospital encounter of 10/18/24   XR Finger Port Right G/E 2 Views    Narrative    EXAM: XR FINGER PORT RIGHT G/E 2 VIEWS  LOCATION: [facility]  DATE: 10/19/2024    INDICATION: Redness, swelling, and decraesed ROM to pointer and middle fingers. Recently treated for cellulitis of this hand. Eval for soft tissue gas, fracture, or signs of osteo  COMPARISON: None.      Impression    IMPRESSION: No soft tissue gas or bony erosive changes. No acute process identified. 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     XR Chest Port 1 View    Narrative    XR CHEST PORT 1 VIEW 10/19/2024 8:51 AM      HISTORY: Interval monitoring of hydropneumothorax    COMPARISON: Chest CT with contrast 10/18/2024. Chest radiograph  9/17/2024.     FINDINGS: Frontal view of the chest. Right upper lobectomy. Right  Port-A-Cath tip projects over the innominate vein and is somewhat  kinked which is thought to be projectional. Stable heart size. Aortic  arch calcification. Left lung  pulmonary nodules more conspicuous on  chest CT. Indistinct air space opacities in the lung bases. Small  amount of pleural air in the right lower lung. Blunting of the right  costophrenic angle.      Impression    IMPRESSION:   1. Stable right basilar hydropneumothorax.  2. Persistent bibasilar infectious/inflammatory opacities.    I have personally reviewed the examination and initial interpretation  and I agree with the findings.    SINGH DIAL MD         SYSTEM ID:  [id number]       Discharge Medications   Current Discharge Medication List        START taking these medications    Details   carboxymethylcellulose PF (REFRESH PLUS) 0.5 % ophthalmic solution Place 1 drop into both eyes every hour as needed for dry eyes.    Associated Diagnoses: Malignant neoplasm metastatic to brain (H); Malignant neoplasm metastatic to bone (H); Malignant neoplasm metastatic to pancreas (H); Large cell carcinoma of lung, right (H)      LORazepam (ATIVAN) 1 MG tablet Place 1 tablet (1 mg) under the tongue every 3 hours as needed for anxiety or nausea.  Qty: 20 tablet, Refills: 0    Associated Diagnoses: Malignant neoplasm metastatic to brain (H); Malignant neoplasm metastatic to bone (H); Malignant neoplasm metastatic to pancreas (H); Large cell carcinoma of lung, right (H)      mineral oil-hydrophilic petrolatum (AQUAPHOR) external ointment Apply topically every hour as needed for dry skin.    Associated Diagnoses: Malignant neoplasm metastatic to brain (H); Malignant neoplasm metastatic to bone (H); Malignant neoplasm metastatic to pancreas (H); Large cell carcinoma of lung, right (H)      oxyCODONE (ROXICODONE) 5 MG/5ML solution Take 10 mLs (10 mg) by mouth 4 times daily. May also take 5 mLs (5 mg) every 3 hours as needed for moderate to severe pain. May also take 10 mLs (10 mg) every 3 hours as needed for moderate to severe pain. Do all this for 4 days. Take 10 mg by mouth 4 times daily. May take additional 5 mg every 3  hours prn moderate pain or 10 mg every 3 hours prn moderate to severe pain..  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 Hold if loose stools.    Associated Diagnoses: Drug-induced constipation      sennosides (SENOKOT) 8.8 MG/5ML syrup Take 5 mLs by mouth 2 times daily as needed for constipation.    Associated Diagnoses: Drug-induced constipation           CONTINUE these medications which have CHANGED    Details   acetaminophen (TYLENOL) 325 MG tablet Take 3 tablets (975 mg) by mouth every 6 hours as needed for mild pain or fever.    Associated Diagnoses: Malignant neoplasm metastatic to brain (H); Malignant neoplasm metastatic to bone (H); Malignant neoplasm metastatic to pancreas (H); Large cell carcinoma of lung, right (H)      bisacodyl (DULCOLAX) 10 MG suppository Place 1 suppository (10 mg) rectally daily as needed for constipation.    Associated Diagnoses: Drug-induced constipation           CONTINUE these medications which have NOT CHANGED    Details   ipratropium - albuterol 0.5 mg/2.5 mg/3 mL (DUONEB) 0.5-2.5 (3) MG/3ML neb solution Take 1 vial (3 mLs) by nebulization 4 times daily as needed for shortness of breath, wheezing or cough.    Associated Diagnoses: Acute cough      Menthol, Topical Analgesic, (BIOFREEZE EX) Externally apply topically 4 times daily as needed.      SENNA-docusate sodium (SENNA S) 8.6-50 MG tablet Take 1 tablet by mouth 2 times daily.           STOP taking these medications       amiodarone (PACERONE) 200 MG tablet Comments:   Reason for Stopping:          dexAMETHasone (DECADRON) 4 MG tablet Comments:   Reason for Stopping:         digoxin (LANOXIN) 125 MCG tablet Comments:   Reason for Stopping:         diltiazem ER (DILT-XR) 120 MG 24 hr capsule Comments:   Reason for Stopping:         fexofenadine (ALLEGRA) 180 MG tablet Comments:   Reason for Stopping:         furosemide (LASIX) 40 MG tablet Comments:   Reason for Stopping:         guaiFENesin (MUCINEX) 600 MG 12 hr tablet Comments:   Reason for Stopping:         morphine (MS CONTIN) 15 MG CR tablet Comments:   Reason for Stopping:         oxyCODONE (ROXICODONE) 5 MG tablet Comments:   Reason for Stopping:             Allergies   No Known Allergies

## 2024-10-28 NOTE — CONSULTS
SPIRITUAL HEALTH SERVICES Consult Note  [facility] ([facility] 6B    Provided visit per Hoemr's anticipated discharge this afternoon. I offered a few Hail Crystal's, which continue to be very comforting to Homer. He was resting with a wooden cross and a typed Hail Crystal on his body when I entered. Tears came to Homer's eyes, and I offered a few words of prayer and blessing over his discharge to hospice.     Lena Peter M.Div.  Associate   Pager [phone]  Reachable via [employer]    * [facility] remains available 24/7 for emergent requests/referrals, either by having the switchboard page the on-call  or by entering an ASAP/STAT consult in Epic (this will also page the on-call ). Routine Epic consults receive an initial response within 24 hours.*

## 2024-10-28 NOTE — PROGRESS NOTES
Pt AO x4, on 4 LPM via NC. Scattered bruises to bilateral extremities. Pt occasionally refuses to be repositioned. Pain managed with meds.   Continue to monitor, provide psychosocial support and provide comfort measures.

## 2024-10-28 NOTE — PLAN OF CARE
Goal Outcome Evaluation:       6MS DISCHARGE    D: Patient discharged to SNF with hospice at 1300. Patient accompanied by EMT.    I: Discharge prescriptions given to patient. All discharge medications and instructions reviewed with facility.  Phone numbers to call with questions or concerns after discharge reviewed. PIV removed. Education completed.    A: Pt verbalized understanding of discharge medications and instructions. Prescribed home medications given to patient.  Belongings returned to patient.     Pt on comfort care. On 4l NC. Prn pain meds given per order.

## 2024-10-29 NOTE — PROGRESS NOTES
[facility]    Background: Transitional Care Management program identified per system criteria and reviewed by [facility] team for possible outreach.    Assessment: Upon chart review, CCRC Team member will not proceed with patient outreach related to this episode of Transitional Care Management program due to reason below:    Non-[facility] TCU: CCRC team member noted patient discharged to TCU/ARU/LTACH. Patient is not established with a [facility] currently supported by Primary Care-Care Coordination therefore handoff to Primary Care-Care Coordination is not appropriate at this time.    Plan: Transitional Care Management episode addressed appropriately per reason noted above.      JOCY Angulo  [phone]  [facility]     *Connected Care Resource Team does NOT follow patient ongoing. Referrals are identified based on internal discharge reports and the outreach is to ensure patient has an understanding of their discharge instructions.

## 2024-11-26 ENCOUNTER — PATIENT OUTREACH (OUTPATIENT)
Dept: ONCOLOGY | Facility: CLINIC | Age: 69
End: 2024-11-26
Payer: COMMERCIAL

## 2024-11-26 NOTE — PROGRESS NOTES
[facility]: Cancer Care                                                                                          Removed RNCC Aida Campbell from pt care team.     Signature:  CANDIDA MENDEZ RN

## (undated) DEVICE — STPL DAVINCI SUREFORM 45MM CVD TIP RELOAD 12FIRE 480545

## (undated) DEVICE — ENDO VALVE SUCTION BRONCH EVIS MAJ-209

## (undated) DEVICE — DAVINCI XI DRAPE COLUMN 470341

## (undated) DEVICE — DAVINCI XI DRAPE ARM 470015

## (undated) DEVICE — ESU PROBE ERBE FLEX 1.7MMX15MR 20402-410

## (undated) DEVICE — ENDO CATHETER ION 490105

## (undated) DEVICE — SU VICRYL 3-0 SH 27" UND J416H

## (undated) DEVICE — STPL DAVINCI SUREFORM 45MM RELOAD GREEN 48345G

## (undated) DEVICE — GLOVE BIOGEL PI MICRO SZ 7.0 48570

## (undated) DEVICE — DECANTER BAG 2002S

## (undated) DEVICE — SYR 10ML LL W/O NDL

## (undated) DEVICE — SU SILK 0 24" TIE SA76G

## (undated) DEVICE — GOWN XLG DISP 9545

## (undated) DEVICE — SU MONOCRYL 4-0 PS-2 18" UND Y496G

## (undated) DEVICE — CATH TRAY FOLEY SURESTEP 16FR W/URINE MTR STATLK LF A303416A

## (undated) DEVICE — PREP PAD ALCOHOL 6818

## (undated) DEVICE — ESU GROUND PAD UNIVERSAL W/O CORD

## (undated) DEVICE — LABEL MEDICATION SYSTEM  3304

## (undated) DEVICE — ESU ELEC BLADE 2.75" COATED/INSULATED E1455

## (undated) DEVICE — BLADE KNIFE SURG 15 371115

## (undated) DEVICE — DAVINCI XI SEAL UNIVERSAL 5-12MM 470500

## (undated) DEVICE — PREP CHLORAPREP 26ML TINTED ORANGE  260815

## (undated) DEVICE — SOL WATER IRRIG 1000ML BOTTLE 07139-09

## (undated) DEVICE — SU SILK 0 SH 30" K834H

## (undated) DEVICE — SOL NACL 0.9% IRRIG 1000ML BOTTLE 2F7124

## (undated) DEVICE — LUBRICANT INST KIT ENDO-LUBE 220-90

## (undated) DEVICE — DRAPE C-ARM 60X42" 1013

## (undated) DEVICE — SOL WATER IRRIG 1000ML BOTTLE 2F7114

## (undated) DEVICE — SU VICRYL 2-0 CT-2 27" UND J269H

## (undated) DEVICE — GLOVE BIOGEL PI MICRO INDICATOR UNDERGLOVE SZ 7.5 48975

## (undated) DEVICE — SU VICRYL+ 0 27 UR6 VLT VCP603H

## (undated) DEVICE — SYR 30ML SLIP TIP W/O NDL 302833

## (undated) DEVICE — SYR 30ML LL W/O NDL 302832

## (undated) DEVICE — SU DERMABOND ADVANCED .7ML DNX12

## (undated) DEVICE — DAVINCI XI OBTURATOR BLADELESS 8MM 470359

## (undated) DEVICE — NDL COUNTER 20CT 31142493

## (undated) DEVICE — TUBING CONMED AIRSEAL SMOKE EVAC INSUFFLATION ASM-EVAC

## (undated) DEVICE — KIT ENDO FIRST STEP DISINFECTANT 200ML W/POUCH EP-4

## (undated) DEVICE — SUCTION MANIFOLD NEPTUNE 2 SYS 1 PORT 702-025-000

## (undated) DEVICE — DAVINCI XI RETR ENDOWRIST SMALL GRAPTOR 470318

## (undated) DEVICE — COVER NEOPROBE SOFTFLEX 5X96" W/BANDS 20-PC596

## (undated) DEVICE — PITCHER STERILE 1000ML  SSK9004A

## (undated) DEVICE — BASIN SET MINOR DISP

## (undated) DEVICE — PACK MINOR SBA15MIFSE

## (undated) DEVICE — SUCTION DRY CHEST DRAIN OASIS 3600-100

## (undated) DEVICE — ENDO FORCEP ALLIGATOR JAW BIOPSY 2MMX100CM FB-211D

## (undated) DEVICE — PREP CHLORAPREP 26ML TINTED HI-LITE ORANGE 930815

## (undated) DEVICE — ADH LIQUID MASTISOL TOPICAL VIAL 2-3ML 0523-48

## (undated) DEVICE — ANTIFOG SOLUTION SEE SHARP 150M TROCAR SWABS 30978

## (undated) DEVICE — LABEL MEDICATION SYSTEM 3303-P

## (undated) DEVICE — LINEN TOWEL PACK X5 5464

## (undated) DEVICE — NDL ASPIRATION EBUS-VIZISHOT 22G NA-U401SX-4022-A

## (undated) DEVICE — LUBRICANT INST ELECTROLUBE EL101

## (undated) DEVICE — CONNECTOR SWIVEL 7.0MM ION 490108

## (undated) DEVICE — SU PROLENE 2-0 SHDA 36" 8523H

## (undated) DEVICE — DRAPE IOBAN INCISE 23X17" 6650EZ

## (undated) DEVICE — BLADE KNIFE SURG 11 371111

## (undated) DEVICE — SPONGE RAY-TEC 4X8" 7318

## (undated) DEVICE — LIGHT HANDLE X2

## (undated) DEVICE — SYR 10ML SLIP TIP W/O NDL 303134

## (undated) DEVICE — TUBING SUCTION 10'X3/16" N510

## (undated) DEVICE — DAVINCI XI GRASPER ENDOWRIST BIPOLAR LONG 470400

## (undated) DEVICE — POUCH TISSUE RETRIEVAL 15MM 6.00" INTRO TRS190SB2

## (undated) DEVICE — NDL BLUNT 18GA 1" W/O FILTER 305181

## (undated) DEVICE — SURGICEL ABSORBABLE HEMOSTAT SNOW 2"X4" 2082

## (undated) DEVICE — POUCH TISSUE RETRIEVAL LAP 10MM 2.21" INTRO TRS100SB2

## (undated) DEVICE — SYR 10ML FINGER CONTROL W/O NDL 309695

## (undated) DEVICE — PROBE PERIPHERAL VISION ION 490206

## (undated) DEVICE — DRSG STERI STRIP 1/2X4" R1547

## (undated) DEVICE — DRSG TEGADERM 2 1/2X 2 3/4"

## (undated) DEVICE — ADAPTER PROBE/SUCTION VISION ION 490101

## (undated) DEVICE — ENDO TROCAR CONMED AIRSEAL BLADELESS 12X120MM IAS12-120LP

## (undated) DEVICE — ESU PENCIL W/HOLSTER E2350H

## (undated) DEVICE — SOL NACL 0.9% IRRIG 1000ML BOTTLE 07138-09

## (undated) DEVICE — DRAPE BREAST/CHEST 29420

## (undated) DEVICE — STPL DAVINCI SUREFORM 45MM RELOAD BLUE 48345B

## (undated) DEVICE — ESU ELEC BLADE 6" COATED/INSULATED E1455-6

## (undated) DEVICE — STOCKING SLEEVE COMPRESSION CALF LG

## (undated) DEVICE — ENDO VALVE BX EVIS MAJ-210

## (undated) DEVICE — DECANTER VIAL 2006S

## (undated) DEVICE — STPL DAVINCI SUREFORM 45MM RELOAD WHITE 48345W

## (undated) DEVICE — BAG INSTRUMENT IV VISION ION 490127

## (undated) DEVICE — DRAPE SHEET REV FOLD 3/4 9349

## (undated) DEVICE — DRSG TELFA 3X8" 1238

## (undated) DEVICE — DRAIN CHEST TUBE 28FR STR 8028

## (undated) DEVICE — DRSG GAUZE 2X2" 8042

## (undated) DEVICE — PACK LAP TRANSVERSE STD

## (undated) DEVICE — NDL 25GA 1.5" 305127

## (undated) DEVICE — ESU PENCIL W/COATED BLADE E2450H

## (undated) RX ORDER — CHLORHEXIDINE GLUCONATE ORAL RINSE 1.2 MG/ML
SOLUTION DENTAL
Status: DISPENSED
Start: 2024-01-01

## (undated) RX ORDER — ONDANSETRON 2 MG/ML
INJECTION INTRAMUSCULAR; INTRAVENOUS
Status: DISPENSED
Start: 2024-01-01

## (undated) RX ORDER — CEFAZOLIN SODIUM 1 G/3ML
INJECTION, POWDER, FOR SOLUTION INTRAMUSCULAR; INTRAVENOUS
Status: DISPENSED
Start: 2024-01-01

## (undated) RX ORDER — FENTANYL CITRATE 50 UG/ML
INJECTION, SOLUTION INTRAMUSCULAR; INTRAVENOUS
Status: DISPENSED
Start: 2024-01-01

## (undated) RX ORDER — PHENYLEPHRINE HYDROCHLORIDE 25 MG/ML
SOLUTION/ DROPS OPHTHALMIC
Status: DISPENSED
Start: 2018-02-26

## (undated) RX ORDER — ACETAMINOPHEN 325 MG/1
TABLET ORAL
Status: DISPENSED
Start: 2024-01-01

## (undated) RX ORDER — DEXAMETHASONE SODIUM PHOSPHATE 4 MG/ML
INJECTION, SOLUTION INTRA-ARTICULAR; INTRALESIONAL; INTRAMUSCULAR; INTRAVENOUS; SOFT TISSUE
Status: DISPENSED
Start: 2024-01-01

## (undated) RX ORDER — DEXAMETHASONE SODIUM PHOSPHATE 10 MG/ML
INJECTION, SOLUTION INTRAMUSCULAR; INTRAVENOUS
Status: DISPENSED
Start: 2024-01-01

## (undated) RX ORDER — CYCLOPENTOLATE HYDROCHLORIDE 10 MG/ML
SOLUTION/ DROPS OPHTHALMIC
Status: DISPENSED
Start: 2019-01-30

## (undated) RX ORDER — VECURONIUM BROMIDE 1 MG/ML
INJECTION, POWDER, LYOPHILIZED, FOR SOLUTION INTRAVENOUS
Status: DISPENSED
Start: 2024-01-01

## (undated) RX ORDER — PROPOFOL 10 MG/ML
INJECTION, EMULSION INTRAVENOUS
Status: DISPENSED
Start: 2024-01-01

## (undated) RX ORDER — HYDROMORPHONE HYDROCHLORIDE 1 MG/ML
INJECTION, SOLUTION INTRAMUSCULAR; INTRAVENOUS; SUBCUTANEOUS
Status: DISPENSED
Start: 2024-01-01

## (undated) RX ORDER — LIDOCAINE HYDROCHLORIDE AND EPINEPHRINE 10; 10 MG/ML; UG/ML
INJECTION, SOLUTION INFILTRATION; PERINEURAL
Status: DISPENSED
Start: 2024-01-01

## (undated) RX ORDER — FENTANYL CITRATE-0.9 % NACL/PF 10 MCG/ML
PLASTIC BAG, INJECTION (ML) INTRAVENOUS
Status: DISPENSED
Start: 2024-01-01

## (undated) RX ORDER — CELECOXIB 200 MG/1
CAPSULE ORAL
Status: DISPENSED
Start: 2024-01-01

## (undated) RX ORDER — TROPICAMIDE 10 MG/ML
SOLUTION/ DROPS OPHTHALMIC
Status: DISPENSED
Start: 2018-02-26

## (undated) RX ORDER — KETOROLAC TROMETHAMINE 30 MG/ML
INJECTION, SOLUTION INTRAMUSCULAR; INTRAVENOUS
Status: DISPENSED
Start: 2024-01-01

## (undated) RX ORDER — BUPIVACAINE HYDROCHLORIDE 5 MG/ML
INJECTION, SOLUTION EPIDURAL; INTRACAUDAL
Status: DISPENSED
Start: 2024-01-01

## (undated) RX ORDER — GABAPENTIN 100 MG/1
CAPSULE ORAL
Status: DISPENSED
Start: 2024-01-01

## (undated) RX ORDER — ENOXAPARIN SODIUM 100 MG/ML
INJECTION SUBCUTANEOUS
Status: DISPENSED
Start: 2024-01-01

## (undated) RX ORDER — LIDOCAINE HYDROCHLORIDE 10 MG/ML
INJECTION, SOLUTION EPIDURAL; INFILTRATION; INTRACAUDAL; PERINEURAL
Status: DISPENSED
Start: 2024-01-01

## (undated) RX ORDER — CYCLOPENTOLATE HYDROCHLORIDE 10 MG/ML
SOLUTION/ DROPS OPHTHALMIC
Status: DISPENSED
Start: 2018-02-26

## (undated) RX ORDER — TROPICAMIDE 10 MG/ML
SOLUTION/ DROPS OPHTHALMIC
Status: DISPENSED
Start: 2019-01-30

## (undated) RX ORDER — GLYCOPYRROLATE 0.2 MG/ML
INJECTION, SOLUTION INTRAMUSCULAR; INTRAVENOUS
Status: DISPENSED
Start: 2024-01-01

## (undated) RX ORDER — PHENYLEPHRINE HYDROCHLORIDE 25 MG/ML
SOLUTION/ DROPS OPHTHALMIC
Status: DISPENSED
Start: 2019-01-30

## (undated) RX ORDER — CEFAZOLIN SODIUM/WATER 2 G/20 ML
SYRINGE (ML) INTRAVENOUS
Status: DISPENSED
Start: 2024-01-01